# Patient Record
Sex: FEMALE | Race: ASIAN | NOT HISPANIC OR LATINO | Employment: UNEMPLOYED | URBAN - METROPOLITAN AREA
[De-identification: names, ages, dates, MRNs, and addresses within clinical notes are randomized per-mention and may not be internally consistent; named-entity substitution may affect disease eponyms.]

---

## 2020-02-26 ENCOUNTER — TELEPHONE (OUTPATIENT)
Dept: TRANSPLANT | Facility: CLINIC | Age: 55
End: 2020-02-26

## 2020-02-26 NOTE — TELEPHONE ENCOUNTER
----- Message from Jillian Delgado sent at 2/26/2020  4:09 PM CST -----  Have the patients demographic sheet which includes: patients name, mailing address, phone number and insurance information. We will check to see if patient medical records are in Care Everywhere:[facility]    By: Jillian Delgado   Post-Care Instructions: I reviewed with the patient in detail post-care instructions. Patient should keep wound covered and call the office should any redness, pain, swelling or worsening occur. Curette Text (Optional): After the contents were expressed a curette was used to partially remove the cyst wall. Include Sutures?: No Suture Text: The incision was partially closed with Method: 11 blade Lesion Type: Cyst Epidermal Closure: simple interrupted Epidermal Sutures: 4-0 Ethilon Anesthesia Volume In Cc: 1 Preparation Text: The area was prepped in the usual clean fashion. Consent was obtained and risks were reviewed including but not limited to delayed wound healing, infection, need for multiple I and D's, and pain. Detail Level: Detailed Wound Care: Petrolatum Size Of Lesion In Cm (Optional But May Be Required For Some Insurances): 0 Dressing: dry sterile dressing Render Postcare In Note?: Yes

## 2020-02-27 ENCOUNTER — TELEPHONE (OUTPATIENT)
Dept: TRANSPLANT | Facility: CLINIC | Age: 55
End: 2020-02-27

## 2020-02-27 NOTE — TELEPHONE ENCOUNTER
----- Message from Jillian Delgado sent at 2/27/2020  4:56 PM CST -----    Faxed POST TXP CARE AGREEMENT letter to:      Reuben Vee MD, Hepatologists  [address] [address]  P:[phone]  F: [phone]

## 2020-03-02 ENCOUNTER — TELEPHONE (OUTPATIENT)
Dept: TRANSPLANT | Facility: CLINIC | Age: 55
End: 2020-03-02

## 2020-03-02 NOTE — TELEPHONE ENCOUNTER
"----- Message from Jillian Delgado sent at 3/2/2020  7:38 AM CST -----    Pt " POST TXP CARE AGREEMENT LETTER " has been scanned into media.   "

## 2020-03-11 ENCOUNTER — TELEPHONE (OUTPATIENT)
Dept: TRANSPLANT | Facility: CLINIC | Age: 55
End: 2020-03-11

## 2020-03-12 NOTE — TELEPHONE ENCOUNTER
pts liver transplant coordinator informed of insurance issues.  Per the insurance pt will need a PCP referral from [facility]. Spoke with GUICHO at [facility] and pt  notified.

## 2020-03-12 NOTE — TELEPHONE ENCOUNTER
Referral received from Dr Vee    Patient with cryptogenic, NAFLD. MELD 29  ICD-10:  K74.60  Referred for liver transplant for  EVALUATION.    Referral completed and forwarded to [facility].          Insurance:   PRIMARY: ALLYSSA FLORES PHYSICIAN  ID:[id number]  GRP# [id number]  Contact #     SECONDARY: ALLYSSA LOZANO SELEC  ID:[id number]  Contact #

## 2020-03-20 ENCOUNTER — TELEPHONE (OUTPATIENT)
Dept: TRANSPLANT | Facility: CLINIC | Age: 55
End: 2020-03-20

## 2020-03-20 NOTE — TELEPHONE ENCOUNTER
At [facility]. Pt has HMO and has medical grp attached to the plan.  She question if auth was for inpatient or outpt. I informed her it was an outpt eval.

## 2020-03-20 NOTE — TELEPHONE ENCOUNTER
----- Message from Amirah Sorto LPN sent at 3/20/2020 11:51 AM CDT -----  Contact: [facility]// Halle Osei morning,   I think this is for you.     Wally       ----- Message -----  From: Yani ELLIS August  Sent: 3/20/2020  11:22 AM CDT  To: [facility] Liver [facility]x Clinical Support    Reason: Calling to speak with coordinator pertaining to coding on a order she received for mutual pt.    Communication: [phone]

## 2020-03-23 ENCOUNTER — TELEPHONE (OUTPATIENT)
Dept: TRANSPLANT | Facility: CLINIC | Age: 55
End: 2020-03-23

## 2020-03-23 NOTE — TELEPHONE ENCOUNTER
----- Message from Colleen Aranda sent at 3/23/2020 12:18 PM CDT -----  Pt is calling wanting to know about insurance clearance      Pt contact [phone]

## 2020-03-24 ENCOUNTER — DOCUMENTATION ONLY (OUTPATIENT)
Dept: TRANSPLANT | Facility: CLINIC | Age: 55
End: 2020-03-24

## 2020-03-24 NOTE — NURSING
Per   The pts medical grp denied the eval and it is now being sent to [employer]. financial to be notified.

## 2020-04-07 ENCOUNTER — DOCUMENTATION ONLY (OUTPATIENT)
Dept: TRANSPLANT | Facility: CLINIC | Age: 55
End: 2020-04-07

## 2020-04-09 ENCOUNTER — TELEPHONE (OUTPATIENT)
Dept: TRANSPLANT | Facility: CLINIC | Age: 55
End: 2020-04-09

## 2020-04-23 ENCOUNTER — TELEPHONE (OUTPATIENT)
Dept: TRANSPLANT | Facility: CLINIC | Age: 55
End: 2020-04-23

## 2020-04-23 NOTE — TELEPHONE ENCOUNTER
----- Message from Jillian Delgado sent at 4/23/2020 12:38 PM CDT -----  Contact: pt  Yonis    Returned call to pt  and he wanted to know about testing that the pt can have done locally. Told him that the nurse has note done a check list on the pt; I will send a message to the nurse and once she is done with the check list we will send the testing that can be done locally to the ref Md office for him to order.      ----- Message -----  From: Donnell Koo RN  Sent: 4/23/2020  12:13 PM CDT  To: [facility] Liver Referral Pool        ----- Message -----  From: Yani Terry  Sent: 4/23/2020   8:13 AM CDT  To: [facility] Pre-Liver Transplant Clinical    Reason: Calling to speak with coordinator pertaining to test for his wife    Communication: [phone]

## 2020-04-28 ENCOUNTER — TELEPHONE (OUTPATIENT)
Dept: TRANSPLANT | Facility: CLINIC | Age: 55
End: 2020-04-28

## 2020-04-28 NOTE — TELEPHONE ENCOUNTER
----- Message from Tay Kumar RN sent at 4/28/2020  3:40 PM CDT -----  Contact: pt ([place])      ----- Message -----  From: Holland Koo  Sent: 4/28/2020   3:28 PM CDT  To: [facility] Pre-Liver Transplant Clinical    Calling to speak with coordinator     Call back: [phone]

## 2020-04-28 NOTE — TELEPHONE ENCOUNTER
Called lvm re we are now scheduling more evaluations for pts flying in. I lvm I will work on this on Thursday.

## 2020-05-05 ENCOUNTER — TELEPHONE (OUTPATIENT)
Dept: TRANSPLANT | Facility: CLINIC | Age: 55
End: 2020-05-05

## 2020-05-11 ENCOUNTER — TELEPHONE (OUTPATIENT)
Dept: TRANSPLANT | Facility: CLINIC | Age: 55
End: 2020-05-11

## 2020-05-11 NOTE — TELEPHONE ENCOUNTER
----- Message from Jillian Delgado sent at 5/11/2020  2:26 PM CDT -----  Contact: Yonis Coy     Returned call to to pt  and he told me that the pt has been adimitted to [facility] for HE. Pt asked that I send a list of testing that the pt may have done locally to Dr. Reuben Vee office. Fax cover sheet with allowed outside testing to F: [phone]    ----- Message -----  From: Camille Crawford  Sent: 5/11/2020   2:03 PM CDT  To: [facility] Referral Pool    Calling to speak with coordinator       Call Back : [phone]

## 2020-05-15 ENCOUNTER — TELEPHONE (OUTPATIENT)
Dept: TRANSPLANT | Facility: CLINIC | Age: 55
End: 2020-05-15

## 2020-05-15 NOTE — TELEPHONE ENCOUNTER
Pt  called. Pt recently discharged due to encephalopathy.  He is wanting to come soon now.  Will ck testing for eval results.

## 2020-06-10 ENCOUNTER — TELEPHONE (OUTPATIENT)
Dept: TRANSPLANT | Facility: CLINIC | Age: 55
End: 2020-06-10

## 2020-06-10 NOTE — TELEPHONE ENCOUNTER
----- Message from Tay Kumar RN sent at 6/10/2020  2:47 PM CDT -----  Contact: Pt       ----- Message -----  From: Jose Meraz  Sent: 6/10/2020   2:41 PM CDT  To: [facility] Pre-Liver Transplant Clinical    Pt would like call back on info regarding upcoming trip for appointments       [phone])

## 2020-06-11 DIAGNOSIS — Z76.82 ORGAN TRANSPLANT CANDIDATE: Primary | ICD-10-CM

## 2020-06-15 ENCOUNTER — TELEPHONE (OUTPATIENT)
Dept: TRANSPLANT | Facility: CLINIC | Age: 55
End: 2020-06-15

## 2020-06-15 NOTE — TELEPHONE ENCOUNTER
----- Message from Jillian Delgado sent at 6/15/2020  1:58 PM CDT -----  Regarding: FW: pt ()    Returned call to pt  and he told me that the pt was in the hosp  for abd pain where they found cyst on her pancreas. She has been made inactive on the list in [place] and wanted to know if he need to cx her work up appts here. Told him that I will let the nurse know.  ----- Message -----  From: Holland Koo  Sent: 6/15/2020   1:39 PM CDT  To: [facility] Liver Referral Pool  Subject: pt ()                                     Pt  is calling to speak with Raisa     Call back: [phone]

## 2020-06-18 ENCOUNTER — TELEPHONE (OUTPATIENT)
Dept: HEPATOLOGY | Facility: CLINIC | Age: 55
End: 2020-06-18

## 2020-06-18 NOTE — TELEPHONE ENCOUNTER
I received a call from Dr. Reuben Vee from [facility].    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  Recently had abd pain and lipase in 5000s, no stones. She underwent EUS, some side branch IPMNs (cyst fluid- high CEA/lipase) seen, the largest is 1.2 cm.  There is no high risk features of pancreatic cyst or malignancy.

## 2020-06-24 ENCOUNTER — TELEPHONE (OUTPATIENT)
Dept: TRANSPLANT | Facility: CLINIC | Age: 55
End: 2020-06-24

## 2020-06-24 NOTE — TELEPHONE ENCOUNTER
----- Message from Tay Kumar RN sent at 6/24/2020  9:26 AM CDT -----  Contact: Yonis    ----- Message -----  From: Camille Crawford  Sent: 6/24/2020   9:17 AM CDT  To: [facility] Pre-Liver Transplant Clinical      Returning a call.            Call Back :[phone]

## 2020-06-24 NOTE — TELEPHONE ENCOUNTER
Spoke with the patient's caregiver about appointments for Liver Transplant Fast Pass Evaluation scheduled to start on 6/25/20 at 0730.   Patient appointments reviewed along with special instructions and locations.   Questions answered and concerns addressed at this time.

## 2020-06-25 ENCOUNTER — HOSPITAL ENCOUNTER (OUTPATIENT)
Facility: HOSPITAL | Age: 55
LOS: 1 days | Discharge: HOME | End: 2020-06-29
Attending: EMERGENCY MEDICINE | Admitting: HOSPITALIST
Payer: COMMERCIAL

## 2020-06-25 DIAGNOSIS — K75.81 NONALCOHOLIC STEATOHEPATITIS: ICD-10-CM

## 2020-06-25 DIAGNOSIS — K74.60 DECOMPENSATED HEPATIC CIRRHOSIS: ICD-10-CM

## 2020-06-25 DIAGNOSIS — R10.84 GENERALIZED ABDOMINAL PAIN: ICD-10-CM

## 2020-06-25 DIAGNOSIS — R18.8 OTHER ASCITES: ICD-10-CM

## 2020-06-25 DIAGNOSIS — Z87.19 HISTORY OF PANCREATITIS: ICD-10-CM

## 2020-06-25 DIAGNOSIS — F05 DELIRIUM DUE TO MEDICAL CONDITION WITH BEHAVIORAL DISTURBANCE: ICD-10-CM

## 2020-06-25 DIAGNOSIS — K76.82 HEPATIC ENCEPHALOPATHY: Primary | ICD-10-CM

## 2020-06-25 DIAGNOSIS — K74.60 LIVER CIRRHOSIS SECONDARY TO NASH: ICD-10-CM

## 2020-06-25 DIAGNOSIS — D69.6 THROMBOCYTOPENIA: ICD-10-CM

## 2020-06-25 DIAGNOSIS — D53.9 MACROCYTIC ANEMIA: ICD-10-CM

## 2020-06-25 DIAGNOSIS — Z76.82 ORGAN TRANSPLANT CANDIDATE: ICD-10-CM

## 2020-06-25 DIAGNOSIS — K75.81 LIVER CIRRHOSIS SECONDARY TO NASH: ICD-10-CM

## 2020-06-25 DIAGNOSIS — K72.90 DECOMPENSATED HEPATIC CIRRHOSIS: ICD-10-CM

## 2020-06-25 LAB
ALBUMIN SERPL BCP-MCNC: 2.7 G/DL (ref 3.5–5.2)
ALP SERPL-CCNC: 84 U/L (ref 55–135)
ALT SERPL W/O P-5'-P-CCNC: 19 U/L (ref 10–44)
AMMONIA PLAS-SCNC: 93 UMOL/L (ref 10–50)
ANION GAP SERPL CALC-SCNC: 7 MMOL/L (ref 8–16)
AST SERPL-CCNC: 35 U/L (ref 10–40)
BASOPHILS # BLD AUTO: 0.07 K/UL (ref 0–0.2)
BASOPHILS NFR BLD: 1 % (ref 0–1.9)
BILIRUB SERPL-MCNC: 6.3 MG/DL (ref 0.1–1)
BILIRUB UR QL STRIP: NEGATIVE
BUN SERPL-MCNC: 28 MG/DL (ref 6–20)
CALCIUM SERPL-MCNC: 9.9 MG/DL (ref 8.7–10.5)
CHLORIDE SERPL-SCNC: 105 MMOL/L (ref 95–110)
CLARITY UR REFRACT.AUTO: ABNORMAL
CO2 SERPL-SCNC: 22 MMOL/L (ref 23–29)
COLOR UR AUTO: ABNORMAL
CREAT SERPL-MCNC: 1.2 MG/DL (ref 0.5–1.4)
DIFFERENTIAL METHOD: ABNORMAL
EOSINOPHIL # BLD AUTO: 0.1 K/UL (ref 0–0.5)
EOSINOPHIL NFR BLD: 1.1 % (ref 0–8)
ERYTHROCYTE [DISTWIDTH] IN BLOOD BY AUTOMATED COUNT: 13.2 % (ref 11.5–14.5)
EST. GFR  (AFRICAN AMERICAN): 59.2 ML/MIN/1.73 M^2
EST. GFR  (NON AFRICAN AMERICAN): 51.4 ML/MIN/1.73 M^2
GLUCOSE SERPL-MCNC: 121 MG/DL (ref 70–110)
GLUCOSE UR QL STRIP: NEGATIVE
HCT VFR BLD AUTO: 26.9 % (ref 37–48.5)
HGB BLD-MCNC: 8.9 G/DL (ref 12–16)
HGB UR QL STRIP: NEGATIVE
IMM GRANULOCYTES # BLD AUTO: 0.04 K/UL (ref 0–0.04)
IMM GRANULOCYTES NFR BLD AUTO: 0.5 % (ref 0–0.5)
INR PPP: 1.3 (ref 0.8–1.2)
KETONES UR QL STRIP: NEGATIVE
LEUKOCYTE ESTERASE UR QL STRIP: NEGATIVE
LIPASE SERPL-CCNC: 218 U/L (ref 4–60)
LYMPHOCYTES # BLD AUTO: 1.4 K/UL (ref 1–4.8)
LYMPHOCYTES NFR BLD: 18.9 % (ref 18–48)
MAGNESIUM SERPL-MCNC: 2 MG/DL (ref 1.6–2.6)
MCH RBC QN AUTO: 35.6 PG (ref 27–31)
MCHC RBC AUTO-ENTMCNC: 33.1 G/DL (ref 32–36)
MCV RBC AUTO: 108 FL (ref 82–98)
MONOCYTES # BLD AUTO: 0.5 K/UL (ref 0.3–1)
MONOCYTES NFR BLD: 6.8 % (ref 4–15)
NEUTROPHILS # BLD AUTO: 5.3 K/UL (ref 1.8–7.7)
NEUTROPHILS NFR BLD: 71.7 % (ref 38–73)
NITRITE UR QL STRIP: NEGATIVE
NRBC BLD-RTO: 0 /100 WBC
PH UR STRIP: 7 [PH] (ref 5–8)
PHOSPHATE SERPL-MCNC: 3.5 MG/DL (ref 2.7–4.5)
PLATELET # BLD AUTO: 82 K/UL (ref 150–350)
PMV BLD AUTO: 10.6 FL (ref 9.2–12.9)
POTASSIUM SERPL-SCNC: 5 MMOL/L (ref 3.5–5.1)
PROT SERPL-MCNC: 6 G/DL (ref 6–8.4)
PROT UR QL STRIP: NEGATIVE
PROTHROMBIN TIME: 13.1 SEC (ref 9–12.5)
RBC # BLD AUTO: 2.5 M/UL (ref 4–5.4)
SARS-COV-2 RDRP RESP QL NAA+PROBE: NEGATIVE
SODIUM SERPL-SCNC: 134 MMOL/L (ref 136–145)
SP GR UR STRIP: 1.01 (ref 1–1.03)
URN SPEC COLLECT METH UR: ABNORMAL
WBC # BLD AUTO: 7.36 K/UL (ref 3.9–12.7)

## 2020-06-25 PROCEDURE — 96374 THER/PROPH/DIAG INJ IV PUSH: CPT | Mod: NTX

## 2020-06-25 PROCEDURE — 99222 PR INITIAL HOSPITAL CARE,LEVL II: ICD-10-PCS | Mod: NTX,,, | Performed by: HOSPITALIST

## 2020-06-25 PROCEDURE — 99223 1ST HOSP IP/OBS HIGH 75: CPT | Mod: NTX,,, | Performed by: INTERNAL MEDICINE

## 2020-06-25 PROCEDURE — 63600175 PHARM REV CODE 636 W HCPCS: Mod: NTX | Performed by: HOSPITALIST

## 2020-06-25 PROCEDURE — 99223 PR INITIAL HOSPITAL CARE,LEVL III: ICD-10-PCS | Mod: NTX,,, | Performed by: INTERNAL MEDICINE

## 2020-06-25 PROCEDURE — 99285 EMERGENCY DEPT VISIT HI MDM: CPT | Mod: NTX,,, | Performed by: PHYSICIAN ASSISTANT

## 2020-06-25 PROCEDURE — 83735 ASSAY OF MAGNESIUM: CPT | Mod: NTX

## 2020-06-25 PROCEDURE — 85610 PROTHROMBIN TIME: CPT | Mod: NTX

## 2020-06-25 PROCEDURE — 63600175 PHARM REV CODE 636 W HCPCS: Mod: NTX | Performed by: PHYSICIAN ASSISTANT

## 2020-06-25 PROCEDURE — 81003 URINALYSIS AUTO W/O SCOPE: CPT | Mod: NTX

## 2020-06-25 PROCEDURE — 25000003 PHARM REV CODE 250: Mod: NTX | Performed by: HOSPITALIST

## 2020-06-25 PROCEDURE — 83690 ASSAY OF LIPASE: CPT | Mod: NTX

## 2020-06-25 PROCEDURE — 20600001 HC STEP DOWN PRIVATE ROOM: Mod: NTX

## 2020-06-25 PROCEDURE — U0002 COVID-19 LAB TEST NON-CDC: HCPCS | Mod: NTX

## 2020-06-25 PROCEDURE — 84100 ASSAY OF PHOSPHORUS: CPT | Mod: NTX

## 2020-06-25 PROCEDURE — 80053 COMPREHEN METABOLIC PANEL: CPT | Mod: NTX

## 2020-06-25 PROCEDURE — 94761 N-INVAS EAR/PLS OXIMETRY MLT: CPT | Mod: NTX

## 2020-06-25 PROCEDURE — 99222 1ST HOSP IP/OBS MODERATE 55: CPT | Mod: NTX,,, | Performed by: HOSPITALIST

## 2020-06-25 PROCEDURE — 82140 ASSAY OF AMMONIA: CPT | Mod: NTX

## 2020-06-25 PROCEDURE — 99285 EMERGENCY DEPT VISIT HI MDM: CPT | Mod: 25,NTX

## 2020-06-25 PROCEDURE — 85025 COMPLETE CBC W/AUTO DIFF WBC: CPT | Mod: NTX

## 2020-06-25 PROCEDURE — 99285 PR EMERGENCY DEPT VISIT,LEVEL V: ICD-10-PCS | Mod: NTX,,, | Performed by: PHYSICIAN ASSISTANT

## 2020-06-25 RX ORDER — LACTULOSE 10 G/10G
20 SOLUTION ORAL 3 TIMES DAILY
Status: DISCONTINUED | OUTPATIENT
Start: 2020-06-25 | End: 2020-06-26

## 2020-06-25 RX ORDER — ACETAMINOPHEN 325 MG/1
325 TABLET ORAL EVERY 8 HOURS PRN
Status: DISCONTINUED | OUTPATIENT
Start: 2020-06-25 | End: 2020-06-29 | Stop reason: HOSPADM

## 2020-06-25 RX ORDER — ERGOCALCIFEROL 1.25 MG/1
50000 CAPSULE ORAL
COMMUNITY
End: 2020-10-07 | Stop reason: SDUPTHER

## 2020-06-25 RX ORDER — ZINC SULFATE 50(220)MG
220 CAPSULE ORAL
COMMUNITY
End: 2020-12-11

## 2020-06-25 RX ORDER — LIDOCAINE HYDROCHLORIDE 20 MG/ML
10 SOLUTION OROPHARYNGEAL ONCE
Status: COMPLETED | OUTPATIENT
Start: 2020-06-25 | End: 2020-06-25

## 2020-06-25 RX ORDER — LEVOTHYROXINE SODIUM 150 UG/1
150 TABLET ORAL
Status: DISCONTINUED | OUTPATIENT
Start: 2020-06-26 | End: 2020-06-29 | Stop reason: HOSPADM

## 2020-06-25 RX ORDER — FUROSEMIDE 40 MG/1
40 TABLET ORAL DAILY
Status: DISCONTINUED | OUTPATIENT
Start: 2020-06-25 | End: 2020-06-25

## 2020-06-25 RX ORDER — POLYETHYLENE GLYCOL 3350 17 G/17G
17 POWDER, FOR SOLUTION ORAL 2 TIMES DAILY PRN
Status: DISCONTINUED | OUTPATIENT
Start: 2020-06-25 | End: 2020-06-28

## 2020-06-25 RX ORDER — ONDANSETRON 2 MG/ML
4 INJECTION INTRAMUSCULAR; INTRAVENOUS EVERY 8 HOURS PRN
Status: DISCONTINUED | OUTPATIENT
Start: 2020-06-25 | End: 2020-06-29 | Stop reason: HOSPADM

## 2020-06-25 RX ORDER — SPIRONOLACTONE 50 MG/1
100 TABLET, FILM COATED ORAL DAILY
Status: DISCONTINUED | OUTPATIENT
Start: 2020-06-25 | End: 2020-06-29 | Stop reason: HOSPADM

## 2020-06-25 RX ORDER — IBUPROFEN 200 MG
24 TABLET ORAL
Status: DISCONTINUED | OUTPATIENT
Start: 2020-06-25 | End: 2020-06-29 | Stop reason: HOSPADM

## 2020-06-25 RX ORDER — NYSTATIN 100000 [USP'U]/G
POWDER TOPICAL 2 TIMES DAILY
COMMUNITY

## 2020-06-25 RX ORDER — CHOLECALCIFEROL (VITAMIN D3) 25 MCG
1000 TABLET ORAL DAILY
Status: ON HOLD | COMMUNITY
End: 2020-06-29 | Stop reason: HOSPADM

## 2020-06-25 RX ORDER — LACTULOSE 10 G/10G
SOLUTION ORAL
Status: ON HOLD | COMMUNITY
End: 2020-08-12 | Stop reason: HOSPADM

## 2020-06-25 RX ORDER — ACETAMINOPHEN, DIPHENHYDRAMINE HCL, PHENYLEPHRINE HCL 325; 25; 5 MG/1; MG/1; MG/1
10 TABLET ORAL NIGHTLY PRN
COMMUNITY

## 2020-06-25 RX ORDER — FUROSEMIDE 40 MG/1
60 TABLET ORAL DAILY
Status: ON HOLD | COMMUNITY
End: 2020-07-10 | Stop reason: SDUPTHER

## 2020-06-25 RX ORDER — ENOXAPARIN SODIUM 100 MG/ML
40 INJECTION SUBCUTANEOUS EVERY 24 HOURS
Status: DISCONTINUED | OUTPATIENT
Start: 2020-06-25 | End: 2020-06-29 | Stop reason: HOSPADM

## 2020-06-25 RX ORDER — ALUMINUM HYDROXIDE, MAGNESIUM HYDROXIDE, AND SIMETHICONE 2400; 240; 2400 MG/30ML; MG/30ML; MG/30ML
30 SUSPENSION ORAL ONCE
Status: COMPLETED | OUTPATIENT
Start: 2020-06-25 | End: 2020-06-25

## 2020-06-25 RX ORDER — MIDODRINE HYDROCHLORIDE 10 MG/1
10 TABLET ORAL 3 TIMES DAILY
COMMUNITY
End: 2020-09-14 | Stop reason: SDUPTHER

## 2020-06-25 RX ORDER — PANTOPRAZOLE SODIUM 40 MG/1
40 TABLET, DELAYED RELEASE ORAL
Status: DISCONTINUED | OUTPATIENT
Start: 2020-06-26 | End: 2020-06-29 | Stop reason: HOSPADM

## 2020-06-25 RX ORDER — ONDANSETRON 4 MG/1
4 TABLET, ORALLY DISINTEGRATING ORAL EVERY 8 HOURS PRN
Status: DISCONTINUED | OUTPATIENT
Start: 2020-06-25 | End: 2020-06-29 | Stop reason: HOSPADM

## 2020-06-25 RX ORDER — HALOPERIDOL 1 MG/1
1 TABLET ORAL 2 TIMES DAILY
Status: ON HOLD | COMMUNITY
End: 2020-06-29 | Stop reason: SDUPTHER

## 2020-06-25 RX ORDER — MORPHINE SULFATE 4 MG/ML
4 INJECTION, SOLUTION INTRAMUSCULAR; INTRAVENOUS
Status: COMPLETED | OUTPATIENT
Start: 2020-06-25 | End: 2020-06-25

## 2020-06-25 RX ORDER — LIDOCAINE 50 MG/G
1 PATCH TOPICAL
COMMUNITY

## 2020-06-25 RX ORDER — SODIUM CHLORIDE 0.9 % (FLUSH) 0.9 %
5 SYRINGE (ML) INJECTION
Status: DISCONTINUED | OUTPATIENT
Start: 2020-06-25 | End: 2020-06-29 | Stop reason: HOSPADM

## 2020-06-25 RX ORDER — LEVOTHYROXINE SODIUM 150 UG/1
150 TABLET ORAL
COMMUNITY
End: 2020-07-28 | Stop reason: SDUPTHER

## 2020-06-25 RX ORDER — ACETAMINOPHEN 500 MG
500 TABLET ORAL EVERY 6 HOURS PRN
Status: ON HOLD | COMMUNITY
End: 2020-06-29 | Stop reason: SDUPTHER

## 2020-06-25 RX ORDER — IBUPROFEN 200 MG
16 TABLET ORAL
Status: DISCONTINUED | OUTPATIENT
Start: 2020-06-25 | End: 2020-06-29 | Stop reason: HOSPADM

## 2020-06-25 RX ORDER — SPIRONOLACTONE 50 MG/1
100 TABLET, FILM COATED ORAL DAILY
Status: ON HOLD | COMMUNITY
End: 2020-08-12 | Stop reason: SDUPTHER

## 2020-06-25 RX ORDER — LACTULOSE 10 G/15ML
20 SOLUTION ORAL 3 TIMES DAILY
Status: DISCONTINUED | OUTPATIENT
Start: 2020-06-25 | End: 2020-06-25

## 2020-06-25 RX ORDER — PANTOPRAZOLE SODIUM 40 MG/1
40 TABLET, DELAYED RELEASE ORAL
COMMUNITY

## 2020-06-25 RX ORDER — GLUCAGON 1 MG
1 KIT INJECTION
Status: DISCONTINUED | OUTPATIENT
Start: 2020-06-25 | End: 2020-06-29 | Stop reason: HOSPADM

## 2020-06-25 RX ORDER — MIDODRINE HYDROCHLORIDE 5 MG/1
10 TABLET ORAL 3 TIMES DAILY
Status: DISCONTINUED | OUTPATIENT
Start: 2020-06-25 | End: 2020-06-29 | Stop reason: HOSPADM

## 2020-06-25 RX ORDER — SIMETHICONE 80 MG
80 TABLET,CHEWABLE ORAL EVERY 6 HOURS PRN
COMMUNITY

## 2020-06-25 RX ORDER — MIDODRINE HYDROCHLORIDE 5 MG/1
5 TABLET ORAL 3 TIMES DAILY
Status: DISCONTINUED | OUTPATIENT
Start: 2020-06-25 | End: 2020-06-25

## 2020-06-25 RX ADMIN — FUROSEMIDE 60 MG: 40 TABLET ORAL at 02:06

## 2020-06-25 RX ADMIN — RIFAXIMIN 550 MG: 550 TABLET ORAL at 09:06

## 2020-06-25 RX ADMIN — MIDODRINE HYDROCHLORIDE 10 MG: 5 TABLET ORAL at 02:06

## 2020-06-25 RX ADMIN — ALUMINUM HYDROXIDE, MAGNESIUM HYDROXIDE, AND DIMETHICONE 30 ML: 400; 400; 40 SUSPENSION ORAL at 03:06

## 2020-06-25 RX ADMIN — LACTULOSE 20 G: 10 SOLUTION ORAL at 03:06

## 2020-06-25 RX ADMIN — MORPHINE SULFATE 4 MG: 4 INJECTION INTRAVENOUS at 04:06

## 2020-06-25 RX ADMIN — RIFAXIMIN 550 MG: 550 TABLET ORAL at 02:06

## 2020-06-25 RX ADMIN — LIDOCAINE HYDROCHLORIDE 10 ML: 20 SOLUTION ORAL; TOPICAL at 03:06

## 2020-06-25 RX ADMIN — ENOXAPARIN SODIUM 40 MG: 100 INJECTION SUBCUTANEOUS at 05:06

## 2020-06-25 RX ADMIN — MIDODRINE HYDROCHLORIDE 5 MG: 5 TABLET ORAL at 08:06

## 2020-06-25 RX ADMIN — MIDODRINE HYDROCHLORIDE 10 MG: 5 TABLET ORAL at 09:06

## 2020-06-25 RX ADMIN — SPIRONOLACTONE 100 MG: 50 TABLET ORAL at 02:06

## 2020-06-25 RX ADMIN — LACTULOSE 20 G: 10 SOLUTION ORAL at 09:06

## 2020-06-25 NOTE — ED TRIAGE NOTES
Zeina Mahoney, a 54 y.o. female presents to the ED w/ complaint of abdominal pain    Triage note:  Chief Complaint   Patient presents with    Abdominal Pain     Pt states constant abdominal pain in upper middle abdomen starting at 1am this morning. Pt scheduled to have evaluation for liver tx tomorrow. Pt previous hospitalized with same complaint.     Review of patient's allergies indicates:   Allergen Reactions    Aspirin     Ciprofloxacin      No past medical history on file.       Adult Physical Assessment  LOC: Zeina Mahoney, 54 y.o. female verified via two identifiers.  The patient is awake, alert, oriented and speaking appropriately at this time.  APPEARANCE: Patient resting comfortably and appears to be in no acute distress at this time. Patient is clean and well groomed, patient's clothing is properly fastened.  SKIN:The skin is warm and dry, patient has normal skin turgor and moist mucus membranes, skin intact, no breakdown or brusing noted. Patient is jaundice  MUSCULOSKELETAL: Patient moving all extremities well, no obvious swelling or deformities noted.  RESPIRATORY: Airway is open and patent, respirations are spontaneous, patient has a normal effort and rate, no accessory muscle use noted.  CARDIAC: Patient has a normal rate and rhythm, no periphreal edema noted in any extremity, capillary refill < 3 seconds in all extremities  ABDOMEN: Soft and non tender to palpation, no abdominal distention noted. Bowel sounds present in all four quadrants.  NEUROLOGIC: Eyes open spontaneously, behavior appropriate to situation, follows commands, facial expression symmetrical, bilateral hand grasp equal and even, purposeful motor response noted, normal sensation in all extremities when touched with a finger.

## 2020-06-25 NOTE — HPI
Patient is a 54-year-old female with past medical history of CHAUDHARY/NEHAL cirrhosis complicated by encephalopathy, volume overload, sarcopenia, listed for transplant at [facility] in [place], gravies disease status post iodine radiation, hypercalcemia due to hyperparathyroidism, recent onset abdominal pain recurrent admissions due to acute pancreatitis of unclear etiology, who presented to the ED with abdominal pain.  Hepatology consulted for evaluation    History obtained from patient's report and chart review    Patient was listed at [facility] for liver transplant secondary to Chaudhary cirrhosis.  Most recently she was admitted with 2 episodes of epigastric abdominal pain and was found to have pancreatitis based on a lipase that was elevated. Abdominal imaging did not show signs of pancreatitis, although showed cystic lesions in the tail of the pancreas. She was inactive pending identification of pancreas lesions. She underwent EUS and showed multiple cystic lesions consistent with IPMN.  Neoplasia seems to be less likely.  Pain was thought inconsistent with pancreatitis however.  Patient was discharged with a lidocaine patch for possible musculoskeletal type pain.    Patient has significant decompensation due to cirrhosis, however her meld score is relatively low in the low 20s, and due to long wait in [facility], patient decided to get re-evaluated in [place] for liver transplant.    The patient was scheduled for a clinic appointment earlier today at 7:30 a.m., however she presented directly to the ED due to worsening abdominal pain similar to her prior episodes.  The patient points towards the epigastric area.  She denies nausea, vomiting, change in appetite.  She denies fevers or chills or change in bowel habits.  She denies melena or hematochezia.      Labs today are stable compared to her labs last done in [place].  Her lipase is 214 down trending from last reading.  An ultrasound of the abdomen shows signs of  cirrhosis with moderate volume ascites.

## 2020-06-25 NOTE — ASSESSMENT & PLAN NOTE
Patient is 54-year-old female with past medical history of CHAUDHARY cirrhosis complicated by hepatic encephalopathy and volume overload presented to the hospital for evaluation of the abdominal pain.    Unclear etiology of abdominal pain, this appears to have been extensively evaluated in [place] with no underlying etiology.  Lipase here argues against diagnosis of pancreatitis.  Recommend paracentesis to evaluate fluid for SBP.    Given meld score of 20 and relative stability of the patient's liver disease otherwise, will defer evaluation to outpatient setting.  Once pain is controlled, the patient can be discharged and transplant evaluation can be resumed outpatient.    -abdominal pain:  Unclear etiology, pain management, and paracentesis to rule out SBP  -history of pancreatic cysts:  Based on prior evaluation, patient had EUS with FNA suspicious IPMN.  Despite mildly elevated CEA, this is thought to be benign.  -decompensated cirrhosis secondary to CHAUDHARY (possible alcohol): obtain PETH.  Given meld score stability, will defer evaluation to outpatient.  Patient is [place] at [facility] [place].  -hepatic encephalopathy:  Continue lactulose, rifaximin, and zinc.  -volume overload:  Continue home diuretics; furosemide 60 mg daily, and spironolactone 100 mg daily.  -HCC and variceal screening up-to-date on Care everywhere.

## 2020-06-25 NOTE — PROGRESS NOTES
Transplant Recipient Adult Psychosocial Assessment - Inpatient Evaluation     Pt's , Yonis Coy, was present for the evaluation and assisted her with answering questions.     Zeina Mahoney  [address] [facility] [address] [place] [address]  Telephone Information:   Mobile [phone]   Home  [phone] (home)  Work  There is no work phone number on file.  E-mail  No e-mail address on record    Sex: female  YOB: 1965  Age: 54 y.o.    Encounter Date: 2020  [place] Citizen: yes  Primary Language: English   Needed: no    Emergency Contact:  Name: Yonis Mahoney   Relationship:   Address: [address], [place], [place] [address]  Phone Numbers:  [phone] (mobile)    Family/Social Support:   Number of dependents/: Pt has no dependents that she provides cares for. Pt has 2 outside dogs and 1 inside cat in the home. Pt's daughter will take care of the pets in the event that pt is relocated for transplant.   Marital history: Pt has been  to her  for 30 years and this is her only marriage.   Other family dynamics: Pt has 3 adult children; 2 sons - Yonis Mahoney  (29), Ramiro Mahoney (26), and 1 daughter - Riley Mahoney (21). Pt's parents are both . Pt has 2 older sisters that live near her and 2 older brothers that are both .     Household Composition:  Name: Zeina Mahoney   Age: 54  Relationship: patient  Does person drive? no     Name: Yonis Mahoney  Age: u/k  Relationship:   Does person drive? yes    Riley Mahoney (daughter) lives on the same property as the pt but in a different home.     Do you and your caregivers have access to reliable transportation? yes  PRIMARY CAREGIVER: Yonis Mahoney, pt's , will be the primary caregiver. Phone: [phone]     provided in-depth information to patient and caregiver regarding pre- and post-transplant caregiver role.   strongly encourages patient and caregiver to have  concrete plan regarding post-transplant care giving, including back-up caregiver(s) to ensure care giving needs are met as needed.    Patient and Caregiver states understanding all aspects of caregiver role/commitment and is able/willing/committed to being caregiver to the fullest extent necessary.    Patient and Caregiver verbalizes understanding of the education provided today and caregiver responsibilities.         remains available. Patient and Caregiver agree to contact  in a timely manner if concerns arise.      Able to take time off work without financial concerns: yes. Pt's  is retired.     Additional Significant Others who will Assist with Transplant:  Name: Ramiro Mahoney; Ph: [phone]  Age: 26  City: [facility]: [place]  Relationship: son  Does person drive? yes    Name: Riley Mahoney; ; [phone]  Age: 21  City: [facility]: [place]  Relationship: daughter  Does person drive? yes    Living Will: no  Healthcare Power of : no  Advance Directives on file: <<no information> per medical record.  Verbally reviewed LW/HCPA information.   provided patient with copy of LW/HCPA documents and provided education on completion of forms.    Living Donors: No. Education was given to patient.    Highest Education Level: High School (9-12) or GED  Reading Ability: 12th grade  Reports difficulty with: reading; pt wears readers  Learns Best By:  Pt reports that she learns best by seeing, hearing, and viewing demonstration.      Status: no  VA Benefits: no     Working for Income: No  If no, reason not working: Patient choice. Pt stopped working when her kids were born to raise her children and stayed home.   Patient is retired from working in retail. 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    Spouse/Significant Other Employment: Pt's  is retired, worked in [employer] in [place].     Disabled: no    Monthly Income:  skilled nursing: $9,000 - Pt's  's [facility].   Able to afford all costs now and if transplanted, including medications: yes  Patient and Caregiver verbalizes understanding of personal responsibilities related to transplant costs and the importance of having a financial plan to ensure that patients transplant costs are fully covered.      provided fundraising information/education.  Patient and Caregiver verbalizes understanding.   remains available.    Insurance:   Payor/Plan Subscr  Sex Relation Sub. Ins. ID Effective Group Num   1. BLUE CROSS BL* FLORIAN MEDINA 9/10/1964 Male  [id number]                                   [address] [address]     Primary Insurance (for UNOS reporting): Private Insurance  Secondary Insurance (for UNOS reporting): None  Patient and Caregiver verbalizes clear understanding that patient may experience difficulty obtaining and/or be denied insurance coverage post-surgery. This includes and is not limited to disability insurance, life insurance, health insurance, burial insurance, long term care insurance, and other insurances.    Patient and Caregiver also reports understanding that future health concerns related to or unrelated to transplantation may not be covered by patient's insurance.  Resources and information provided and reviewed.      Patient and Caregiver provides verbal permission to release any necessary information to outside resources for patient care and discharge planning.  Resources and information provided are reviewed.      Dialysis Adherence:  Patient and Caregiver reports no history of dialysis treatment.      Infusion Service: patient utilizing? no  Home Health: patient utilizing? yes - Pt and pt's  reports that they have utilized home health services for the past 4 months. Pt's  reports that someone comes weekly for blood draws and checking pt's vitals. Pt's  states that they plan on continuing these services. HH last visited the  patient on June 16th, before the pt and  left for [place] for transplant evaluation.   DME: yes - Pt's  reports that the pt utilizes several DME devices including a hospital bed, walker, wheelchair, bathroom pull-bars, and shower chair.   Pulmonary/Cardiac Rehab: no   ADLS:  Pt reports she needs constant assistance. Pt's  reports that there is always someone there to help the pt with anything she needs and requires assistance with most of her ADLS. Pt's  she has a history of falls, episodes of encephalopathy, and can not be left alone for these reasons.     Adherence:   Pt and pt's  reports no issues or concerns with adherence with medical appointments and taking prescribed medications.  Adherence education and counseling provided.     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  Alcohol: Pt and pt's  reports that the pt used to be a social drinker and would have a few glasses of wine on occasion. Pt reports that she has not had any ETOH use in over a year when she recieved her ESLD diagnosis and did not have any problems with quitting.   Illicit Drugs/Non-prescribed Medications: none, patient denies any use.    Patient and Caregiver states clear understanding of the potential impact of substance use as it relates to transplant candidacy and is aware of possible random substance screening.  Substance abstinence/cessation counseling, education and resources provided and reviewed.     Arrests/DWI/Treatment/Rehab: patient denies    Psychiatric History:    Mental  Health: Pt denies any MH diagnosis or any issues with depression or anxiety past/present.  Psychiatrist/Counselor: no  Medications:  no  Suicide/Homicide Issues: Pt denies any SI/HI (past/present).    Safety at home: Pt and pt's  reports not concerns safety in the home.     Knowledge: Patient and Caregiver states having clear understanding and realistic expectations regarding the potential risks and potential benefits of organ transplantation and organ donation, agrees to discuss with health care team members and support system members and to utilize available resources and express questions and/or concerns in order to further facilitate the pt informed decision-making.  Resources and information provided and reviewed.     Patient and Caregiver is aware of [facility]'s affiliation and/or partnership with agencies in home health care, LTAC, SNF, [facility] [place], and other hospitals and clinics.    Understanding: Patient and Caregiver reports having a clear understanding of the many lifetime commitments involved with being a transplant recipient, including costs, compliance, medications, lab work, procedures, appointments, concrete and financial planning, preparedness, timely and appropriate communication of concerns, abstinence (ETOH, tobacco, illicit non-prescribed drugs), adherence to all health care team recommendations, support system and caregiver involvement, appropriate and timely resource utilization and follow-through, mental health counseling as needed/recommended, and patient and caregiver responsibilities.  Social Service Handbook, resources and detailed educational information provided and reviewed.  Educational information provided.    Patient and Caregiver also reports current and expected compliance with health care regime and states having a clear understanding of the importance of compliance.      Patient and Caregiver reports a clear understanding that risks and benefits may be involved with organ  transplantation and with organ donation.      Patient and Caregiver also reports clear understanding that psychosocial risk factors may affect patient, and include but are not limited to feelings of depression, generalized anxiety, anxiety regarding dependence on others, post traumatic stress disorder, feelings of guilt and other emotional and/or mental concerns, and/or exacerbation of existing mental health concerns.  Detailed resources provided and discussed.     Patient and Caregiver agrees to access appropriate resources in a timely manner as needed and/or as recommended, and to communicate concerns appropriately.  Patient and Caregiver also reports a clear understanding of treatment options available.      reviewed education, provided additional information, and answered questions.    Feelings or Concerns: Pt reports that she is nervous and scared about the transplant process and nervous that she will not receive a transplant.     Coping: Identify Patient & Caregiver Strategies to Damon:   1. Currently & Pre-transplant - Pt marlene with surrounding herself with family and spending time with her  and children.    2. At the time of surgery - Pt reported that she will continue to cope by surrounding herself with family and spending time with her  and children.    3. During post-Transplant & Recovery Period - Pt reported that she will continue to cope by surrounding herself with family and spending time with her  and children.     Goals: Pt reports she looks forward to fishing and going to the beach after she is recovered from transplant.  Patient referred to Vocational Rehabilitation.    Interview Behavior: Patient presents as alert and oriented x 4, good eye contact, well groomed, recall good, concentration/judgement good and average intelligence. Pt was sitting up in the chair in her room on [facility]. Pt's  answered a majority of the questions from the assessment due to the pt  not feeling well as well as eating her lunch. Pt's  presents as alert and oriented x 4, good eye contact, and asking/answering questions appropriately.     Pt was present at the hospital to present to the transplant clinic for liver transplant evauation. From there the pt reported to the ED for abdominal pain and then was admitted into TSU. SW met with the pt in her room on TSU.     Transplant Social Work - Candidacy  Assessment/Plan:     Psychosocial Suitability: Patient presents as an acceptable candidate for liver transplant at this time. Based on psychosocial risk factors, patient presents as low risk. Pt's protective factors includes a supportive family and caregiver plan, and a lack of substance abuse and mental health concerns.     Recommendations/Additional Comments:  -fundraising  -local lodging     LUKE Alfonso    Supervised by Anthony Ortiz LMSW.

## 2020-06-25 NOTE — CONSULTS
[facility]  Hepatology  Consult Note    Patient Name: Zeina Mahoney  MRN: [id number]  Admission Date: 6/25/2020  Hospital Length of Stay: 0 days  Attending Provider: Elen Dumont MD   Primary Care Physician: Primary Doctor No  Principal Problem:Generalized abdominal pain    Inpatient consult to Hepatology  Consult performed by: Tasha Red MD  Consult ordered by: Elen Dumont MD        Subjective:     Transplant status: Pre-transplant    HPI:  Patient is a 54-year-old female with past medical history of GRAJEDA/NEHAL cirrhosis complicated by encephalopathy, volume overload, sarcopenia, listed for transplant at [facility] in [place], gravies disease status post iodine radiation, hypercalcemia due to hyperparathyroidism, recent onset abdominal pain recurrent admissions due to acute pancreatitis of unclear etiology, who presented to the ED with abdominal pain.  Hepatology consulted for evaluation    History obtained from patient's report and chart review    Patient was listed at [facility] for liver transplant secondary to Grajeda cirrhosis.  Most recently she was admitted with 2 episodes of epigastric abdominal pain and was found to have pancreatitis based on a lipase that was elevated. Abdominal imaging did not show signs of pancreatitis, although showed cystic lesions in the tail of the pancreas. She was inactive pending identification of pancreas lesions. She underwent EUS and showed multiple cystic lesions consistent with IPMN.  Neoplasia seems to be less likely.  Pain was thought inconsistent with pancreatitis however.  Patient was discharged with a lidocaine patch for possible musculoskeletal type pain.    Patient has significant decompensation due to cirrhosis, however her meld score is relatively low in the low 20s, and due to long wait in [facility], patient decided to get re-evaluated in [place] for liver transplant.    The patient was scheduled for a clinic appointment earlier today  at 7:30 a.m., however she presented directly to the ED due to worsening abdominal pain similar to her prior episodes.  The patient points towards the epigastric area.  She denies nausea, vomiting, change in appetite.  She denies fevers or chills or change in bowel habits.  She denies melena or hematochezia.      Labs today are stable compared to her labs last done in [place].  Her lipase is 214 down trending from last reading.  An ultrasound of the abdomen shows signs of cirrhosis with moderate volume ascites.    Review of Systems   Constitutional: Positive for fatigue. Negative for activity change, appetite change, chills and fever.   HENT: Negative for trouble swallowing.    Respiratory: Negative for cough, choking, chest tightness and shortness of breath.    Cardiovascular: Negative for chest pain and leg swelling.   Gastrointestinal: Positive for abdominal pain (epigastric). Negative for abdominal distention, anal bleeding, blood in stool, constipation, diarrhea, nausea and vomiting.   Genitourinary: Negative for difficulty urinating and dysuria.   Musculoskeletal: Negative for arthralgias and back pain.   Skin: Positive for color change. Negative for pallor.   Neurological: Positive for dizziness and weakness. Negative for headaches.   Psychiatric/Behavioral: Positive for confusion. Negative for agitation.       Past Medical History:   Diagnosis Date    Decompensated hepatic cirrhosis 6/25/2020       No past surgical history on file.    Family history of liver disease: No    Review of patient's allergies indicates:   Allergen Reactions    Aspirin Tinitus    Ciprofloxacin Rash       Tobacco Use    Smoking status: Not on file   Substance and Sexual Activity    Alcohol use: Not on file    Drug use: Not on file    Sexual activity: Not on file       Medications Prior to Admission   Medication Sig Dispense Refill Last Dose    acetaminophen (TYLENOL) 500 MG tablet Take 500 mg by mouth every 6 (six) hours as  needed for Pain.       ergocalciferol (VITAMIN D2) 50,000 unit Cap Take 50,000 Units by mouth every 7 days.   tuesday    furosemide (LASIX) 40 MG tablet Take 60 mg by mouth once daily.        haloperidoL (HALDOL) 1 MG tablet Take 1 mg by mouth 2 (two) times daily.       lactulose (CEPHULAC) 10 gram packet Take 1 and a half packets three times a day        levothyroxine (SYNTHROID) 150 MCG tablet Take 150 mcg by mouth before breakfast.       lidocaine (LIDODERM) 5 % Place 1 patch onto the skin every 24 hours. Remove & Discard patch within 12 hours or as directed by MD       melatonin 10 mg Tab Take 10 mg by mouth nightly as needed (sleep).       midodrine (PROAMATINE) 10 MG tablet Take 10 mg by mouth 3 (three) times daily.       nystatin (MYCOSTATIN) powder Apply topically 2 (two) times daily. Apply to affected area twice a day       pantoprazole (PROTONIX) 40 MG tablet Take 40 mg by mouth before breakfast.       rifAXIMin (XIFAXAN) 550 mg Tab Take 550 mg by mouth 2 (two) times daily.        simethicone (MYLICON) 80 MG chewable tablet Take 80 mg by mouth every 6 (six) hours as needed for Flatulence. Take 2 tablets by mouth every 6 hours as needed for gas       spironolactone (ALDACTONE) 50 MG tablet Take 100 mg by mouth once daily.        vitamin D (VITAMIN D3) 1000 units Tab Take 1,000 Units by mouth once daily.       zinc sulfate (ZINCATE) 220 (50) mg capsule Take 220 mg by mouth before breakfast.          Objective:     Vital Signs (Most Recent):  Temp: 97.2 °F (36.2 °C) (06/25/20 1222)  Pulse: 82 (06/25/20 1400)  Resp: 16 (06/25/20 1300)  BP: 102/62 (06/25/20 1300)  SpO2: 99 % (06/25/20 1400) Vital Signs (24h Range):  Temp:  [97.2 °F (36.2 °C)-98.5 °F (36.9 °C)] 97.2 °F (36.2 °C)  Pulse:  [67-91] 82  Resp:  [13-19] 16  SpO2:  [99 %-100 %] 99 %  BP: ()/(50-66) 102/62     Weight: 60.9 kg (134 lb 4.2 oz) (06/25/20 0640)  Body mass index is 23.05 kg/m².    Physical Exam  Constitutional:        General: She is not in acute distress.     Appearance: She is well-developed. She is ill-appearing.   HENT:      Head: Normocephalic.   Eyes:      General: Scleral icterus present.      Conjunctiva/sclera: Conjunctivae normal.   Neck:      Musculoskeletal: Normal range of motion and neck supple.   Cardiovascular:      Rate and Rhythm: Normal rate and regular rhythm.   Pulmonary:      Effort: Pulmonary effort is normal.      Breath sounds: Normal breath sounds.   Abdominal:      General: Bowel sounds are normal. There is no distension.      Palpations: Abdomen is soft. There is no mass.      Tenderness: There is abdominal tenderness (epigastric). There is no guarding or rebound.   Musculoskeletal: Normal range of motion.   Skin:     General: Skin is warm and dry.      Coloration: Skin is jaundiced.   Neurological:      Mental Status: She is alert and oriented to person, place, and time.      Comments: Positive asterixis         MELD-Na score: 20 at 6/25/2020  4:41 AM  MELD score: 18 at 6/25/2020  4:41 AM  Calculated from:  Serum Creatinine: 1.2 mg/dL at 6/25/2020  4:41 AM  Serum Sodium: 134 mmol/L at 6/25/2020  4:41 AM  Total Bilirubin: 6.3 mg/dL at 6/25/2020  4:41 AM  INR(ratio): 1.3 at 6/25/2020  4:41 AM  Age: 54 years 9 months    Significant Labs:  CBC:   Recent Labs   Lab 06/25/20  0441   WBC 7.36   RBC 2.50*   HGB 8.9*   HCT 26.9*   PLT 82*     BMP:   Recent Labs   Lab 06/25/20  0441   *   *   K 5.0      CO2 22*   BUN 28*   CREATININE 1.2   CALCIUM 9.9     CMP:   Recent Labs   Lab 06/25/20  0441   *   CALCIUM 9.9   ALBUMIN 2.7*   PROT 6.0   *   K 5.0   CO2 22*      BUN 28*   CREATININE 1.2   ALKPHOS 84   ALT 19   AST 35   BILITOT 6.3*     Coagulation:   Recent Labs   Lab 06/25/20  0441   INR 1.3*       Significant Imaging:  Labs: Reviewed  US: Reviewed  CT: Reviewed    Assessment/Plan:     Decompensated hepatic cirrhosis  Patient is 54-year-old female with past medical  history of CHAUDHARY cirrhosis complicated by hepatic encephalopathy and volume overload presented to the hospital for evaluation of the abdominal pain.    Unclear etiology of abdominal pain, this appears to have been extensively evaluated in [place] with no underlying etiology.  Lipase here argues against diagnosis of pancreatitis.  Recommend paracentesis to evaluate fluid for SBP.    Given meld score of 20 and relative stability of the patient's liver disease otherwise, will defer evaluation to outpatient setting.  Once pain is controlled, the patient can be discharged and transplant evaluation can be resumed outpatient.    -abdominal pain:  Unclear etiology, pain management, and paracentesis to rule out SBP  -history of pancreatic cysts:  Based on prior evaluation, patient had EUS with FNA suspicious IPMN.  Despite mildly elevated CEA, this is thought to be benign.  -decompensated cirrhosis secondary to CHAUDHARY (possible alcohol): obtain PETH.  Given meld score stability, will defer evaluation to outpatient.  Patient is listed at [facility] in [place].  -hepatic encephalopathy:  Continue lactulose, rifaximin, and zinc.  -volume overload:  Continue home diuretics; furosemide 60 mg daily, and spironolactone 100 mg daily.  -HCC and variceal screening up-to-date on Care everywhere.        Thank you for your consult. I will follow-up with patient. Please contact us if you have any additional questions.    Prosper Red MD  Hepatology  [facility]

## 2020-06-25 NOTE — ED PROVIDER NOTES
Encounter Date: 6/25/2020       History     Chief Complaint   Patient presents with    Abdominal Pain     Pt states constant abdominal pain in upper middle abdomen starting at 1am this morning. Pt scheduled to have evaluation for liver tx tomorrow. Pt previous hospitalized with same complaint.     54-year-old female to the ER for evaluation of epigastric abdominal pain.  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   She lives in [place] and is routinely followed out there.  She was listed for a liver transplant but due to low MELD she has decided come to [place] for 2nd opinion.     Most recently the patient has had multiple admissions for abdominal pain, encephalopathy, and ascites..  No recent notes of SBP from outside facility. MELD in mid 20s.  Recently had abd pain and lipase in 5000s, no stones. She underwent EUS, some side branch IPMNs (cyst fluid- high CEA/lipase) seen, the largest is 1.2 cm. There were no high risk features of pancreatic cyst or malignancy.    Patient has an appointment in the morning today June 25th to see hepatology at 7:30 a.m. but she came to the ER due to severe abdominal discomfort.  She endorses nausea and vomiting.  She denies any fevers or chills.  She denies any chest pain or shortness of breath. She is awake and alert with good mentation.         Review of patient's allergies indicates:   Allergen Reactions    Aspirin     Ciprofloxacin      No past medical history on file.  No past surgical history on file.  No family history on file.  Social History     Tobacco Use    Smoking status: Not on file   Substance Use Topics    Alcohol use: Not on file    Drug use: Not on file     Review of Systems   Constitutional: Negative for fever.   HENT: Negative for sore throat.    Respiratory: Negative for shortness of breath.    Cardiovascular: Negative for chest pain.   Gastrointestinal: Positive for abdominal pain, nausea and vomiting.   Genitourinary: Negative for  dysuria.   Musculoskeletal: Negative for back pain.   Skin: Negative for rash.   Neurological: Negative for weakness.   Hematological: Does not bruise/bleed easily.       Physical Exam     Initial Vitals [06/25/20 0409]   BP Pulse Resp Temp SpO2   (!) 92/50 91 18 98.4 °F (36.9 °C) 100 %      MAP       --         Physical Exam    Constitutional: Vital signs are normal. She appears well-developed and well-nourished. She is not diaphoretic. She appears distressed.   HENT:   Head: Normocephalic and atraumatic.   Right Ear: Hearing and external ear normal.   Left Ear: Hearing and external ear normal.   Eyes: Conjunctivae are normal. Scleral icterus is present.   Neck: Neck supple.   Cardiovascular: Normal rate and regular rhythm.   Pulmonary/Chest: Breath sounds normal.   Good air movement, non distressed   Abdominal: Soft. Normal appearance and bowel sounds are normal.   Soft, non tender   Musculoskeletal: Normal range of motion. No tenderness or edema.   Neurological: She is alert and oriented to person, place, and time.   Benign, normal mentation, non focal exam   Skin: Skin is warm and intact.   Jaundiced   Psychiatric: She has a normal mood and affect. Her speech is normal and behavior is normal. Cognition and memory are normal.         ED Course   Procedures  Labs Reviewed   CBC W/ AUTO DIFFERENTIAL   COMPREHENSIVE METABOLIC PANEL   LIPASE   URINALYSIS, REFLEX TO URINE CULTURE   MAGNESIUM   PHOSPHORUS   PROTIME-INR   AMMONIA          Imaging Results    None          Medical Decision Making:   History:   Old Medical Records: I decided to obtain old medical records.  Initial Assessment:   54-year-old female with cirrhosis  Differential Diagnosis:   Spontaneous bacterial peritonitis, cirrhosis, abdominal pain, pancreatitis, pancreatic cancer, decompensated cirrhosis  Clinical Tests:   Lab Tests: Ordered and Reviewed  Medical Tests: Ordered and Reviewed  ED Management:  Plan:  Routine labs and discussed with Hospital  Medicine.  I anticipate admission Hospital Medicine with hepatology consult.  The patient is scheduled to see hepatology for outpatient workup a pedro transplant this morning.  Based on the patient's initial assessment have low suspicion for SBP as she is afebrile and her abdomen is soft.  I suspect that her symptoms are likely secondary to decompensated cirrhosis. She is awake alert and oriented, I see no signs of encephalopathy.   Ammonia slightly elevated, patient not encephalopathic  Meld calculated 22 using old INR as that labs had not resulted yet  Case discussed with Hospital Medicine.  The patient will be admitted.  At this time I do not have any etiology for pain other than decompensated cirrhosis. No signs of infection on my assessment at this time.   Other:   I discussed test(s) with the performing physician.  I have discussed this case with another health care provider.                                 Clinical Impression:       ICD-10-CM ICD-9-CM   1. Decompensated hepatic cirrhosis  K72.90 572.8         Disposition:   Disposition: Admitted  Condition: Stable                        Emanuel Lr PA-C  06/25/20 0549

## 2020-06-25 NOTE — H&P
Inpatient Radiology Pre-procedure Note    History of Present Illness:  Zeina Mahoney is a 54 y.o. female with CHAUDHARY cirrhosis who presents for US-guided paracentesis.  Admission H&P reviewed.  Past Medical History:   Diagnosis Date    Decompensated hepatic cirrhosis 6/25/2020     No past surgical history on file.    Review of Systems:   As documented in primary team H&P    Home Meds:   Prior to Admission medications    Medication Sig Start Date End Date Taking? Authorizing Provider   acetaminophen (TYLENOL) 500 MG tablet Take 500 mg by mouth every 6 (six) hours as needed for Pain.   Yes Historical Provider, MD   ergocalciferol (VITAMIN D2) 50,000 unit Cap Take 50,000 Units by mouth every 7 days.   Yes Historical Provider, MD   furosemide (LASIX) 40 MG tablet Take 60 mg by mouth once daily.    Yes Historical Provider, MD   haloperidoL (HALDOL) 1 MG tablet Take 1 mg by mouth 2 (two) times daily.   Yes Historical Provider, MD   lactulose (CEPHULAC) 10 gram packet Take 1 and a half packets three times a day    Yes Historical Provider, MD   levothyroxine (SYNTHROID) 150 MCG tablet Take 150 mcg by mouth before breakfast.   Yes Historical Provider, MD   lidocaine (LIDODERM) 5 % Place 1 patch onto the skin every 24 hours. Remove & Discard patch within 12 hours or as directed by MD   Yes Historical Provider, MD   melatonin 10 mg Tab Take 10 mg by mouth nightly as needed (sleep).   Yes Historical Provider, MD   midodrine (PROAMATINE) 10 MG tablet Take 10 mg by mouth 3 (three) times daily.   Yes Historical Provider, MD   nystatin (MYCOSTATIN) powder Apply topically 2 (two) times daily. Apply to affected area twice a day   Yes Historical Provider, MD   pantoprazole (PROTONIX) 40 MG tablet Take 40 mg by mouth before breakfast.   Yes Historical Provider, MD   rifAXIMin (XIFAXAN) 550 mg Tab Take 550 mg by mouth 2 (two) times daily.    Yes Historical Provider, MD   simethicone (MYLICON) 80 MG chewable tablet Take 80 mg by mouth  every 6 (six) hours as needed for Flatulence. Take 2 tablets by mouth every 6 hours as needed for gas   Yes Historical Provider, MD   spironolactone (ALDACTONE) 50 MG tablet Take 100 mg by mouth once daily.    Yes Historical Provider, MD   vitamin D (VITAMIN D3) 1000 units Tab Take 1,000 Units by mouth once daily.   Yes Historical Provider, MD   zinc sulfate (ZINCATE) 220 (50) mg capsule Take 220 mg by mouth before breakfast.   Yes Historical Provider, MD     Scheduled Meds:    enoxaparin  40 mg Subcutaneous Q24H    furosemide  60 mg Oral Daily    GI cocktail (mylanta 30 mL, lidocaine 2 % viscous 10 mL, dicyclomine 10 mL) 50 mL   Oral Once    [START ON 6/26/2020] levothyroxine  150 mcg Oral Before breakfast    midodrine  10 mg Oral TID    [START ON 6/26/2020] pantoprazole  40 mg Oral Before breakfast    rifAXImin  550 mg Oral BID    spironolactone  100 mg Oral Daily     Continuous Infusions:   PRN Meds:acetaminophen, dextrose 50%, dextrose 50%, glucagon (human recombinant), glucose, glucose, melatonin, ondansetron, ondansetron, polyethylene glycol, sodium chloride 0.9%  Anticoagulants/Antiplatelets: no anticoagulation    Allergies:   Review of patient's allergies indicates:   Allergen Reactions    Aspirin Tinitus    Ciprofloxacin Rash     Sedation Hx: have not been any systemic reactions    Labs:  Recent Labs   Lab 06/25/20 0441   INR 1.3*       Recent Labs   Lab 06/25/20 0441   WBC 7.36   HGB 8.9*   HCT 26.9*   *   PLT 82*      Recent Labs   Lab 06/25/20  0441   *   *   K 5.0      CO2 22*   BUN 28*   CREATININE 1.2   CALCIUM 9.9   MG 2.0   ALT 19   AST 35   ALBUMIN 2.7*   BILITOT 6.3*     Vitals:  Temp: 97.2 °F (36.2 °C) (06/25/20 1222)  Pulse: 85 (06/25/20 1200)  Resp: 17 (06/25/20 1200)  BP: 101/66 (06/25/20 1200)  SpO2: 99 % (06/25/20 1000)     Physical Exam:  General: no acute distress  Mental Status: alert and oriented to person, place and time  HEENT: normocephalic,  atraumatic  Chest: unlabored breathing  Heart: regular heart rate  Abdomen: nondistended  Extremity: moves all extremities    Plan: Patient will undergo US-guided paracentesis.   Sedation Plan: local    Janee Haider, PGY-3

## 2020-06-25 NOTE — NURSING
Dr. DILAN Haider at bedside, insufficient fluid noted on ultrasound to safely procede with procedure, Dr. Haider discussed with pt, pt verbalized understanding, report called to bedside RN, transport request placed for return to room

## 2020-06-25 NOTE — PLAN OF CARE
Patient is pleasantly confused at times. Patient had a liver US today and was scheduled for a paracentesis but did not have enough fluid to remove. Patient started on her home lactulose this afternoon. Monitoring neuro checks every 4 hours. Patient c/o abdominal pain but is refusing the PRN tylenol. GI cocktail given per orders. Urine sample sent for testing. Reminded the patient and her  to call for assistance. Call light and personal items are within reach.

## 2020-06-25 NOTE — SUBJECTIVE & OBJECTIVE
Review of Systems   Constitutional: Positive for fatigue. Negative for activity change, appetite change, chills and fever.   HENT: Negative for trouble swallowing.    Respiratory: Negative for cough, choking, chest tightness and shortness of breath.    Cardiovascular: Negative for chest pain and leg swelling.   Gastrointestinal: Positive for abdominal pain (epigastric). Negative for abdominal distention, anal bleeding, blood in stool, constipation, diarrhea, nausea and vomiting.   Genitourinary: Negative for difficulty urinating and dysuria.   Musculoskeletal: Negative for arthralgias and back pain.   Skin: Positive for color change. Negative for pallor.   Neurological: Positive for dizziness and weakness. Negative for headaches.   Psychiatric/Behavioral: Positive for confusion. Negative for agitation.       Past Medical History:   Diagnosis Date    Decompensated hepatic cirrhosis 6/25/2020       No past surgical history on file.    Family history of liver disease: No    Review of patient's allergies indicates:   Allergen Reactions    Aspirin Tinitus    Ciprofloxacin Rash       Tobacco Use    Smoking status: Not on file   Substance and Sexual Activity    Alcohol use: Not on file    Drug use: Not on file    Sexual activity: Not on file       Medications Prior to Admission   Medication Sig Dispense Refill Last Dose    acetaminophen (TYLENOL) 500 MG tablet Take 500 mg by mouth every 6 (six) hours as needed for Pain.       ergocalciferol (VITAMIN D2) 50,000 unit Cap Take 50,000 Units by mouth every 7 days.   tuesday    furosemide (LASIX) 40 MG tablet Take 60 mg by mouth once daily.        haloperidoL (HALDOL) 1 MG tablet Take 1 mg by mouth 2 (two) times daily.       lactulose (CEPHULAC) 10 gram packet Take 1 and a half packets three times a day        levothyroxine (SYNTHROID) 150 MCG tablet Take 150 mcg by mouth before breakfast.       lidocaine (LIDODERM) 5 % Place 1 patch onto the skin every 24 hours.  Remove & Discard patch within 12 hours or as directed by MD       melatonin 10 mg Tab Take 10 mg by mouth nightly as needed (sleep).       midodrine (PROAMATINE) 10 MG tablet Take 10 mg by mouth 3 (three) times daily.       nystatin (MYCOSTATIN) powder Apply topically 2 (two) times daily. Apply to affected area twice a day       pantoprazole (PROTONIX) 40 MG tablet Take 40 mg by mouth before breakfast.       rifAXIMin (XIFAXAN) 550 mg Tab Take 550 mg by mouth 2 (two) times daily.        simethicone (MYLICON) 80 MG chewable tablet Take 80 mg by mouth every 6 (six) hours as needed for Flatulence. Take 2 tablets by mouth every 6 hours as needed for gas       spironolactone (ALDACTONE) 50 MG tablet Take 100 mg by mouth once daily.        vitamin D (VITAMIN D3) 1000 units Tab Take 1,000 Units by mouth once daily.       zinc sulfate (ZINCATE) 220 (50) mg capsule Take 220 mg by mouth before breakfast.          Objective:     Vital Signs (Most Recent):  Temp: 97.2 °F (36.2 °C) (06/25/20 1222)  Pulse: 82 (06/25/20 1400)  Resp: 16 (06/25/20 1300)  BP: 102/62 (06/25/20 1300)  SpO2: 99 % (06/25/20 1400) Vital Signs (24h Range):  Temp:  [97.2 °F (36.2 °C)-98.5 °F (36.9 °C)] 97.2 °F (36.2 °C)  Pulse:  [67-91] 82  Resp:  [13-19] 16  SpO2:  [99 %-100 %] 99 %  BP: ()/(50-66) 102/62     Weight: 60.9 kg (134 lb 4.2 oz) (06/25/20 0640)  Body mass index is 23.05 kg/m².    Physical Exam  Constitutional:       General: She is not in acute distress.     Appearance: She is well-developed. She is ill-appearing.   HENT:      Head: Normocephalic.   Eyes:      General: Scleral icterus present.      Conjunctiva/sclera: Conjunctivae normal.   Neck:      Musculoskeletal: Normal range of motion and neck supple.   Cardiovascular:      Rate and Rhythm: Normal rate and regular rhythm.   Pulmonary:      Effort: Pulmonary effort is normal.      Breath sounds: Normal breath sounds.   Abdominal:      General: Bowel sounds are normal. There  is no distension.      Palpations: Abdomen is soft. There is no mass.      Tenderness: There is abdominal tenderness (epigastric). There is no guarding or rebound.   Musculoskeletal: Normal range of motion.   Skin:     General: Skin is warm and dry.      Coloration: Skin is jaundiced.   Neurological:      Mental Status: She is alert and oriented to person, place, and time.      Comments: Positive asterixis         MELD-Na score: 20 at 6/25/2020  4:41 AM  MELD score: 18 at 6/25/2020  4:41 AM  Calculated from:  Serum Creatinine: 1.2 mg/dL at 6/25/2020  4:41 AM  Serum Sodium: 134 mmol/L at 6/25/2020  4:41 AM  Total Bilirubin: 6.3 mg/dL at 6/25/2020  4:41 AM  INR(ratio): 1.3 at 6/25/2020  4:41 AM  Age: 54 years 9 months    Significant Labs:  CBC:   Recent Labs   Lab 06/25/20  0441   WBC 7.36   RBC 2.50*   HGB 8.9*   HCT 26.9*   PLT 82*     BMP:   Recent Labs   Lab 06/25/20  0441   *   *   K 5.0      CO2 22*   BUN 28*   CREATININE 1.2   CALCIUM 9.9     CMP:   Recent Labs   Lab 06/25/20  0441   *   CALCIUM 9.9   ALBUMIN 2.7*   PROT 6.0   *   K 5.0   CO2 22*      BUN 28*   CREATININE 1.2   ALKPHOS 84   ALT 19   AST 35   BILITOT 6.3*     Coagulation:   Recent Labs   Lab 06/25/20  0441   INR 1.3*       Significant Imaging:  Labs: Reviewed  US: Reviewed  CT: Reviewed

## 2020-06-25 NOTE — PLAN OF CARE
SW attempted to complete Discharge Planning Assessment at bedside; however, patient was meeting with our physicians at the time. SW will attempt again at a later time.        06/25/20 1132   Discharge Assessment   Assessment Type Discharge Planning Assessment     Antoinette Simon LMSW   - Case Management

## 2020-06-25 NOTE — PROCEDURES
Radiology Post-Procedure Note    Pre Op Diagnosis: Ascites  Post Op Diagnosis: Same    Procedure: Paracentesis    Findings:   Limited ultrasound of all 4 abdominal quadrants was performed. There was no amenable fluid for drainage. No paracentesis was performed.       Janee Haider, PGY-3

## 2020-06-26 PROBLEM — R45.1 AGITATION: Status: ACTIVE | Noted: 2020-06-26

## 2020-06-26 PROBLEM — K76.82 HEPATIC ENCEPHALOPATHY: Status: RESOLVED | Noted: 2020-06-25 | Resolved: 2020-06-26

## 2020-06-26 LAB
ALBUMIN SERPL BCP-MCNC: 3.3 G/DL (ref 3.5–5.2)
ALP SERPL-CCNC: 98 U/L (ref 55–135)
ALT SERPL W/O P-5'-P-CCNC: 25 U/L (ref 10–44)
AMMONIA PLAS-SCNC: 73 UMOL/L (ref 10–50)
ANION GAP SERPL CALC-SCNC: 12 MMOL/L (ref 8–16)
AST SERPL-CCNC: 39 U/L (ref 10–40)
BASOPHILS # BLD AUTO: 0.05 K/UL (ref 0–0.2)
BASOPHILS NFR BLD: 1 % (ref 0–1.9)
BILIRUB SERPL-MCNC: 7.8 MG/DL (ref 0.1–1)
BUN SERPL-MCNC: 27 MG/DL (ref 6–20)
CALCIUM SERPL-MCNC: 10.8 MG/DL (ref 8.7–10.5)
CHLORIDE SERPL-SCNC: 103 MMOL/L (ref 95–110)
CO2 SERPL-SCNC: 20 MMOL/L (ref 23–29)
CREAT SERPL-MCNC: 1.2 MG/DL (ref 0.5–1.4)
DIFFERENTIAL METHOD: ABNORMAL
EOSINOPHIL # BLD AUTO: 0 K/UL (ref 0–0.5)
EOSINOPHIL NFR BLD: 0.2 % (ref 0–8)
ERYTHROCYTE [DISTWIDTH] IN BLOOD BY AUTOMATED COUNT: 13.2 % (ref 11.5–14.5)
EST. GFR  (AFRICAN AMERICAN): 59.2 ML/MIN/1.73 M^2
EST. GFR  (NON AFRICAN AMERICAN): 51.4 ML/MIN/1.73 M^2
GLUCOSE SERPL-MCNC: 114 MG/DL (ref 70–110)
HCT VFR BLD AUTO: 31.1 % (ref 37–48.5)
HGB BLD-MCNC: 9.6 G/DL (ref 12–16)
IMM GRANULOCYTES # BLD AUTO: 0.03 K/UL (ref 0–0.04)
IMM GRANULOCYTES NFR BLD AUTO: 0.6 % (ref 0–0.5)
INR PPP: 1.3 (ref 0.8–1.2)
LACTATE SERPL-SCNC: 1.8 MMOL/L (ref 0.5–2.2)
LYMPHOCYTES # BLD AUTO: 0.8 K/UL (ref 1–4.8)
LYMPHOCYTES NFR BLD: 14.7 % (ref 18–48)
MAGNESIUM SERPL-MCNC: 2.1 MG/DL (ref 1.6–2.6)
MCH RBC QN AUTO: 34.9 PG (ref 27–31)
MCHC RBC AUTO-ENTMCNC: 30.9 G/DL (ref 32–36)
MCV RBC AUTO: 113 FL (ref 82–98)
MONOCYTES # BLD AUTO: 0.4 K/UL (ref 0.3–1)
MONOCYTES NFR BLD: 6.9 % (ref 4–15)
NEUTROPHILS # BLD AUTO: 4 K/UL (ref 1.8–7.7)
NEUTROPHILS NFR BLD: 76.6 % (ref 38–73)
NRBC BLD-RTO: 0 /100 WBC
PHOSPHATE SERPL-MCNC: 3.3 MG/DL (ref 2.7–4.5)
PLATELET # BLD AUTO: 84 K/UL (ref 150–350)
PMV BLD AUTO: 10.7 FL (ref 9.2–12.9)
POTASSIUM SERPL-SCNC: 4.6 MMOL/L (ref 3.5–5.1)
PROT SERPL-MCNC: 7 G/DL (ref 6–8.4)
PROTHROMBIN TIME: 12.7 SEC (ref 9–12.5)
RBC # BLD AUTO: 2.75 M/UL (ref 4–5.4)
SODIUM SERPL-SCNC: 135 MMOL/L (ref 136–145)
WBC # BLD AUTO: 5.25 K/UL (ref 3.9–12.7)

## 2020-06-26 PROCEDURE — 82140 ASSAY OF AMMONIA: CPT | Mod: NTX

## 2020-06-26 PROCEDURE — 83735 ASSAY OF MAGNESIUM: CPT | Mod: NTX

## 2020-06-26 PROCEDURE — 85025 COMPLETE CBC W/AUTO DIFF WBC: CPT | Mod: NTX

## 2020-06-26 PROCEDURE — 87040 BLOOD CULTURE FOR BACTERIA: CPT | Mod: 59,NTX

## 2020-06-26 PROCEDURE — 96372 THER/PROPH/DIAG INJ SC/IM: CPT

## 2020-06-26 PROCEDURE — 83605 ASSAY OF LACTIC ACID: CPT | Mod: NTX

## 2020-06-26 PROCEDURE — 63600175 PHARM REV CODE 636 W HCPCS: Mod: NTX | Performed by: HOSPITALIST

## 2020-06-26 PROCEDURE — 99225 PR SUBSEQUENT OBSERVATION CARE,LEVEL II: ICD-10-PCS | Mod: NTX,,, | Performed by: HOSPITALIST

## 2020-06-26 PROCEDURE — 25000003 PHARM REV CODE 250: Mod: NTX | Performed by: HOSPITALIST

## 2020-06-26 PROCEDURE — 80321 ALCOHOLS BIOMARKERS 1OR 2: CPT | Mod: NTX

## 2020-06-26 PROCEDURE — 80053 COMPREHEN METABOLIC PANEL: CPT | Mod: NTX

## 2020-06-26 PROCEDURE — 36415 COLL VENOUS BLD VENIPUNCTURE: CPT | Mod: NTX

## 2020-06-26 PROCEDURE — 99225 PR SUBSEQUENT OBSERVATION CARE,LEVEL II: CPT | Mod: NTX,,, | Performed by: HOSPITALIST

## 2020-06-26 PROCEDURE — 84100 ASSAY OF PHOSPHORUS: CPT | Mod: NTX

## 2020-06-26 PROCEDURE — 85610 PROTHROMBIN TIME: CPT | Mod: NTX

## 2020-06-26 PROCEDURE — G0378 HOSPITAL OBSERVATION PER HR: HCPCS | Mod: NTX

## 2020-06-26 RX ORDER — AMOXICILLIN 250 MG
1 CAPSULE ORAL 2 TIMES DAILY
Status: DISCONTINUED | OUTPATIENT
Start: 2020-06-26 | End: 2020-06-28

## 2020-06-26 RX ORDER — LACTULOSE 10 G/10G
20 SOLUTION ORAL EVERY 6 HOURS
Status: DISCONTINUED | OUTPATIENT
Start: 2020-06-26 | End: 2020-06-29 | Stop reason: HOSPADM

## 2020-06-26 RX ORDER — HALOPERIDOL 5 MG/ML
1 INJECTION INTRAMUSCULAR EVERY 6 HOURS PRN
Status: DISCONTINUED | OUTPATIENT
Start: 2020-06-26 | End: 2020-06-27

## 2020-06-26 RX ORDER — HALOPERIDOL 1 MG/1
1 TABLET ORAL 2 TIMES DAILY
Status: DISCONTINUED | OUTPATIENT
Start: 2020-06-26 | End: 2020-06-27

## 2020-06-26 RX ORDER — ZINC SULFATE 50(220)MG
220 CAPSULE ORAL DAILY
Status: DISCONTINUED | OUTPATIENT
Start: 2020-06-26 | End: 2020-06-29 | Stop reason: HOSPADM

## 2020-06-26 RX ORDER — LACTULOSE 10 G/15ML
200 SOLUTION ORAL; RECTAL 3 TIMES DAILY
Status: DISCONTINUED | OUTPATIENT
Start: 2020-06-26 | End: 2020-06-26

## 2020-06-26 RX ADMIN — ENOXAPARIN SODIUM 40 MG: 100 INJECTION SUBCUTANEOUS at 04:06

## 2020-06-26 RX ADMIN — SENNOSIDES AND DOCUSATE SODIUM 1 TABLET: 8.6; 5 TABLET ORAL at 07:06

## 2020-06-26 RX ADMIN — HALOPERIDOL 1 MG: 1 TABLET ORAL at 07:06

## 2020-06-26 RX ADMIN — HALOPERIDOL LACTATE 1 MG: 5 INJECTION, SOLUTION INTRAMUSCULAR at 09:06

## 2020-06-26 RX ADMIN — LACTULOSE 20 G: 10 SOLUTION ORAL at 10:06

## 2020-06-26 RX ADMIN — LACTULOSE 20 G: 10 SOLUTION ORAL at 05:06

## 2020-06-26 RX ADMIN — LEVOTHYROXINE SODIUM 150 MCG: 150 TABLET ORAL at 05:06

## 2020-06-26 RX ADMIN — ZINC SULFATE 220 MG (50 MG) CAPSULE 220 MG: CAPSULE at 04:06

## 2020-06-26 RX ADMIN — RIFAXIMIN 550 MG: 550 TABLET ORAL at 07:06

## 2020-06-26 RX ADMIN — SENNOSIDES AND DOCUSATE SODIUM 1 TABLET: 8.6; 5 TABLET ORAL at 01:06

## 2020-06-26 RX ADMIN — MIDODRINE HYDROCHLORIDE 10 MG: 5 TABLET ORAL at 07:06

## 2020-06-26 RX ADMIN — RIFAXIMIN 550 MG: 550 TABLET ORAL at 01:06

## 2020-06-26 RX ADMIN — LACTULOSE 200 G: 10 SOLUTION ORAL at 09:06

## 2020-06-26 RX ADMIN — SPIRONOLACTONE 100 MG: 50 TABLET ORAL at 01:06

## 2020-06-26 RX ADMIN — LACTULOSE 20 G: 10 SOLUTION ORAL at 06:06

## 2020-06-26 RX ADMIN — PANTOPRAZOLE SODIUM 40 MG: 40 TABLET, DELAYED RELEASE ORAL at 05:06

## 2020-06-26 RX ADMIN — FUROSEMIDE 60 MG: 40 TABLET ORAL at 01:06

## 2020-06-26 RX ADMIN — MIDODRINE HYDROCHLORIDE 10 MG: 5 TABLET ORAL at 02:06

## 2020-06-26 NOTE — PLAN OF CARE
Pt. Disoriented x 3, oriented to self, and agitated at the beginning of the shift due to encephalopathy (ammonia 73, LFTs trending up)- Lactulose enema given x 1 and PRN haldol given and patient became AAO x 4  Neuro checks q4h completed  Blood cultures NGTD, Lactic acid 1.8--afebrile  Patient's spouse at the bedside- interacting with the patient and participating in care.  Wound care consulted due to L. Leg wound r/t fall at home-- cleansed with NS, Medi honey applied and island dressing in placed-- to be changed daily  Patient states she knows when she has to use the bathroom but has had multiple incontinent episodes-- Pur wick in place  Safety precautions maintained throughout the shift, call light within reach, and nonslip socks when out of bed.

## 2020-06-26 NOTE — PHARMACY MED REC
"Admission Medication Reconciliation - Pharmacy Consult Note    The home medication history was taken by Marine Reddy, pharmacy technician.  Based on information gathered and subsequent review by the clinical pharmacist, the items below may need attention.     You may go to "Admission" then "Reconcile Home Medications" tabs to review and/or act upon these items.     Potentially problematic discrepancies with current MAR  o Patient IS taking the following which was not ordered upon admit  o Ergocalciferol 50 000 Units Q Tuesday  o Zinc 220 mg QD   o Patient is taking a drug DIFFERENTLY than how ordered upon admit  o Haloperidol 1 mg BID     Please address this information as you see fit.  Feel free to contact us if you have any questions or require assistance.  Lani Hernandez  EXT [phone]     .    .            "

## 2020-06-26 NOTE — CONSULTS
Wound care consult received. Pt with past medical history of CHAUDHARY/NEHAL cirrhosis complicated by encephalopathy, volume overload, sarcopenia, listed for transplant [facility] [place], gravies disease status post iodine radiation, hypercalcemia due to hyperparathyroidism, recent onset abdominal pain recurrent admissions due to acute pancreatitis of unclear etiology, who presented to the ED with abdominal pain.   Consult states pt fell at home and dropped a plate which cut her leg and has been getting dressing changes by a home health nurse.  Upon assessment of lower left leg, noted healing open wound from trauma with mostly partial thickness tissue exposed and very small areas of full thickness. Healing edges with intact scar tissue. No redness or signs of infection. Media file not available at this time. Wound measures 4.5x1.3x0.1cm.  Recommend Medihoney to wound daily. Expect healing of wound with no complications. Notified nursing.  Wound care team to sign off.  Please reconsult for any further needs. [id number]

## 2020-06-26 NOTE — PROGRESS NOTES
Patient became more alert; patient is AAO x 3, disoriented to the year. Patient able to sit on the side of the bed. Patient was able to hold a conversation and answer questions for discharge planning with . Will continue to monitor

## 2020-06-26 NOTE — PLAN OF CARE
SW completed discharge planning assessment with patient and caregiver (spouse) at bedside.     Patient spouse reported he will provide transportation upon discharge.     Functional stats was independent prior and patient utilized rolling walker, wheelchair, and shower chair. Patient's spouse indicates the patient needs incontinence DME (depends). This request was shared with CM to order.     Patient lives in a 1 story home with 0 steps to enter with spouse and stepson. Patient is staying in an RV at [facility] on [address] while in [place] waiting on transplant evaluation. There are 4 steps to enter with a handle bar and patient's spouse and stepson provides assistance.     Patient and spouse inquired about information regarding completing Advance Directive. Patient's nurse present at the time of inquiry and stated she would follow-up to help get this completed.     Patient is current with [facility] in [place] for  and would like to resume services upon return to [place].     PCP: Dr. Merlin Charles    Pharmacy:   [facility]/pharmacy #[id number] - [place], [place] - [address]  [address]  [place] [address]  Phone: [phone] Fax: [phone]    Or most convenient [facility] location to the [facility] in [place]    Payor: Payor: BLUE CROSS BLUE SHIELD / Plan: BCBS ALL OUT OF STATE / Product Type: PPO /        06/26/20 1450   Discharge Assessment   Assessment Type Discharge Planning Assessment   Confirmed/corrected address and phone number on facesheet? Yes   Assessment information obtained from? Patient;Caregiver   Prior to hospitilization cognitive status: Alert/Oriented   Prior to hospitalization functional status: Assistive Equipment   Current cognitive status: Alert/Oriented   Current Functional Status: Assistive Equipment   Facility Arrived From: ED   Lives With child(alondra), adult;spouse   Able to Return to Prior Arrangements yes   Is patient able to care for self after discharge? Yes   Who are your caregiver(s)  and their phone number(s)? Yonis Mahoney (spouse) [phone]   Patient's perception of discharge disposition home or selfcare   Readmission Within the Last 30 Days no previous admission in last 30 days   Patient currently being followed by outpatient case management? No   Patient currently receives any other outside agency services? No   Equipment Currently Used at Home walker, rolling;wheelchair;shower chair   Do you have any problems affording any of your prescribed medications? No   Is the patient taking medications as prescribed? yes   Does the patient have transportation home? Yes   Transportation Anticipated family or friend will provide   Dialysis Name and Scheduled days N/A   Does the patient receive services at the [facility]? No   Discharge Plan A Home Health   Discharge Plan B Home   DME Needed Upon Discharge  other (see comments)  (Incontinence supplies (Depends))   Patient/Family in Agreement with Plan yes     Antoinette Simon LMSW   - Case Management

## 2020-06-26 NOTE — PROGRESS NOTES
Notified Dr. Dumont of patient now somnolent (likely from PRN Haldol) but unchanged from this morning assessment. Lactulose enema given and 2 BM's. VSS, afebrile. No c/o pain. Still not able to pass bedside swallow so no morning medications were given. No new orders at this time and provider to see patient and spouse at the bedside. Will continue to monitor to see how the patient does throughout the day.

## 2020-06-26 NOTE — PROGRESS NOTES
Notified Dr. Dumont of patient oriented only to self, agitated, dysarthric speech and unable to answer questions or . VSS. Patient is unable to follow commands so no morning medications taken. Orders to be placed. Will continue to monitor.

## 2020-06-27 PROBLEM — F05 DELIRIUM DUE TO MEDICAL CONDITION WITH BEHAVIORAL DISTURBANCE: Status: ACTIVE | Noted: 2020-06-26

## 2020-06-27 LAB
ALBUMIN SERPL BCP-MCNC: 2.8 G/DL (ref 3.5–5.2)
ALP SERPL-CCNC: 85 U/L (ref 55–135)
ALT SERPL W/O P-5'-P-CCNC: 21 U/L (ref 10–44)
ANION GAP SERPL CALC-SCNC: 8 MMOL/L (ref 8–16)
AST SERPL-CCNC: 33 U/L (ref 10–40)
BASOPHILS # BLD AUTO: 0.05 K/UL (ref 0–0.2)
BASOPHILS NFR BLD: 0.8 % (ref 0–1.9)
BILIRUB SERPL-MCNC: 7.2 MG/DL (ref 0.1–1)
BUN SERPL-MCNC: 28 MG/DL (ref 6–20)
CALCIUM SERPL-MCNC: 10.2 MG/DL (ref 8.7–10.5)
CHLORIDE SERPL-SCNC: 105 MMOL/L (ref 95–110)
CO2 SERPL-SCNC: 24 MMOL/L (ref 23–29)
CREAT SERPL-MCNC: 1.2 MG/DL (ref 0.5–1.4)
DIFFERENTIAL METHOD: ABNORMAL
EOSINOPHIL # BLD AUTO: 0 K/UL (ref 0–0.5)
EOSINOPHIL NFR BLD: 0.5 % (ref 0–8)
ERYTHROCYTE [DISTWIDTH] IN BLOOD BY AUTOMATED COUNT: 13.1 % (ref 11.5–14.5)
EST. GFR  (AFRICAN AMERICAN): 59.2 ML/MIN/1.73 M^2
EST. GFR  (NON AFRICAN AMERICAN): 51.4 ML/MIN/1.73 M^2
GLUCOSE SERPL-MCNC: 145 MG/DL (ref 70–110)
HCT VFR BLD AUTO: 25 % (ref 37–48.5)
HGB BLD-MCNC: 8 G/DL (ref 12–16)
IMM GRANULOCYTES # BLD AUTO: 0.02 K/UL (ref 0–0.04)
IMM GRANULOCYTES NFR BLD AUTO: 0.3 % (ref 0–0.5)
INR PPP: 1.4 (ref 0.8–1.2)
LYMPHOCYTES # BLD AUTO: 1.2 K/UL (ref 1–4.8)
LYMPHOCYTES NFR BLD: 19.6 % (ref 18–48)
MAGNESIUM SERPL-MCNC: 1.9 MG/DL (ref 1.6–2.6)
MCH RBC QN AUTO: 34.9 PG (ref 27–31)
MCHC RBC AUTO-ENTMCNC: 32 G/DL (ref 32–36)
MCV RBC AUTO: 109 FL (ref 82–98)
MONOCYTES # BLD AUTO: 0.5 K/UL (ref 0.3–1)
MONOCYTES NFR BLD: 8.1 % (ref 4–15)
NEUTROPHILS # BLD AUTO: 4.5 K/UL (ref 1.8–7.7)
NEUTROPHILS NFR BLD: 70.7 % (ref 38–73)
NRBC BLD-RTO: 0 /100 WBC
PHOSPHATE SERPL-MCNC: 3.5 MG/DL (ref 2.7–4.5)
PLATELET # BLD AUTO: 75 K/UL (ref 150–350)
PMV BLD AUTO: 10.5 FL (ref 9.2–12.9)
POTASSIUM SERPL-SCNC: 4.3 MMOL/L (ref 3.5–5.1)
PROT SERPL-MCNC: 6 G/DL (ref 6–8.4)
PROTHROMBIN TIME: 13.5 SEC (ref 9–12.5)
RBC # BLD AUTO: 2.29 M/UL (ref 4–5.4)
SODIUM SERPL-SCNC: 137 MMOL/L (ref 136–145)
WBC # BLD AUTO: 6.33 K/UL (ref 3.9–12.7)

## 2020-06-27 PROCEDURE — 36415 COLL VENOUS BLD VENIPUNCTURE: CPT | Mod: NTX

## 2020-06-27 PROCEDURE — 90792 PR PSYCHIATRIC DIAGNOSTIC EVALUATION W/MEDICAL SERVICES: ICD-10-PCS | Mod: NTX,,, | Performed by: PSYCHIATRY & NEUROLOGY

## 2020-06-27 PROCEDURE — 80053 COMPREHEN METABOLIC PANEL: CPT | Mod: NTX

## 2020-06-27 PROCEDURE — 84100 ASSAY OF PHOSPHORUS: CPT | Mod: NTX

## 2020-06-27 PROCEDURE — 96372 THER/PROPH/DIAG INJ SC/IM: CPT

## 2020-06-27 PROCEDURE — 90792 PSYCH DIAG EVAL W/MED SRVCS: CPT | Mod: NTX,,, | Performed by: PSYCHIATRY & NEUROLOGY

## 2020-06-27 PROCEDURE — 25000003 PHARM REV CODE 250: Mod: NTX | Performed by: HOSPITALIST

## 2020-06-27 PROCEDURE — G0378 HOSPITAL OBSERVATION PER HR: HCPCS | Mod: NTX

## 2020-06-27 PROCEDURE — 97530 THERAPEUTIC ACTIVITIES: CPT | Mod: NTX

## 2020-06-27 PROCEDURE — 97165 OT EVAL LOW COMPLEX 30 MIN: CPT | Mod: NTX

## 2020-06-27 PROCEDURE — 63600175 PHARM REV CODE 636 W HCPCS: Mod: NTX | Performed by: HOSPITALIST

## 2020-06-27 PROCEDURE — 99225 PR SUBSEQUENT OBSERVATION CARE,LEVEL II: ICD-10-PCS | Mod: NTX,,, | Performed by: HOSPITALIST

## 2020-06-27 PROCEDURE — 83735 ASSAY OF MAGNESIUM: CPT | Mod: NTX

## 2020-06-27 PROCEDURE — 85610 PROTHROMBIN TIME: CPT | Mod: NTX

## 2020-06-27 PROCEDURE — 99225 PR SUBSEQUENT OBSERVATION CARE,LEVEL II: CPT | Mod: NTX,,, | Performed by: HOSPITALIST

## 2020-06-27 PROCEDURE — 85025 COMPLETE CBC W/AUTO DIFF WBC: CPT | Mod: NTX

## 2020-06-27 RX ORDER — TALC
9 POWDER (GRAM) TOPICAL NIGHTLY
Status: DISCONTINUED | OUTPATIENT
Start: 2020-06-27 | End: 2020-06-29 | Stop reason: HOSPADM

## 2020-06-27 RX ORDER — LACTULOSE 10 G/15ML
200 SOLUTION ORAL; RECTAL ONCE
Status: COMPLETED | OUTPATIENT
Start: 2020-06-27 | End: 2020-06-27

## 2020-06-27 RX ORDER — HALOPERIDOL 1 MG/1
1 TABLET ORAL ONCE
Status: COMPLETED | OUTPATIENT
Start: 2020-06-27 | End: 2020-06-27

## 2020-06-27 RX ORDER — HALOPERIDOL 1 MG/1
1 TABLET ORAL EVERY 8 HOURS PRN
Status: DISCONTINUED | OUTPATIENT
Start: 2020-06-27 | End: 2020-06-29 | Stop reason: HOSPADM

## 2020-06-27 RX ORDER — HALOPERIDOL 1 MG/1
2 TABLET ORAL NIGHTLY
Status: DISCONTINUED | OUTPATIENT
Start: 2020-06-28 | End: 2020-06-29 | Stop reason: HOSPADM

## 2020-06-27 RX ADMIN — SENNOSIDES AND DOCUSATE SODIUM 1 TABLET: 8.6; 5 TABLET ORAL at 09:06

## 2020-06-27 RX ADMIN — FUROSEMIDE 60 MG: 40 TABLET ORAL at 09:06

## 2020-06-27 RX ADMIN — RIFAXIMIN 550 MG: 550 TABLET ORAL at 09:06

## 2020-06-27 RX ADMIN — HALOPERIDOL 1 MG: 1 TABLET ORAL at 09:06

## 2020-06-27 RX ADMIN — LACTULOSE 20 G: 10 SOLUTION ORAL at 06:06

## 2020-06-27 RX ADMIN — MIDODRINE HYDROCHLORIDE 10 MG: 5 TABLET ORAL at 03:06

## 2020-06-27 RX ADMIN — SPIRONOLACTONE 100 MG: 50 TABLET ORAL at 09:06

## 2020-06-27 RX ADMIN — Medication 9 MG: at 09:06

## 2020-06-27 RX ADMIN — ENOXAPARIN SODIUM 40 MG: 100 INJECTION SUBCUTANEOUS at 05:06

## 2020-06-27 RX ADMIN — MIDODRINE HYDROCHLORIDE 10 MG: 5 TABLET ORAL at 09:06

## 2020-06-27 RX ADMIN — LACTULOSE 20 G: 10 SOLUTION ORAL at 12:06

## 2020-06-27 RX ADMIN — LEVOTHYROXINE SODIUM 150 MCG: 150 TABLET ORAL at 05:06

## 2020-06-27 RX ADMIN — LACTULOSE 20 G: 10 SOLUTION ORAL at 11:06

## 2020-06-27 RX ADMIN — LACTULOSE 200 G: 10 SOLUTION ORAL at 06:06

## 2020-06-27 RX ADMIN — LACTULOSE 20 G: 10 SOLUTION ORAL at 05:06

## 2020-06-27 RX ADMIN — ZINC SULFATE 220 MG (50 MG) CAPSULE 220 MG: CAPSULE at 09:06

## 2020-06-27 RX ADMIN — PANTOPRAZOLE SODIUM 40 MG: 40 TABLET, DELAYED RELEASE ORAL at 05:06

## 2020-06-27 NOTE — SUBJECTIVE & OBJECTIVE
Patient History           Medical as of 6/27/2020     Past Medical History     Diagnosis Date Comments Source    Chronic Hepatic encephalopathy 6/25/2020 -- Provider    Decompensated hepatic cirrhosis 6/25/2020 -- Provider    History of pancreatitis 6/25/2020 -- Provider    Liver cirrhosis secondary to CHAUDHARY 6/25/2020 -- Provider    Macrocytic anemia 6/25/2020 -- Provider    Other ascites 6/25/2020 -- Provider    Thrombocytopenia 6/25/2020 -- Provider                  Surgical as of 6/27/2020    Past Surgical History: Patient provided no pertinent surgical history.           Family as of 6/27/2020    None           Tobacco Use as of 6/27/2020     Smoking Status Smoking Start Date Smoking Quit Date Packs/Day Years Used    Never Smoker -- -- -- --    Types Comments Smokeless Tobacco Status Smokeless Tobacco Quit Date Source    -- -- Never Used -- Provider            Alcohol Use as of 6/27/2020     Alcohol Use Drinks/Week Alcohol/Week Comments Source    --   -- -- Provider    Frequency Typical Drinks Binge Drinking        -- -- --              Drug Use as of 6/27/2020     Drug Use Types Frequency Comments Source    -- -- -- -- Provider            Sexual Activity as of 6/27/2020     Sexually Active Birth Control Partners Comments Source    -- -- -- -- Provider            Activities of Daily Living as of 6/27/2020    None           Social Documentation as of 6/27/2020    None           Occupational as of 6/27/2020    None           Socioeconomic as of 6/27/2020     Marital Status Spouse Name Number of Children Years Education Education Level Preferred Language Ethnicity Race Source    Unknown -- -- -- -- English Unknown Unknown --    Financial Resource Strain Food Insecurity: Worry Food Insecurity: Inability Transportation Needs: Medical Transportation Needs: Non-medical    -- -- -- -- --            Pertinent History     Question Response Comments    Lives with -- --    Place in Birth Order -- --    Lives in -- --     Number of Siblings -- --    Raised by -- --    Legal Involvement -- --    Childhood Trauma -- --    Criminal History of -- --    Financial Status -- --    Highest Level of Education -- --    Does patient have access to a firearm? -- --     Service -- --    Primary Leisure Activity -- --    Spirituality -- --        Past Medical History:   Diagnosis Date    Chronic Hepatic encephalopathy 6/25/2020    Decompensated hepatic cirrhosis 6/25/2020    History of pancreatitis 6/25/2020    Liver cirrhosis secondary to CHAUDHARY 6/25/2020    Macrocytic anemia 6/25/2020    Other ascites 6/25/2020    Thrombocytopenia 6/25/2020     History reviewed. No pertinent surgical history.  Family History     None        Tobacco Use    Smoking status: Never Smoker    Smokeless tobacco: Never Used   Substance and Sexual Activity    Alcohol use: Not on file    Drug use: Not on file    Sexual activity: Not on file     Review of patient's allergies indicates:   Allergen Reactions    Aspirin Tinitus    Ciprofloxacin Rash       No current facility-administered medications on file prior to encounter.      Current Outpatient Medications on File Prior to Encounter   Medication Sig    acetaminophen (TYLENOL) 500 MG tablet Take 500 mg by mouth every 6 (six) hours as needed for Pain.    ergocalciferol (VITAMIN D2) 50,000 unit Cap Take 50,000 Units by mouth every 7 days.    furosemide (LASIX) 40 MG tablet Take 60 mg by mouth once daily.     haloperidoL (HALDOL) 1 MG tablet Take 1 mg by mouth 2 (two) times daily.    lactulose (CEPHULAC) 10 gram packet Take 1 and a half packets three times a day     levothyroxine (SYNTHROID) 150 MCG tablet Take 150 mcg by mouth before breakfast.    lidocaine (LIDODERM) 5 % Place 1 patch onto the skin every 24 hours. Remove & Discard patch within 12 hours or as directed by MD    melatonin 10 mg Tab Take 10 mg by mouth nightly as needed (sleep).    midodrine (PROAMATINE) 10 MG tablet Take 10 mg by  "mouth 3 (three) times daily.    nystatin (MYCOSTATIN) powder Apply topically 2 (two) times daily. Apply to affected area twice a day    pantoprazole (PROTONIX) 40 MG tablet Take 40 mg by mouth before breakfast.    rifAXIMin (XIFAXAN) 550 mg Tab Take 550 mg by mouth 2 (two) times daily.     simethicone (MYLICON) 80 MG chewable tablet Take 80 mg by mouth every 6 (six) hours as needed for Flatulence. Take 2 tablets by mouth every 6 hours as needed for gas    spironolactone (ALDACTONE) 50 MG tablet Take 100 mg by mouth once daily.     vitamin D (VITAMIN D3) 1000 units Tab Take 1,000 Units by mouth once daily.    zinc sulfate (ZINCATE) 220 (50) mg capsule Take 220 mg by mouth before breakfast.     Psychotherapeutics (From admission, onward)    Start     Stop Route Frequency Ordered    06/26/20 2100  haloperidoL tablet 1 mg      -- Oral 2 times daily 06/26/20 1403    06/26/20 1024  haloperidol lactate injection 1 mg      -- IV Every 6 hours PRN 06/26/20 0924          Objective:     Vital Signs (Most Recent):  Temp: 98.2 °F (36.8 °C) (06/27/20 1210)  Pulse: 88 (06/27/20 0601)  Resp: 15 (06/27/20 0601)  BP: 103/65 (06/27/20 0601)  SpO2: 99 % (06/27/20 0601) Vital Signs (24h Range):  Temp:  [97.6 °F (36.4 °C)-98.6 °F (37 °C)] 98.2 °F (36.8 °C)  Pulse:  [83-98] 88  Resp:  [15-16] 15  SpO2:  [99 %] 99 %  BP: ()/(63-72) 103/65     Height: 5' 4" (162.6 cm)  Weight: 56.7 kg (125 lb)  Body mass index is 21.46 kg/m².      Intake/Output Summary (Last 24 hours) at 6/27/2020 1435  Last data filed at 6/27/2020 0601  Gross per 24 hour   Intake 1440 ml   Output --   Net 1440 ml       Physical Exam  Psychiatric:      Comments: Mental Status Exam:  Appearance: fair hygiene and grooming, casually dressed, drowsy, significant scleral icterus  Behavior/Cooperation: calm, cooperative, pleasant, engaged, occasionally somnolent  Language: fluent english  Speech: normal tone, normal rate, normal pitch, normal volume  Mood: " ""great"  Affect: blunted  Thought Process: linear, logical, goal-directed  Thought Content:  denies SI/HI/paranoia/delusions; no objective evidence of paranoia or delusions  Perception: denies AVH; no objective evidence of AVH   Orientation: x4  Memory: intact to conversation  Attention Span/Concentration: impaired d/t sedation  Fund of Knowledge: appropriate for education level  Insight: fair  Judgment: fair    CAM-ICU negative          Significant Labs: All pertinent labs within the past 24 hours have been reviewed.    Significant Imaging: I have reviewed all pertinent imaging results/findings within the past 24 hours.  "

## 2020-06-27 NOTE — PT/OT/SLP EVAL
"Occupational Therapy   Evaluation and Discharge Note    Name: Zeina Mahoney  MRN: [id number]  Admitting Diagnosis:  Hepatic encephalopathy      Recommendations:     Discharge Recommendations: home  Discharge Equipment Recommendations:  none  Barriers to discharge:  None    Assessment:     Zeina Mahoney is a 54 y.o. female with a medical diagnosis of Hepatic encephalopathy. At this time, patient is functioning at their prior level of function and does not require further acute OT services.     Plan:     During this hospitalization, patient does not require further acute OT services.  Please re-consult if situation changes.    · Plan of Care Reviewed with: patient    Subjective     Occupational Profile:  Living Environment: Pt lives with her  in a SSH with no steps and a walk-in shower with a chair.  Previous level of function: (I) with ADLs / Walks with a RW / Recent fall at home - "I just gave out".  Equipment Used at home:  wheelchair, shower chair, walker, rolling  Assistance upon Discharge:     Pain/Comfort:  · Pain Rating 1: 0/10    Patients cultural, spiritual, Sabianist conflicts given the current situation:      Objective:     Communicated with: rn prior to session.  Patient found up in chair with   upon OT entry to room.    General Precautions: Standard, fall     Occupational Performance:    Bed Mobility:    Independent    Functional Mobility/Transfers:  · Patient completed Sit <> Stand Transfer with supervision  with  no assistive device   · Functional Mobility: Walked ~ 100' with (S) - more stable with the RW. Observed walking within room w/o assistance.    Activities of Daily Living:  · Setup - (I)    Cognitive/Visual Perceptual:  Cognitive/Psychosocial Skills:     -       Oriented to: Person, Place, Time and Situation   -       Safety awareness/insight to disability: intact     Physical Exam:  BUE AROM/MMT: WNL    AMPAC 6 Click ADL:  AMPAC Total Score: 24    Treatment & Education:  UE " ROM/MMT  Bed mobility training / assessment  Functional mobility assessment  Sit/standing balance assessment  Educated on importance of sitting OOB in bedside chair to promote increased strength, endurance & breathing.  Discussed D/C of OT  Education:    Patient left up in chair with call button in reach    GOALS:   Multidisciplinary Problems     Occupational Therapy Goals     Not on file          Multidisciplinary Problems (Resolved)        Problem: Occupational Therapy Goal    Goal Priority Disciplines Outcome Interventions   Occupational Therapy Goal   (Resolved)     OT, PT/OT Met                    History:     Past Medical History:   Diagnosis Date    Chronic Hepatic encephalopathy 6/25/2020    Decompensated hepatic cirrhosis 6/25/2020    History of pancreatitis 6/25/2020    Liver cirrhosis secondary to CHAUDHARY 6/25/2020    Macrocytic anemia 6/25/2020    Other ascites 6/25/2020    Thrombocytopenia 6/25/2020       History reviewed. No pertinent surgical history.    Time Tracking:     OT Date of Treatment: 06/27/20  OT Start Time: 1132  OT Stop Time: 1149  OT Total Time (min): 17 min    Billable Minutes:Evaluation 7  Therapeutic Activity 10    FRANCISCO Poole  6/27/2020

## 2020-06-27 NOTE — PROGRESS NOTES
Progress Note  Hospital Medicine  [facility]       Patient Name: Zeina Mahoney  MRN:  [id number]  Hospital Medicine Team: [facility] L Elen Dumont MD  Date of Admission:  6/25/2020     Length of Stay:  LOS: 1 day   Expected Discharge Date: 6/29/2020  Principal Problem:  Hepatic encephalopathy     Subjective:     Interval History/Overnight Events:      Patient lethargic and encephalopathic this AM.  Was becoming agitated. Was not able to swallow meds safely.  Given lactulose enema and haldol.  Had a BM and became more awake.   Abdominal pain resolved.  Had good appetite.  HDS  VSS  Psychiatry consulted for antipsychotic management, since pt was placed on chronic BID PO haldol at OSH  acute on chronic HE on 6/26 with significant confusion which is  Improving with increased lactulose doses and lactulose enema . Was much more awake and closer to baseline this PM     enoxaparin  40 mg Subcutaneous Q24H    furosemide  60 mg Oral Daily    haloperidoL  1 mg Oral BID    lactulose  20 g Oral Q6H    levothyroxine  150 mcg Oral Before breakfast    midodrine  10 mg Oral TID    pantoprazole  40 mg Oral Before breakfast    rifAXImin  550 mg Oral BID    senna-docusate 8.6-50 mg  1 tablet Oral BID    spironolactone  100 mg Oral Daily    zinc sulfate  220 mg Oral Daily           acetaminophen, dextrose 50%, dextrose 50%, glucagon (human recombinant), glucose, glucose, haloperidol lactate, melatonin, ondansetron, ondansetron, polyethylene glycol, sodium chloride 0.9%    Review of Systems   Constitutional: Negative for chills, fatigue, fever.   HENT: Negative for sore throat, trouble swallowing.    Eyes: Negative for photophobia, visual disturbance.   Respiratory: Negative for cough, shortness of breath.    Cardiovascular: Negative for chest pain, palpitations, leg swelling.   Gastrointestinal: Negative for abdominal pain, constipation, diarrhea, nausea, vomiting.   Endocrine: Negative  for cold intolerance, heat intolerance.   Genitourinary: Negative for dysuria, frequency.   Musculoskeletal: Negative for arthralgias, myalgias.   Skin: Negative for rash, wound, erythema   Neurological: Negative for dizziness, syncope, weakness, light-headedness.   Psychiatric/Behavioral: Negative for confusion, hallucinations, anxiety  All other systems reviewed and are negative.    Objective:     Temp:  [97.6 °F (36.4 °C)-98.6 °F (37 °C)]   Pulse:  []   Resp:  [16-18]   BP: ()/(49-72)   SpO2:  [99 %-100 %]         Weight change: 1.732 kg (3 lb 13.1 oz)   Body mass index is 22.97 kg/m².       Physical Exam:  Constitutional: appears older than stated age, chronically ill looking,  non-distressed, not diaphoretic.   HENT: NC/AT, external ears normal, oropharynx clear, MMM w/o exudates.   Eyes: PERRL, EOMI, conjunctiva normal, no discharge b/l, +scleral icterus   Neck: normal ROM, supple  CV: RRR, no m/r/g, no carotid bruits, +2 peripheral pulses.  Pulmonary/Chest wall: Breathing comfortably w/o distress, CTAB, no w/r/r, no crackles.    GI: Soft, (+) BS, (+) BM   + mildly distended + mildly tender in epigastric region  Musculoskeletal: Normal ROM, +atrophy,  no pitting edema  Neurological: AAO x 3, no focal deficits   + asterixis   Skin: warm, dry   +pallor, jaundice, + spider angiomas,  Psych: flat affect , normal behavior, thought content and judgement    Labs:    Chemistries:   Recent Labs   Lab 06/25/20 0441 06/26/20 0621   * 135*   K 5.0 4.6    103   CO2 22* 20*   BUN 28* 27*   CREATININE 1.2 1.2   CALCIUM 9.9 10.8*   PROT 6.0 7.0   BILITOT 6.3* 7.8*   ALKPHOS 84 98   ALT 19 25   AST 35 39   MG 2.0 2.1   PHOS 3.5 3.3        WBC:   Recent Labs   Lab 06/25/20 0441 06/26/20  0621   WBC 7.36 5.25     Bands:     CBC/Anemia Labs: Coags:    Recent Labs   Lab 06/25/20  0441 06/26/20  0621   WBC 7.36 5.25   HGB 8.9* 9.6*   HCT 26.9* 31.1*   PLT 82* 84*   * 113*   RDW 13.2 13.2    Recent  Labs   Lab 06/25/20  0441 06/26/20  0621   INR 1.3* 1.3*          Assessment and Plan     Hospital Course:    Ms. Zeina Mahoney was admitted to Hospital Medicine for management of  Hepatic encephalopathy     Active Hospital Problems    Diagnosis  POA    *Acute Hepatic encephalopathy [K72.90]  Yes    Decompensated hepatic cirrhosis [K72.90]  Yes    Generalized abdominal pain [R10.84]  Yes    Macrocytic anemia [D53.9]  Yes    Liver cirrhosis secondary to CHAUDHARY [K75.81, K74.60]  Yes    Other ascites [R18.8]  Yes    Thrombocytopenia [D69.6]  Yes    History of pancreatitis [Z87.19]  Not Applicable      Resolved Hospital Problems   No resolved problems to display.           Generalized abdominal pain  History of pancreatitis  - unclear etiology at this time.  Labs and presentation not consistent with acute pancreatitis.  Ultrasound of the liver showed no PVT / thrombus and showed moderate amount of ascites. IR paracentesis attempted but insufficient ascites identified for safe paracentesis.  Concern for possible acid reflux as etiology.  No UTI based on UA results  - IR paracentesis attempted but insufficient ascites identified for safe paracentesis.   - will consider CT scan abdominal pain does not improve  - holding off antibiotics  - attempt to GI cocktail, monitor for improvement  - resolved on 6/26. Work up unrevealing   Overall, pain is chronic in nature and has been present for >2 mo     Decompensated hepatic cirrhosis  Liver cirrhosis secondary to CHAUDHARY  Other ascites  Chronic Hepatic encephalopathy  MELD-Na score: 20 at 6/26/2020  6:21 AM  MELD score: 19 at 6/26/2020  6:21 AM  Calculated from:  Serum Creatinine: 1.2 mg/dL at 6/26/2020  6:21 AM  Serum Sodium: 135 mmol/L at 6/26/2020  6:21 AM  Total Bilirubin: 7.8 mg/dL at 6/26/2020  6:21 AM  INR(ratio): 1.3 at 6/26/2020  6:21 AM  Age: 54 years 9 months     - listed for transplant at [facility] in [place]  - continue home diuretics as well as lactulose and  rifaximin.  No current concerns for acute hepatic encephalopathy or volume overload  - hepatology consulted.  Investigating abdominal pain as above.  - IR paracentesis attempted but insufficient ascites identified for safe paracentesis  - acute on chronic HE on 6/26 with significant confusion which is  Improving with increased lactulose doses and lactulose enema   -Psychiatry consulted for antipsychotic management, since pt was placed on chronic BID PO haldol at OSH     Macrocytic anemia  Thrombocytopenia  - stable on chronic due to liver disease  - monitor     Diet:    GI PPx:    DVT PPx:    Goals of Care:       High Risk Conditions:  abd pain     Disposition:    Admitted to hospital medicine     Discharge  in 1-2 days        Signing Physician:      Elen Dumont MD  Department of Hospital Medicine   [facility]  Pager [phone] [id number]

## 2020-06-27 NOTE — ASSESSMENT & PLAN NOTE
Impression  Delirium 2/2 hepatic encephalopathy    Recs  · Meds  · Haldol 2mg PO qHS  · Haldol 1mg PO q4hr for non-redirectable agitation  · Continue to treat hepatic encephalopathy with appropriate medical mgmt (I.e. lactulose)  · DELIRIUM BEHAVIOR MANAGEMENT  · PLEASE utilize CHEMICAL restraints with PRN meds first for agitation. Minimize use of PHYSICAL restraints OR have periods of being out of physical restraints if possible.  · Keep window shades open and room lit during day and room dim at night in order to promote normal sleep-wake cycles  · Encourage family at bedside. [place] patient often to situation, location, date.  · Continue to Limit or Discontinue use of Narcotics, Benzos and Anti-cholinergic medications as they may worsen delirium.  · Continue medical workup for causative etiology of Delirium.    Case discussed with staff psychiatrist, Dr. Chad Dewey. Psychiatry will continue to follow.

## 2020-06-27 NOTE — ASSESSMENT & PLAN NOTE
Impression  Delirium 2/2 hepatic encephalopathy    Recs  · Meds  · Haldol 2mg PO qHS  · Haldol 2mg PO q4hr for non-redirectable agitation  · Continue to treat hepatic encephalopathy with appropriate medical mgmt (I.e. lactulose)  · DELIRIUM BEHAVIOR MANAGEMENT  · PLEASE utilize CHEMICAL restraints with PRN meds first for agitation. Minimize use of PHYSICAL restraints OR have periods of being out of physical restraints if possible.  · Keep window shades open and room lit during day and room dim at night in order to promote normal sleep-wake cycles  · Encourage family at bedside. [place] patient often to situation, location, date.  · Continue to Limit or Discontinue use of Narcotics, Benzos and Anti-cholinergic medications as they may worsen delirium.  · Continue medical workup for causative etiology of Delirium.    Case discussed with staff psychiatrist, Dr. Chad Dewey. Psychiatry will continue to follow.

## 2020-06-27 NOTE — HPI
"Zeina Mahoney is a 54 y.o. female with a past psychiatric history of Delirium 2/2 hepatic encephalopathy and Mood d/o 2/2 liver disease who presented to [facility] due to Hepatic encephalopathy. Psychiatry was consulted for "antipsychotic adjustment/ management, pt placed on BID haldol for agitation associated with hepatic encephalopathy at OSH."    Per Primary Team:  Patient lethargic and encephalopathic this AM.  Was becoming agitated. Was not able to swallow meds safely.  Given lactulose enema and haldol.  Had a BM and became more awake.     Per Chart Review:  Pt was admitted to a hospital in early May with complications related to liver failure and had several bouts of agitation and irritability.  Psychiatry consult liaison team assessed patient and recommended Haldol 2.5 mg q.h.s. then 1 mg q.h.s. on discharge.  During a later encounter at the hospital, patient's dose of Haldol was increased to 1 mg b.i.d.    Per Psychiatry:  On interview, patient calm and cooperative, however very drowsy.  She is alert and oriented x4 and CAM-ICU negative, but at times confused.  Denies any current mood, psychotic, manic, PTSD, or other psychiatric symptoms at this time.  Also denies history of mental illness.  Agreeable to continuing to take Haldol.    Collateral:  Spoke with son outside of her room.  He says his mother has never been irritable or agitated outside of the setting of hepatic encephalopathy.  Well with the Haldol does help with those symptoms, he believes it is very sedating for her. Does believe the pros outweight the cons of the medication.    Medical Review of Systems:  Endorses jaundice, fatigue, mild confusion, and significant abdominal pain, ROS otherwise negative     Psychiatric Review of Systems-is patient experiencing or having changes in  sleep: no  appetite: no  weight: no  energy/anergy: yes  interest/pleasure/anhedonia: no  somatic symptoms: yes  libido: no  anxiety/panic: no  guilty/hopelessness: " no  concentration: yes  S.I.B.s/risky behavior: no  any drugs: no  alcohol: no     Allergies:  Aspirin and Ciprofloxacin    Past Medical/Surgical History:  Past Medical History:   Diagnosis Date    Chronic Hepatic encephalopathy 6/25/2020    Decompensated hepatic cirrhosis 6/25/2020    History of pancreatitis 6/25/2020    Liver cirrhosis secondary to CHAUDHARY 6/25/2020    Macrocytic anemia 6/25/2020    Other ascites 6/25/2020    Thrombocytopenia 6/25/2020     History reviewed. No pertinent surgical history.    Past Psychiatric History:  Previous Medication Trials: yes   Previous Psychiatric Hospitalizations: no   Previous Suicide Attempts: no   History of Violence: no  Outpatient Psychiatrist: no    Social History:  Marital Status:   Children: 3   Employment Status/Info: retired  Education: college graduate  Special Ed: no  Housing Status: lives with family in [place]  History of phys/sexual abuse: no  Access to gun: no    Substance Abuse History:  Recreational Drugs: denies  Use of Alcohol: denied  Rehab History: no   Tobacco Use: no    Legal History:  Past Charges/Incarcerations: no   Pending charges: no     Family Psychiatric History:   denies    Psychosocial Stressors: health  Functioning Relationships: good support system

## 2020-06-27 NOTE — CONSULTS
"[facility]  Psychiatry  Consult Note    Patient Name: Zeina Mahoney  MRN: [id number]   Code Status: Full Code  Admission Date: 6/25/2020  Hospital Length of Stay: 1 days  Attending Physician: Elen Dumont MD  Primary Care Provider: Primary Doctor No      Patient information was obtained from patient, relative(s), past medical records and ER records.   Inpatient consult to Psychiatry  Consult performed by: Reggie Agudelo MD  Consult ordered by: Elen Dumont MD        Subjective:     Principal Problem:Hepatic encephalopathy       HPI:   Zeina Mahoney is a 54 y.o. female with a past psychiatric history of Delirium 2/2 hepatic encephalopathy and Mood d/o 2/2 liver disease who presented to [facility] due to Hepatic encephalopathy. Psychiatry was consulted for "antipsychotic adjustment/ management, pt placed on BID haldol for agitation associated with hepatic encephalopathy at OSH."    Per Primary Team:  Patient lethargic and encephalopathic this AM.  Was becoming agitated. Was not able to swallow meds safely.  Given lactulose enema and haldol.  Had a BM and became more awake.     Per Chart Review:  Pt was admitted to a hospital in early May with complications related to liver failure and had several bouts of agitation and irritability.  Psychiatry consult liaison team assessed patient and recommended Haldol 2.5 mg q.h.s. then 1 mg q.h.s. on discharge.  During a later encounter at the hospital, patient's dose of Haldol was increased to 1 mg b.i.d.    Per Psychiatry:  On interview, patient calm and cooperative, however very drowsy.  She is alert and oriented x4 and CAM-ICU negative, but at times confused.  Denies any current mood, psychotic, manic, PTSD, or other psychiatric symptoms at this time.  Also denies history of mental illness.  Agreeable to continuing to take Haldol.    Collateral:  Spoke with son outside of her room.  He says his mother has never been irritable or agitated outside of the " setting of hepatic encephalopathy.  Well with the Haldol does help with those symptoms, he believes it is very sedating for her. Does believe the pros outweight the cons of the medication.    Medical Review of Systems:  Endorses jaundice, fatigue, mild confusion, and significant abdominal pain, ROS otherwise negative     Psychiatric Review of Systems-is patient experiencing or having changes in  sleep: no  appetite: no  weight: no  energy/anergy: yes  interest/pleasure/anhedonia: no  somatic symptoms: yes  libido: no  anxiety/panic: no  guilty/hopelessness: no  concentration: yes  S.I.B.s/risky behavior: no  any drugs: no  alcohol: no     Allergies:  Aspirin and Ciprofloxacin    Past Medical/Surgical History:  Past Medical History:   Diagnosis Date    Chronic Hepatic encephalopathy 6/25/2020    Decompensated hepatic cirrhosis 6/25/2020    History of pancreatitis 6/25/2020    Liver cirrhosis secondary to CHAUDHARY 6/25/2020    Macrocytic anemia 6/25/2020    Other ascites 6/25/2020    Thrombocytopenia 6/25/2020     History reviewed. No pertinent surgical history.    Past Psychiatric History:  Previous Medication Trials: yes   Previous Psychiatric Hospitalizations: no   Previous Suicide Attempts: no   History of Violence: no  Outpatient Psychiatrist: no    Social History:  Marital Status:   Children: 3   Employment Status/Info: retired  Education: college graduate  Special Ed: no  Housing Status: lives with family in [place]  History of phys/sexual abuse: no  Access to gun: no    Substance Abuse History:  Recreational Drugs: denies  Use of Alcohol: denied  Rehab History: no   Tobacco Use: no    Legal History:  Past Charges/Incarcerations: no   Pending charges: no     Family Psychiatric History:   denies    Psychosocial Stressors: health  Functioning Relationships: good support system      Hospital Course: No notes on file         Patient History           Medical as of 6/27/2020     Past Medical History      Diagnosis Date Comments Source    Chronic Hepatic encephalopathy 6/25/2020 -- Provider    Decompensated hepatic cirrhosis 6/25/2020 -- Provider    History of pancreatitis 6/25/2020 -- Provider    Liver cirrhosis secondary to CHAUDHARY 6/25/2020 -- Provider    Macrocytic anemia 6/25/2020 -- Provider    Other ascites 6/25/2020 -- Provider    Thrombocytopenia 6/25/2020 -- Provider                  Surgical as of 6/27/2020    Past Surgical History: Patient provided no pertinent surgical history.           Family as of 6/27/2020    None           Tobacco Use as of 6/27/2020     Smoking Status Smoking Start Date Smoking Quit Date Packs/Day Years Used    Never Smoker -- -- -- --    Types Comments Smokeless Tobacco Status Smokeless Tobacco Quit Date Source    -- -- Never Used -- Provider            Alcohol Use as of 6/27/2020     Alcohol Use Drinks/Week Alcohol/Week Comments Source    --   -- -- Provider    Frequency Typical Drinks Binge Drinking        -- -- --              Drug Use as of 6/27/2020     Drug Use Types Frequency Comments Source    -- -- -- -- Provider            Sexual Activity as of 6/27/2020     Sexually Active Birth Control Partners Comments Source    -- -- -- -- Provider            Activities of Daily Living as of 6/27/2020    None           Social Documentation as of 6/27/2020    None           Occupational as of 6/27/2020    None           Socioeconomic as of 6/27/2020     Marital Status Spouse Name Number of Children Years Education Education Level Preferred Language Ethnicity Race Source    Unknown -- -- -- -- English Unknown Unknown --    Financial Resource Strain Food Insecurity: Worry Food Insecurity: Inability Transportation Needs: Medical Transportation Needs: Non-medical    -- -- -- -- --            Pertinent History     Question Response Comments    Lives with -- --    Place in Birth Order -- --    Lives in -- --    Number of Siblings -- --    Raised by -- --    Legal Involvement -- --    Childhood  Trauma -- --    Criminal History of -- --    Financial Status -- --    Highest Level of Education -- --    Does patient have access to a firearm? -- --     Service -- --    Primary Leisure Activity -- --    Spirituality -- --        Past Medical History:   Diagnosis Date    Chronic Hepatic encephalopathy 6/25/2020    Decompensated hepatic cirrhosis 6/25/2020    History of pancreatitis 6/25/2020    Liver cirrhosis secondary to CHAUDHARY 6/25/2020    Macrocytic anemia 6/25/2020    Other ascites 6/25/2020    Thrombocytopenia 6/25/2020     History reviewed. No pertinent surgical history.  Family History     None        Tobacco Use    Smoking status: Never Smoker    Smokeless tobacco: Never Used   Substance and Sexual Activity    Alcohol use: Not on file    Drug use: Not on file    Sexual activity: Not on file     Review of patient's allergies indicates:   Allergen Reactions    Aspirin Tinitus    Ciprofloxacin Rash       No current facility-administered medications on file prior to encounter.      Current Outpatient Medications on File Prior to Encounter   Medication Sig    acetaminophen (TYLENOL) 500 MG tablet Take 500 mg by mouth every 6 (six) hours as needed for Pain.    ergocalciferol (VITAMIN D2) 50,000 unit Cap Take 50,000 Units by mouth every 7 days.    furosemide (LASIX) 40 MG tablet Take 60 mg by mouth once daily.     haloperidoL (HALDOL) 1 MG tablet Take 1 mg by mouth 2 (two) times daily.    lactulose (CEPHULAC) 10 gram packet Take 1 and a half packets three times a day     levothyroxine (SYNTHROID) 150 MCG tablet Take 150 mcg by mouth before breakfast.    lidocaine (LIDODERM) 5 % Place 1 patch onto the skin every 24 hours. Remove & Discard patch within 12 hours or as directed by MD    melatonin 10 mg Tab Take 10 mg by mouth nightly as needed (sleep).    midodrine (PROAMATINE) 10 MG tablet Take 10 mg by mouth 3 (three) times daily.    nystatin (MYCOSTATIN) powder Apply topically 2  "(two) times daily. Apply to affected area twice a day    pantoprazole (PROTONIX) 40 MG tablet Take 40 mg by mouth before breakfast.    rifAXIMin (XIFAXAN) 550 mg Tab Take 550 mg by mouth 2 (two) times daily.     simethicone (MYLICON) 80 MG chewable tablet Take 80 mg by mouth every 6 (six) hours as needed for Flatulence. Take 2 tablets by mouth every 6 hours as needed for gas    spironolactone (ALDACTONE) 50 MG tablet Take 100 mg by mouth once daily.     vitamin D (VITAMIN D3) 1000 units Tab Take 1,000 Units by mouth once daily.    zinc sulfate (ZINCATE) 220 (50) mg capsule Take 220 mg by mouth before breakfast.     Psychotherapeutics (From admission, onward)    Start     Stop Route Frequency Ordered    06/26/20 2100  haloperidoL tablet 1 mg      -- Oral 2 times daily 06/26/20 1403    06/26/20 1024  haloperidol lactate injection 1 mg      -- IV Every 6 hours PRN 06/26/20 0924          Objective:     Vital Signs (Most Recent):  Temp: 98.2 °F (36.8 °C) (06/27/20 1210)  Pulse: 88 (06/27/20 0601)  Resp: 15 (06/27/20 0601)  BP: 103/65 (06/27/20 0601)  SpO2: 99 % (06/27/20 0601) Vital Signs (24h Range):  Temp:  [97.6 °F (36.4 °C)-98.6 °F (37 °C)] 98.2 °F (36.8 °C)  Pulse:  [83-98] 88  Resp:  [15-16] 15  SpO2:  [99 %] 99 %  BP: ()/(63-72) 103/65     Height: 5' 4" (162.6 cm)  Weight: 56.7 kg (125 lb)  Body mass index is 21.46 kg/m².      Intake/Output Summary (Last 24 hours) at 6/27/2020 1435  Last data filed at 6/27/2020 0601  Gross per 24 hour   Intake 1440 ml   Output --   Net 1440 ml       Physical Exam  Psychiatric:      Comments: Mental Status Exam:  Appearance: fair hygiene and grooming, casually dressed, drowsy, significant scleral icterus  Behavior/Cooperation: calm, cooperative, pleasant, engaged, occasionally somnolent  Language: fluent english  Speech: normal tone, normal rate, normal pitch, normal volume  Mood: "great"  Affect: blunted  Thought Process: linear, logical, goal-directed  Thought " Content:  denies SI/HI/paranoia/delusions; no objective evidence of paranoia or delusions  Perception: denies AVH; no objective evidence of AVH   Orientation: x4  Memory: intact to conversation  Attention Span/Concentration: impaired d/t sedation  Fund of Knowledge: appropriate for education level  Insight: fair  Judgment: fair    CAM-ICU negative          Significant Labs: All pertinent labs within the past 24 hours have been reviewed.    Significant Imaging: I have reviewed all pertinent imaging results/findings within the past 24 hours.    Assessment/Plan:     Delirium due to medical condition with behavioral disturbance  Impression  Delirium 2/2 hepatic encephalopathy    Recs  · Meds  · Haldol 2mg PO qHS  · Haldol 1mg PO q4hr for non-redirectable agitation  · Continue to treat hepatic encephalopathy with appropriate medical mgmt (I.e. lactulose)  · DELIRIUM BEHAVIOR MANAGEMENT  · PLEASE utilize CHEMICAL restraints with PRN meds first for agitation. Minimize use of PHYSICAL restraints OR have periods of being out of physical restraints if possible.  · Keep window shades open and room lit during day and room dim at night in order to promote normal sleep-wake cycles  · Encourage family at bedside. [place] patient often to situation, location, date.  · Continue to Limit or Discontinue use of Narcotics, Benzos and Anti-cholinergic medications as they may worsen delirium.  · Continue medical workup for causative etiology of Delirium.    Case discussed with staff psychiatrist, Dr. Chad Dewey. Psychiatry will continue to follow.         Total Time:  60 minutes      Reggie Agudelo MD   Psychiatry  [facility]

## 2020-06-27 NOTE — PROGRESS NOTES
Patient oriented to person, place and time. Ambulating in room. Son at side. Receiving lactulose - pt's own crystalose.  Has a gigantic apetite- educated on need for low sodium diet.

## 2020-06-27 NOTE — PROGRESS NOTES
Progress Note  Hospital Medicine  [facility]       Patient Name: Zeina Mahoney  MRN:  [id number]  Hospital Medicine Team: [facility] HOSP MED L Elen Dumont MD  Date of Admission:  6/25/2020     Length of Stay:  LOS: 1 day   Expected Discharge Date: 6/29/2020  Principal Problem:  Hepatic encephalopathy     Subjective:     Interval History/Overnight Events:      Patient sleepy today but awakens easily. Son at bedside states that pt is still somewhat encephalopathic and not at baseline.  Increased frequency of lactulose and plan for lactulose enema in the PM if does not achieve 4 BMs. Likely needs > 4BMs per day to control HE     enoxaparin  40 mg Subcutaneous Q24H    furosemide  60 mg Oral Daily    haloperidoL  1 mg Oral Once    [START ON 6/28/2020] haloperidoL  2 mg Oral QHS    lactulose  200 g Rectal Once    lactulose  20 g Oral Q6H    levothyroxine  150 mcg Oral Before breakfast    melatonin  9 mg Oral Nightly    midodrine  10 mg Oral TID    pantoprazole  40 mg Oral Before breakfast    rifAXImin  550 mg Oral BID    senna-docusate 8.6-50 mg  1 tablet Oral BID    spironolactone  100 mg Oral Daily    zinc sulfate  220 mg Oral Daily           acetaminophen, dextrose 50%, dextrose 50%, glucagon (human recombinant), glucose, glucose, haloperidoL, ondansetron, ondansetron, polyethylene glycol, sodium chloride 0.9%    Review of Systems   Constitutional: Negative for chills, fatigue, fever.   HENT: Negative for sore throat, trouble swallowing.    Eyes: Negative for photophobia, visual disturbance.   Respiratory: Negative for cough, shortness of breath.    Cardiovascular: Negative for chest pain, palpitations, leg swelling.   Gastrointestinal: Negative for abdominal pain, constipation, diarrhea, nausea, vomiting.   Endocrine: Negative for cold intolerance, heat intolerance.   Genitourinary: Negative for dysuria, frequency.   Musculoskeletal: Negative for arthralgias, myalgias.    Skin: Negative for rash, wound, erythema   Neurological: Negative for dizziness, syncope, weakness, light-headedness.   Psychiatric/Behavioral: Negative for confusion, hallucinations, anxiety  All other systems reviewed and are negative.    Objective:     Temp:  [97.4 °F (36.3 °C)-98.6 °F (37 °C)]   Pulse:  []   Resp:  [15-21]   BP: ()/(63-71)   SpO2:  [99 %]         Weight change: -4 kg (-8 lb 13.1 oz)   Body mass index is 21.46 kg/m².       Physical Exam:  Constitutional: appears older than stated age, chronically ill looking,  non-distressed, not diaphoretic.   HENT: NC/AT, external ears normal, oropharynx clear, MMM w/o exudates.   Eyes: PERRL, EOMI, conjunctiva normal, no discharge b/l, +scleral icterus   Neck: normal ROM, supple  CV: RRR, no m/r/g, no carotid bruits, +2 peripheral pulses.  Pulmonary/Chest wall: Breathing comfortably w/o distress, CTAB, no w/r/r, no crackles.    GI: Soft, (+) BS, (+) BM   + mildly distended  non tender   Musculoskeletal: Normal ROM, +atrophy,  no pitting edema  Neurological: AAO x 3, no focal deficits   + asterixis   Skin: warm, dry   +pallor, jaundice, + spider angiomas,  Psych: flat affect , normal behavior, thought content and judgement    Labs:    Chemistries:   Recent Labs   Lab 06/25/20 0441 06/26/20 0621 06/27/20  0557   * 135* 137   K 5.0 4.6 4.3    103 105   CO2 22* 20* 24   BUN 28* 27* 28*   CREATININE 1.2 1.2 1.2   CALCIUM 9.9 10.8* 10.2   PROT 6.0 7.0 6.0   BILITOT 6.3* 7.8* 7.2*   ALKPHOS 84 98 85   ALT 19 25 21   AST 35 39 33   MG 2.0 2.1 1.9   PHOS 3.5 3.3 3.5        WBC:   Recent Labs   Lab 06/25/20 0441 06/26/20 0621 06/27/20  0557   WBC 7.36 5.25 6.33     Bands:     CBC/Anemia Labs: Coags:    Recent Labs   Lab 06/25/20  0441 06/26/20  0621 06/27/20  0557   WBC 7.36 5.25 6.33   HGB 8.9* 9.6* 8.0*   HCT 26.9* 31.1* 25.0*   PLT 82* 84* 75*   * 113* 109*   RDW 13.2 13.2 13.1    Recent Labs   Lab 06/25/20 0441 06/26/20 0621  06/27/20  0557   INR 1.3* 1.3* 1.4*          Assessment and Plan     Hospital Course:    Ms. Zeina Mahoney was admitted to Hospital Medicine for management of  Hepatic encephalopathy     Active Hospital Problems    Diagnosis  POA    *Acute Hepatic encephalopathy [K72.90]  Yes    Delirium due to medical condition with behavioral disturbance [F05]  Yes    Decompensated hepatic cirrhosis [K72.90]  Yes    Generalized abdominal pain [R10.84]  Yes    Macrocytic anemia [D53.9]  Yes    Liver cirrhosis secondary to CHAUDHARY [K75.81, K74.60]  Yes    Other ascites [R18.8]  Yes    Thrombocytopenia [D69.6]  Yes    History of pancreatitis [Z87.19]  Not Applicable      Resolved Hospital Problems   No resolved problems to display.           Generalized abdominal pain  History of pancreatitis  - unclear etiology at this time.  Labs and presentation not consistent with acute pancreatitis.  Ultrasound of the liver showed no PVT / thrombus and showed moderate amount of ascites. IR paracentesis attempted but insufficient ascites identified for safe paracentesis.  Concern for possible acid reflux as etiology.  No UTI based on UA results  - IR paracentesis attempted but insufficient ascites identified for safe paracentesis.   - will consider CT scan abdominal pain does not improve  - holding off antibiotics  - attempt to GI cocktail, monitor for improvement  - resolved on 6/26. Work up unrevealing   Overall, pain is chronic in nature and has been present for >2 mo     Decompensated hepatic cirrhosis  Liver cirrhosis secondary to CHAUDHARY  Other ascites  Chronic Hepatic encephalopathy  MELD-Na score: 19 at 6/27/2020  5:57 AM  MELD score: 19 at 6/27/2020  5:57 AM  Calculated from:  Serum Creatinine: 1.2 mg/dL at 6/27/2020  5:57 AM  Serum Sodium: 137 mmol/L at 6/27/2020  5:57 AM  Total Bilirubin: 7.2 mg/dL at 6/27/2020  5:57 AM  INR(ratio): 1.4 at 6/27/2020  5:57 AM  Age: 54 years 9 months     [place] [facility] [place]  -  continue home diuretics as well as lactulose and rifaximin.  No current concerns for acute hepatic encephalopathy or volume overload  - hepatology consulted.  Investigating abdominal pain as above.  - IR paracentesis attempted but insufficient ascites identified for safe paracentesis  - acute on chronic HE on 6/26 with significant confusion which is  Improving with increased lactulose doses and lactulose enema   -Psychiatry consulted for antipsychotic management, since pt was placed on chronic BID PO haldol at OSH- rec cont haldol 2mg QHS  - Increased frequency of lactulose and plan for lactulose enema in the PM if does not achieve 4 BMs. Likely needs > 4BMs per day to control HE     Macrocytic anemia  Thrombocytopenia  - stable on chronic due to liver disease  - monitor     Diet:    GI PPx:    DVT PPx:    Goals of Care:       High Risk Conditions:  abd pain     Disposition:       Discharge 6/28 - 6/29         Signing Physician:      Elen Dumont MD  Department of Hospital Medicine   [facility]  Pager [phone] [facility] [id number]

## 2020-06-28 LAB
ALBUMIN SERPL BCP-MCNC: 2.8 G/DL (ref 3.5–5.2)
ALP SERPL-CCNC: 85 U/L (ref 55–135)
ALT SERPL W/O P-5'-P-CCNC: 22 U/L (ref 10–44)
AMMONIA PLAS-SCNC: 87 UMOL/L (ref 10–50)
ANION GAP SERPL CALC-SCNC: 7 MMOL/L (ref 8–16)
AST SERPL-CCNC: 33 U/L (ref 10–40)
BASOPHILS # BLD AUTO: 0.05 K/UL (ref 0–0.2)
BASOPHILS NFR BLD: 0.8 % (ref 0–1.9)
BILIRUB SERPL-MCNC: 6.5 MG/DL (ref 0.1–1)
BUN SERPL-MCNC: 25 MG/DL (ref 6–20)
CALCIUM SERPL-MCNC: 10.1 MG/DL (ref 8.7–10.5)
CHLORIDE SERPL-SCNC: 101 MMOL/L (ref 95–110)
CO2 SERPL-SCNC: 25 MMOL/L (ref 23–29)
CREAT SERPL-MCNC: 1.2 MG/DL (ref 0.5–1.4)
DIFFERENTIAL METHOD: ABNORMAL
EOSINOPHIL # BLD AUTO: 0.1 K/UL (ref 0–0.5)
EOSINOPHIL NFR BLD: 0.8 % (ref 0–8)
ERYTHROCYTE [DISTWIDTH] IN BLOOD BY AUTOMATED COUNT: 12.9 % (ref 11.5–14.5)
EST. GFR  (AFRICAN AMERICAN): 59.2 ML/MIN/1.73 M^2
EST. GFR  (NON AFRICAN AMERICAN): 51.4 ML/MIN/1.73 M^2
GLUCOSE SERPL-MCNC: 156 MG/DL (ref 70–110)
HCT VFR BLD AUTO: 26.8 % (ref 37–48.5)
HGB BLD-MCNC: 8.6 G/DL (ref 12–16)
IMM GRANULOCYTES # BLD AUTO: 0.03 K/UL (ref 0–0.04)
IMM GRANULOCYTES NFR BLD AUTO: 0.5 % (ref 0–0.5)
LYMPHOCYTES # BLD AUTO: 1.6 K/UL (ref 1–4.8)
LYMPHOCYTES NFR BLD: 24 % (ref 18–48)
MCH RBC QN AUTO: 35.1 PG (ref 27–31)
MCHC RBC AUTO-ENTMCNC: 32.1 G/DL (ref 32–36)
MCV RBC AUTO: 109 FL (ref 82–98)
MONOCYTES # BLD AUTO: 0.5 K/UL (ref 0.3–1)
MONOCYTES NFR BLD: 7 % (ref 4–15)
NEUTROPHILS # BLD AUTO: 4.3 K/UL (ref 1.8–7.7)
NEUTROPHILS NFR BLD: 66.9 % (ref 38–73)
NRBC BLD-RTO: 0 /100 WBC
PLATELET # BLD AUTO: 77 K/UL (ref 150–350)
PMV BLD AUTO: 9.9 FL (ref 9.2–12.9)
POTASSIUM SERPL-SCNC: 4.5 MMOL/L (ref 3.5–5.1)
PROT SERPL-MCNC: 6.2 G/DL (ref 6–8.4)
RBC # BLD AUTO: 2.45 M/UL (ref 4–5.4)
SODIUM SERPL-SCNC: 133 MMOL/L (ref 136–145)
WBC # BLD AUTO: 6.45 K/UL (ref 3.9–12.7)

## 2020-06-28 PROCEDURE — 25000003 PHARM REV CODE 250: Mod: NTX | Performed by: HOSPITALIST

## 2020-06-28 PROCEDURE — 80053 COMPREHEN METABOLIC PANEL: CPT | Mod: NTX

## 2020-06-28 PROCEDURE — G0378 HOSPITAL OBSERVATION PER HR: HCPCS | Mod: NTX

## 2020-06-28 PROCEDURE — 36415 COLL VENOUS BLD VENIPUNCTURE: CPT | Mod: NTX

## 2020-06-28 PROCEDURE — 94761 N-INVAS EAR/PLS OXIMETRY MLT: CPT | Mod: NTX

## 2020-06-28 PROCEDURE — 85025 COMPLETE CBC W/AUTO DIFF WBC: CPT | Mod: NTX

## 2020-06-28 PROCEDURE — 97161 PT EVAL LOW COMPLEX 20 MIN: CPT | Mod: NTX

## 2020-06-28 PROCEDURE — 82140 ASSAY OF AMMONIA: CPT | Mod: NTX

## 2020-06-28 PROCEDURE — 99225 PR SUBSEQUENT OBSERVATION CARE,LEVEL II: CPT | Mod: NTX,,, | Performed by: HOSPITALIST

## 2020-06-28 PROCEDURE — 63600175 PHARM REV CODE 636 W HCPCS: Mod: NTX | Performed by: HOSPITALIST

## 2020-06-28 PROCEDURE — 97530 THERAPEUTIC ACTIVITIES: CPT | Mod: NTX

## 2020-06-28 PROCEDURE — 99225 PR SUBSEQUENT OBSERVATION CARE,LEVEL II: ICD-10-PCS | Mod: NTX,,, | Performed by: HOSPITALIST

## 2020-06-28 PROCEDURE — 96372 THER/PROPH/DIAG INJ SC/IM: CPT

## 2020-06-28 RX ORDER — LACTULOSE 10 G/15ML
45 SOLUTION ORAL 3 TIMES DAILY
Status: DISCONTINUED | OUTPATIENT
Start: 2020-06-28 | End: 2020-06-29 | Stop reason: HOSPADM

## 2020-06-28 RX ORDER — POLYETHYLENE GLYCOL 3350 17 G/17G
17 POWDER, FOR SOLUTION ORAL DAILY
Status: DISCONTINUED | OUTPATIENT
Start: 2020-06-28 | End: 2020-06-29 | Stop reason: HOSPADM

## 2020-06-28 RX ORDER — LACTULOSE 10 G/15ML
200 SOLUTION ORAL; RECTAL ONCE
Status: DISCONTINUED | OUTPATIENT
Start: 2020-06-28 | End: 2020-06-29 | Stop reason: HOSPADM

## 2020-06-28 RX ORDER — LACTULOSE 10 G/15ML
45 SOLUTION ORAL 3 TIMES DAILY
Status: DISCONTINUED | OUTPATIENT
Start: 2020-06-28 | End: 2020-06-28

## 2020-06-28 RX ADMIN — LACTULOSE 45 G: 10 SOLUTION ORAL at 01:06

## 2020-06-28 RX ADMIN — RIFAXIMIN 550 MG: 550 TABLET ORAL at 09:06

## 2020-06-28 RX ADMIN — MIDODRINE HYDROCHLORIDE 10 MG: 5 TABLET ORAL at 08:06

## 2020-06-28 RX ADMIN — MIDODRINE HYDROCHLORIDE 10 MG: 5 TABLET ORAL at 09:06

## 2020-06-28 RX ADMIN — LACTULOSE 45 G: 10 SOLUTION ORAL at 08:06

## 2020-06-28 RX ADMIN — FUROSEMIDE 60 MG: 40 TABLET ORAL at 09:06

## 2020-06-28 RX ADMIN — ENOXAPARIN SODIUM 40 MG: 100 INJECTION SUBCUTANEOUS at 05:06

## 2020-06-28 RX ADMIN — RIFAXIMIN 550 MG: 550 TABLET ORAL at 08:06

## 2020-06-28 RX ADMIN — PANTOPRAZOLE SODIUM 40 MG: 40 TABLET, DELAYED RELEASE ORAL at 05:06

## 2020-06-28 RX ADMIN — LACTULOSE 20 G: 10 SOLUTION ORAL at 06:06

## 2020-06-28 RX ADMIN — SPIRONOLACTONE 100 MG: 50 TABLET ORAL at 09:06

## 2020-06-28 RX ADMIN — HALOPERIDOL 2 MG: 1 TABLET ORAL at 08:06

## 2020-06-28 RX ADMIN — MIDODRINE HYDROCHLORIDE 10 MG: 5 TABLET ORAL at 03:06

## 2020-06-28 RX ADMIN — LACTULOSE 20 G: 10 SOLUTION ORAL at 11:06

## 2020-06-28 RX ADMIN — ZINC SULFATE 220 MG (50 MG) CAPSULE 220 MG: CAPSULE at 09:06

## 2020-06-28 RX ADMIN — LEVOTHYROXINE SODIUM 150 MCG: 150 TABLET ORAL at 05:06

## 2020-06-28 RX ADMIN — SENNOSIDES AND DOCUSATE SODIUM 1 TABLET: 8.6; 5 TABLET ORAL at 09:06

## 2020-06-28 RX ADMIN — Medication 9 MG: at 08:06

## 2020-06-28 RX ADMIN — LACTULOSE 20 G: 10 SOLUTION ORAL at 05:06

## 2020-06-28 NOTE — PLAN OF CARE
Problem: Physical Therapy Goal  Goal: Physical Therapy Goal  Outcome: Met   Eval and D/C from acute PT services    Eric Remy PT,DPT  6/28/2020

## 2020-06-28 NOTE — PROGRESS NOTES
Progress Note  Hospital Medicine  [facility]       Patient Name: Zeina Mahoney  MRN:  [id number]  Hospital Medicine Team: [facility] MED L Elen Dumont MD  Date of Admission:  6/25/2020     Length of Stay:  LOS: 1 day   Expected Discharge Date: 6/29/2020  Principal Problem:  Hepatic encephalopathy     Subjective:     Interval History/Overnight Events:      Mental status at baseline today.  However, has not had any BMs today.  Needed to get lactulose enemas. No asterixis. Bowel regimen escalated.      enoxaparin  40 mg Subcutaneous Q24H    furosemide  60 mg Oral Daily    haloperidoL  2 mg Oral QHS    lactulose  200 g Rectal Once    lactulose  45 g Oral TID    lactulose  20 g Oral Q6H    levothyroxine  150 mcg Oral Before breakfast    melatonin  9 mg Oral Nightly    midodrine  10 mg Oral TID    pantoprazole  40 mg Oral Before breakfast    rifAXImin  550 mg Oral BID    senna-docusate 8.6-50 mg  1 tablet Oral BID    spironolactone  100 mg Oral Daily    zinc sulfate  220 mg Oral Daily           acetaminophen, dextrose 50%, dextrose 50%, glucagon (human recombinant), glucose, glucose, haloperidoL, ondansetron, ondansetron, polyethylene glycol, sodium chloride 0.9%    Review of Systems   Constitutional: Negative for chills, fatigue, fever.   HENT: Negative for sore throat, trouble swallowing.    Eyes: Negative for photophobia, visual disturbance.   Respiratory: Negative for cough, shortness of breath.    Cardiovascular: Negative for chest pain, palpitations, leg swelling.   Gastrointestinal: Negative for abdominal pain, constipation, diarrhea, nausea, vomiting.   Endocrine: Negative for cold intolerance, heat intolerance.   Genitourinary: Negative for dysuria, frequency.   Musculoskeletal: Negative for arthralgias, myalgias.   Skin: Negative for rash, wound, erythema   Neurological: Negative for dizziness, syncope, weakness, light-headedness.   Psychiatric/Behavioral:  Negative for confusion, hallucinations, anxiety  All other systems reviewed and are negative.    Objective:     Temp:  [97.7 °F (36.5 °C)-98.6 °F (37 °C)]   Pulse:  [85-92]   Resp:  [15-23]   BP: ()/(59-70)   SpO2:  [98 %-99 %]         Weight change: 0.6 kg (1 lb 5.2 oz)   Body mass index is 21.68 kg/m².       Physical Exam:  Constitutional: appears older than stated age, chronically ill looking,  non-distressed, not diaphoretic.   HENT: NC/AT, external ears normal, oropharynx clear, MMM w/o exudates.   Eyes: PERRL, EOMI, conjunctiva normal, no discharge b/l, +scleral icterus   Neck: normal ROM, supple  CV: RRR, no m/r/g, no carotid bruits, +2 peripheral pulses.  Pulmonary/Chest wall: Breathing comfortably w/o distress, CTAB, no w/r/r, no crackles.    GI: Soft, (+) BS, (+) BM   + mildly distended  non tender   Musculoskeletal: Normal ROM, +atrophy,  no pitting edema  Neurological: AAO x4, no focal deficits   no asterixis   Skin: warm, dry   +pallor, jaundice, + spider angiomas,  Psych: flat affect , normal behavior, thought content and judgement    Labs:    Chemistries:   Recent Labs   Lab 06/25/20 0441 06/26/20 0621 06/27/20  0557 06/28/20  0636   * 135* 137 133*   K 5.0 4.6 4.3 4.5    103 105 101   CO2 22* 20* 24 25   BUN 28* 27* 28* 25*   CREATININE 1.2 1.2 1.2 1.2   CALCIUM 9.9 10.8* 10.2 10.1   PROT 6.0 7.0 6.0 6.2   BILITOT 6.3* 7.8* 7.2* 6.5*   ALKPHOS 84 98 85 85   ALT 19 25 21 22   AST 35 39 33 33   MG 2.0 2.1 1.9  --    PHOS 3.5 3.3 3.5  --         WBC:   Recent Labs   Lab 06/25/20 0441 06/26/20  0621 06/27/20  0557 06/28/20  0636   WBC 7.36 5.25 6.33 6.45     Bands:     CBC/Anemia Labs: Coags:    Recent Labs   Lab 06/26/20  0621 06/27/20  0557 06/28/20  0636   WBC 5.25 6.33 6.45   HGB 9.6* 8.0* 8.6*   HCT 31.1* 25.0* 26.8*   PLT 84* 75* 77*   * 109* 109*   RDW 13.2 13.1 12.9    Recent Labs   Lab 06/25/20  0441 06/26/20  0621 06/27/20  0557   INR 1.3* 1.3* 1.4*           Assessment and Plan     Hospital Course:    Ms. Zeina Mahoney was admitted to Hospital Medicine for management of  Hepatic encephalopathy     Active Hospital Problems    Diagnosis  POA    *Acute Hepatic encephalopathy [K72.90]  Yes    Delirium due to medical condition with behavioral disturbance [F05]  Yes    Decompensated hepatic cirrhosis [K72.90]  Yes    Generalized abdominal pain [R10.84]  Yes    Macrocytic anemia [D53.9]  Yes    Liver cirrhosis secondary to CHAUDHARY [K75.81, K74.60]  Yes    Other ascites [R18.8]  Yes    Thrombocytopenia [D69.6]  Yes    History of pancreatitis [Z87.19]  Not Applicable      Resolved Hospital Problems   No resolved problems to display.           Generalized abdominal pain  History of pancreatitis  - unclear etiology at this time.  Labs and presentation not consistent with acute pancreatitis.  Ultrasound of the liver showed no PVT / thrombus and showed moderate amount of ascites. IR paracentesis attempted but insufficient ascites identified for safe paracentesis.  Concern for possible acid reflux as etiology.  No UTI based on UA results  - IR paracentesis attempted but insufficient ascites identified for safe paracentesis.   - will consider CT scan abdominal pain does not improve  - holding off antibiotics  - attempt to GI cocktail, monitor for improvement  - resolved on 6/26. Work up unrevealing   Overall, pain is chronic in nature and has been present for >2 mo  - RESOLVED     Decompensated hepatic cirrhosis  Liver cirrhosis secondary to CHAUDHARY  Other ascites  Chronic Hepatic encephalopathy  MELD-Na score: 22 at 6/28/2020  6:36 AM  MELD score: 19 at 6/28/2020  6:36 AM  Calculated from:  Serum Creatinine: 1.2 mg/dL at 6/28/2020  6:36 AM  Serum Sodium: 133 mmol/L at 6/28/2020  6:36 AM  Total Bilirubin: 6.5 mg/dL at 6/28/2020  6:36 AM  INR(ratio): 1.4 at 6/27/2020  5:57 AM  Age: 54 years 9 months     - [place] [facility] [place]  - continue home diuretics as  well as lactulose and rifaximin.  No current concerns for acute hepatic encephalopathy or volume overload  - hepatology consulted.  Investigating abdominal pain as above.  - IR paracentesis attempted but insufficient ascites identified for safe paracentesis  - acute on chronic HE on 6/26 with significant confusion which is  Improving with increased lactulose doses and lactulose enema   -Psychiatry consulted for antipsychotic management, since pt was placed on chronic BID PO haldol at OSH- rec cont haldol 2mg QHS  - patient takes crystalose at home, as pt did not like lactulose taster in the past and does better with powder. Plan to use crystalose and lactulose intermittently,since not having BMs with crystalose alone.  Senna-doc/ Miralax  also ordered.  Likely needs > 4BMs per day to control HE     Macrocytic anemia  Thrombocytopenia  - stable on chronic due to liver disease  - monitor     Diet:    GI PPx:    DVT PPx:    Goals of Care:       High Risk Conditions:  abd pain     Disposition:       Discharge 6/28 - 6/29         Signing Physician:      Elen Dumont MD  Department of Hospital Medicine   [facility]  Pager [phone] [facility] [id number]

## 2020-06-28 NOTE — PT/OT/SLP EVAL
"Physical Therapy Evaluation and Discharge Note    Patient Name:  Zeina Mahoney   MRN:  [id number]    Recommendations:     Discharge Recommendations:  home   Discharge Equipment Recommendations: none   Barriers to discharge: None    Assessment:     Zeina Mahoney is a 54 y.o. female admitted with a medical diagnosis of Hepatic encephalopathy. .  At this time, patient is functioning at their prior level of function and does not require further acute PT services.     Recent Surgery: * No surgery found *      Plan:     During this hospitalization, patient does not require further acute PT services.  Please re-consult if situation changes.      Subjective     Chief Complaint: fatigue  Patient/Family Comments/goals: "I've been getting around ok."  Pain/Comfort:  · Pain Rating 1: 0/10    Patients cultural, spiritual, Bahai conflicts given the current situation: no    Living Environment:  Pt lives c  in [facility] c [facility]STE.  Pt from [place] and currently living in [facility] c 4STE [place].  Reports 1x fall secondary to legs giving out  Prior to admission, patients level of function was independent c ADLs and using RW for mobility.  Equipment used at home: walker, rolling, wheelchair, shower chair.  DME owned (not currently used): none.  Upon discharge, patient will have assistance from family.    Objective:     Communicated with RN prior to session.  Patient found up in chair with telemetry, peripheral IV upon PT entry to room.    General Precautions: Standard, fall   Orthopedic Precautions:N/A   Braces: N/A     Exams:  · Cognitive Exam:  Patient is oriented to Person, Place, Time and Situation  · RLE ROM: WFL  · RLE Strength: WFL  · LLE ROM: WFL  · LLE Strength: WFL    Functional Mobility:  · Transfers:     · Sit to Stand:  supervision with no AD  · Gait: 50ft c no AD; 100ft c RW S  · Demo decreased gait speed, narrow POONAM, mild unsteadiness c improvement using RW  · Balance: standing (S)    AM-PAC 6 CLICK MOBILITY  Total " Score:20       Therapeutic Activities and Exercises:  Pt educated on: PT role/POC; safety c mobility; benefits of OOB activities; performing therex; d/c recs - v/u  -sit<>Stand from bedside chair 6x  -therex (LAQ, hip flex, AP)  -RW ordered    AM-PAC 6 CLICK MOBILITY  Total Score:20     Patient left up in chair with all lines intact, call button in reach, RN notified and family present.    GOALS:   Multidisciplinary Problems     Physical Therapy Goals     Not on file          Multidisciplinary Problems (Resolved)        Problem: Physical Therapy Goal    Goal Priority Disciplines Outcome Goal Variances Interventions   Physical Therapy Goal   (Resolved)     PT, PT/OT Met                     History:     Past Medical History:   Diagnosis Date    Chronic Hepatic encephalopathy 6/25/2020    Decompensated hepatic cirrhosis 6/25/2020    History of pancreatitis 6/25/2020    Liver cirrhosis secondary to CHAUDHARY 6/25/2020    Macrocytic anemia 6/25/2020    Other ascites 6/25/2020    Thrombocytopenia 6/25/2020       History reviewed. No pertinent surgical history.    Time Tracking:     PT Received On: 06/28/20  PT Start Time: 0954     PT Stop Time: 1012  PT Total Time (min): 18 min     Billable Minutes: Evaluation 10 min and Therapeutic Activity 8 min      Eric Remy, PT  06/28/2020

## 2020-06-28 NOTE — PROGRESS NOTES
Dresing chnaged to left shin wound ( from previous fall at home)    Cleansed with normal saline, applied medihoney and covered with mepore dressing.

## 2020-06-28 NOTE — CARE UPDATE
Rapid Response Nurse Chart Check     Chart check completed, abnormal VS noted. Please call [phone] for further concerns or assistance.

## 2020-06-29 ENCOUNTER — TELEPHONE (OUTPATIENT)
Dept: TRANSPLANT | Facility: CLINIC | Age: 55
End: 2020-06-29

## 2020-06-29 VITALS
DIASTOLIC BLOOD PRESSURE: 77 MMHG | BODY MASS INDEX: 21.64 KG/M2 | WEIGHT: 126.75 LBS | HEART RATE: 104 BPM | SYSTOLIC BLOOD PRESSURE: 127 MMHG | RESPIRATION RATE: 16 BRPM | OXYGEN SATURATION: 99 % | TEMPERATURE: 98 F | HEIGHT: 64 IN

## 2020-06-29 LAB
ALBUMIN SERPL BCP-MCNC: 2.9 G/DL (ref 3.5–5.2)
ALP SERPL-CCNC: 87 U/L (ref 55–135)
ALT SERPL W/O P-5'-P-CCNC: 21 U/L (ref 10–44)
AMMONIA PLAS-SCNC: 51 UMOL/L (ref 10–50)
ANION GAP SERPL CALC-SCNC: 8 MMOL/L (ref 8–16)
AST SERPL-CCNC: 32 U/L (ref 10–40)
BILIRUB SERPL-MCNC: 6.9 MG/DL (ref 0.1–1)
BUN SERPL-MCNC: 23 MG/DL (ref 6–20)
CALCIUM SERPL-MCNC: 10.6 MG/DL (ref 8.7–10.5)
CHLORIDE SERPL-SCNC: 99 MMOL/L (ref 95–110)
CO2 SERPL-SCNC: 25 MMOL/L (ref 23–29)
CREAT SERPL-MCNC: 1.4 MG/DL (ref 0.5–1.4)
EST. GFR  (AFRICAN AMERICAN): 49.1 ML/MIN/1.73 M^2
EST. GFR  (NON AFRICAN AMERICAN): 42.6 ML/MIN/1.73 M^2
GLUCOSE SERPL-MCNC: 139 MG/DL (ref 70–110)
POTASSIUM SERPL-SCNC: 4.4 MMOL/L (ref 3.5–5.1)
PROT SERPL-MCNC: 6.4 G/DL (ref 6–8.4)
SODIUM SERPL-SCNC: 132 MMOL/L (ref 136–145)

## 2020-06-29 PROCEDURE — 80053 COMPREHEN METABOLIC PANEL: CPT | Mod: NTX

## 2020-06-29 PROCEDURE — 82140 ASSAY OF AMMONIA: CPT | Mod: NTX

## 2020-06-29 PROCEDURE — 36415 COLL VENOUS BLD VENIPUNCTURE: CPT | Mod: NTX

## 2020-06-29 PROCEDURE — 99217 PR OBSERVATION CARE DISCHARGE: CPT | Mod: NTX,,, | Performed by: HOSPITALIST

## 2020-06-29 PROCEDURE — 25000003 PHARM REV CODE 250: Mod: NTX | Performed by: HOSPITALIST

## 2020-06-29 PROCEDURE — 94761 N-INVAS EAR/PLS OXIMETRY MLT: CPT | Mod: NTX

## 2020-06-29 PROCEDURE — G0378 HOSPITAL OBSERVATION PER HR: HCPCS | Mod: NTX

## 2020-06-29 PROCEDURE — 99217 PR OBSERVATION CARE DISCHARGE: ICD-10-PCS | Mod: NTX,,, | Performed by: HOSPITALIST

## 2020-06-29 RX ORDER — HALOPERIDOL 1 MG/1
2 TABLET ORAL NIGHTLY
Status: ON HOLD
Start: 2020-06-29 | End: 2020-07-10 | Stop reason: SDUPTHER

## 2020-06-29 RX ORDER — ACETAMINOPHEN 500 MG
500 TABLET ORAL EVERY 6 HOURS PRN
Refills: 0
Start: 2020-06-29

## 2020-06-29 RX ORDER — POLYETHYLENE GLYCOL 3350 17 G/17G
17 POWDER, FOR SOLUTION ORAL DAILY
Refills: 0 | Status: ON HOLD
Start: 2020-06-30 | End: 2020-07-10 | Stop reason: SDUPTHER

## 2020-06-29 RX ORDER — LACTULOSE 10 G/15ML
20 SOLUTION ORAL; RECTAL 3 TIMES DAILY PRN
Qty: 2700 ML | Refills: 2 | Status: SHIPPED | OUTPATIENT
Start: 2020-06-29 | End: 2020-07-15 | Stop reason: SDUPTHER

## 2020-06-29 RX ADMIN — RIFAXIMIN 550 MG: 550 TABLET ORAL at 08:06

## 2020-06-29 RX ADMIN — SPIRONOLACTONE 100 MG: 50 TABLET ORAL at 08:06

## 2020-06-29 RX ADMIN — MIDODRINE HYDROCHLORIDE 10 MG: 5 TABLET ORAL at 08:06

## 2020-06-29 RX ADMIN — PANTOPRAZOLE SODIUM 40 MG: 40 TABLET, DELAYED RELEASE ORAL at 05:06

## 2020-06-29 RX ADMIN — LACTULOSE 45 G: 10 SOLUTION ORAL at 08:06

## 2020-06-29 RX ADMIN — LEVOTHYROXINE SODIUM 150 MCG: 150 TABLET ORAL at 05:06

## 2020-06-29 RX ADMIN — ZINC SULFATE 220 MG (50 MG) CAPSULE 220 MG: CAPSULE at 08:06

## 2020-06-29 RX ADMIN — FUROSEMIDE 60 MG: 40 TABLET ORAL at 08:06

## 2020-06-29 RX ADMIN — LACTULOSE 20 G: 10 SOLUTION ORAL at 05:06

## 2020-06-29 NOTE — PLAN OF CARE
No SW needs identified at this time.     Spouse will provide transportation.        06/29/20 1042   Post-Acute Status   Post-Acute Authorization Other   Other Status No Post-Acute Service Needs   Discharge Plan   Discharge Plan A Home with family     Antoinette Simon LMSW   - Case Management

## 2020-06-29 NOTE — TELEPHONE ENCOUNTER
Returned call.  asking re rescheduling appts. Informed that after discharge the report will go to coordinator who will reschedule pt appts.  This week is booked. He questioned if we would consider they are from out of town. Informed that we will reschedule appts after hospital testing reviewed. informed this week may not be available.

## 2020-06-29 NOTE — TELEPHONE ENCOUNTER
----- Message from Holland Koo sent at 6/29/2020  9:32 AM CDT -----  Contact: pt ()  Calling to speak with Raisa Nuñez in regards to his wife    Call back: [phone]

## 2020-06-29 NOTE — DISCHARGE SUMMARY
DISCHARGE SUMMARY  Hospital Medicine    Team: [facility] MED L    Patient Name: Zeina Mahoney  YOB: 1965    Admit Date: 6/25/2020    Discharge Date: 06/29/2020    Discharge Attending Physician: CHARISSA Dumont MD    Chief Complaint: Generalized abdominal pain     Princilpal Diagnoses:  Active Hospital Problems    Diagnosis  POA    *Acute Hepatic encephalopathy [K72.90]  Yes    Delirium due to medical condition with behavioral disturbance [F05]  Yes    Decompensated hepatic cirrhosis [K72.90]  Yes    Generalized abdominal pain [R10.84]  Yes    Macrocytic anemia [D53.9]  Yes    Liver cirrhosis secondary to CHAUDHARY [K75.81, K74.60]  Yes    Other ascites [R18.8]  Yes    Thrombocytopenia [D69.6]  Yes    History of pancreatitis [Z87.19]  Not Applicable      Resolved Hospital Problems   No resolved problems to display.       Discharged Condition: Admit problems have  resolved       HOSPITAL COURSE:      Initial Presentation:     54 y.o. female with CHAUDHARY/NEHAL cirrhosis complicated by encephalopathy, volume overload, sarcopenia, [place] [facility] [place], hx of pancreatitis 2/2 unknown cause (status post extensive workup), presented to the ED on 06/25/2020 with acute onset of epigastric abdominal pain.  Patient describes pain as constant, dull in nature,  Nonradiating; pain started several hours before presentation to the ED.  No associated fevers chills, abdominal distension, nausea vomiting, decreased appetite with this pain.  Patient has not been constipating, and has been taking her lactulose appropriately.  No recent mental status changes.  Denies any dysuria or fevers.  No prior history of SBP.  Patient has not need recurrent frequent paracentesis.  Patient does have history of acid reflux and takes PPI at home.      On evaluation on presentation, patient felt that her abdominal pain has alleviated with IV morphine given in the ED.  Lipase 218, has history of pancreatitis.  Meld score 20 on  admission    Course of Principle Problem for Admission:    Generalized abdominal pain  History of pancreatitis  - unclear etiology at this time.  Labs and presentation not consistent with acute pancreatitis.  Ultrasound of the liver showed no PVT / thrombus and showed moderate amount of ascites. IR paracentesis attempted but insufficient ascites identified for safe paracentesis.  Concern for possible acid reflux as etiology.  No UTI based on UA results  - IR paracentesis attempted but insufficient ascites identified for safe paracentesis.   - holding off antibiotics  - attempt to GI cocktail, monitor for improvement  - resolved on 6/26. Work up unrevealing   Overall, pain is chronic in nature and has been present for >2 mo  - RESOLVED    On the day of d/c, patient was doing well with no acute issues. Mental status at patient's baseline , having adequate BMs with lactulose and crystalose    Physical Exam:  Constitutional: appears older than stated age, chronically ill looking,  non-distressed, not diaphoretic.   HENT: NC/AT, external ears normal, oropharynx clear, MMM w/o exudates.   Eyes: PERRL, EOMI, conjunctiva normal, no discharge b/l, +scleral icterus   Neck: normal ROM, supple  CV: RRR, no m/r/g, no carotid bruits, +2 peripheral pulses.  Pulmonary/Chest wall: Breathing comfortably w/o distress, CTAB, no w/r/r, no crackles.    GI: Soft, (+) BS, (+) BM   + mildly distended  non tender   Musculoskeletal: Normal ROM, +atrophy,  no pitting edema  Neurological: AAO x4, no focal deficits   no asterixis   Skin: warm, dry   +pallor, jaundice, + spider angiomas,  Psych: blunt affect , normal behavior, thought content and judgement    Other Medical Problems Addressed in the Hospital:    Decompensated hepatic cirrhosis  Liver cirrhosis secondary to CHAUDHARY with ascites   Acute on Chronic Hepatic encephalopathy  MELD-Na score: 24 at 6/29/2020  6:17 AM  MELD score: 21 at 6/29/2020  6:17 AM  Calculated from:  Serum Creatinine:  1.4 mg/dL at 6/29/2020  6:17 AM  Serum Sodium: 132 mmol/L at 6/29/2020  6:17 AM  Total Bilirubin: 6.9 mg/dL at 6/29/2020  6:17 AM  INR(ratio): 1.4 at 6/27/2020  5:57 AM  Age: 54 years 9 months    - listed for transplant at [facility] [place]  - continue home diuretics as well as lactulose and rifaximin.  No current concerns for acute hepatic encephalopathy or volume overload  - hepatology consulted.  Investigating abdominal pain as above.  - IR paracentesis attempted but insufficient ascites identified for safe paracentesis  - acute on chronic HE on 6/26 with significant confusion which is  Improving with increased lactulose doses and lactulose enema   - patient takes crystalose at home, as pt did not like lactulose taster in the past and does better with powder. Plan to use crystalose and lactulose intermittently,since not having BMs with crystalose alone.  Senna-doc/ Miralax  also ordered.  Likely needs > 4BMs per day to control HE    Macrocytic anemia  Thrombocytopenia  - stable on chronic due to liver disease  - monitor    Delirium due to medical condition with behavioral disturbance  -Psychiatry consulted for antipsychotic management, since pt was placed on chronic BID PO haldol at OSH- rec cont haldol 2mg QHS    CONSULTS: hepatology, psychiatry     PROCEDURES: none    Labs:    Chemistries:   Recent Labs   Lab 06/25/20  0441 06/26/20  0621 06/27/20  0557 06/28/20  0636 06/29/20  0617   * 135* 137 133* 132*   K 5.0 4.6 4.3 4.5 4.4    103 105 101 99   CO2 22* 20* 24 25 25   BUN 28* 27* 28* 25* 23*   CREATININE 1.2 1.2 1.2 1.2 1.4   CALCIUM 9.9 10.8* 10.2 10.1 10.6*   PROT 6.0 7.0 6.0 6.2 6.4   BILITOT 6.3* 7.8* 7.2* 6.5* 6.9*   ALKPHOS 84 98 85 85 87   ALT 19 25 21 22 21   AST 35 39 33 33 32   MG 2.0 2.1 1.9  --   --    PHOS 3.5 3.3 3.5  --   --         WBC:   Recent Labs   Lab 06/25/20  0441 06/26/20  0621 06/27/20  0557 06/28/20  0636   WBC 7.36 5.25 6.33 6.45     Bands:     CBC/Anemia Labs: Coags:     Recent Labs   Lab 06/26/20  0621 06/27/20  0557 06/28/20  0636   WBC 5.25 6.33 6.45   HGB 9.6* 8.0* 8.6*   HCT 31.1* 25.0* 26.8*   PLT 84* 75* 77*   * 109* 109*   RDW 13.2 13.1 12.9    Recent Labs   Lab 06/25/20  0441 06/26/20  0621 06/27/20  0557   INR 1.3* 1.3* 1.4*            Disposition:  Home       Follow-up Plans from This Hospitalization:  hepatology     Discharge Medication List:       Zeina Mahoney   Home Medication Instructions LOERTTA:[id number]    Printed on:06/29/20 1308   Medication Information                      acetaminophen (TYLENOL) 500 MG tablet  Take 1 tablet (500 mg total) by mouth every 6 (six) hours as needed for Pain. Max 2000mg daily.             diaper,brief,adult,disposable Misc  5 mg/kg/day by Misc.(Non-Drug; Combo Route) route 5 (five) times daily. Patient with incontinence. Please supply enough depends for 5xs/day             ergocalciferol (VITAMIN D2) 50,000 unit Cap  Take 50,000 Units by mouth every 7 days.             furosemide (LASIX) 40 MG tablet  Take 60 mg by mouth once daily.              haloperidoL (HALDOL) 1 MG tablet  Take 2 tablets (2 mg total) by mouth every evening.             lactulose (CEPHULAC) 10 gram packet  Take 1 and a half packets three times a day              lactulose (CHRONULAC) 10 gram/15 mL solution  Take 30 mLs (20 g total) by mouth 3 (three) times daily as needed. Alternate with kristalose if not having enough BMs.             levothyroxine (SYNTHROID) 150 MCG tablet  Take 150 mcg by mouth before breakfast.             lidocaine (LIDODERM) 5 %  Place 1 patch onto the skin every 24 hours. Remove & Discard patch within 12 hours or as directed by MD             melatonin 10 mg Tab  Take 10 mg by mouth nightly as needed (sleep).             midodrine (PROAMATINE) 10 MG tablet  Take 10 mg by mouth 3 (three) times daily.             nystatin (MYCOSTATIN) powder  Apply topically 2 (two) times daily. Apply to affected area twice a day              pantoprazole (PROTONIX) 40 MG tablet  Take 40 mg by mouth before breakfast.             polyethylene glycol (GLYCOLAX) 17 gram PwPk  Take 17 g by mouth once daily. Take if no BM with lactulose             rifAXIMin (XIFAXAN) 550 mg Tab  Take 550 mg by mouth 2 (two) times daily.              simethicone (MYLICON) 80 MG chewable tablet  Take 80 mg by mouth every 6 (six) hours as needed for Flatulence. Take 2 tablets by mouth every 6 hours as needed for gas             spironolactone (ALDACTONE) 50 MG tablet  Take 100 mg by mouth once daily.              zinc sulfate (ZINCATE) 220 (50) mg capsule  Take 220 mg by mouth before breakfast.                   At the time of discharge patient was told to take all medications as prescribed, to keep all followup appointments, and to call their primary care physician or return to the emergency room if they have any worsening or concerning symptoms.    Time spent on the discharge of the patient including review of hospital course with the patient. reviewing discharge medications and arranging follow-up care 45 minutes.  Patient was seen and examined on the date of discharge and determined to be suitable for discharge.        Signing Physician:  Elen Dumont MD

## 2020-06-29 NOTE — NURSING
Reviewed discharged instruction with patient's  who verbalized understanding. [facility] Patient left via wheelchair with  and all belongings.

## 2020-07-01 LAB
BACTERIA BLD CULT: NORMAL
BACTERIA BLD CULT: NORMAL

## 2020-07-02 LAB — PHOSPHATIDYLETHANOL (PETH): NEGATIVE NG/ML

## 2020-07-04 ENCOUNTER — NURSE TRIAGE (OUTPATIENT)
Dept: ADMINISTRATIVE | Facility: CLINIC | Age: 55
End: 2020-07-04

## 2020-07-05 NOTE — TELEPHONE ENCOUNTER
Reason for Disposition   [1] Difficult to awaken or acting confused (e.g., disoriented, slurred speech) AND [2] present now AND [3] new onset    Additional Information   Negative: [1] Difficult to awaken or acting confused (e.g., disoriented, slurred speech) AND [2] present now AND [3] has diabetes (diabetes mellitus)    Protocols used: CONFUSION - DELIRIUM-A-AH  Admit 6/25  Liver referral 2/28/20  BPA n/a  CC: family calling with concerns of severe constipation. Had small BM 30 mins ago after an enema. Had 60 ml lactulose prior to enema with no effect. on lactulose/cystalose x 3 today today. still disoriented. Confusion worse today. rec EMS. Caller states he will take pt to ED. Call back with questions.

## 2020-07-06 ENCOUNTER — TELEPHONE (OUTPATIENT)
Dept: TRANSPLANT | Facility: CLINIC | Age: 55
End: 2020-07-06

## 2020-07-06 ENCOUNTER — HOSPITAL ENCOUNTER (OUTPATIENT)
Dept: RADIOLOGY | Facility: CLINIC | Age: 55
Discharge: HOME | DRG: 442 | End: 2020-07-06
Attending: INTERNAL MEDICINE
Payer: COMMERCIAL

## 2020-07-06 ENCOUNTER — HOSPITAL ENCOUNTER (OUTPATIENT)
Dept: RADIOLOGY | Facility: HOSPITAL | Age: 55
Discharge: HOME | DRG: 442 | End: 2020-07-06
Attending: INTERNAL MEDICINE
Payer: COMMERCIAL

## 2020-07-06 DIAGNOSIS — K75.81 NONALCOHOLIC STEATOHEPATITIS: ICD-10-CM

## 2020-07-06 DIAGNOSIS — Z76.82 ORGAN TRANSPLANT CANDIDATE: ICD-10-CM

## 2020-07-06 DIAGNOSIS — Z76.82 ORGAN TRANSPLANT CANDIDATE: Primary | ICD-10-CM

## 2020-07-06 PROCEDURE — 77080 DXA BONE DENSITY AXIAL: CPT | Mod: TC,TXP

## 2020-07-06 PROCEDURE — 71046 XR CHEST PA AND LATERAL: ICD-10-PCS | Mod: 26,TXP,, | Performed by: RADIOLOGY

## 2020-07-06 PROCEDURE — 71046 X-RAY EXAM CHEST 2 VIEWS: CPT | Mod: TC,FY,TXP

## 2020-07-06 PROCEDURE — 71046 X-RAY EXAM CHEST 2 VIEWS: CPT | Mod: 26,TXP,, | Performed by: RADIOLOGY

## 2020-07-06 PROCEDURE — 77080 DEXA BONE DENSITY SPINE HIP: ICD-10-PCS | Mod: 26,TXP,, | Performed by: INTERNAL MEDICINE

## 2020-07-06 PROCEDURE — 77080 DXA BONE DENSITY AXIAL: CPT | Mod: 26,TXP,, | Performed by: INTERNAL MEDICINE

## 2020-07-06 NOTE — TELEPHONE ENCOUNTER
----- Message from Jillian Delgado sent at 7/6/2020 10:00 AM CDT -----  Contact: Yonis Pastrana    Returned call to pt  to re/maame work up appts that had to be cx'ed because of her admit. Pt  said that they can start anytime. Maame'ed pt some appts for today and will call him back when i have finished with her other appts.   ----- Message -----  From: Camille Crawford  Sent: 7/6/2020   9:04 AM CDT  To: [facility] Liver Referral Pool    Patient calling to  appt         Call Back : [phone]

## 2020-07-06 NOTE — TELEPHONE ENCOUNTER
----- Message from Jillian Delgado sent at 7/6/2020  5:06 PM CDT -----    Called pt  x2, but the call could not be completed. Called pt and told her that I kelton'ed the rest of her appt for 7/8 and 7/9. Emailed a copy to her .

## 2020-07-07 ENCOUNTER — HOSPITAL ENCOUNTER (INPATIENT)
Facility: HOSPITAL | Age: 55
LOS: 2 days | Discharge: HOME | DRG: 442 | End: 2020-07-10
Attending: EMERGENCY MEDICINE | Admitting: HOSPITALIST
Payer: COMMERCIAL

## 2020-07-07 DIAGNOSIS — K76.82 HEPATIC ENCEPHALOPATHY: ICD-10-CM

## 2020-07-07 DIAGNOSIS — I27.20 PULMONARY HYPERTENSION: ICD-10-CM

## 2020-07-07 DIAGNOSIS — F05 DELIRIUM DUE TO MEDICAL CONDITION WITHOUT BEHAVIORAL DISTURBANCE: ICD-10-CM

## 2020-07-07 DIAGNOSIS — F43.22 ADJUSTMENT DISORDER WITH ANXIOUS MOOD: ICD-10-CM

## 2020-07-07 LAB
ALBUMIN SERPL BCP-MCNC: 3.1 G/DL (ref 3.5–5.2)
ALP SERPL-CCNC: 94 U/L (ref 55–135)
ALT SERPL W/O P-5'-P-CCNC: 34 U/L (ref 10–44)
AMMONIA PLAS-SCNC: 94 UMOL/L (ref 10–50)
ANION GAP SERPL CALC-SCNC: 8 MMOL/L (ref 8–16)
AST SERPL-CCNC: 60 U/L (ref 10–40)
BASOPHILS # BLD AUTO: 0.04 K/UL (ref 0–0.2)
BASOPHILS NFR BLD: 0.6 % (ref 0–1.9)
BILIRUB SERPL-MCNC: 7.4 MG/DL (ref 0.1–1)
BUN SERPL-MCNC: 34 MG/DL (ref 6–20)
CALCIUM SERPL-MCNC: 10.5 MG/DL (ref 8.7–10.5)
CHLORIDE SERPL-SCNC: 103 MMOL/L (ref 95–110)
CO2 SERPL-SCNC: 22 MMOL/L (ref 23–29)
CREAT SERPL-MCNC: 1.7 MG/DL (ref 0.5–1.4)
DIFFERENTIAL METHOD: ABNORMAL
EOSINOPHIL # BLD AUTO: 0 K/UL (ref 0–0.5)
EOSINOPHIL NFR BLD: 0.6 % (ref 0–8)
ERYTHROCYTE [DISTWIDTH] IN BLOOD BY AUTOMATED COUNT: 13.8 % (ref 11.5–14.5)
EST. GFR  (AFRICAN AMERICAN): 38.9 ML/MIN/1.73 M^2
EST. GFR  (NON AFRICAN AMERICAN): 33.7 ML/MIN/1.73 M^2
GLUCOSE SERPL-MCNC: 175 MG/DL (ref 70–110)
HCT VFR BLD AUTO: 25.8 % (ref 37–48.5)
HGB BLD-MCNC: 8.8 G/DL (ref 12–16)
IMM GRANULOCYTES # BLD AUTO: 0.04 K/UL (ref 0–0.04)
IMM GRANULOCYTES NFR BLD AUTO: 0.6 % (ref 0–0.5)
INR PPP: 1.2 (ref 0.8–1.2)
LIPASE SERPL-CCNC: 170 U/L (ref 4–60)
LYMPHOCYTES # BLD AUTO: 1.2 K/UL (ref 1–4.8)
LYMPHOCYTES NFR BLD: 17.2 % (ref 18–48)
MCH RBC QN AUTO: 36.1 PG (ref 27–31)
MCHC RBC AUTO-ENTMCNC: 34.1 G/DL (ref 32–36)
MCV RBC AUTO: 106 FL (ref 82–98)
MONOCYTES # BLD AUTO: 0.5 K/UL (ref 0.3–1)
MONOCYTES NFR BLD: 7 % (ref 4–15)
NEUTROPHILS # BLD AUTO: 5.3 K/UL (ref 1.8–7.7)
NEUTROPHILS NFR BLD: 74 % (ref 38–73)
NRBC BLD-RTO: 0 /100 WBC
PLATELET # BLD AUTO: 85 K/UL (ref 150–350)
PMV BLD AUTO: 10.6 FL (ref 9.2–12.9)
POTASSIUM SERPL-SCNC: 5 MMOL/L (ref 3.5–5.1)
PROT SERPL-MCNC: 7.3 G/DL (ref 6–8.4)
PROTHROMBIN TIME: 12.4 SEC (ref 9–12.5)
RBC # BLD AUTO: 2.44 M/UL (ref 4–5.4)
SODIUM SERPL-SCNC: 133 MMOL/L (ref 136–145)
WBC # BLD AUTO: 7.17 K/UL (ref 3.9–12.7)

## 2020-07-07 PROCEDURE — 80053 COMPREHEN METABOLIC PANEL: CPT | Mod: NTX

## 2020-07-07 PROCEDURE — 99285 EMERGENCY DEPT VISIT HI MDM: CPT | Mod: 25,NTX

## 2020-07-07 PROCEDURE — 25000003 PHARM REV CODE 250: Mod: NTX | Performed by: STUDENT IN AN ORGANIZED HEALTH CARE EDUCATION/TRAINING PROGRAM

## 2020-07-07 PROCEDURE — 51798 US URINE CAPACITY MEASURE: CPT | Mod: NTX

## 2020-07-07 PROCEDURE — 99285 PR EMERGENCY DEPT VISIT,LEVEL V: ICD-10-PCS | Mod: NTX,,, | Performed by: EMERGENCY MEDICINE

## 2020-07-07 PROCEDURE — 82140 ASSAY OF AMMONIA: CPT | Mod: NTX

## 2020-07-07 PROCEDURE — 99285 EMERGENCY DEPT VISIT HI MDM: CPT | Mod: NTX,,, | Performed by: EMERGENCY MEDICINE

## 2020-07-07 PROCEDURE — 85610 PROTHROMBIN TIME: CPT | Mod: NTX

## 2020-07-07 PROCEDURE — 83690 ASSAY OF LIPASE: CPT | Mod: NTX

## 2020-07-07 PROCEDURE — 85025 COMPLETE CBC W/AUTO DIFF WBC: CPT | Mod: NTX

## 2020-07-07 PROCEDURE — U0002 COVID-19 LAB TEST NON-CDC: HCPCS | Mod: NTX

## 2020-07-07 RX ORDER — LACTULOSE 10 G/15ML
30 SOLUTION ORAL
Status: COMPLETED | OUTPATIENT
Start: 2020-07-07 | End: 2020-07-07

## 2020-07-07 RX ADMIN — LACTULOSE 30 G: 20 SOLUTION ORAL at 09:07

## 2020-07-08 ENCOUNTER — DOCUMENTATION ONLY (OUTPATIENT)
Dept: TRANSPLANT | Facility: CLINIC | Age: 55
End: 2020-07-08

## 2020-07-08 PROBLEM — E89.0 POSTABLATIVE HYPOTHYROIDISM: Status: ACTIVE | Noted: 2020-07-08

## 2020-07-08 PROBLEM — E83.52 HYPERCALCEMIA: Status: ACTIVE | Noted: 2020-07-08

## 2020-07-08 LAB
ALBUMIN SERPL BCP-MCNC: 2.8 G/DL (ref 3.5–5.2)
ALP SERPL-CCNC: 93 U/L (ref 55–135)
ALT SERPL W/O P-5'-P-CCNC: 29 U/L (ref 10–44)
ANION GAP SERPL CALC-SCNC: 7 MMOL/L (ref 8–16)
ASCENDING AORTA: 3.44 CM
AST SERPL-CCNC: 42 U/L (ref 10–40)
AV INDEX (PROSTH): 0.7
AV MEAN GRADIENT: 8 MMHG
AV PEAK GRADIENT: 18 MMHG
AV VALVE AREA: 2.55 CM2
AV VELOCITY RATIO: 0.6
BACTERIA #/AREA URNS AUTO: ABNORMAL /HPF
BASOPHILS # BLD AUTO: 0.05 K/UL (ref 0–0.2)
BASOPHILS NFR BLD: 0.8 % (ref 0–1.9)
BILIRUB SERPL-MCNC: 7.7 MG/DL (ref 0.1–1)
BILIRUB UR QL STRIP: NEGATIVE
BILIRUB UR QL STRIP: NEGATIVE
BSA FOR ECHO PROCEDURE: 1.57 M2
BUN SERPL-MCNC: 32 MG/DL (ref 6–20)
CALCIUM SERPL-MCNC: 10.7 MG/DL (ref 8.7–10.5)
CHLORIDE SERPL-SCNC: 101 MMOL/L (ref 95–110)
CLARITY UR REFRACT.AUTO: ABNORMAL
CLARITY UR REFRACT.AUTO: ABNORMAL
CO2 SERPL-SCNC: 22 MMOL/L (ref 23–29)
COLOR UR AUTO: ABNORMAL
COLOR UR AUTO: ABNORMAL
CREAT SERPL-MCNC: 1.6 MG/DL (ref 0.5–1.4)
CREAT UR-MCNC: 49 MG/DL (ref 15–325)
CREAT UR-MCNC: 49 MG/DL (ref 15–325)
CV ECHO LV RWT: 0.33 CM
DIFFERENTIAL METHOD: ABNORMAL
DOP CALC AO PEAK VEL: 2.11 M/S
DOP CALC AO VTI: 36.06 CM
DOP CALC LVOT AREA: 3.6 CM2
DOP CALC LVOT DIAMETER: 2.15 CM
DOP CALC LVOT PEAK VEL: 1.27 M/S
DOP CALC LVOT STROKE VOLUME: 91.95 CM3
DOP CALCLVOT PEAK VEL VTI: 25.34 CM
E WAVE DECELERATION TIME: 242.91 MSEC
E/A RATIO: 0.79
E/E' RATIO: 11.07 M/S
ECHO LV POSTERIOR WALL: 0.8 CM (ref 0.6–1.1)
EOSINOPHIL # BLD AUTO: 0 K/UL (ref 0–0.5)
EOSINOPHIL NFR BLD: 0.6 % (ref 0–8)
ERYTHROCYTE [DISTWIDTH] IN BLOOD BY AUTOMATED COUNT: 13.5 % (ref 11.5–14.5)
EST. GFR  (AFRICAN AMERICAN): 41.8 ML/MIN/1.73 M^2
EST. GFR  (NON AFRICAN AMERICAN): 36.3 ML/MIN/1.73 M^2
FRACTIONAL SHORTENING: 35 % (ref 28–44)
GLUCOSE SERPL-MCNC: 168 MG/DL (ref 70–110)
GLUCOSE UR QL STRIP: NEGATIVE
GLUCOSE UR QL STRIP: NEGATIVE
HCT VFR BLD AUTO: 24.6 % (ref 37–48.5)
HGB BLD-MCNC: 8.2 G/DL (ref 12–16)
HGB UR QL STRIP: ABNORMAL
HGB UR QL STRIP: ABNORMAL
IMM GRANULOCYTES # BLD AUTO: 0.04 K/UL (ref 0–0.04)
IMM GRANULOCYTES NFR BLD AUTO: 0.6 % (ref 0–0.5)
INR PPP: 1.2 (ref 0.8–1.2)
INTERVENTRICULAR SEPTUM: 0.9 CM (ref 0.6–1.1)
KETONES UR QL STRIP: NEGATIVE
KETONES UR QL STRIP: NEGATIVE
LA MAJOR: 4.84 CM
LA MINOR: 5.02 CM
LA WIDTH: 3.99 CM
LEFT ATRIUM SIZE: 3.6 CM
LEFT ATRIUM VOLUME INDEX: 38.1 ML/M2
LEFT ATRIUM VOLUME: 60.17 CM3
LEFT INTERNAL DIMENSION IN SYSTOLE: 3.18 CM (ref 2.1–4)
LEFT VENTRICLE DIASTOLIC VOLUME INDEX: 69.97 ML/M2
LEFT VENTRICLE DIASTOLIC VOLUME: 110.55 ML
LEFT VENTRICLE MASS INDEX: 89 G/M2
LEFT VENTRICLE SYSTOLIC VOLUME INDEX: 25.5 ML/M2
LEFT VENTRICLE SYSTOLIC VOLUME: 40.36 ML
LEFT VENTRICULAR INTERNAL DIMENSION IN DIASTOLE: 4.86 CM (ref 3.5–6)
LEFT VENTRICULAR MASS: 139.97 G
LEUKOCYTE ESTERASE UR QL STRIP: NEGATIVE
LEUKOCYTE ESTERASE UR QL STRIP: NEGATIVE
LV LATERAL E/E' RATIO: 10.38 M/S
LV SEPTAL E/E' RATIO: 11.86 M/S
LYMPHOCYTES # BLD AUTO: 1.1 K/UL (ref 1–4.8)
LYMPHOCYTES NFR BLD: 17 % (ref 18–48)
MCH RBC QN AUTO: 35.7 PG (ref 27–31)
MCHC RBC AUTO-ENTMCNC: 33.3 G/DL (ref 32–36)
MCV RBC AUTO: 107 FL (ref 82–98)
MICROSCOPIC COMMENT: ABNORMAL
MONOCYTES # BLD AUTO: 0.5 K/UL (ref 0.3–1)
MONOCYTES NFR BLD: 6.8 % (ref 4–15)
MV PEAK A VEL: 1.05 M/S
MV PEAK E VEL: 0.83 M/S
MV STENOSIS PRESSURE HALF TIME: 70.44 MS
MV VALVE AREA P 1/2 METHOD: 3.12 CM2
NEUTROPHILS # BLD AUTO: 4.9 K/UL (ref 1.8–7.7)
NEUTROPHILS NFR BLD: 74.2 % (ref 38–73)
NITRITE UR QL STRIP: NEGATIVE
NITRITE UR QL STRIP: NEGATIVE
NRBC BLD-RTO: 0 /100 WBC
PH UR STRIP: 6 [PH] (ref 5–8)
PH UR STRIP: 6 [PH] (ref 5–8)
PISA TR MAX VEL: 2.25 M/S
PLATELET # BLD AUTO: 61 K/UL (ref 150–350)
PMV BLD AUTO: 10.1 FL (ref 9.2–12.9)
POTASSIUM SERPL-SCNC: 3.7 MMOL/L (ref 3.5–5.1)
PROT SERPL-MCNC: 6.5 G/DL (ref 6–8.4)
PROT UR QL STRIP: NEGATIVE
PROT UR QL STRIP: NEGATIVE
PROT UR-MCNC: 16 MG/DL (ref 0–15)
PROT/CREAT UR: 0.33 MG/G{CREAT} (ref 0–0.2)
PROTHROMBIN TIME: 12.4 SEC (ref 9–12.5)
RA MAJOR: 4.95 CM
RA PRESSURE: 3 MMHG
RA WIDTH: 3.64 CM
RBC # BLD AUTO: 2.3 M/UL (ref 4–5.4)
RBC #/AREA URNS AUTO: 2 /HPF (ref 0–4)
RIGHT VENTRICULAR END-DIASTOLIC DIMENSION: 3.3 CM
SARS-COV-2 RDRP RESP QL NAA+PROBE: NEGATIVE
SINUS: 3.14 CM
SODIUM SERPL-SCNC: 130 MMOL/L (ref 136–145)
SODIUM UR-SCNC: 70 MMOL/L (ref 20–250)
SP GR UR STRIP: 1.01 (ref 1–1.03)
SP GR UR STRIP: 1.01 (ref 1–1.03)
SQUAMOUS #/AREA URNS AUTO: 1 /HPF
STJ: 2.8 CM
TDI LATERAL: 0.08 M/S
TDI SEPTAL: 0.07 M/S
TDI: 0.08 M/S
TR MAX PG: 20 MMHG
TRICUSPID ANNULAR PLANE SYSTOLIC EXCURSION: 1.65 CM
TV REST PULMONARY ARTERY PRESSURE: 23 MMHG
URN SPEC COLLECT METH UR: ABNORMAL
URN SPEC COLLECT METH UR: ABNORMAL
UUN UR-MCNC: 514 MG/DL (ref 140–1050)
WBC # BLD AUTO: 6.58 K/UL (ref 3.9–12.7)
WBC #/AREA URNS AUTO: 1 /HPF (ref 0–5)

## 2020-07-08 PROCEDURE — 99223 PR INITIAL HOSPITAL CARE,LEVL III: ICD-10-PCS | Mod: NTX,,, | Performed by: HOSPITALIST

## 2020-07-08 PROCEDURE — 81001 URINALYSIS AUTO W/SCOPE: CPT | Mod: NTX

## 2020-07-08 PROCEDURE — 25000003 PHARM REV CODE 250: Mod: NTX | Performed by: HOSPITALIST

## 2020-07-08 PROCEDURE — 20600001 HC STEP DOWN PRIVATE ROOM: Mod: NTX

## 2020-07-08 PROCEDURE — 86480 TB TEST CELL IMMUN MEASURE: CPT | Mod: NTX

## 2020-07-08 PROCEDURE — 99222 1ST HOSP IP/OBS MODERATE 55: CPT | Mod: NTX,,, | Performed by: INTERNAL MEDICINE

## 2020-07-08 PROCEDURE — 99222 PR INITIAL HOSPITAL CARE,LEVL II: ICD-10-PCS | Mod: NTX,,, | Performed by: INTERNAL MEDICINE

## 2020-07-08 PROCEDURE — 84300 ASSAY OF URINE SODIUM: CPT | Mod: NTX

## 2020-07-08 PROCEDURE — 84540 ASSAY OF URINE/UREA-N: CPT | Mod: NTX

## 2020-07-08 PROCEDURE — 80053 COMPREHEN METABOLIC PANEL: CPT | Mod: NTX

## 2020-07-08 PROCEDURE — 99223 1ST HOSP IP/OBS HIGH 75: CPT | Mod: NTX,,, | Performed by: HOSPITALIST

## 2020-07-08 PROCEDURE — 86682 HELMINTH ANTIBODY: CPT | Mod: NTX

## 2020-07-08 PROCEDURE — 63600175 PHARM REV CODE 636 W HCPCS: Mod: NTX | Performed by: HOSPITALIST

## 2020-07-08 PROCEDURE — 84156 ASSAY OF PROTEIN URINE: CPT | Mod: NTX

## 2020-07-08 PROCEDURE — 85610 PROTHROMBIN TIME: CPT | Mod: NTX

## 2020-07-08 PROCEDURE — 36415 COLL VENOUS BLD VENIPUNCTURE: CPT | Mod: NTX

## 2020-07-08 PROCEDURE — 85025 COMPLETE CBC W/AUTO DIFF WBC: CPT | Mod: NTX

## 2020-07-08 RX ORDER — IPRATROPIUM BROMIDE AND ALBUTEROL SULFATE 2.5; .5 MG/3ML; MG/3ML
3 SOLUTION RESPIRATORY (INHALATION) EVERY 6 HOURS PRN
Status: DISCONTINUED | OUTPATIENT
Start: 2020-07-08 | End: 2020-07-10 | Stop reason: HOSPADM

## 2020-07-08 RX ORDER — QUETIAPINE FUMARATE 25 MG/1
25 TABLET, FILM COATED ORAL NIGHTLY
Status: DISCONTINUED | OUTPATIENT
Start: 2020-07-08 | End: 2020-07-09

## 2020-07-08 RX ORDER — SPIRONOLACTONE 25 MG/1
100 TABLET ORAL DAILY
Status: DISCONTINUED | OUTPATIENT
Start: 2020-07-08 | End: 2020-07-08

## 2020-07-08 RX ORDER — MIDODRINE HYDROCHLORIDE 5 MG/1
10 TABLET ORAL 3 TIMES DAILY
Status: DISCONTINUED | OUTPATIENT
Start: 2020-07-08 | End: 2020-07-10 | Stop reason: HOSPADM

## 2020-07-08 RX ORDER — LEVOTHYROXINE SODIUM 112 UG/1
112 TABLET ORAL
Status: DISCONTINUED | OUTPATIENT
Start: 2020-07-09 | End: 2020-07-10 | Stop reason: HOSPADM

## 2020-07-08 RX ORDER — IBUPROFEN 200 MG
24 TABLET ORAL
Status: DISCONTINUED | OUTPATIENT
Start: 2020-07-08 | End: 2020-07-10 | Stop reason: HOSPADM

## 2020-07-08 RX ORDER — LACTULOSE 10 G/15ML
45 SOLUTION ORAL EVERY 6 HOURS
Status: DISCONTINUED | OUTPATIENT
Start: 2020-07-08 | End: 2020-07-10 | Stop reason: HOSPADM

## 2020-07-08 RX ORDER — POLYETHYLENE GLYCOL 3350 17 G/17G
17 POWDER, FOR SOLUTION ORAL DAILY
Status: DISCONTINUED | OUTPATIENT
Start: 2020-07-08 | End: 2020-07-08

## 2020-07-08 RX ORDER — OLANZAPINE 10 MG/2ML
5 INJECTION, POWDER, FOR SOLUTION INTRAMUSCULAR EVERY 8 HOURS PRN
Status: DISCONTINUED | OUTPATIENT
Start: 2020-07-08 | End: 2020-07-10

## 2020-07-08 RX ORDER — ZINC SULFATE 50(220)MG
220 CAPSULE ORAL
Status: DISCONTINUED | OUTPATIENT
Start: 2020-07-08 | End: 2020-07-10 | Stop reason: HOSPADM

## 2020-07-08 RX ORDER — POLYETHYLENE GLYCOL 3350 17 G/17G
17 POWDER, FOR SOLUTION ORAL 3 TIMES DAILY
Status: DISCONTINUED | OUTPATIENT
Start: 2020-07-08 | End: 2020-07-10 | Stop reason: HOSPADM

## 2020-07-08 RX ORDER — ENOXAPARIN SODIUM 100 MG/ML
40 INJECTION SUBCUTANEOUS EVERY 24 HOURS
Status: DISCONTINUED | OUTPATIENT
Start: 2020-07-08 | End: 2020-07-10 | Stop reason: HOSPADM

## 2020-07-08 RX ORDER — SODIUM CHLORIDE 0.9 % (FLUSH) 0.9 %
10 SYRINGE (ML) INJECTION
Status: DISCONTINUED | OUTPATIENT
Start: 2020-07-08 | End: 2020-07-10 | Stop reason: HOSPADM

## 2020-07-08 RX ORDER — GADOBUTROL 604.72 MG/ML
10 INJECTION INTRAVENOUS
Status: COMPLETED | OUTPATIENT
Start: 2020-07-09 | End: 2020-07-08

## 2020-07-08 RX ORDER — ACETAMINOPHEN 325 MG/1
325 TABLET ORAL EVERY 4 HOURS PRN
Status: DISCONTINUED | OUTPATIENT
Start: 2020-07-08 | End: 2020-07-10 | Stop reason: HOSPADM

## 2020-07-08 RX ORDER — PROCHLORPERAZINE EDISYLATE 5 MG/ML
5 INJECTION INTRAMUSCULAR; INTRAVENOUS EVERY 6 HOURS PRN
Status: DISCONTINUED | OUTPATIENT
Start: 2020-07-08 | End: 2020-07-10 | Stop reason: HOSPADM

## 2020-07-08 RX ORDER — IBUPROFEN 200 MG
16 TABLET ORAL
Status: DISCONTINUED | OUTPATIENT
Start: 2020-07-08 | End: 2020-07-10 | Stop reason: HOSPADM

## 2020-07-08 RX ORDER — PANTOPRAZOLE SODIUM 40 MG/1
40 TABLET, DELAYED RELEASE ORAL
Status: DISCONTINUED | OUTPATIENT
Start: 2020-07-08 | End: 2020-07-10 | Stop reason: HOSPADM

## 2020-07-08 RX ORDER — LACTULOSE 10 G/15ML
20 SOLUTION ORAL EVERY 6 HOURS
Status: DISCONTINUED | OUTPATIENT
Start: 2020-07-08 | End: 2020-07-08

## 2020-07-08 RX ORDER — SYRING-NEEDL,DISP,INSUL,0.3 ML 29 G X1/2"
296 SYRINGE, EMPTY DISPOSABLE MISCELLANEOUS ONCE
Status: COMPLETED | OUTPATIENT
Start: 2020-07-08 | End: 2020-07-08

## 2020-07-08 RX ORDER — TALC
6 POWDER (GRAM) TOPICAL NIGHTLY PRN
Status: DISCONTINUED | OUTPATIENT
Start: 2020-07-08 | End: 2020-07-10 | Stop reason: HOSPADM

## 2020-07-08 RX ORDER — ONDANSETRON 2 MG/ML
4 INJECTION INTRAMUSCULAR; INTRAVENOUS EVERY 8 HOURS PRN
Status: DISCONTINUED | OUTPATIENT
Start: 2020-07-08 | End: 2020-07-10 | Stop reason: HOSPADM

## 2020-07-08 RX ORDER — ERGOCALCIFEROL 1.25 MG/1
50000 CAPSULE ORAL
Status: DISCONTINUED | OUTPATIENT
Start: 2020-07-08 | End: 2020-07-10 | Stop reason: HOSPADM

## 2020-07-08 RX ADMIN — POLYETHYLENE GLYCOL 3350 17 G: 17 POWDER, FOR SOLUTION ORAL at 03:07

## 2020-07-08 RX ADMIN — MAGNESIUM CITRATE 296 ML: 1.75 LIQUID ORAL at 03:07

## 2020-07-08 RX ADMIN — MIDODRINE HYDROCHLORIDE 10 MG: 5 TABLET ORAL at 09:07

## 2020-07-08 RX ADMIN — MIDODRINE HYDROCHLORIDE 10 MG: 5 TABLET ORAL at 03:07

## 2020-07-08 RX ADMIN — QUETIAPINE FUMARATE 25 MG: 25 TABLET ORAL at 09:07

## 2020-07-08 RX ADMIN — SODIUM CHLORIDE 1000 ML: 0.9 INJECTION, SOLUTION INTRAVENOUS at 05:07

## 2020-07-08 RX ADMIN — RIFAXIMIN 550 MG: 550 TABLET ORAL at 09:07

## 2020-07-08 RX ADMIN — PANTOPRAZOLE SODIUM 40 MG: 40 TABLET, DELAYED RELEASE ORAL at 05:07

## 2020-07-08 RX ADMIN — LACTULOSE 45 G: 20 SOLUTION ORAL at 05:07

## 2020-07-08 RX ADMIN — ZINC SULFATE 220 MG (50 MG) CAPSULE 220 MG: CAPSULE at 05:07

## 2020-07-08 RX ADMIN — ERGOCALCIFEROL 50000 UNITS: 1.25 CAPSULE ORAL at 09:07

## 2020-07-08 RX ADMIN — LACTULOSE 20 G: 20 SOLUTION ORAL at 05:07

## 2020-07-08 RX ADMIN — POLYETHYLENE GLYCOL 3350 17 G: 17 POWDER, FOR SOLUTION ORAL at 12:07

## 2020-07-08 RX ADMIN — LEVOTHYROXINE SODIUM 137 MCG: 25 TABLET ORAL at 05:07

## 2020-07-08 RX ADMIN — GADOBUTROL 10 ML: 604.72 INJECTION INTRAVENOUS at 11:07

## 2020-07-08 RX ADMIN — ENOXAPARIN SODIUM 40 MG: 100 INJECTION SUBCUTANEOUS at 05:07

## 2020-07-08 RX ADMIN — LACTULOSE 45 G: 20 SOLUTION ORAL at 12:07

## 2020-07-08 RX ADMIN — POLYETHYLENE GLYCOL 3350 17 G: 17 POWDER, FOR SOLUTION ORAL at 09:07

## 2020-07-08 NOTE — CONSULTS
[facility]  Hepatology  Consult Note    Patient Name: Zeina Mahoney  MRN: [id number]  Admission Date: 7/7/2020  Hospital Length of Stay: 0 days  Attending Provider: Jorge Watson MD   Primary Care Physician: Primary Doctor No  Principal Problem:Hepatic encephalopathy    Inpatient consult to Hepatology  Consult performed by: Mario Randall MD  Consult ordered by: Preet Koo MD        Subjective:     Transplant status: No    HPI:  Ms. Coy is a 53yo F w/PMH CHAUDHARY/NEHAL cirrhosis with hepatic encephalopathy, volume overload, previously listed for transplant at [facility] [place] ([place]), Grave's (s/p iodine radiation), hypercalcemia due to hyperparathyroidism who presents with altered mental status.    Patient recently admitted 6/25-6/29 with pancreatitis and has had prior extensive workup for this including EUS 6/11/20 with pancreatic cysts. Brought in by  for increased confusion. She has been on lactulose which she states she is taking with 3-4 BMs daily. Denies missed doses. She does still note epigastric pain but unrelated to food and able to eat breakfast today. She was previously listed for transplant in [place] but moved here.    Review of Systems   Constitutional: Negative for chills, fatigue and fever.   HENT: Negative for trouble swallowing.    Respiratory: Negative for cough and shortness of breath.    Cardiovascular: Negative for chest pain and leg swelling.   Gastrointestinal: Negative for abdominal pain, blood in stool, constipation, diarrhea, nausea and vomiting.   Genitourinary: Negative for dysuria.   Musculoskeletal: Negative for arthralgias.   Skin: Negative for color change and pallor.   Neurological: Negative for light-headedness and headaches.   Psychiatric/Behavioral: Positive for confusion. Negative for behavioral problems.       Past Medical History:   Diagnosis Date    Chronic Hepatic encephalopathy 6/25/2020    Decompensated hepatic cirrhosis 6/25/2020    History  of pancreatitis 6/25/2020    Liver cirrhosis secondary to CHAUDHARY 6/25/2020    Macrocytic anemia 6/25/2020    Other ascites 6/25/2020    Thrombocytopenia 6/25/2020       History reviewed. No pertinent surgical history.    Family history of liver disease: No    Review of patient's allergies indicates:   Allergen Reactions    Aspirin Tinitus    Ciprofloxacin Rash       Tobacco Use    Smoking status: Never Smoker    Smokeless tobacco: Never Used   Substance and Sexual Activity    Alcohol use: Not on file    Drug use: Not on file    Sexual activity: Not on file       Medications Prior to Admission   Medication Sig Dispense Refill Last Dose    acetaminophen (TYLENOL) 500 MG tablet Take 1 tablet (500 mg total) by mouth every 6 (six) hours as needed for Pain. Max 2000mg daily.  0     diaper,brief,adult,disposable Misc 5 mg/kg/day by Misc.(Non-Drug; Combo Route) route 5 (five) times daily. Patient with incontinence. Please supply enough depends for 5xs/day       ergocalciferol (VITAMIN D2) 50,000 unit Cap Take 50,000 Units by mouth every 7 days.       furosemide (LASIX) 40 MG tablet Take 60 mg by mouth once daily.        haloperidoL (HALDOL) 1 MG tablet Take 2 tablets (2 mg total) by mouth every evening.       lactulose (CEPHULAC) 10 gram packet Take 1 and a half packets three times a day        lactulose (CHRONULAC) 10 gram/15 mL solution Take 30 mLs (20 g total) by mouth 3 (three) times daily as needed. Alternate with kristalose if not having enough BMs. 2700 mL 2     levothyroxine (SYNTHROID) 150 MCG tablet Take 150 mcg by mouth before breakfast.       lidocaine (LIDODERM) 5 % Place 1 patch onto the skin every 24 hours. Remove & Discard patch within 12 hours or as directed by MD       melatonin 10 mg Tab Take 10 mg by mouth nightly as needed (sleep).       midodrine (PROAMATINE) 10 MG tablet Take 10 mg by mouth 3 (three) times daily.       nystatin (MYCOSTATIN) powder Apply topically 2 (two) times  daily. Apply to affected area twice a day       pantoprazole (PROTONIX) 40 MG tablet Take 40 mg by mouth before breakfast.       polyethylene glycol (GLYCOLAX) 17 gram PwPk Take 17 g by mouth once daily. Take if no BM with lactulose  0     rifAXIMin (XIFAXAN) 550 mg Tab Take 550 mg by mouth 2 (two) times daily.        simethicone (MYLICON) 80 MG chewable tablet Take 80 mg by mouth every 6 (six) hours as needed for Flatulence. Take 2 tablets by mouth every 6 hours as needed for gas       spironolactone (ALDACTONE) 50 MG tablet Take 100 mg by mouth once daily.        zinc sulfate (ZINCATE) 220 (50) mg capsule Take 220 mg by mouth before breakfast.          Objective:     Vital Signs (Most Recent):  Temp: 97.6 °F (36.4 °C) (07/08/20 1157)  Pulse: 82 (07/08/20 1157)  Resp: 18 (07/08/20 1157)  BP: (!) 112/56 (07/08/20 1157)  SpO2: 100 % (07/08/20 1157) Vital Signs (24h Range):  Temp:  [97.3 °F (36.3 °C)-97.8 °F (36.6 °C)] 97.6 °F (36.4 °C)  Pulse:  [79-91] 82  Resp:  [15-18] 18  SpO2:  [97 %-100 %] 100 %  BP: (108-143)/(53-99) 112/56     Weight: 55 kg (121 lb 4.1 oz) (07/08/20 0300)  Body mass index is 20.81 kg/m².    Physical Exam  Vitals signs and nursing note reviewed.   Constitutional:       General: She is not in acute distress.  Eyes:      General: Scleral icterus present.   Neck:      Musculoskeletal: Neck supple.   Cardiovascular:      Rate and Rhythm: Normal rate and regular rhythm.      Heart sounds: No murmur.   Pulmonary:      Effort: Pulmonary effort is normal. No respiratory distress.      Breath sounds: Normal breath sounds.   Abdominal:      General: Bowel sounds are normal. There is no distension.      Palpations: Abdomen is soft.      Tenderness: There is no abdominal tenderness.   Skin:     General: Skin is warm.   Neurological:      Mental Status: She is alert.   Psychiatric:         Behavior: Behavior normal.         MELD-Na score: 25 at 7/8/2020 11:11 AM  MELD score: 21 at 7/8/2020 11:11  AM  Calculated from:  Serum Creatinine: 1.6 mg/dL at 7/8/2020 11:11 AM  Serum Sodium: 130 mmol/L at 7/8/2020 11:11 AM  Total Bilirubin: 7.7 mg/dL at 7/8/2020 11:11 AM  INR(ratio): 1.2 at 7/8/2020 11:11 AM  Age: 54 years 9 months    Significant Labs:  Labs within the past month have been reviewed.    Significant Imaging:  Imaging reviewed    Assessment/Plan:     Decompensated hepatic cirrhosis  Ms. Coy is a 53yo F w/PMH CHAUDHARY/NEHAL cirrhosis with hepatic encephalopathy, volume overload, previously listed for transplant at [facility] ([place]), Grave's (s/p iodine radiation), hypercalcemia due to hyperparathyroidism who presents with altered mental status.  Mental status improved, but still confused per . Has not been able to complete transplant eval due to recurrent admissions. MELD 25.    - Ascites: 2 gram Na restrict; diuretics on hold with hyponatremia / KG  - Esophageal varices: last EGD 11/2019 with no varices  - Hepatic encephalopathy: lactulose titrated to 3-4 BMs daily + rifaximin bid + zinc  - HCC screening: AFP 16 7/6/20, U/S 6/25/20 with no masses  - Transplant: approved for inpatient evaluation, will start as inpatient given recurrent admissions          Thank you for your consult. I will follow-up with patient. Please contact us if you have any additional questions.    Mario Randall MD  Hepatology  [facility]

## 2020-07-08 NOTE — PLAN OF CARE
Problem: Wound  Goal: Optimal Wound Healing  7/8/2020 0521 by Mil Ferrer RN  Outcome: Ongoing, Progressing  7/8/2020 0410 by Mil Ferrer RN  Outcome: Ongoing, Progressing     Problem: Fall Injury Risk  Goal: Absence of Fall and Fall-Related Injury  7/8/2020 0521 by Mil Ferrer RN  Outcome: Ongoing, Progressing  7/8/2020 0410 by Mil Ferrer RN  Outcome: Ongoing, Progressing     Problem: Adult Inpatient Plan of Care  Goal: Plan of Care Review  7/8/2020 0521 by Mil Ferrer RN  Outcome: Ongoing, Progressing  7/8/2020 0410 by Mil Ferrer RN  Outcome: Ongoing, Progressing  Goal: Patient-Specific Goal (Individualization)  7/8/2020 0521 by Mil Ferrer RN  Outcome: Ongoing, Progressing  7/8/2020 0410 by Mil Ferrer RN  Outcome: Ongoing, Progressing  Goal: Absence of Hospital-Acquired Illness or Injury  7/8/2020 0521 by Mil Ferrer RN  Outcome: Ongoing, Progressing  7/8/2020 0410 by Mil Ferrer RN  Outcome: Ongoing, Progressing  Goal: Optimal Comfort and Wellbeing  7/8/2020 0521 by Mil Ferrer RN  Outcome: Ongoing, Progressing  7/8/2020 0410 by Mil Ferrer RN  Outcome: Ongoing, Progressing  Goal: Readiness for Transition of Care  7/8/2020 0521 by Mil Ferrer RN  Outcome: Ongoing, Progressing  7/8/2020 0410 by Mil Ferrer RN  Outcome: Ongoing, Progressing  Goal: Rounds/Family Conference  7/8/2020 0521 by Mil Ferrer RN  Outcome: Ongoing, Progressing  7/8/2020 0410 by Mil Ferrer RN  Outcome: Ongoing, Progressing     Problem: Infection  Goal: Infection Symptom Resolution  7/8/2020 0521 by Mil Ferrer RN  Outcome: Ongoing, Progressing  7/8/2020 0410 by Mil Ferrer RN  Outcome: Ongoing, Progressing     Problem: Skin Injury Risk Increased  Goal: Skin Health and Integrity  7/8/2020 0521 by Mil Ferrer RN  Outcome: Ongoing, Progressing  7/8/2020 0410 by Mil Ferrer RN  Outcome: Ongoing, Progressing

## 2020-07-08 NOTE — SUBJECTIVE & OBJECTIVE
Review of Systems   Constitutional: Negative for chills, fatigue and fever.   HENT: Negative for trouble swallowing.    Respiratory: Negative for cough and shortness of breath.    Cardiovascular: Negative for chest pain and leg swelling.   Gastrointestinal: Negative for abdominal pain, blood in stool, constipation, diarrhea, nausea and vomiting.   Genitourinary: Negative for dysuria.   Musculoskeletal: Negative for arthralgias.   Skin: Negative for color change and pallor.   Neurological: Negative for light-headedness and headaches.   Psychiatric/Behavioral: Positive for confusion. Negative for behavioral problems.       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 No pertinent surgical history.    Family history of liver disease: No    Review of patient's allergies indicates:   Allergen Reactions    Aspirin Tinitus    Ciprofloxacin Rash       Tobacco Use    Smoking status: Never Smoker    Smokeless tobacco: Never Used   Substance and Sexual Activity    Alcohol use: Not on file    Drug use: Not on file    Sexual activity: Not on file       Medications Prior to Admission   Medication Sig Dispense Refill Last Dose    acetaminophen (TYLENOL) 500 MG tablet Take 1 tablet (500 mg total) by mouth every 6 (six) hours as needed for Pain. Max 2000mg daily.  0     diaper,brief,adult,disposable Misc 5 mg/kg/day by Misc.(Non-Drug; Combo Route) route 5 (five) times daily. Patient with incontinence. Please supply enough depends for 5xs/day       ergocalciferol (VITAMIN D2) 50,000 unit Cap Take 50,000 Units by mouth every 7 days.       furosemide (LASIX) 40 MG tablet Take 60 mg by mouth once daily.        haloperidoL (HALDOL) 1 MG tablet  Take 2 tablets (2 mg total) by mouth every evening.       lactulose (CEPHULAC) 10 gram packet Take 1 and a half packets three times a day        lactulose (CHRONULAC) 10 gram/15 mL solution Take 30 mLs (20 g total) by mouth 3 (three) times daily as needed. Alternate with kristalose if not having enough BMs. 2700 mL 2     levothyroxine (SYNTHROID) 150 MCG tablet Take 150 mcg by mouth before breakfast.       lidocaine (LIDODERM) 5 % Place 1 patch onto the skin every 24 hours. Remove & Discard patch within 12 hours or as directed by MD       melatonin 10 mg Tab Take 10 mg by mouth nightly as needed (sleep).       midodrine (PROAMATINE) 10 MG tablet Take 10 mg by mouth 3 (three) times daily.       nystatin (MYCOSTATIN) powder Apply topically 2 (two) times daily. Apply to affected area twice a day       pantoprazole (PROTONIX) 40 MG tablet Take 40 mg by mouth before breakfast.       polyethylene glycol (GLYCOLAX) 17 gram PwPk Take 17 g by mouth once daily. Take if no BM with lactulose  0     rifAXIMin (XIFAXAN) 550 mg Tab Take 550 mg by mouth 2 (two) times daily.        simethicone (MYLICON) 80 MG chewable tablet Take 80 mg by mouth every 6 (six) hours as needed for Flatulence. Take 2 tablets by mouth every 6 hours as needed for gas       spironolactone (ALDACTONE) 50 MG tablet Take 100 mg by mouth once daily.        zinc sulfate (ZINCATE) 220 (50) mg capsule Take 220 mg by mouth before breakfast.          Objective:     Vital Signs (Most Recent):  Temp: 97.6 °F (36.4 °C) (07/08/20 1157)  Pulse: 82 (07/08/20 1157)  Resp: 18 (07/08/20 1157)  BP: (!) 112/56 (07/08/20 1157)  SpO2: 100 % (07/08/20 1157) Vital Signs (24h Range):  Temp:  [97.3 °F (36.3 °C)-97.8 °F (36.6 °C)] 97.6 °F (36.4 °C)  Pulse:  [79-91] 82  Resp:  [15-18] 18  SpO2:  [97 %-100 %] 100 %  BP: (108-143)/(53-99) 112/56     Weight: 55 kg (121 lb 4.1 oz) (07/08/20 0300)  Body mass index is 20.81 kg/m².    Physical Exam  Vitals signs and nursing  note reviewed.   Constitutional:       General: She is not in acute distress.  Eyes:      General: Scleral icterus present.   Neck:      Musculoskeletal: Neck supple.   Cardiovascular:      Rate and Rhythm: Normal rate and regular rhythm.      Heart sounds: No murmur.   Pulmonary:      Effort: Pulmonary effort is normal. No respiratory distress.      Breath sounds: Normal breath sounds.   Abdominal:      General: Bowel sounds are normal. There is no distension.      Palpations: Abdomen is soft.      Tenderness: There is no abdominal tenderness.   Skin:     General: Skin is warm.   Neurological:      Mental Status: She is alert.   Psychiatric:         Behavior: Behavior normal.         MELD-Na score: 25 at 7/8/2020 11:11 AM  MELD score: 21 at 7/8/2020 11:11 AM  Calculated from:  Serum Creatinine: 1.6 mg/dL at 7/8/2020 11:11 AM  Serum Sodium: 130 mmol/L at 7/8/2020 11:11 AM  Total Bilirubin: 7.7 mg/dL at 7/8/2020 11:11 AM  INR(ratio): 1.2 at 7/8/2020 11:11 AM  Age: 54 years 9 months    Significant Labs:  Labs within the past month have been reviewed.    Significant Imaging:  Imaging reviewed

## 2020-07-08 NOTE — H&P
Hospital Medicine  History and Physical Exam    Team: [facility] HOSP MED L Preet Koo MD  Admit Date: 7/7/2020  Principal Problem:  Hepatic encephalopathy   Patient information was obtained from patient, past medical records and ER records.   Primary care Physician: Primary Doctor No  Code status: Full Code    HPI: 53 yo F with PMHx of CHAUDHARY/NEHAL cirrhosis complicated by encephalopathy, volume overload, sarcopenia, listed for [place] [facility] [place] [place], hx of pancreatitis 2/2 unknown cause (status post extensive workup) presents to the ED with  with decreased alertness. Pt. Discharged from the hospital 9 days ago after a 5 day admission for generalized abdominal pain. During that admission, the patient reportedly developed acute on chronic HE on 6/26 with significant confusion which improved with increased lactulose doses and lactulose enema. Pt.  not available at time of interview. Per ED, the patient's  reports that the patient has been progressively  less acitve and sleepythe patient's discharge 6/29l. He reports that the pt. Has been taking lactulose as prescribed, but the patient has not been having more than 1-2 BM's a day. Pt. Presented to the ED with  7/5 with similar complaints, but she was discharged home as her ammonia and other labs were at baseline and she was sleepy but noted to be AAOx3.    Today on my interview, the patient is alert and oriented x3. When asked why she is here, she states that she was driven to the hospital by her  in their RV because of her liver and chronic abdominal pain. She reports that she has been feeling ok and having an appropriate amount of BM's. She denies any fevers or chills and reports that she has been 4 BM's a day.    Hemoglobin A1C   Date Value Ref Range Status   07/06/2020 <4.0 (A) 4.0 - 5.6 % Final     Comment:     ADA Screening Guidelines:  5.7-6.4%  Consistent with prediabetes  >or=6.5%  Consistent with diabetes  High levels of  fetal hemoglobin interfere with the HbA1C  assay. Heterozygous hemoglobin variants (HbS, HgC, etc)do  not significantly interfere with this assay.   However, presence of multiple variants may affect accuracy.  Falsely low HA1c results may be observed in patients   with clinical conditions that shorten erythrocyte   life span or decrease mean erythrocyte age.  HA1c   may not accurately reflect glycemic control when   clinical conditions that affect erythrocyte survival   are present. Fructosamine may be used as an alternate  measurement of glycemic control.         Past Medical History: Patient has a past medical history of Chronic Hepatic encephalopathy (6/25/2020), Decompensated hepatic cirrhosis (6/25/2020), History of pancreatitis (6/25/2020), Liver cirrhosis secondary to CHAUDHARY (6/25/2020), Macrocytic anemia (6/25/2020), Other ascites (6/25/2020), and Thrombocytopenia (6/25/2020).    Past Surgical History: Patient has no past surgical history on file.    Social History: Patient reports that she has never smoked. She has never used smokeless tobacco.    Family History: family history is not on file.    Medications: reviewed     Allergies: Patient is allergic to aspirin and ciprofloxacin.    ROS  Pain Scale:4 /10   Constitutional: no fever or chills  Respiratory: no cough or shortness of breath  Cardiovascular: no chest pain or palpitations  Gastrointestinal: no nausea or vomiting, Positive for abdominal pain epigastric or change in bowel habits  Genitourinary: no hematuria or dysuria  Integument/Breast: no rash or pruritis  Hematologic/Lymphatic: no easy bruising or lymphadenopathy  Musculoskeletal: no arthralgias or myalgias  Neurological: no seizures or tremors  Behavioral/Psych: no depression or anxiety    PEx  Temp:  [97.3 °F (36.3 °C)]   Pulse:  [82-91]   Resp:  [16-18]   BP: (112-120)/(56-59)   SpO2:  [97 %-100 %]   Body mass index is 21.76 kg/m².   No intake or output data in the 24 hours ending 07/08/20  0003    General appearance: chronically ill appearing and Jaundiced  Mental status: Alert and oriented x 3  HEENT:  conjunctivae/corneas clear, PERRL  Neck: supple, thyroid not enlarged  Pulm:   normal respiratory effort, CTA B, no c/w/r  Card: RRR, S1, S2 normal, no murmur, click, rub or gallop  Abd: soft, NT, Mildly distended, ND, BS present; no masses, no organomegaly  Ext: no c/c/e  Pulses: 2+, symmetric  Skin: color, texture, turgor normal. No rashes or lesions  Neuro: CN II-XII grossly intact, no focal numbness or weakness, normal strength and tone     Recent Results (from the past 24 hour(s))   CBC W/ AUTO DIFFERENTIAL    Collection Time: 07/07/20  9:32 PM   Result Value Ref Range    WBC 7.17 3.90 - 12.70 K/uL    RBC 2.44 (L) 4.00 - 5.40 M/uL    Hemoglobin 8.8 (L) 12.0 - 16.0 g/dL    Hematocrit 25.8 (L) 37.0 - 48.5 %    Mean Corpuscular Volume 106 (H) 82 - 98 fL    Mean Corpuscular Hemoglobin 36.1 (H) 27.0 - 31.0 pg    Mean Corpuscular Hemoglobin Conc 34.1 32.0 - 36.0 g/dL    RDW 13.8 11.5 - 14.5 %    Platelets 85 (L) 150 - 350 K/uL    MPV 10.6 9.2 - 12.9 fL    Immature Granulocytes 0.6 (H) 0.0 - 0.5 %    Gran # (ANC) 5.3 1.8 - 7.7 K/uL    Immature Grans (Abs) 0.04 0.00 - 0.04 K/uL    Lymph # 1.2 1.0 - 4.8 K/uL    Mono # 0.5 0.3 - 1.0 K/uL    Eos # 0.0 0.0 - 0.5 K/uL    Baso # 0.04 0.00 - 0.20 K/uL    nRBC 0 0 /100 WBC    Gran% 74.0 (H) 38.0 - 73.0 %    Lymph% 17.2 (L) 18.0 - 48.0 %    Mono% 7.0 4.0 - 15.0 %    Eosinophil% 0.6 0.0 - 8.0 %    Basophil% 0.6 0.0 - 1.9 %    Differential Method Automated    Comp. Metabolic Panel    Collection Time: 07/07/20  9:32 PM   Result Value Ref Range    Sodium 133 (L) 136 - 145 mmol/L    Potassium 5.0 3.5 - 5.1 mmol/L    Chloride 103 95 - 110 mmol/L    CO2 22 (L) 23 - 29 mmol/L    Glucose 175 (H) 70 - 110 mg/dL    BUN, Bld 34 (H) 6 - 20 mg/dL    Creatinine 1.7 (H) 0.5 - 1.4 mg/dL    Calcium 10.5 8.7 - 10.5 mg/dL    Total Protein 7.3 6.0 - 8.4 g/dL    Albumin 3.1 (L) 3.5 -  5.2 g/dL    Total Bilirubin 7.4 (H) 0.1 - 1.0 mg/dL    Alkaline Phosphatase 94 55 - 135 U/L    AST 60 (H) 10 - 40 U/L    ALT 34 10 - 44 U/L    Anion Gap 8 8 - 16 mmol/L    eGFR if African American 38.9 (A) >60 mL/min/1.73 m^2    eGFR if non  33.7 (A) >60 mL/min/1.73 m^2   Lipase    Collection Time: 07/07/20  9:32 PM   Result Value Ref Range    Lipase 170 (H) 4 - 60 U/L   Protime-INR    Collection Time: 07/07/20  9:32 PM   Result Value Ref Range    Prothrombin Time 12.4 9.0 - 12.5 sec    INR 1.2 0.8 - 1.2   Ammonia    Collection Time: 07/07/20  9:32 PM   Result Value Ref Range    Ammonia 94 (H) 10 - 50 umol/L       No results for input(s): POCTGLUCOSE in the last 168 hours.    Active Hospital Problems    Diagnosis  POA    Acute Hepatic encephalopathy [K72.90]  Yes      Resolved Hospital Problems   No resolved problems to display.       Assessment and Plan:  Hepatic Encephalopathy  -Pt. With decreased alertness, ammonia elevated to 94 (previously 51 at discharge last hospitalization)  -Continue plan from last hospitalization, lactulose Q6hrs, rifaximin, senna. Can use lactulose enemas as needed if PO lactulose not working     Decompensated hepatic cirrhosis  Liver cirrhosis secondary to CHAUDHARY  MELD-Na score: 24 at 7/7/2020  9:32 PM  MELD score: 21 at 7/7/2020  9:32 PM  Calculated from:  Serum Creatinine: 1.7 mg/dL at 7/7/2020  9:32 PM  Serum Sodium: 133 mmol/L at 7/7/2020  9:32 PM  Total Bilirubin: 7.4 mg/dL at 7/7/2020  9:32 PM  INR(ratio): 1.2 at 7/7/2020  9:32 PM  Age: 54 years 9 months  - [place] [facility] [place]  -Hold home diuretics, suspect pt. Over diuresed and not having as much PO intake  -Continue midodrine  - hepatology consulted    CKD  -Baseline Cr 1.2, Cr 1.7 on presentation  -Hold diuretics and continue to monitor  -Avoid nephrotoxic medications     Macrocytic anemia  Thrombocytopenia  - stable on chronic due to liver disease  - monitor    Hypothyroidism  -TSH 0.050  with Free T4 elevated to 1.8  -Will decrease synthroid to 137 mcg from 150, follow up with PCP/endocrine as outpatient    DVT PPx: SCDs    Preet Koo MD  Hospital Medicine Staff  [phone] pager

## 2020-07-08 NOTE — HPI
Ms. Coy is a 55yo F w/PMH CHAUDHARY/NEHAL cirrhosis with hepatic encephalopathy, volume overload, previously listed for transplant at [facility] [place] ([place]), Grave's (s/p iodine radiation), hypercalcemia due to hyperparathyroidism who presents with altered mental status.    Patient recently admitted 6/25-6/29 with pancreatitis and has had prior extensive workup for this including EUS 6/11/20 with pancreatic cysts. Brought in by  for increased confusion. She has been on lactulose which she states she is taking with 3-4 BMs daily. Denies missed doses. She does still note epigastric pain but unrelated to food and able to eat breakfast today. She was previously listed for transplant in [place] but moved here.

## 2020-07-08 NOTE — PLAN OF CARE
CM met with patient and  in room 8025 to complete discharge planning assessment. Admitted for - CHAUDHARY/NEHAL cirrhosis with hepatic encephalopathy, volume overload and liver transplant. Not medically stable for discharge. Will continue to follow    Mihaela Haro RN   Case Management  Ext  [phone]       07/08/20 [id number]   Post-Acute Status   Post-Acute Authorization Placement   Post-Acute Placement Status Awaiting Internal Medical Clearance   Discharge Delays None known at this time   Discharge Plan   Discharge Plan A Rehab   Discharge Plan B Home;Home Health

## 2020-07-08 NOTE — PLAN OF CARE
Problem: Adult Inpatient Plan of Care  Goal: Plan of Care Review  Outcome: Ongoing, Progressing  Plan of care was reviewed with the pt. AAOx3. Vitals were stable. Echo and MRI was done. Picc team was able to come put in a new IV due to poor venous access. Pt was able to ambulate to the bathroom with stand by assist with . No major changes through out the day. Safety precautions were in placed.

## 2020-07-08 NOTE — NURSING TRANSFER
Nursing Transfer Note      7/8/2020     Transfer From: ED    Transfer via stretcher    Transfer with      Transported by: transport     Medicines sent: None     Chart send with patient: Yes    Notified: spouse    Patient reassessed at: 06/08/2020, 0300    Upon arrival to floor: patient oriented to room, call bell in reach and bed in lowest position

## 2020-07-08 NOTE — ASSESSMENT & PLAN NOTE
Ms. Coy is a 55yo F w/PMH CHAUDHARY/NEHAL cirrhosis with hepatic encephalopathy, volume overload, previously listed for transplant at [facility] ([place]), Grave's (s/p iodine radiation), hypercalcemia due to hyperparathyroidism who presents with altered mental status.  Mental status improved, but still confused per . Has not been able to complete transplant eval due to recurrent admissions. MELD 25.    - Ascites: 2 gram Na restrict; diuretics on hold with hyponatremia / KG  - Esophageal varices: last EGD 11/2019 with no varices  - Hepatic encephalopathy: lactulose titrated to 3-4 BMs daily + rifaximin bid + zinc  - HCC screening: AFP 16 7/6/20, U/S 6/25/20 with no masses  - Transplant: approved for inpatient evaluation, will start as inpatient given recurrent admissions

## 2020-07-08 NOTE — ED TRIAGE NOTES
Patient comes into ER with complaints of altered mental status since about midnight last night. Patient's  states that she has been in an our of the [facility] recently for this same thing. The patient is on the transplant list for a liver.

## 2020-07-08 NOTE — HPI
This is a 54 y.o. female. with a past medical history of CHAUDHARY/NEHAL cirrhosis with hepatic encephalopathy, volume overload, [place] [facility] [place] Graves disease s/p iodine radiation with postablative hypothyroidism, hypercalcemia with PTH 72 but vitamin D deficiency and CKD who was admitted for confusion.    Pt. Discharged from the hospital 6/29 after a 5 day admission for generalized abdominal pain, acute on chronic HE on 6/26 with significant confusion which improved with increased lactulose doses and lactulose enema.     Patient's  reports, that he does not know much about her thyroid medications but he has been giving her the synthroid for the past year around 9am with all the other medications together. Patient has lost close to 30 pounds since May 2020, but most likely due to the uptitration of her lasix to 60mg PO daily. She gets some trembling per  (likely asterixis), which happens every time she gets altered.      Today on my interview patient is sleepy, does not engage much in conversation and was not able to obtain much information. All the information obtained from the .  stated he can get all the info about prior synthroid doses and if it has been up titrated recently by her PCP later tonight if needed.     Graves disease s/p iodine radiation with postablative hypothyroidism  -Presents with possible iatrogenic hyperthyroidism labs likely due to high doses of synthroid  -Patient was taking 150mcg PO synthroid daily, was supposed to be decreased to 137mcg PO daily but  stated he didn't decreased it. Her weight based synthroid calculatedat 88mcg PO daily  -Last TSH 0.05   -T4 1.8  -Pt with Hx of volume overload, which improved since May per  at bedside, likely allowing for better absorption of synthroid  -Currently not complaining of any heat intolerance, increase in sweating, anxiety, does have fatigue and weakness but likely due to  her liver    Hypercalcemia likely PTH mediated and worsened due to hyperthyroidism, CKD and andimmobilization  -10.7, corrected for albumin 11.66  -Vitamin D level 12  -PTH 73 on May 2020  -No Hx of kidneys stones

## 2020-07-08 NOTE — ASSESSMENT & PLAN NOTE
Hypercalcemia likely PTH mediated and worsened due to hyperthyroidism, cirrhosis, CKD and and immobilization  -10.7, corrected for albumin 11.66, unlikely mental status due to hypercalcemia  -PTH 73 on May 2020  -In care everywhere noted to have a 24 hour urine calcium which was < 100 mg/24h but can be low because of vitamin D deficiency  -Current Vitamin D level 12  -No Hx of kidneys stones    Plan  - Continue ergocalciferol 50,000 IU every 7 days   - Recheck vitamin D in 8 weeks outpatient with a goal of >20 as patient is cirrhotic and will be hard to fully correct her vitamin D levels  - At that time recheck PTH and if elevated recommend to check 24hr urine Calcium  - Ensure adequate hydration, can use Lasix for volume overload as dictated by Hepatology

## 2020-07-08 NOTE — SUBJECTIVE & OBJECTIVE
Interval HPI:  See above  Overnight events: None  Eatin%  Nausea: No  Hypoglycemia and intervention: No  Fever: No  TPN and/or TF: No      PMH, PSH, FH, SH updated and reviewed     ROS: Information obtained from  as patient sleepy and nort engaging in interview    Review of Systems   Constitutional: Positive for activity change, appetite change and fatigue. Negative for unexpected weight change.   HENT: Negative for congestion and trouble swallowing.    Eyes: Negative for visual disturbance.   Respiratory: Negative for shortness of breath.    Cardiovascular: Negative for chest pain.   Gastrointestinal: Negative for abdominal pain.   Endocrine: Negative for cold intolerance, heat intolerance, polydipsia, polyphagia and polyuria.   Genitourinary: Negative for difficulty urinating.   Musculoskeletal: Negative for myalgias.   Skin: Negative for color change.   Neurological: Positive for weakness.   Hematological: Negative for adenopathy.   Psychiatric/Behavioral: Positive for confusion. Negative for agitation.       Labs Reviewed and Include:    Recent Labs   Lab 20  1111   *   CALCIUM 10.7*   ALBUMIN 2.8*   PROT 6.5   *   K 3.7   CO2 22*      BUN 32*   CREATININE 1.6*   ALKPHOS 93   ALT 29   AST 42*   BILITOT 7.7*     Lab Results   Component Value Date    HGBA1C <4.0 (A) 2020       Nutritional status:   Body mass index is 20.77 kg/m².  Lab Results   Component Value Date    ALBUMIN 2.8 (L) 2020    ALBUMIN 3.1 (L) 2020    ALBUMIN 3.2 (L) 2020     No results found for: PREALBUMIN    Estimated Creatinine Clearance: 34.7 mL/min (A) (based on SCr of 1.6 mg/dL (H)).    POCT Glucose results:    Current Insulin Regimen:         PHYSICAL EXAMINATION:  Vitals:    20 1157   BP: (!) 112/56   Pulse: 82   Resp: 18   Temp: 97.6 °F (36.4 °C)     Body mass index is 20.81 kg/m².    Physical Exam  Vitals signs and nursing note reviewed.   Constitutional:       General:  She is not in acute distress.     Appearance: She is well-developed. She is ill-appearing.   HENT:      Head: Normocephalic and atraumatic.      Right Ear: External ear normal.      Left Ear: External ear normal.      Nose: Nose normal.   Eyes:      General: Scleral icterus present.   Neck:      Musculoskeletal: No muscular tenderness.      Thyroid: No thyromegaly.      Vascular: No carotid bruit.      Trachea: No tracheal deviation.   Cardiovascular:      Rate and Rhythm: Normal rate and regular rhythm.      Heart sounds: Normal heart sounds. No murmur.   Pulmonary:      Effort: Pulmonary effort is normal.      Breath sounds: Normal breath sounds.   Abdominal:      Palpations: Abdomen is soft. There is no mass.      Tenderness: There is no abdominal tenderness.   Musculoskeletal: Normal range of motion.   Skin:     General: Skin is warm.      Findings: No rash.   Neurological:      Motor: Weakness present.   Psychiatric:      Comments: Patient drowsy during interview

## 2020-07-08 NOTE — ASSESSMENT & PLAN NOTE
Graves disease s/p iodine radiation with postablative hypothyroidism  -Presents with possible iatrogenic hyperthyroidism   -Likely due to high doses of synthroid  -Non tender thyroid  -Taking 150mcg PO synthroid daily  -Her weight based synthroid calculatedat 88mcg PO daily  -Last TSH 0.05   -T4 1.8  -Hx of volume overload, which improved since May per  at bedside (lost about 30 pounds), likely allowing for better absorption of synthroid    Plan  Recommend to lowe synthroid to 112mcg PO daily  Educated  to not give medication with any other medications and to give it on an empty stomach with water only and to wait 30 min before giving her anything else  Recommend to repeat TSH and T4 levels in 4 to 6 weeks and adjust accordingly

## 2020-07-08 NOTE — PROVIDER PROGRESS NOTES - EMERGENCY DEPT.
Encounter Date: 7/7/2020    ED Physician Progress Notes         EKG - STEMI Decision  Initial Reading: No STEMI present.  Response: No Action Needed.

## 2020-07-08 NOTE — ED PROVIDER NOTES
Encounter Date: 7/7/2020       History     Chief Complaint   Patient presents with    Altered Mental Status     Pt's  states pt is in liver failure and has been nonresponsive and confused since last night around 2am. Pt recently admitted to the hospital for encephalopathy.     54 y.o. female with CHAUDHARY cirrhosis who presents with altered mental status. Patient was recently discharged 6/29 after admission for hepatic encephalopathy.  states she had not returned to baseline upon discharge and continued to worsen over the week. He states that she has had reduced bowel movements (1-2 daily) since discharge while appropriately taking lactulose. He says she has been less active and oriented today. Patient denies any pain today. No associated fevers chills, abdominal distension, nausea vomiting.  No recent mental status changes. Denies any dysuria or fevers. No history of SBP. No recent paracentesis. Patient is currently being worked up for liver transplant, has not yet been listed.        Review of patient's allergies indicates:   Allergen Reactions    Aspirin Tinitus    Ciprofloxacin Rash     Past Medical History:   Diagnosis Date    Chronic Hepatic encephalopathy 6/25/2020    Decompensated hepatic cirrhosis 6/25/2020    History of pancreatitis 6/25/2020    Liver cirrhosis secondary to CHAUDHARY 6/25/2020    Macrocytic anemia 6/25/2020    Other ascites 6/25/2020    Thrombocytopenia 6/25/2020     No past surgical history on file.  No family history on file.  Social History     Tobacco Use    Smoking status: Never Smoker    Smokeless tobacco: Never Used   Substance Use Topics    Alcohol use: Not on file    Drug use: Not on file     Review of Systems   Constitutional: Positive for activity change, appetite change and fatigue. Negative for chills, diaphoresis and fever.   HENT: Negative for congestion, rhinorrhea, sinus pressure, sinus pain, sneezing and sore throat.    Respiratory: Negative for cough,  choking, chest tightness, shortness of breath, wheezing and stridor.    Cardiovascular: Negative for chest pain and leg swelling.   Gastrointestinal: Negative for abdominal distention, abdominal pain, anal bleeding, blood in stool, constipation, diarrhea, nausea, rectal pain and vomiting.   Genitourinary: Negative for dysuria, frequency, hematuria and urgency.   Musculoskeletal: Negative for back pain and myalgias.   Skin: Negative for rash.   Allergic/Immunologic: Negative for environmental allergies, food allergies and immunocompromised state.   Neurological: Negative for dizziness and headaches.   Hematological: Does not bruise/bleed easily.       Physical Exam     Initial Vitals   BP Pulse Resp Temp SpO2   07/07/20 2017 07/07/20 2017 07/07/20 2020 07/07/20 2020 07/07/20 2017   (!) 116/58 91 18 97.3 °F (36.3 °C) 97 %      MAP       --                Physical Exam  Gen: Alert and oriented to person but not place or time  Eye: EOMI, no scleral icterus, no periorbital edema or ecchymosis  Head: NCAT, no lesions  ENT: neck supple, no stridor, no masses  CVS: RRR, no m/r/g, distal pulses intact/symmetric  Pulm: CTAB, no wheezes, rales or rhonchi, no increased work of breathing  Abd: soft, nontender, nondistended, no organomegaly, no CVAT  Ext: no edema, lesions, rashes, or deformity  Neuro: GCS15, moving all extremities, gait intact  Skin: Bronzed skin  Psych: Cooperative, require regular reorienting during exam  ED Course   Procedures  Labs Reviewed   CBC W/ AUTO DIFFERENTIAL   COMPREHENSIVE METABOLIC PANEL   LIPASE   URINALYSIS, REFLEX TO URINE CULTURE   PROTIME-INR   AMMONIA     EKG Readings: (Independently Interpreted)   Initial Reading: No STEMI. Rhythm: Sinus Tachycardia.       Imaging Results    None          Medical Decision Making:   Initial Assessment:   54 y.o. female  with h/o CHAUDHARY cirrhosis who presents with altered mental staus. This patient's differential diagnosis includes, but is not limited to:  metabolic causes (i.e., hyper/hyponatremia, hyper/hypoglycemia, hypercalcemia, hyper/hypothyroidism, hypoxia/hypercapnea, hepatic encephalopathy, uremic encephalopathy, drug intoxication/withdrawal, alcohol intoxication or withdrawal, Wernicke encephalopathy), spontaneous bacterial peritonitis, cirrhosis, abdominal pain, pancreatitis, pancreatic cancer, decompensated cirrhosis.    Clinical presentation consistent with hepatic encephalopathy, less concern for SBP although abdomen is soft, nontender, and not distended.       Independently Interpreted Test(s):   I have ordered and independently interpreted EKG Reading(s) - see prior notes  Clinical Tests:   Lab Tests: Ordered  Radiological Study: Ordered  Medical Tests: Ordered  ED Management:  11:30 PM Worsening ammonia 94 from 57 on discharge from last week. MELD score is 24 today. Patient being admitted to -L.               Attending Attestation:   Physician Attestation Statement for Resident:  As the supervising MD . -: Emergent evaluation 54-year-old female history of CHAUDHARY presenting with family for acute confusion and altered mental status.  Patient has been compliant with medication,  has been giving multiple enemas and doubling doses of her meds without good bowel movements.  -: General:  Chronically ill-appearing, no acute distress  Eyes:  Scleral icterus  Abdomen:  Soft, nontender, no distension, no fluid wave  Neuro:  Awake, answering questions, no tremor  -: Patient treated with lactulose, exam consistent with hepatic encephalopathy.  Labs support diagnosis.  Will admit to Hospital Medicine for further treatment and evaluation                                   Clinical Impression:       ICD-10-CM ICD-9-CM   1. Hepatic encephalopathy  K72.90 572.2                                Isa Matamoros MD  07/08/20 0158       Stephen Ford MD  Resident  07/08/20 0159

## 2020-07-08 NOTE — PLAN OF CARE
CM met with patient and spouse (Yonis) in room 8089 for Discharge Planning Assessment.  Per spouse ,  patient lives with him in a single family home in [place] currently here with an RV and spouse is staying at a BNB..   Per Yonis  patient was intermittently independent requiring modest assistance with ADLS and uses cane/walker for ambulation.  Per Yonis, the patient will have assistance from himself and adult children upon discharge.  Discharge Planning Booklet given to patient/family and discussed.  All questions addressed.        PCP:  Primary Doctor No    Pharmacy:  [id number] - [place], [place] - [address]  [address]  [place] [address]  Phone: [phone] Fax: [phone]    Emergency Contacts:  Extended Emergency Contact Information  Primary Emergency Contact: Yonis Pastrana  Mobile Phone: [phone]  Relation: Spouse   needed? No    Insurance:  Payor: [employer] / Plan: [employer] ALL OUT OF STATE / Product Type: PPO /       Mihaela Haro RN  Case Management  Ext: [phone]  07/08/2020  3:19 PM         07/08/20 1501   Discharge Assessment   Assessment Type Discharge Planning Assessment   Confirmed/corrected address and phone number on facesheet? Yes   Assessment information obtained from? Caregiver  (spouse - Yonis Pastrana)   Expected Length of Stay (days) 8   Communicated expected length of stay with patient/caregiver yes   Prior to hospitilization cognitive status: Unable to Assess  (patient disoriented at time of visit)   Prior to hospitalization functional status: Needs Assistance;Assistive Equipment;Partially Dependent;Independent   Current cognitive status: Unable to Assess   Current Functional Status: Independent;Assistive Equipment;Needs Assistance;Partially Dependent   Facility Arrived From: from [facility] Liver transplant and evaluation   Lives With spouse;child(alondra), adult   Is patient able to care for self after discharge? Yes   Who are your caregiver(s) and their phone number(s)?  Yonis Pastrana Spouse     [phone]  or Oskar Pastrana - son - [phone]   Patient's perception of discharge disposition home health  (agreeable to IRF/SNF if recommended)   Readmission Within the Last 30 Days no previous admission in last 30 days   Patient currently being followed by outpatient case management? No   Patient currently receives any other outside agency services? No   Equipment Currently Used at Home cane, straight;walker, rolling;wheelchair;bedside commode;shower chair;hospital bed;grab bar;wound care supplies   Do you have any problems affording any of your prescribed medications? No   Is the patient taking medications as prescribed? yes   Does the patient have transportation home? Yes   Transportation Anticipated family or friend will provide   Does the patient receive services at the [facility]? No   Discharge Plan A Rehab   Discharge Plan B Home;Home with family   DME Needed Upon Discharge  other (see comments)  (TBD)   Patient/Family in Agreement with Plan yes

## 2020-07-08 NOTE — CONSULTS
[facility]  Endocrinology  Consult Note    Consult Requested by: Jorge Watson MD   Reason for admit: Hepatic encephalopathy    HISTORY OF PRESENT ILLNESS:  This is a 54 y.o. female. with a past medical history of CHAUDHARY/NEHAL cirrhosis with hepatic encephalopathy, volume overload, previously listed for transplant at [facility] ([place]), Graves disease s/p iodine radiation with postablative hypothyroidism, hypercalcemia with PTH 72 but vitamin D deficiency and CKD who was admitted for confusion.    Pt. Discharged from the hospital 6/29 after a 5 day admission for generalized abdominal pain, acute on chronic HE on 6/26 with significant confusion which improved with increased lactulose doses and lactulose enema.     Patient's  reports, that he does not know much about her thyroid medications but he has been giving her the synthroid for the past year around 9am with all the other medications together. Patient has lost close to 30 pounds since May 2020, but most likely due to the uptitration of her lasix to 60mg PO daily. She gets some trembling per  (likely asterixis), which happens every time she gets altered.      Today on my interview patient is sleepy, does not engage much in conversation and was not able to obtain much information. All the information obtained from the .  stated he can get all the info about prior synthroid doses and if it has been up titrated recently by her PCP later tonight if needed.     Graves disease s/p iodine radiation with postablative hypothyroidism  -Presents with possible iatrogenic hyperthyroidism labs likely due to high doses of synthroid  -Patient was taking 150mcg PO synthroid daily, was supposed to be decreased to 137mcg PO daily but  stated he didn't decreased it. Her weight based synthroid calculatedat 88mcg PO daily  -Last TSH 0.05   -T4 1.8  -Pt with Hx of volume overload, which improved since May per  at bedside, likely  allowing for better absorption of synthroid  -Currently not complaining of any heat intolerance, increase in sweating, anxiety, does have fatigue and weakness but likely due to her liver    Hypercalcemia likely PTH mediated and worsened due to hyperthyroidism, CKD and andimmobilization  -10.7, corrected for albumin 11.66  -Vitamin D level 12  -PTH 73 on May 2020  -No Hx of kidneys stones      Medications and/or Treatments Impacting Glycemic Control:  Immunotherapy:    Immunosuppressants     None        Steroids:   Hormones (From admission, onward)    Start     Stop Route Frequency Ordered    07/08/20 0128  melatonin tablet 6 mg      -- Oral Nightly PRN 07/08/20 0028        Pressors:    Autonomic Drugs (From admission, onward)    Start     Stop Route Frequency Ordered    07/08/20 0900  midodrine tablet 10 mg      -- Oral 3 times daily 07/08/20 0028          Medications Prior to Admission   Medication Sig Dispense Refill Last Dose    acetaminophen (TYLENOL) 500 MG tablet Take 1 tablet (500 mg total) by mouth every 6 (six) hours as needed for Pain. Max 2000mg daily.  0     diaper,brief,adult,disposable Misc 5 mg/kg/day by Misc.(Non-Drug; Combo Route) route 5 (five) times daily. Patient with incontinence. Please supply enough depends for 5xs/day       ergocalciferol (VITAMIN D2) 50,000 unit Cap Take 50,000 Units by mouth every 7 days.       furosemide (LASIX) 40 MG tablet Take 60 mg by mouth once daily.        haloperidoL (HALDOL) 1 MG tablet Take 2 tablets (2 mg total) by mouth every evening.       lactulose (CEPHULAC) 10 gram packet Take 1 and a half packets three times a day        lactulose (CHRONULAC) 10 gram/15 mL solution Take 30 mLs (20 g total) by mouth 3 (three) times daily as needed. Alternate with kristalose if not having enough BMs. 2700 mL 2     levothyroxine (SYNTHROID) 150 MCG tablet Take 150 mcg by mouth before breakfast.       lidocaine (LIDODERM) 5 % Place 1 patch onto the skin every 24  hours. Remove & Discard patch within 12 hours or as directed by MD       melatonin 10 mg Tab Take 10 mg by mouth nightly as needed (sleep).       midodrine (PROAMATINE) 10 MG tablet Take 10 mg by mouth 3 (three) times daily.       nystatin (MYCOSTATIN) powder Apply topically 2 (two) times daily. Apply to affected area twice a day       pantoprazole (PROTONIX) 40 MG tablet Take 40 mg by mouth before breakfast.       polyethylene glycol (GLYCOLAX) 17 gram PwPk Take 17 g by mouth once daily. Take if no BM with lactulose  0     rifAXIMin (XIFAXAN) 550 mg Tab Take 550 mg by mouth 2 (two) times daily.        simethicone (MYLICON) 80 MG chewable tablet Take 80 mg by mouth every 6 (six) hours as needed for Flatulence. Take 2 tablets by mouth every 6 hours as needed for gas       spironolactone (ALDACTONE) 50 MG tablet Take 100 mg by mouth once daily.        zinc sulfate (ZINCATE) 220 (50) mg capsule Take 220 mg by mouth before breakfast.          Current Facility-Administered Medications   Medication Dose Route Frequency Provider Last Rate Last Dose    acetaminophen tablet 325 mg  325 mg Oral Q4H PRN Preet Koo MD        albuterol-ipratropium 2.5 mg-0.5 mg/3 mL nebulizer solution 3 mL  3 mL Nebulization Q6H PRN Preet Koo MD        enoxaparin injection 40 mg  40 mg Subcutaneous Q24H Preet Koo MD        ergocalciferol capsule 50,000 Units  50,000 Units Oral Q7 Days Jorge Watson MD   50,000 Units at 07/08/20 0953    glucose chewable tablet 16 g  16 g Oral PRN Preet Koo MD        glucose chewable tablet 24 g  24 g Oral PRN Preet Koo MD        lactulose 20 gram/30 mL solution Soln 45 g  45 g Oral Q6H Jorge Watson MD   45 g at 07/08/20 1240    melatonin tablet 6 mg  6 mg Oral Nightly PRN Preet Koo MD        midodrine tablet 10 mg  10 mg Oral TID Preet Koo MD   10 mg at 07/08/20 1515    OLANZapine injection 5 mg  5 mg Intramuscular Q8H PRN Jorge Watson MD         ondansetron injection 4 mg  4 mg Intravenous Q8H PRN Preet Koo MD        pantoprazole EC tablet 40 mg  40 mg Oral Before breakfast Preet Koo MD   40 mg at 20 0520    polyethylene glycol packet 17 g  17 g Oral TID Jorge Watson MD   17 g at 20 1515    prochlorperazine injection Soln 5 mg  5 mg Intravenous Q6H PRN Jorge Watson MD        QUEtiapine tablet 25 mg  25 mg Oral QHS Jorge Watson MD        rifAXIMin tablet 550 mg  550 mg Oral BID Preet Koo MD   550 mg at 20 0953    sodium chloride 0.9% bolus 1,000 mL  1,000 mL Intravenous Once Jorge Watson MD        sodium chloride 0.9% flush 10 mL  10 mL Intravenous PRN Preet Koo MD        zinc sulfate capsule 220 mg  220 mg Oral Before breakfast Preet Koo MD   220 mg at 20 0520       Interval HPI:  See above  Overnight events: None  Eatin%  Nausea: No  Hypoglycemia and intervention: No  Fever: No  TPN and/or TF: No      PMH, PSH, FH, SH updated and reviewed     ROS: Information obtained from  as patient sleepy and nort engaging in interview    Review of Systems   Constitutional: Positive for activity change, appetite change and fatigue. Negative for unexpected weight change.   HENT: Negative for congestion and trouble swallowing.    Eyes: Negative for visual disturbance.   Respiratory: Negative for shortness of breath.    Cardiovascular: Negative for chest pain.   Gastrointestinal: Negative for abdominal pain.   Endocrine: Negative for cold intolerance, heat intolerance, polydipsia, polyphagia and polyuria.   Genitourinary: Negative for difficulty urinating.   Musculoskeletal: Negative for myalgias.   Skin: Negative for color change.   Neurological: Positive for weakness.   Hematological: Negative for adenopathy.   Psychiatric/Behavioral: Positive for confusion. Negative for agitation.       Labs Reviewed and Include:    Recent Labs   Lab 20  1111   *   CALCIUM 10.7*   ALBUMIN  2.8*   PROT 6.5   *   K 3.7   CO2 22*      BUN 32*   CREATININE 1.6*   ALKPHOS 93   ALT 29   AST 42*   BILITOT 7.7*     Lab Results   Component Value Date    HGBA1C <4.0 (A) 07/06/2020       Nutritional status:   Body mass index is 20.77 kg/m².  Lab Results   Component Value Date    ALBUMIN 2.8 (L) 07/08/2020    ALBUMIN 3.1 (L) 07/07/2020    ALBUMIN 3.2 (L) 07/06/2020     No results found for: PREALBUMIN    Estimated Creatinine Clearance: 34.7 mL/min (A) (based on SCr of 1.6 mg/dL (H)).    POCT Glucose results:    Current Insulin Regimen:         PHYSICAL EXAMINATION:  Vitals:    07/08/20 1157   BP: (!) 112/56   Pulse: 82   Resp: 18   Temp: 97.6 °F (36.4 °C)     Body mass index is 20.81 kg/m².    Physical Exam  Vitals signs and nursing note reviewed.   Constitutional:       General: She is not in acute distress.     Appearance: She is well-developed. She is ill-appearing.   HENT:      Head: Normocephalic and atraumatic.      Right Ear: External ear normal.      Left Ear: External ear normal.      Nose: Nose normal.   Eyes:      General: Scleral icterus present.   Neck:      Musculoskeletal: No muscular tenderness.      Thyroid: No thyromegaly.      Vascular: No carotid bruit.      Trachea: No tracheal deviation.   Cardiovascular:      Rate and Rhythm: Normal rate and regular rhythm.      Heart sounds: Normal heart sounds. No murmur.   Pulmonary:      Effort: Pulmonary effort is normal.      Breath sounds: Normal breath sounds.   Abdominal:      Palpations: Abdomen is soft. There is no mass.      Tenderness: There is no abdominal tenderness.   Musculoskeletal: Normal range of motion.   Skin:     General: Skin is warm.      Findings: No rash.   Neurological:      Motor: Weakness present.   Psychiatric:      Comments: Patient drowsy during interview       .     ASSESSMENT and PLAN:    Hypercalcemia  Hypercalcemia likely PTH mediated and worsened due to hyperthyroidism, cirrhosis, CKD and and  immobilization  -10.7, corrected for albumin 11.66, unlikely mental status due to hypercalcemia  -PTH 73 on May 2020  -In care everywhere noted to have a 24 hour urine calcium which was < 100 mg/24h but can be low because of vitamin D deficiency  -Current Vitamin D level 12  -No Hx of kidneys stones    Plan  - Continue ergocalciferol 50,000 IU every 7 days   - Recheck vitamin D in 8 weeks outpatient with a goal of >20 as patient is cirrhotic and will be hard to fully correct her vitamin D levels  - At that time recheck PTH and if elevated recommend to check 24hr urine Calcium  - Ensure adequate hydration, can use Lasix for volume overload as dictated by Hepatology    Postablative hypothyroidism  Graves disease s/p iodine radiation with postablative hypothyroidism  -Presents with possible iatrogenic hyperthyroidism   -Likely due to high doses of synthroid  -Non tender thyroid  -Taking 150mcg PO synthroid daily  -Her weight based synthroid calculatedat 88mcg PO daily  -Last TSH 0.05   -T4 1.8  -Hx of volume overload, which improved since May per  at bedside (lost about 30 pounds), likely allowing for better absorption of synthroid    Plan  Recommend to lowe synthroid to 112mcg PO daily  Educated  to not give medication with any other medications and to give it on an empty stomach with water only and to wait 30 min before giving her anything else  Recommend to repeat TSH and T4 levels in 4 to 6 weeks and adjust accordingly        Decompensated hepatic cirrhosis  Per primary team          DISCHARGE NEEDS: will assess daily    Sunil Younger MD  Endocrinology  [facility]

## 2020-07-09 PROBLEM — F43.22 ADJUSTMENT DISORDER WITH ANXIOUS MOOD: Status: ACTIVE | Noted: 2020-07-09

## 2020-07-09 LAB
ABO + RH BLD: NORMAL
ALBUMIN SERPL BCP-MCNC: 2.7 G/DL (ref 3.5–5.2)
ALP SERPL-CCNC: 85 U/L (ref 55–135)
ALT SERPL W/O P-5'-P-CCNC: 32 U/L (ref 10–44)
ANION GAP SERPL CALC-SCNC: 6 MMOL/L (ref 8–16)
AST SERPL-CCNC: 43 U/L (ref 10–40)
BASOPHILS # BLD AUTO: 0.04 K/UL (ref 0–0.2)
BASOPHILS NFR BLD: 0.7 % (ref 0–1.9)
BILIRUB SERPL-MCNC: 8.1 MG/DL (ref 0.1–1)
BLD GP AB SCN CELLS X3 SERPL QL: NORMAL
BUN SERPL-MCNC: 25 MG/DL (ref 6–20)
CALCIUM SERPL-MCNC: 10.3 MG/DL (ref 8.7–10.5)
CHLORIDE SERPL-SCNC: 106 MMOL/L (ref 95–110)
CO2 SERPL-SCNC: 23 MMOL/L (ref 23–29)
CREAT SERPL-MCNC: 1.4 MG/DL (ref 0.5–1.4)
DIFFERENTIAL METHOD: ABNORMAL
EOSINOPHIL # BLD AUTO: 0.1 K/UL (ref 0–0.5)
EOSINOPHIL NFR BLD: 1.4 % (ref 0–8)
ERYTHROCYTE [DISTWIDTH] IN BLOOD BY AUTOMATED COUNT: 13.5 % (ref 11.5–14.5)
EST. GFR  (AFRICAN AMERICAN): 49.1 ML/MIN/1.73 M^2
EST. GFR  (NON AFRICAN AMERICAN): 42.6 ML/MIN/1.73 M^2
GAMMA INTERFERON BACKGROUND BLD IA-ACNC: 0.02 IU/ML
GLUCOSE SERPL-MCNC: 124 MG/DL (ref 70–110)
HCT VFR BLD AUTO: 23.7 % (ref 37–48.5)
HGB BLD-MCNC: 7.7 G/DL (ref 12–16)
IMM GRANULOCYTES # BLD AUTO: 0.03 K/UL (ref 0–0.04)
IMM GRANULOCYTES NFR BLD AUTO: 0.5 % (ref 0–0.5)
INR PPP: 1.2 (ref 0.8–1.2)
LYMPHOCYTES # BLD AUTO: 1.4 K/UL (ref 1–4.8)
LYMPHOCYTES NFR BLD: 23.4 % (ref 18–48)
M TB IFN-G CD4+ BCKGRND COR BLD-ACNC: 0 IU/ML
MAGNESIUM SERPL-MCNC: 2.4 MG/DL (ref 1.6–2.6)
MCH RBC QN AUTO: 35.2 PG (ref 27–31)
MCHC RBC AUTO-ENTMCNC: 32.5 G/DL (ref 32–36)
MCV RBC AUTO: 108 FL (ref 82–98)
MITOGEN IGNF BCKGRD COR BLD-ACNC: 5.89 IU/ML
MONOCYTES # BLD AUTO: 0.4 K/UL (ref 0.3–1)
MONOCYTES NFR BLD: 7.1 % (ref 4–15)
NEUTROPHILS # BLD AUTO: 3.9 K/UL (ref 1.8–7.7)
NEUTROPHILS NFR BLD: 66.9 % (ref 38–73)
NRBC BLD-RTO: 0 /100 WBC
PLATELET # BLD AUTO: 51 K/UL (ref 150–350)
PMV BLD AUTO: 8.9 FL (ref 9.2–12.9)
POTASSIUM SERPL-SCNC: 4.2 MMOL/L (ref 3.5–5.1)
PREALB SERPL-MCNC: 5 MG/DL (ref 20–43)
PROT SERPL-MCNC: 6.3 G/DL (ref 6–8.4)
PROTHROMBIN TIME: 12.3 SEC (ref 9–12.5)
RBC # BLD AUTO: 2.19 M/UL (ref 4–5.4)
SODIUM SERPL-SCNC: 135 MMOL/L (ref 136–145)
STRONGYLOIDES ANTIBODY IGG: NEGATIVE
TB GOLD PLUS: NEGATIVE
TB2 - NIL: 0 IU/ML
WBC # BLD AUTO: 5.78 K/UL (ref 3.9–12.7)

## 2020-07-09 PROCEDURE — 94761 N-INVAS EAR/PLS OXIMETRY MLT: CPT | Mod: NTX

## 2020-07-09 PROCEDURE — 97161 PT EVAL LOW COMPLEX 20 MIN: CPT | Mod: NTX

## 2020-07-09 PROCEDURE — 36415 COLL VENOUS BLD VENIPUNCTURE: CPT | Mod: NTX

## 2020-07-09 PROCEDURE — 84134 ASSAY OF PREALBUMIN: CPT | Mod: NTX

## 2020-07-09 PROCEDURE — 63600175 PHARM REV CODE 636 W HCPCS: Mod: NTX | Performed by: HOSPITALIST

## 2020-07-09 PROCEDURE — 20600001 HC STEP DOWN PRIVATE ROOM: Mod: NTX

## 2020-07-09 PROCEDURE — 99232 SBSQ HOSP IP/OBS MODERATE 35: CPT | Mod: NTX,,, | Performed by: NURSE PRACTITIONER

## 2020-07-09 PROCEDURE — 25500020 PHARM REV CODE 255: Mod: NTX | Performed by: HOSPITALIST

## 2020-07-09 PROCEDURE — 97116 GAIT TRAINING THERAPY: CPT | Mod: NTX

## 2020-07-09 PROCEDURE — 97802 MEDICAL NUTRITION INDIV IN: CPT | Mod: NTX

## 2020-07-09 PROCEDURE — 97535 SELF CARE MNGMENT TRAINING: CPT | Mod: NTX

## 2020-07-09 PROCEDURE — 99232 PR SUBSEQUENT HOSPITAL CARE,LEVL II: ICD-10-PCS | Mod: NTX,,, | Performed by: HOSPITALIST

## 2020-07-09 PROCEDURE — 25000003 PHARM REV CODE 250: Mod: NTX | Performed by: HOSPITALIST

## 2020-07-09 PROCEDURE — 99232 SBSQ HOSP IP/OBS MODERATE 35: CPT | Mod: NTX,,, | Performed by: HOSPITALIST

## 2020-07-09 PROCEDURE — 85025 COMPLETE CBC W/AUTO DIFF WBC: CPT | Mod: NTX

## 2020-07-09 PROCEDURE — 97165 OT EVAL LOW COMPLEX 30 MIN: CPT | Mod: NTX

## 2020-07-09 PROCEDURE — 85610 PROTHROMBIN TIME: CPT | Mod: NTX

## 2020-07-09 PROCEDURE — 80053 COMPREHEN METABOLIC PANEL: CPT | Mod: NTX

## 2020-07-09 PROCEDURE — 99900035 HC TECH TIME PER 15 MIN (STAT): Mod: NTX

## 2020-07-09 PROCEDURE — 83735 ASSAY OF MAGNESIUM: CPT | Mod: NTX

## 2020-07-09 PROCEDURE — 99232 PR SUBSEQUENT HOSPITAL CARE,LEVL II: ICD-10-PCS | Mod: NTX,,, | Performed by: NURSE PRACTITIONER

## 2020-07-09 PROCEDURE — A9585 GADOBUTROL INJECTION: HCPCS | Mod: NTX | Performed by: HOSPITALIST

## 2020-07-09 PROCEDURE — 86850 RBC ANTIBODY SCREEN: CPT | Mod: NTX

## 2020-07-09 PROCEDURE — 25000003 PHARM REV CODE 250: Mod: NTX | Performed by: GENERAL ACUTE CARE HOSPITAL

## 2020-07-09 RX ADMIN — MIDODRINE HYDROCHLORIDE 10 MG: 5 TABLET ORAL at 08:07

## 2020-07-09 RX ADMIN — RIFAXIMIN 550 MG: 550 TABLET ORAL at 08:07

## 2020-07-09 RX ADMIN — ZINC SULFATE 220 MG (50 MG) CAPSULE 220 MG: CAPSULE at 05:07

## 2020-07-09 RX ADMIN — LACTULOSE 45 G: 20 SOLUTION ORAL at 12:07

## 2020-07-09 RX ADMIN — ENOXAPARIN SODIUM 40 MG: 100 INJECTION SUBCUTANEOUS at 05:07

## 2020-07-09 RX ADMIN — IOHEXOL 100 ML: 350 INJECTION, SOLUTION INTRAVENOUS at 07:07

## 2020-07-09 RX ADMIN — RIFAXIMIN 550 MG: 550 TABLET ORAL at 09:07

## 2020-07-09 RX ADMIN — POLYETHYLENE GLYCOL 3350 17 G: 17 POWDER, FOR SOLUTION ORAL at 08:07

## 2020-07-09 RX ADMIN — PANTOPRAZOLE SODIUM 40 MG: 40 TABLET, DELAYED RELEASE ORAL at 05:07

## 2020-07-09 RX ADMIN — LACTULOSE 45 G: 20 SOLUTION ORAL at 05:07

## 2020-07-09 RX ADMIN — MIDODRINE HYDROCHLORIDE 10 MG: 5 TABLET ORAL at 03:07

## 2020-07-09 RX ADMIN — LEVOTHYROXINE SODIUM 112 MCG: 112 TABLET ORAL at 05:07

## 2020-07-09 RX ADMIN — POLYETHYLENE GLYCOL 3350 17 G: 17 POWDER, FOR SOLUTION ORAL at 03:07

## 2020-07-09 RX ADMIN — POLYETHYLENE GLYCOL 3350 17 G: 17 POWDER, FOR SOLUTION ORAL at 09:07

## 2020-07-09 RX ADMIN — MIDODRINE HYDROCHLORIDE 10 MG: 5 TABLET ORAL at 09:07

## 2020-07-09 NOTE — HOSPITAL COURSE
"7/10/2020  Pt sleeping in no distress upon entering room.  Pt awakens easily.  Pt provides consent for  (at bedside) to remain in room during interview.  Pt reports doing well yesterday/overnight, denies acute events.  Reports sleeping well overnight.  Denies problems with appetite.  Mood "good."  Denies SE from current med regimen.  Attends well to interview.  Multiple errors on SAVEAHAART.  No errors on disorganized thinking questions.  Disoriented to day of week, day of month.  Oriented to month, year, location and situation.  Pt's  confirmed history prior to presentation as documented in HPI above.   reports that pt's current PRNs work well for sleep and acute agitation.  Pt's  does not feel that adjustments to pt's psychotropic regimen are needed at this time.   "

## 2020-07-09 NOTE — PLAN OF CARE
Problem: Occupational Therapy Goal  Goal: Occupational Therapy Goal  Description: Goals to be met by: 7/16/20    Patient will increase functional independence with ADLs by performing:    UE Dressing with Supervision.  LE Dressing with Stand-by Assistance.  Grooming while standing at sink with Supervision.  Toileting from toilet with Supervision for hygiene and clothing management.   Supine to sit with [place].  Toilet transfer to toilet with Supervision.    Outcome: Ongoing, Progressing   Evaluation completed. Initiate POC.   Rebecca Renner OT  7/9/2020

## 2020-07-09 NOTE — PLAN OF CARE
Problem: Physical Therapy Goal  Goal: Physical Therapy Goal  Description: Goals to be met by: 2020    Patient will increase functional independence with mobility by performin. Supine to sit with Modified [place]  2. Sit to supine with Modified [place]  3. Sit to stand transfer with Supervision  4. Gait  x 150 feet with Supervision with or without using least restrictive AD.   5. Ascend/descend 5 stairs with right Handrails Supervision to improve functional BLE strength   6. Lower extremity exercise program x20 reps per handout, with independence    Outcome: Ongoing, Progressing     PT evaluation completed- see note for details. Goals established and POC initiated.     Torri Haider, PT, DPT   2020  Pager: [phone]

## 2020-07-09 NOTE — PROGRESS NOTES
MELD-Na score: 25 at 7/8/2020 11:11 AM  MELD score: 21 at 7/8/2020 11:11 AM  Calculated from:  Serum Creatinine: 1.6 mg/dL at 7/8/2020 11:11 AM  Serum Sodium: 130 mmol/L at 7/8/2020 11:11 AM  Total Bilirubin: 7.7 mg/dL at 7/8/2020 11:11 AM  INR(ratio): 1.2 at 7/8/2020 11:11 AM  Age: 54 years 9 months    - patient here from [place] for liver transplant eval, already approved and listed there with low meld however;  at bedside; has not had a BM today and here for HE, increased lactulose, added miralax and mag citrate  - patient has been approved to complete eval inpatient, all orders and consults placed;

## 2020-07-09 NOTE — CONSULTS
[facility]  Psychiatry  Consult Note    Patient Name: Zeina Mahoney  MRN: [id number]   Code Status: Full Code  Admission Date: 7/7/2020  Hospital Length of Stay: 1 days  Attending Physician: Elen Dumont MD  Primary Care Provider: Primary Doctor No    Current Legal Status: N/A    Patient information was obtained from patient, spouse/SO, past medical records and ER records.   Inpatient consult to Psychiatry  Consult performed by: Polly Pacheco DNP  Consult ordered by: Elen Dumont MD  Reason for consult: ongoing delirium and agitation in setting of liver failure and episodes of acutes on chronic hep encephalopathy, further reassessment for delirium control - would like to avoid scheduled anti psychotics        Subjective:     Principal Problem:Hepatic encephalopathy    Chief Complaint:  Delirium      HPI:   Per Primary Team:  HPI: 55 yo F with PMHx of CHAUDHARY/NEHAL cirrhosis complicated by encephalopathy, volume overload, sarcopenia, listed for transplant at [facility] in [place], hx of pancreatitis 2/2 unknown cause (status post extensive workup) presents to the ED with  with decreased alertness. Pt. Discharged from the hospital 9 days ago after a 5 day admission for generalized abdominal pain. During that admission, the patient reportedly developed acute on chronic HE on 6/26 with significant confusion which improved with increased lactulose doses and lactulose enema. Pt.  not available at time of interview. Per ED, the patient's  reports that the patient has been progressively  less acitve and sleepythe patient's discharge 6/29l. He reports that the pt. Has been taking lactulose as prescribed, but the patient has not been having more than 1-2 BM's a day. Pt. Presented to the ED with  7/5 with similar complaints, but she was discharged home as her ammonia and other labs were at baseline and she was sleepy but noted to be AAOx3.     Today on my interview, the patient  "is alert and oriented x3. When asked why she is here, she states that she was driven to the hospital by her  in their RV because of her liver and chronic abdominal pain. She reports that she has been feeling ok and having an appropriate amount of BM's. She denies any fevers or chills and reports that she has been 4 BM's a day.    Per Chart Review:  Pt was admitted to a hospital in early May with complications related to liver failure and had several bouts of agitation and irritability.  Psychiatry consult liaison team assessed patient and recommended Haldol 2.5 mg q.h.s. then 1 mg q.h.s. on discharge.  During a later encounter at the hospital, patient's dose of Haldol was increased to 1 mg b.i.d. She was admitted again around June 25th where she was seen by Psych CL and haldol continued  - 2mg QHS.     Per Psychiatry CL:  Zeina Mahoney is a 55 y/o female with no previous psychiatric history. She is lethargic at times but Ox3. She reports feeling very tired and trying to find out information about being listed (transplant). Admits to anxiety and poor sleep when asked directly. Per  she has been having trouble sleeping for past couple months. Later he reports she slept better on haldol. He initially stated he didn't notice a difference with haldol but upon later reflection he noted improvement in agitation and irritability.    Patient  Yonis ([phone]) is at bedside and gave a brief history of events. Jul 2019 she was diagnosed with liver disease. From Nov -Dec she spent a month in the hospital "was very taxing on her, pulled 25 teeth out at one time."  At one point she wanted to leave, had some outbursts which he feels were related to both HE as well as anxiety. At the beginning of May she was hospitalized to get a transplant but the surgeon decided the liver was not "good enough." Per  at the end of that brief hospitalization she was becoming delirious so they discharged her home " feeling she would do better at home. She had a fall the next day and was re hospitalized. She subsequently spent all of May in and out of the hospital with delirium. Reports HE episode last about 5-7 days but once her ammonia level is improved she clears. He reports she decompensates very quickly, and its becoming more severe and prolonged. He denies any previous psychiatric illness.       Past Medical/Surgical History  Past Medical History:   Diagnosis Date    Chronic Hepatic encephalopathy 6/25/2020    Decompensated hepatic cirrhosis 6/25/2020    History of pancreatitis 6/25/2020    Liver cirrhosis secondary to CHAUDHARY 6/25/2020    Macrocytic anemia 6/25/2020    Other ascites 6/25/2020    Thrombocytopenia 6/25/2020     History reviewed. No pertinent surgical history.    Past Psychiatric History:  Previous Medication Trials: yes - xanax, haldol,   Previous Psychiatric Hospitalizations: no   Previous Suicide Attempts: no   History of Violence: no  Outpatient Psychiatrist: no     Social History:  Marital Status:   Children: 3   Employment Status/Info: retired  Education: college graduate  Special Ed: no  Housing Status: lives with family in [place]  History of phys/sexual abuse: no  Access to gun: no     Substance Abuse History:  Recreational Drugs: denies  Use of Alcohol: denied  Rehab History: no   Tobacco Use: no     Legal History:  Past Charges/Incarcerations: no   Pending charges: no      Family Psychiatric History:   denies     Psychosocial Stressors: health  Functioning Relationships: good support system      Hospital Course: No notes on file         Patient History           Medical as of 7/9/2020     Past Medical History     Diagnosis Date Comments Source    Chronic Hepatic encephalopathy 6/25/2020 -- Provider    Decompensated hepatic cirrhosis 6/25/2020 -- Provider    History of pancreatitis 6/25/2020 -- Provider    Liver cirrhosis secondary to CHAUDHARY 6/25/2020 -- Provider    Macrocytic anemia  6/25/2020 -- Provider    Other ascites 6/25/2020 -- Provider    Thrombocytopenia 6/25/2020 -- Provider                  Surgical as of 7/9/2020    Past Surgical History: Patient provided no pertinent surgical history.           Family as of 7/9/2020    None           Tobacco Use as of 7/9/2020     Smoking Status Smoking Start Date Smoking Quit Date Packs/Day Years Used    Never Smoker -- -- -- --    Types Comments Smokeless Tobacco Status Smokeless Tobacco Quit Date Source    -- -- Never Used -- Provider            Alcohol Use as of 7/9/2020     Alcohol Use Drinks/Week Alcohol/Week Comments Source    --   -- -- Provider    Frequency Typical Drinks Binge Drinking        -- -- --              Drug Use as of 7/9/2020     Drug Use Types Frequency Comments Source    -- -- -- -- Provider            Sexual Activity as of 7/9/2020     Sexually Active Birth Control Partners Comments Source    -- -- -- -- Provider            Activities of Daily Living as of 7/9/2020    None           Social Documentation as of 7/9/2020    None           Occupational as of 7/9/2020    None           Socioeconomic as of 7/9/2020     Marital Status Spouse Name Number of Children Years Education Education Level Preferred Language Ethnicity Race Source     -- -- -- -- English Belgian  --    Financial Resource Strain Food Insecurity: Worry Food Insecurity: Inability Transportation Needs: Medical Transportation Needs: Non-medical    -- -- -- -- --            Pertinent History     Question Response Comments    Lives with -- --    Place in Birth Order -- --    Lives in -- --    Number of Siblings -- --    Raised by -- --    Legal Involvement -- --    Childhood Trauma -- --    Criminal History of -- --    Financial Status -- --    Highest Level of Education -- --    Does patient have access to a firearm? -- --     Service -- --    Primary Leisure Activity -- --    Spirituality -- --        Past Medical History:   Diagnosis Date     Chronic Hepatic encephalopathy 6/25/2020    Decompensated hepatic cirrhosis 6/25/2020    History of pancreatitis 6/25/2020    Liver cirrhosis secondary to CHAUDHARY 6/25/2020    Macrocytic anemia 6/25/2020    Other ascites 6/25/2020    Thrombocytopenia 6/25/2020     History reviewed. No pertinent surgical history.  Family History     None        Tobacco Use    Smoking status: Never Smoker    Smokeless tobacco: Never Used   Substance and Sexual Activity    Alcohol use: Not on file    Drug use: Not on file    Sexual activity: Not on file     Review of patient's allergies indicates:   Allergen Reactions    Aspirin Tinitus    Ciprofloxacin Rash       No current facility-administered medications on file prior to encounter.      Current Outpatient Medications on File Prior to Encounter   Medication Sig    acetaminophen (TYLENOL) 500 MG tablet Take 1 tablet (500 mg total) by mouth every 6 (six) hours as needed for Pain. Max 2000mg daily.    diaper,brief,adult,disposable Misc 5 mg/kg/day by Misc.(Non-Drug; Combo Route) route 5 (five) times daily. Patient with incontinence. Please supply enough depends for 5xs/day    ergocalciferol (VITAMIN D2) 50,000 unit Cap Take 50,000 Units by mouth every 7 days.    furosemide (LASIX) 40 MG tablet Take 60 mg by mouth once daily.     haloperidoL (HALDOL) 1 MG tablet Take 2 tablets (2 mg total) by mouth every evening.    lactulose (CEPHULAC) 10 gram packet Take 1 and a half packets three times a day     lactulose (CHRONULAC) 10 gram/15 mL solution Take 30 mLs (20 g total) by mouth 3 (three) times daily as needed. Alternate with kristalose if not having enough BMs.    levothyroxine (SYNTHROID) 150 MCG tablet Take 150 mcg by mouth before breakfast.    lidocaine (LIDODERM) 5 % Place 1 patch onto the skin every 24 hours. Remove & Discard patch within 12 hours or as directed by MD    melatonin 10 mg Tab Take 10 mg by mouth nightly as needed (sleep).    midodrine (PROAMATINE)  "10 MG tablet Take 10 mg by mouth 3 (three) times daily.    nystatin (MYCOSTATIN) powder Apply topically 2 (two) times daily. Apply to affected area twice a day    pantoprazole (PROTONIX) 40 MG tablet Take 40 mg by mouth before breakfast.    polyethylene glycol (GLYCOLAX) 17 gram PwPk Take 17 g by mouth once daily. Take if no BM with lactulose    rifAXIMin (XIFAXAN) 550 mg Tab Take 550 mg by mouth 2 (two) times daily.     simethicone (MYLICON) 80 MG chewable tablet Take 80 mg by mouth every 6 (six) hours as needed for Flatulence. Take 2 tablets by mouth every 6 hours as needed for gas    spironolactone (ALDACTONE) 50 MG tablet Take 100 mg by mouth once daily.     zinc sulfate (ZINCATE) 220 (50) mg capsule Take 220 mg by mouth before breakfast.     Psychotherapeutics (From admission, onward)    Start     Stop Route Frequency Ordered    07/08/20 1436  OLANZapine injection 5 mg      -- IM Every 8 hours PRN 07/08/20 1336        Review of Systems   Gastrointestinal: Positive for abdominal pain.   Psychiatric/Behavioral: Positive for confusion and sleep disturbance. The patient is nervous/anxious.      Strengths and Liabilities: Strength: Patient is intelligent., Strength: Patient has positive support network., Liability: Patient has poor health., Liability: Patient has possible cognitive impairment.    Objective:     Vital Signs (Most Recent):  Temp: 97.8 °F (36.6 °C) (07/09/20 0801)  Pulse: 105 (07/09/20 0801)  Resp: 16 (07/09/20 0801)  BP: 128/64 (07/09/20 0801)  SpO2: 100 % (07/09/20 0801) Vital Signs (24h Range):  Temp:  [97.8 °F (36.6 °C)-98.6 °F (37 °C)] 97.8 °F (36.6 °C)  Pulse:  [] 105  Resp:  [16-18] 16  SpO2:  [95 %-100 %] 100 %  BP: (108-128)/(54-64) 128/64     Height: 5' 4" (162.6 cm)  Weight: 54.9 kg (121 lb 0.5 oz)  Body mass index is 20.78 kg/m².    No intake or output data in the 24 hours ending 07/09/20 1702    Physical Exam  NEUROLOGICAL EXAMINATION:     MENTAL STATUS        Mental Status " "Exam  Appearance: disheveled, lying in bed, jaundice  Behavior/Copperation: cooperative, psychomotor retardation  Speech: slowed, soft, non-spontaneous  Mood:  anxious  Affect: mood-incongruent  Thought Process: poverty of thought  Thought Content: normal, no suicidality, no homicidality, delusions, or paranoia  Orientation:  person, place, situation, month of year, year  Memory: Intact  Attention/Concentration:  Impaired but improving  Cognition: grossly intact  Insight: good  Judgement: fair       Significant Labs: All pertinent labs within the past 24 hours have been reviewed.    Significant Imaging: I have reviewed all pertinent imaging results/findings within the past 24 hours.    Assessment/Plan:     Adjustment disorder with anxious mood  - expressed concerns for anxiety related to adjusting to limitations of this illness - not able to drive, leave the house, do many things she previously could, also guilt about taking someone's liver.   -patient describes her mood as "fine" but reports anxiety when asked directly if she is feeling anxious     Delirium due to medical condition without behavioral disturbance  Assessment: Zeina Mahoney is a 55 y/o female with no previous psychiatric history who has been hospitalized multiple times since May with complications related to liver failure and had several bouts of agitation and irritability. During these hospitalizations she was placed on scheduled haldol at various frequencies, most recently Haldol 1 mg BID. Presentation this hospitalization is absent of irritability or agitation thus far. Per 's observation she only becomes agitated during hospitalizations, not when she is in her home environment.     Recommendations:  -Haldol 1 mg IV every 6 hours as needed for severe non-redirectable agitation if needed to help the pt more effectively interact with environment.  -ECG daily  - CL will continue to follow  - Case Discussed with: Dr. Mendiola           Total " Time:  60 minutes      Polly Pacheco, RAUDEL   Psychiatry  [facility]

## 2020-07-09 NOTE — PLAN OF CARE
NOT MEDICALLY STABLE FOR DISCHARGE:      Per Dr Watson:  MELD-Na score: 25 at 7/8/2020 11:11 AM  MELD score: 21 at 7/8/2020 11:11 AM  Calculated from:  Serum Creatinine: 1.6 mg/dL at 7/8/2020 11:11 AM  Serum Sodium: 130 mmol/L at 7/8/2020 11:11 AM  Total Bilirubin: 7.7 mg/dL at 7/8/2020 11:11 AM  INR(ratio): 1.2 at 7/8/2020 11:11 AM  Age: 54 years 9 months     - patient here from [place] for liver transplant eval, already approved and listed there with low meld however;  at bedside; has not had a BM today and here for HE, increased lactulose, added miralax and mag citrate  - patient has been approved to complete eval inpatient, all orders and consults placed;        07/09/20 1058   Discharge Reassessment   Assessment Type Discharge Planning Reassessment   Provided patient/caregiver education on the expected discharge date and the discharge plan Yes   Do you have any problems affording any of your prescribed medications? No   Discharge Plan A Rehab   Discharge Plan B Home;Home Health   DME Needed Upon Discharge  other (see comments)  (TBD)   Patient choice form signed by patient/caregiver N/A   Anticipated Discharge Disposition   (TBD)   Can the patient/caregiver answer the patient profile reliably? No, cognitively impaired       Mihaela Haro RN  Case Management  Ext [phone]

## 2020-07-09 NOTE — PT/OT/SLP EVAL
Occupational Therapy   Evaluation    Name: Zeina Mahoney  MRN: [id number]  Admitting Diagnosis:  Hepatic encephalopathy      Recommendations:     Discharge Recommendations: home health OT  Discharge Equipment Recommendations:  none  Barriers to discharge:  None    Assessment:     Zeina Mahoney is a 54 y.o. female with a medical diagnosis of Hepatic encephalopathy.  She presents alert and willing to participate in therapy session. Upon arrival, pt found supine and oriented x4. Pt displays delayed processing and poor safety awareness. Performance deficits affecting function: weakness, impaired endurance, gait instability, impaired balance, impaired self care skills, impaired functional mobilty, impaired cognition.  She has good support from spouse and would benefit from HHOT following d/c to continue to progress towards goals and improve quality of life.     Rehab Prognosis: Good; patient would benefit from acute skilled OT services to address these deficits and reach maximum level of function.       Plan:     Patient to be seen 3 x/week to address the above listed problems via self-care/home management, therapeutic activities, therapeutic exercises, neuromuscular re-education  · Plan of Care Expires: 08/07/20  · Plan of Care Reviewed with: patient    Subjective     Chief Complaint: Nausea during end of session  Patient/Family Comments/goals: return to PLOF    Occupational Profile:  Living Environment: Pt lives with spouse in [place] however is currently in [place] in a 1st floor condo with no KSENIA; bathroom contains tub-shower combo   Previous level of function: PTA, pt reports being independent with ADL and functional mobility   Roles and Routines: Home/community dweller, drives  Equipment Used at Home:  cane, straight, walker, rolling, wheelchair, bedside commode, shower chair, grab bar  Assistance upon Discharge: Pt will have assistance from spouse     Pain/Comfort:  · Pain Rating 1: 0/10  · Pain Rating  Post-Intervention 1: 0/10    Patients cultural, spiritual, Gnosticism conflicts given the current situation: no    Objective:     Communicated with: RN prior to session.  Patient found supine with telemetry, peripheral IV, pulse ox (continuous) upon OT entry to room.    General Precautions: Standard, fall   Orthopedic Precautions:N/A   Braces: N/A     Occupational Performance:    Bed Mobility:    · Patient completed Rolling/Turning to Left with  stand by assistance  · Patient completed Supine to Sit with stand by assistance    Functional Mobility/Transfers:  · Patient completed Sit <> Stand Transfer with contact guard assistance  with  rolling walker   · Patient completed Bed <> Chair Transfer using Stand Pivot technique with contact guard assistance with rolling walker  · Functional Mobility: Pt completed functional mobility from EOB < bathroom < bedside chair with CGA and RW for balance and safety.     Activities of Daily Living:  · Grooming: stand by assistance to wash hands while standing at sink ( required minimal cues for sequencing)   · Upper Body Dressing: minimum assistance to tammie gown like jacket while seated EOB  · Lower Body Dressing: stand by assistance to tammie B socks while seated EOB    Cognitive/Visual Perceptual:  Cognitive/Psychosocial Skills:     -       Oriented to: Person, Place, Time and Situation   -       Follows Commands/attention:Follows multistep  commands  -       Communication: clear/fluent  -       Safety awareness/insight to disability: Impaired  -       Mood/Affect/Coping skills/emotional control: Appropriate to situation  Visual/Perceptual:      -Intact     Physical Exam:  Postural examination/scapula alignment:    -       Rounded shoulders  Skin integrity: Visible skin intact  Edema:  None noted  Dominant hand:    -       RUE  Upper Extremity Range of Motion:     -       Right Upper Extremity: WFL  -       Left Upper Extremity: WFL  Upper Extremity Strength:    -       Right Upper  Extremity: WFL  -       Left Upper Extremity: WFL   Strength:    -       Right Upper Extremity: WFL  -       Left Upper Extremity: WFL    AMPAC 6 Click ADL:  AMPAC Total Score: 19    Treatment & Education:  -Pt edu on OT role/POC  -Importance of OOB activity with staff assistance  -Safety during functional t/f and mobility  -White board updated  - Multiple self care tasks completed--as noted above  - All questions/concerns answered within OT scope of practice  Education:    Patient left up in chair with all lines intact, call button in reach and chair alarm on    GOALS:   Multidisciplinary Problems     Occupational Therapy Goals        Problem: Occupational Therapy Goal    Goal Priority Disciplines Outcome Interventions   Occupational Therapy Goal     OT, PT/OT Ongoing, Progressing    Description: Goals to be met by: 7/16/20    Patient will increase functional independence with ADLs by performing:    UE Dressing with Supervision.  LE Dressing with Stand-by Assistance.  Grooming while standing at sink with Supervision.  Toileting from toilet with Supervision for hygiene and clothing management.   Supine to sit with [place].  Toilet transfer to toilet with Supervision.                     History:     Past Medical History:   Diagnosis Date    Chronic Hepatic encephalopathy 6/25/2020    Decompensated hepatic cirrhosis 6/25/2020    History of pancreatitis 6/25/2020    Liver cirrhosis secondary to CHAUDHARY 6/25/2020    Macrocytic anemia 6/25/2020    Other ascites 6/25/2020    Thrombocytopenia 6/25/2020       History reviewed. No pertinent surgical history.    Time Tracking:     OT Date of Treatment: 07/09/20  OT Start Time: 0848  OT Stop Time: 0907  OT Total Time (min): 19 min    Billable Minutes:Evaluation 8  Self Care/Home Management 11    Rebecca Renner, RICHELLE  7/9/2020

## 2020-07-09 NOTE — HPI
Per Primary Team:  HPI: 53 yo F with PMHx of CHAUDHARY/NEHAL cirrhosis complicated by encephalopathy, volume overload, sarcopenia, listed for transplant at [facility] in [place], hx of pancreatitis 2/2 unknown cause (status post extensive workup) presents to the ED with  with decreased alertness. Pt. Discharged from the hospital 9 days ago after a 5 day admission for generalized abdominal pain. During that admission, the patient reportedly developed acute on chronic HE on 6/26 with significant confusion which improved with increased lactulose doses and lactulose enema. Pt.  not available at time of interview. Per ED, the patient's  reports that the patient has been progressively  less acitve and sleepythe patient's discharge 6/29l. He reports that the pt. Has been taking lactulose as prescribed, but the patient has not been having more than 1-2 BM's a day. Pt. Presented to the ED with  7/5 with similar complaints, but she was discharged home as her ammonia and other labs were at baseline and she was sleepy but noted to be AAOx3.     Today on my interview, the patient is alert and oriented x3. When asked why she is here, she states that she was driven to the hospital by her  in their RV because of her liver and chronic abdominal pain. She reports that she has been feeling ok and having an appropriate amount of BM's. She denies any fevers or chills and reports that she has been 4 BM's a day.    Per Chart Review:  Pt was admitted to a hospital in early May with complications related to liver failure and had several bouts of agitation and irritability.  Psychiatry consult liaison team assessed patient and recommended Haldol 2.5 mg q.h.s. then 1 mg q.h.s. on discharge.  During a later encounter at the hospital, patient's dose of Haldol was increased to 1 mg b.i.d. She was admitted again around June 25th where she was seen by Psych CL and haldol continued  - 2mg QHS.     Per Psychiatry CL:  Zeina  "Maru is a 55 y/o female with no previous psychiatric history. She is lethargic at times but Ox3. She reports feeling very tired and trying to find out information about being listed (transplant). Admits to anxiety and poor sleep. Per  she has been having trouble sleeping for past couple months. Later he reports she slept better on haldol. He initially stated he didn't notice a difference with haldol but upon later reflection he noted improvement in agitation and irritability.    Patient  Yonis ([phone]) is at bedside and gave a brief history of events. Jul 2019 she was diagnosed with liver disease. From Nov -Dec she spent a month in the hospital "was very taxing on her, pulled 25 teeth out at one time."  At one point she wanted to leave, had some out светлана which he feels  was related to both HE as well as anxiety. At the beginning of May she was hospitalized to get a transplant but the surgeon decided the liver was not "good enough." Per  at the end of that brief hospitalization she was becoming delirious so they discharged her home feeling she would do better at home. She had a fall the next day and was re hospitalized. She subsequently spent all of May in and out of the hospital with delirium. Reports "HE episode" last about 5-7 days but once her ammonia level is improved she clears. He reports she decompensates very quickly, and its becoming more severe and prolonged. He denies any previous psychiatric illness.       Past Medical/Surgical History  Past Medical History:   Diagnosis Date    Chronic Hepatic encephalopathy 6/25/2020    Decompensated hepatic cirrhosis 6/25/2020    History of pancreatitis 6/25/2020    Liver cirrhosis secondary to CHAUDHARY 6/25/2020    Macrocytic anemia 6/25/2020    Other ascites 6/25/2020    Thrombocytopenia 6/25/2020     History reviewed. No pertinent surgical history.    Past Psychiatric History:  Previous Medication Trials: yes - xanax, haldol, "   Previous Psychiatric Hospitalizations: no   Previous Suicide Attempts: no   History of Violence: no  Outpatient Psychiatrist: no     Social History:  Marital Status:   Children: 3   Employment Status/Info: retired  Education: college graduate  Special Ed: no  Housing Status: lives with family in [place]  History of phys/sexual abuse: no  Access to gun: no     Substance Abuse History:  Recreational Drugs: denies  Use of Alcohol: denied  Rehab History: no   Tobacco Use: no     Legal History:  Past Charges/Incarcerations: no   Pending charges: no      Family Psychiatric History:   denies     Psychosocial Stressors: health  Functioning Relationships: good support system

## 2020-07-09 NOTE — ASSESSMENT & PLAN NOTE
"- expressed concerns for anxiety related to adjusting to limitations of this illness - not able to drive, leave the house, do many things she previously could, also guilt about taking someone's liver.   -patient describes her mood as "fine" but reports anxiety when asked directly if she is feeling anxious   "

## 2020-07-09 NOTE — ASSESSMENT & PLAN NOTE
Assessment: Zeina Mahoney is a 55 y/o female with no previous psychiatric history who has been hospitalized multiple times since May with complications related to liver failure and had several bouts of agitation and irritability. During these hospitalizations she was placed on scheduled haldol at various frequencies, most recently Haldol 1 mg BID. Presentation this hospitalization is absent of irritability or agitation thus far. Per 's observation she only becomes agitated during hospitalizations, not when she is in her home environment.     Recommendations:  -Haldol 1 mg IV every 6 hours as needed for severe non-redirectable agitation if needed to help the pt more effectively interact with environment.  -ECG daily  - CL will continue to follow  - Case Discussed with: Dr. Mendiola

## 2020-07-09 NOTE — TREATMENT PLAN
Hepatology Treatment Plan    Zeina Mahoney is a 54 y.o. female admitted to hospital 7/7/2020 (Hospital Day: 3) due to Hepatic encephalopathy.     Interval History  LFTs stable. Cr improving, 1.4. Vitals stable.  No BM yesterday.    Objective  Temp:  [97.8 °F (36.6 °C)-98.6 °F (37 °C)] 97.8 °F (36.6 °C) (07/09 0801)  Pulse:  [] 105 (07/09 0801)  BP: (108-128)/(54-64) 128/64 (07/09 0801)  Resp:  [16-18] 16 (07/09 0801)  SpO2:  [95 %-100 %] 100 % (07/09 0801)      Laboratory    MELD-Na score: 21 at 7/9/2020  5:39 AM  MELD score: 20 at 7/9/2020  5:39 AM  Calculated from:  Serum Creatinine: 1.4 mg/dL at 7/9/2020  5:39 AM  Serum Sodium: 135 mmol/L at 7/9/2020  5:39 AM  Total Bilirubin: 8.1 mg/dL at 7/9/2020  5:39 AM  INR(ratio): 1.2 at 7/9/2020  5:39 AM  Age: 54 years 9 months    Recent Labs   Lab 07/07/20  2132 07/08/20  1111 07/09/20  0539   HGB 8.8* 8.2* 7.7*       Lab Results   Component Value Date    WBC 5.78 07/09/2020    HGB 7.7 (L) 07/09/2020    HCT 23.7 (L) 07/09/2020     (H) 07/09/2020    PLT 51 (L) 07/09/2020       Lab Results   Component Value Date     (L) 07/09/2020    K 4.2 07/09/2020     07/09/2020    CO2 23 07/09/2020    BUN 25 (H) 07/09/2020    CREATININE 1.4 07/09/2020    CALCIUM 10.3 07/09/2020    ANIONGAP 6 (L) 07/09/2020    ESTGFRAFRICA 49.1 (A) 07/09/2020    EGFRNONAA 42.6 (A) 07/09/2020       Lab Results   Component Value Date    ALT 32 07/09/2020    AST 43 (H) 07/09/2020    GGT 32 07/06/2020    GGT 32 07/06/2020    ALKPHOS 85 07/09/2020    BILITOT 8.1 (H) 07/09/2020       Lab Results   Component Value Date    INR 1.2 07/09/2020    INR 1.2 07/08/2020    INR 1.2 07/07/2020       Plan  Ms. Coy is a 55yo F w/PMH CHAUDHARY/NEHAL cirrhosis with hepatic encephalopathy, volume overload, previously listed for transplant at [facility] ([place]), Grave's (s/p iodine radiation), hypercalcemia due to hyperparathyroidism who presents with altered mental status.  Undergoing inpatient liver  transplant evaluation.    - Ascites: 2 gram Na restrict; diuretics on hold with hyponatremia / KG  - Esophageal varices: last EGD 11/2019 with no varices  - Hepatic encephalopathy: MRI brain with changes of HE. Lactulose titrated to 3-4 BMs daily + rifaximin bid + zinc  - HCC screening: AFP 16 7/6/20, U/S 6/25/20 with no masses, MRI abdomen 7/8 with 1.3cm R hepatic mass -> will get CT abdomen triple phase to better evaluate  - Transplant: began inpatient evaluation      Thank you for involving us in the care of Zeina Mahoney. Please call with any additional questions, concerns or changes in the patient's clinical status.    Mario Randall MD  Gastroenterology Fellow, PGY V

## 2020-07-09 NOTE — PLAN OF CARE
Recommendations     1. Continue current Low sodium diet, add Boost Plus ONS if PO intake declines.   2. RD to monitor & follow-up.     Goals: Meet % EEN, EPN by RD f/u date  Nutrition Goal Status: new  Communication of RD Recs: reviewed with RN

## 2020-07-09 NOTE — SUBJECTIVE & OBJECTIVE
Patient History           Medical as of 7/9/2020     Past Medical History     Diagnosis Date Comments Source    Chronic Hepatic encephalopathy 6/25/2020 -- Provider    Decompensated hepatic cirrhosis 6/25/2020 -- Provider    History of pancreatitis 6/25/2020 -- Provider    Liver cirrhosis secondary to CHAUDHARY 6/25/2020 -- Provider    Macrocytic anemia 6/25/2020 -- Provider    Other ascites 6/25/2020 -- Provider    Thrombocytopenia 6/25/2020 -- Provider                  Surgical as of 7/9/2020    Past Surgical History: Patient provided no pertinent surgical history.           Family as of 7/9/2020    None           Tobacco Use as of 7/9/2020     Smoking Status Smoking Start Date Smoking Quit Date Packs/Day Years Used    Never Smoker -- -- -- --    Types Comments Smokeless Tobacco Status Smokeless Tobacco Quit Date Source    -- -- Never Used -- Provider            Alcohol Use as of 7/9/2020     Alcohol Use Drinks/Week Alcohol/Week Comments Source    --   -- -- Provider    Frequency Typical Drinks Binge Drinking        -- -- --              Drug Use as of 7/9/2020     Drug Use Types Frequency Comments Source    -- -- -- -- Provider            Sexual Activity as of 7/9/2020     Sexually Active Birth Control Partners Comments Source    -- -- -- -- Provider            Activities of Daily Living as of 7/9/2020    None           Social Documentation as of 7/9/2020    None           Occupational as of 7/9/2020    None           Socioeconomic as of 7/9/2020     Marital Status Spouse Name Number of Children Years Education Education Level Preferred Language Ethnicity Race Source     -- -- -- -- English Belarusian  --    Financial Resource Strain Food Insecurity: Worry Food Insecurity: Inability Transportation Needs: Medical Transportation Needs: Non-medical    -- -- -- -- --            Pertinent History     Question Response Comments    Lives with -- --    Place in Birth Order -- --    Lives in -- --    Number of  Siblings -- --    Raised by -- --    Legal Involvement -- --    Childhood Trauma -- --    Criminal History of -- --    Financial Status -- --    Highest Level of Education -- --    Does patient have access to a firearm? -- --     Service -- --    Primary Leisure Activity -- --    Spirituality -- --        Past Medical History:   Diagnosis Date    Chronic Hepatic encephalopathy 6/25/2020    Decompensated hepatic cirrhosis 6/25/2020    History of pancreatitis 6/25/2020    Liver cirrhosis secondary to CHAUDHARY 6/25/2020    Macrocytic anemia 6/25/2020    Other ascites 6/25/2020    Thrombocytopenia 6/25/2020     History reviewed. No pertinent surgical history.  Family History     None        Tobacco Use    Smoking status: Never Smoker    Smokeless tobacco: Never Used   Substance and Sexual Activity    Alcohol use: Not on file    Drug use: Not on file    Sexual activity: Not on file     Review of patient's allergies indicates:   Allergen Reactions    Aspirin Tinitus    Ciprofloxacin Rash       No current facility-administered medications on file prior to encounter.      Current Outpatient Medications on File Prior to Encounter   Medication Sig    acetaminophen (TYLENOL) 500 MG tablet Take 1 tablet (500 mg total) by mouth every 6 (six) hours as needed for Pain. Max 2000mg daily.    diaper,brief,adult,disposable Misc 5 mg/kg/day by Misc.(Non-Drug; Combo Route) route 5 (five) times daily. Patient with incontinence. Please supply enough depends for 5xs/day    ergocalciferol (VITAMIN D2) 50,000 unit Cap Take 50,000 Units by mouth every 7 days.    furosemide (LASIX) 40 MG tablet Take 60 mg by mouth once daily.     haloperidoL (HALDOL) 1 MG tablet Take 2 tablets (2 mg total) by mouth every evening.    lactulose (CEPHULAC) 10 gram packet Take 1 and a half packets three times a day     lactulose (CHRONULAC) 10 gram/15 mL solution Take 30 mLs (20 g total) by mouth 3 (three) times daily as needed. Alternate  with kristalose if not having enough BMs.    levothyroxine (SYNTHROID) 150 MCG tablet Take 150 mcg by mouth before breakfast.    lidocaine (LIDODERM) 5 % Place 1 patch onto the skin every 24 hours. Remove & Discard patch within 12 hours or as directed by MD    melatonin 10 mg Tab Take 10 mg by mouth nightly as needed (sleep).    midodrine (PROAMATINE) 10 MG tablet Take 10 mg by mouth 3 (three) times daily.    nystatin (MYCOSTATIN) powder Apply topically 2 (two) times daily. Apply to affected area twice a day    pantoprazole (PROTONIX) 40 MG tablet Take 40 mg by mouth before breakfast.    polyethylene glycol (GLYCOLAX) 17 gram PwPk Take 17 g by mouth once daily. Take if no BM with lactulose    rifAXIMin (XIFAXAN) 550 mg Tab Take 550 mg by mouth 2 (two) times daily.     simethicone (MYLICON) 80 MG chewable tablet Take 80 mg by mouth every 6 (six) hours as needed for Flatulence. Take 2 tablets by mouth every 6 hours as needed for gas    spironolactone (ALDACTONE) 50 MG tablet Take 100 mg by mouth once daily.     zinc sulfate (ZINCATE) 220 (50) mg capsule Take 220 mg by mouth before breakfast.     Psychotherapeutics (From admission, onward)    Start     Stop Route Frequency Ordered    07/08/20 1436  OLANZapine injection 5 mg      -- IM Every 8 hours PRN 07/08/20 1336        Review of Systems   Gastrointestinal: Positive for abdominal pain.   Psychiatric/Behavioral: Positive for confusion and sleep disturbance. The patient is nervous/anxious.      Strengths and Liabilities: Strength: Patient is intelligent., Strength: Patient has positive support network., Liability: Patient has poor health., Liability: Patient has possible cognitive impairment.    Objective:     Vital Signs (Most Recent):  Temp: 97.8 °F (36.6 °C) (07/09/20 0801)  Pulse: 105 (07/09/20 0801)  Resp: 16 (07/09/20 0801)  BP: 128/64 (07/09/20 0801)  SpO2: 100 % (07/09/20 0801) Vital Signs (24h Range):  Temp:  [97.8 °F (36.6 °C)-98.6 °F (37 °C)]  "97.8 °F (36.6 °C)  Pulse:  [] 105  Resp:  [16-18] 16  SpO2:  [95 %-100 %] 100 %  BP: (108-128)/(54-64) 128/64     Height: 5' 4" (162.6 cm)  Weight: 54.9 kg (121 lb 0.5 oz)  Body mass index is 20.78 kg/m².    No intake or output data in the 24 hours ending 07/09/20 1603    Physical Exam  NEUROLOGICAL EXAMINATION:     MENTAL STATUS        Mental Status Exam  Appearance: disheveled, lying in bed, jaundice  Behavior/Copperation: cooperative, psychomotor retardation  Speech: slowed, soft, non-spontaneous  Mood:  anxious  Affect: mood-incongruent  Thought Process: poverty of thought  Thought Content: normal, no suicidality, no homicidality, delusions, or paranoia  Orientation:  person, place, situation, month of year, year  Memory: Intact  Attention/Concentration:  Impaired but improving  Cognition: grossly intact  Insight: good  Judgement: fair       Significant Labs: All pertinent labs within the past 24 hours have been reviewed.    Significant Imaging: I have reviewed all pertinent imaging results/findings within the past 24 hours.  "

## 2020-07-09 NOTE — PLAN OF CARE
The patient is lying in bed, soundly asleep at this time. Pt is easily awakened. Patient is able to make needs known. ADL's done totally per staff. The patient has a Telesitter monitoring her. No attempts to get out of bed during the night. Pt to be transported to MRI at 0200. Bed locked , call light within reach.

## 2020-07-09 NOTE — PT/OT/SLP EVAL
Physical Therapy Evaluation  & Treatment     Patient Name:  Zeina Mahoney  MRN: [id number]    Recommendations:     Discharge Recommendations: Home Health PT (pending medical course- pt undergoing work-up for liver transplant evaluation)   Equipment recommendations: TBD  Barriers to discharge: Fall risk     Assessment:     Zeina Mahoney is a 54 y.o. female admitted to [facility] on 2020 with medical diagnosis of Hepatic encephalopathy. Pt cooperative and agreeable to participate in therapy. Pt presents with weakness, impaired balance, decreased endurance, decreased safety awareness, impaired functional mobility , edema and gait instability. Zeina Mahoney would benefit from acute PT intervention to address listed functional deficits, provide patient/caregiver education, reduce fall risk, and maximize (I) and safety with functional mobility.    Rehab Prognosis: Good; patient would benefit from acute skilled PT services to address these deficits and reach maximum level of function.      Plan:     During this hospitalization, patient to be seen 3 x/week to address the identified rehab impairments via gait training, therapeutic activities, therapeutic exercises, neuromuscular re-education and progress towards stated goals.     Plan of Care Expires:  20  Plan of Care reviewed with: patient    This plan of care has been discussed with the patient/caregiver, who was included in its development and is in agreement with the identified goals and treatment plan.     Subjective     Communicated with RN prior to session.  Patient agreeable to participate.     Patient comments/goals: no therapy goals stated     Pain/Comfort:  · Location: no pain   · Pain Ratin/10   · Pain Rating Post-Intervention: 0/10     Patients cultural, spiritual, Congregation conflicts given the current situation: No cultural, spiritual, or educational needs identified.    Patient History: information obtained from pt      Living Environment: Pt lives with  spouse in [place], [facility] with no KSENIA; while in [place] for medical care, pt and spouse have been staying at bed & breakfast.   Prior Level of Function: Pt stated that she was ambulatory with use of rolling walker as needed (not all of the time). Her  assisted with ADLs as needed (typically dressing).   DME owned: rolling walker, single point cane, bedside commode, shower chair and wheelchair  Caregiver Assistance: (A) from spouse as needed    Objective:     Patient found HOB elevated with: telemetry, peripheral IV, pulse ox (continuous)    Recent Surgery: * No surgery found *    General Precautions: Standard, fall   Orthopedic Precautions:N/A   Braces: N/A   Body mass index is 20.78 kg/m².  Oxygen Device: room air      Exams:     Cognition:  o Pt is alert and oriented x 4  o Pt's ability to follow single step commands: intact    o Impaired safety awareness; delayed response time      Sensation:   o Light touch sensation: Intact BLEs     Edema: mild BLEs      Postural examination/scapula alignment: Rounded shoulder and Head forward     Lower Extremity Range of Motion:  o Right Lower Extremity: WFL  o Left Lower Extremity: WFL     Lower Extremity Strength:  o Right Lower Extremity: 4/5  o Left Lower Extremity: 4/5    Functional Mobility:    Bed Mobility:  · Supine > Sit: Stand-by Assistance  · Sit > Supine: N/T     Transfers:   · Sit <> Stand Transfer: Contact Guard Assistance from EOB with RW x 1 trial, from bedside chair x 1 trial   · Pt required cueing for hand placement and RW management               Gait:  · Distance: ~20 ft.   · Assistance level: Contact Guard Assistance  · Assistive Device: rolling walker  · Gait Assessment: decreased step length , decreased gait speed and unsteady gait ; no overt LOB     Balance:  · Static Sit: Supervision at EOB   · Static Stand: Stand-By Assist with Rolling walker    Outcome Measure: AM-PAC 6 CLICK MOBILITY  Total Score:18     Patient/Caregiver Education:      Therapist educated pt/caregiver regarding:    PT POC and goals for therapy    Safety with mobility and fall risk    Seated therex and activity recommendations    Instruction on use of call button and importance of calling nursing staff for assistance with mobility     Patient/caregiver able to verbalize understanding; will follow-up with pt/caregiver during current admit for additional questions/concerns within scope of practice.     White board updated.     Patient left up in chair with all lines intact, call button in reach, chair alarm on and RN present.    GOALS:   Multidisciplinary Problems     Physical Therapy Goals        Problem: Physical Therapy Goal    Goal Priority Disciplines Outcome Goal Variances Interventions   Physical Therapy Goal     PT, PT/OT Ongoing, Progressing     Description: Goals to be met by: 2020    Patient will increase functional independence with mobility by performin. Supine to sit with Modified [place]  2. Sit to supine with Modified [place]  3. Sit to stand transfer with Supervision  4. Gait  x 150 feet with Supervision with or without using least restrictive AD.   5. Ascend/descend 5 stairs with right Handrails Supervision to improve functional BLE strength   6. Lower extremity exercise program x20 reps per handout, with independence                       History:     Past Medical History:   Diagnosis Date    Chronic Hepatic encephalopathy 2020    Decompensated hepatic cirrhosis 2020    History of pancreatitis 2020    Liver cirrhosis secondary to CHAUDHARY 2020    Macrocytic anemia 2020    Other ascites 2020    Thrombocytopenia 2020       History reviewed. No pertinent surgical history.    Time Tracking:     PT Received On: 20  PT Start Time: 843     PT Stop Time: 906  PT Total Time (min): 23 min     Billable Minutes: Evaluation 13 min and Gait Training 10 min    Torri Haider PT  2020

## 2020-07-09 NOTE — CONSULTS
"  [facility]  Adult Nutrition  Consult Note    SUMMARY     Recommendations    1. Continue current Low sodium diet, add Boost Plus ONS if PO intake declines.   2. RD to monitor & follow-up.    Goals: Meet % EEN, EPN by RD f/u date  Nutrition Goal Status: new  Communication of RD Recs: reviewed with RN    Reason for Assessment    Reason For Assessment: consult  Diagnosis: other (see comments)(Encephalopathy, Liver tx work-up)  Relevant Medical History: Cirrhosis  Interdisciplinary Rounds: did not attend    General Information Comments: Pt reports good appetite, consuming 75% of meals. Reports good appetite & stable wt PTA, however per H & P - pt w/ 30# wt loss PTA. No weight history in chart. Pt appears frail, however unable to complete NFPE 2/2 working w/ PT. RD unable to fully assess for malnutrition - will continue to monitor energy intake & weight changes. Low sodium diet education complete, handouts provided & pt verbalized understanding.   Nutrition Discharge Planning: Adequate PO intake    Nutrition/Diet History    Patient Reported Diet/Restrictions/Preferences: low salt  Factors Affecting Nutritional Intake: None identified at this time    Anthropometrics    Temp: 97.8 °F (36.6 °C)  Height Method: Stated  Height: 5' 4" (162.6 cm)  Height (inches): 64 in  Weight Method: Bed Scale  Weight: 54.9 kg (121 lb 0.5 oz)  Weight (lb): 121.03 lb  Ideal Body Weight (IBW), Female: 120 lb  % Ideal Body Weight, Female (lb): 100.86 %  BMI (Calculated): 20.8  BMI Grade: 18.5-24.9 - normal    Lab/Procedures/Meds    Pertinent Labs Reviewed: reviewed  Pertinent Labs Comments: Na 130, BUN 32, Creat 1.6, GFR 36.3, Bili 7.7  Pertinent Medications Reviewed: reviewed  Pertinent Medications Comments: Lactulose    Estimated/Assessed Needs    Weight Used For Calorie Calculations: 55 kg (121 lb 4.1 oz)     Energy Calorie Requirements (kcal): 1476 kcal/d  Energy Need Method: [place](1.3 PAL)     Protein " Requirements: 66 g/d (1.2 g/kg)  Weight Used For Protein Calculations: 55 kg (121 lb 4.1 oz)     Estimated Fluid Requirement Method: other (see comments)(Per MD or 1 mL/kcal)    Nutrition Prescription Ordered    Current Diet Order: Low sodium     Evaluation of Received Nutrient/Fluid Intake    Comments: LBM: 7/8    Tolerance: tolerating    Nutrition Risk    Level of Risk/Frequency of Follow-up: (1x/week)     Assessment and Plan    Nutrition Problem  Increased nutrient needs     Related to (etiology):   Physiological causes    Signs and Symptoms (as evidenced by):   Liver tx work-up    Interventions(treatment strategy):  Collaboration of nutrition care w/ other providers     Nutrition Diagnosis Status:   New     Monitor and Evaluation    Food and Nutrient Intake: energy intake, food and beverage intake  Food and Nutrient Adminstration: diet order  Physical Activity and Function: nutrition-related ADLs and IADLs  Anthropometric Measurements: weight, weight change  Biochemical Data, Medical Tests and Procedures: lipid profile, inflammatory profile, glucose/endocrine profile, gastrointestinal profile, electrolyte and renal panel  Nutrition-Focused Physical Findings: overall appearance     Nutrition Follow-Up    RD Follow-up?: Yes

## 2020-07-10 ENCOUNTER — DOCUMENTATION ONLY (OUTPATIENT)
Dept: PHARMACY | Facility: HOSPITAL | Age: 55
End: 2020-07-10

## 2020-07-10 VITALS
HEART RATE: 96 BPM | WEIGHT: 121.06 LBS | OXYGEN SATURATION: 100 % | HEIGHT: 64 IN | DIASTOLIC BLOOD PRESSURE: 56 MMHG | TEMPERATURE: 97 F | BODY MASS INDEX: 20.67 KG/M2 | RESPIRATION RATE: 16 BRPM | SYSTOLIC BLOOD PRESSURE: 111 MMHG

## 2020-07-10 LAB
ALBUMIN SERPL BCP-MCNC: 2.5 G/DL (ref 3.5–5.2)
ALP SERPL-CCNC: 84 U/L (ref 55–135)
ALT SERPL W/O P-5'-P-CCNC: 30 U/L (ref 10–44)
AMMONIA PLAS-SCNC: 49 UMOL/L (ref 10–50)
ANION GAP SERPL CALC-SCNC: 5 MMOL/L (ref 8–16)
AST SERPL-CCNC: 46 U/L (ref 10–40)
BASOPHILS # BLD AUTO: 0.03 K/UL (ref 0–0.2)
BASOPHILS NFR BLD: 0.5 % (ref 0–1.9)
BILIRUB SERPL-MCNC: 8.4 MG/DL (ref 0.1–1)
BUN SERPL-MCNC: 21 MG/DL (ref 6–20)
CALCIUM SERPL-MCNC: 10.3 MG/DL (ref 8.7–10.5)
CHLORIDE SERPL-SCNC: 103 MMOL/L (ref 95–110)
CO2 SERPL-SCNC: 24 MMOL/L (ref 23–29)
CREAT SERPL-MCNC: 1.2 MG/DL (ref 0.5–1.4)
DIFFERENTIAL METHOD: ABNORMAL
EOSINOPHIL # BLD AUTO: 0.1 K/UL (ref 0–0.5)
EOSINOPHIL NFR BLD: 1.4 % (ref 0–8)
ERYTHROCYTE [DISTWIDTH] IN BLOOD BY AUTOMATED COUNT: 13.3 % (ref 11.5–14.5)
EST. GFR  (AFRICAN AMERICAN): 59.2 ML/MIN/1.73 M^2
EST. GFR  (NON AFRICAN AMERICAN): 51.4 ML/MIN/1.73 M^2
GLUCOSE SERPL-MCNC: 184 MG/DL (ref 70–110)
HCT VFR BLD AUTO: 22.9 % (ref 37–48.5)
HGB BLD-MCNC: 7.8 G/DL (ref 12–16)
IMM GRANULOCYTES # BLD AUTO: 0.04 K/UL (ref 0–0.04)
IMM GRANULOCYTES NFR BLD AUTO: 0.7 % (ref 0–0.5)
INR PPP: 1.3 (ref 0.8–1.2)
LYMPHOCYTES # BLD AUTO: 1 K/UL (ref 1–4.8)
LYMPHOCYTES NFR BLD: 18.1 % (ref 18–48)
MAGNESIUM SERPL-MCNC: 2.5 MG/DL (ref 1.6–2.6)
MCH RBC QN AUTO: 35.5 PG (ref 27–31)
MCHC RBC AUTO-ENTMCNC: 34.1 G/DL (ref 32–36)
MCV RBC AUTO: 104 FL (ref 82–98)
MONOCYTES # BLD AUTO: 0.5 K/UL (ref 0.3–1)
MONOCYTES NFR BLD: 8.5 % (ref 4–15)
NEUTROPHILS # BLD AUTO: 4 K/UL (ref 1.8–7.7)
NEUTROPHILS NFR BLD: 70.8 % (ref 38–73)
NRBC BLD-RTO: 0 /100 WBC
PLATELET # BLD AUTO: 51 K/UL (ref 150–350)
PMV BLD AUTO: 10.3 FL (ref 9.2–12.9)
POTASSIUM SERPL-SCNC: 4.3 MMOL/L (ref 3.5–5.1)
PROT SERPL-MCNC: 6.1 G/DL (ref 6–8.4)
PROTHROMBIN TIME: 13.1 SEC (ref 9–12.5)
RBC # BLD AUTO: 2.2 M/UL (ref 4–5.4)
SODIUM SERPL-SCNC: 132 MMOL/L (ref 136–145)
WBC # BLD AUTO: 5.63 K/UL (ref 3.9–12.7)

## 2020-07-10 PROCEDURE — 99232 SBSQ HOSP IP/OBS MODERATE 35: CPT | Mod: NTX,,, | Performed by: INTERNAL MEDICINE

## 2020-07-10 PROCEDURE — 83735 ASSAY OF MAGNESIUM: CPT | Mod: NTX

## 2020-07-10 PROCEDURE — 85610 PROTHROMBIN TIME: CPT | Mod: NTX

## 2020-07-10 PROCEDURE — 25000003 PHARM REV CODE 250: Mod: NTX | Performed by: HOSPITALIST

## 2020-07-10 PROCEDURE — 99232 PR SUBSEQUENT HOSPITAL CARE,LEVL II: ICD-10-PCS | Mod: NTX,,, | Performed by: INTERNAL MEDICINE

## 2020-07-10 PROCEDURE — 82140 ASSAY OF AMMONIA: CPT | Mod: NTX

## 2020-07-10 PROCEDURE — 85025 COMPLETE CBC W/AUTO DIFF WBC: CPT | Mod: NTX

## 2020-07-10 PROCEDURE — 36415 COLL VENOUS BLD VENIPUNCTURE: CPT | Mod: NTX

## 2020-07-10 PROCEDURE — 80307 DRUG TEST PRSMV CHEM ANLYZR: CPT | Mod: NTX

## 2020-07-10 PROCEDURE — 99239 PR HOSPITAL DISCHARGE DAY,>30 MIN: ICD-10-PCS | Mod: NTX,,, | Performed by: HOSPITALIST

## 2020-07-10 PROCEDURE — 80053 COMPREHEN METABOLIC PANEL: CPT | Mod: NTX

## 2020-07-10 PROCEDURE — 25000003 PHARM REV CODE 250: Mod: NTX | Performed by: GENERAL ACUTE CARE HOSPITAL

## 2020-07-10 PROCEDURE — 99239 HOSP IP/OBS DSCHRG MGMT >30: CPT | Mod: NTX,,, | Performed by: HOSPITALIST

## 2020-07-10 PROCEDURE — 63600175 PHARM REV CODE 636 W HCPCS: Mod: NTX | Performed by: HOSPITALIST

## 2020-07-10 RX ORDER — POLYETHYLENE GLYCOL 3350 17 G/17G
17 POWDER, FOR SOLUTION ORAL DAILY
Refills: 0
Start: 2020-07-10

## 2020-07-10 RX ORDER — AMOXICILLIN 250 MG
1 CAPSULE ORAL 2 TIMES DAILY
Status: DISCONTINUED | OUTPATIENT
Start: 2020-07-10 | End: 2020-07-10 | Stop reason: HOSPADM

## 2020-07-10 RX ORDER — SPIRONOLACTONE 100 MG/1
100 TABLET, FILM COATED ORAL DAILY
Status: DISCONTINUED | OUTPATIENT
Start: 2020-07-10 | End: 2020-07-10 | Stop reason: HOSPADM

## 2020-07-10 RX ORDER — ADHESIVE BANDAGE
30 BANDAGE TOPICAL DAILY PRN
Start: 2020-07-10

## 2020-07-10 RX ORDER — FUROSEMIDE 40 MG/1
40 TABLET ORAL DAILY
Start: 2020-07-10 | End: 2020-07-28

## 2020-07-10 RX ORDER — HALOPERIDOL 1 MG/1
1 TABLET ORAL DAILY PRN
Status: ON HOLD
Start: 2020-07-10 | End: 2020-09-23 | Stop reason: HOSPADM

## 2020-07-10 RX ORDER — FUROSEMIDE 40 MG/1
40 TABLET ORAL DAILY
Status: DISCONTINUED | OUTPATIENT
Start: 2020-07-10 | End: 2020-07-10 | Stop reason: HOSPADM

## 2020-07-10 RX ORDER — OLANZAPINE 10 MG/2ML
2.5 INJECTION, POWDER, FOR SOLUTION INTRAMUSCULAR EVERY 8 HOURS PRN
Status: DISCONTINUED | OUTPATIENT
Start: 2020-07-10 | End: 2020-07-10 | Stop reason: HOSPADM

## 2020-07-10 RX ADMIN — MIDODRINE HYDROCHLORIDE 10 MG: 5 TABLET ORAL at 01:07

## 2020-07-10 RX ADMIN — DOCUSATE SODIUM - SENNOSIDES 1 TABLET: 50; 8.6 TABLET, FILM COATED ORAL at 11:07

## 2020-07-10 RX ADMIN — LACTULOSE 45 G: 20 SOLUTION ORAL at 12:07

## 2020-07-10 RX ADMIN — ZINC SULFATE 220 MG (50 MG) CAPSULE 220 MG: CAPSULE at 06:07

## 2020-07-10 RX ADMIN — LACTULOSE 45 G: 20 SOLUTION ORAL at 06:07

## 2020-07-10 RX ADMIN — ONDANSETRON 4 MG: 2 INJECTION, SOLUTION INTRAMUSCULAR; INTRAVENOUS at 08:07

## 2020-07-10 RX ADMIN — SPIRONOLACTONE 100 MG: 100 TABLET ORAL at 01:07

## 2020-07-10 RX ADMIN — POLYETHYLENE GLYCOL 3350 17 G: 17 POWDER, FOR SOLUTION ORAL at 08:07

## 2020-07-10 RX ADMIN — LEVOTHYROXINE SODIUM 112 MCG: 112 TABLET ORAL at 06:07

## 2020-07-10 RX ADMIN — FUROSEMIDE 40 MG: 40 TABLET ORAL at 01:07

## 2020-07-10 RX ADMIN — POLYETHYLENE GLYCOL 3350 17 G: 17 POWDER, FOR SOLUTION ORAL at 01:07

## 2020-07-10 RX ADMIN — PANTOPRAZOLE SODIUM 40 MG: 40 TABLET, DELAYED RELEASE ORAL at 06:07

## 2020-07-10 RX ADMIN — MIDODRINE HYDROCHLORIDE 10 MG: 5 TABLET ORAL at 08:07

## 2020-07-10 RX ADMIN — LACTULOSE 45 G: 20 SOLUTION ORAL at 11:07

## 2020-07-10 RX ADMIN — RIFAXIMIN 550 MG: 550 TABLET ORAL at 08:07

## 2020-07-10 NOTE — PLAN OF CARE
Patient aao, vss, family at bedside , Poc explained patient tolerated well, transferred to CT for the ABD, will monitor.

## 2020-07-10 NOTE — PROGRESS NOTES
[facility]  Hepatology  Progress Note    Patient Name: Zeina Mahoney  MRN: [id number]  Admission Date: 7/7/2020  Hospital Length of Stay: 2 days  Attending Provider: Elen Dumont MD   Primary Care Physician: Primary Doctor No  Principal Problem:Hepatic encephalopathy    Subjective:     Transplant status: No    HPI: Ms. Coy is a 53yo F w/PMH CHAUDHARY/NEHAL cirrhosis with hepatic encephalopathy, volume overload, previously listed for transplant at [facility] ([place]), Grave's (s/p iodine radiation), hypercalcemia due to hyperparathyroidism who presents with altered mental status.    Patient recently admitted 6/25-6/29 with pancreatitis and has had prior extensive workup for this including EUS 6/11/20 with pancreatic cysts. Brought in by  for increased confusion. She has been on lactulose which she states she is taking with 3-4 BMs daily. Denies missed doses. She does still note epigastric pain but unrelated to food and able to eat breakfast today. She was previously listed for transplant in [place] but moved here.    Interval History:   Patient has no complaints. Undergoing liver transplant evaluation.    Current Facility-Administered Medications   Medication    acetaminophen tablet 325 mg    albuterol-ipratropium 2.5 mg-0.5 mg/3 mL nebulizer solution 3 mL    enoxaparin injection 40 mg    ergocalciferol capsule 50,000 Units    glucose chewable tablet 16 g    glucose chewable tablet 24 g    lactulose 20 gram/30 mL solution Soln 45 g    levothyroxine tablet 112 mcg    melatonin tablet 6 mg    midodrine tablet 10 mg    OLANZapine injection 5 mg    ondansetron injection 4 mg    pantoprazole EC tablet 40 mg    polyethylene glycol packet 17 g    prochlorperazine injection Soln 5 mg    rifAXIMin tablet 550 mg    senna-docusate 8.6-50 mg per tablet 1 tablet    sodium chloride 0.9% flush 10 mL    zinc sulfate capsule 220 mg       Objective:     Vital Signs (Most Recent):  Temp:  96.6 °F (35.9 °C) (07/10/20 0703)  Pulse: 93 (07/10/20 0703)  Resp: 16 (07/10/20 0703)  BP: (!) 105/54 (07/10/20 0703)  SpO2: 100 % (07/10/20 0703) Vital Signs (24h Range):  Temp:  [96.6 °F (35.9 °C)-98.9 °F (37.2 °C)] 96.6 °F (35.9 °C)  Pulse:  [] 93  Resp:  [16-18] 16  SpO2:  [98 %-100 %] 100 %  BP: (105-125)/(51-65) 105/54     Weight: 54.9 kg (121 lb 0.5 oz) (07/09/20 0900)  Body mass index is 20.78 kg/m².    Physical Exam  Vitals signs and nursing note reviewed.   Eyes:      General: Scleral icterus present.   Cardiovascular:      Rate and Rhythm: Normal rate.      Heart sounds: Normal heart sounds.   Pulmonary:      Effort: Pulmonary effort is normal.   Abdominal:      General: There is no distension.      Tenderness: There is no abdominal tenderness.   Skin:     Coloration: Skin is jaundiced.   Neurological:      Mental Status: She is alert.         MELD-Na score: 22 at 7/10/2020  5:44 AM  MELD score: 19 at 7/10/2020  5:44 AM  Calculated from:  Serum Creatinine: 1.2 mg/dL at 7/10/2020  5:44 AM  Serum Sodium: 132 mmol/L at 7/10/2020  5:44 AM  Total Bilirubin: 8.4 mg/dL at 7/10/2020  5:44 AM  INR(ratio): 1.3 at 7/10/2020  5:44 AM  Age: 54 years 10 months    Significant Labs:  Labs within the past month have been reviewed.    Significant Imaging:  Reviewed    Assessment/Plan:     Decompensated hepatic cirrhosis  Ms. Coy is a 55yo F w/PMH CHAUDHARY/NEHAL cirrhosis with hepatic encephalopathy, volume overload, previously listed for transplant at [facility] ([place]), Grave's (s/p iodine radiation), hypercalcemia due to hyperparathyroidism who presents with altered mental status.  Mental status improved, but still confused per . Has not been able to complete transplant eval due to recurrent admissions.    - Ascites: none  - Esophageal varices: last EGD 11/2019 with no varices  - Hepatic encephalopathy: lactulose titrated to 3-4 BMs daily + rifaximin bid + zinc. Pending Psychiatry re-evaluation for prior to  admission haldol / agitation.  - HCC screening: AFP 16 7/6/20, U/S 6/25/20 with no masses  - Transplant: completed inpatient evaluation, ordered Utox given hx of +methadone        Thank you for your consult. I will follow-up with patient. Please contact us if you have any additional questions.    Mario Randall MD  Hepatology  [facility]

## 2020-07-10 NOTE — SUBJECTIVE & OBJECTIVE
"Interval History: see hospital course.     Family History     None        Tobacco Use    Smoking status: Never Smoker    Smokeless tobacco: Never Used   Substance and Sexual Activity    Alcohol use: Not on file    Drug use: Not on file    Sexual activity: Not on file     Psychotherapeutics (From admission, onward)    Start     Stop Route Frequency Ordered    07/08/20 1436  OLANZapine injection 5 mg      -- IM Every 8 hours PRN 07/08/20 1336         Review of Systems   History obtained from the patient   General : NO chills or fever.     Eyes: NO  visual changes   ENT: NO hearing change, nasal discharge or sore throat   Endocrine: NO weight changes or polydipsia/polyuria   Dermatological: NO rashes   Respiratory: NO cough, shortness of breath   Cardiovascular: NO chest pain, palpitations or racing heart   Gastrointestinal: NO constipation or diarrhea.  +nausea, +vomiting.   Musculoskeletal: NO muscle pain or stiffness   Neurological: NO confusion, dizziness, headaches or tremors   Psychiatric: please see HPI    Objective:     Vital Signs (Most Recent):  Temp: 96.6 °F (35.9 °C) (07/10/20 0703)  Pulse: 93 (07/10/20 0703)  Resp: 16 (07/10/20 0703)  BP: (!) 105/54 (07/10/20 0703)  SpO2: 100 % (07/10/20 0703) Vital Signs (24h Range):  Temp:  [96.6 °F (35.9 °C)-98.9 °F (37.2 °C)] 96.6 °F (35.9 °C)  Pulse:  [] 93  Resp:  [16-18] 16  SpO2:  [98 %-100 %] 100 %  BP: (105-125)/(51-65) 105/54     Height: 5' 4" (162.6 cm)  Weight: 54.9 kg (121 lb 0.5 oz)  Body mass index is 20.78 kg/m².      Intake/Output Summary (Last 24 hours) at 7/10/2020 1104  Last data filed at 7/10/2020 0600  Gross per 24 hour   Intake 240 ml   Output 600 ml   Net -360 ml     Physical Exam      Appearance: disheveled, lying in bed, jaundice  Behavior/Copperation: cooperative, psychomotor retardation, somnolent, fell asleep repeatedly during interview  Speech: slowed, soft, non-spontaneous  Mood:  "alright"  Affect: " "mood-incongruent  Thought Process: linear and goal directed  Thought Content: normal, no suicidality, no homicidality, delusions, or paranoia  Orientation:  person, place, situation, month of year, year  Memory: Intact  Attention/Concentration: Attended to conversation but failed SAVEAHAART  Cognition: grossly intact  Insight: good  Judgement: fair    CAM-ICU:  1. Acute change and/or fluctuating course of mental status: Yes  2. Inattention (SAVEAHAART): Yes  · "Squeeze my hand, only when you hear, the letter 'A'."  3. Altered Level of Consciousness: Yes  4. Disorganized Thinking (Errors >1/6): No  · "Will a stone float on water?"  · "Are there fish in the sea?"  · "Does one pound weigh more than two?"  · "Can you use a hammer to pound a nail?"  · Command(s):  · "Hold up 2 fingers."  · "Now do the same thing with the other hand."    Score: 1+2 AND, either 3 or 4 present = Positive CAM-ICU      Significant Labs: All pertinent labs within the past 24 hours have been reviewed.    Significant Imaging: I have reviewed all pertinent imaging results/findings within the past 24 hours.  "

## 2020-07-10 NOTE — PROGRESS NOTES
Progress Note  Hospital Medicine  [facility]       Patient Name: Zeina Mahoney  MRN:  [id number]  Hospital Medicine Team: [facility] Elen Dumont MD  Date of Admission:  7/7/2020     Length of Stay:  LOS: 1 day   Expected Discharge Date: 7/15/2020  Principal Problem:  Hepatic encephalopathy     Subjective:     Interval History/Overnight Events:      Mental status slowly improving, still has mild asterixis.  Having bowel movements .psychiatry agree consulted today again.  Seroquel stopped   Patient's MRI of the brain and abdomen reviewed.  Hepatology recommended obtaining CT triple phase of the liver.     enoxaparin  40 mg Subcutaneous Q24H    ergocalciferol  50,000 Units Oral Q7 Days    lactulose  45 g Oral Q6H    levothyroxine  112 mcg Oral Before breakfast    midodrine  10 mg Oral TID    pantoprazole  40 mg Oral Before breakfast    polyethylene glycol  17 g Oral TID    rifAXIMin  550 mg Oral BID    zinc sulfate  220 mg Oral Before breakfast           acetaminophen, albuterol-ipratropium, glucose, glucose, melatonin, OLANZapine, ondansetron, prochlorperazine, sodium chloride 0.9%    Review of Systems   Constitutional: Negative for chills, fatigue, fever.   HENT: Negative for sore throat, trouble swallowing.    Eyes: Negative for photophobia, visual disturbance.   Respiratory: Negative for cough, shortness of breath.    Cardiovascular: Negative for chest pain, palpitations, leg swelling.   Gastrointestinal: Negative for abdominal pain, constipation, diarrhea, nausea, vomiting.   Endocrine: Negative for cold intolerance, heat intolerance.   Genitourinary: Negative for dysuria, frequency.   Musculoskeletal: Negative for arthralgias, myalgias.   Skin: Negative for rash, wound, erythema   Neurological: Negative for dizziness, syncope, weakness, light-headedness.   Psychiatric/Behavioral: Negative for confusion, hallucinations, anxiety  All other systems reviewed and are  negative.    Objective:     Temp:  [97.3 °F (36.3 °C)-98.3 °F (36.8 °C)]   Pulse:  []   Resp:  [16-18]   BP: (108-128)/(54-65)   SpO2:  [95 %-100 %]         Weight change: -2.615 kg (-5 lb 12.2 oz)   Body mass index is 20.78 kg/m².       Physical Exam:  Constitutional: appears older than stated age, chronically ill looking,  non-distressed, not diaphoretic.   HENT: NC/AT, external ears normal, oropharynx clear, MMM w/o exudates.   Eyes: PERRL, EOMI, conjunctiva normal, no discharge b/l, +scleral icterus   Neck: normal ROM, supple  CV: RRR, no m/r/g, no carotid bruits, +2 peripheral pulses.  Pulmonary/Chest wall: Breathing comfortably w/o distress, CTAB, no w/r/r, no crackles.    GI: Soft, (+) BS, (+) BM   not distended  non tender   Musculoskeletal: Normal ROM, +atrophy,  no pitting edema  Neurological: AAO x4, no focal deficits   +asterixis   Skin: warm, dry   +pallor, jaundice, + spider angiomas,  Psych: flat affect , normal behavior, thought content and judgement    Labs:    Chemistries:   Recent Labs   Lab 07/07/20 2132 07/08/20  1111 07/09/20  0539   * 130* 135*   K 5.0 3.7 4.2    101 106   CO2 22* 22* 23   BUN 34* 32* 25*   CREATININE 1.7* 1.6* 1.4   CALCIUM 10.5 10.7* 10.3   PROT 7.3 6.5 6.3   BILITOT 7.4* 7.7* 8.1*   ALKPHOS 94 93 85   ALT 34 29 32   AST 60* 42* 43*   MG  --   --  2.4        WBC:   Recent Labs   Lab 07/05/20  0119 07/06/20  1403 07/07/20 2132 07/08/20  1111 07/09/20  0539   WBC 5.90 7.60  7.60 7.17 6.58 5.78     Bands:     CBC/Anemia Labs: Coags:    Recent Labs   Lab 07/06/20  1403 07/07/20 2132 07/08/20  1111 07/09/20  0539   WBC 7.60  7.60 7.17 6.58 5.78   HGB 9.3*  9.3* 8.8* 8.2* 7.7*   HCT 28.8*  28.8* 25.8* 24.6* 23.7*   PLT 78*  78* 85* 61* 51*   *  110* 106* 107* 108*   RDW 13.9  13.9 13.8 13.5 13.5   IRON 185*  185*  --   --   --    FERRITIN 547*  547*  --   --   --     Recent Labs   Lab 07/07/20 2132 07/08/20  1111 07/09/20  0539   INR 1.2 1.2  1.2          Assessment and Plan     Hospital Course:    Ms. Zeina Mahoney was admitted to Hospital Medicine for management of  Hepatic encephalopathy     Active Hospital Problems    Diagnosis  POA    *Acute Hepatic encephalopathy [K72.90]  Yes    Adjustment disorder with anxious mood [F43.22]  Yes    Postablative hypothyroidism [E89.0]  Yes    Hypercalcemia [E83.52]  Yes    Delirium due to medical condition without behavioral disturbance [F05]  Yes    Decompensated hepatic cirrhosis [K72.90]  Yes    Liver cirrhosis secondary to CHAUDHARY [K75.81, K74.60]  Yes    Thrombocytopenia [D69.6]  Yes      Resolved Hospital Problems   No resolved problems to display.      Acute on Chronic Hepatic encephalopathy  Delirium due to medical condition without behavioral disturbance  -recurrent hospitalizations with acute on chronic HE.  Did not have adequate number of bowel movements prior to this admission, despite increased lactulose doses and lactulose enemas at home.  - low suspicion for infection  - continue aggressive p.o. lactulose as well as a rifaximin and zinc and stool softeners.  Will include p.r.n. lactulose enemas the patient not having adequate number of bowel movements  - MRI of the brain was obtained.  Showed Symmetrical T1 hyperintensity bilateral basal ganglia most compatible with sequela of chronic hepatic encephalopathy in light of history  - psychiatry evaluating the patient.  And will stop scheduled Haldol and only use Haldol for non redirectable agitation      Decompensated hepatic cirrhosis  Liver cirrhosis secondary to CHAUDHARY  Other ascites  MELD-Na score: 21 at 7/9/2020  5:39 AM  MELD score: 20 at 7/9/2020  5:39 AM  Calculated from:  Serum Creatinine: 1.4 mg/dL at 7/9/2020  5:39 AM  Serum Sodium: 135 mmol/L at 7/9/2020  5:39 AM  Total Bilirubin: 8.1 mg/dL at 7/9/2020  5:39 AM  INR(ratio): 1.2 at 7/9/2020  5:39 AM  Age: 54 years 9 months     - listed for transplant at [facility] in [place]  - PETH negative    - hepatology consulted.    - IR paracentesis attempted but insufficient ascites identified for safe paracentesis  -diuretics held at this time due to slightly increased creatinine early in the hospitalization.  - MRI abdomen pelvis obtained. Showed  Morphologic changes of cirrhosis and MR findings suggesting parenchymal iron overload.  1.3 cm focal area of signal abnormality within the right hepatic dome, possibly loculated fluid or a complex cyst but incompletely evaluated secondary to location and patient motion artifact.  Consider further characterization with triple phase abdominal CT.  - AFP 16  - triple phase abdominal CT pending   - will plan to complete liver transplant evaluation while inpatient, given the patient has recurrent hospitalization and has been unable to complete liver transplant evaluation as outpatient     Macrocytic anemia  Thrombocytopenia  - stable on chronic due to liver disease  - monitor    Hypercalcemia  - Multifactorial: immobilization, cirrhosis, thyrotoxicosis, hyperparathyroidism in the setting of very low vitamin D levels   Mild osteopenia, no nephrocalcinosis or kidney stones on recent US.   -10.7, corrected for albumin 11.66, unlikely mental status due to hypercalcemia. PTH 73 on May 2020  - Continue ergocalciferol 50,000 IU every 7 days   - Recheck vitamin D in 8 weeks outpatient with a goal of >20 as patient is cirrhotic and will be hard to fully correct her vitamin D levels  - At that time recheck PTH and if elevated recommend to check 24hr urine Calcium  - adequate hydration, can use Lasix for volume overload as dictated by Hepatology     Diet:   low-sodium  GI PPx:    DVT PPx:   Lovenox  Goals of Care:  full     High Risk Conditions:  abd pain     Disposition:       TBD        Signing Physician:      Elen Dumont MD  Department of Hospital Medicine   [facility]  Pager [phone] [place] [id number]

## 2020-07-10 NOTE — PLAN OF CARE
CM submitted HH referral to [facility] - Patient is from [place] on the Liver Transplant List and currently staying indefinitely at a local ir BNB at [address] [place] [address] - Contact number for spouse Yonis is [phone] (cell)    Mihaela Haro RN  Case Management  Ext [phone]       07/10/20 1310   Post-Acute Status   Post-Acute Authorization Home Health   Home Health Status Referrals Sent   Discharge Plan   Discharge Plan A Home;Home Health   Discharge Plan B Home;Home with family

## 2020-07-10 NOTE — PROGRESS NOTES
[facility]  Psychiatry  Progress Note    Patient Name: Zeina Mahoney  MRN: [id number]   Code Status: Full Code  Admission Date: 7/7/2020  Hospital Length of Stay: 2 days  Expected Discharge Date: 7/10/2020  Attending Physician: Elen Dumont MD  Primary Care Provider: Primary Doctor No    Current Legal Status: N/A    Patient information was obtained from patient, spouse/SO, past medical records and ER records.     Subjective:     Principal Problem:Hepatic encephalopathy    Chief Complaint: confusion    Per Primary Team:  HPI: 55 yo F with PMHx of CHAUDHARY/NEHAL cirrhosis complicated by encephalopathy, volume overload, sarcopenia, [place] [facility] [place], hx of pancreatitis 2/2 unknown cause (status post extensive workup) presents to the ED with  with decreased alertness. Pt. Discharged from the hospital 9 days ago after a 5 day admission for generalized abdominal pain. During that admission, the patient reportedly developed acute on chronic HE on 6/26 with significant confusion which improved with increased lactulose doses and lactulose enema. Pt.  not available at time of interview. Per ED, the patient's  reports that the patient has been progressively  less acitve and sleepythe patient's discharge 6/29l. He reports that the pt. Has been taking lactulose as prescribed, but the patient has not been having more than 1-2 BM's a day. Pt. Presented to the ED with  7/5 with similar complaints, but she was discharged home as her ammonia and other labs were at baseline and she was sleepy but noted to be AAOx3.     Today on my interview, the patient is alert and oriented x3. When asked why she is here, she states that she was driven to the hospital by her  in their RV because of her liver and chronic abdominal pain. She reports that she has been feeling ok and having an appropriate amount of BM's. She denies any fevers or chills and reports that she has  "been 4 BM's a day.    Per Chart Review:  Pt was admitted to a hospital in early May with complications related to liver failure and had several bouts of agitation and irritability.  Psychiatry consult liaison team assessed patient and recommended Haldol 2.5 mg q.h.s. then 1 mg q.h.s. on discharge.  During a later encounter at the hospital, patient's dose of Haldol was increased to 1 mg b.i.d. She was admitted again around June 25th where she was seen by Psych CL and haldol continued  - 2mg QHS.     Per Psychiatry CL:  Zeina Mahoney is a 53 y/o female with no previous psychiatric history. She is lethargic at times but Ox3. She reports feeling very tired and trying to find out information about being listed (transplant). Admits to anxiety and poor sleep. Per  she has been having trouble sleeping for past couple months. Later he reports she slept better on haldol. He initially stated he didn't notice a difference with haldol but upon later reflection he noted improvement in agitation and irritability.    Patient  Yonis ([phone]) is at bedside and gave a brief history of events. Jul 2019 she was diagnosed with liver disease. From Nov -Dec she spent a month in the hospital "was very taxing on her, pulled 25 teeth out at one time."  At one point she wanted to leave, had some out светлана which he feels  was related to both HE as well as anxiety. At the beginning of May she was hospitalized to get a transplant but the surgeon decided the liver was not "good enough." Per  at the end of that brief hospitalization she was becoming delirious so they discharged her home feeling she would do better at home. She had a fall the next day and was re hospitalized. She subsequently spent all of May in and out of the hospital with delirium. Reports "HE episode" last about 5-7 days but once her ammonia level is improved she clears. He reports she decompensates very quickly, and its becoming more severe and " "prolonged. He denies any previous psychiatric illness.       Past Medical/Surgical History  Past Medical History:   Diagnosis Date    Chronic Hepatic encephalopathy 6/25/2020    Decompensated hepatic cirrhosis 6/25/2020    History of pancreatitis 6/25/2020    Liver cirrhosis secondary to CHAUDHARY 6/25/2020    Macrocytic anemia 6/25/2020    Other ascites 6/25/2020    Thrombocytopenia 6/25/2020     History reviewed. No pertinent surgical history.    Past Psychiatric History:  Previous Medication Trials: yes - xanax, haldol,   Previous Psychiatric Hospitalizations: no   Previous Suicide Attempts: no   History of Violence: no  Outpatient Psychiatrist: no     Social History:  Marital Status:   Children: 3   Employment Status/Info: retired  Education: college graduate  Special Ed: no  Housing Status: lives with family in [place]  History of phys/sexual abuse: no  Access to gun: no     Substance Abuse History:  Recreational Drugs: denies  Use of Alcohol: denied  Rehab History: no   Tobacco Use: no     Legal History:  Past Charges/Incarcerations: no   Pending charges: no      Family Psychiatric History:   denies     Psychosocial Stressors: health  Functioning Relationships: good support system      Hospital Course: 7/10/2020  Pt sleeping in no distress upon entering room.  Pt awakens easily.  Pt provides consent for  (at bedside) to remain in room during interview.  Pt reports doing well yesterday/overnight, denies acute events.  Reports sleeping well overnight.  Denies problems with appetite.  Mood "good."  Denies SE from current med regimen.  Attends well to interview.  Multiple errors on SAVEAHAART.  No errors on disorganized thinking questions.  Disoriented to day of week, day of month.  Oriented to month, year, location and situation.  Pt's  confirmed history prior to presentation as documented in HPI above.   reports that pt's current PRNs work well for sleep and acute agitation.  " "Pt's  does not feel that adjustments to pt's psychotropic regimen are needed at this time.     Interval History: see hospital course.     Family History     None        Tobacco Use    Smoking status: Never Smoker    Smokeless tobacco: Never Used   Substance and Sexual Activity    Alcohol use: Not on file    Drug use: Not on file    Sexual activity: Not on file     Psychotherapeutics (From admission, onward)    Start     Stop Route Frequency Ordered    07/08/20 1436  OLANZapine injection 5 mg      -- IM Every 8 hours PRN 07/08/20 1336         Review of Systems   History obtained from the patient   General : NO chills or fever.     Eyes: NO  visual changes   ENT: NO hearing change, nasal discharge or sore throat   Endocrine: NO weight changes or polydipsia/polyuria   Dermatological: NO rashes   Respiratory: NO cough, shortness of breath   Cardiovascular: NO chest pain, palpitations or racing heart   Gastrointestinal: NO constipation or diarrhea.  +nausea, +vomiting.   Musculoskeletal: NO muscle pain or stiffness   Neurological: NO confusion, dizziness, headaches or tremors   Psychiatric: please see HPI    Objective:     Vital Signs (Most Recent):  Temp: 96.6 °F (35.9 °C) (07/10/20 0703)  Pulse: 93 (07/10/20 0703)  Resp: 16 (07/10/20 0703)  BP: (!) 105/54 (07/10/20 0703)  SpO2: 100 % (07/10/20 0703) Vital Signs (24h Range):  Temp:  [96.6 °F (35.9 °C)-98.9 °F (37.2 °C)] 96.6 °F (35.9 °C)  Pulse:  [] 93  Resp:  [16-18] 16  SpO2:  [98 %-100 %] 100 %  BP: (105-125)/(51-65) 105/54     Height: 5' 4" (162.6 cm)  Weight: 54.9 kg (121 lb 0.5 oz)  Body mass index is 20.78 kg/m².      Intake/Output Summary (Last 24 hours) at 7/10/2020 1104  Last data filed at 7/10/2020 0600  Gross per 24 hour   Intake 240 ml   Output 600 ml   Net -360 ml     Physical Exam      Appearance: disheveled, lying in bed, jaundice  Behavior/Copperation: cooperative, psychomotor retardation, somnolent, fell asleep repeatedly " "during interview  Speech: slowed, soft, non-spontaneous  Mood:  "alright"  Affect: mood-incongruent  Thought Process: linear and goal directed  Thought Content: normal, no suicidality, no homicidality, delusions, or paranoia  Orientation:  person, place, situation, month of year, year  Memory: Intact  Attention/Concentration: Attended to conversation but failed SAVEAHAART  Cognition: grossly intact  Insight: good  Judgement: fair    CAM-ICU:  1. Acute change and/or fluctuating course of mental status: Yes  2. Inattention (SAVEAHAART): Yes  · "Squeeze my hand, only when you hear, the letter 'A'."  3. Altered Level of Consciousness: Yes  4. Disorganized Thinking (Errors >1/6): No  · "Will a stone float on water?"  · "Are there fish in the sea?"  · "Does one pound weigh more than two?"  · "Can you use a hammer to pound a nail?"  · Command(s):  · "Hold up 2 fingers."  · "Now do the same thing with the other hand."    Score: 1+2 AND, either 3 or 4 present = Positive CAM-ICU      Significant Labs: All pertinent labs within the past 24 hours have been reviewed.    Significant Imaging: I have reviewed all pertinent imaging results/findings within the past 24 hours.    Assessment/Plan:     Adjustment disorder with anxious mood  - expressed concerns for anxiety related to adjusting to limitations of this illness - not able to drive, leave the house, do many things she previously could, also guilt about taking someone's liver.   -patient describes her mood as "fine" but reports anxiety when asked directly if she is feeling anxious     Delirium due to medical condition without behavioral disturbance  Assessment: Zeina Mahoney is a 53 y/o female with no previous psychiatric history who has been hospitalized multiple times since May with complications related to liver failure and had several bouts of agitation and irritability. During these hospitalizations she was placed on scheduled haldol at various frequencies, most " recently Haldol 1 mg BID. Presentation this hospitalization is absent of irritability or agitation thus far. Per 's observation she only becomes agitated during hospitalizations, not when she is in her home environment.     Recommendations:    1. Scheduled meds:   - Start seroquel 25mg nightly for encephalopathy and insomnia, this medication can be discontinued at discharge    2. PRN meds:   - Discontinue Haldol 2/2 elevated qtc  - Recommend zyprexa 2.5mg PO or IM q8 hrs prn for nonredirectable agitation associated with encephalopathy  - Recommend to continue melatonin vs ramelteon prn insomnia    3. Legal:   -No need for PEC as pt is not an imminent danger to self or others or gravely disabled due to an acute psychiatric condition, no need to seek inpatient psychiatric hospitalization    4. Additional workup/other:   - daily EKG to monitor qtc     Psychiatry will sign off at this time.  Thank you for the consult.  If any additional assistance is needed in the future or if questions arise regarding the above recommendations, please call or re-consult.  If emergencies arise, please contact on-call resident, SW or NP.     In cases of emergency, daily coverage provided by Acute/ED Psych MD, NP, or SW, with associated contact numbers listed in the [facility] On Call Schedule.    Case discussed with consult liaison psychiatry staff: Radha Mendiola MD     Need for Continued Hospitalization:   No need for inpatient psychiatric hospitalization. Continue medical care as per the primary team.    Anticipated Disposition: Home or Self Care     Total time:  25 with greater than 50% of this time spent in counseling and/or coordination of care.     Issac Suarez MD   Psychiatry  [facility]

## 2020-07-10 NOTE — PLAN OF CARE
The patient is lying in bed soundly asleep at this time. No s/s of acute distress. Patient is easily awakened. Able to make needs known. Telesitter maintained. Patient is a ble to stand with 2 person assist. Purewick external catheter in place. Bed locked and in low position. Call light within reach.

## 2020-07-10 NOTE — SUBJECTIVE & OBJECTIVE
Interval History:   Patient has no complaints. Undergoing liver transplant evaluation.    Current Facility-Administered Medications   Medication    acetaminophen tablet 325 mg    albuterol-ipratropium 2.5 mg-0.5 mg/3 mL nebulizer solution 3 mL    enoxaparin injection 40 mg    ergocalciferol capsule 50,000 Units    glucose chewable tablet 16 g    glucose chewable tablet 24 g    lactulose 20 gram/30 mL solution Soln 45 g    levothyroxine tablet 112 mcg    melatonin tablet 6 mg    midodrine tablet 10 mg    OLANZapine injection 5 mg    ondansetron injection 4 mg    pantoprazole EC tablet 40 mg    polyethylene glycol packet 17 g    prochlorperazine injection Soln 5 mg    rifAXIMin tablet 550 mg    senna-docusate 8.6-50 mg per tablet 1 tablet    sodium chloride 0.9% flush 10 mL    zinc sulfate capsule 220 mg       Objective:     Vital Signs (Most Recent):  Temp: 96.6 °F (35.9 °C) (07/10/20 0703)  Pulse: 93 (07/10/20 0703)  Resp: 16 (07/10/20 0703)  BP: (!) 105/54 (07/10/20 0703)  SpO2: 100 % (07/10/20 0703) Vital Signs (24h Range):  Temp:  [96.6 °F (35.9 °C)-98.9 °F (37.2 °C)] 96.6 °F (35.9 °C)  Pulse:  [] 93  Resp:  [16-18] 16  SpO2:  [98 %-100 %] 100 %  BP: (105-125)/(51-65) 105/54     Weight: 54.9 kg (121 lb 0.5 oz) (07/09/20 0900)  Body mass index is 20.78 kg/m².    Physical Exam  Vitals signs and nursing note reviewed.   Eyes:      General: Scleral icterus present.   Cardiovascular:      Rate and Rhythm: Normal rate.      Heart sounds: Normal heart sounds.   Pulmonary:      Effort: Pulmonary effort is normal.   Abdominal:      General: There is no distension.      Tenderness: There is no abdominal tenderness.   Skin:     Coloration: Skin is jaundiced.   Neurological:      Mental Status: She is alert.         MELD-Na score: 22 at 7/10/2020  5:44 AM  MELD score: 19 at 7/10/2020  5:44 AM  Calculated from:  Serum Creatinine: 1.2 mg/dL at 7/10/2020  5:44 AM  Serum Sodium: 132 mmol/L at 7/10/2020   5:44 AM  Total Bilirubin: 8.4 mg/dL at 7/10/2020  5:44 AM  INR(ratio): 1.3 at 7/10/2020  5:44 AM  Age: 54 years 10 months    Significant Labs:  Labs within the past month have been reviewed.    Significant Imaging:  Reviewed

## 2020-07-10 NOTE — PLAN OF CARE
Patient medically ready for discharge to [facility] with spouse / [facility] accepted will see patient Wednesday morning to draw labs per Dr Dumont orders. Patient being presented Wednesday (07/15/2020) to Transplant Committee - follow up appointments to be scheduled by tranplant coordinator at that time as patient is from [place].   is at bedside and will assit patient home. This CM scheduled or requested necessary follow-up appointments. Family/patient aware of discharge.    No future appointments.    Mihaela Haro RN  Case Management  Ext: [phone]  07/10/2020  2:37 PM        07/10/20 1435   Final Note   Assessment Type Final Discharge Note   Anticipated Discharge Disposition Home-Health   What phone number can be called within the next 1-3 days to see how you are doing after discharge? [phone]   Hospital Follow Up  Appt(s) scheduled? No  (will be scheduled following Transplnt commitee meeting on Wednesday - they will call patient)   Discharge plans and expectations educations in teach back method with documentation complete? Yes  (yes per bedside nurse)   Right Care Referral Info   Post Acute Recommendation Home-care   Referral Type Home health -   Facility Name [facility]

## 2020-07-10 NOTE — PLAN OF CARE
[facility]    HOME HEALTH ORDERS  FACE TO FACE ENCOUNTER    Patient Name: Zeina Mahoney  YOB: 1965    PCP: Primary Doctor No   PCP Address: None  PCP Phone Number: None  PCP Fax: None    Encounter Date: 07/10/2020    Admit to Home Health    Diagnoses:  Active Hospital Problems    Diagnosis  POA    *Acute Hepatic encephalopathy [K72.90]  Yes    Adjustment disorder with anxious mood [F43.22]  Yes    Postablative hypothyroidism [E89.0]  Yes    Hypercalcemia [E83.52]  Yes    Delirium due to medical condition without behavioral disturbance [F05]  Yes    Decompensated hepatic cirrhosis [K72.90]  Yes    Liver cirrhosis secondary to CHAUDHARY [K75.81, K74.60]  Yes    Thrombocytopenia [D69.6]  Yes      Resolved Hospital Problems   No resolved problems to display.       No future appointments.  Follow-up Information     [facility].    Specialty: Hepatology  Why: as scheduled   Contact information:  [address]  [facility] [id number] [phone]                   I have seen and examined this patient face to face today. My clinical findings that support the need for the home health skilled services and home bound status are the following:  Weakness/numbness causing balance and gait disturbance due to Liver Disease and Weakness/Debility making it taxing to leave home.  Requiring assistive device to leave home due to unsteady gait caused by  Liver Disease and Weakness/Debility.  Patient with medication mismanagement issues requiring home bound status as evidenced by  Poor understanding of medication regimen/dosage and Poor adherence to medication regimen/dosage.  Medical restrictions requiring assistance of another human to leave home due to  Unstable ambulation and hepatic encephalopathy .  Mental confusion making it unsafe for patient to leave home alone due to  hepatic encephalopathy .    Allergies:  Review of patient's allergies indicates:   Allergen Reactions     Aspirin Tinitus    Ciprofloxacin Rash       Diet: fluid restriction: 1500 cc and 2 gram sodium diet    Activities: activity as tolerated    Nursing:   SN to complete comprehensive assessment including routine vital signs. Instruct on disease process and s/s of complications to report to MD. Review/verify medication list sent home with the patient at time of discharge  and instruct patient/caregiver as needed. Frequency may be adjusted depending on start of care date.    Notify MD if SBP > 160 or < 90; DBP > 90 or < 50; HR > 120 or < 50; Temp > 101; Other:   Worsening confusion despite lactulose doses      CONSULTS:    Physical Therapy to evaluate and treat. Evaluate for home safety and equipment needs; Establish/upgrade home exercise program. Perform / instruct on therapeutic exercises, gait training, transfer training, and Range of Motion.  Occupational Therapy to evaluate and treat. Evaluate home environment for safety and equipment needs. Perform/Instruct on transfers, ADL training, ROM, and therapeutic exercises.    MISCELLANEOUS CARE:  lactulose enemas as needed 3 times per week, if patient does not achieve desired # of bowel movements     Please draw weekly CBC , INR and CMP, starting on 7/15/20 and fax results to [phone] [facility]       WOUND CARE ORDERS  n/a      Medications: Review discharge medications with patient and family and provide education.      Current Discharge Medication List      START taking these medications    Details   magnesium hydroxide 400 mg/5 ml (MILK OF MAGNESIA) 400 mg/5 mL Susp Take 30 mLs (2,400 mg total) by mouth daily as needed (Take if constipated despite lactulose).  Qty:           CONTINUE these medications which have CHANGED    Details   furosemide (LASIX) 40 MG tablet Take 1 tablet (40 mg total) by mouth once daily.      haloperidoL (HALDOL) 1 MG tablet Take 1 tablet (1 mg total) by mouth daily as needed (non redirectable agitation).      polyethylene glycol  (GLYCOLAX) 17 gram PwPk Take 17 g by mouth once daily. Or miralax OTC. Take if constipated despite lactulose  Refills: 0         CONTINUE these medications which have NOT CHANGED    Details   acetaminophen (TYLENOL) 500 MG tablet Take 1 tablet (500 mg total) by mouth every 6 (six) hours as needed for Pain. Max 2000mg daily.  Refills: 0      diaper,brief,adult,disposable Misc 5 mg/kg/day by Misc.(Non-Drug; Combo Route) route 5 (five) times daily. Patient with incontinence. Please supply enough depends for 5xs/day      ergocalciferol (VITAMIN D2) 50,000 unit Cap Take 50,000 Units by mouth every 7 days.      lactulose (CEPHULAC) 10 gram packet Take 1 and a half packets three times a day       lactulose (CHRONULAC) 10 gram/15 mL solution Take 30 mLs (20 g total) by mouth 3 (three) times daily as needed. Alternate with kristalose if not having enough BMs.  Qty: 2700 mL, Refills: 2      levothyroxine (SYNTHROID) 150 MCG tablet Take 150 mcg by mouth before breakfast.      lidocaine (LIDODERM) 5 % Place 1 patch onto the skin every 24 hours. Remove & Discard patch within 12 hours or as directed by MD      melatonin 10 mg Tab Take 10 mg by mouth nightly as needed (sleep).      midodrine (PROAMATINE) 10 MG tablet Take 10 mg by mouth 3 (three) times daily.      nystatin (MYCOSTATIN) powder Apply topically 2 (two) times daily. Apply to affected area twice a day      pantoprazole (PROTONIX) 40 MG tablet Take 40 mg by mouth before breakfast.      rifAXIMin (XIFAXAN) 550 mg Tab Take 550 mg by mouth 2 (two) times daily.       simethicone (MYLICON) 80 MG chewable tablet Take 80 mg by mouth every 6 (six) hours as needed for Flatulence. Take 2 tablets by mouth every 6 hours as needed for gas      spironolactone (ALDACTONE) 50 MG tablet Take 100 mg by mouth once daily.     Comments: .      zinc sulfate (ZINCATE) 220 (50) mg capsule Take 220 mg by mouth before breakfast.             I certify that this patient is confined to her home and  needs intermittent skilled nursing care, physical therapy and occupational therapy.      Signing Physician:     Elen Dumont MD  Department of [facility] Medicine   [facility]  Pager [phone] [place] [phone]  7/10/2020

## 2020-07-10 NOTE — ASSESSMENT & PLAN NOTE
Ms. Coy is a 53yo F w/PMH CHAUDHARY/NEHAL cirrhosis with hepatic encephalopathy, volume overload, previously listed for transplant at [facility] [place] ([place]), Grave's (s/p iodine radiation), hypercalcemia due to hyperparathyroidism who presents with altered mental status.  Mental status improved, but still confused per . Has not been able to complete transplant eval due to recurrent admissions.    - Ascites: none  - Esophageal varices: last EGD 11/2019 with no varices  - Hepatic encephalopathy: lactulose titrated to 3-4 BMs daily + rifaximin bid + zinc. Pending Psychiatry re-evaluation for prior to admission haldol / agitation.  - HCC screening: AFP 16 7/6/20, U/S 6/25/20 with no masses  - Transplant: completed inpatient evaluation, ordered Utox given hx of +methadone

## 2020-07-10 NOTE — ASSESSMENT & PLAN NOTE
Assessment: Zeina Mahoney is a 53 y/o female with no previous psychiatric history who has been hospitalized multiple times since May with complications related to liver failure and had several bouts of agitation and irritability. During these hospitalizations she was placed on scheduled haldol at various frequencies, most recently Haldol 1 mg BID. Presentation this hospitalization is absent of irritability or agitation thus far. Per 's observation she only becomes agitated during hospitalizations, not when she is in her home environment.     Recommendations:    1. Scheduled meds:   - Start seroquel 25mg nightly for encephalopathy and insomnia, this medication can be discontinued at discharge    2. PRN meds:   - Discontinue Haldol 2/2 elevated qtc  - Recommend zyprexa 2.5mg PO or IM q8 hrs prn for nonredirectable agitation associated with encephalopathy  - Recommend to continue melatonin vs ramelteon prn insomnia    3. Legal:   -No need for PEC as pt is not an imminent danger to self or others or gravely disabled due to an acute psychiatric condition, no need to seek inpatient psychiatric hospitalization    4. Additional workup/other:   - daily EKG to monitor qtc     Psychiatry will sign off at this time.  Thank you for the consult.  If any additional assistance is needed in the future or if questions arise regarding the above recommendations, please call or re-consult.  If emergencies arise, please contact on-call resident, SW or NP.     In cases of emergency, daily coverage provided by Acute/ED Psych MD, NP, or SW, with associated contact numbers listed in the [facility] On Call Schedule.    Case discussed with consult liaison psychiatry staff: Radha Mendiola MD

## 2020-07-10 NOTE — NURSING
PHARM.D. PRE-TRANSPLANT NOTE:    This patient's medication therapy was evaluated as part of her pre-transplant evaluation.      The following general pharmacologic concerns were noted: Currently hospitalized; patient was on haldol at home and should have synthroid restarted to prevent withdrawal post transplant    The following concerns for post-operative pain management were noted: n/a    The following pharmacologic concerns related to HCV therapy were noted: n/a      This patient's medication profile was reviewed for considerations for DAA Hepatitis C therapy:    [x]  No current inducers of CYP 3A4 or PGP  [x]  No amiodarone on this patient's EMR profile in the last 24 months  [x]  No past or current atrial fibrillation on this patient's EMR profile       Current Outpatient Medications   Medication Sig Dispense Refill    acetaminophen (TYLENOL) 500 MG tablet Take 1 tablet (500 mg total) by mouth every 6 (six) hours as needed for Pain. Max 2000mg daily.  0    diaper,brief,adult,disposable Misc 5 mg/kg/day by Misc.(Non-Drug; Combo Route) route 5 (five) times daily. Patient with incontinence. Please supply enough depends for 5xs/day      ergocalciferol (VITAMIN D2) 50,000 unit Cap Take 50,000 Units by mouth every 7 days.      furosemide (LASIX) 40 MG tablet Take 1 tablet (40 mg total) by mouth once daily.      haloperidoL (HALDOL) 1 MG tablet Take 1 tablet (1 mg total) by mouth daily as needed (non redirectable agitation).      lactulose (CEPHULAC) 10 gram packet Take 1 and a half packets three times a day       lactulose (CHRONULAC) 10 gram/15 mL solution Take 30 mLs (20 g total) by mouth 3 (three) times daily as needed. Alternate with kristalose if not having enough BMs. 2700 mL 2    levothyroxine (SYNTHROID) 150 MCG tablet Take 150 mcg by mouth before breakfast.      lidocaine (LIDODERM) 5 % Place 1 patch onto the skin every 24 hours. Remove & Discard patch within 12 hours or as directed by MD       magnesium hydroxide 400 mg/5 ml (MILK OF MAGNESIA) 400 mg/5 mL Susp Take 30 mLs (2,400 mg total) by mouth daily as needed (Take if constipated despite lactulose).      melatonin 10 mg Tab Take 10 mg by mouth nightly as needed (sleep).      midodrine (PROAMATINE) 10 MG tablet Take 10 mg by mouth 3 (three) times daily.      nystatin (MYCOSTATIN) powder Apply topically 2 (two) times daily. Apply to affected area twice a day      pantoprazole (PROTONIX) 40 MG tablet Take 40 mg by mouth before breakfast.      polyethylene glycol (GLYCOLAX) 17 gram PwPk Take 17 g by mouth once daily. Or miralax OTC. Take if constipated despite lactulose  0    rifAXIMin (XIFAXAN) 550 mg Tab Take 550 mg by mouth 2 (two) times daily.       simethicone (MYLICON) 80 MG chewable tablet Take 80 mg by mouth every 6 (six) hours as needed for Flatulence. Take 2 tablets by mouth every 6 hours as needed for gas      spironolactone (ALDACTONE) 50 MG tablet Take 100 mg by mouth once daily.       zinc sulfate (ZINCATE) 220 (50) mg capsule Take 220 mg by mouth before breakfast.       No current facility-administered medications for this visit.      Facility-Administered Medications Ordered in Other Visits   Medication Dose Route Frequency Provider Last Rate Last Dose    acetaminophen tablet 325 mg  325 mg Oral Q4H PRN Preet Koo MD        albuterol-ipratropium 2.5 mg-0.5 mg/3 mL nebulizer solution 3 mL  3 mL Nebulization Q6H PRN Preet Koo MD        enoxaparin injection 40 mg  40 mg Subcutaneous Q24H Preet Koo MD   40 mg at 07/09/20 1717    ergocalciferol capsule 50,000 Units  50,000 Units Oral Q7 Days Jorge Watson MD   50,000 Units at 07/08/20 0953    furosemide tablet 40 mg  40 mg Oral Daily Elen Dumont MD   40 mg at 07/10/20 1337    glucose chewable tablet 16 g  16 g Oral PRN Preet Koo MD        glucose chewable tablet 24 g  24 g Oral PRN Preet Koo MD        lactulose 20 gram/30 mL solution Soln 45  g  45 g Oral Q6H Jorge Watson MD   45 g at 07/10/20 1122    levothyroxine tablet 112 mcg  112 mcg Oral Before breakfast Sunli Younger MD   112 mcg at 07/10/20 0648    melatonin tablet 6 mg  6 mg Oral Nightly PRN Preet Koo MD        midodrine tablet 10 mg  10 mg Oral TID Preet Koo MD   10 mg at 07/10/20 1337    OLANZapine injection 2.5 mg  2.5 mg Intramuscular Q8H PRN Radha Mendiola MD        ondansetron injection 4 mg  4 mg Intravenous Q8H PRN Preet Koo MD   4 mg at 07/10/20 0821    pantoprazole EC tablet 40 mg  40 mg Oral Before breakfast Preet Koo MD   40 mg at 07/10/20 0648    polyethylene glycol packet 17 g  17 g Oral TID Jorge Watson MD   17 g at 07/10/20 1338    prochlorperazine injection Soln 5 mg  5 mg Intravenous Q6H PRN Jorge Watson MD        rifAXIMin tablet 550 mg  550 mg Oral BID Preet Koo MD   550 mg at 07/10/20 0818    senna-docusate 8.6-50 mg per tablet 1 tablet  1 tablet Oral BID Elen Dumont MD   1 tablet at 07/10/20 1100    sodium chloride 0.9% flush 10 mL  10 mL Intravenous PRN Preet Koo MD        spironolactone tablet 100 mg  100 mg Oral Daily Elen Dumont MD   100 mg at 07/10/20 1338    zinc sulfate capsule 220 mg  220 mg Oral Before breakfast Preet Koo MD   220 mg at 07/10/20 0648         Currently Ms. Mahoney is responsible for preparing / administering this patient's medications on a daily basis.  I am available for consultation and can be contacted, as needed by the other members of the Liver Transplant team.

## 2020-07-10 NOTE — DISCHARGE SUMMARY
DISCHARGE SUMMARY  Hospital Medicine    [facility]: [facility]    Patient Name: Zeina Mahoney  YOB: 1965    Admit Date: 7/7/2020    Discharge Date: 07/10/2020    Discharge Attending Physician: Elen Dumont MD     Chief Complaint: Acute Hepatic encephalopathy    Princilpal Diagnoses:  Active Hospital Problems    Diagnosis  POA    *Acute Hepatic encephalopathy [K72.90]  Yes    Adjustment disorder with anxious mood [F43.22]  Yes    Postablative hypothyroidism [E89.0]  Yes    Hypercalcemia [E83.52]  Yes    Delirium due to medical condition without behavioral disturbance [F05]  Yes    Decompensated hepatic cirrhosis [K72.90]  Yes    Liver cirrhosis secondary to CHAUDHARY [K75.81, K74.60]  Yes    Thrombocytopenia [D69.6]  Yes      Resolved Hospital Problems   No resolved problems to display.       Discharged Condition: Admit problems have resolved     HOSPITAL COURSE:      Initial Presentation:    As per admitting provider,     53 yo F with PMHx of CHAUDHARY/NEHAL cirrhosis complicated by encephalopathy, volume overload, sarcopenia, [place] [facility] [place], hx of pancreatitis 2/2 unknown cause (status post extensive workup) presents to the ED with  with decreased alertness. Pt. Discharged from the hospital 9 days ago after a 5 day admission for generalized abdominal pain. During that admission, the patient reportedly developed acute on chronic HE on 6/26 with significant confusion which improved with increased lactulose doses and lactulose enema. Pt.  not available at time of interview. Per ED, the patient's  reports that the patient has been progressively  less acitve and sleepythe patient's discharge 6/29l. He reports that the pt. Has been taking lactulose as prescribed, but the patient has not been having more than 1-2 BM's a day. Pt. Presented to the ED with  7/5 with similar complaints, but she was discharged home as her ammonia and other labs were at  baseline and she was sleepy but noted to be AAOx3.     Today on my interview, the patient is alert and oriented x3. When asked why she is here, she states that she was driven to the hospital by her  in their RV because of her liver and chronic abdominal pain. She reports that she has been feeling ok and having an appropriate amount of BM's. She denies any fevers or chills and reports that she has been 4 BM's a day.    Course of Principle Problem for Admission:    Acute on Chronic Hepatic encephalopathy  Delirium due to medical condition without behavioral disturbance  -recurrent hospitalizations with acute on chronic HE.  Did not have adequate number of bowel movements prior to this admission, despite increased lactulose doses and lactulose enemas at home.  - low suspicion for infection  - continue aggressive p.o. lactulose as well as a rifaximin and zinc and stool softeners.  Will include p.r.n. lactulose enemas the patient not having adequate number of bowel movements  - MRI of the brain was obtained.  Showed Symmetrical T1 hyperintensity bilateral basal ganglia most compatible with sequela of chronic hepatic encephalopathy in light of history  - psychiatry evaluating the patient.  And will stop scheduled Haldol and only use Haldol for non redirectable agitation      On the day of d/c, patient was doing well with no acute issues.   Mental status at baseline   Discharged with the medication regimen below . Plan to present pt's case to txp committee on 7/15    Physical Exam:  Constitutional: appears older than stated age, chronically ill looking,  non-distressed, not diaphoretic.   HENT: NC/AT, external ears normal, oropharynx clear, MMM w/o exudates.   Eyes: PERRL, EOMI, conjunctiva normal, no discharge b/l, +scleral icterus   Neck: normal ROM, supple  CV: RRR, no m/r/g, no carotid bruits, +2 peripheral pulses.  Pulmonary/Chest wall: Breathing comfortably w/o distress, CTAB, no w/r/r, no crackles.    GI:  Soft, (+) BS, (+) BM   not distended  non tender   Musculoskeletal: Normal ROM, +atrophy,  no pitting edema  Neurological: AAO x4, no focal deficits   no asterixis   Skin: warm, dry   +pallor, jaundice  Psych: flat affect , normal behavior, thought content and judgement    Other Medical Problems Addressed in the Hospital:    Decompensated hepatic cirrhosis  Liver cirrhosis secondary to CHAUDHARY  Other ascites  MELD-Na score: 21 at 7/9/2020  5:39 AM  MELD score: 20 at 7/9/2020  5:39 AM  Calculated from:  Serum Creatinine: 1.4 mg/dL at 7/9/2020  5:39 AM  Serum Sodium: 135 mmol/L at 7/9/2020  5:39 AM  Total Bilirubin: 8.1 mg/dL at 7/9/2020  5:39 AM  INR(ratio): 1.2 at 7/9/2020  5:39 AM  Age: 54 years 9 months     - [place] [facility] [place]  - PETH negative   - hepatology consulted.    - IR paracentesis attempted but insufficient ascites identified for safe paracentesis  -diuretics held at this time due to slightly increased creatinine early in the hospitalization.  - MRI abdomen pelvis obtained. Showed  Morphologic changes of cirrhosis and MR findings suggesting parenchymal iron overload.  1.3 cm focal area of signal abnormality within the right hepatic dome, possibly loculated fluid or a complex cyst but incompletely evaluated secondary to location and patient motion artifact.  Consider further characterization with triple phase abdominal CT.  - AFP 16  - triple phase abdominal CT obtained and reviewed   -completedd liver transplant evaluation while inpatient,  - Plan to present pt's case to txp committee on 7/15     Macrocytic anemia  Thrombocytopenia  - stable on chronic due to liver disease  - monitor     Hypercalcemia  - Multifactorial: immobilization, cirrhosis, thyrotoxicosis, hyperparathyroidism in the setting of very low vitamin D levels   Mild osteopenia, no nephrocalcinosis or kidney stones on recent US.   -10.7, corrected for albumin 11.66, unlikely mental status due to hypercalcemia. PTH 73  on May 2020  - Continue ergocalciferol 50,000 IU every 7 days   - Recheck vitamin D in 8 weeks outpatient with a goal of >20 as patient is cirrhotic and will be hard to fully correct her vitamin D levels  - At that time recheck PTH and if elevated recommend to check 24hr urine Calcium  - adequate hydration, can use Lasix for volume overload as dictated by Hepatology      CONSULTS: hepatology, psychiatry     Labs:    Chemistries:   Recent Labs   Lab 07/08/20 1111 07/09/20  0539 07/10/20  0544   * 135* 132*   K 3.7 4.2 4.3    106 103   CO2 22* 23 24   BUN 32* 25* 21*   CREATININE 1.6* 1.4 1.2   CALCIUM 10.7* 10.3 10.3   PROT 6.5 6.3 6.1   BILITOT 7.7* 8.1* 8.4*   ALKPHOS 93 85 84   ALT 29 32 30   AST 42* 43* 46*   MG  --  2.4 2.5        WBC:   Recent Labs   Lab 07/06/20  1403 07/07/20  2132 07/08/20 1111 07/09/20  0539 07/10/20  0544   WBC 7.60  7.60 7.17 6.58 5.78 5.63     Bands:     CBC/Anemia Labs: Coags:    Recent Labs   Lab 07/06/20  1403  07/08/20  1111 07/09/20  0539 07/10/20  0544   WBC 7.60  7.60   < > 6.58 5.78 5.63   HGB 9.3*  9.3*   < > 8.2* 7.7* 7.8*   HCT 28.8*  28.8*   < > 24.6* 23.7* 22.9*   PLT 78*  78*   < > 61* 51* 51*   *  110*   < > 107* 108* 104*   RDW 13.9  13.9   < > 13.5 13.5 13.3   IRON 185*  185*  --   --   --   --    FERRITIN 547*  547*  --   --   --   --     < > = values in this interval not displayed.    Recent Labs   Lab 07/08/20 1111 07/09/20  0539 07/10/20  0544   INR 1.2 1.2 1.3*          Disposition:  Home with Home Health       Follow-up Plans from This Hospitalization:  Hepatology     Discharge Medication List:       Zeina Mahoney   Home Medication Instructions LORETTA:[id number]    Printed on:07/10/20 1239   Medication Information                      acetaminophen (TYLENOL) 500 MG tablet  Take 1 tablet (500 mg total) by mouth every 6 (six) hours as needed for Pain. Max 2000mg daily.             diaper,brief,adult,disposable Misc  5 mg/kg/day by  Misc.(Non-Drug; Combo Route) route 5 (five) times daily. Patient with incontinence. Please supply enough depends for 5xs/day             ergocalciferol (VITAMIN D2) 50,000 unit Cap  Take 50,000 Units by mouth every 7 days.             furosemide (LASIX) 40 MG tablet  Take 1 tablet (40 mg total) by mouth once daily.             haloperidoL (HALDOL) 1 MG tablet  Take 1 tablet (1 mg total) by mouth daily as needed (non redirectable agitation).             lactulose (CEPHULAC) 10 gram packet  Take 1 and a half packets three times a day              lactulose (CHRONULAC) 10 gram/15 mL solution  Take 30 mLs (20 g total) by mouth 3 (three) times daily as needed. Alternate with kristalose if not having enough BMs.             levothyroxine (SYNTHROID) 150 MCG tablet  Take 150 mcg by mouth before breakfast.             lidocaine (LIDODERM) 5 %  Place 1 patch onto the skin every 24 hours. Remove & Discard patch within 12 hours or as directed by MD             magnesium hydroxide 400 mg/5 ml (MILK OF MAGNESIA) 400 mg/5 mL Susp  Take 30 mLs (2,400 mg total) by mouth daily as needed (Take if constipated despite lactulose).             melatonin 10 mg Tab  Take 10 mg by mouth nightly as needed (sleep).             midodrine (PROAMATINE) 10 MG tablet  Take 10 mg by mouth 3 (three) times daily.             nystatin (MYCOSTATIN) powder  Apply topically 2 (two) times daily. Apply to affected area twice a day             pantoprazole (PROTONIX) 40 MG tablet  Take 40 mg by mouth before breakfast.             polyethylene glycol (GLYCOLAX) 17 gram PwPk  Take 17 g by mouth once daily. Or miralax OTC. Take if constipated despite lactulose             rifAXIMin (XIFAXAN) 550 mg Tab  Take 550 mg by mouth 2 (two) times daily.              simethicone (MYLICON) 80 MG chewable tablet  Take 80 mg by mouth every 6 (six) hours as needed for Flatulence. Take 2 tablets by mouth every 6 hours as needed for gas             spironolactone (ALDACTONE)  50 MG tablet  Take 100 mg by mouth once daily.              zinc sulfate (ZINCATE) 220 (50) mg capsule  Take 220 mg by mouth before breakfast.                   At the time of discharge patient was told to take all medications as prescribed, to keep all followup appointments, and to call their primary care physician or return to the emergency room if they have any worsening or concerning symptoms.    Time spent on the discharge of the patient including review of hospital course with the patient. reviewing discharge medications and arranging follow-up care 45 minutes.  Patient was seen and examined on the date of discharge and determined to be suitable for discharge.        Signing Physician:  Elen Dumont MD

## 2020-07-11 LAB
AMPHET+METHAMPHET UR QL: NEGATIVE
BARBITURATES UR QL SCN>200 NG/ML: NEGATIVE
BENZODIAZ UR QL SCN>200 NG/ML: NEGATIVE
BZE UR QL SCN: NEGATIVE
CANNABINOIDS UR QL SCN: NEGATIVE
CREAT UR-MCNC: 61 MG/DL (ref 15–325)
ETHANOL UR-MCNC: 20 MG/DL
METHADONE UR QL SCN>300 NG/ML: NEGATIVE
OPIATES UR QL SCN: NEGATIVE
PCP UR QL SCN>25 NG/ML: NEGATIVE
TOXICOLOGY INFORMATION: ABNORMAL

## 2020-07-11 NOTE — HPI
Ms. Mahoney is a 53 yo female who presents with ESLD 2/2 CHAUDHARY. Per family, she was listed for transplant in [place], but recently relocated to [facility]. She denies abdominal surgeries. U/S 6/25 demonstrates splenomegaly, +ascites, and a patent portal vein. MRI 7/8 demonstrates a splenorenal shunt and possible lesion in the dome of the liver. CT is pending to further characterize the area of concern. Patient is drowsy but arousable and answers questions appropriately with minimal prompting.

## 2020-07-11 NOTE — CONSULTS
[facility]  Liver Transplant  Consult Note    Patient Name: Zeina Mahoney  MRN: [id number]  Admission Date: 7/7/2020  Hospital Length of Stay: 2 days  Code Status: Prior  Attending Provider: No att. providers found  Primary Care Provider: Primary Doctor No    Consults  Subjective:     History of Present Illness:  Ms. Mahoney is a 53 yo female who presents with ESLD 2/2 CHAUDHARY. Per family, she was listed for transplant in [place], but recently relocated to [facility]. She denies abdominal surgeries. U/S 6/25 demonstrates splenomegaly, +ascites, and a patent portal vein. MRI 7/8 demonstrates a splenorenal shunt and possible lesion in the dome of the liver. CT is pending to further characterize the area of concern. Patient is drowsy but arousable and answers questions appropriately with minimal prompting.     Review of Systems   Constitutional: Positive for activity change, appetite change and fatigue. Negative for unexpected weight change.   HENT: Negative for congestion and trouble swallowing.    Eyes: Negative for visual disturbance.   Respiratory: Negative for shortness of breath.    Cardiovascular: Negative for chest pain.   Gastrointestinal: Negative for abdominal pain.   Endocrine: Negative for cold intolerance, heat intolerance, polydipsia, polyphagia and polyuria.   Genitourinary: Negative for difficulty urinating.   Musculoskeletal: Negative for myalgias.   Skin: Negative for color change.   Neurological: Positive for weakness.   Hematological: Negative for adenopathy.   Psychiatric/Behavioral: Positive for confusion. Negative for agitation.       Labs Reviewed and Include:    Recent Labs   Lab 07/10/20  0544   *   CALCIUM 10.3   ALBUMIN 2.5*   PROT 6.1   *   K 4.3   CO2 24      BUN 21*   CREATININE 1.2   ALKPHOS 84   ALT 30   AST 46*   BILITOT 8.4*     Lab Results   Component Value Date    HGBA1C <4.0 (A) 07/06/2020       Nutritional status:   Body mass index is 20.78 kg/m².  Lab  Results   Component Value Date    ALBUMIN 2.5 (L) 07/10/2020    ALBUMIN 2.7 (L) 07/09/2020    ALBUMIN 2.8 (L) 07/08/2020     Lab Results   Component Value Date    PREALBUMIN 5 (L) 07/09/2020       Estimated Creatinine Clearance: 46.3 mL/min (based on SCr of 1.2 mg/dL).    POCT Glucose results:    Current Insulin Regimen:         PHYSICAL EXAMINATION:  Vitals:    07/10/20 1301   BP: (!) 111/56   Pulse: 96   Resp: 16   Temp: 96.8 °F (36 °C)     Body mass index is 20.78 kg/m².    Physical Exam  Vitals signs and nursing note reviewed.   Constitutional:       General: She is not in acute distress.     Appearance: She is well-developed. She is ill-appearing.   HENT:      Head: Normocephalic and atraumatic.      Right Ear: External ear normal.      Left Ear: External ear normal.      Nose: Nose normal.   Eyes:      General: Scleral icterus present.   Neck:      Musculoskeletal: No muscular tenderness.      Thyroid: No thyromegaly.      Vascular: No carotid bruit.      Trachea: No tracheal deviation.   Cardiovascular:      Rate and Rhythm: Normal rate and regular rhythm.      Heart sounds: Normal heart sounds. No murmur.   Pulmonary:      Effort: Pulmonary effort is normal.      Breath sounds: Normal breath sounds.   Abdominal:      Palpations: Abdomen is soft. There is no mass.      Tenderness: There is no abdominal tenderness.   Musculoskeletal: Normal range of motion.   Skin:     General: Skin is warm.      Findings: No rash.   Neurological:      Motor: No focal deficits.   Psychiatric:      Comments: Patient drowsy during interview         Assessment/Plan:     No new Assessment & Plan notes have been filed under this hospital service since the last note was generated.  Service: Transplant Liver      Transplant Candidacy: Patient is a 54 y.o. year old female with CHAUDHARY (non-alcoholic steatohepatitis) being evaluated for possible OLT.  In my opinion, she is a suitable liver transplant candidate.  She will be presented to  selection committee after all tests and evaluations are complete.        UNOS Patient Status  Functional Status: 50% - Requires considerable assistance and frequent medical care  Physical Capacity: No Limitations    Counseling: I discussed with the patient the benefits of liver transplantation.  We discussed the evaluation and listing procedures.  We discussed the MELD system and the associated waiting times.  We discussed national and center specific survival results.  We discussed the option of being multiply listed in different OPOs.   We discussed the risks, benefits and potential complications related to surgery including the risks related to anesthesia, bleeding, infection, primary non-function of the allograft, the risk of reoperation as well as the risk of death.  We discussed the typical post-operative course, length of hospitalization, the long-term use of immunosuppressive therapy as well as the need for long-term routine follow-up.    PHS: I discussed the use of organs from donors with PHS increased-risk behavior, including the testing protocols utilized, as well as data from the literature regarding the likelihood of transmission of hepatitis or HIV.  The patient is willing to consider such grafts.  DCD: I discussed the use of organs recovered by donation after cardiac death (DCD), including slightly decreased graft survival and greater risk of arterial and biliary complications. The potential advantage to the recipient is possibly receiving a transplant sooner by accepting such an organ. The patient is willing to consider such grafts.  HBcAb: I discussed the use of organs from donors with HBcAb in conjunction with long term use of HBV antiviral drugs, such as lamivudine. The small but measurable risk of hepatitis B seroconversion was discussed as well as the potentially life long need to continue antiviral drugs. The patient is willing to consider such grafts.  HCV Non-viremic recipient: I discussed  the use of HCV-positive organs in naive recipients, including the risk of viral transmission to the patients or others, potential insurance barriers for antiviral medication coverage, risk for fibrosing cholestatic hepatitis, death or graft loss. The potential advantage to the recipient is the possibility of receiving a transplant sooner with decreased mortality risk by accepting such an organ. The patient is willing to consider such grafts.  LDLT: I discussed the nature of living donor liver transplant, including donor risks and more frequent recipient complications. The patient is willing to consider such grafts.    Annette Verdugo MD  Liver Transplant  [facility]

## 2020-07-11 NOTE — SUBJECTIVE & OBJECTIVE
Review of Systems   Constitutional: Positive for activity change, appetite change and fatigue. Negative for unexpected weight change.   HENT: Negative for congestion and trouble swallowing.    Eyes: Negative for visual disturbance.   Respiratory: Negative for shortness of breath.    Cardiovascular: Negative for chest pain.   Gastrointestinal: Negative for abdominal pain.   Endocrine: Negative for cold intolerance, heat intolerance, polydipsia, polyphagia and polyuria.   Genitourinary: Negative for difficulty urinating.   Musculoskeletal: Negative for myalgias.   Skin: Negative for color change.   Neurological: Positive for weakness.   Hematological: Negative for adenopathy.   Psychiatric/Behavioral: Positive for confusion. Negative for agitation.       Labs Reviewed and Include:    Recent Labs   Lab 07/10/20  0544   *   CALCIUM 10.3   ALBUMIN 2.5*   PROT 6.1   *   K 4.3   CO2 24      BUN 21*   CREATININE 1.2   ALKPHOS 84   ALT 30   AST 46*   BILITOT 8.4*     Lab Results   Component Value Date    HGBA1C <4.0 (A) 07/06/2020       Nutritional status:   Body mass index is 20.78 kg/m².  Lab Results   Component Value Date    ALBUMIN 2.5 (L) 07/10/2020    ALBUMIN 2.7 (L) 07/09/2020    ALBUMIN 2.8 (L) 07/08/2020     Lab Results   Component Value Date    PREALBUMIN 5 (L) 07/09/2020       Estimated Creatinine Clearance: 46.3 mL/min (based on SCr of 1.2 mg/dL).    POCT Glucose results:    Current Insulin Regimen:         PHYSICAL EXAMINATION:  Vitals:    07/10/20 1301   BP: (!) 111/56   Pulse: 96   Resp: 16   Temp: 96.8 °F (36 °C)     Body mass index is 20.78 kg/m².    Physical Exam  Vitals signs and nursing note reviewed.   Constitutional:       General: She is not in acute distress.     Appearance: She is well-developed. She is ill-appearing.   HENT:      Head: Normocephalic and atraumatic.      Right Ear: External ear normal.      Left Ear: External ear normal.      Nose: Nose normal.   Eyes:       General: Scleral icterus present.   Neck:      Musculoskeletal: No muscular tenderness.      Thyroid: No thyromegaly.      Vascular: No carotid bruit.      Trachea: No tracheal deviation.   Cardiovascular:      Rate and Rhythm: Normal rate and regular rhythm.      Heart sounds: Normal heart sounds. No murmur.   Pulmonary:      Effort: Pulmonary effort is normal.      Breath sounds: Normal breath sounds.   Abdominal:      Palpations: Abdomen is soft. There is no mass.      Tenderness: There is no abdominal tenderness.   Musculoskeletal: Normal range of motion.   Skin:     General: Skin is warm.      Findings: No rash.   Neurological:      Motor: Weakness present.   Psychiatric:      Comments: Patient drowsy during interview

## 2020-07-12 ENCOUNTER — OFFICE VISIT (OUTPATIENT)
Dept: URGENT CARE | Facility: CLINIC | Age: 55
End: 2020-07-12
Payer: COMMERCIAL

## 2020-07-12 VITALS
RESPIRATION RATE: 20 BRPM | OXYGEN SATURATION: 98 % | HEIGHT: 64 IN | HEART RATE: 89 BPM | BODY MASS INDEX: 20.67 KG/M2 | SYSTOLIC BLOOD PRESSURE: 131 MMHG | WEIGHT: 121.06 LBS | DIASTOLIC BLOOD PRESSURE: 52 MMHG | TEMPERATURE: 97 F

## 2020-07-12 DIAGNOSIS — L03.116 CELLULITIS OF LEFT LEG WITHOUT FOOT: Primary | ICD-10-CM

## 2020-07-12 PROCEDURE — 90715 TDAP VACCINE GREATER THAN OR EQUAL TO 7YO IM: ICD-10-PCS | Mod: S$GLB,TXP,, | Performed by: INTERNAL MEDICINE

## 2020-07-12 PROCEDURE — 90715 TDAP VACCINE 7 YRS/> IM: CPT | Mod: S$GLB,TXP,, | Performed by: INTERNAL MEDICINE

## 2020-07-12 PROCEDURE — 90471 IMMUNIZATION ADMIN: CPT | Mod: S$GLB,TXP,, | Performed by: INTERNAL MEDICINE

## 2020-07-12 PROCEDURE — 99213 PR OFFICE/OUTPT VISIT, EST, LEVL III, 20-29 MIN: ICD-10-PCS | Mod: 25,S$GLB,TXP, | Performed by: INTERNAL MEDICINE

## 2020-07-12 PROCEDURE — 90471 TDAP VACCINE GREATER THAN OR EQUAL TO 7YO IM: ICD-10-PCS | Mod: S$GLB,TXP,, | Performed by: INTERNAL MEDICINE

## 2020-07-12 PROCEDURE — 99213 OFFICE O/P EST LOW 20 MIN: CPT | Mod: 25,S$GLB,TXP, | Performed by: INTERNAL MEDICINE

## 2020-07-12 RX ORDER — LEVOFLOXACIN 500 MG/1
500 TABLET, FILM COATED ORAL DAILY
Qty: 7 TABLET | Refills: 0 | Status: ON HOLD | OUTPATIENT
Start: 2020-07-12 | End: 2020-07-18

## 2020-07-12 NOTE — PROGRESS NOTES
Subjective:       Patient ID: Zeina Mahoney is a 54 y.o. female.    Vitals:  vitals were not taken for this visit.     Chief Complaint: Laceration (Right calf )    1 week ago Right calf wound    Laceration   The incident occurred 5 to 7 days ago. The laceration is located on the right leg. The laceration mechanism was a dirty knife. The pain is at a severity of 2/10. The pain is mild. The pain has been constant since onset. She reports no foreign bodies present. Her tetanus status is unknown.       Constitution: Negative for chills, fatigue and fever.   HENT: Negative for congestion and sore throat.    Neck: Negative for painful lymph nodes.   Cardiovascular: Negative for chest pain and leg swelling.   Eyes: Negative for double vision and blurred vision.   Respiratory: Negative for cough and shortness of breath.    Gastrointestinal: Negative for nausea, vomiting and diarrhea.   Genitourinary: Negative for dysuria, frequency, urgency and history of kidney stones.   Musculoskeletal: Negative for joint pain, joint swelling, muscle cramps and muscle ache.   Skin: Positive for laceration and erythema. Negative for color change, pale, rash and bruising.   Allergic/Immunologic: Negative for seasonal allergies.   Neurological: Negative for dizziness, history of vertigo, light-headedness, passing out and headaches.   Hematologic/Lymphatic: Negative for swollen lymph nodes.   Psychiatric/Behavioral: Negative for nervous/anxious, sleep disturbance and depression. The patient is not nervous/anxious.        Objective:      Physical Exam   Constitutional: She is oriented to person, place, and time.  Non-toxic appearance.   HENT:   Head: Normocephalic and atraumatic.   Eyes: Scleral icterus is present.   Cardiovascular: Normal rate, regular rhythm and normal pulses.   Pulmonary/Chest: Effort normal and breath sounds normal. No respiratory distress.   Abdominal: Normal appearance.   Neurological: She is alert and oriented to person,  place, and time.   Skin: Lesions:  erythema and lesion           there is a healing puncture wound on the right anterior shin near the ankle with surrounding erythema. No purulence was expressed. There is no calf tenderness. She exhibits lower extremity edema bilaterally equal in character.  Assessment:       1. Cellulitis of left leg without foot        Plan:         Cellulitis of left leg without foot  -     levoFLOXacin (LEVAQUIN) 500 MG tablet; Take 1 tablet (500 mg total) by mouth once daily. for 7 days  Dispense: 7 tablet; Refill: 0  -     (In Office Administered) Tdap Vaccine    No purulence of fluctuance of the wound. I&D not indicated. No sepsis physiology. Patient was alert and oriented x4. Will try outpatient therapy with Levofloxacin, but instructed patient and care giver to immediately go to the ER if she becomes confused or develops fever or chills. Patient and caregiver agreed and were given handwritten instructions in addition to verbal instructions.

## 2020-07-12 NOTE — PATIENT INSTRUCTIONS
Take all antibiotics as prescribed. If you develop fever or chills, immediately proceed to the ER. If your leg develops drainage, proceed to the ER. Call your Liver Transplant team on Monday and let them know you were seen by Urgent Care and found to have a superficial cellulitis. You were started on antibiotics and given a tetanus shot.

## 2020-07-13 ENCOUNTER — HOSPITAL ENCOUNTER (EMERGENCY)
Facility: HOSPITAL | Age: 55
Discharge: HOME | End: 2020-07-13
Attending: EMERGENCY MEDICINE
Payer: COMMERCIAL

## 2020-07-13 VITALS
WEIGHT: 125 LBS | HEART RATE: 86 BPM | TEMPERATURE: 98 F | OXYGEN SATURATION: 100 % | SYSTOLIC BLOOD PRESSURE: 102 MMHG | RESPIRATION RATE: 18 BRPM | BODY MASS INDEX: 21.34 KG/M2 | DIASTOLIC BLOOD PRESSURE: 49 MMHG | HEIGHT: 64 IN

## 2020-07-13 DIAGNOSIS — L03.90 CELLULITIS: ICD-10-CM

## 2020-07-13 LAB
ALBUMIN SERPL BCP-MCNC: 2.6 G/DL (ref 3.5–5.2)
ALP SERPL-CCNC: 109 U/L (ref 55–135)
ALT SERPL W/O P-5'-P-CCNC: 36 U/L (ref 10–44)
ANION GAP SERPL CALC-SCNC: 6 MMOL/L (ref 8–16)
AST SERPL-CCNC: 47 U/L (ref 10–40)
BASOPHILS # BLD AUTO: 0.03 K/UL (ref 0–0.2)
BASOPHILS NFR BLD: 0.5 % (ref 0–1.9)
BILIRUB SERPL-MCNC: 5.3 MG/DL (ref 0.1–1)
BUN SERPL-MCNC: 29 MG/DL (ref 6–20)
CALCIUM SERPL-MCNC: 10.2 MG/DL (ref 8.7–10.5)
CHLORIDE SERPL-SCNC: 101 MMOL/L (ref 95–110)
CO2 SERPL-SCNC: 22 MMOL/L (ref 23–29)
CREAT SERPL-MCNC: 1.5 MG/DL (ref 0.5–1.4)
CRP SERPL-MCNC: 3 MG/L (ref 0–8.2)
DIFFERENTIAL METHOD: ABNORMAL
EOSINOPHIL # BLD AUTO: 0.1 K/UL (ref 0–0.5)
EOSINOPHIL NFR BLD: 1 % (ref 0–8)
ERYTHROCYTE [DISTWIDTH] IN BLOOD BY AUTOMATED COUNT: 14.5 % (ref 11.5–14.5)
ERYTHROCYTE [SEDIMENTATION RATE] IN BLOOD BY WESTERGREN METHOD: <2 MM/HR (ref 0–36)
EST. GFR  (AFRICAN AMERICAN): 45.2 ML/MIN/1.73 M^2
EST. GFR  (NON AFRICAN AMERICAN): 39.2 ML/MIN/1.73 M^2
GLUCOSE SERPL-MCNC: 169 MG/DL (ref 70–110)
HCT VFR BLD AUTO: 22 % (ref 37–48.5)
HGB BLD-MCNC: 7.4 G/DL (ref 12–16)
IMM GRANULOCYTES # BLD AUTO: 0.04 K/UL (ref 0–0.04)
IMM GRANULOCYTES NFR BLD AUTO: 0.6 % (ref 0–0.5)
LYMPHOCYTES # BLD AUTO: 1.2 K/UL (ref 1–4.8)
LYMPHOCYTES NFR BLD: 19.3 % (ref 18–48)
MCH RBC QN AUTO: 36.1 PG (ref 27–31)
MCHC RBC AUTO-ENTMCNC: 33.6 G/DL (ref 32–36)
MCV RBC AUTO: 107 FL (ref 82–98)
MONOCYTES # BLD AUTO: 0.4 K/UL (ref 0.3–1)
MONOCYTES NFR BLD: 7 % (ref 4–15)
NEUTROPHILS # BLD AUTO: 4.5 K/UL (ref 1.8–7.7)
NEUTROPHILS NFR BLD: 71.6 % (ref 38–73)
NRBC BLD-RTO: 0 /100 WBC
PLATELET # BLD AUTO: 66 K/UL (ref 150–350)
PMV BLD AUTO: 10 FL (ref 9.2–12.9)
POTASSIUM SERPL-SCNC: 5 MMOL/L (ref 3.5–5.1)
PROT SERPL-MCNC: 5.9 G/DL (ref 6–8.4)
RBC # BLD AUTO: 2.05 M/UL (ref 4–5.4)
SODIUM SERPL-SCNC: 129 MMOL/L (ref 136–145)
WBC # BLD AUTO: 6.26 K/UL (ref 3.9–12.7)

## 2020-07-13 PROCEDURE — 85652 RBC SED RATE AUTOMATED: CPT | Mod: NTX

## 2020-07-13 PROCEDURE — 99284 EMERGENCY DEPT VISIT MOD MDM: CPT | Mod: 25,NTX

## 2020-07-13 PROCEDURE — 99285 EMERGENCY DEPT VISIT HI MDM: CPT | Mod: NTX,,, | Performed by: EMERGENCY MEDICINE

## 2020-07-13 PROCEDURE — 36000 PLACE NEEDLE IN VEIN: CPT | Mod: NTX

## 2020-07-13 PROCEDURE — 85025 COMPLETE CBC W/AUTO DIFF WBC: CPT | Mod: NTX

## 2020-07-13 PROCEDURE — 99285 PR EMERGENCY DEPT VISIT,LEVEL V: ICD-10-PCS | Mod: NTX,,, | Performed by: EMERGENCY MEDICINE

## 2020-07-13 PROCEDURE — 80053 COMPREHEN METABOLIC PANEL: CPT | Mod: NTX

## 2020-07-13 PROCEDURE — 86140 C-REACTIVE PROTEIN: CPT | Mod: NTX

## 2020-07-13 PROCEDURE — 87040 BLOOD CULTURE FOR BACTERIA: CPT | Mod: 59,NTX

## 2020-07-13 RX ORDER — CLINDAMYCIN HYDROCHLORIDE 150 MG/1
450 CAPSULE ORAL 3 TIMES DAILY
Qty: 90 CAPSULE | Refills: 0 | Status: ON HOLD | OUTPATIENT
Start: 2020-07-13 | End: 2020-07-18

## 2020-07-13 NOTE — ED NOTES
"Patient identifiers for Zeina Mahoney 54 y.o. female checked and correct.  Chief Complaint   Patient presents with    Multiple Complaints     uc told me to come get iv antibiotics for sore on my r lower lerg with swelling, liver failure     Past Medical History:   Diagnosis Date    Chronic Hepatic encephalopathy 6/25/2020    Decompensated hepatic cirrhosis 6/25/2020    History of pancreatitis 6/25/2020    Liver cirrhosis secondary to CHAUDHARY 6/25/2020    Macrocytic anemia 6/25/2020    Other ascites 6/25/2020    Thrombocytopenia 6/25/2020     Allergies reported:   Review of patient's allergies indicates:   Allergen Reactions    Aspirin Tinitus    Ciprofloxacin Rash         LOC: Patient is awake, alert, and aware of environment with an appropriate affect. Patient is oriented x 4 and speaking appropriately.  APPEARANCE: Patient resting comfortably and in no acute distress. Patient is clean and well groomed, patient's clothing is properly fastened.  HEENT: Wearing mask. Yellow sclera noted.   SKIN: The skin is warm and dry. Patient has normal skin turgor and moist mucus membranes. Wound to left calf. Appears clean and healing well. Wound to right ankle is swollen with bruising noted. Appears red and inflammed. Pain noted; "quite sore". Instructed to come to ED for antibiotic therapy. Bruising to right wrist and right leg. Skin is jaundiced.  MUSKULOSKELETAL: Patient is moving all extremities well, no obvious deformities noted. Pulses intact. Patient gets around with wheelchair. Brought wheelchair with them. Reports having generalized weakness. Sees physical therapy.  RESPIRATORY: Airway is open and patent. Respirations are spontaneous and non-labored with normal effort and rate. Denies SOB. 100% oxygen saturation on room air.  CARDIAC: Patient has a normal rate and rhythm. 92 on cardiac monitor. Bilateral lower extremity and foot peripheral edema noted. Denies chest pain.   ABDOMEN: Distention noted. Soft and " tender upon palpation. Denies nausea and vomiting. Liver transplant potential; find out Wednesday.  NEUROLOGICAL: pupils 3 mm, PERRL. Facial expression is symmetrical. Hand grasps are equal bilaterally. Normal sensation in all extremities when touched with finger.

## 2020-07-13 NOTE — ED PROVIDER NOTES
Encounter Date: 7/13/2020       History     Chief Complaint   Patient presents with    Multiple Complaints     uc told me to come get iv antibiotics for sore on my r lower lerg with swelling, liver failure     Patient is a 54 F with liver failure 2/2 CHAUDHARY and thyroid disease who presents with a 2 day history of R ankle pain and erythema around a previous skin wound.  The patient and her  are visiting from [place] for a liver transplant evaluation and have had 2 recent hospital admission for hepatic encephalopathy, most recently being discharged after a 3 day stay on Friday 7/10.  Patient returns today for worsening R ankle pain and increased erythema and swelling around her ankle wound.   Wound on R ankle present for 2 weeks.       Denies Fever, chills, N/V, AMS, chest pain, SOB.    Endorses abdominal pain, decreased BMs. Patient also notes wound to L shin present for 2 months.         Review of patient's allergies indicates:   Allergen Reactions    Aspirin Tinitus    Ciprofloxacin Rash     Past Medical History:   Diagnosis Date    Chronic Hepatic encephalopathy 6/25/2020    Decompensated hepatic cirrhosis 6/25/2020    History of pancreatitis 6/25/2020    Liver cirrhosis secondary to CHAUDHARY 6/25/2020    Macrocytic anemia 6/25/2020    Other ascites 6/25/2020    Thrombocytopenia 6/25/2020     No past surgical history on file.  No family history on file.  Social History     Tobacco Use    Smoking status: Never Smoker    Smokeless tobacco: Never Used   Substance Use Topics    Alcohol use: Not on file    Drug use: Not on file     Review of Systems   Constitutional: Negative for activity change, appetite change and fever.   HENT: Negative for congestion and dental problem.    Eyes: Negative for discharge and itching.   Respiratory: Negative for apnea, chest tightness and shortness of breath.    Cardiovascular: Positive for leg swelling. Negative for chest pain.   Gastrointestinal: Positive for  abdominal pain. Negative for anal bleeding, blood in stool and constipation.   Endocrine: Negative for cold intolerance and heat intolerance.   Genitourinary: Negative for difficulty urinating and dysuria.   Musculoskeletal: Negative for arthralgias and back pain.   Skin: Positive for color change and wound.   Neurological: Negative for dizziness, facial asymmetry and headaches.   Psychiatric/Behavioral: Negative for agitation, behavioral problems and confusion.       Physical Exam     Initial Vitals [07/13/20 1157]   BP Pulse Resp Temp SpO2   (!) 101/49 92 18 97.9 °F (36.6 °C) 100 %      MAP       --         Physical Exam    Constitutional: She appears well-developed. No distress.   Patient appears slightly jaundiced.  States that she is at baseline.   HENT:   Head: Normocephalic and atraumatic.   Nose: Nose normal.   Eyes: EOM are normal. Pupils are equal, round, and reactive to light. Scleral icterus is present.   Neck: Normal range of motion. Neck supple.   Cardiovascular: Normal rate, regular rhythm and normal heart sounds.   Pulmonary/Chest: Breath sounds normal. No respiratory distress.   Abdominal: Soft. Bowel sounds are normal. There is abdominal tenderness.   Musculoskeletal: Normal range of motion. Tenderness and edema present.   Neurological: She is alert and oriented to person, place, and time.   Skin: Skin is warm and dry. Capillary refill takes less than 2 seconds.   1x2 cm wound to anterior right ankle.  Erythema, bruising and swelling around wound.  No Drainage.  TTP around wound.     3x4 cm wound on left proximal shin just distal to level of tibial tuberosity.  No drainage or swelling, sight erythema.     Psychiatric: She has a normal mood and affect. Thought content normal.                 ED Course   Procedures  Labs Reviewed   CULTURE, BLOOD   CULTURE, BLOOD   CBC W/ AUTO DIFFERENTIAL   COMPREHENSIVE METABOLIC PANEL   SEDIMENTATION RATE   C-REACTIVE PROTEIN          Imaging Results    None           Medical Decision Making:   History:   I obtained history from: someone other than patient.       <> Summary of History:  at bedside and helps recount Liver history.    Old Medical Records: I decided to obtain old medical records.  Initial Assessment:   Patient is a 54 F with liver failure 2/2 CHAUDHARY and thyroid disease who presents with a 2 day history of R ankle pain and erythema around a previous skin wound.  Wound to R ankle has been present for 2 weeks, wound to L shin present for 2 months.  Patient diagnosed with cellulitis and started on Abx 2 days ago.    Differential Diagnosis:   Ddx includes but not limited to cellulitis, osteomylitis, septic arthritis.  Prior dx of cellulitis with only 2 doses of abx in cirrotic patient with impaired wound healing, favor cellulitis.  Ordering x-ray to rule out osteomylitis.   Clinical Tests:   Lab Tests: Ordered and Reviewed  Radiological Study: Ordered and Reviewed  ED Management:  X-rays negative for osteomyelitis.  Given patient only had 2 days of abx in setting of liver insufficiency, patient counceled that likely will need more days of treatment before symptoms improve.  Dispo with instructions to return if the infection worsens or spreads.  Plan for to change Rx to Clinda for MRSA coverage in at risk patient.                Attending Attestation:   Physician Attestation Statement for Resident:  As the supervising MD   Physician Attestation Statement: I have personally seen and examined this patient.   I agree with the above history. -:   As the supervising MD I agree with the above PE.    As the supervising MD I agree with the above treatment, course, plan, and disposition.                                  Clinical Impression:   Final diagnoses:  [L03.90] Cellulitis        Disposition:   Disposition: Discharged  Condition: Stable                        Taj Aggarwal MD  Resident  07/13/20 2040       Sabi Hall MD  07/14/20 1140

## 2020-07-13 NOTE — DISCHARGE INSTRUCTIONS
Fill new Rx and take antibiotics as directed.  Return should the patient before febrile or if infections appears to worsen, with widened area of redness or increased drainage of pus.

## 2020-07-13 NOTE — PROVIDER PROGRESS NOTES - EMERGENCY DEPT.
y Emergency Department TeleTRIAGE Encounter Note      CHIEF COMPLAINT    Chief Complaint   Patient presents with    Multiple Complaints     uc told me to come get iv antibiotics for sore on my r lower lerg with swelling, liver failure       VITAL SIGNS   Initial Vitals [07/13/20 1157]   BP Pulse Resp Temp SpO2   (!) 101/49 92 18 97.9 °F (36.6 °C) 100 %      MAP       --            ALLERGIES    Review of patient's allergies indicates:   Allergen Reactions    Aspirin Tinitus    Ciprofloxacin Rash       PROVIDER TRIAGE NOTE  This is a teletriage evaluation of a 54 y.o. female presenting to the ED with c/o right lower leg pain and swelling.  Nursing staff reports no significant erythema on the leg, there is some increased warmth.  Small area with pus draining.  Seen at urgent care yesterday, started on Levaquin.  Appears to be worsening per the family member.  Patient is currently being evaluated for liver transplant.  Initial orders will be placed and care will be transferred to an alternate provider when patient is roomed for a full evaluation. Any additional orders and the final disposition will be determined by that provider.         ORDERS  Labs Reviewed   CBC W/ AUTO DIFFERENTIAL   COMPREHENSIVE METABOLIC PANEL       ED Orders (720h ago, onward)    Start Ordered     Status Ordering Provider    07/13/20 1203 07/13/20 1202  CBC auto differential  STAT  Collect    Ordered JOSE BELLA    07/13/20 1203 07/13/20 1202  Comprehensive metabolic panel  STAT  Collect    Ordered JOSE BELLA    07/13/20 1203 07/13/20 1202  Nursing communication  Once     Comments: Please have the patient change into a gown for examination. Thank you.    Ordered JOSE BELLA            Virtual Visit Note: The provider triage portion of this emergency department evaluation and documentation was performed via [employer], a HIPAA-compliant telemedicine application, in concert with a tele-presenter in the room. A face to face  patient evaluation with one of my colleagues will occur once the patient is placed in an emergency department room.      DISCLAIMER: This note was prepared with [employer] voice recognition transcription software. Garbled syntax, mangled pronouns, and other bizarre constructions may be attributed to that software system.

## 2020-07-15 ENCOUNTER — HOSPITAL ENCOUNTER (EMERGENCY)
Facility: HOSPITAL | Age: 55
Discharge: HOME | End: 2020-07-16
Attending: EMERGENCY MEDICINE
Payer: COMMERCIAL

## 2020-07-15 ENCOUNTER — TELEPHONE (OUTPATIENT)
Dept: TRANSPLANT | Facility: CLINIC | Age: 55
End: 2020-07-15

## 2020-07-15 ENCOUNTER — COMMITTEE REVIEW (OUTPATIENT)
Dept: TRANSPLANT | Facility: CLINIC | Age: 55
End: 2020-07-15

## 2020-07-15 VITALS
DIASTOLIC BLOOD PRESSURE: 53 MMHG | TEMPERATURE: 98 F | OXYGEN SATURATION: 98 % | HEART RATE: 95 BPM | WEIGHT: 120 LBS | SYSTOLIC BLOOD PRESSURE: 108 MMHG | RESPIRATION RATE: 18 BRPM | HEIGHT: 64 IN | BODY MASS INDEX: 20.49 KG/M2

## 2020-07-15 DIAGNOSIS — E87.1 HYPONATREMIA: ICD-10-CM

## 2020-07-15 DIAGNOSIS — L29.9 ITCHING: ICD-10-CM

## 2020-07-15 DIAGNOSIS — E87.5 HYPERKALEMIA: Primary | ICD-10-CM

## 2020-07-15 LAB
ALBUMIN SERPL BCP-MCNC: 2.7 G/DL (ref 3.5–5.2)
ALP SERPL-CCNC: 106 U/L (ref 55–135)
ALT SERPL W/O P-5'-P-CCNC: 36 U/L (ref 10–44)
ANION GAP SERPL CALC-SCNC: 5 MMOL/L (ref 8–16)
AST SERPL-CCNC: 65 U/L (ref 10–40)
BASOPHILS # BLD AUTO: 0.03 K/UL (ref 0–0.2)
BASOPHILS NFR BLD: 0.5 % (ref 0–1.9)
BILIRUB SERPL-MCNC: 5.1 MG/DL (ref 0.1–1)
BUN SERPL-MCNC: 35 MG/DL (ref 6–20)
CALCIUM SERPL-MCNC: 10.3 MG/DL (ref 8.7–10.5)
CHLORIDE SERPL-SCNC: 96 MMOL/L (ref 95–110)
CO2 SERPL-SCNC: 25 MMOL/L (ref 23–29)
CREAT SERPL-MCNC: 1.7 MG/DL (ref 0.5–1.4)
DIFFERENTIAL METHOD: ABNORMAL
EOSINOPHIL # BLD AUTO: 0.1 K/UL (ref 0–0.5)
EOSINOPHIL NFR BLD: 1.1 % (ref 0–8)
ERYTHROCYTE [DISTWIDTH] IN BLOOD BY AUTOMATED COUNT: 15 % (ref 11.5–14.5)
EST. GFR  (AFRICAN AMERICAN): 38.9 ML/MIN/1.73 M^2
EST. GFR  (NON AFRICAN AMERICAN): 33.7 ML/MIN/1.73 M^2
GLUCOSE SERPL-MCNC: 98 MG/DL (ref 70–110)
HCT VFR BLD AUTO: 21.6 % (ref 37–48.5)
HGB BLD-MCNC: 7.3 G/DL (ref 12–16)
IMM GRANULOCYTES # BLD AUTO: 0.05 K/UL (ref 0–0.04)
IMM GRANULOCYTES NFR BLD AUTO: 0.8 % (ref 0–0.5)
LYMPHOCYTES # BLD AUTO: 1.2 K/UL (ref 1–4.8)
LYMPHOCYTES NFR BLD: 17.9 % (ref 18–48)
MCH RBC QN AUTO: 36.1 PG (ref 27–31)
MCHC RBC AUTO-ENTMCNC: 33.8 G/DL (ref 32–36)
MCV RBC AUTO: 107 FL (ref 82–98)
MONOCYTES # BLD AUTO: 0.5 K/UL (ref 0.3–1)
MONOCYTES NFR BLD: 7.1 % (ref 4–15)
NEUTROPHILS # BLD AUTO: 4.8 K/UL (ref 1.8–7.7)
NEUTROPHILS NFR BLD: 72.6 % (ref 38–73)
NRBC BLD-RTO: 0 /100 WBC
PLATELET # BLD AUTO: 89 K/UL (ref 150–350)
PMV BLD AUTO: 11.1 FL (ref 9.2–12.9)
POTASSIUM SERPL-SCNC: 6.4 MMOL/L (ref 3.5–5.1)
PROT SERPL-MCNC: 6.6 G/DL (ref 6–8.4)
RBC # BLD AUTO: 2.02 M/UL (ref 4–5.4)
SODIUM SERPL-SCNC: 126 MMOL/L (ref 136–145)
WBC # BLD AUTO: 6.64 K/UL (ref 3.9–12.7)

## 2020-07-15 PROCEDURE — 96375 TX/PRO/DX INJ NEW DRUG ADDON: CPT | Mod: NTX

## 2020-07-15 PROCEDURE — 85025 COMPLETE CBC W/AUTO DIFF WBC: CPT | Mod: 91,NTX

## 2020-07-15 PROCEDURE — G0180 PR HOME HEALTH MD CERTIFICATION: ICD-10-PCS | Mod: NTX,,, | Performed by: HOSPITALIST

## 2020-07-15 PROCEDURE — 96374 THER/PROPH/DIAG INJ IV PUSH: CPT | Mod: NTX

## 2020-07-15 PROCEDURE — 99284 EMERGENCY DEPT VISIT MOD MDM: CPT | Mod: 25,NTX

## 2020-07-15 PROCEDURE — 99285 PR EMERGENCY DEPT VISIT,LEVEL V: ICD-10-PCS | Mod: NTX,,, | Performed by: EMERGENCY MEDICINE

## 2020-07-15 PROCEDURE — 96361 HYDRATE IV INFUSION ADD-ON: CPT | Mod: NTX

## 2020-07-15 PROCEDURE — G0180 MD CERTIFICATION HHA PATIENT: HCPCS | Mod: NTX,,, | Performed by: HOSPITALIST

## 2020-07-15 PROCEDURE — S0028 INJECTION, FAMOTIDINE, 20 MG: HCPCS | Mod: NTX | Performed by: STUDENT IN AN ORGANIZED HEALTH CARE EDUCATION/TRAINING PROGRAM

## 2020-07-15 PROCEDURE — 25000242 PHARM REV CODE 250 ALT 637 W/ HCPCS: Mod: NTX | Performed by: STUDENT IN AN ORGANIZED HEALTH CARE EDUCATION/TRAINING PROGRAM

## 2020-07-15 PROCEDURE — 99285 EMERGENCY DEPT VISIT HI MDM: CPT | Mod: NTX,,, | Performed by: EMERGENCY MEDICINE

## 2020-07-15 PROCEDURE — 25000003 PHARM REV CODE 250: Mod: NTX | Performed by: STUDENT IN AN ORGANIZED HEALTH CARE EDUCATION/TRAINING PROGRAM

## 2020-07-15 PROCEDURE — 80053 COMPREHEN METABOLIC PANEL: CPT | Mod: 91,NTX

## 2020-07-15 PROCEDURE — 94761 N-INVAS EAR/PLS OXIMETRY MLT: CPT | Mod: NTX

## 2020-07-15 PROCEDURE — 94640 AIRWAY INHALATION TREATMENT: CPT | Mod: NTX

## 2020-07-15 PROCEDURE — 63600175 PHARM REV CODE 636 W HCPCS: Mod: NTX | Performed by: STUDENT IN AN ORGANIZED HEALTH CARE EDUCATION/TRAINING PROGRAM

## 2020-07-15 RX ORDER — LACTULOSE 10 G/15ML
20 SOLUTION ORAL; RECTAL 3 TIMES DAILY PRN
Qty: 2700 ML | Refills: 2 | Status: SHIPPED | OUTPATIENT
Start: 2020-07-15 | End: 2020-08-14

## 2020-07-15 RX ORDER — DEXAMETHASONE SODIUM PHOSPHATE 4 MG/ML
10 INJECTION, SOLUTION INTRA-ARTICULAR; INTRALESIONAL; INTRAMUSCULAR; INTRAVENOUS; SOFT TISSUE
Status: COMPLETED | OUTPATIENT
Start: 2020-07-15 | End: 2020-07-15

## 2020-07-15 RX ORDER — FAMOTIDINE 10 MG/ML
20 INJECTION INTRAVENOUS
Status: COMPLETED | OUTPATIENT
Start: 2020-07-15 | End: 2020-07-15

## 2020-07-15 RX ORDER — ALBUTEROL SULFATE 2.5 MG/.5ML
15 SOLUTION RESPIRATORY (INHALATION)
Status: COMPLETED | OUTPATIENT
Start: 2020-07-15 | End: 2020-07-15

## 2020-07-15 RX ORDER — IBUPROFEN 200 MG
30 TABLET ORAL
Status: DISCONTINUED | OUTPATIENT
Start: 2020-07-15 | End: 2020-07-16

## 2020-07-15 RX ORDER — SULFAMETHOXAZOLE AND TRIMETHOPRIM 800; 160 MG/1; MG/1
1 TABLET ORAL 2 TIMES DAILY
Status: CANCELLED | OUTPATIENT
Start: 2020-07-15 | End: 2020-07-25

## 2020-07-15 RX ADMIN — SODIUM CHLORIDE 1000 ML: 0.9 INJECTION, SOLUTION INTRAVENOUS at 11:07

## 2020-07-15 RX ADMIN — FAMOTIDINE 20 MG: 10 INJECTION INTRAVENOUS at 11:07

## 2020-07-15 RX ADMIN — ALBUTEROL SULFATE 15 MG: 2.5 SOLUTION RESPIRATORY (INHALATION) at 11:07

## 2020-07-15 RX ADMIN — DEXAMETHASONE SODIUM PHOSPHATE 10 MG: 4 INJECTION, SOLUTION INTRA-ARTICULAR; INTRALESIONAL; INTRAMUSCULAR; INTRAVENOUS; SOFT TISSUE at 11:07

## 2020-07-15 NOTE — TELEPHONE ENCOUNTER
Returned call to  nurse. No answer. Left VM requesting a call back. Will route refill for lactulose.

## 2020-07-15 NOTE — TELEPHONE ENCOUNTER
Returned call to pt. Per her , Yonis, pt has full body itching since starting her clindamycin. She has tried topical anti-itch creams & benadrykl to no avail. Called to ask for an alternative antibiotic. Per Dr. Reyes, Bactrim DS 1 tab twice per day for 10 days. Prescription routed.

## 2020-07-15 NOTE — TELEPHONE ENCOUNTER
Called to update pt & family on pt's case. Pt has been approved for listing for a liver transplant. Will request listing clearance from pt's insurance company & follow up with pt once received. Understanding expressed. No other questions at this time.

## 2020-07-15 NOTE — COMMITTEE REVIEW
Zeina Mahoney's case presented to selection committee.  Patient has been accepted for liver transplant due to Cirrhosis: Fatty Liver ([place]) and complications of end stage liver disease including hyperbilirubinemia, hypoalbuminemia, coagulopathy, ascites, severe malnutrition, encephalopathy, jaundice, pancytopenia, SBP, portal hypertension, splenomegaly and thrombocytopenia with a MELD score of 22.  Patient has no absolute contraindications for liver transplant.  Patient will be listed pending financial approval.    Patient will accept HBcAb positive livers.  Patient will accept HCVAB positive livers.  Patient will accept DCD livers.  Patient will accept HCV HERACLIO positive livers  Patient will accept HBV HERACLIO positive livers  I was present at the committee meeting and attest to the decision of the committee.    Joaquin Malhotra  07/16/2020  I was present at the committee meeting and attest to the decision of the committee.    Joaquin Malhotra  07/16/2020  I was present at the committee meeting and attest to the decision of the committee.    Joaquin Malhotra  02/03/2021

## 2020-07-15 NOTE — TELEPHONE ENCOUNTER
----- Message from Tay Kumar RN sent at 7/15/2020  4:51 PM CDT -----  Regarding: FW: Yonis Mahoney:     ----- Message -----  From: Jose Meraz  Sent: 7/15/2020   4:05 PM CDT  To: [facility] Pre-Liver Transplant Clinical  Subject: Yonis Mahoney:                               Pt calling for change in antibiotics for pt. Seems to be having an allergic reaction     [phone]

## 2020-07-15 NOTE — TELEPHONE ENCOUNTER
----- Message from Colleen Aranda sent at 7/15/2020 11:13 AM CDT -----  Contact: [facility] Nurse  Yonis is calling to speak to coordinator regarding pt pt has an injury on her leg pt may be having a reaction to the medication given and she needs a refill rx for her lactulose    Yonis contact [phone]

## 2020-07-16 PROCEDURE — 93010 ELECTROCARDIOGRAM REPORT: CPT | Mod: NTX,,, | Performed by: INTERNAL MEDICINE

## 2020-07-16 PROCEDURE — 63600175 PHARM REV CODE 636 W HCPCS: Mod: NTX | Performed by: STUDENT IN AN ORGANIZED HEALTH CARE EDUCATION/TRAINING PROGRAM

## 2020-07-16 PROCEDURE — 93010 EKG 12-LEAD: ICD-10-PCS | Mod: NTX,,, | Performed by: INTERNAL MEDICINE

## 2020-07-16 PROCEDURE — 25000003 PHARM REV CODE 250: Mod: NTX | Performed by: STUDENT IN AN ORGANIZED HEALTH CARE EDUCATION/TRAINING PROGRAM

## 2020-07-16 PROCEDURE — 93005 ELECTROCARDIOGRAM TRACING: CPT | Mod: NTX

## 2020-07-16 RX ORDER — FUROSEMIDE 10 MG/ML
40 INJECTION INTRAMUSCULAR; INTRAVENOUS
Status: COMPLETED | OUTPATIENT
Start: 2020-07-16 | End: 2020-07-16

## 2020-07-16 RX ADMIN — SODIUM POLYSTYRENE SULFONATE 30 G: 15 SUSPENSION ORAL; RECTAL at 01:07

## 2020-07-16 RX ADMIN — FUROSEMIDE 40 MG: 10 INJECTION, SOLUTION INTRAMUSCULAR; INTRAVENOUS at 01:07

## 2020-07-16 NOTE — ED TRIAGE NOTES
Zeina Mahoney, a 54 y.o. female presents to the ED w/ complaint of generalized itching after taking clindamycin. No rash noted at this time    Triage note:  Chief Complaint   Patient presents with    Itching     pt reports generalized itching x 2 days. Denies SOB, throat swelling, chest tightness. Unsure what caused the itching.     Review of patient's allergies indicates:   Allergen Reactions    Aspirin Tinitus    Ciprofloxacin Rash     Past Medical History:   Diagnosis Date    Chronic Hepatic encephalopathy 6/25/2020    Decompensated hepatic cirrhosis 6/25/2020    History of pancreatitis 6/25/2020    Liver cirrhosis secondary to CHAUDHARY 6/25/2020    Macrocytic anemia 6/25/2020    Other ascites 6/25/2020    Thrombocytopenia 6/25/2020     LOC: Patient name and date of birth verified. The patient is awake, alert and aware of environment with an appropriate affect, the patient is oriented x 3 and speaking appropriately.   APPEARANCE: Patient resting comfortably, patient is clean and well groomed, patient's clothing is properly fastened.  SKIN: The skin is warm and dry, color consistent with ethnicity, patient has normal skin turgor and moist mucus membranes, skin intact, no breakdown or bruising noted.  MUSCULOSKELETAL: Patient moving all extremities well, no obvious swelling or deformities noted.   RESPIRATORY: Respirations are spontaneous, patient has a normal effort and rate, no accessory muscle use noted.  CARDIAC: Patient has a normal rate and rhythm, no periphreal edema noted, capillary refill < 3 seconds.  ABDOMEN: Soft and non tender to palpation, no distention noted. Bowel sounds present in all four quadrants.  NEUROLOGIC: Eyes open spontaneously, behavior appropriate to situation, follows commands, facial expression symmetrical, bilateral hand grasp equal and even, purposeful motor response noted, normal sensation in all extremities when touched with a finger.

## 2020-07-16 NOTE — ED PROVIDER NOTES
"Encounter Date: 7/15/2020     History     Chief Complaint   Patient presents with    Itching     pt reports generalized itching x 2 days. Denies SOB, throat swelling, chest tightness. Unsure what caused the itching.     HPI     Ms. Zeina Mahoney is a 55 y/o F with a PMH of liver cirrhosis secondary to CHAUDHARY, hepatic encephalopathy, pancreatitis who has presented for itching. Patient reports that she was recently started on an antibiotic for "her liver" (per chart review it appears she was started on levofloxacin for cellulitis on 7/12, then switched to clindamycin on 7/13 for MRSA coverage). She has been itching severely since. She says she itches "all over." Patient has history of allergic reaction to keflex in the past. Conversation in "[place]" from today appears that Dr. Reyes of liver transplant sent patient a prescription for Bactrim DS to [facility] pharmacy. Unclear if patient has picked up new prescription, likely not. Patient reports taking benadryl with no relief. She took 2 pills of benadryl prior to arrival, with no relief of itching. Patient denies any respiratory distress; denies any facial swelling, shortness of breath, wheezing, chest pain, change in voice, excessive secretions, abdominal pain, nausea, vomiting, etc. All other review of systems negative. Of note, patient has been approved for listing for a liver transplant.     Review of patient's allergies indicates:   Allergen Reactions    Aspirin Tinitus    Ciprofloxacin Rash     Past Medical History:   Diagnosis Date    Chronic Hepatic encephalopathy 6/25/2020    Decompensated hepatic cirrhosis 6/25/2020    History of pancreatitis 6/25/2020    Liver cirrhosis secondary to CHAUDHARY 6/25/2020    Macrocytic anemia 6/25/2020    Other ascites 6/25/2020    Thrombocytopenia 6/25/2020     History reviewed. No pertinent surgical history.  History reviewed. No pertinent family history.  Social History     Tobacco Use    Smoking status: Never Smoker    " Smokeless tobacco: Never Used   Substance Use Topics    Alcohol use: Never     Frequency: Never    Drug use: Never     Review of Systems   Constitutional: Negative for activity change, appetite change, chills, fatigue and fever.   HENT: Negative for congestion, rhinorrhea, sneezing and sore throat.    Eyes: Negative for photophobia, redness and visual disturbance.   Respiratory: Negative for cough, shortness of breath and wheezing.    Cardiovascular: Negative for chest pain and leg swelling.   Gastrointestinal: Negative for abdominal pain, diarrhea, nausea and vomiting.   Genitourinary: Negative for frequency, hematuria, urgency and vaginal bleeding.   Musculoskeletal: Negative for arthralgias, back pain and myalgias.   Skin: Negative for color change, pallor and wound.        Itching   Neurological: Negative for dizziness, weakness, numbness and headaches.   Hematological: Does not bruise/bleed easily.   Psychiatric/Behavioral: Negative for agitation, behavioral problems and confusion.   All other systems reviewed and are negative.      Physical Exam     Initial Vitals [07/15/20 2047]   BP Pulse Resp Temp SpO2   (!) 108/53 91 17 98.1 °F (36.7 °C) 100 %      MAP       --         Physical Exam    Nursing note and vitals reviewed.  Constitutional: She appears well-developed and well-nourished. She is not diaphoretic. No distress.   54 year old female, jaundiced, appears uncomfortable. Alert and oriented.   HENT:   Head: Normocephalic and atraumatic.   Right Ear: External ear normal.   Left Ear: External ear normal.   Nose: Nose normal.   Mouth/Throat: Oropharynx is clear and moist.   Eyes: Conjunctivae and EOM are normal. Pupils are equal, round, and reactive to light. Scleral icterus is present.   Neck: Normal range of motion. Neck supple. No tracheal deviation present.   Cardiovascular: Normal rate, regular rhythm, normal heart sounds and intact distal pulses.   No murmur heard.  Pulmonary/Chest: Breath sounds  normal. No stridor. No respiratory distress. She has no wheezes. She exhibits no tenderness.   Abdominal: Soft. Bowel sounds are normal. She exhibits no distension. There is no abdominal tenderness. There is no guarding.   Musculoskeletal: Normal range of motion. Edema present. No tenderness.      Comments: Bilateral lower extremity edema  Small scab/wound on left ankle   Neurological: She is alert and oriented to person, place, and time. She has normal strength. No cranial nerve deficit or sensory deficit. GCS score is 15. GCS eye subscore is 4. GCS verbal subscore is 5. GCS motor subscore is 6.   Skin: Skin is warm and dry. Capillary refill takes less than 2 seconds. No erythema. No pallor.   Jaundiced   Psychiatric: She has a normal mood and affect. Her behavior is normal. Thought content normal.       ED Course   Procedures  Labs Reviewed   CBC W/ AUTO DIFFERENTIAL - Abnormal; Notable for the following components:       Result Value    RBC 2.02 (*)     Hemoglobin 7.3 (*)     Hematocrit 21.6 (*)     Mean Corpuscular Volume 107 (*)     Mean Corpuscular Hemoglobin 36.1 (*)     RDW 15.0 (*)     Platelets 89 (*)     Immature Granulocytes 0.8 (*)     Immature Grans (Abs) 0.05 (*)     Lymph% 17.9 (*)     All other components within normal limits   COMPREHENSIVE METABOLIC PANEL - Abnormal; Notable for the following components:    Sodium 126 (*)     Potassium 6.4 (*)     BUN, Bld 35 (*)     Creatinine 1.7 (*)     Albumin 2.7 (*)     Total Bilirubin 5.1 (*)     AST 65 (*)     Anion Gap 5 (*)     eGFR if  38.9 (*)     eGFR if non  33.7 (*)     All other components within normal limits   BASIC METABOLIC PANEL   SARS-COV-2 RNA AMPLIFICATION, QUAL   POCT GLUCOSE MONITORING CONTINUOUS     EKG Readings: (Independently Interpreted)   Initial Reading: No STEMI. Rhythm: Sinus Tachycardia. Heart Rate: 111. Ectopy: No Ectopy. Conduction: Normal. Clinical Impression: Sinus Tachycardia   Sinus  "tachycardia rate of 111       Imaging Results    None          Medical Decision Making:   Initial Assessment:   55 y/o F with a PMH of liver cirrhosis 2/2 CHAUDHARY, hepatic encephalopathy, pancreatitis who has presented for itching. Patient reports 2 days of non-stop itching not relieved by benadryl. Patient recently started on abx for cellulitis (initially levofloxacin, switched to clindamycin, now with script for bactrim but has not started bactrim). Patient has history of allergic reactions to abx (keflex). Patient appears jaundiced, with scleral icterus. Bilateral lower extremity edema with wound on left lower leg. Vitals stable.   Differential Diagnosis:   Allergic reaction, elevated bilirubin  Clinical Tests:   Lab Tests: Ordered  ED Management:  Work-up concerning for the following: Potassium 6.4 (elevated from 5.8 earlier today), sodium 126 (decreased from 129 earlier today), creatinine 1.7, bilirubin 5.1 (down from 6 earlier today).   Have ordered EKG and initiated hyperkalemia shift with albuterol nebs and insulin.   Patient to receive famotidine and dexamethasone in addition for itching (patient already had benadryl prior to arrival).   Will repeat BMP after shift.   Anticipate admission for abnormal lab-work.   Will continue to reassess and update.    Update:  Patient decided she wanted to leave AMA. Cited reason as she wants to sleep in her own bed and "not be in the hospital anymore." Discussed serious risks of hyperkalemia and hyponatremia with patient. Patient understood and was able to repeat back to me on multiple occasions that she has "high potassium and low sodium" and she could "die" if she goes home. "My heart could stop." Discussed risks such as seizure, coma, arrhythmia, missed medical diagnosis, decompensation, etc.  Patient able to state full name, date, and location. Oriented x3. Gave patient dose of lasix (40mg) in addition to Kayexalate before discharge. Discontinued insulin and glucose. " Patient signed out AMA at this time.                   ED Course as of Jul 16 0142   Wed Jul 15, 2020   2351 Potassium(!!): 6.4 [HM]   2351 Sodium(!): 126 [HM]   2351 Creatinine(!): 1.7 [HM]   2351 BILIRUBIN TOTAL(!): 5.1 [HM]      ED Course User Index  [HM] Vandana Lopez MD                Clinical Impression:       ICD-10-CM ICD-9-CM   1. Hyperkalemia  E87.5 276.7   2. Itching  L29.9 698.9   3. Hyponatremia  E87.1 276.1             ED Disposition Condition    AMA                           Vandana Lopez MD  Resident  07/16/20 0142

## 2020-07-16 NOTE — ED NOTES
"Pt reports "I just want to go home, I dont feel good and I refuse to stay and lay in this bed". This RN attempted to re-direct pt and educate the pt on the need for staying. Pt remains adamant on leaving. MD Nasra notified.   "

## 2020-07-16 NOTE — ED NOTES
At 0110- 20 G IV removed to R wrist. Catheter intact. AMA forms signed by Resident Vandana Lopez . Risks explained to pt by Resident. Pt verbalized understanding. This RN as witness. Pt wheeled out of ED by this RN with mask in place.

## 2020-07-16 NOTE — DISCHARGE INSTRUCTIONS
Recent Results (from the past 6 hour(s))   CBC auto differential    Collection Time: 07/15/20 10:08 PM   Result Value Ref Range    WBC 6.64 3.90 - 12.70 K/uL    RBC 2.02 (L) 4.00 - 5.40 M/uL    Hemoglobin 7.3 (L) 12.0 - 16.0 g/dL    Hematocrit 21.6 (L) 37.0 - 48.5 %    Mean Corpuscular Volume 107 (H) 82 - 98 fL    Mean Corpuscular Hemoglobin 36.1 (H) 27.0 - 31.0 pg    Mean Corpuscular Hemoglobin Conc 33.8 32.0 - 36.0 g/dL    RDW 15.0 (H) 11.5 - 14.5 %    Platelets 89 (L) 150 - 350 K/uL    MPV 11.1 9.2 - 12.9 fL    Immature Granulocytes 0.8 (H) 0.0 - 0.5 %    Gran # (ANC) 4.8 1.8 - 7.7 K/uL    Immature Grans (Abs) 0.05 (H) 0.00 - 0.04 K/uL    Lymph # 1.2 1.0 - 4.8 K/uL    Mono # 0.5 0.3 - 1.0 K/uL    Eos # 0.1 0.0 - 0.5 K/uL    Baso # 0.03 0.00 - 0.20 K/uL    nRBC 0 0 /100 WBC    Gran% 72.6 38.0 - 73.0 %    Lymph% 17.9 (L) 18.0 - 48.0 %    Mono% 7.1 4.0 - 15.0 %    Eosinophil% 1.1 0.0 - 8.0 %    Basophil% 0.5 0.0 - 1.9 %    Differential Method Automated    Comprehensive metabolic panel    Collection Time: 07/15/20 10:08 PM   Result Value Ref Range    Sodium 126 (L) 136 - 145 mmol/L    Potassium 6.4 (HH) 3.5 - 5.1 mmol/L    Chloride 96 95 - 110 mmol/L    CO2 25 23 - 29 mmol/L    Glucose 98 70 - 110 mg/dL    BUN, Bld 35 (H) 6 - 20 mg/dL    Creatinine 1.7 (H) 0.5 - 1.4 mg/dL    Calcium 10.3 8.7 - 10.5 mg/dL    Total Protein 6.6 6.0 - 8.4 g/dL    Albumin 2.7 (L) 3.5 - 5.2 g/dL    Total Bilirubin 5.1 (H) 0.1 - 1.0 mg/dL    Alkaline Phosphatase 106 55 - 135 U/L    AST 65 (H) 10 - 40 U/L    ALT 36 10 - 44 U/L    Anion Gap 5 (L) 8 - 16 mmol/L    eGFR if African American 38.9 (A) >60 mL/min/1.73 m^2    eGFR if non  33.7 (A) >60 mL/min/1.73 m^2

## 2020-07-17 ENCOUNTER — TELEPHONE (OUTPATIENT)
Dept: TRANSPLANT | Facility: CLINIC | Age: 55
End: 2020-07-17

## 2020-07-17 RX ORDER — SULFAMETHOXAZOLE AND TRIMETHOPRIM 400; 80 MG/1; MG/1
1 TABLET ORAL 2 TIMES DAILY
Qty: 20 TABLET | Refills: 0 | Status: SHIPPED | OUTPATIENT
Start: 2020-07-17 | End: 2020-07-28

## 2020-07-17 NOTE — TELEPHONE ENCOUNTER
Returned call to pt's , Yonis. He was inquiring re: pt;s transplant status. Explained that we are awaiting financial clearance from her insurance co & will be following up with pt for a lab appt as soon as clearance is obtained. Requested that he or patient call us when health issues arise so that we may triage pt's needs instead of her always having to go to the ED. Yonis stated that pt is getting anxious about being away from home. Assured him that pt is safe here & in good care with the team. Yonis agreed that she has had great care at [facility] & is very happy with the team. He thinks pt's is just feeling antsy waiting here instead of waiting in [place]. Offered understanding & support. Reviewed pt's case with Yonis & explained that her MELD has been high lately and there is potential for her to get offers sooner rather than later depending on what the list looks like once she is listed. Understanding expressed. No other questions at this time.

## 2020-07-17 NOTE — TELEPHONE ENCOUNTER
----- Message from Colleen Aranda sent at 7/17/2020  9:41 AM CDT -----  Contact: Yonis   Mr Yonis is calling to speak to coordinator, he stated his wife is having anxiety about being in the city and also wants to know about her antibiotics          Pt contact [phone]

## 2020-07-18 ENCOUNTER — HOSPITAL ENCOUNTER (INPATIENT)
Facility: HOSPITAL | Age: 55
LOS: 3 days | Discharge: HOME | DRG: 439 | End: 2020-07-21
Attending: EMERGENCY MEDICINE | Admitting: SURGERY
Payer: COMMERCIAL

## 2020-07-18 DIAGNOSIS — R10.84 GENERALIZED ABDOMINAL PAIN: ICD-10-CM

## 2020-07-18 DIAGNOSIS — K75.81 LIVER CIRRHOSIS SECONDARY TO NASH: ICD-10-CM

## 2020-07-18 DIAGNOSIS — D68.4 ACQUIRED COAGULATION FACTOR DEFICIENCY: ICD-10-CM

## 2020-07-18 DIAGNOSIS — K85.90 ACUTE PANCREATITIS, UNSPECIFIED COMPLICATION STATUS, UNSPECIFIED PANCREATITIS TYPE: ICD-10-CM

## 2020-07-18 DIAGNOSIS — E87.5 HYPERKALEMIA: ICD-10-CM

## 2020-07-18 DIAGNOSIS — R74.8 ABNORMAL SERUM LIPASE LEVEL: ICD-10-CM

## 2020-07-18 DIAGNOSIS — K74.60 LIVER CIRRHOSIS SECONDARY TO NASH: ICD-10-CM

## 2020-07-18 DIAGNOSIS — R10.9 ABDOMINAL PAIN, UNSPECIFIED ABDOMINAL LOCATION: ICD-10-CM

## 2020-07-18 DIAGNOSIS — K74.60 DECOMPENSATED HEPATIC CIRRHOSIS: ICD-10-CM

## 2020-07-18 DIAGNOSIS — R10.9 ABDOMINAL PAIN: Primary | ICD-10-CM

## 2020-07-18 DIAGNOSIS — E87.1 HYPONATREMIA: ICD-10-CM

## 2020-07-18 DIAGNOSIS — K72.90 DECOMPENSATED HEPATIC CIRRHOSIS: ICD-10-CM

## 2020-07-18 LAB
ABO + RH BLD: NORMAL
ALBUMIN SERPL BCP-MCNC: 2.9 G/DL (ref 3.5–5.2)
ALP SERPL-CCNC: 107 U/L (ref 55–135)
ALT SERPL W/O P-5'-P-CCNC: 35 U/L (ref 10–44)
AMMONIA PLAS-SCNC: 31 UMOL/L (ref 10–50)
AMPHET+METHAMPHET UR QL: NEGATIVE
ANION GAP SERPL CALC-SCNC: 4 MMOL/L (ref 8–16)
AST SERPL-CCNC: 42 U/L (ref 10–40)
BACTERIA BLD CULT: NORMAL
BACTERIA BLD CULT: NORMAL
BARBITURATES UR QL SCN>200 NG/ML: NEGATIVE
BASOPHILS # BLD AUTO: 0.01 K/UL (ref 0–0.2)
BASOPHILS NFR BLD: 0.1 % (ref 0–1.9)
BENZODIAZ UR QL SCN>200 NG/ML: NEGATIVE
BILIRUB SERPL-MCNC: 5.7 MG/DL (ref 0.1–1)
BILIRUB UR QL STRIP: NEGATIVE
BLD GP AB SCN CELLS X3 SERPL QL: NORMAL
BUN SERPL-MCNC: 45 MG/DL (ref 6–20)
BZE UR QL SCN: NEGATIVE
CALCIUM SERPL-MCNC: 9.9 MG/DL (ref 8.7–10.5)
CANNABINOIDS UR QL SCN: NEGATIVE
CHLORIDE SERPL-SCNC: 99 MMOL/L (ref 95–110)
CHOLEST SERPL-MCNC: 109 MG/DL (ref 120–199)
CHOLEST/HDLC SERPL: 12.1 {RATIO} (ref 2–5)
CLARITY UR REFRACT.AUTO: CLEAR
CO2 SERPL-SCNC: 24 MMOL/L (ref 23–29)
COLOR UR AUTO: YELLOW
CREAT SERPL-MCNC: 1.3 MG/DL (ref 0.5–1.4)
CREAT UR-MCNC: 22 MG/DL (ref 15–325)
CREAT UR-MCNC: 23 MG/DL (ref 15–325)
DIFFERENTIAL METHOD: ABNORMAL
EOSINOPHIL # BLD AUTO: 0 K/UL (ref 0–0.5)
EOSINOPHIL NFR BLD: 0.1 % (ref 0–8)
ERYTHROCYTE [DISTWIDTH] IN BLOOD BY AUTOMATED COUNT: 14.9 % (ref 11.5–14.5)
EST. GFR  (AFRICAN AMERICAN): 53.7 ML/MIN/1.73 M^2
EST. GFR  (NON AFRICAN AMERICAN): 46.6 ML/MIN/1.73 M^2
ETHANOL SERPL-MCNC: <10 MG/DL
GLUCOSE SERPL-MCNC: 154 MG/DL (ref 70–110)
GLUCOSE UR QL STRIP: NEGATIVE
HCT VFR BLD AUTO: 23.8 % (ref 37–48.5)
HDLC SERPL-MCNC: 9 MG/DL (ref 40–75)
HDLC SERPL: 8.3 % (ref 20–50)
HGB BLD-MCNC: 8 G/DL (ref 12–16)
HGB UR QL STRIP: NEGATIVE
IMM GRANULOCYTES # BLD AUTO: 0.15 K/UL (ref 0–0.04)
IMM GRANULOCYTES NFR BLD AUTO: 1.3 % (ref 0–0.5)
INR PPP: 1.3 (ref 0.8–1.2)
KETONES UR QL STRIP: NEGATIVE
LDLC SERPL CALC-MCNC: 87.4 MG/DL (ref 63–159)
LEUKOCYTE ESTERASE UR QL STRIP: NEGATIVE
LIPASE SERPL-CCNC: >1000 U/L (ref 4–60)
LYMPHOCYTES # BLD AUTO: 1.1 K/UL (ref 1–4.8)
LYMPHOCYTES NFR BLD: 9.5 % (ref 18–48)
MAGNESIUM SERPL-MCNC: 1.7 MG/DL (ref 1.6–2.6)
MCH RBC QN AUTO: 36.2 PG (ref 27–31)
MCHC RBC AUTO-ENTMCNC: 33.6 G/DL (ref 32–36)
MCV RBC AUTO: 108 FL (ref 82–98)
METHADONE UR QL SCN>300 NG/ML: NEGATIVE
MONOCYTES # BLD AUTO: 1.1 K/UL (ref 0.3–1)
MONOCYTES NFR BLD: 8.9 % (ref 4–15)
NEUTROPHILS # BLD AUTO: 9.6 K/UL (ref 1.8–7.7)
NEUTROPHILS NFR BLD: 80.1 % (ref 38–73)
NITRITE UR QL STRIP: NEGATIVE
NONHDLC SERPL-MCNC: 100 MG/DL
NRBC BLD-RTO: 0 /100 WBC
OPIATES UR QL SCN: NORMAL
OSMOLALITY SERPL: 291 MOSM/KG (ref 275–295)
OSMOLALITY UR: 418 MOSM/KG (ref 50–1200)
PCP UR QL SCN>25 NG/ML: NEGATIVE
PH UR STRIP: 7 [PH] (ref 5–8)
PLATELET # BLD AUTO: 82 K/UL (ref 150–350)
PMV BLD AUTO: 9.9 FL (ref 9.2–12.9)
POTASSIUM SERPL-SCNC: 5.2 MMOL/L (ref 3.5–5.1)
PROT SERPL-MCNC: 6.4 G/DL (ref 6–8.4)
PROT UR QL STRIP: NEGATIVE
PROTHROMBIN TIME: 13 SEC (ref 9–12.5)
RBC # BLD AUTO: 2.21 M/UL (ref 4–5.4)
SARS-COV-2 RDRP RESP QL NAA+PROBE: NEGATIVE
SODIUM SERPL-SCNC: 127 MMOL/L (ref 136–145)
SODIUM UR-SCNC: 105 MMOL/L (ref 20–250)
SP GR UR STRIP: 1.01 (ref 1–1.03)
TOXICOLOGY INFORMATION: NORMAL
TRIGL SERPL-MCNC: 63 MG/DL (ref 30–150)
TROPONIN I SERPL DL<=0.01 NG/ML-MCNC: 0.02 NG/ML (ref 0–0.03)
URN SPEC COLLECT METH UR: NORMAL
WBC # BLD AUTO: 11.94 K/UL (ref 3.9–12.7)

## 2020-07-18 PROCEDURE — 25500020 PHARM REV CODE 255: Mod: NTX | Performed by: EMERGENCY MEDICINE

## 2020-07-18 PROCEDURE — 82140 ASSAY OF AMMONIA: CPT | Mod: NTX

## 2020-07-18 PROCEDURE — 80053 COMPREHEN METABOLIC PANEL: CPT | Mod: NTX

## 2020-07-18 PROCEDURE — 80320 DRUG SCREEN QUANTALCOHOLS: CPT | Mod: NTX

## 2020-07-18 PROCEDURE — 36415 COLL VENOUS BLD VENIPUNCTURE: CPT | Mod: NTX

## 2020-07-18 PROCEDURE — 84300 ASSAY OF URINE SODIUM: CPT | Mod: NTX

## 2020-07-18 PROCEDURE — U0002 COVID-19 LAB TEST NON-CDC: HCPCS | Mod: NTX

## 2020-07-18 PROCEDURE — 99285 PR EMERGENCY DEPT VISIT,LEVEL V: ICD-10-PCS | Mod: NTX,,, | Performed by: PHYSICIAN ASSISTANT

## 2020-07-18 PROCEDURE — 81003 URINALYSIS AUTO W/O SCOPE: CPT | Mod: NTX,59

## 2020-07-18 PROCEDURE — 25000003 PHARM REV CODE 250: Mod: NTX | Performed by: NURSE PRACTITIONER

## 2020-07-18 PROCEDURE — 99223 1ST HOSP IP/OBS HIGH 75: CPT | Mod: NTX,,, | Performed by: NURSE PRACTITIONER

## 2020-07-18 PROCEDURE — 20600001 HC STEP DOWN PRIVATE ROOM: Mod: NTX

## 2020-07-18 PROCEDURE — 96375 TX/PRO/DX INJ NEW DRUG ADDON: CPT | Mod: NTX

## 2020-07-18 PROCEDURE — 63600175 PHARM REV CODE 636 W HCPCS: Mod: NTX | Performed by: PHYSICIAN ASSISTANT

## 2020-07-18 PROCEDURE — 87502 INFLUENZA DNA AMP PROBE: CPT | Mod: NTX

## 2020-07-18 PROCEDURE — 93010 EKG 12-LEAD: ICD-10-PCS | Mod: NTX,,, | Performed by: INTERNAL MEDICINE

## 2020-07-18 PROCEDURE — 83690 ASSAY OF LIPASE: CPT | Mod: NTX

## 2020-07-18 PROCEDURE — 94761 N-INVAS EAR/PLS OXIMETRY MLT: CPT | Mod: NTX

## 2020-07-18 PROCEDURE — 85610 PROTHROMBIN TIME: CPT | Mod: NTX

## 2020-07-18 PROCEDURE — 99285 EMERGENCY DEPT VISIT HI MDM: CPT | Mod: 25,NTX

## 2020-07-18 PROCEDURE — 96374 THER/PROPH/DIAG INJ IV PUSH: CPT | Mod: NTX

## 2020-07-18 PROCEDURE — 84484 ASSAY OF TROPONIN QUANT: CPT | Mod: NTX

## 2020-07-18 PROCEDURE — 25000003 PHARM REV CODE 250: Mod: NTX | Performed by: EMERGENCY MEDICINE

## 2020-07-18 PROCEDURE — 82570 ASSAY OF URINE CREATININE: CPT | Mod: NTX

## 2020-07-18 PROCEDURE — 99223 PR INITIAL HOSPITAL CARE,LEVL III: ICD-10-PCS | Mod: NTX,,, | Performed by: NURSE PRACTITIONER

## 2020-07-18 PROCEDURE — 93005 ELECTROCARDIOGRAM TRACING: CPT | Mod: NTX

## 2020-07-18 PROCEDURE — 25000003 PHARM REV CODE 250: Mod: NTX | Performed by: PHYSICIAN ASSISTANT

## 2020-07-18 PROCEDURE — 85025 COMPLETE CBC W/AUTO DIFF WBC: CPT | Mod: NTX

## 2020-07-18 PROCEDURE — 99285 EMERGENCY DEPT VISIT HI MDM: CPT | Mod: NTX,,, | Performed by: PHYSICIAN ASSISTANT

## 2020-07-18 PROCEDURE — 80061 LIPID PANEL: CPT | Mod: NTX

## 2020-07-18 PROCEDURE — 83735 ASSAY OF MAGNESIUM: CPT | Mod: NTX

## 2020-07-18 PROCEDURE — 94640 AIRWAY INHALATION TREATMENT: CPT | Mod: NTX

## 2020-07-18 PROCEDURE — 93010 ELECTROCARDIOGRAM REPORT: CPT | Mod: NTX,,, | Performed by: INTERNAL MEDICINE

## 2020-07-18 PROCEDURE — 80307 DRUG TEST PRSMV CHEM ANLYZR: CPT | Mod: NTX

## 2020-07-18 PROCEDURE — 83935 ASSAY OF URINE OSMOLALITY: CPT | Mod: NTX

## 2020-07-18 PROCEDURE — 83930 ASSAY OF BLOOD OSMOLALITY: CPT | Mod: NTX

## 2020-07-18 PROCEDURE — 25000003 PHARM REV CODE 250: Mod: NTX | Performed by: STUDENT IN AN ORGANIZED HEALTH CARE EDUCATION/TRAINING PROGRAM

## 2020-07-18 PROCEDURE — 86850 RBC ANTIBODY SCREEN: CPT | Mod: NTX

## 2020-07-18 RX ORDER — PANTOPRAZOLE SODIUM 40 MG/1
40 TABLET, DELAYED RELEASE ORAL DAILY
Status: DISCONTINUED | OUTPATIENT
Start: 2020-07-19 | End: 2020-07-21 | Stop reason: HOSPADM

## 2020-07-18 RX ORDER — ALBUTEROL SULFATE 2.5 MG/.5ML
15 SOLUTION RESPIRATORY (INHALATION)
Status: DISCONTINUED | OUTPATIENT
Start: 2020-07-18 | End: 2020-07-18

## 2020-07-18 RX ORDER — LACTULOSE 10 G/15ML
200 SOLUTION ORAL; RECTAL
Status: DISCONTINUED | OUTPATIENT
Start: 2020-07-18 | End: 2020-07-18

## 2020-07-18 RX ORDER — SODIUM CHLORIDE 9 MG/ML
INJECTION, SOLUTION INTRAVENOUS CONTINUOUS
Status: DISCONTINUED | OUTPATIENT
Start: 2020-07-18 | End: 2020-07-21

## 2020-07-18 RX ORDER — MICONAZOLE NITRATE 2 %
POWDER (GRAM) TOPICAL 2 TIMES DAILY
Status: DISCONTINUED | OUTPATIENT
Start: 2020-07-18 | End: 2020-07-21 | Stop reason: HOSPADM

## 2020-07-18 RX ORDER — FUROSEMIDE 10 MG/ML
40 INJECTION INTRAMUSCULAR; INTRAVENOUS
Status: DISCONTINUED | OUTPATIENT
Start: 2020-07-18 | End: 2020-07-18

## 2020-07-18 RX ORDER — LEVOTHYROXINE SODIUM 150 UG/1
150 TABLET ORAL
Status: DISCONTINUED | OUTPATIENT
Start: 2020-07-18 | End: 2020-07-21 | Stop reason: HOSPADM

## 2020-07-18 RX ORDER — ONDANSETRON 2 MG/ML
4 INJECTION INTRAMUSCULAR; INTRAVENOUS EVERY 8 HOURS PRN
Status: DISCONTINUED | OUTPATIENT
Start: 2020-07-18 | End: 2020-07-21 | Stop reason: HOSPADM

## 2020-07-18 RX ORDER — SODIUM CHLORIDE 0.9 % (FLUSH) 0.9 %
10 SYRINGE (ML) INJECTION
Status: DISCONTINUED | OUTPATIENT
Start: 2020-07-18 | End: 2020-07-21 | Stop reason: HOSPADM

## 2020-07-18 RX ORDER — MIDODRINE HYDROCHLORIDE 5 MG/1
10 TABLET ORAL EVERY 8 HOURS
Status: DISCONTINUED | OUTPATIENT
Start: 2020-07-18 | End: 2020-07-21 | Stop reason: HOSPADM

## 2020-07-18 RX ORDER — PANTOPRAZOLE SODIUM 40 MG/1
40 TABLET, DELAYED RELEASE ORAL
Status: DISCONTINUED | OUTPATIENT
Start: 2020-07-18 | End: 2020-07-18

## 2020-07-18 RX ORDER — MIDODRINE HYDROCHLORIDE 5 MG/1
10 TABLET ORAL 3 TIMES DAILY
Status: DISCONTINUED | OUTPATIENT
Start: 2020-07-18 | End: 2020-07-18

## 2020-07-18 RX ORDER — ZINC SULFATE 50(220)MG
220 CAPSULE ORAL
Status: DISCONTINUED | OUTPATIENT
Start: 2020-07-18 | End: 2020-07-21 | Stop reason: HOSPADM

## 2020-07-18 RX ORDER — INDOMETHACIN 25 MG/1
50 CAPSULE ORAL
Status: DISCONTINUED | OUTPATIENT
Start: 2020-07-18 | End: 2020-07-18

## 2020-07-18 RX ORDER — LACTULOSE 10 G/15ML
15 SOLUTION ORAL 3 TIMES DAILY
Status: DISCONTINUED | OUTPATIENT
Start: 2020-07-18 | End: 2020-07-18

## 2020-07-18 RX ORDER — MORPHINE SULFATE 4 MG/ML
4 INJECTION, SOLUTION INTRAMUSCULAR; INTRAVENOUS
Status: COMPLETED | OUTPATIENT
Start: 2020-07-18 | End: 2020-07-18

## 2020-07-18 RX ORDER — ONDANSETRON 2 MG/ML
4 INJECTION INTRAMUSCULAR; INTRAVENOUS EVERY 12 HOURS PRN
Status: DISCONTINUED | OUTPATIENT
Start: 2020-07-18 | End: 2020-07-18 | Stop reason: SDUPTHER

## 2020-07-18 RX ORDER — ONDANSETRON 2 MG/ML
4 INJECTION INTRAMUSCULAR; INTRAVENOUS
Status: COMPLETED | OUTPATIENT
Start: 2020-07-18 | End: 2020-07-18

## 2020-07-18 RX ORDER — HEPARIN SODIUM 5000 [USP'U]/ML
5000 INJECTION, SOLUTION INTRAVENOUS; SUBCUTANEOUS EVERY 8 HOURS
Status: DISCONTINUED | OUTPATIENT
Start: 2020-07-18 | End: 2020-07-21 | Stop reason: HOSPADM

## 2020-07-18 RX ORDER — ACETAMINOPHEN 325 MG/1
650 TABLET ORAL EVERY 8 HOURS PRN
Status: DISCONTINUED | OUTPATIENT
Start: 2020-07-18 | End: 2020-07-21 | Stop reason: HOSPADM

## 2020-07-18 RX ADMIN — LACTULOSE 200 G: 10 SOLUTION ORAL at 09:07

## 2020-07-18 RX ADMIN — SODIUM CHLORIDE: 0.9 INJECTION, SOLUTION INTRAVENOUS at 08:07

## 2020-07-18 RX ADMIN — ONDANSETRON 4 MG: 2 INJECTION INTRAMUSCULAR; INTRAVENOUS at 11:07

## 2020-07-18 RX ADMIN — PANTOPRAZOLE SODIUM 40 MG: 40 TABLET, DELAYED RELEASE ORAL at 08:07

## 2020-07-18 RX ADMIN — MIDODRINE HYDROCHLORIDE 10 MG: 5 TABLET ORAL at 08:07

## 2020-07-18 RX ADMIN — RIFAXIMIN 550 MG: 550 TABLET ORAL at 09:07

## 2020-07-18 RX ADMIN — HEPARIN SODIUM 5000 UNITS: 5000 INJECTION INTRAVENOUS; SUBCUTANEOUS at 10:07

## 2020-07-18 RX ADMIN — MORPHINE SULFATE 4 MG: 4 INJECTION INTRAVENOUS at 03:07

## 2020-07-18 RX ADMIN — LACTULOSE 15 G: 20 SOLUTION ORAL at 02:07

## 2020-07-18 RX ADMIN — IOHEXOL 75 ML: 350 INJECTION, SOLUTION INTRAVENOUS at 04:07

## 2020-07-18 RX ADMIN — HEPARIN SODIUM 5000 UNITS: 5000 INJECTION INTRAVENOUS; SUBCUTANEOUS at 02:07

## 2020-07-18 RX ADMIN — ZINC SULFATE 220 MG (50 MG) CAPSULE 220 MG: CAPSULE at 08:07

## 2020-07-18 RX ADMIN — ONDANSETRON 4 MG: 2 INJECTION INTRAMUSCULAR; INTRAVENOUS at 03:07

## 2020-07-18 RX ADMIN — MIDODRINE HYDROCHLORIDE 10 MG: 5 TABLET ORAL at 10:07

## 2020-07-18 RX ADMIN — LEVOTHYROXINE SODIUM 150 MCG: 150 TABLET ORAL at 08:07

## 2020-07-18 RX ADMIN — MIDODRINE HYDROCHLORIDE 10 MG: 5 TABLET ORAL at 02:07

## 2020-07-18 RX ADMIN — ACETAMINOPHEN 650 MG: 325 TABLET ORAL at 09:07

## 2020-07-18 NOTE — ED NOTES
Night shift gave report. Was told pt treatment team needed to be notified of heart rate in the 120's before coming to the floor. Night nurse sent a message to PA.

## 2020-07-18 NOTE — ED PROVIDER NOTES
Encounter Date: 7/18/2020       History     Chief Complaint   Patient presents with    Abdominal Pain     Pt reports generalized abd pain x 1 day. pt denies emesis and diarrhea. Pt on Liver tx list.     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 She presents to the ED for abdominal pain. Patient reports having generalized abdominal pain for one day. Describes pain as constant and aching. Rates pain 10/10. Reports associated nausea. Reports last BM today. + flatus. Reports compliance with lactulose. She denies fever,chills, nausea, vomiting, sob, chest pain, dysuria, diarrhea, or constipation. She is a non smoker and denies alcohol use.    The history is provided by the patient and medical records. No  was used.     Review of patient's allergies indicates:   Allergen Reactions    Aspirin Tinitus    Ciprofloxacin Rash     Past Medical History:   Diagnosis Date    Chronic Hepatic encephalopathy 6/25/2020    Decompensated hepatic cirrhosis 6/25/2020    History of pancreatitis 6/25/2020    Liver cirrhosis secondary to CHAUDHARY 6/25/2020    Macrocytic anemia 6/25/2020    Other ascites 6/25/2020    Thrombocytopenia 6/25/2020     No past surgical history on file.  No family history on file.  Social History     Tobacco Use    Smoking status: Never Smoker    Smokeless tobacco: Never Used   Substance Use Topics    Alcohol use: Never     Frequency: Never    Drug use: Never     Review of Systems   Constitutional: Negative for fever.   HENT: Negative for sore throat.    Respiratory: Negative for shortness of breath.    Cardiovascular: Negative for chest pain.   Gastrointestinal: Positive for abdominal pain and nausea. Negative for vomiting.   Genitourinary: Negative for dysuria.   Musculoskeletal: Negative for back pain.   Skin: Negative for rash.   Neurological: Negative for weakness.    Hematological: Does not bruise/bleed easily.       Physical Exam     Initial Vitals [07/18/20 0301]   BP Pulse Resp Temp SpO2   (!) 116/58 109 18 98.1 °F (36.7 °C) 98 %      MAP       --         Physical Exam    Vitals reviewed.  Constitutional: She appears well-developed and well-nourished. No distress.   HENT:   Head: Normocephalic.   Eyes: Conjunctivae are normal. Scleral icterus is present.   Neck: Normal range of motion.   Cardiovascular: Normal rate and regular rhythm.   No murmur heard.  Pulmonary/Chest: Breath sounds normal. No respiratory distress. She has no wheezes. She has no rales.   Abdominal: Soft. Bowel sounds are normal. She exhibits no distension. There is abdominal tenderness in the right upper quadrant and epigastric area.   Musculoskeletal: Normal range of motion. Edema (2+ pitting edema of b/l LE) present.   Neurological: She is alert and oriented to person, place, and time.   Skin: Skin is warm and dry. No erythema.   jaundice         ED Course   Procedures  Labs Reviewed   CBC W/ AUTO DIFFERENTIAL - Abnormal; Notable for the following components:       Result Value    RBC 2.21 (*)     Hemoglobin 8.0 (*)     Hematocrit 23.8 (*)     Mean Corpuscular Volume 108 (*)     Mean Corpuscular Hemoglobin 36.2 (*)     RDW 14.9 (*)     Platelets 82 (*)     Immature Granulocytes 1.3 (*)     Gran # (ANC) 9.6 (*)     Immature Grans (Abs) 0.15 (*)     Mono # 1.1 (*)     Gran% 80.1 (*)     Lymph% 9.5 (*)     All other components within normal limits   COMPREHENSIVE METABOLIC PANEL - Abnormal; Notable for the following components:    Sodium 127 (*)     Potassium 5.2 (*)     Glucose 154 (*)     BUN, Bld 45 (*)     Albumin 2.9 (*)     Total Bilirubin 5.7 (*)     AST 42 (*)     Anion Gap 4 (*)     eGFR if  53.7 (*)     eGFR if non  46.6 (*)     All other components within normal limits   LIPASE - Abnormal; Notable for the following components:    Lipase >1000 (*)     All other  components within normal limits    Narrative:     ADD ON TROPONIN I [id number] PER AMAYA LINARES PA-C 05:40    07/18/2020    PROTIME-INR - Abnormal; Notable for the following components:    Prothrombin Time 13.0 (*)     INR 1.3 (*)     All other components within normal limits   URINALYSIS, REFLEX TO URINE CULTURE    Narrative:     Specimen Source->Urine   MAGNESIUM   AMMONIA   ALCOHOL,MEDICAL (ETHANOL)   DRUG SCREEN PANEL, URINE EMERGENCY    Narrative:     Specimen Source->Urine   SARS-COV-2 RNA AMPLIFICATION, QUAL   TROPONIN I   TROPONIN I    Narrative:     ADD ON TROPONIN I [id number] PER AMAYA LINARES PA-C 05:40    07/18/2020    TYPE & SCREEN   ISTAT CHEM8          Imaging Results          X-Ray Abdomen AP 1 View (KUB) (Final result)  Result time 07/18/20 06:46:00    Final result by Reuben Rojas MD (07/18/20 06:46:00)                 Impression:      Enteric tube is noted, the distal tip is subdiaphragmatic overlying the left upper quadrant of the abdomen.      Electronically signed by: Reuben Rojas  Date:    07/18/2020  Time:    06:46             Narrative:    EXAMINATION:  XR ABDOMEN AP 1 VIEW    CLINICAL HISTORY:  Encounter for fitting and adjustment of other gastrointestinal appliance and device    TECHNIQUE:  AP View(s) of the abdomen was performed.    COMPARISON:  None    FINDINGS:  Single abdominal radiograph is submitted centered about the upper abdomen and lower chest to evaluate enteric tube placement.  Enteric tube is noted, the distal tip is subdiaphragmatic overlying the left upper quadrant of the abdomen.  Evaluation of the abdomen is otherwise limited.                                CT Abdomen Pelvis With Contrast (Final result)  Result time 07/18/20 05:12:16    Final result by Reuben Rojas MD (07/18/20 05:12:16)                 Impression:      Variant bowel anatomy is noted as discussed above including appearance thought to represent a mobile cecum without evidence  for colonic inflammatory change or volvulus, there are small bowel loops that extend lateral to the right colon, some of the small bowel loops demonstrate mild dilated appearance with suggestion of mild wall and fold thickening and interloop edema, where as the remainder of the small bowel appears predominantly decompressed, this may relate to a localized ileus/enteritis however the possibility of a developing closed loop obstruction is to be considered, close clinical and historical correlation is needed if there is persistent or worsening symptomatology additional follow-up is recommended.    Findings consistent with chronic hepatic disease and portal venous hypertension again noted.    Mild central branching air density associated with the liver does not have the typical configuration of portal venous air, and there is no air seen within the portal vein, or mesenteric vein, and there is no evidence for pneumatosis this is thought likely mild pneumobilia.    Cholelithiasis, there is no evidence for pericholecystic inflammatory change.    Hypodense pancreatic lesions are again noted appearing stable.    This report was flagged in Epic as abnormal.      Electronically signed by: Reuben Rojas  Date:    07/18/2020  Time:    05:12             Narrative:    EXAMINATION:  CT ABDOMEN PELVIS WITH CONTRAST    CLINICAL HISTORY:  Abdominal pain, acute, nonlocalized;    TECHNIQUE:  Low dose axial images, sagittal and coronal reformations were obtained from the lung bases to the pubic symphysis following the IV administration of 75 mL of Omnipaque 350 .  Oral contrast was not given.    COMPARISON:  July 9, 2020    FINDINGS:  Single-phase CT examination of the abdomen and pelvis was performed.  Intravenous contrast was utilized, oral contrast was not utilized, axial imaging, sagittal and coronal reconstruction imaging is submitted.    The lung bases demonstrate mild motion artifact, and chronic and atelectatic change.  The  stomach demonstrates nonspecific appearance of mild distention with ingested material and air.  The gallbladder is partially contracted, cholelithiasis is noted, there is no evidence for pericholecystic inflammatory change.  There is no evidence for peripancreatic inflammatory change, hypodense pancreatic lesions are again noted as previously discussed and appears stable.  There is no evidence for abnormal pancreatic or biliary ductal dilatation, there is however mild pneumobilia noted.    There is mild irregular contour of the liver consistent with chronic hepatic disease, the spleen appears enlarged, there is mild free fluid of the abdomen and pelvis and varices are noted, findings consistent with chronic hepatic disease, and portal venous hypertension as previously noted.  Small hypodensity at the dome of the liver appears stable.    The adrenal glands appear unremarkable.  There is no evidence for hydronephrosis or obstructive uropathy or perinephric inflammatory change bilaterally.  The abdominal aorta demonstrates appropriate opacification, atherosclerotic changes noted, there is no aneurysmal dilatation.  The urinary bladder appears unremarkable for degree of distention.  The uterus and adnexa appear unremarkable.    There is variant anatomic configuration of the bowel, as there are several small bowel loops that extend to the right of, lateral of the ascending colon.  In addition it appears that there is a mildly prominent air and fluid-filled structure in the central abdomen as seen on axial image 38 that is thought likely representative of the cecum, this is seen at the level of the mid abdomen, it appears that cecum courses inferior medially towards the left, then crosses the midline as seen on axial images 50-58 to the right lower quadrant, and communicates with the ascending colon.  This is appears to be a change in configuration of the right colon and therefore this may relate to a mobile cecum, there  is however no evidence for rotation to suggest cecal volvulus, there is no evidence for colonic wall thickening or inflammatory change, mild stool seen throughout the course of the colon and the colon appears somewhat redundant and tortuous.  The appendix is not identified.  The majority of the small bowel appears of small caliber, however there are a few small bowel loops of the right abdomen as seen on axial images 33 through 52 that demonstrate mild fluid-filled distention with mild wall and fold thickening, this could relate to a focal area of enteritis, and ileus however there is suggestion of some interloop haziness that may relate to edema, and given the localized appearance the possibility of developing closed loop obstruction is to be considered.  Close clinical and historical correlation is needed if there is persistent or worsening symptomatology additional follow-up is recommended.  There is no evidence for free intraperitoneal air, there is no evidence for pneumatosis, the aforementioned branching air within the central liver is thought likely pneumobilia and does not demonstrate a pattern typical for portal venous air.    The visualized osseous structures demonstrate chronic change.                                 Medical Decision Making:   History:   Old Medical Records: I decided to obtain old medical records.  Clinical Tests:   Lab Tests: Ordered and Reviewed  Radiological Study: Ordered and Reviewed  Medical Tests: Ordered and Reviewed  Other:   I have discussed this case with another health care provider.       <> Summary of the Discussion: Case discussed with liver transplant surgery for admission       APC / Resident Notes:   Patient is a 54 year old female presents to the ED for emergent evaluation of generalized abdominal pain.     Will order labs and imaging. Will order pain medication and antiemetic for symptomatic relief. Will continue to monitor.     Differential diagnoses include, but are  not limited to: hepatic encephalopathy, hepatic congestion, pancreatitis, cardiac arrhythmia, or electrolyte imbalance.     COVID negative. No leukocytosis. Hemodynamically stable. Thrombocytopenia. Hyponatremia 127. Elevated tbili 5.7. MELD score of 25. Patient has been accepted for liver transplant due to Cirrhosis since 07/15/2020 pending financial approval. UA unremarkable for infectious process. UDS presumptive positive for opiates. CT abdomen pelvis with contrast found to have mobile cecum without evidence for colonic inflammatory change or volvulus, there are small bowel loops that extend lateral to the right colon, some of the small bowel loops demonstrate mild dilated appearance with suggestion of mild wall and fold thickening and interloop edema, where as the remainder of the small bowel appears predominantly decompressed, this may relate to a localized ileus/enteritis however the possibility of a developing closed loop obstruction is to be considered. Liver transplant surgery consulted.     6:03 AM  CT findings discussed with liver transplant CORINNE whom has discussed and reviewed with attending Dr. Foster. They believe there is no indication for emergent general surgery consultation. They will admit patient to their service for conservative management at this time. They will consult hepatology for evaluation this morning.     Patient admitted to liver transplant surgery service for further management.     I have discussed and reviewed with my supervising physician.         Attending Attestation:     Physician Attestation Statement for NP/PA:   I discussed this assessment and plan of this patient with the NP/PA, but I did not personally examine the patient. The face to face encounter was performed by the NP/PA.    Other NP/PA Attestation Additions:      Medical Decision Making: Admitted to liver transplant service.           Clinical Impression:       ICD-10-CM ICD-9-CM   1. Abdominal pain  R10.9 789.00    2. Liver cirrhosis secondary to CHAUDHARY  K75.81 571.8    K74.60 571.5   3. Decompensated hepatic cirrhosis  K72.90 572.8   4. Hyponatremia  E87.1 276.1         Disposition:   Disposition: Admitted  Condition: Fair     ED Disposition Condition    Admit                           Agata Alexander PA-C  07/18/20 [id number]       Roxana Carlton MD  07/18/20 5743

## 2020-07-18 NOTE — ED NOTES
Patient was put on the bed pan and alert and able to give us a urine sample. Sheets were changed and the patient is clean, dry and comfortable.

## 2020-07-18 NOTE — HPI
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 She presented to the ED for abdominal pain. Patient reports having generalized abdominal pain for one day. Describes pain as constant and aching. Rates pain 10/10. Reports associated nausea. Reports last BM today. + flatus. Reports compliance with lactulose. She denies fever, chills, nausea, vomiting, sob, chest pain, dysuria, diarrhea, or constipation. Labs reviewed (close to baseline). CT A/P with concern for possible ileus vs. closed loop SBO. D/w transplant hepatologist, Dr. Cobb. Plan for NG tube placement, IVF, and close monitoring. Lipase also noted to be greater than 1000. Will cont with plan for bowel rest at this time.

## 2020-07-18 NOTE — ASSESSMENT & PLAN NOTE
- Presented to the ED 7/18 for 10/10 abdominal pain x 1 day  - Reports associated nausea, but no vomiting.  - Reports last BM today. + flatus. Reports compliance with lactulose.   - Labs reviewed (close to baseline).  - CT A/P with concern for possible ileus vs. closed loop SBO.   - D/w transplant hepatologist, Dr. Cobb. Plan for NG tube placement, IVF, and close monitoring.  - Lipase also greater than 1000 on lab work.   - plan to cont with bowel rest at this time.   - consider lactulose enemas for HE control if pt noted with confusion.

## 2020-07-18 NOTE — PROGRESS NOTES
Pt arrived to room, unsure of where the NG tube was marked and placed. Notified NP Jerry Perdue who will order xray to verify placement. IVF infusing.

## 2020-07-18 NOTE — ED NOTES
Spoke to pt  on phone.  updated on POC and informed that all tests have not yet resulted. Pt  instructed to call back for any other questions

## 2020-07-18 NOTE — SUBJECTIVE & OBJECTIVE
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 No pertinent surgical history.    Review of patient's allergies indicates:   Allergen Reactions    Aspirin Tinitus    Ciprofloxacin Rash    Clindamycin Itching       Family History     None        Tobacco Use    Smoking status: Never Smoker    Smokeless tobacco: Never Used   Substance and Sexual Activity    Alcohol use: Never     Frequency: Never    Drug use: Never    Sexual activity: Not on file       PTA Medications   Medication Sig    acetaminophen (TYLENOL) 500 MG tablet Take 1 tablet (500 mg total) by mouth every 6 (six) hours as needed for Pain. Max 2000mg daily.    clindamycin (CLEOCIN) 150 MG capsule Take 3 capsules (450 mg total) by mouth 3 (three) times daily. for 10 days    diaper,brief,adult,disposable Misc 5 mg/kg/day by Misc.(Non-Drug; Combo Route) route 5 (five) times daily. Patient with incontinence. Please supply enough depends for 5xs/day    ergocalciferol (VITAMIN D2) 50,000 unit Cap Take 50,000 Units by mouth every 7 days.    furosemide (LASIX) 40 MG tablet Take 1 tablet (40 mg total) by mouth once daily.    haloperidoL (HALDOL) 1 MG tablet Take 1 tablet (1 mg total) by mouth daily as needed (non redirectable agitation).    lactulose (CEPHULAC) 10 gram packet Take 1 and a half packets three times a day     lactulose (CHRONULAC) 10 gram/15 mL solution Take 30 mLs (20 g total) by mouth 3 (three) times daily as needed. Alternate with kristalose if not having enough BMs.    levoFLOXacin (LEVAQUIN) 500 MG tablet Take 1 tablet (500 mg total) by mouth once daily. for 7 days    levothyroxine (SYNTHROID) 150 MCG tablet Take 150 mcg by mouth before breakfast.     lidocaine (LIDODERM) 5 % Place 1 patch onto the skin every 24 hours. Remove & Discard patch within 12 hours or as directed by MD    magnesium hydroxide 400 mg/5 ml (MILK OF MAGNESIA) 400 mg/5 mL Susp Take 30 mLs (2,400 mg total) by mouth daily as needed (Take if constipated despite lactulose).    melatonin 10 mg Tab Take 10 mg by mouth nightly as needed (sleep).    midodrine (PROAMATINE) 10 MG tablet Take 10 mg by mouth 3 (three) times daily.    nystatin (MYCOSTATIN) powder Apply topically 2 (two) times daily. Apply to affected area twice a day    pantoprazole (PROTONIX) 40 MG tablet Take 40 mg by mouth before breakfast.    polyethylene glycol (GLYCOLAX) 17 gram PwPk Take 17 g by mouth once daily. Or miralax OTC. Take if constipated despite lactulose    rifAXIMin (XIFAXAN) 550 mg Tab Take 550 mg by mouth 2 (two) times daily.     simethicone (MYLICON) 80 MG chewable tablet Take 80 mg by mouth every 6 (six) hours as needed for Flatulence. Take 2 tablets by mouth every 6 hours as needed for gas    spironolactone (ALDACTONE) 50 MG tablet Take 100 mg by mouth once daily.     sulfamethoxazole-trimethoprim 400-80mg (BACTRIM) 400-80 mg per tablet Take 1 tablet by mouth 2 (two) times daily.    zinc sulfate (ZINCATE) 220 (50) mg capsule Take 220 mg by mouth before breakfast.       Review of Systems   Constitutional: Positive for activity change, appetite change and fatigue. Negative for fever.   HENT: Negative.  Negative for congestion and facial swelling.    Eyes: Negative.    Respiratory: Negative.  Negative for chest tightness, shortness of breath and wheezing.    Cardiovascular: Negative.  Negative for chest pain, palpitations and leg swelling.   Gastrointestinal: Positive for abdominal distention and abdominal pain. Negative for constipation, diarrhea, nausea and vomiting.   Genitourinary: Negative.  Negative for decreased urine volume, difficulty urinating, dysuria, hematuria and urgency.   Musculoskeletal:  Negative.  Negative for back pain, neck pain and neck stiffness.   Skin: Positive for color change. Negative for pallor and rash.   Allergic/Immunologic: Positive for immunocompromised state.   Neurological: Positive for tremors and weakness. Negative for dizziness, seizures, speech difficulty, light-headedness and headaches.   Psychiatric/Behavioral: Positive for decreased concentration. Negative for behavioral problems, hallucinations and suicidal ideas. The patient is nervous/anxious.      Objective:     Vital Signs (Most Recent):  Temp: 97.8 °F (36.6 °C) (07/18/20 1130)  Pulse: 91 (07/18/20 1130)  Resp: 17 (07/18/20 1130)  BP: 109/67 (07/18/20 1130)  SpO2: 99 % (07/18/20 1130) Vital Signs (24h Range):  Temp:  [97.8 °F (36.6 °C)-98.1 °F (36.7 °C)] 97.8 °F (36.6 °C)  Pulse:  [] 91  Resp:  [17-24] 17  SpO2:  [98 %-100 %] 99 %  BP: (109-116)/(56-68) 109/67     Weight: 63.1 kg (139 lb 1.8 oz)  Body mass index is 23.88 kg/m².    No intake or output data in the 24 hours ending 07/18/20 1215    Physical Exam  Vitals signs and nursing note reviewed.   Constitutional:       General: She is not in acute distress.     Appearance: She is well-developed. She is not diaphoretic.   HENT:      Head: Normocephalic and atraumatic.   Neck:      Musculoskeletal: Normal range of motion and neck supple.   Cardiovascular:      Rate and Rhythm: Normal rate and regular rhythm.      Heart sounds: Normal heart sounds. No murmur.   Pulmonary:      Effort: Pulmonary effort is normal. No respiratory distress.      Breath sounds: Examination of the right-lower field reveals decreased breath sounds. Examination of the left-lower field reveals decreased breath sounds. Decreased breath sounds present. No wheezing.   Chest:      Chest wall: No tenderness.   Abdominal:      General: Bowel sounds are normal. There is distension.      Palpations: Abdomen is soft.      Tenderness: There is abdominal tenderness.   Musculoskeletal: Normal range of  motion.         General: Swelling (BLE) present. No tenderness.   Skin:     General: Skin is warm and dry.   Neurological:      Mental Status: She is alert and oriented to person, place, and time.   Psychiatric:         Behavior: Behavior normal.         Thought Content: Thought content normal.         Judgment: Judgment normal.         Laboratory:  CBC:   Recent Labs   Lab 07/18/20 0323   WBC 11.94   RBC 2.21*   HGB 8.0*   HCT 23.8*   PLT 82*   *   MCH 36.2*   MCHC 33.6     CMP:   Recent Labs   Lab 07/18/20 0323   *   CALCIUM 9.9   ALBUMIN 2.9*   PROT 6.4   *   K 5.2*   CO2 24   CL 99   BUN 45*   CREATININE 1.3   ALKPHOS 107   ALT 35   AST 42*   BILITOT 5.7*     Coagulation:   Recent Labs   Lab 07/18/20 0323   INR 1.3*     Labs within the past 24 hours have been reviewed.    Diagnostic Results:  None

## 2020-07-18 NOTE — ED NOTES
EKG shows acute MI/STEMI. Bianka SCHUMACHER called to bedside. Repeat EKG to be taken with portable EKG machine due to lead2 having some artifact

## 2020-07-18 NOTE — PLAN OF CARE
Pt is AAOX3 but disorientated to time. Liver txp eval is complete, waiting on final financial clearance, MELD 25. Pt went to ED for abdominal pain. In ED an NGT was placed to right nare for possible ileus vs closed loop SBO showing on CT scan. Placement was verified by Xray when pt got up to room [address].     -abdomen is soft and not distended  -NG tube was hooked up to LIWS and very little clear yellow output with a light pink tinge was noted in canister  -pt pulled out NGT and NP Jerry Perdue was notified. NP stated to leave it out for now, but if nausea came back, it has to be placed again  -NPO  -lipase at 1000, IVF at 75ml/hr  -up with SBA, very weak and shaky  -purewick placed due to weakness  -pt stated she had 4 bms on 7/17 which was later confirmed by  who stated he inspects each and every bm and on 7/17 she had very long tubular soft formed bms  -pt reported pain at constant stabbing that started in the middle of the night, after pt was admitted to room and positioned in bed with BLE elevated, pt denied pain at each time nurse rounded on pt  -pt was informed of elevated lipase and the plan for bowel rest at this time. Pt repeated asks over and over for something to eat although she verbalizes that she knows she cant have any food.  - is at bedside who is a good historian and supportive with care   -bed alarm was turned on due to pts hx of encephalopathy  -fall precautions in place, call bell in reach

## 2020-07-18 NOTE — ASSESSMENT & PLAN NOTE
"- 54 year old female with PMHx of d-CHF with 65% EF, cirrhosis due to CHAUDHARY (liver txp eval complete, financial approval pending MELD 25)  - See "abdominal pain."  - Consider lactulose enemas for HE control if pt noted with confusion.   "

## 2020-07-18 NOTE — H&P
[facility]  Liver Transplant  History & Physical    Patient Name: Zeina Mahoney  MRN: [id number]  Admission Date: 7/18/2020  Code Status: Full Code  Primary Care Provider: Primary Doctor No  Post-Operative Day:      ORGAN:     Disease Etiology: Cirrhosis: Fatty Liver (Chaudhary)  Donor Type:     CDC High Risk:     Donor CMV Status:   Donor CMV Status:   Donor HBcAB:     Donor HCV Status:     Donor HBV HERACLIO: Organ record is missing.  Donor HCV HERACLIO: Organ record is missing.  Whole or Partial:   Biliary Anastomosis:   Arterial Anatomy:     Subjective:     History of Present Illness:  54 year old female with PMHx of d-CHF with 65% EF, cirrhosis due to CHAUDHARY (liver txp eval complete, financial approval pending MELD 25), portal hypertension, hypothyroidism, and adjustment disorder. She presented to the ED for abdominal pain. Patient reports having generalized abdominal pain for one day. Describes pain as constant and aching. Rates pain 10/10. Reports associated nausea. Reports last BM today. + flatus. Reports compliance with lactulose. She denies fever, chills, nausea, vomiting, sob, chest pain, dysuria, diarrhea, or constipation. Labs reviewed (close to baseline). CT A/P with concern for possible ileus vs. closed loop SBO. D/w transplant hepatologist, Dr. Cobb. Plan for NG tube placement, IVF, and close monitoring. Lipase also noted to be greater than 1000. Will cont with plan for bowel rest at this time.     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 No pertinent surgical history.    Review of patient's allergies indicates:   Allergen Reactions    Aspirin Tinitus    Ciprofloxacin Rash    Clindamycin Itching       Family  History     None        Tobacco Use    Smoking status: Never Smoker    Smokeless tobacco: Never Used   Substance and Sexual Activity    Alcohol use: Never     Frequency: Never    Drug use: Never    Sexual activity: Not on file       PTA Medications   Medication Sig    acetaminophen (TYLENOL) 500 MG tablet Take 1 tablet (500 mg total) by mouth every 6 (six) hours as needed for Pain. Max 2000mg daily.    clindamycin (CLEOCIN) 150 MG capsule Take 3 capsules (450 mg total) by mouth 3 (three) times daily. for 10 days    diaper,brief,adult,disposable Misc 5 mg/kg/day by Misc.(Non-Drug; Combo Route) route 5 (five) times daily. Patient with incontinence. Please supply enough depends for 5xs/day    ergocalciferol (VITAMIN D2) 50,000 unit Cap Take 50,000 Units by mouth every 7 days.    furosemide (LASIX) 40 MG tablet Take 1 tablet (40 mg total) by mouth once daily.    haloperidoL (HALDOL) 1 MG tablet Take 1 tablet (1 mg total) by mouth daily as needed (non redirectable agitation).    lactulose (CEPHULAC) 10 gram packet Take 1 and a half packets three times a day     lactulose (CHRONULAC) 10 gram/15 mL solution Take 30 mLs (20 g total) by mouth 3 (three) times daily as needed. Alternate with kristalose if not having enough BMs.    levoFLOXacin (LEVAQUIN) 500 MG tablet Take 1 tablet (500 mg total) by mouth once daily. for 7 days    levothyroxine (SYNTHROID) 150 MCG tablet Take 150 mcg by mouth before breakfast.    lidocaine (LIDODERM) 5 % Place 1 patch onto the skin every 24 hours. Remove & Discard patch within 12 hours or as directed by MD    magnesium hydroxide 400 mg/5 ml (MILK OF MAGNESIA) 400 mg/5 mL Susp Take 30 mLs (2,400 mg total) by mouth daily as needed (Take if constipated despite lactulose).    melatonin 10 mg Tab Take 10 mg by mouth nightly as needed (sleep).    midodrine (PROAMATINE) 10 MG tablet Take 10 mg by mouth 3 (three) times daily.    nystatin (MYCOSTATIN) powder Apply topically 2 (two)  times daily. Apply to affected area twice a day    pantoprazole (PROTONIX) 40 MG tablet Take 40 mg by mouth before breakfast.    polyethylene glycol (GLYCOLAX) 17 gram PwPk Take 17 g by mouth once daily. Or miralax OTC. Take if constipated despite lactulose    rifAXIMin (XIFAXAN) 550 mg Tab Take 550 mg by mouth 2 (two) times daily.     simethicone (MYLICON) 80 MG chewable tablet Take 80 mg by mouth every 6 (six) hours as needed for Flatulence. Take 2 tablets by mouth every 6 hours as needed for gas    spironolactone (ALDACTONE) 50 MG tablet Take 100 mg by mouth once daily.     sulfamethoxazole-trimethoprim 400-80mg (BACTRIM) 400-80 mg per tablet Take 1 tablet by mouth 2 (two) times daily.    zinc sulfate (ZINCATE) 220 (50) mg capsule Take 220 mg by mouth before breakfast.       Review of Systems   Constitutional: Positive for activity change, appetite change and fatigue. Negative for fever.   HENT: Negative.  Negative for congestion and facial swelling.    Eyes: Negative.    Respiratory: Negative.  Negative for chest tightness, shortness of breath and wheezing.    Cardiovascular: Negative.  Negative for chest pain, palpitations and leg swelling.   Gastrointestinal: Positive for abdominal distention and abdominal pain. Negative for constipation, diarrhea, nausea and vomiting.   Genitourinary: Negative.  Negative for decreased urine volume, difficulty urinating, dysuria, hematuria and urgency.   Musculoskeletal: Negative.  Negative for back pain, neck pain and neck stiffness.   Skin: Positive for color change. Negative for pallor and rash.   Allergic/Immunologic: Positive for immunocompromised state.   Neurological: Positive for tremors and weakness. Negative for dizziness, seizures, speech difficulty, light-headedness and headaches.   Psychiatric/Behavioral: Positive for decreased concentration. Negative for behavioral problems, hallucinations and suicidal ideas. The patient is nervous/anxious.      Objective:      Vital Signs (Most Recent):  Temp: 97.8 °F (36.6 °C) (07/18/20 1130)  Pulse: 91 (07/18/20 1130)  Resp: 17 (07/18/20 1130)  BP: 109/67 (07/18/20 1130)  SpO2: 99 % (07/18/20 1130) Vital Signs (24h Range):  Temp:  [97.8 °F (36.6 °C)-98.1 °F (36.7 °C)] 97.8 °F (36.6 °C)  Pulse:  [] 91  Resp:  [17-24] 17  SpO2:  [98 %-100 %] 99 %  BP: (109-116)/(56-68) 109/67     Weight: 63.1 kg (139 lb 1.8 oz)  Body mass index is 23.88 kg/m².    No intake or output data in the 24 hours ending 07/18/20 1215    Physical Exam  Vitals signs and nursing note reviewed.   Constitutional:       General: She is not in acute distress.     Appearance: She is well-developed. She is not diaphoretic.   HENT:      Head: Normocephalic and atraumatic.   Neck:      Musculoskeletal: Normal range of motion and neck supple.   Cardiovascular:      Rate and Rhythm: Normal rate and regular rhythm.      Heart sounds: Normal heart sounds. No murmur.   Pulmonary:      Effort: Pulmonary effort is normal. No respiratory distress.      Breath sounds: Examination of the right-lower field reveals decreased breath sounds. Examination of the left-lower field reveals decreased breath sounds. Decreased breath sounds present. No wheezing.   Chest:      Chest wall: No tenderness.   Abdominal:      General: Bowel sounds are normal. There is distension.      Palpations: Abdomen is soft.      Tenderness: There is abdominal tenderness.   Musculoskeletal: Normal range of motion.         General: Swelling (BLE) present. No tenderness.   Skin:     General: Skin is warm and dry.   Neurological:      Mental Status: She is alert and oriented to person, place, and time.   Psychiatric:         Behavior: Behavior normal.         Thought Content: Thought content normal.         Judgment: Judgment normal.         Laboratory:  CBC:   Recent Labs   Lab 07/18/20  0323   WBC 11.94   RBC 2.21*   HGB 8.0*   HCT 23.8*   PLT 82*   *   MCH 36.2*   MCHC 33.6     CMP:   Recent Labs  "  Lab 07/18/20  0323   *   CALCIUM 9.9   ALBUMIN 2.9*   PROT 6.4   *   K 5.2*   CO2 24   CL 99   BUN 45*   CREATININE 1.3   ALKPHOS 107   ALT 35   AST 42*   BILITOT 5.7*     Coagulation:   Recent Labs   Lab 07/18/20  0323   INR 1.3*     Labs within the past 24 hours have been reviewed.    Diagnostic Results:  None    Assessment/Plan:     Hyperkalemia  - Continue tele.  - Low K diet when resumed  - Monitor      Acquired coagulation factor deficiency  - 2/2 ESLD  - Monitor      Abdominal pain  - Presented to the ED 7/18 for 10/10 abdominal pain x 1 day  - Reports associated nausea, but no vomiting.  - Reports last BM today. + flatus. Reports compliance with lactulose.   - Labs reviewed (close to baseline).  - CT A/P with concern for possible ileus vs. closed loop SBO.   - D/w transplant hepatologist, Dr. Cobb. Plan for NG tube placement, IVF, and close monitoring.  - Lipase also greater than 1000 on lab work.   - plan to cont with bowel rest at this time.   - consider lactulose enemas for HE control if pt noted with confusion.       Liver cirrhosis secondary to CHAUDHARY  - 54 year old female with PMHx of d-CHF with 65% EF, cirrhosis due to CHAUDHARY (liver txp eval complete, financial approval pending MELD 25)  - See "abdominal pain."  - Consider lactulose enemas for HE control if pt noted with confusion.     Decompensated hepatic cirrhosis  - See "liver cirrhosis."            Jerry Perdue NP  Liver Transplant  [facility]      "

## 2020-07-18 NOTE — PROGRESS NOTES
VISI placed on pt, VSS. Xray at bedside doing abd xray to verify placement. Hat placed in toilet to collect for urine testing. Pt instructed to call for assistance to ambulate to BR and verbalized understanding.

## 2020-07-19 PROBLEM — K85.90 ACUTE PANCREATITIS: Status: ACTIVE | Noted: 2020-07-18

## 2020-07-19 LAB
ALBUMIN SERPL BCP-MCNC: 2.3 G/DL (ref 3.5–5.2)
ALP SERPL-CCNC: 84 U/L (ref 55–135)
ALT SERPL W/O P-5'-P-CCNC: 29 U/L (ref 10–44)
AMYLASE SERPL-CCNC: 1376 U/L (ref 20–110)
ANION GAP SERPL CALC-SCNC: 2 MMOL/L (ref 8–16)
AST SERPL-CCNC: 37 U/L (ref 10–40)
BASOPHILS # BLD AUTO: 0.01 K/UL (ref 0–0.2)
BASOPHILS NFR BLD: 0.1 % (ref 0–1.9)
BILIRUB SERPL-MCNC: 7.2 MG/DL (ref 0.1–1)
BUN SERPL-MCNC: 23 MG/DL (ref 6–20)
CALCIUM SERPL-MCNC: 9.7 MG/DL (ref 8.7–10.5)
CHLORIDE SERPL-SCNC: 104 MMOL/L (ref 95–110)
CO2 SERPL-SCNC: 23 MMOL/L (ref 23–29)
CREAT SERPL-MCNC: 1 MG/DL (ref 0.5–1.4)
DIFFERENTIAL METHOD: ABNORMAL
EOSINOPHIL # BLD AUTO: 0 K/UL (ref 0–0.5)
EOSINOPHIL NFR BLD: 0.5 % (ref 0–8)
ERYTHROCYTE [DISTWIDTH] IN BLOOD BY AUTOMATED COUNT: 14.3 % (ref 11.5–14.5)
EST. GFR  (AFRICAN AMERICAN): >60 ML/MIN/1.73 M^2
EST. GFR  (NON AFRICAN AMERICAN): >60 ML/MIN/1.73 M^2
GLUCOSE SERPL-MCNC: 150 MG/DL (ref 70–110)
HCT VFR BLD AUTO: 22.1 % (ref 37–48.5)
HGB BLD-MCNC: 7.2 G/DL (ref 12–16)
IMM GRANULOCYTES # BLD AUTO: 0.07 K/UL (ref 0–0.04)
IMM GRANULOCYTES NFR BLD AUTO: 0.9 % (ref 0–0.5)
INR PPP: 1.3 (ref 0.8–1.2)
LIPASE SERPL-CCNC: >1000 U/L (ref 4–60)
LYMPHOCYTES # BLD AUTO: 0.6 K/UL (ref 1–4.8)
LYMPHOCYTES NFR BLD: 7.7 % (ref 18–48)
MAGNESIUM SERPL-MCNC: 1.6 MG/DL (ref 1.6–2.6)
MCH RBC QN AUTO: 35.8 PG (ref 27–31)
MCHC RBC AUTO-ENTMCNC: 32.6 G/DL (ref 32–36)
MCV RBC AUTO: 110 FL (ref 82–98)
MONOCYTES # BLD AUTO: 0.7 K/UL (ref 0.3–1)
MONOCYTES NFR BLD: 9.1 % (ref 4–15)
NEUTROPHILS # BLD AUTO: 6.6 K/UL (ref 1.8–7.7)
NEUTROPHILS NFR BLD: 81.7 % (ref 38–73)
NRBC BLD-RTO: 0 /100 WBC
PHOSPHATE SERPL-MCNC: 3.1 MG/DL (ref 2.7–4.5)
PLATELET # BLD AUTO: 56 K/UL (ref 150–350)
PMV BLD AUTO: 9.6 FL (ref 9.2–12.9)
POTASSIUM SERPL-SCNC: 4.8 MMOL/L (ref 3.5–5.1)
PROT SERPL-MCNC: 5.2 G/DL (ref 6–8.4)
PROTHROMBIN TIME: 13.4 SEC (ref 9–12.5)
RBC # BLD AUTO: 2.01 M/UL (ref 4–5.4)
SODIUM SERPL-SCNC: 129 MMOL/L (ref 136–145)
WBC # BLD AUTO: 8.1 K/UL (ref 3.9–12.7)

## 2020-07-19 PROCEDURE — 83690 ASSAY OF LIPASE: CPT | Mod: NTX

## 2020-07-19 PROCEDURE — 99233 PR SUBSEQUENT HOSPITAL CARE,LEVL III: ICD-10-PCS | Mod: NTX,,, | Performed by: INTERNAL MEDICINE

## 2020-07-19 PROCEDURE — 25000003 PHARM REV CODE 250: Mod: NTX | Performed by: PHYSICIAN ASSISTANT

## 2020-07-19 PROCEDURE — 85610 PROTHROMBIN TIME: CPT | Mod: NTX

## 2020-07-19 PROCEDURE — 80053 COMPREHEN METABOLIC PANEL: CPT | Mod: NTX

## 2020-07-19 PROCEDURE — 20600001 HC STEP DOWN PRIVATE ROOM: Mod: NTX

## 2020-07-19 PROCEDURE — 94761 N-INVAS EAR/PLS OXIMETRY MLT: CPT | Mod: NTX

## 2020-07-19 PROCEDURE — 84100 ASSAY OF PHOSPHORUS: CPT | Mod: NTX

## 2020-07-19 PROCEDURE — 85025 COMPLETE CBC W/AUTO DIFF WBC: CPT | Mod: NTX

## 2020-07-19 PROCEDURE — 82150 ASSAY OF AMYLASE: CPT | Mod: NTX

## 2020-07-19 PROCEDURE — 25000003 PHARM REV CODE 250: Mod: NTX | Performed by: EMERGENCY MEDICINE

## 2020-07-19 PROCEDURE — 25000003 PHARM REV CODE 250: Mod: NTX | Performed by: NURSE PRACTITIONER

## 2020-07-19 PROCEDURE — 83735 ASSAY OF MAGNESIUM: CPT | Mod: NTX

## 2020-07-19 PROCEDURE — 63600175 PHARM REV CODE 636 W HCPCS: Mod: NTX | Performed by: PHYSICIAN ASSISTANT

## 2020-07-19 PROCEDURE — 99233 SBSQ HOSP IP/OBS HIGH 50: CPT | Mod: NTX,,, | Performed by: INTERNAL MEDICINE

## 2020-07-19 PROCEDURE — 36415 COLL VENOUS BLD VENIPUNCTURE: CPT | Mod: NTX

## 2020-07-19 RX ORDER — LACTULOSE 10 G/15ML
200 SOLUTION ORAL; RECTAL 2 TIMES DAILY
Status: DISCONTINUED | OUTPATIENT
Start: 2020-07-19 | End: 2020-07-20

## 2020-07-19 RX ORDER — LACTULOSE 10 G/15ML
200 SOLUTION ORAL; RECTAL 2 TIMES DAILY
Status: DISCONTINUED | OUTPATIENT
Start: 2020-07-19 | End: 2020-07-19

## 2020-07-19 RX ORDER — LACTULOSE 10 G/15ML
30 SOLUTION ORAL 3 TIMES DAILY
Status: DISCONTINUED | OUTPATIENT
Start: 2020-07-19 | End: 2020-07-21

## 2020-07-19 RX ORDER — HALOPERIDOL 1 MG/1
1 TABLET ORAL DAILY PRN
Status: DISCONTINUED | OUTPATIENT
Start: 2020-07-19 | End: 2020-07-19

## 2020-07-19 RX ORDER — HALOPERIDOL 5 MG/ML
1 INJECTION INTRAMUSCULAR EVERY 6 HOURS PRN
Status: DISCONTINUED | OUTPATIENT
Start: 2020-07-19 | End: 2020-07-21 | Stop reason: HOSPADM

## 2020-07-19 RX ADMIN — RIFAXIMIN 550 MG: 550 TABLET ORAL at 09:07

## 2020-07-19 RX ADMIN — PANTOPRAZOLE SODIUM 40 MG: 40 TABLET, DELAYED RELEASE ORAL at 09:07

## 2020-07-19 RX ADMIN — SODIUM CHLORIDE: 0.9 INJECTION, SOLUTION INTRAVENOUS at 09:07

## 2020-07-19 RX ADMIN — MIDODRINE HYDROCHLORIDE 10 MG: 5 TABLET ORAL at 09:07

## 2020-07-19 RX ADMIN — MICONAZOLE NITRATE: 20 POWDER TOPICAL at 09:07

## 2020-07-19 RX ADMIN — HEPARIN SODIUM 5000 UNITS: 5000 INJECTION INTRAVENOUS; SUBCUTANEOUS at 02:07

## 2020-07-19 RX ADMIN — MIDODRINE HYDROCHLORIDE 10 MG: 5 TABLET ORAL at 02:07

## 2020-07-19 RX ADMIN — LACTULOSE 30 G: 20 SOLUTION ORAL at 09:07

## 2020-07-19 RX ADMIN — ZINC SULFATE 220 MG (50 MG) CAPSULE 220 MG: CAPSULE at 05:07

## 2020-07-19 RX ADMIN — LACTULOSE 200 G: 10 SOLUTION ORAL at 08:07

## 2020-07-19 RX ADMIN — SODIUM CHLORIDE: 0.9 INJECTION, SOLUTION INTRAVENOUS at 10:07

## 2020-07-19 RX ADMIN — LACTULOSE 30 G: 20 SOLUTION ORAL at 02:07

## 2020-07-19 RX ADMIN — HALOPERIDOL LACTATE 1 MG: 5 INJECTION, SOLUTION INTRAMUSCULAR at 07:07

## 2020-07-19 RX ADMIN — HEPARIN SODIUM 5000 UNITS: 5000 INJECTION INTRAVENOUS; SUBCUTANEOUS at 05:07

## 2020-07-19 RX ADMIN — LEVOTHYROXINE SODIUM 150 MCG: 150 TABLET ORAL at 05:07

## 2020-07-19 RX ADMIN — MIDODRINE HYDROCHLORIDE 10 MG: 5 TABLET ORAL at 05:07

## 2020-07-19 RX ADMIN — HEPARIN SODIUM 5000 UNITS: 5000 INJECTION INTRAVENOUS; SUBCUTANEOUS at 09:07

## 2020-07-19 NOTE — SUBJECTIVE & OBJECTIVE
Interval History:   Patient has no complaints. Undergoing liver transplant evaluation.    Current Facility-Administered Medications   Medication    0.9%  NaCl infusion    acetaminophen tablet 650 mg    haloperidoL tablet 1 mg    heparin (porcine) injection 5,000 Units    lactulose 10 gram/15 mL solution (enema) 200 g    lactulose 20 gram/30 mL solution Soln 30 g    levothyroxine tablet 150 mcg    miconazole NITRATE 2 % top powder    midodrine tablet 10 mg    ondansetron injection 4 mg    pantoprazole EC tablet 40 mg    rifAXIMin tablet 550 mg    sodium chloride 0.9% flush 10 mL    zinc sulfate capsule 220 mg     Review of Systems   Constitutional: Positive for activity change and appetite change.   HENT: Negative for congestion and trouble swallowing.    Eyes: Negative for visual disturbance.   Respiratory: Negative for cough and shortness of breath.    Cardiovascular: Positive for leg swelling. Negative for chest pain.   Gastrointestinal: Positive for abdominal distention and diarrhea. Negative for abdominal pain, constipation, nausea and vomiting.   Genitourinary: Negative for dysuria and flank pain.   Musculoskeletal: Negative for back pain and gait problem.   Skin: Positive for color change.   Neurological: Negative for light-headedness and headaches.   Psychiatric/Behavioral: Negative for agitation and behavioral problems.       Objective:     Vital Signs (Most Recent):  Temp: 97.6 °F (36.4 °C) (07/19/20 1121)  Pulse: 98 (07/19/20 1121)  Resp: 14 (07/19/20 1121)  BP: (!) 114/56 (07/19/20 1121)  SpO2: 99 % (07/19/20 1121) Vital Signs (24h Range):  Temp:  [97.4 °F (36.3 °C)-98.3 °F (36.8 °C)] 97.6 °F (36.4 °C)  Pulse:  [85-98] 98  Resp:  [14-20] 14  SpO2:  [97 %-99 %] 99 %  BP: (100-114)/(49-69) 114/56     Weight: 67.8 kg (149 lb 7.6 oz) (07/19/20 0500)  Body mass index is 25.66 kg/m².    Physical Exam  Vitals signs and nursing note reviewed.   Constitutional:       Appearance: Normal appearance.    HENT:      Head: Normocephalic and atraumatic.      Right Ear: Tympanic membrane normal.   Eyes:      General: Scleral icterus present.      Extraocular Movements: Extraocular movements intact.      Pupils: Pupils are equal, round, and reactive to light.   Cardiovascular:      Rate and Rhythm: Normal rate.      Heart sounds: Normal heart sounds.   Pulmonary:      Effort: Pulmonary effort is normal.   Abdominal:      General: There is no distension.      Tenderness: There is no abdominal tenderness.   Skin:     Coloration: Skin is jaundiced.   Neurological:      Mental Status: She is alert and oriented to person, place, and time.   Psychiatric:         Mood and Affect: Mood normal.         Behavior: Behavior normal.         MELD-Na score: 25 at 7/18/2020  3:23 AM  MELD score: 18 at 7/18/2020  3:23 AM  Calculated from:  Serum Creatinine: 1.3 mg/dL at 7/18/2020  3:23 AM  Serum Sodium: 127 mmol/L at 7/18/2020  3:23 AM  Total Bilirubin: 5.7 mg/dL at 7/18/2020  3:23 AM  INR(ratio): 1.3 at 7/18/2020  3:23 AM  Age: 54 years 10 months    Significant Labs:  Labs within the past month have been reviewed.    Significant Imaging:  Reviewed

## 2020-07-19 NOTE — PROGRESS NOTES
[facility]  Liver Transplant  Progress Note    Patient Name: Zeina Mahoney  MRN: [id number]  Admission Date: 7/18/2020  Hospital Length of Stay: 1 days  Code Status: Full Code  Primary Care Provider: Primary Doctor No  Post-Operative Day:     ORGAN:     Disease Etiology: Cirrhosis: Fatty Liver (Chaudhary)  Donor Type:     CDC High Risk:     Donor CMV Status:   Donor CMV Status:   Donor HBcAB:     Donor HCV Status:     Donor HBV HERACLIO: Organ record is missing.  Donor HCV HERACLIO: Organ record is missing.  Whole or Partial:   Biliary Anastomosis:   Arterial Anatomy:   Subjective:     History of Present Illness:  54 year old female with PMHx of d-CHF with 65% EF, cirrhosis due to CHAUDHARY (liver txp eval complete, financial approval pending MELD 25), portal hypertension, hypothyroidism, and adjustment disorder. She presented to the ED for abdominal pain. Patient reports having generalized abdominal pain for one day. Describes pain as constant and aching. Rates pain 10/10. Reports associated nausea. Reports last BM today. + flatus. Reports compliance with lactulose. She denies fever, chills, nausea, vomiting, sob, chest pain, dysuria, diarrhea, or constipation. Labs reviewed (close to baseline). CT A/P with concern for possible ileus vs. closed loop SBO. D/w transplant hepatologist, Dr. Cobb. Plan for NG tube placement, IVF, and close monitoring. Lipase also noted to be greater than 1000. Will cont with plan for bowel rest at this time.     Hospital Course:  Patient presented with sever abdominal pain, she he has managed for acute pancreatitis, which clinically improved. Suspected SBO on CT abdomen, bowel rest was planned and NGT placed, patient demand removing NGT and advancing diet as she clinically feely better. She is undergoing eval for liver transplant as well. Continue monitor lytes closely.     Interval History:   Patient has no complaints. Undergoing liver transplant evaluation.    Current  Facility-Administered Medications   Medication    0.9%  NaCl infusion    acetaminophen tablet 650 mg    haloperidoL tablet 1 mg    heparin (porcine) injection 5,000 Units    lactulose 10 gram/15 mL solution (enema) 200 g    lactulose 20 gram/30 mL solution Soln 30 g    levothyroxine tablet 150 mcg    miconazole NITRATE 2 % top powder    midodrine tablet 10 mg    ondansetron injection 4 mg    pantoprazole EC tablet 40 mg    rifAXIMin tablet 550 mg    sodium chloride 0.9% flush 10 mL    zinc sulfate capsule 220 mg     Review of Systems   Constitutional: Positive for activity change and appetite change.   HENT: Negative for congestion and trouble swallowing.    Eyes: Negative for visual disturbance.   Respiratory: Negative for cough and shortness of breath.    Cardiovascular: Positive for leg swelling. Negative for chest pain.   Gastrointestinal: Positive for abdominal distention and diarrhea. Negative for abdominal pain, constipation, nausea and vomiting.   Genitourinary: Negative for dysuria and flank pain.   Musculoskeletal: Negative for back pain and gait problem.   Skin: Positive for color change.   Neurological: Negative for light-headedness and headaches.   Psychiatric/Behavioral: Negative for agitation and behavioral problems.       Objective:     Vital Signs (Most Recent):  Temp: 97.6 °F (36.4 °C) (07/19/20 1121)  Pulse: 98 (07/19/20 1121)  Resp: 14 (07/19/20 1121)  BP: (!) 114/56 (07/19/20 1121)  SpO2: 99 % (07/19/20 1121) Vital Signs (24h Range):  Temp:  [97.4 °F (36.3 °C)-98.3 °F (36.8 °C)] 97.6 °F (36.4 °C)  Pulse:  [85-98] 98  Resp:  [14-20] 14  SpO2:  [97 %-99 %] 99 %  BP: (100-114)/(49-69) 114/56     Weight: 67.8 kg (149 lb 7.6 oz) (07/19/20 0500)  Body mass index is 25.66 kg/m².    Physical Exam  Vitals signs and nursing note reviewed.   Constitutional:       Appearance: Normal appearance.   HENT:      Head: Normocephalic and atraumatic.      Right Ear: Tympanic membrane normal.   Eyes:       General: Scleral icterus present.      Extraocular Movements: Extraocular movements intact.      Pupils: Pupils are equal, round, and reactive to light.   Cardiovascular:      Rate and Rhythm: Normal rate.      Heart sounds: Normal heart sounds.   Pulmonary:      Effort: Pulmonary effort is normal.   Abdominal:      General: There is no distension.      Tenderness: There is no abdominal tenderness.   Skin:     Coloration: Skin is jaundiced.   Neurological:      Mental Status: She is alert and oriented to person, place, and time.   Psychiatric:         Mood and Affect: Mood normal.         Behavior: Behavior normal.         MELD-Na score: 25 at 7/18/2020  3:23 AM  MELD score: 18 at 7/18/2020  3:23 AM  Calculated from:  Serum Creatinine: 1.3 mg/dL at 7/18/2020  3:23 AM  Serum Sodium: 127 mmol/L at 7/18/2020  3:23 AM  Total Bilirubin: 5.7 mg/dL at 7/18/2020  3:23 AM  INR(ratio): 1.3 at 7/18/2020  3:23 AM  Age: 54 years 10 months    Significant Labs:  Labs within the past month have been reviewed.    Significant Imaging:  Reviewed          Assessment/Plan:     * Acute pancreatitis  - Patient with hx of multiple acute pancreatitis unknown cause. She underwent extensive workup in [place]. Presented with sever abdominal pain as well as N/V.    While in ED: CT A/P with concern for possible ileus vs. closed loop SBO and Lipase at presentation >1000   - Initially plan for bowel rest and placed NGT>> overnight patient pulled out the NGT and request advancing the diet as she feels her symptoms improving   - Patient looks clinically improving this morning>> no abdominal pain, N or V   - Advancing diet as tolerated   - continue monitor closely           Decompensated hepatic cirrhosis  -54 year old female with PMHx of d-CHF with 65% EF, cirrhosis due to CHAUDHARY (liver txp eval complete, financial approval pending MELD 25)      MELD-Na score: 25 at 7/18/2020  3:23 AM  MELD score: 18 at 7/18/2020  3:23 AM  Calculated  "from:  Serum Creatinine: 1.3 mg/dL at 7/18/2020  3:23 AM  Serum Sodium: 127 mmol/L at 7/18/2020  3:23 AM  Total Bilirubin: 5.7 mg/dL at 7/18/2020  3:23 AM  INR(ratio): 1.3 at 7/18/2020  3:23 AM  Age: 54 years 10 months      -history of pancreatic cysts:  Based on prior evaluation, patient had EUS with FNA suspicious IPMN.  Despite mildly elevated CEA, this is thought to be benign.  -decompensated cirrhosis secondary to CHAUDHARY (possible alcohol)  -hepatic encephalopathy:  Continue lactulose oral and Enema, rifaximin, and zinc.  -HCC and variceal screening up-to-date on Care everywhere.  - Transplant: Patient undergoing liver transplant Eval       Hyponatremia  Last      Plan:   - Daily BMP   - Continue to monitor closely       Hyperkalemia  - Continue tele.  - Low K diet when resumed    Plan:   - Daily BMP    - Shift as needed       Acquired coagulation factor deficiency  - 2/2 ESLD  - Monitor      Liver cirrhosis secondary to CHAUDHARY  - 54 year old female with PMHx of d-CHF with 65% EF, cirrhosis due to CHAUDHARY (liver txp eval complete, financial approval pending MELD 25)  - See "abdominal pain."  - Consider lactulose enemas for HE control if pt noted with confusion.       VTE Risk Mitigation (From admission, onward)         Ordered     heparin (porcine) injection 5,000 Units  Every 8 hours      07/18/20 0605     IP VTE HIGH RISK PATIENT  Once      07/18/20 0605                The patients clinical status was discussed at multidisplinary rounds, involving transplant surgery, transplant medicine, pharmacy, nursing, nutrition, and social work    Noreen Salinas MD  Internal Medicine Department, PGY-II  [facility]  [phone]    "

## 2020-07-19 NOTE — PLAN OF CARE
Pt AAOx3 intermittently confused, VSS, afebrile. Pt is moderatly encephalopathic and becomes agitated when told she is NPO. Polly PA made exception and allowed one time broth and cranberry juice to calm pt down, pt tolerated well. Pt has pure wick in place hooked up to wall suction. Bed alarm on. Lipase @ 1000. IVF @ 75. Pt had single lactulose enema at 2200, BM passed 30m post admin. Plan for bowel rest. Fall risk precautions maintained and reviewed with pt. Plan of care reviewed with pt. Pt verbalized understanding. Look at flowsheet for assessment findings. Will continue to monitor.

## 2020-07-19 NOTE — PLAN OF CARE
Pt is AAOX3 but disorientated to time. Has remained calm and cooperative on this shift at this time. Pt refused labs this am. Called and spoke to phlebotomist who will be in to collect the missed labs.     -abdomen is soft and not distended, has had two bms that have been charted in the flowsheets. bms are soft and mod-large.  -diet changed to CLD and pt had some milk that was gave to her. Tolerated it all without difficulty. No Nausea or pain  -lipase of 1000 as of 7/18, IVF at 75ml/hr  -up with SBA, very weak and shaky,  assists pt to bed  -purewick placed due to weakness  - is at bedside who is a good historian and supportive with care   -bed alarm was turned on due to pts hx of encephalopathy  -fall precautions in place, call bell in reach

## 2020-07-19 NOTE — ASSESSMENT & PLAN NOTE
- Patient with hx of multiple acute pancreatitis unknown cause. She underwent extensive workup in [place]. Presented with sever abdominal pain as well as N/V.    While in ED: CT A/P with concern for possible ileus vs. closed loop SBO and Lipase at presentation >1000   - Initially plan for bowel rest and placed NGT>> overnight patient pulled out the NGT and request advancing the diet as she feels her symptoms improving   - Patient looks clinically improving this morning>> no abdominal pain, N or V   - Advancing diet as tolerated   - continue monitor closely

## 2020-07-19 NOTE — ASSESSMENT & PLAN NOTE
-54 year old female with PMHx of d-CHF with 65% EF, cirrhosis due to CHAUDHARY (liver txp eval complete, financial approval pending MELD 25)      MELD-Na score: 25 at 7/18/2020  3:23 AM  MELD score: 18 at 7/18/2020  3:23 AM  Calculated from:  Serum Creatinine: 1.3 mg/dL at 7/18/2020  3:23 AM  Serum Sodium: 127 mmol/L at 7/18/2020  3:23 AM  Total Bilirubin: 5.7 mg/dL at 7/18/2020  3:23 AM  INR(ratio): 1.3 at 7/18/2020  3:23 AM  Age: 54 years 10 months      -history of pancreatic cysts:  Based on prior evaluation, patient had EUS with FNA suspicious IPMN.  Despite mildly elevated CEA, this is thought to be benign.  -decompensated cirrhosis secondary to CHAUDHARY (possible alcohol)  -hepatic encephalopathy:  Continue lactulose oral and Enema, rifaximin, and zinc.  -HCC and variceal screening up-to-date on Care everywhere.  - Transplant: Patient undergoing liver transplant Eval

## 2020-07-19 NOTE — HOSPITAL COURSE
Interval History: patient with significant agitation overnight due to her wanting to eat, patient placed in restraints and given haldol IV. She was previously NPO on IVF for acute pancreatitis. Lipase remains > 1000. Patient reports abd pain and N/V resolved. CT A/P 7/18 with possible localized ilus vs SBO. Patient approved for transplant awaiting financial approval to list. MELD 22 today. Diet advanced to low fat, monitor closely for abd pain and N/V. AAOx3, unsure of date/time, (-) asterixis, continue home lactulose/rifaximin.

## 2020-07-20 ENCOUNTER — TELEPHONE (OUTPATIENT)
Dept: TRANSPLANT | Facility: CLINIC | Age: 55
End: 2020-07-20

## 2020-07-20 LAB
ALBUMIN SERPL BCP-MCNC: 2.1 G/DL (ref 3.5–5.2)
ALP SERPL-CCNC: 75 U/L (ref 55–135)
ALT SERPL W/O P-5'-P-CCNC: 25 U/L (ref 10–44)
ANION GAP SERPL CALC-SCNC: 3 MMOL/L (ref 8–16)
AST SERPL-CCNC: 33 U/L (ref 10–40)
BASOPHILS # BLD AUTO: 0.01 K/UL (ref 0–0.2)
BASOPHILS NFR BLD: 0.2 % (ref 0–1.9)
BILIRUB SERPL-MCNC: 6.9 MG/DL (ref 0.1–1)
BUN SERPL-MCNC: 19 MG/DL (ref 6–20)
BUN SERPL-MCNC: 40 MG/DL (ref 6–30)
CALCIUM SERPL-MCNC: 9.3 MG/DL (ref 8.7–10.5)
CHLORIDE SERPL-SCNC: 109 MMOL/L (ref 95–110)
CHLORIDE SERPL-SCNC: 97 MMOL/L (ref 95–110)
CO2 SERPL-SCNC: 18 MMOL/L (ref 23–29)
CREAT SERPL-MCNC: 0.8 MG/DL (ref 0.5–1.4)
CREAT SERPL-MCNC: 1.4 MG/DL (ref 0.5–1.4)
DIFFERENTIAL METHOD: ABNORMAL
EOSINOPHIL # BLD AUTO: 0.1 K/UL (ref 0–0.5)
EOSINOPHIL NFR BLD: 1.2 % (ref 0–8)
ERYTHROCYTE [DISTWIDTH] IN BLOOD BY AUTOMATED COUNT: 14.3 % (ref 11.5–14.5)
EST. GFR  (AFRICAN AMERICAN): >60 ML/MIN/1.73 M^2
EST. GFR  (NON AFRICAN AMERICAN): >60 ML/MIN/1.73 M^2
GLUCOSE SERPL-MCNC: 145 MG/DL (ref 70–110)
GLUCOSE SERPL-MCNC: 152 MG/DL (ref 70–110)
HCT VFR BLD AUTO: 21.8 % (ref 37–48.5)
HCT VFR BLD CALC: 24 %PCV (ref 36–54)
HGB BLD-MCNC: 7 G/DL (ref 12–16)
IMM GRANULOCYTES # BLD AUTO: 0.07 K/UL (ref 0–0.04)
IMM GRANULOCYTES NFR BLD AUTO: 1.1 % (ref 0–0.5)
INR PPP: 1.3 (ref 0.8–1.2)
LIPASE SERPL-CCNC: >1000 U/L (ref 4–60)
LYMPHOCYTES # BLD AUTO: 0.9 K/UL (ref 1–4.8)
LYMPHOCYTES NFR BLD: 13.9 % (ref 18–48)
MAGNESIUM SERPL-MCNC: 1.5 MG/DL (ref 1.6–2.6)
MCH RBC QN AUTO: 36.1 PG (ref 27–31)
MCHC RBC AUTO-ENTMCNC: 32.1 G/DL (ref 32–36)
MCV RBC AUTO: 112 FL (ref 82–98)
MONOCYTES # BLD AUTO: 0.7 K/UL (ref 0.3–1)
MONOCYTES NFR BLD: 11 % (ref 4–15)
NEUTROPHILS # BLD AUTO: 4.8 K/UL (ref 1.8–7.7)
NEUTROPHILS NFR BLD: 72.6 % (ref 38–73)
NRBC BLD-RTO: 0 /100 WBC
PHOSPHATE SERPL-MCNC: 2.7 MG/DL (ref 2.7–4.5)
PLATELET # BLD AUTO: 52 K/UL (ref 150–350)
PMV BLD AUTO: 9.3 FL (ref 9.2–12.9)
POC IONIZED CALCIUM: 1.37 MMOL/L (ref 1.06–1.42)
POC TCO2 (MEASURED): 23 MMOL/L (ref 23–29)
POTASSIUM BLD-SCNC: 5.2 MMOL/L (ref 3.5–5.1)
POTASSIUM SERPL-SCNC: 5 MMOL/L (ref 3.5–5.1)
PROT SERPL-MCNC: 4.9 G/DL (ref 6–8.4)
PROTHROMBIN TIME: 12.8 SEC (ref 9–12.5)
RBC # BLD AUTO: 1.94 M/UL (ref 4–5.4)
SAMPLE: ABNORMAL
SODIUM BLD-SCNC: 130 MMOL/L (ref 136–145)
SODIUM SERPL-SCNC: 130 MMOL/L (ref 136–145)
WBC # BLD AUTO: 6.55 K/UL (ref 3.9–12.7)

## 2020-07-20 PROCEDURE — 84100 ASSAY OF PHOSPHORUS: CPT | Mod: NTX

## 2020-07-20 PROCEDURE — 99233 SBSQ HOSP IP/OBS HIGH 50: CPT | Mod: NTX,,, | Performed by: NURSE PRACTITIONER

## 2020-07-20 PROCEDURE — 25000003 PHARM REV CODE 250: Mod: NTX | Performed by: PHYSICIAN ASSISTANT

## 2020-07-20 PROCEDURE — 63600175 PHARM REV CODE 636 W HCPCS: Mod: NTX | Performed by: PHYSICIAN ASSISTANT

## 2020-07-20 PROCEDURE — 83735 ASSAY OF MAGNESIUM: CPT | Mod: NTX

## 2020-07-20 PROCEDURE — 25000003 PHARM REV CODE 250: Mod: NTX | Performed by: EMERGENCY MEDICINE

## 2020-07-20 PROCEDURE — 36415 COLL VENOUS BLD VENIPUNCTURE: CPT | Mod: NTX

## 2020-07-20 PROCEDURE — 20600001 HC STEP DOWN PRIVATE ROOM: Mod: NTX

## 2020-07-20 PROCEDURE — 99233 PR SUBSEQUENT HOSPITAL CARE,LEVL III: ICD-10-PCS | Mod: NTX,,, | Performed by: NURSE PRACTITIONER

## 2020-07-20 PROCEDURE — 83690 ASSAY OF LIPASE: CPT | Mod: NTX

## 2020-07-20 PROCEDURE — 25000003 PHARM REV CODE 250: Mod: NTX | Performed by: NURSE PRACTITIONER

## 2020-07-20 PROCEDURE — 85025 COMPLETE CBC W/AUTO DIFF WBC: CPT | Mod: NTX

## 2020-07-20 PROCEDURE — 80053 COMPREHEN METABOLIC PANEL: CPT | Mod: NTX

## 2020-07-20 PROCEDURE — 85610 PROTHROMBIN TIME: CPT | Mod: NTX

## 2020-07-20 RX ORDER — LACTULOSE 10 G/15ML
200 SOLUTION ORAL; RECTAL EVERY 6 HOURS PRN
Status: DISCONTINUED | OUTPATIENT
Start: 2020-07-20 | End: 2020-07-21 | Stop reason: HOSPADM

## 2020-07-20 RX ORDER — SODIUM BICARBONATE 650 MG/1
1300 TABLET ORAL 2 TIMES DAILY
Status: COMPLETED | OUTPATIENT
Start: 2020-07-20 | End: 2020-07-20

## 2020-07-20 RX ORDER — LANOLIN ALCOHOL/MO/W.PET/CERES
400 CREAM (GRAM) TOPICAL DAILY
Status: DISCONTINUED | OUTPATIENT
Start: 2020-07-20 | End: 2020-07-21 | Stop reason: HOSPADM

## 2020-07-20 RX ORDER — LACTULOSE 10 G/15ML
30 SOLUTION ORAL EVERY 4 HOURS PRN
Status: DISCONTINUED | OUTPATIENT
Start: 2020-07-20 | End: 2020-07-21

## 2020-07-20 RX ADMIN — MIDODRINE HYDROCHLORIDE 10 MG: 5 TABLET ORAL at 05:07

## 2020-07-20 RX ADMIN — SODIUM CHLORIDE: 0.9 INJECTION, SOLUTION INTRAVENOUS at 10:07

## 2020-07-20 RX ADMIN — HEPARIN SODIUM 5000 UNITS: 5000 INJECTION INTRAVENOUS; SUBCUTANEOUS at 02:07

## 2020-07-20 RX ADMIN — MIDODRINE HYDROCHLORIDE 10 MG: 5 TABLET ORAL at 02:07

## 2020-07-20 RX ADMIN — SODIUM BICARBONATE 650 MG TABLET 1300 MG: at 10:07

## 2020-07-20 RX ADMIN — RIFAXIMIN 550 MG: 550 TABLET ORAL at 08:07

## 2020-07-20 RX ADMIN — RIFAXIMIN 550 MG: 550 TABLET ORAL at 09:07

## 2020-07-20 RX ADMIN — ACETAMINOPHEN 650 MG: 325 TABLET ORAL at 07:07

## 2020-07-20 RX ADMIN — LEVOTHYROXINE SODIUM 150 MCG: 150 TABLET ORAL at 05:07

## 2020-07-20 RX ADMIN — PANTOPRAZOLE SODIUM 40 MG: 40 TABLET, DELAYED RELEASE ORAL at 08:07

## 2020-07-20 RX ADMIN — MIDODRINE HYDROCHLORIDE 10 MG: 5 TABLET ORAL at 09:07

## 2020-07-20 RX ADMIN — ZINC SULFATE 220 MG (50 MG) CAPSULE 220 MG: CAPSULE at 05:07

## 2020-07-20 RX ADMIN — HALOPERIDOL LACTATE 1 MG: 5 INJECTION, SOLUTION INTRAMUSCULAR at 08:07

## 2020-07-20 RX ADMIN — HEPARIN SODIUM 5000 UNITS: 5000 INJECTION INTRAVENOUS; SUBCUTANEOUS at 05:07

## 2020-07-20 RX ADMIN — MAGNESIUM OXIDE 400 MG (241.3 MG MAGNESIUM) TABLET 400 MG: TABLET at 10:07

## 2020-07-20 RX ADMIN — LACTULOSE 30 G: 10 SOLUTION ORAL at 05:07

## 2020-07-20 RX ADMIN — SODIUM BICARBONATE 650 MG TABLET 1300 MG: at 09:07

## 2020-07-20 RX ADMIN — MICONAZOLE NITRATE: 20 POWDER TOPICAL at 08:07

## 2020-07-20 NOTE — PLAN OF CARE
Pt AAOx3 intermittently confused, VSS, afebrile. Pt is moderatly encephalopathic and upon entering her room at beginning of shift, pt became agitated and verbally abusive, threatening staff. Pt threatened to remove IV and leave hospital. Gerardo BOOGIE notified and entered nonviolent restraints, only wrist restraints were applied. PO haldol offered pt refused, so IV haldol was administered. Avasys in room. Bed alarm on. Lactulose enema given, BM passed 30m post admin. Plan for bowel rest, pt was advanced to clear liquid diet, tolerating well. Restraints were removed @ 0200 and pt has been compliant. IVF @ 75. Fall risk precautions maintained and reviewed with pt. Plan of care reviewed with pt. Pt verbalized understanding. Look at flowsheet for assessment findings. Will continue to monitor.

## 2020-07-20 NOTE — ASSESSMENT & PLAN NOTE
- Patient with hx of multiple acute pancreatitis unknown cause. She underwent extensive workup in [place]. Presented with severe abdominal pain as well as N/V.    While in ED: CT A/P with concern for possible ileus vs. closed loop SBO and Lipase at presentation >1000   - Initially planedn for bowel rest and placed NGT>> overnight patient pulled out the NGT and request advancing the diet as she feels her symptoms improving   - Patient looks clinically improving this morning>> no abdominal pain, N or V   - Diet advanced to low fat 7/20  - Monitor for abd pain and/or N/V  - Lipase remains > 1000, continue to trend amylase/lipase

## 2020-07-20 NOTE — PROGRESS NOTES
Admit Note     Met with patient and  to assess needs. Patient is a 54 y.o.  female, admitted for Hyponatremia., Liver cirrhosis secondary to CHAUDHARY, Abdominal pain, and Decompensated hepatic cirrhosis.         Patient admitted from ED on 7/18/2020 .  At this time, patient presents as alert and oriented x 4, pleasant, good eye contact, well groomed, recall good, concentration/judgement good, average intelligence, calm, communicative, cooperative and asking and answering questions appropriately.  At this time, patients caregiver presents as alert and oriented x 4, pleasant, good eye contact, well groomed, recall good, concentration/judgement good, average intelligence, calm, communicative, cooperative and asking and answering questions appropriately.    Household/Family Systems     Patient resides with patient's , at [address] [facility] [address].  Support system includes .  Patient does not have dependents that are need of being cared for.     Patients primary caregiver is Yonis Mahoney , patients , phone number [phone].  Confirmed patients contact information is [phone] (home);   Telephone Information:   Mobile [phone]   .    During admission, patient's caregiver plans to stay at [facility] located on [address] [place] [address] . Confirmed patient and patients caregivers do have access to reliable transportation.    Cognitive Status/Learning     Patient reports reading ability as 12th grade and states patient does have difficulty with seeing. Pt wears readers.  Patient reports patient learns best by seeing, hearing, and visual.   Needed: No.   Highest education level: High School (9-12) or GED    Vocation/Disability   .  Working for Income: No  If no, reason not working: Patient Choice - Homemaker. Pt stopped working when kids were born to raise her children and stayed at home.   Patient is retired from working in retail. 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      Adherence     Patient reports a high level of adherence to patients health care regimen.  Adherence counseling and education provided. Patient verbalizes understanding.    Substance Use    Patient reports the following substance usage.    Tobacco: none, patient denies any use.  Alcohol:  Pt and pt's  reports that the pt used to be a social drinker and would have a few glasses of wine on occasion. Pt reports that she has not had any ETOH use in over a year when she recieved her ESLD diagnosis and did not have any problems with quitting.   Illicit Drugs/Non-prescribed Medications: none, patient denies any use.  Patient states clear understanding of the potential impact of substance use.  Substance abstinence/cessation counseling, education and resources provided and reviewed.     Services Utilizing/ADLS    Infusion Service: Prior to admission, patient utilizing? no  Home Health: Prior to admission, patient utilizing? yes Pt and pt's  reports that they have utilized home health services for the past 4 months. Pt's  reports that someone comes weekly for blood draws and checking pt's vitals. Pt's  states that they plan on continuing these services.  last visited the patient on June 16th, before the pt and  left for [place] for transplant evaluation.   DME: Prior to admission, yes Pt's  reports that the pt utilizes several DME devices including a hospital bed, walker, wheelchair, bathroom pull-bars, and shower chair.  Pulmonary/Cardiac Rehab: Prior to admission, no  Dialysis:  Prior to admission, no  Transplant Specialty Pharmacy:  Prior to admission, no.    Prior to admission, patient reports patient was not independent with ADLS and was not driving.  Patient reports patient is not able to care for self at this time due to compromised medical condition (as documented in medical record) and physical weakness.. Pt reported needing 's assistance.  Patient indicates  a willingness to care for self once medically cleared to do so.    Insurance/Medications    Insured by   Payor/Plan Subscr  Sex Relation Sub. Ins. ID Effective Group Num   1. BLUE CROSS BL* FLORIAN MEDINA 9/10/1964 Male  [id number]                                   [address] [address]      Primary Insurance (for UNOS reporting): Private Insurance  Secondary Insurance (for UNOS reporting): None    Patient reports patient is able to obtain and afford medications at this time and at time of discharge.    Living Will/Healthcare Power of     Patient states patient does not have a LW and/or HCPA.   provided education regarding LW and HCPA and the completion of forms.     Coping/Mental Health    Patient is coping well with the aid of  family members.  Patient denies mental health difficulties.     Discharge Planning    At time of discharge, patient plans to return to [address] [place] [address]  under the care of pt's  Florian.  Patients  will transport patient.  Per rounds today, expected discharge date has not been medically determined at this time. Patient and patients caregiver  verbalize understanding and are involved in treatment planning and discharge process.    Additional Concerns    Patient's caretaker denies additional needs and/or concerns at this time. Patient is being followed for needs, education, resources, information, emotional support, supportive counseling, and for supportive and skilled discharge plan of care.  providing ongoing psychosocial support, education, resources and d/c planning as needed.  SW remains available.  provided resource list, patient choice, psychosocial and supportive counseling, resources, education, assistance and discharge planning with patient and caregiver involvement, ongoing SW availability and services as appropriate.  remains available. Patient's caregiver verbalizes understanding and  agreement with information reviewed,  availability and how to access available resources as needed. Patient denies additional needs and/or concerns at this time. Patient verbalizes understanding and agreement with information reviewed, social work availability, and how to access available resources as needed.

## 2020-07-20 NOTE — PROGRESS NOTES
[facility]  Liver Transplant  Progress Note    Patient Name: Zeina Mahoney  MRN: [id number]  Admission Date: 7/18/2020  Hospital Length of Stay: 2 days  Code Status: Full Code  Primary Care Provider: Primary Doctor No  Post-Operative Day:     ORGAN:     Disease Etiology: Cirrhosis: Fatty Liver (Chaudhary)  Donor Type:     CDC High Risk:     Donor CMV Status:   Donor CMV Status:   Donor HBcAB:     Donor HCV Status:     Donor HBV HERACLIO: Organ record is missing.  Donor HCV HERACLIO: Organ record is missing.  Whole or Partial:   Biliary Anastomosis:   Arterial Anatomy:   Subjective:     History of Present Illness:  54 year old female with PMHx of d-CHF with 65% EF, cirrhosis due to CHAUDHARY (liver txp eval complete, financial approval pending MELD 25), portal hypertension, hypothyroidism, and adjustment disorder. She presented to the ED for abdominal pain. Patient reports having generalized abdominal pain for one day. Describes pain as constant and aching. Rates pain 10/10. Reports associated nausea. Reports last BM today. + flatus. Reports compliance with lactulose. She denies fever, chills, nausea, vomiting, sob, chest pain, dysuria, diarrhea, or constipation. Labs reviewed (close to baseline). CT A/P with concern for possible ileus vs. closed loop SBO. D/w transplant hepatologist, Dr. Cobb. Plan for NG tube placement, IVF, and close monitoring. Lipase also noted to be greater than 1000. Will cont with plan for bowel rest at this time.     Interval History: patient with significant agitation overnight due to her wanting to eat, patient placed in restraints and given haldol IV. She was previously NPO on IVF for acute pancreatitis. Lipase remains > 1000. Patient reports abd pain and N/V resolved. CT A/P 7/18 with possible localized ilus vs SBO. Patient approved for transplant awaiting financial approval to list. MELD 22 today. Diet advanced to low fat, monitor closely for abd pain and N/V. AAOx3, unsure of date/time,  (-) asterixis, continue home lactulose/rifaximin.     Scheduled Meds:   heparin (porcine)  5,000 Units Subcutaneous Q8H    lactulose  30 g Oral TID    levothyroxine  150 mcg Oral Before breakfast    magnesium oxide  400 mg Oral Daily    miconazole NITRATE 2 %   Topical (Top) BID    midodrine  10 mg Oral Q8H    pantoprazole  40 mg Oral Daily    rifAXIMin  550 mg Oral BID    sodium bicarbonate  1,300 mg Oral BID    zinc sulfate  220 mg Oral Before breakfast     Continuous Infusions:   sodium chloride 0.9% 75 mL/hr at 07/20/20 1036     PRN Meds:acetaminophen, haloperidol lactate, ondansetron, sodium chloride 0.9%    Review of Systems   Constitutional: Positive for activity change, appetite change and fatigue. Negative for fever.   HENT: Negative.  Negative for congestion and facial swelling.    Eyes: Negative.    Respiratory: Negative.  Negative for chest tightness, shortness of breath and wheezing.    Cardiovascular: Negative.  Negative for chest pain, palpitations and leg swelling.   Gastrointestinal: Positive for abdominal distention. Negative for abdominal pain, constipation, diarrhea, nausea and vomiting.   Genitourinary: Negative.  Negative for decreased urine volume, difficulty urinating, dysuria, hematuria and urgency.   Musculoskeletal: Negative.  Negative for back pain, neck pain and neck stiffness.   Skin: Positive for color change. Negative for pallor and rash.   Allergic/Immunologic: Positive for immunocompromised state.   Neurological: Positive for weakness. Negative for dizziness, tremors, seizures, speech difficulty, light-headedness and headaches.   Psychiatric/Behavioral: Positive for decreased concentration. Negative for behavioral problems, hallucinations and suicidal ideas. The patient is nervous/anxious.      Objective:     Vital Signs (Most Recent):  Temp: 98.3 °F (36.8 °C) (07/20/20 0748)  Pulse: 93 (07/20/20 0748)  Resp: 18 (07/20/20 0748)  BP: (!) 102/51 (07/20/20 0748)  SpO2: 99 %  (07/20/20 0748) Vital Signs (24h Range):  Temp:  [97.6 °F (36.4 °C)-98.5 °F (36.9 °C)] 98.3 °F (36.8 °C)  Pulse:  [90-99] 93  Resp:  [14-18] 18  SpO2:  [95 %-100 %] 99 %  BP: (102-115)/(51-56) 102/51     Weight: 65.5 kg (144 lb 6.4 oz)  Body mass index is 24.79 kg/m².    Intake/Output - Last 3 Shifts       07/18 0700 - 07/19 0659 07/19 0700 - 07/20 0659 07/20 0700 - 07/21 0659    P.O. 430 1755 240    I.V. (mL/kg)  2918.8 (44.6)     Total Intake(mL/kg) 430 (6.3) 4673.8 (71.4) 240 (3.7)    Urine (mL/kg/hr) 900 (0.6) 300 (0.2) 300 (1.1)    Emesis/NG output   0    Other   0    Stool 0 0 0    Blood   0    Total Output 900 300 300    Net -470 +4373.8 -60           Urine Occurrence 1 x  0 x    Stool Occurrence 1 x 5 x 0 x    Emesis Occurrence   0 x          Physical Exam  Vitals signs and nursing note reviewed.   Constitutional:       General: She is not in acute distress.     Appearance: She is well-developed. She is not diaphoretic.   HENT:      Head: Normocephalic and atraumatic.   Neck:      Musculoskeletal: Normal range of motion and neck supple.   Cardiovascular:      Rate and Rhythm: Normal rate and regular rhythm.      Heart sounds: Normal heart sounds. No murmur.   Pulmonary:      Effort: Pulmonary effort is normal. No respiratory distress.      Breath sounds: Examination of the right-lower field reveals decreased breath sounds. Examination of the left-lower field reveals decreased breath sounds. Decreased breath sounds present. No wheezing.   Chest:      Chest wall: No tenderness.   Abdominal:      General: Bowel sounds are normal. There is distension.      Palpations: Abdomen is soft.      Tenderness: There is abdominal tenderness.   Musculoskeletal: Normal range of motion.         General: Swelling (BLE) present. No tenderness.   Skin:     General: Skin is warm and dry.   Neurological:      Mental Status: She is alert and oriented to person, place, and time.   Psychiatric:         Behavior: Behavior normal.          Thought Content: Thought content normal.         Judgment: Judgment normal.         Laboratory:  Immunosuppressants     None        CBC:   Recent Labs   Lab 07/20/20  0616   WBC 6.55   RBC 1.94*   HGB 7.0*   HCT 21.8*   PLT 52*   *   MCH 36.1*   MCHC 32.1     CMP:   Recent Labs   Lab 07/20/20  0616   *   CALCIUM 9.3   ALBUMIN 2.1*   PROT 4.9*   *   K 5.0   CO2 18*      BUN 19   CREATININE 0.8   ALKPHOS 75   ALT 25   AST 33   BILITOT 6.9*     Coagulation:   Recent Labs   Lab 07/20/20  0616   INR 1.3*     Labs within the past 24 hours have been reviewed.    Diagnostic Results:  CT Abd/Pelvis:  FINDINGS:  Single-phase CT examination of the abdomen and pelvis was performed.  Intravenous contrast was utilized, oral contrast was not utilized, axial imaging, sagittal and coronal reconstruction imaging is submitted.     The lung bases demonstrate mild motion artifact, and chronic and atelectatic change.  The stomach demonstrates nonspecific appearance of mild distention with ingested material and air.  The gallbladder is partially contracted, cholelithiasis is noted, there is no evidence for pericholecystic inflammatory change.  There is no evidence for peripancreatic inflammatory change, hypodense pancreatic lesions are again noted as previously discussed and appears stable.  There is no evidence for abnormal pancreatic or biliary ductal dilatation, there is however mild pneumobilia noted.     There is mild irregular contour of the liver consistent with chronic hepatic disease, the spleen appears enlarged, there is mild free fluid of the abdomen and pelvis and varices are noted, findings consistent with chronic hepatic disease, and portal venous hypertension as previously noted.  Small hypodensity at the dome of the liver appears stable.     The adrenal glands appear unremarkable.  There is no evidence for hydronephrosis or obstructive uropathy or perinephric inflammatory change bilaterally.   The abdominal aorta demonstrates appropriate opacification, atherosclerotic changes noted, there is no aneurysmal dilatation.  The urinary bladder appears unremarkable for degree of distention.  The uterus and adnexa appear unremarkable.     There is variant anatomic configuration of the bowel, as there are several small bowel loops that extend to the right of, lateral of the ascending colon.  In addition it appears that there is a mildly prominent air and fluid-filled structure in the central abdomen as seen on axial image 38 that is thought likely representative of the cecum, this is seen at the level of the mid abdomen, it appears that cecum courses inferior medially towards the left, then crosses the midline as seen on axial images 50-58 to the right lower quadrant, and communicates with the ascending colon.  This is appears to be a change in configuration of the right colon and therefore this may relate to a mobile cecum, there is however no evidence for rotation to suggest cecal volvulus, there is no evidence for colonic wall thickening or inflammatory change, mild stool seen throughout the course of the colon and the colon appears somewhat redundant and tortuous.  The appendix is not identified.  The majority of the small bowel appears of small caliber, however there are a few small bowel loops of the right abdomen as seen on axial images 33 through 52 that demonstrate mild fluid-filled distention with mild wall and fold thickening, this could relate to a focal area of enteritis, and ileus however there is suggestion of some interloop haziness that may relate to edema, and given the localized appearance the possibility of developing closed loop obstruction is to be considered.  Close clinical and historical correlation is needed if there is persistent or worsening symptomatology additional follow-up is recommended.  There is no evidence for free intraperitoneal air, there is no evidence for pneumatosis, the  aforementioned branching air within the central liver is thought likely pneumobilia and does not demonstrate a pattern typical for portal venous air.     The visualized osseous structures demonstrate chronic change.     Impression:     Variant bowel anatomy is noted as discussed above including appearance thought to represent a mobile cecum without evidence for colonic inflammatory change or volvulus, there are small bowel loops that extend lateral to the right colon, some of the small bowel loops demonstrate mild dilated appearance with suggestion of mild wall and fold thickening and interloop edema, where as the remainder of the small bowel appears predominantly decompressed, this may relate to a localized ileus/enteritis however the possibility of a developing closed loop obstruction is to be considered, close clinical and historical correlation is needed if there is persistent or worsening symptomatology additional follow-up is recommended.     Findings consistent with chronic hepatic disease and portal venous hypertension again noted.     Mild central branching air density associated with the liver does not have the typical configuration of portal venous air, and there is no air seen within the portal vein, or mesenteric vein, and there is no evidence for pneumatosis this is thought likely mild pneumobilia.     Cholelithiasis, there is no evidence for pericholecystic inflammatory change.     Hypodense pancreatic lesions are again noted appearing stable.       Debility/Functional status: Patient debilitated by evidence of Weakness, Chronic fatigue, unspecified, Other malaise, Age-related physical debility, Limitation of activities due to disability and Other reduced mobility. Physical and occupational therapy ordered daily to evaluate and treat. Debility was: present on admission.    Assessment/Plan:     * Acute pancreatitis  - Patient with hx of multiple acute pancreatitis unknown cause. She underwent extensive  workup in [place]. Presented with severe abdominal pain as well as N/V.    While in ED: CT A/P with concern for possible ileus vs. closed loop SBO and Lipase at presentation >1000   - Initially planedn for bowel rest and placed NGT>> overnight patient pulled out the NGT and request advancing the diet as she feels her symptoms improving   - Patient looks clinically improving this morning>> no abdominal pain, N or V   - Diet advanced to low fat 7/20  - Monitor for abd pain and/or N/V  - Lipase remains > 1000, continue to trend amylase/lipase    Hyponatremia  - Na stable. Continue to monitor.     Hyperkalemia  - Continue tele.  - monitor    Acquired coagulation factor deficiency  - 2/2 ESLD  - Monitor    Liver cirrhosis secondary to CHAUDHARY  - 54 year old female with PMHx of d-CHF with 65% EF, cirrhosis due to CHAUDHARY (liver txp eval complete, financial approval pending MELD 25)  - See decompensated hepatic cirrhosis    Decompensated hepatic cirrhosis  -54 year old female with PMHx of d-CHF with 65% EF, cirrhosis due to CHAUDHARY (liver txp eval complete, financial approval pending MELD 25)  -history of pancreatic cysts:  Based on prior evaluation, patient had EUS with FNA suspicious IPMN.  Despite mildly elevated CEA, this is thought to be benign.  -decompensated cirrhosis secondary to CHAUDHARY (possible alcohol)  - [facility] 7/6 negative    MELD-Na score: 22 at 7/20/2020  6:16 AM  MELD score: 17 at 7/20/2020  6:16 AM  Calculated from:  Serum Creatinine: 0.8 mg/dL (Rounded to 1 mg/dL) at 7/20/2020  6:16 AM  Serum Sodium: 130 mmol/L at 7/20/2020  6:16 AM  Total Bilirubin: 6.9 mg/dL at 7/20/2020  6:16 AM  INR(ratio): 1.3 at 7/20/2020  6:16 AM  Age: 54 years 10 months        VTE Risk Mitigation (From admission, onward)         Ordered     heparin (porcine) injection 5,000 Units  Every 8 hours      07/18/20 0605     IP VTE HIGH RISK PATIENT  Once      07/18/20 0605                The patients clinical status was discussed at  multidisplinary rounds, involving transplant surgery, transplant medicine, pharmacy, nursing, nutrition, and social work    Discharge Planning: not stable for dc at this time.     Latrice Sorto DNP  Liver Transplant  [facility]

## 2020-07-20 NOTE — NURSING
"Offered patient PO Lactulose patient refusing stating "hell no not taking that" informed patient she will get a lactulose enema. Patient still yelling respirations even and unlabored no complaints of pain that she will report. No injury to the bilateral upper extremities.   "

## 2020-07-20 NOTE — PLAN OF CARE
AAOx4. VSS. Afebrile.  Denies any pain or discomfort.  PIV, CDI. Tele monitoring NSR.  Pt out of restraints and being compliant/not combative.  Liver eval completed and awaiting financial clearance. MELD 25.  CT A/P w possible ileus vs SBO. Now on reg diet w no issues.  Lipase remains > 1000. NS infusing @ 75 ml/hr.  Scheduled rifaximin and kristalose continued. No BM yet today.  Po mag replacement given. Mag 1.5.  +1 to bathroom/ purewick per request.   Bed alarm set + AVASYS.  @ bedside.  Bed low and locked, side rails x3. Call bell in reach. WCTM.

## 2020-07-20 NOTE — ASSESSMENT & PLAN NOTE
-54 year old female with PMHx of d-CHF with 65% EF, cirrhosis due to CHAUDHARY (liver txp eval complete, financial approval pending MELD 25)  -history of pancreatic cysts:  Based on prior evaluation, patient had EUS with FNA suspicious IPMN.  Despite mildly elevated CEA, this is thought to be benign.  -decompensated cirrhosis secondary to CHAUDHARY (possible alcohol)  - PETH 7/6 negative    MELD-Na score: 22 at 7/20/2020  6:16 AM  MELD score: 17 at 7/20/2020  6:16 AM  Calculated from:  Serum Creatinine: 0.8 mg/dL (Rounded to 1 mg/dL) at 7/20/2020  6:16 AM  Serum Sodium: 130 mmol/L at 7/20/2020  6:16 AM  Total Bilirubin: 6.9 mg/dL at 7/20/2020  6:16 AM  INR(ratio): 1.3 at 7/20/2020  6:16 AM  Age: 54 years 10 months

## 2020-07-20 NOTE — SUBJECTIVE & OBJECTIVE
Scheduled Meds:   heparin (porcine)  5,000 Units Subcutaneous Q8H    lactulose  30 g Oral TID    levothyroxine  150 mcg Oral Before breakfast    magnesium oxide  400 mg Oral Daily    miconazole NITRATE 2 %   Topical (Top) BID    midodrine  10 mg Oral Q8H    pantoprazole  40 mg Oral Daily    rifAXIMin  550 mg Oral BID    sodium bicarbonate  1,300 mg Oral BID    zinc sulfate  220 mg Oral Before breakfast     Continuous Infusions:   sodium chloride 0.9% 75 mL/hr at 07/20/20 1036     PRN Meds:acetaminophen, haloperidol lactate, ondansetron, sodium chloride 0.9%    Review of Systems   Constitutional: Positive for activity change, appetite change and fatigue. Negative for fever.   HENT: Negative.  Negative for congestion and facial swelling.    Eyes: Negative.    Respiratory: Negative.  Negative for chest tightness, shortness of breath and wheezing.    Cardiovascular: Negative.  Negative for chest pain, palpitations and leg swelling.   Gastrointestinal: Positive for abdominal distention. Negative for abdominal pain, constipation, diarrhea, nausea and vomiting.   Genitourinary: Negative.  Negative for decreased urine volume, difficulty urinating, dysuria, hematuria and urgency.   Musculoskeletal: Negative.  Negative for back pain, neck pain and neck stiffness.   Skin: Positive for color change. Negative for pallor and rash.   Allergic/Immunologic: Positive for immunocompromised state.   Neurological: Positive for weakness. Negative for dizziness, tremors, seizures, speech difficulty, light-headedness and headaches.   Psychiatric/Behavioral: Positive for decreased concentration. Negative for behavioral problems, hallucinations and suicidal ideas. The patient is nervous/anxious.      Objective:     Vital Signs (Most Recent):  Temp: 98.3 °F (36.8 °C) (07/20/20 0748)  Pulse: 93 (07/20/20 0748)  Resp: 18 (07/20/20 0748)  BP: (!) 102/51 (07/20/20 0748)  SpO2: 99 % (07/20/20 0748) Vital Signs (24h Range):  Temp:  [97.6  °F (36.4 °C)-98.5 °F (36.9 °C)] 98.3 °F (36.8 °C)  Pulse:  [90-99] 93  Resp:  [14-18] 18  SpO2:  [95 %-100 %] 99 %  BP: (102-115)/(51-56) 102/51     Weight: 65.5 kg (144 lb 6.4 oz)  Body mass index is 24.79 kg/m².    Intake/Output - Last 3 Shifts       07/18 0700 - 07/19 0659 07/19 0700 - 07/20 0659 07/20 0700 - 07/21 0659    P.O. 430 1755 240    I.V. (mL/kg)  2918.8 (44.6)     Total Intake(mL/kg) 430 (6.3) 4673.8 (71.4) 240 (3.7)    Urine (mL/kg/hr) 900 (0.6) 300 (0.2) 300 (1.1)    Emesis/NG output   0    Other   0    Stool 0 0 0    Blood   0    Total Output 900 300 300    Net -470 +4373.8 -60           Urine Occurrence 1 x  0 x    Stool Occurrence 1 x 5 x 0 x    Emesis Occurrence   0 x          Physical Exam  Vitals signs and nursing note reviewed.   Constitutional:       General: She is not in acute distress.     Appearance: She is well-developed. She is not diaphoretic.   HENT:      Head: Normocephalic and atraumatic.   Neck:      Musculoskeletal: Normal range of motion and neck supple.   Cardiovascular:      Rate and Rhythm: Normal rate and regular rhythm.      Heart sounds: Normal heart sounds. No murmur.   Pulmonary:      Effort: Pulmonary effort is normal. No respiratory distress.      Breath sounds: Examination of the right-lower field reveals decreased breath sounds. Examination of the left-lower field reveals decreased breath sounds. Decreased breath sounds present. No wheezing.   Chest:      Chest wall: No tenderness.   Abdominal:      General: Bowel sounds are normal. There is distension.      Palpations: Abdomen is soft.      Tenderness: There is abdominal tenderness.   Musculoskeletal: Normal range of motion.         General: Swelling (BLE) present. No tenderness.   Skin:     General: Skin is warm and dry.   Neurological:      Mental Status: She is alert and oriented to person, place, and time.   Psychiatric:         Behavior: Behavior normal.         Thought Content: Thought content normal.          Judgment: Judgment normal.         Laboratory:  Immunosuppressants     None        CBC:   Recent Labs   Lab 07/20/20  0616   WBC 6.55   RBC 1.94*   HGB 7.0*   HCT 21.8*   PLT 52*   *   MCH 36.1*   MCHC 32.1     CMP:   Recent Labs   Lab 07/20/20  0616   *   CALCIUM 9.3   ALBUMIN 2.1*   PROT 4.9*   *   K 5.0   CO2 18*      BUN 19   CREATININE 0.8   ALKPHOS 75   ALT 25   AST 33   BILITOT 6.9*     Coagulation:   Recent Labs   Lab 07/20/20  0616   INR 1.3*     Labs within the past 24 hours have been reviewed.    Diagnostic Results:  CT Abd/Pelvis:  FINDINGS:  Single-phase CT examination of the abdomen and pelvis was performed.  Intravenous contrast was utilized, oral contrast was not utilized, axial imaging, sagittal and coronal reconstruction imaging is submitted.     The lung bases demonstrate mild motion artifact, and chronic and atelectatic change.  The stomach demonstrates nonspecific appearance of mild distention with ingested material and air.  The gallbladder is partially contracted, cholelithiasis is noted, there is no evidence for pericholecystic inflammatory change.  There is no evidence for peripancreatic inflammatory change, hypodense pancreatic lesions are again noted as previously discussed and appears stable.  There is no evidence for abnormal pancreatic or biliary ductal dilatation, there is however mild pneumobilia noted.     There is mild irregular contour of the liver consistent with chronic hepatic disease, the spleen appears enlarged, there is mild free fluid of the abdomen and pelvis and varices are noted, findings consistent with chronic hepatic disease, and portal venous hypertension as previously noted.  Small hypodensity at the dome of the liver appears stable.     The adrenal glands appear unremarkable.  There is no evidence for hydronephrosis or obstructive uropathy or perinephric inflammatory change bilaterally.  The abdominal aorta demonstrates appropriate  opacification, atherosclerotic changes noted, there is no aneurysmal dilatation.  The urinary bladder appears unremarkable for degree of distention.  The uterus and adnexa appear unremarkable.     There is variant anatomic configuration of the bowel, as there are several small bowel loops that extend to the right of, lateral of the ascending colon.  In addition it appears that there is a mildly prominent air and fluid-filled structure in the central abdomen as seen on axial image 38 that is thought likely representative of the cecum, this is seen at the level of the mid abdomen, it appears that cecum courses inferior medially towards the left, then crosses the midline as seen on axial images 50-58 to the right lower quadrant, and communicates with the ascending colon.  This is appears to be a change in configuration of the right colon and therefore this may relate to a mobile cecum, there is however no evidence for rotation to suggest cecal volvulus, there is no evidence for colonic wall thickening or inflammatory change, mild stool seen throughout the course of the colon and the colon appears somewhat redundant and tortuous.  The appendix is not identified.  The majority of the small bowel appears of small caliber, however there are a few small bowel loops of the right abdomen as seen on axial images 33 through 52 that demonstrate mild fluid-filled distention with mild wall and fold thickening, this could relate to a focal area of enteritis, and ileus however there is suggestion of some interloop haziness that may relate to edema, and given the localized appearance the possibility of developing closed loop obstruction is to be considered.  Close clinical and historical correlation is needed if there is persistent or worsening symptomatology additional follow-up is recommended.  There is no evidence for free intraperitoneal air, there is no evidence for pneumatosis, the aforementioned branching air within the  central liver is thought likely pneumobilia and does not demonstrate a pattern typical for portal venous air.     The visualized osseous structures demonstrate chronic change.     Impression:     Variant bowel anatomy is noted as discussed above including appearance thought to represent a mobile cecum without evidence for colonic inflammatory change or volvulus, there are small bowel loops that extend lateral to the right colon, some of the small bowel loops demonstrate mild dilated appearance with suggestion of mild wall and fold thickening and interloop edema, where as the remainder of the small bowel appears predominantly decompressed, this may relate to a localized ileus/enteritis however the possibility of a developing closed loop obstruction is to be considered, close clinical and historical correlation is needed if there is persistent or worsening symptomatology additional follow-up is recommended.     Findings consistent with chronic hepatic disease and portal venous hypertension again noted.     Mild central branching air density associated with the liver does not have the typical configuration of portal venous air, and there is no air seen within the portal vein, or mesenteric vein, and there is no evidence for pneumatosis this is thought likely mild pneumobilia.     Cholelithiasis, there is no evidence for pericholecystic inflammatory change.     Hypodense pancreatic lesions are again noted appearing stable.       Debility/Functional status: Patient debilitated by evidence of Weakness, Chronic fatigue, unspecified, Other malaise, Age-related physical debility, Limitation of activities due to disability and Other reduced mobility. Physical and occupational therapy ordered daily to evaluate and treat. Debility was: present on admission.

## 2020-07-20 NOTE — NURSING
"Patient attempting to pull out PIV stating she was going to leave, that she had to leave because she is hungry. Patient threating to kick and hit all staff in room including HERNANDEZ Deluna. Per MD bilateral upper restraints applied no injuries noted. Orders received for IV haldol PRN Q 6 patient offered PO option first to which she responded "hell no i'm not taking that." IV haldol administered per MAR, patient tolerated well. patient states she will keep yelling till she gets a sandwich. Yelling noted. Orders placed by this RN for [employer] camera at the bedside. This RN will keep visual contact until camera received. MADAN Barron RN getting camera at this time.  "

## 2020-07-20 NOTE — TELEPHONE ENCOUNTER
Returned call to Yonis per portal message request. He said there was a misunderstanding regarding pt's NPO status & she became upset with one of the nurses on the unit. At this time, he thinks she was just having a rough night with some hepatic encephalopathy and was frustrated with her bedside RN, but stated that he didn't think it necessary to get upset over the situation. No other questions at this time.

## 2020-07-20 NOTE — ASSESSMENT & PLAN NOTE
- 54 year old female with PMHx of d-CHF with 65% EF, cirrhosis due to CHAUDHARY (liver txp eval complete, financial approval pending MELD 25)  - See decompensated hepatic cirrhosis

## 2020-07-21 VITALS
RESPIRATION RATE: 20 BRPM | TEMPERATURE: 98 F | BODY MASS INDEX: 24.65 KG/M2 | SYSTOLIC BLOOD PRESSURE: 114 MMHG | WEIGHT: 144.38 LBS | DIASTOLIC BLOOD PRESSURE: 57 MMHG | OXYGEN SATURATION: 100 % | HEIGHT: 64 IN | HEART RATE: 85 BPM

## 2020-07-21 DIAGNOSIS — K74.60 LIVER CIRRHOSIS SECONDARY TO NASH: ICD-10-CM

## 2020-07-21 DIAGNOSIS — K75.81 LIVER CIRRHOSIS SECONDARY TO NASH: ICD-10-CM

## 2020-07-21 DIAGNOSIS — Z76.82 AWAITING ORGAN TRANSPLANT STATUS: Primary | ICD-10-CM

## 2020-07-21 PROBLEM — R74.8 ABNORMAL SERUM LIPASE LEVEL: Status: ACTIVE | Noted: 2020-07-18

## 2020-07-21 LAB
ABO + RH BLD: NORMAL
ALBUMIN SERPL BCP-MCNC: 2.1 G/DL (ref 3.5–5.2)
ALP SERPL-CCNC: 89 U/L (ref 55–135)
ALT SERPL W/O P-5'-P-CCNC: 22 U/L (ref 10–44)
AMYLASE SERPL-CCNC: 307 U/L (ref 20–110)
ANION GAP SERPL CALC-SCNC: 2 MMOL/L (ref 8–16)
AST SERPL-CCNC: 29 U/L (ref 10–40)
BASOPHILS # BLD AUTO: 0.02 K/UL (ref 0–0.2)
BASOPHILS NFR BLD: 0.4 % (ref 0–1.9)
BILIRUB SERPL-MCNC: 5.8 MG/DL (ref 0.1–1)
BLD GP AB SCN CELLS X3 SERPL QL: NORMAL
BLD PROD TYP BPU: NORMAL
BLOOD UNIT EXPIRATION DATE: NORMAL
BLOOD UNIT TYPE CODE: 5100
BLOOD UNIT TYPE: NORMAL
BUN SERPL-MCNC: 20 MG/DL (ref 6–20)
CALCIUM SERPL-MCNC: 9.2 MG/DL (ref 8.7–10.5)
CHLORIDE SERPL-SCNC: 107 MMOL/L (ref 95–110)
CO2 SERPL-SCNC: 20 MMOL/L (ref 23–29)
CODING SYSTEM: NORMAL
CREAT SERPL-MCNC: 1 MG/DL (ref 0.5–1.4)
DIFFERENTIAL METHOD: ABNORMAL
DISPENSE STATUS: NORMAL
EOSINOPHIL # BLD AUTO: 0.1 K/UL (ref 0–0.5)
EOSINOPHIL NFR BLD: 1.8 % (ref 0–8)
ERYTHROCYTE [DISTWIDTH] IN BLOOD BY AUTOMATED COUNT: 14.5 % (ref 11.5–14.5)
EST. GFR  (AFRICAN AMERICAN): >60 ML/MIN/1.73 M^2
EST. GFR  (NON AFRICAN AMERICAN): >60 ML/MIN/1.73 M^2
GLUCOSE SERPL-MCNC: 126 MG/DL (ref 70–110)
HCT VFR BLD AUTO: 19.6 % (ref 37–48.5)
HGB BLD-MCNC: 6.5 G/DL (ref 12–16)
IMM GRANULOCYTES # BLD AUTO: 0.07 K/UL (ref 0–0.04)
IMM GRANULOCYTES NFR BLD AUTO: 1.2 % (ref 0–0.5)
INR PPP: 1.2 (ref 0.8–1.2)
LIPASE SERPL-CCNC: 477 U/L (ref 4–60)
LYMPHOCYTES # BLD AUTO: 1.1 K/UL (ref 1–4.8)
LYMPHOCYTES NFR BLD: 19.6 % (ref 18–48)
MAGNESIUM SERPL-MCNC: 1.7 MG/DL (ref 1.6–2.6)
MCH RBC QN AUTO: 36.7 PG (ref 27–31)
MCHC RBC AUTO-ENTMCNC: 33.2 G/DL (ref 32–36)
MCV RBC AUTO: 111 FL (ref 82–98)
MONOCYTES # BLD AUTO: 0.6 K/UL (ref 0.3–1)
MONOCYTES NFR BLD: 10.1 % (ref 4–15)
NEUTROPHILS # BLD AUTO: 3.8 K/UL (ref 1.8–7.7)
NEUTROPHILS NFR BLD: 66.9 % (ref 38–73)
NRBC BLD-RTO: 0 /100 WBC
PLATELET # BLD AUTO: 46 K/UL (ref 150–350)
PMV BLD AUTO: 9.6 FL (ref 9.2–12.9)
POTASSIUM SERPL-SCNC: 5.3 MMOL/L (ref 3.5–5.1)
PROT SERPL-MCNC: 4.9 G/DL (ref 6–8.4)
PROTHROMBIN TIME: 13.3 SEC (ref 9–12.5)
RBC # BLD AUTO: 1.77 M/UL (ref 4–5.4)
SODIUM SERPL-SCNC: 129 MMOL/L (ref 136–145)
TRANS ERYTHROCYTES VOL PATIENT: NORMAL ML
WBC # BLD AUTO: 5.66 K/UL (ref 3.9–12.7)

## 2020-07-21 PROCEDURE — 82150 ASSAY OF AMYLASE: CPT | Mod: NTX

## 2020-07-21 PROCEDURE — 25000003 PHARM REV CODE 250: Mod: NTX | Performed by: NURSE PRACTITIONER

## 2020-07-21 PROCEDURE — 86920 COMPATIBILITY TEST SPIN: CPT | Mod: NTX

## 2020-07-21 PROCEDURE — 83690 ASSAY OF LIPASE: CPT | Mod: NTX

## 2020-07-21 PROCEDURE — P9021 RED BLOOD CELLS UNIT: HCPCS | Mod: NTX

## 2020-07-21 PROCEDURE — 36430 TRANSFUSION BLD/BLD COMPNT: CPT | Mod: NTX

## 2020-07-21 PROCEDURE — 99233 PR SUBSEQUENT HOSPITAL CARE,LEVL III: ICD-10-PCS | Mod: NTX,,, | Performed by: NURSE PRACTITIONER

## 2020-07-21 PROCEDURE — 83735 ASSAY OF MAGNESIUM: CPT | Mod: NTX

## 2020-07-21 PROCEDURE — 25000003 PHARM REV CODE 250: Mod: NTX | Performed by: PHYSICIAN ASSISTANT

## 2020-07-21 PROCEDURE — 25000003 PHARM REV CODE 250: Mod: NTX | Performed by: EMERGENCY MEDICINE

## 2020-07-21 PROCEDURE — 86644 CMV ANTIBODY: CPT | Mod: NTX

## 2020-07-21 PROCEDURE — 86901 BLOOD TYPING SEROLOGIC RH(D): CPT | Mod: NTX

## 2020-07-21 PROCEDURE — 85610 PROTHROMBIN TIME: CPT | Mod: NTX

## 2020-07-21 PROCEDURE — 63600175 PHARM REV CODE 636 W HCPCS: Mod: NTX | Performed by: PHYSICIAN ASSISTANT

## 2020-07-21 PROCEDURE — 99233 SBSQ HOSP IP/OBS HIGH 50: CPT | Mod: NTX,,, | Performed by: NURSE PRACTITIONER

## 2020-07-21 PROCEDURE — 85025 COMPLETE CBC W/AUTO DIFF WBC: CPT | Mod: NTX

## 2020-07-21 PROCEDURE — 36415 COLL VENOUS BLD VENIPUNCTURE: CPT | Mod: NTX

## 2020-07-21 PROCEDURE — 80053 COMPREHEN METABOLIC PANEL: CPT | Mod: NTX

## 2020-07-21 RX ORDER — LACTULOSE 10 G/10G
30 SOLUTION ORAL EVERY 4 HOURS PRN
Status: DISCONTINUED | OUTPATIENT
Start: 2020-07-21 | End: 2020-07-21 | Stop reason: HOSPADM

## 2020-07-21 RX ORDER — HYDROCODONE BITARTRATE AND ACETAMINOPHEN 500; 5 MG/1; MG/1
TABLET ORAL
Status: DISCONTINUED | OUTPATIENT
Start: 2020-07-21 | End: 2020-07-21 | Stop reason: HOSPADM

## 2020-07-21 RX ORDER — LACTULOSE 10 G/10G
30 SOLUTION ORAL 3 TIMES DAILY
Status: DISCONTINUED | OUTPATIENT
Start: 2020-07-21 | End: 2020-07-21 | Stop reason: HOSPADM

## 2020-07-21 RX ORDER — SODIUM BICARBONATE 650 MG/1
1300 TABLET ORAL 2 TIMES DAILY
Status: DISCONTINUED | OUTPATIENT
Start: 2020-07-21 | End: 2020-07-21 | Stop reason: HOSPADM

## 2020-07-21 RX ORDER — LACTULOSE 10 G/10G
30 SOLUTION ORAL 3 TIMES DAILY PRN
Qty: 90 PACKET | Refills: 11 | Status: SHIPPED | OUTPATIENT
Start: 2020-07-21 | End: 2021-04-06 | Stop reason: SDUPTHER

## 2020-07-21 RX ADMIN — HALOPERIDOL LACTATE 1 MG: 5 INJECTION, SOLUTION INTRAMUSCULAR at 01:07

## 2020-07-21 RX ADMIN — MIDODRINE HYDROCHLORIDE 10 MG: 5 TABLET ORAL at 06:07

## 2020-07-21 RX ADMIN — PANTOPRAZOLE SODIUM 40 MG: 40 TABLET, DELAYED RELEASE ORAL at 10:07

## 2020-07-21 RX ADMIN — MIDODRINE HYDROCHLORIDE 10 MG: 5 TABLET ORAL at 02:07

## 2020-07-21 RX ADMIN — RIFAXIMIN 550 MG: 550 TABLET ORAL at 10:07

## 2020-07-21 RX ADMIN — ZINC SULFATE 220 MG (50 MG) CAPSULE 220 MG: CAPSULE at 06:07

## 2020-07-21 RX ADMIN — MAGNESIUM OXIDE 400 MG (241.3 MG MAGNESIUM) TABLET 400 MG: TABLET at 10:07

## 2020-07-21 RX ADMIN — HEPARIN SODIUM 5000 UNITS: 5000 INJECTION INTRAVENOUS; SUBCUTANEOUS at 06:07

## 2020-07-21 RX ADMIN — HEPARIN SODIUM 5000 UNITS: 5000 INJECTION INTRAVENOUS; SUBCUTANEOUS at 02:07

## 2020-07-21 RX ADMIN — LEVOTHYROXINE SODIUM 150 MCG: 150 TABLET ORAL at 06:07

## 2020-07-21 RX ADMIN — SODIUM BICARBONATE 650 MG TABLET 1300 MG: at 10:07

## 2020-07-21 RX ADMIN — LACTULOSE 30 G: 10 SOLUTION ORAL at 10:07

## 2020-07-21 NOTE — NURSING
Discharge instructions given and reviewed with pt and . Wound care instructions hand written to AVS per wound care RN orders. Gauze, foam dressings, Medi-honey, Betadine swabs and a bottle of NS provided for home wound care. HH to follow-up as outpt. Verbalized understanding.

## 2020-07-21 NOTE — PLAN OF CARE
Pt sat in chair this am. Pt was NPO this am for possible EUS today. EUS not scheduled. Diet resumed. Pt back in bed.  at BS assisting pt. Pt ambulated to BR wearing non-skid socks with  and had BM and voided. H&H decreased=6.5 & 19.6. Transfused PRBC's X1 unit. Tolerated well. No reaction noted. Remained oriented today. More cooperative when  was at BS. Plan to discharge today with .

## 2020-07-21 NOTE — SUBJECTIVE & OBJECTIVE
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 No pertinent surgical history.    Review of patient's allergies indicates:   Allergen Reactions    Aspirin Tinitus    Ciprofloxacin Rash    Clindamycin Itching     Family History     None        Tobacco Use    Smoking status: Never Smoker    Smokeless tobacco: Never Used   Substance and Sexual Activity    Alcohol use: Never     Frequency: Never    Drug use: Never    Sexual activity: Not on file     Review of Systems   Constitutional: Negative for activity change, appetite change, chills, diaphoresis, fatigue, fever and unexpected weight change.   HENT: Negative for sore throat and trouble swallowing.    Eyes: Negative for visual disturbance.   Respiratory: Negative for chest tightness and shortness of breath.    Cardiovascular: Negative for chest pain and leg swelling.   Gastrointestinal: Negative for abdominal distention, abdominal pain, anal bleeding, blood in stool, constipation, diarrhea, nausea and vomiting.   Genitourinary: Negative for dysuria and hematuria.   Musculoskeletal: Negative for arthralgias and myalgias.   Skin: Negative for rash.   Neurological: Negative for dizziness, weakness, light-headedness and headaches.   Psychiatric/Behavioral: Negative for agitation and confusion.     Objective:     Vital Signs (Most Recent):  Temp: 98.3 °F (36.8 °C) (07/21/20 0835)  Pulse: 92 (07/21/20 0835)  Resp: 16 (07/21/20 0835)  BP: (!) 106/54 (07/21/20 0835)  SpO2: 100 % (07/21/20 0835) Vital Signs (24h Range):  Temp:  [98.1 °F (36.7 °C)-98.4 °F (36.9 °C)] 98.3 °F (36.8 °C)  Pulse:  [] 92  Resp:  [16-18] 16  SpO2:  [97 %-100 %] 100 %  BP: ()/(50-76) 106/54     Weight:  65.5 kg (144 lb 6.4 oz) (07/20/20 0500)  Body mass index is 24.79 kg/m².      Intake/Output Summary (Last 24 hours) at 7/21/2020 0948  Last data filed at 7/21/2020 0854  Gross per 24 hour   Intake 3442.5 ml   Output 200 ml   Net 3242.5 ml       Lines/Drains/Airways     Drain            Female External Urinary Catheter 07/18/20 1411 2 days          Peripheral Intravenous Line                 Peripheral IV - Single Lumen 07/21/20 0711 22 G Anterior;Distal;Right Forearm less than 1 day                Physical Exam  Vitals signs and nursing note reviewed.   Constitutional:       General: She is not in acute distress.     Appearance: She is well-developed. She is not diaphoretic.      Comments: Sitting in bedside chair, no distress. No overt confusion, answering questions appropriately, no asterixis.    HENT:      Head: Normocephalic and atraumatic.      Mouth/Throat:      Pharynx: No oropharyngeal exudate.   Eyes:      General: Scleral icterus present.      Conjunctiva/sclera: Conjunctivae normal.   Cardiovascular:      Rate and Rhythm: Normal rate and regular rhythm.      Heart sounds: Normal heart sounds.   Pulmonary:      Effort: Pulmonary effort is normal. No respiratory distress.      Breath sounds: Normal breath sounds.   Abdominal:      General: Bowel sounds are normal. There is no distension.      Palpations: Abdomen is soft. There is no mass.      Tenderness: There is no abdominal tenderness. There is no guarding or rebound.      Comments: Soft, non-tender, mild distension +ve ascites.   Musculoskeletal:         General: No tenderness.   Lymphadenopathy:      Cervical: No cervical adenopathy.   Skin:     General: Skin is warm.      Capillary Refill: Capillary refill takes less than 2 seconds.      Findings: No rash.   Neurological:      Mental Status: She is alert and oriented to person, place, and time.   Psychiatric:         Behavior: Behavior normal.         Thought Content: Thought content normal.          Judgment: Judgment normal.         Significant Labs:  All pertinent lab results from the last 24 hours have been reviewed.    Significant Imaging:  Imaging results within the past 24 hours have been reviewed.

## 2020-07-21 NOTE — NURSING
Pt disoriented to date and time. Agitated mood currently, previously resting and calm during the day, yelling out in pain, rates abdominal pain 10/10, threatening to rip out IV's. PRN tylenol and halodol administered. KEAGAN Irvin notified of new change. NST on tele-'s. Avasys at bedside, bed alarm on, call bell within reach.

## 2020-07-21 NOTE — PROGRESS NOTES
Met with patient and  prior to discharge . Gave them [facility] booklet.  Informed patient that  will be calling them with appointments for labs and clinic next week.  Allowed time for questions and answers.

## 2020-07-21 NOTE — ASSESSMENT & PLAN NOTE
Ms Mahoney is a 54 year old female with history of HFpEF, CHAUDHARY ESLD (pending listing), grave's disease, who is presenting to the hospital due abdominal pain, AES consulted due to concern for abnormal lipase.    We do not believe the patient's current episode of abdominal pain is due to acute pancreatitis.  Recent imaging studies are without pancreatic changes c/w pancreatitis and are concerning for SBO. Abdominal pain is not classic for acute pancreatitis. Though lipase is elevated this may be due to alternative process such as ?SBO. Prior workup was concerning for side-branch IPMN (no evidence of main duct IPMN) which would be unlikely to cause acute pancreatitis. If patient were to have acute pancreatitis, most likely etiology would be biliary. She has prior history of sphincterotomy (4/2020) and would have CCY with OLT.     Plan  - recommend conservative management, abdominal pain has resolved and patient is tolerating PO  - do not recommend further endoscopic evaluation at this time

## 2020-07-21 NOTE — CONSULTS
[facility]  Gastroenterology  Consult Note    Patient Name: Zeina Mahoney  MRN: [id number]  Admission Date: 7/18/2020  Hospital Length of Stay: 3 days  Code Status: Full Code   Attending Provider: Hammad Foster MD   Consulting Provider: Eric Matos MD  Primary Care Physician: Primary Doctor No  Principal Problem:Abnormal serum lipase level    Inpatient consult to [facility] ([facility])  Consult performed by: Eric Matos MD  Consult ordered by: Latrice Sorto DNP        Subjective:     HPI:  Ms Mahoney is a 54 year old female with history of HFpEF, CHAUDHARY ESLD (pending listing), grave's disease, who is presenting to the hospital due to concern for elevated lipase.    She was previously listed for OLT at [facility] [place] and has been evaluated and pending listing at [facility] [place] due to wait list and low MELD. She was recently admitted 6/25/20 due to abdominal pain, at which time labs notable for lipase 218. RUQ notable for cholelithiasis and Gb thickening. She presented to the hospital on 7/18 due to concern for worsening abdominal pain, associated with nausea. Labs with lipase >1k. CT with concern for bowel obstruction (some of the small bowel loops demonstrate mild dilated appearance with suggestion of mild wall and fold thickening and interloop edema, where as the remainder of the small bowel appears predominantly decompressed). AES consulted due to concern for evaluation.    She reports she was feeling well prior to this without any complaints and was watching tv when she had acute onset of abdominal pain mid-epigastric, non-radiating. Since admission pain has resolved and is feeling at baseline. Denies any preceding symptoms of biliary colic. New medication changes or new medications.  Serial PETH negative (6/26, 7/6) and denies any ETOH history. Denies any family history of pancreatitis or pancreatic problems.    She denies history of pancreatitis, though on chart review she had a history of  pancreatitis felt to be gallstone in nature. She underwent ERCP (4/2020, 5/2020) for biliary obstruction s/p sphincterotomy. Reportedly underwent EUS at [facility] [place] notable for SB-IPMN (report not available)    Past Medical History:   Diagnosis Date    Chronic Hepatic encephalopathy 6/25/2020    Decompensated hepatic cirrhosis 6/25/2020    History of pancreatitis 6/25/2020    Liver cirrhosis secondary to CHAUDHARY 6/25/2020    Macrocytic anemia 6/25/2020    Other ascites 6/25/2020    Thrombocytopenia 6/25/2020       History reviewed. No pertinent surgical history.    Review of patient's allergies indicates:   Allergen Reactions    Aspirin Tinitus    Ciprofloxacin Rash    Clindamycin Itching     Family History     None        Tobacco Use    Smoking status: Never Smoker    Smokeless tobacco: Never Used   Substance and Sexual Activity    Alcohol use: Never     Frequency: Never    Drug use: Never    Sexual activity: Not on file     Review of Systems   Constitutional: Negative for activity change, appetite change, chills, diaphoresis, fatigue, fever and unexpected weight change.   HENT: Negative for sore throat and trouble swallowing.    Eyes: Negative for visual disturbance.   Respiratory: Negative for chest tightness and shortness of breath.    Cardiovascular: Negative for chest pain and leg swelling.   Gastrointestinal: Negative for abdominal distention, abdominal pain, anal bleeding, blood in stool, constipation, diarrhea, nausea and vomiting.   Genitourinary: Negative for dysuria and hematuria.   Musculoskeletal: Negative for arthralgias and myalgias.   Skin: Negative for rash.   Neurological: Negative for dizziness, weakness, light-headedness and headaches.   Psychiatric/Behavioral: Negative for agitation and confusion.     Objective:     Vital Signs (Most Recent):  Temp: 98.3 °F (36.8 °C) (07/21/20 0835)  Pulse: 92 (07/21/20 0835)  Resp: 16 (07/21/20 0835)  BP: (!) 106/54 (07/21/20 0835)  SpO2: 100 % (07/21/20  0835) Vital Signs (24h Range):  Temp:  [98.1 °F (36.7 °C)-98.4 °F (36.9 °C)] 98.3 °F (36.8 °C)  Pulse:  [] 92  Resp:  [16-18] 16  SpO2:  [97 %-100 %] 100 %  BP: ()/(50-76) 106/54     Weight: 65.5 kg (144 lb 6.4 oz) (07/20/20 0500)  Body mass index is 24.79 kg/m².      Intake/Output Summary (Last 24 hours) at 7/21/2020 0948  Last data filed at 7/21/2020 0854  Gross per 24 hour   Intake 3442.5 ml   Output 200 ml   Net 3242.5 ml       Lines/Drains/Airways     Drain            Female External Urinary Catheter 07/18/20 1411 2 days          Peripheral Intravenous Line                 Peripheral IV - Single Lumen 07/21/20 0711 22 G Anterior;Distal;Right Forearm less than 1 day                Physical Exam  Vitals signs and nursing note reviewed.   Constitutional:       General: She is not in acute distress.     Appearance: She is well-developed. She is not diaphoretic.      Comments: Sitting in bedside chair, no distress. No overt confusion, answering questions appropriately, no asterixis.    HENT:      Head: Normocephalic and atraumatic.      Mouth/Throat:      Pharynx: No oropharyngeal exudate.   Eyes:      General: Scleral icterus present.      Conjunctiva/sclera: Conjunctivae normal.   Cardiovascular:      Rate and Rhythm: Normal rate and regular rhythm.      Heart sounds: Normal heart sounds.   Pulmonary:      Effort: Pulmonary effort is normal. No respiratory distress.      Breath sounds: Normal breath sounds.   Abdominal:      General: Bowel sounds are normal. There is no distension.      Palpations: Abdomen is soft. There is no mass.      Tenderness: There is no abdominal tenderness. There is no guarding or rebound.      Comments: Soft, non-tender, mild distension +ve ascites.   Musculoskeletal:         General: No tenderness.   Lymphadenopathy:      Cervical: No cervical adenopathy.   Skin:     General: Skin is warm.      Capillary Refill: Capillary refill takes less than 2 seconds.      Findings: No  rash.   Neurological:      Mental Status: She is alert and oriented to person, place, and time.   Psychiatric:         Behavior: Behavior normal.         Thought Content: Thought content normal.         Judgment: Judgment normal.         Significant Labs:  All pertinent lab results from the last 24 hours have been reviewed.    Significant Imaging:  Imaging results within the past 24 hours have been reviewed.    Assessment/Plan:     * Abnormal serum lipase level  Ms Mahoney is a 54 year old female with history of HFpEF, CHAUDHARY ESLD (pending listing), grave's disease, who is presenting to the hospital due abdominal pain, AES consulted due to concern for abnormal lipase.    We do not believe the patient's current episode of abdominal pain is due to acute pancreatitis.  Recent imaging studies are without pancreatic changes c/w pancreatitis and are concerning for SBO. Abdominal pain is not classic for acute pancreatitis. Though lipase is elevated this may be due to alternative process such as ?SBO. Prior workup was concerning for side-branch IPMN (no evidence of main duct IPMN) which would be unlikely to cause acute pancreatitis. If patient were to have acute pancreatitis, most likely etiology would be biliary. She has prior history of sphincterotomy (4/2020) and would have CCY with OLT.     Plan  - recommend conservative management, abdominal pain has resolved and patient is tolerating PO  - do not recommend further endoscopic evaluation at this time          Thank you for your consult. I will sign off. Please contact us if you have any additional questions.    Eric Matos MD  Gastroenterology  [facility]

## 2020-07-21 NOTE — DISCHARGE SUMMARY
[facility]  Liver Transplant  Discharge Summary      Patient Name: Zeina Mahoney  MRN: [id number]  Admission Date: 7/18/2020  Hospital Length of Stay: 3 days  Discharge Date and Time:  07/21/2020 1:05 PM  Attending Physician: Hammad Foster MD   Discharging Provider: Latrice Sorto DNP  Primary Care Provider: Primary Doctor No  HPI:   54 year old female with PMHx of d-CHF with 65% EF, cirrhosis due to CHAUDHARY (liver txp eval complete, financial approval pending MELD 25), portal hypertension, hypothyroidism, and adjustment disorder. She presented to the ED for abdominal pain. Patient reports having generalized abdominal pain for one day. Describes pain as constant and aching. Rates pain 10/10. Reports associated nausea. Reports last BM today. + flatus. Reports compliance with lactulose. She denies fever, chills, nausea, vomiting, sob, chest pain, dysuria, diarrhea, or constipation. Labs reviewed (close to baseline). CT A/P with concern for possible ileus vs. closed loop SBO. D/w transplant hepatologist, Dr. Cobb. Plan for NG tube placement, IVF, and close monitoring. Lipase also noted to be greater than 1000. Will cont with plan for bowel rest at this time.     Hospital Course:    Patient admitted with abd pain and N/V. Lipase > 1000. CT abd/pelvis with possible ileus vs SBO. CT reviewed with surgeon. NGT was placed then removed by patient. 1 day after admission, patient's symptoms resolved. She has since tolerated a regular diet without abd pain or N/V. She is having BM's with lactulose. On day of discharge, her H/H was low, likely related to hemodilation from IVF. She was transfused with 1 unit PRBC. While inpatient, case was discussed with AES who felt symptoms and lipase not attributable to pancreatitis as CT did not show pancreatic inflammation. Per AES, they believe some small bowel inflammation likely caused transient pancreatitis and symptoms. Diuretics were restarted on discharge. Lipase now  trending down. She is stable and ready for discharge. She was approved for liver transplant and is awaiting financial clearance to be formally listed. She will have follow up labs and clinic appt per PDOL protocol.     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] 07/18/2020 Yes    Hyperkalemia [E87.5] 07/18/2020 Yes    Hyponatremia [E87.1]  Yes    Liver cirrhosis secondary to CHAUDHARY [K75.81, K74.60] 06/25/2020 Yes    Decompensated hepatic cirrhosis [K72.90] 06/25/2020 Yes      Problems Resolved During this Admission:       Consults (From admission, onward)        Status Ordering Provider     Inpatient consult to [facility] ([facility])  Once     Provider:  (Not yet assigned)    KIMMIE Hutton          Pending Diagnostic Studies:     None        Significant Diagnostic Studies: Labs:   CMP   Recent Labs   Lab 07/19/20  1547 07/20/20  0616 07/21/20  0610   * 130* 129*   K 4.8 5.0 5.3*    109 107   CO2 23 18* 20*   * 145* 126*   BUN 23* 19 20   CREATININE 1.0 0.8 1.0   CALCIUM 9.7 9.3 9.2   PROT 5.2* 4.9* 4.9*   ALBUMIN 2.3* 2.1* 2.1*   BILITOT 7.2* 6.9* 5.8*   ALKPHOS 84 75 89   AST 37 33 29   ALT 29 25 22   ANIONGAP 2* 3* 2*   ESTGFRAFRICA >60.0 >60.0 >60.0   EGFRNONAA >60.0 >60.0 >60.0   , CBC   Recent Labs   Lab 07/19/20  1547 07/20/20  0616 07/21/20  0610   WBC 8.10 6.55 5.66   HGB 7.2* 7.0* 6.5*   HCT 22.1* 21.8* 19.6*   PLT 56* 52* 46*   , INR   Lab Results   Component Value Date    INR 1.2 07/21/2020    INR 1.3 (H) 07/20/2020    INR 1.3 (H) 07/19/2020    and All labs within the past 24 hours have been reviewed    The patients clinical status was discussed at multidisplinary rounds, involving transplant surgery, transplant medicine, pharmacy, nursing, nutrition, and social work    Discharged Condition: good    Disposition: Home or Self  Care    Follow Up: see hospital course    Patient Instructions:      Diet Adult Regular     Order Specific Question Answer Comments   Na restriction, if any: 2gNa      Notify your health care provider if you experience any of the following:  temperature >100.4     Notify your health care provider if you experience any of the following:  persistent nausea and vomiting or diarrhea     Notify your health care provider if you experience any of the following:  severe uncontrolled pain     Notify your health care provider if you experience any of the following:  redness, tenderness, or signs of infection (pain, swelling, redness, odor or green/yellow discharge around incision site)     Notify your health care provider if you experience any of the following:  difficulty breathing or increased cough     Notify your health care provider if you experience any of the following:  severe persistent headache     Notify your health care provider if you experience any of the following:  worsening rash     Notify your health care provider if you experience any of the following:  persistent dizziness, light-headedness, or visual disturbances     Notify your health care provider if you experience any of the following:  increased confusion or weakness     No dressing needed     Activity as tolerated     Medications:  Reconciled Home Medications:      Medication List      CHANGE how you take these medications    * lactulose 10 gram packet  Commonly known as: CEPHULAC  Take 1 and a half packets three times a day  What changed: Another medication with the same name was added. Make sure you understand how and when to take each.     * lactulose 10 gram/15 mL solution  Commonly known as: CHRONULAC  Take 30 mLs (20 g total) by mouth 3 (three) times daily as needed. Alternate with kristalose if not having enough BMs.  What changed: Another medication with the same name was added. Make sure you understand how and when to take each.     *  lactulose 10 gram packet  Commonly known as: CEPHULAC  Take 3 packets (30 g total) by mouth 3 (three) times daily as needed.  What changed: You were already taking a medication with the same name, and this prescription was added. Make sure you understand how and when to take each.         * This list has 3 medication(s) that are the same as other medications prescribed for you. Read the directions carefully, and ask your doctor or other care provider to review them with you.            CONTINUE taking these medications    acetaminophen 500 MG tablet  Commonly known as: TYLENOL  Take 1 tablet (500 mg total) by mouth every 6 (six) hours as needed for Pain. Max 2000mg daily.     diaper,brief,adult,disposable Misc  5 mg/kg/day by Misc.(Non-Drug; Combo Route) route 5 (five) times daily. Patient with incontinence. Please supply enough depends for 5xs/day     furosemide 40 MG tablet  Commonly known as: LASIX  Take 1 tablet (40 mg total) by mouth once daily.     haloperidoL 1 MG tablet  Commonly known as: HALDOL  Take 1 tablet (1 mg total) by mouth daily as needed (non redirectable agitation).     levothyroxine 150 MCG tablet  Commonly known as: SYNTHROID  Take 150 mcg by mouth before breakfast.     lidocaine 5 %  Commonly known as: LIDODERM  Place 1 patch onto the skin every 24 hours. Remove & Discard patch within 12 hours or as directed by MD     magnesium hydroxide 400 mg/5 ml 400 mg/5 mL Susp  Commonly known as: MILK OF MAGNESIA  Take 30 mLs (2,400 mg total) by mouth daily as needed (Take if constipated despite lactulose).     melatonin 10 mg Tab  Take 10 mg by mouth nightly as needed (sleep).     midodrine 10 MG tablet  Commonly known as: PROAMATINE  Take 10 mg by mouth 3 (three) times daily.     nystatin powder  Commonly known as: MYCOSTATIN  Apply topically 2 (two) times daily. Apply to affected area twice a day     pantoprazole 40 MG tablet  Commonly known as: PROTONIX  Take 40 mg by mouth before breakfast.      polyethylene glycol 17 gram Pwpk  Commonly known as: GLYCOLAX  Take 17 g by mouth once daily. Or miralax OTC. Take if constipated despite lactulose     rifAXIMin 550 mg Tab  Commonly known as: XIFAXAN  Take 550 mg by mouth 2 (two) times daily.     simethicone 80 MG chewable tablet  Commonly known as: MYLICON  Take 80 mg by mouth every 6 (six) hours as needed for Flatulence. Take 2 tablets by mouth every 6 hours as needed for gas     spironolactone 50 MG tablet  Commonly known as: ALDACTONE  Take 100 mg by mouth once daily.     VITAMIN D2 50,000 unit Cap  Generic drug: ergocalciferol  Take 50,000 Units by mouth every 7 days.     zinc sulfate 220 (50) mg capsule  Commonly known as: ZINCATE  Take 220 mg by mouth before breakfast.        STOP taking these medications    clindamycin 150 MG capsule  Commonly known as: CLEOCIN     levoFLOXacin 500 MG tablet  Commonly known as: LEVAQUIN        ASK your doctor about these medications    sulfamethoxazole-trimethoprim 400-80mg 400-80 mg per tablet  Commonly known as: BACTRIM  Take 1 tablet by mouth 2 (two) times daily.          Time spent caring for patient (Greater than 1/2 spent in direct face-to-face contact): > 30 minutes    Latrice Sorto DNP  Liver Transplant  [facility]

## 2020-07-21 NOTE — PROGRESS NOTES
Discharge:     GERHARD spoke with Latrice Sorto DNP and inquired pt's potential discharge due to recent information regarding pt discharging during discussion meeting. SW was informed pt will be discharging today. SW visited pt at bedside and inquired if pt was receiving HH from [facility] prior to hospitalization. Pt and caregiver both confirmed pt was receiving HH from [facility] for SN (lab draws) and PT. CORINNE Latrice Sorto DNP put in resumption orders for HH SN and PT. GERHARD spoke with Blanca ([id number]) with [facility] and informed of pt discharging today and of HH referral. [facility] will be contacting pt tor further schedule a HH appt. SW informed pt and caregiver of HH resumption. Pt and caregiver verbalized understanding.     Pt and caregiver reported coping adequately well. Pt and caregiver reported looking forward to discharging home, as expressing hospitalizations can be overwhelming. SW provided active listening and emotional support. Caregiver(pt's ) will be transporting pt home. Pt and caregiver both reported sleeping, eating well, and practicing self care. Caregiver and pt denied having further questions or concerns. SW providing ongoing psychosocial and counseling support, education, resources, assistance and discharge planning as indicated. Following and available.

## 2020-07-21 NOTE — PLAN OF CARE
Pt alert, disoriented to time, VSS, afebrile.  Reports abdominal pain completely resolved. Continuing regular diet. Lipase reamins >1000. NS@75cc/hr.   Telemonitor NST  1 BM overnight, cont. Kristalose.  Bed alarm on, avasys monitoring system at bedside.   Pt in lowest settings, call light w/in reach, nonskid socks when ambulating, remains free from falls. NAD. WCTM.
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
## 2020-07-21 NOTE — HPI
Ms Mahoney is a 54 year old female with history of HFpEF, CHAUDHARY ESLD (pending listing), grave's disease, who is presenting to the hospital due to concern for elevated lipase.    She was previously listed for OLT at [facility] [place] and has been evaluated and pending listing at [facility] [place] due to wait list and low MELD. She was recently admitted 6/25/20 due to abdominal pain, at which time labs notable for lipase 218. RUQ notable for cholelithiasis and Gb thickening. She presented to the hospital on 7/18 due to concern for worsening abdominal pain, associated with nausea. Labs with lipase >1k. CT with concern for bowel obstruction (some of the small bowel loops demonstrate mild dilated appearance with suggestion of mild wall and fold thickening and interloop edema, where as the remainder of the small bowel appears predominantly decompressed). AES consulted due to concern for evaluation.    She reports she was feeling well prior to this without any complaints and was watching tv when she had acute onset of abdominal pain mid-epigastric, non-radiating. Since admission pain has resolved and is feeling at baseline. Denies any preceding symptoms of biliary colic. New medication changes or new medications.  Serial PETH negative (6/26, 7/6) and denies any ETOH history. Denies any family history of pancreatitis or pancreatic problems.    She denies history of pancreatitis, though on chart review she had a history of pancreatitis felt to be gallstone in nature. She underwent ERCP (4/2020, 5/2020) for biliary obstruction s/p sphincterotomy. Reportedly underwent EUS at [facility] [place] notable for SB-IPMN (report not available)

## 2020-07-22 ENCOUNTER — PATIENT OUTREACH (OUTPATIENT)
Dept: ADMINISTRATIVE | Facility: CLINIC | Age: 55
End: 2020-07-22

## 2020-07-22 NOTE — PROGRESS NOTES
C3 nurse attempted to contact patient. No answer. The following message was left for the patient to return the call:  Good Morning I am a nurse calling on behalf of [facility] from the [facility]ination [facility].  This is a Transitional Care Call for Zeina Mahoney . When you have a moment please contact us at [phone] or [phone] Monday through Friday, between the hours of 8 am to 4 pm. We look forward to speaking with you. On behalf of [facility] have a nice day.    The patient does not have a scheduled HOSFU appointment within 7-14 days post hospital discharge date 07/21/2020. Non [facility] PCP.

## 2020-07-23 ENCOUNTER — EXTERNAL HOME HEALTH (OUTPATIENT)
Dept: HOME HEALTH SERVICES | Facility: HOSPITAL | Age: 55
End: 2020-07-23
Payer: COMMERCIAL

## 2020-07-23 NOTE — PATIENT INSTRUCTIONS
Lipase  Does this test have other names?  Serum lipase  What is this test?  This test measures the amount of lipase in your blood. Lipase is an enzyme that is made by your pancreas. It helps your body digest fats.  Higher levels of lipase may mean you have a problem with your pancreas. Most often this means acute pancreatitis, or sudden inflammation of the pancreas.  Why do I need this test?  You may need this test if your healthcare provider suspects that you have a pancreatic disorder. Signs and symptoms of acute pancreatitis include:  · Nausea  · Vomiting'  · Rapid pulse  · Fever  · Abdominal (belly) or back pain  You also may also have this test if you already have pancreatitis and are being treated. Your provider can use this test to see how well your treatment is working.  What other tests might I have along with this test?  Your healthcare provider may also order other blood tests. These include one to check your levels of amylase, another digestive enzyme that rises if you have pancreatitis.  You may also have an ultrasound, CT scan, MRI, a special MRI called a magnetic resonance cholangiopancreatography of your pancreas. These scans look for gallstones or other abnormalities that sometimes occur with acute pancreatitis.  What do my test results mean?  Many things may affect your lab test results. These include the method each lab uses to do the test. Even if your test results are different from the normal value, you may not have a problem. To learn what the results mean for you, talk with your healthcare provider.  Results are given in units per liter (U/L). The normal range for adults younger than 60 is 0 to 160 U/L.   Higher than normal levels of lipase mean that you have a problem with your pancreas. If your blood has 3 to 10 times the normal level of lipase, then it's likely that you have acute pancreatitis.  High lipase levels also mean you may have kidney failure, cirrhosis, or a bowel problem.  How  is this test done?  The test requires a blood sample, which is drawn through a needle from a vein in your arm.  Does this test pose any risks?  Taking a blood sample with a needle carries risks that include bleeding, infection, bruising, or feeling dizzy. When the needle pricks your arm, you may feel a slight stinging sensation or pain. Afterward, the site may be slightly sore.  What might affect my test results?  Dialysis and a number of medications can affect your test results.  How do I get ready for this test?  You may need to stop eating or drinking anything except water for 8 to 12 hours before this test. Be sure your healthcare provider knows about all medicines, herbs, vitamins, and supplements you are taking. This includes medicines that don't need a prescription and any illicit drugs you may use.   © 2738-9618 [employer] [address] [place] [address], [place] [address]. All rights reserved. This information is not intended as a substitute for professional medical care. Always follow your healthcare professional's instructions.

## 2020-07-23 NOTE — PROGRESS NOTES
Please forward this important TCC information to your provider in order to maximize the post discharge care delivery of this patient.    C3 nurse spoke with Zeina Mahoney  for a TCC post hospital discharge follow up call. The patient does not have a scheduled HOSFU appointment with Primary Doctor No  within 7-14 days post hospital discharge date 07/21/2020. C3 nurse was unable to schedule HOSFU appointment in [facility].  Please contact pcp and schedule follow up appointment using HOSFU visit type on or before 08/05/2020    Respectfully,  Heather Bowers LPN    [facility]    [email or web address]       Please do not reply to this message, as this inbox is not routinely monitored.

## 2020-07-24 ENCOUNTER — TELEPHONE (OUTPATIENT)
Dept: TRANSPLANT | Facility: CLINIC | Age: 55
End: 2020-07-24

## 2020-07-24 DIAGNOSIS — K75.81 LIVER CIRRHOSIS SECONDARY TO NASH: Primary | ICD-10-CM

## 2020-07-24 DIAGNOSIS — Z76.82 ORGAN TRANSPLANT CANDIDATE: ICD-10-CM

## 2020-07-24 DIAGNOSIS — K74.60 LIVER CIRRHOSIS SECONDARY TO NASH: Primary | ICD-10-CM

## 2020-07-24 NOTE — TELEPHONE ENCOUNTER
Call returned. Yonis says Zeina is retaining fluid in her thighs, has increasing abdominal distention, and seems more tired. Says thighs appear to have fluid pockets, posteriorly, and are hard to touch.    Says Zeina is compliant with her medications; taking lasix 40 mg/d, spironolactone 100 mg/d; follows a low sodium diet; and elevates lower extremities when possible. Denies fever; sob; warmth, pain,  erythema, weeping of lower extremities.     Says patient's taking kristalose and xifaxan as prescribed and has 3-4 BMs daily. Denies confusion or mental status changes.     Labs on 7/21/20 show Na 129, K+ 5.3, crt 1.0.     Reviewed with Dr. Reyes. Plan: increase lasix to 40 mg/bid, continue spironolactone 100 mg/d, and repeat labs Monday. Patient should report to the ED for worsening of symptoms or acute changes/distress over the weekend.      Yonis notified. Understanding expressed.     ----- Message from Tameka Burger sent at 7/24/2020  4:05 PM CDT -----  Regarding: Speak with office  Contact: Yonis Somers is calling to speak with nurse about his wife having swelling in thighs and more tired.    [phone]

## 2020-07-27 ENCOUNTER — LAB VISIT (OUTPATIENT)
Dept: LAB | Facility: HOSPITAL | Age: 55
End: 2020-07-27
Attending: INTERNAL MEDICINE
Payer: COMMERCIAL

## 2020-07-27 ENCOUNTER — TELEPHONE (OUTPATIENT)
Dept: TRANSPLANT | Facility: CLINIC | Age: 55
End: 2020-07-27

## 2020-07-27 DIAGNOSIS — Z76.82 ORGAN TRANSPLANT CANDIDATE: ICD-10-CM

## 2020-07-27 DIAGNOSIS — K75.81 LIVER CIRRHOSIS SECONDARY TO NASH: ICD-10-CM

## 2020-07-27 DIAGNOSIS — K74.60 LIVER CIRRHOSIS SECONDARY TO NASH: ICD-10-CM

## 2020-07-27 LAB
ALBUMIN SERPL BCP-MCNC: 2.4 G/DL (ref 3.5–5.2)
ALP SERPL-CCNC: 113 U/L (ref 55–135)
ALT SERPL W/O P-5'-P-CCNC: 30 U/L (ref 10–44)
ANION GAP SERPL CALC-SCNC: 5 MMOL/L (ref 8–16)
AST SERPL-CCNC: 37 U/L (ref 10–40)
BASOPHILS # BLD AUTO: 0.04 K/UL (ref 0–0.2)
BASOPHILS NFR BLD: 0.5 % (ref 0–1.9)
BILIRUB SERPL-MCNC: 6.1 MG/DL (ref 0.1–1)
BUN SERPL-MCNC: 34 MG/DL (ref 6–20)
CALCIUM SERPL-MCNC: 9.8 MG/DL (ref 8.7–10.5)
CHLORIDE SERPL-SCNC: 100 MMOL/L (ref 95–110)
CO2 SERPL-SCNC: 25 MMOL/L (ref 23–29)
CREAT SERPL-MCNC: 1.1 MG/DL (ref 0.5–1.4)
DIFFERENTIAL METHOD: ABNORMAL
EOSINOPHIL # BLD AUTO: 0.1 K/UL (ref 0–0.5)
EOSINOPHIL NFR BLD: 1.1 % (ref 0–8)
ERYTHROCYTE [DISTWIDTH] IN BLOOD BY AUTOMATED COUNT: 16.8 % (ref 11.5–14.5)
EST. GFR  (AFRICAN AMERICAN): >60 ML/MIN/1.73 M^2
EST. GFR  (NON AFRICAN AMERICAN): 57.1 ML/MIN/1.73 M^2
GLUCOSE SERPL-MCNC: 170 MG/DL (ref 70–110)
HCT VFR BLD AUTO: 27.9 % (ref 37–48.5)
HGB BLD-MCNC: 8.8 G/DL (ref 12–16)
IMM GRANULOCYTES # BLD AUTO: 0.09 K/UL (ref 0–0.04)
IMM GRANULOCYTES NFR BLD AUTO: 1.2 % (ref 0–0.5)
INR PPP: 1.1 (ref 0.8–1.2)
LYMPHOCYTES # BLD AUTO: 1 K/UL (ref 1–4.8)
LYMPHOCYTES NFR BLD: 14.1 % (ref 18–48)
MCH RBC QN AUTO: 34.6 PG (ref 27–31)
MCHC RBC AUTO-ENTMCNC: 31.5 G/DL (ref 32–36)
MCV RBC AUTO: 110 FL (ref 82–98)
MONOCYTES # BLD AUTO: 0.6 K/UL (ref 0.3–1)
MONOCYTES NFR BLD: 7.5 % (ref 4–15)
NEUTROPHILS # BLD AUTO: 5.5 K/UL (ref 1.8–7.7)
NEUTROPHILS NFR BLD: 75.6 % (ref 38–73)
NRBC BLD-RTO: 0 /100 WBC
PLATELET # BLD AUTO: 69 K/UL (ref 150–350)
PMV BLD AUTO: 9.6 FL (ref 9.2–12.9)
POTASSIUM SERPL-SCNC: 5.1 MMOL/L (ref 3.5–5.1)
PROT SERPL-MCNC: 5.8 G/DL (ref 6–8.4)
PROTHROMBIN TIME: 12.2 SEC (ref 9–12.5)
RBC # BLD AUTO: 2.54 M/UL (ref 4–5.4)
SODIUM SERPL-SCNC: 130 MMOL/L (ref 136–145)
WBC # BLD AUTO: 7.32 K/UL (ref 3.9–12.7)

## 2020-07-27 PROCEDURE — 85610 PROTHROMBIN TIME: CPT | Mod: NTX

## 2020-07-27 PROCEDURE — 80053 COMPREHEN METABOLIC PANEL: CPT | Mod: NTX

## 2020-07-27 PROCEDURE — 85025 COMPLETE CBC W/AUTO DIFF WBC: CPT | Mod: NTX

## 2020-07-27 PROCEDURE — 36415 COLL VENOUS BLD VENIPUNCTURE: CPT | Mod: TXP

## 2020-07-27 NOTE — TELEPHONE ENCOUNTER
----- Message from Jillian Delgado sent at 7/27/2020  4:09 PM CDT -----    Called and sp to pt  about appt changes; appts on 7/30 cx'ed. Pt will see Dr. Reyes on 7/28.

## 2020-07-28 ENCOUNTER — DOCUMENT SCAN (OUTPATIENT)
Dept: HOME HEALTH SERVICES | Facility: HOSPITAL | Age: 55
End: 2020-07-28
Payer: COMMERCIAL

## 2020-07-28 ENCOUNTER — TELEPHONE (OUTPATIENT)
Dept: TRANSPLANT | Facility: CLINIC | Age: 55
End: 2020-07-28

## 2020-07-28 ENCOUNTER — OFFICE VISIT (OUTPATIENT)
Dept: TRANSPLANT | Facility: CLINIC | Age: 55
End: 2020-07-28
Payer: COMMERCIAL

## 2020-07-28 VITALS
OXYGEN SATURATION: 100 % | WEIGHT: 148.56 LBS | TEMPERATURE: 98 F | DIASTOLIC BLOOD PRESSURE: 45 MMHG | SYSTOLIC BLOOD PRESSURE: 100 MMHG | BODY MASS INDEX: 25.51 KG/M2 | HEART RATE: 79 BPM | RESPIRATION RATE: 16 BRPM

## 2020-07-28 DIAGNOSIS — K74.60 LIVER CIRRHOSIS SECONDARY TO NASH: Primary | ICD-10-CM

## 2020-07-28 DIAGNOSIS — K74.60 DECOMPENSATED HEPATIC CIRRHOSIS: Primary | ICD-10-CM

## 2020-07-28 DIAGNOSIS — K75.81 LIVER CIRRHOSIS SECONDARY TO NASH: Primary | ICD-10-CM

## 2020-07-28 DIAGNOSIS — Z76.82 ORGAN TRANSPLANT CANDIDATE: ICD-10-CM

## 2020-07-28 DIAGNOSIS — E44.0 PROTEIN-CALORIE MALNUTRITION, MODERATE: ICD-10-CM

## 2020-07-28 DIAGNOSIS — K72.90 DECOMPENSATED HEPATIC CIRRHOSIS: Primary | ICD-10-CM

## 2020-07-28 DIAGNOSIS — R18.8 OTHER ASCITES: ICD-10-CM

## 2020-07-28 DIAGNOSIS — K75.81 LIVER CIRRHOSIS SECONDARY TO NASH: ICD-10-CM

## 2020-07-28 DIAGNOSIS — E03.9 HYPOTHYROIDISM, UNSPECIFIED TYPE: ICD-10-CM

## 2020-07-28 DIAGNOSIS — D69.59 THROMBOCYTOPENIA DUE TO HYPERSPLENISM: ICD-10-CM

## 2020-07-28 DIAGNOSIS — K76.82 HEPATIC ENCEPHALOPATHY: ICD-10-CM

## 2020-07-28 DIAGNOSIS — D73.1 THROMBOCYTOPENIA DUE TO HYPERSPLENISM: ICD-10-CM

## 2020-07-28 DIAGNOSIS — E87.1 HYPONATREMIA WITH DECREASED SERUM OSMOLALITY: ICD-10-CM

## 2020-07-28 DIAGNOSIS — K74.60 LIVER CIRRHOSIS SECONDARY TO NASH: ICD-10-CM

## 2020-07-28 DIAGNOSIS — K76.6 PORTAL HYPERTENSION: ICD-10-CM

## 2020-07-28 PROBLEM — F05 DELIRIUM DUE TO MEDICAL CONDITION WITHOUT BEHAVIORAL DISTURBANCE: Status: RESOLVED | Noted: 2020-06-26 | Resolved: 2020-07-28

## 2020-07-28 PROBLEM — R10.84 GENERALIZED ABDOMINAL PAIN: Status: RESOLVED | Noted: 2020-06-25 | Resolved: 2020-07-28

## 2020-07-28 PROCEDURE — 3008F BODY MASS INDEX DOCD: CPT | Mod: CPTII,S$GLB,TXP, | Performed by: INTERNAL MEDICINE

## 2020-07-28 PROCEDURE — 99215 PR OFFICE/OUTPT VISIT, EST, LEVL V, 40-54 MIN: ICD-10-PCS | Mod: S$GLB,TXP,, | Performed by: INTERNAL MEDICINE

## 2020-07-28 PROCEDURE — 99999 PR PBB SHADOW E&M-EST. PATIENT-LVL V: ICD-10-PCS | Mod: PBBFAC,TXP,, | Performed by: INTERNAL MEDICINE

## 2020-07-28 PROCEDURE — 99999 PR PBB SHADOW E&M-EST. PATIENT-LVL V: CPT | Mod: PBBFAC,TXP,, | Performed by: INTERNAL MEDICINE

## 2020-07-28 PROCEDURE — 99215 OFFICE O/P EST HI 40 MIN: CPT | Mod: S$GLB,TXP,, | Performed by: INTERNAL MEDICINE

## 2020-07-28 PROCEDURE — 3008F PR BODY MASS INDEX (BMI) DOCUMENTED: ICD-10-PCS | Mod: CPTII,S$GLB,TXP, | Performed by: INTERNAL MEDICINE

## 2020-07-28 RX ORDER — FUROSEMIDE 40 MG/1
40 TABLET ORAL 2 TIMES DAILY
Status: ON HOLD
Start: 2020-07-28 | End: 2020-08-12 | Stop reason: SDUPTHER

## 2020-07-28 RX ORDER — LEVOTHYROXINE SODIUM 112 UG/1
112 TABLET ORAL
Qty: 30 TABLET | Refills: 6 | Status: ON HOLD | OUTPATIENT
Start: 2020-07-28 | End: 2020-08-12 | Stop reason: HOSPADM

## 2020-07-28 NOTE — LETTER
July 28, 2020        Reuben Vee  [address]  [facility] [address]  Phone: [phone]  Fax: [phone]             [facility]  [address]  [facility] [address]  Phone: [phone]   Patient: Zeina Mahoney   MR Number: [id number]   YOB: 1965   Date of Visit: 7/28/2020       Dear Dr. Reuben Vee    Thank you for referring Zeina Mahoney to me for evaluation. Attached you will find relevant portions of my assessment and plan of care.    If you have questions, please do not hesitate to call me. I look forward to following Zeina Mahoney along with you.    Sincerely,    Jairo Reyes MD    Enclosure    If you would like to receive this communication electronically, please contact [email or web address] or [phone] to request [employer] Link access.    [employer] Link is a tool which provides read-only access to select patient information with whom you have a relationship. Its easy to use and provides real time access to review your patients record including encounter summaries, notes, results, and demographic information.    If you feel you have received this communication in error or would no longer like to receive these types of communications, please e-mail [email or web address]

## 2020-07-28 NOTE — PROGRESS NOTES
Transplant Hepatology (Transplant Evaluation) Consult Note    Referring provider: Reuben Vee MD    Chief complaint:   Chief Complaint   Patient presents with    Cirrhosis       HPI:  Zeina Mahoney is a 54 y.o. female that presents to Transplant Hepatology Clinic for consultation of had concerns including Cirrhosis..  She  is accompanied by her .    7/28/20: feels ok, no acute complaints. Denies confusion. Working with home PT, able to ambulate with assistance. Denies GI bleed, SOB, Fever. Still with 1-2+ LL edema. /45 but did not get to take her midodrine in am. Abd is soft. Await insurance approval to get on UNOS list. Labs weekly, RTC 1 month.    Background  CHAUDHARY cirrhosis with decompensations. She had episodes of abdominal pain and elevated lipase, however not consistent with true acute pancreatitis as CT findings of the pancreas were normal. Thought to be GI process.     MELD-Na score: 21 at 7/27/2020 10:55 AM  MELD score: 15 at 7/27/2020 10:55 AM  Calculated from:  Serum Creatinine: 1.1 mg/dL at 7/27/2020 10:55 AM  Serum Sodium: 130 mmol/L at 7/27/2020 10:55 AM  Total Bilirubin: 6.1 mg/dL at 7/27/2020 10:55 AM  INR(ratio): 1.1 at 7/27/2020 10:55 AM  Age: 54 years 10 months    Liver disease:                   CHAUDHARY    MELD-Na:                         21  Child-Myers Class:             C    Blood type:                        O    Transplant status:             approved    Cirrhosis is decompensated with:    Ascites:                                                       yes large  Spontaneous bacterial peritonitis:              no   Hepatic Encephalopathy:                           Yes, [place] grade 2  Portal bleeding:                                          no  Hepatocellular carcinoma:                          no    Hepatorenal syndrome:                              no  Hyponatremia:                                            yes  Sarcopenia:                                                 yes   Portal vein thrombosis:                               no      Screening:  Last EGD:  Last imaging study:       Patient Active Problem List   Diagnosis    Decompensated hepatic cirrhosis    Generalized abdominal pain    Macrocytic anemia    Acute Hepatic encephalopathy    Liver cirrhosis secondary to CHAUDHARY    Other ascites    Thrombocytopenia    History of pancreatitis    Delirium due to medical condition without behavioral disturbance    Postablative hypothyroidism    Hypercalcemia    Adjustment disorder with anxious mood    Abnormal serum lipase level    Acquired coagulation factor deficiency    Hyperkalemia    Hyponatremia       Past Medical History:   Diagnosis Date    Chronic Hepatic encephalopathy 6/25/2020    Decompensated hepatic cirrhosis 6/25/2020    History of pancreatitis 6/25/2020    Liver cirrhosis secondary to CHAUDHARY 6/25/2020    Macrocytic anemia 6/25/2020    Other ascites 6/25/2020    Thrombocytopenia 6/25/2020       No past surgical history on file.    No family history on file.    Social History     Socioeconomic History    Marital status:      Spouse name: Not on file    Number of children: Not on file    Years of education: Not on file    Highest education level: Not on file   Occupational History    Not on file   Social Needs    Financial resource strain: Not on file    Food insecurity     Worry: Not on file     Inability: Not on file    Transportation needs     Medical: Not on file     Non-medical: Not on file   Tobacco Use    Smoking status: Never Smoker    Smokeless tobacco: Never Used   Substance and Sexual Activity    Alcohol use: Never     Frequency: Never    Drug use: Never    Sexual activity: Not on file   Lifestyle    Physical activity     Days per week: Not on file     Minutes per session: Not on file    Stress: Not on file   Relationships    Social connections     Talks on phone: Not on file     Gets together: Not on file     Attends  Quaker service: Not on file     Active member of club or organization: Not on file     Attends meetings of clubs or organizations: Not on file     Relationship status: Not on file   Other Topics Concern    Not on file   Social History Narrative    Not on file       Current Outpatient Medications   Medication Sig Dispense Refill    acetaminophen (TYLENOL) 500 MG tablet Take 1 tablet (500 mg total) by mouth every 6 (six) hours as needed for Pain. Max 2000mg daily.  0    ergocalciferol (VITAMIN D2) 50,000 unit Cap Take 50,000 Units by mouth every 7 days.      furosemide (LASIX) 40 MG tablet Take 1 tablet (40 mg total) by mouth once daily. (Patient taking differently: Take 80 mg by mouth once daily. )      haloperidoL (HALDOL) 1 MG tablet Take 1 tablet (1 mg total) by mouth daily as needed (non redirectable agitation).      lactulose (CEPHULAC) 10 gram packet Take 1 and a half packets three times a day       lactulose (CEPHULAC) 10 gram packet Mix 3 packets (30 g total) with liquid and take by mouth 3 (three) times daily as needed. 90 packet 11    lactulose (CHRONULAC) 10 gram/15 mL solution Take 30 mLs (20 g total) by mouth 3 (three) times daily as needed. Alternate with kristalose if not having enough BMs. 2700 mL 2    levothyroxine (SYNTHROID) 150 MCG tablet Take 150 mcg by mouth before breakfast.      diaper,brief,adult,disposable Misc 5 mg/kg/day by Misc.(Non-Drug; Combo Route) route 5 (five) times daily. Patient with incontinence. Please supply enough depends for 5xs/day      lidocaine (LIDODERM) 5 % Place 1 patch onto the skin every 24 hours. Remove & Discard patch within 12 hours or as directed by MD      magnesium hydroxide 400 mg/5 ml (MILK OF MAGNESIA) 400 mg/5 mL Susp Take 30 mLs (2,400 mg total) by mouth daily as needed (Take if constipated despite lactulose).      melatonin 10 mg Tab Take 10 mg by mouth nightly as needed (sleep).      midodrine (PROAMATINE) 10 MG tablet Take 10 mg by  mouth 3 (three) times daily.      nystatin (MYCOSTATIN) powder Apply topically 2 (two) times daily. Apply to affected area twice a day      pantoprazole (PROTONIX) 40 MG tablet Take 40 mg by mouth before breakfast.      polyethylene glycol (GLYCOLAX) 17 gram PwPk Take 17 g by mouth once daily. Or miralax OTC. Take if constipated despite lactulose  0    rifAXIMin (XIFAXAN) 550 mg Tab Take 550 mg by mouth 2 (two) times daily.       simethicone (MYLICON) 80 MG chewable tablet Take 80 mg by mouth every 6 (six) hours as needed for Flatulence. Take 2 tablets by mouth every 6 hours as needed for gas      spironolactone (ALDACTONE) 50 MG tablet Take 100 mg by mouth once daily.       sulfamethoxazole-trimethoprim 400-80mg (BACTRIM) 400-80 mg per tablet Take 1 tablet by mouth 2 (two) times daily. (Patient not taking: Reported on 7/23/2020) 20 tablet 0    zinc sulfate (ZINCATE) 220 (50) mg capsule Take 220 mg by mouth before breakfast.       No current facility-administered medications for this visit.        Review of patient's allergies indicates:   Allergen Reactions    Aspirin Tinitus    Ciprofloxacin Rash    Clindamycin Itching       Review of Systems   Constitutional: Positive for malaise/fatigue. Negative for chills, fever and weight loss.   HENT: Negative for sore throat.    Respiratory: Negative for cough, shortness of breath and wheezing.    Cardiovascular: Positive for leg swelling. Negative for chest pain.   Gastrointestinal: Negative for abdominal pain, blood in stool, constipation, diarrhea, melena, nausea and vomiting.   Musculoskeletal: Positive for back pain.   Neurological: Positive for dizziness and weakness. Negative for tremors, focal weakness, seizures and headaches.   Endo/Heme/Allergies: Bruises/bleeds easily.       Vitals:    07/28/20 0916   BP: (!) 100/45   Pulse: 79   Resp: 16   Temp: 98 °F (36.7 °C)   TempSrc: Oral   SpO2: 100%   Weight: 67.4 kg (148 lb 9.4 oz)       Physical Exam  Vitals  signs and nursing note reviewed.   Constitutional:       Appearance: She is well-developed.      Comments: Chronically ill-appearing. Malnourished. Temporal wasting.     HENT:      Head: Normocephalic and atraumatic.   Eyes:      General: Scleral icterus present.   Cardiovascular:      Rate and Rhythm: Normal rate and regular rhythm.      Heart sounds: Normal heart sounds.   Pulmonary:      Effort: Pulmonary effort is normal. No respiratory distress.      Breath sounds: Normal breath sounds.   Abdominal:      General: Bowel sounds are normal. There is distension.      Palpations: Abdomen is soft.      Tenderness: There is no abdominal tenderness.      Comments: Soft, small ascites     Musculoskeletal:         General: Swelling present.   Skin:     General: Skin is dry.      Coloration: Skin is jaundiced.      Findings: No rash.   Neurological:      Mental Status: She is alert and oriented to person, place, and time.   Psychiatric:         Mood and Affect: Mood is not anxious.         Behavior: Behavior normal.         LABS: I personally reviewed pertinent laboratory findings.    Lab Results   Component Value Date    ALT 30 07/27/2020    AST 37 07/27/2020    GGT 32 07/06/2020    GGT 32 07/06/2020    ALKPHOS 113 07/27/2020    BILITOT 6.1 (H) 07/27/2020       Lab Results   Component Value Date    WBC 7.32 07/27/2020    HGB 8.8 (L) 07/27/2020    HCT 27.9 (L) 07/27/2020     (H) 07/27/2020    PLT 69 (L) 07/27/2020       Lab Results   Component Value Date     (L) 07/27/2020    K 5.1 07/27/2020     07/27/2020    CO2 25 07/27/2020    BUN 34 (H) 07/27/2020    CREATININE 1.1 07/27/2020    CALCIUM 9.8 07/27/2020    ANIONGAP 5 (L) 07/27/2020    ESTGFRAFRICA >60.0 07/27/2020    EGFRNONAA 57.1 (A) 07/27/2020       Lab Results   Component Value Date    INR 1.1 07/27/2020    INR 1.2 07/21/2020    INR 1.3 (H) 07/20/2020       No results found for: SMOOTHMUSCAB, MITOAB  Lab Results   Component Value Date    IRON 185  (H) 07/06/2020    IRON 185 (H) 07/06/2020    TIBC 231 (L) 07/06/2020    TIBC 231 (L) 07/06/2020    FERRITIN 547 (H) 07/06/2020    FERRITIN 547 (H) 07/06/2020     Lab Results   Component Value Date    HEPBCAB Negative 07/06/2020    HEPBCAB Negative 07/06/2020    HEPCAB Negative 07/06/2020    HEPCAB Negative 07/06/2020     Lab Results   Component Value Date    TSH 0.050 (L) 07/06/2020    TSH 0.050 (L) 07/06/2020     No results found for: MELIDA    No results found for: ABORH    Lab Results   Component Value Date    HGBA1C <4.0 (A) 07/06/2020     Lab Results   Component Value Date    CHOL 109 (L) 07/18/2020     Lab Results   Component Value Date    HDL 9 (L) 07/18/2020     Lab Results   Component Value Date    LDLCALC 87.4 07/18/2020     Lab Results   Component Value Date    TRIG 63 07/18/2020     Lab Results   Component Value Date    CHOLHDL 8.3 (L) 07/18/2020         Imaging:   No results found for this or any previous visit.  I personally reviewed recent cardiology studies available on the chart and from outside medical records.  I personally reviewed recent imaging studies available on the chart and from outside medical records.      Assessment:  54 y.o. female presenting with     1. Decompensated hepatic cirrhosis    2. Other ascites    3. Liver cirrhosis secondary to CHAUDHARY    4. Hepatic encephalopathy    5. Portal hypertension    6. Protein-calorie malnutrition, moderate    7. Thrombocytopenia due to hypersplenism    8. Hyponatremia with decreased serum osmolality    9. Hypothyroidism, unspecified type        MELD-Na score: 21 at 7/27/2020 10:55 AM  MELD score: 15 at 7/27/2020 10:55 AM  Calculated from:  Serum Creatinine: 1.1 mg/dL at 7/27/2020 10:55 AM  Serum Sodium: 130 mmol/L at 7/27/2020 10:55 AM  Total Bilirubin: 6.1 mg/dL at 7/27/2020 10:55 AM  INR(ratio): 1.1 at 7/27/2020 10:55 AM  Age: 54 years 10 months    Functional Status: 40% - Disabled: requires special care and assistance  Physical Capacity: No  Limitations    Transplant Candidacy: approved for OLT,  Pending insurance approval to get UNOS listed.    7/28/20: feels ok, no acute complaints. Denies confusion. Working with home PT, able to ambulate with assistance. Denies GI bleed, SOB, Fever. Still with 1-2+ LL edema. /45 but did not get to take her midodrine in am. Abd is soft. Await insurance approval to get on UNOS list. Labs weekly, RTC 1 month.      Recommendation(s):  - await UNOS listing  - RTC in 1 month    Cirrhosis Counseling  - strict abstinence of alcohol use  - compliance with lactulose and rifaximin if you have developed hepatic encephalopathy (confusion due to high ammonia level)  - avoid non-steroidal anti-inflammatory drugs (NSAIDs) such as ibuprofen, Motrin, naprosyn, Alleve due to the risk of kidney damage  - can take acetaminophen (Tylenol), no more than 2000 mg per day  - low sodium (salt) 2 gram per day diet  - nutrition: 25-30kcal (calorie per body body weight in kilogram) per day  - no need to restrict protein in diet  - high protein diet: 1.2-1.5 gram/kg (protein per body weight in kilogram) per day to prevent muscle mass loss  - avoid fasting  - Late night snack with 50 gram of complex carbohydrates and protein  - resistance exercises for muscle strength  - avoid raw seafoods due to the risk of fatal Vibrio vulnificus infection  - ultrasound or MRI of the liver every 6 months for liver cancer screening (you are at risk of developing liver cancer due to scar tissue in the liver)  - Upper endoscopy every 1-2 years to screen for varices in the stomach and foodpipe which can burst and cause fatal bleeding      A total of 40 minutes were spent face-to-face with the patient during this encounter and over half of that time was spent on counseling and coordination of care.  We discussed in depth the nature of the patient's liver disease, the management plan and liver transplant evaluation in details. I also educated the patient about  lifestyle modifications which may improve hepatic steatosis, overweight/obesity, insulin resistance and high blood pressure issues. I have provided the patient with an opportunity to ask questions and have all questions answered to his satisfaction.     I have sent communication to the referring physician and/or primary care provider.    Jairo Reyes MD  Staff Physician  Hepatology and Liver Transplant  [facility]  [facility]

## 2020-07-28 NOTE — TELEPHONE ENCOUNTER
Called pt &  to inform them pt is now listed for a liver transplant with a MELD of 21. Transplant episode updated.

## 2020-07-28 NOTE — PATIENT INSTRUCTIONS
Your MELD score today is 21. We are still waiting for insurance approval to place on UNOS list.    Cirrhosis Counseling  - strict abstinence of alcohol use  - compliance with lactulose and rifaximin if you have developed hepatic encephalopathy (confusion due to high ammonia level)  - avoid non-steroidal anti-inflammatory drugs (NSAIDs) such as ibuprofen, Motrin, naprosyn, Alleve due to the risk of kidney damage  - can take acetaminophen (Tylenol), no more than 2000 mg per day  - low sodium (salt) 2 gram per day diet  - nutrition: 25-30kcal (calorie per body body weight in kilogram) per day - 7035-6190 calories  - no need to restrict protein in diet  - high protein diet: 1.2-1.5 gram/kg (protein per body weight in kilogram) per day to prevent muscle mass loss: 75 g protein  - avoid fasting  - Late night snack with 50 gram of complex carbohydrates and protein  - resistance exercises for muscle strength  - avoid raw seafoods due to the risk of fatal Vibrio vulnificus infection  - ultrasound or MRI of the liver every 6 months for liver cancer screening (you are at risk of developing liver cancer due to scar tissue in the liver)  - Upper endoscopy every 1-2 years to screen for varices in the stomach and foodpipe which can burst and cause fatal bleeding

## 2020-07-28 NOTE — TELEPHONE ENCOUNTER
"  LIVER WAIT LISTING NOTE    **NOTE:   IF ANY EXTERNAL LABS ARE USED FOR LISTING THE VALUES AND DATES MUST BE ENTERED IN EPIC TO GENERATE THIS NOTE**    Date of Financial clearance to list: 2020    [facility]:        Organ: Liver  Name:       Zeina Mahoney    : 1965          Gender:     female      MRN#: [id number]                                 State of Permanent Residence:  [address] [place] [id number]  Ethnicity: Sen[place]alese   Race:          CLINICAL INFORMATION       ABO  ABO Blood Group:   O POS     ABO Confirmation: (THESE DATES MUST BE PRIOR TO THE LIST DATE AND SUPPORTED BY SEPARATE LAB REPORTS)    Internal Results    Lab Results   Component Value Date    GROUPTRH O POS 2020    GROUPTRH O POS 2020     No results found for: ABO    External Results    ABO Date 1:  ABO Result 1:  ABO Date 2:  ABO Result 2:     Are either of these ABO results based on External Labs? no  (If Yes, STOP and go to source document in Media Tab for verification).    VITALS  Height:    Ht Readings from Last 1 Encounters:   20 5' 4" (1.626 m)     Weight:    Wt Readings from Last 1 Encounters:   20 67.4 kg (148 lb 9.4 oz)       LIVER ORGAN INFORMATION  Candidate Medical Urgency Status: MELD/PELD    Number of Previous Transplants: 0    MELD/PELD Data Collection:  Had dialysis twice, or 24 hours of CVVHD, within 1 week prior to the serum creatinine test: No  Encephalopathy: 1 - 2 Date: 2020  Ascites: moderate Date: 2020          MELD Score:  MELD-Na score: 21 at 2020 10:55 AM  MELD score: 15 at 2020 10:55 AM  Calculated from:  Serum Creatinine: 1.1 mg/dL at 2020 10:55 AM  Serum Sodium: 130 mmol/L at 2020 10:55 AM  Total Bilirubin: 6.1 mg/dL at 2020 10:55 AM  INR(ratio): 1.1 at 2020 10:55 AM  Age: 54 years 10 months  Lab Results   Component Value Date    ALBUMIN 2.4 (L) 2020     No results found for: EXTINR, EXTCREATININ, EXTSODIUM, " "EXTBILITOTAL, EXTALBUMIN    Additional Organs: none  Kidney: No    If Kidney is "Yes" above, check Diagnosis and enter the medical eligibility below for a simultaneous liver/kidney:    Diagnosis: Chronic kidney disease (CKD) with measured or calculated GFR less than or equal to 60 ml/min for greater than 90 consecutive days. At least one of the following must qualify for CKD:  Date Begun Dialysis CrCl (ml/min)  Must be < or = 30 eGFR (ml/min) Must be < or = 30    Yes/no:                    Diagnosis: Sustained acute kidney injury (must be confirmed at least once every 7 days). Please select at least one of the following criteria:  Date of test or treatment Dialysis received CrCl (ml/min) Must be < or = 30 eGFR (ml/min) Must be < or = 25 Number of days since previous test or treatment (must be less than or equal to 7 days)    Yes/No:                  Diagnosis: Metabolic disease, Check all diagnosis that apply:     Hyperoxaluria    Atypical hemolytic uremic syndrome (HUS) from mutations in factor H or factor I    Familial non-neuropathic systemic amyloidosis    Methylmalonic aciduria     Transplant nephrologist confirming candidate's most recent diagnosis for SLK: N/A               ## Please submit a separate Kidney Listing note for combined Liver/Kidney patients. ##    Will Recipient Accept?   Accept HBcAB Positive Organ:  yes  Accept HBV HERACLIO Positive Organ:  yes  Accept HCV Antibody Positive Organ: yes   Accept HCV HERACLIO Positive Organ:  yes                        Local: No                           Import: No  Accept DCD Organ:    yes  Minimum acceptable donor age:  5 years  Maximum acceptable donor age:  99 years  Minimum acceptable donor weight:  40 lbs    Maximum acceptable donor weight:  440 lb  Maximum miles the organ or  Recovery team will travel:   5000 miles    TCR Information    Citizenship: [place] Citizen   Country of permanent residence:   Year of entry to the [place]:   Highest education level: High School (9-12) " or GED    Patient on Life Support: No  Functional Status: 40% - disabled: requires special care and assistance   Working for income: No  If no, reason not working: Patient Choice - Other  Previous Pancreas [facility] - No  Source of payment: Private Insurance  Diabetes: No  Any previous malignancy: No  Neoadjuvant Therapy: No  Has candidate ever had a diagnosis of HCC: No    Liver Medical Factors  Previous abdominal surgery: No  Spontaneous Bacterial Peritonitis: No  History of Portal Vein Thrombosis: No  Transjugular Intrahepatic Portosystemic Shunt: No

## 2020-07-30 ENCOUNTER — DOCUMENT SCAN (OUTPATIENT)
Dept: HOME HEALTH SERVICES | Facility: HOSPITAL | Age: 55
End: 2020-07-30
Payer: COMMERCIAL

## 2020-07-31 ENCOUNTER — TELEPHONE (OUTPATIENT)
Dept: TRANSPLANT | Facility: CLINIC | Age: 55
End: 2020-07-31

## 2020-07-31 NOTE — TELEPHONE ENCOUNTER
Pt currently backup to another center for liver transplant.  Covid testing will be scheduled when times are established.

## 2020-08-01 ENCOUNTER — TELEPHONE (OUTPATIENT)
Dept: TRANSPLANT | Facility: CLINIC | Age: 55
End: 2020-08-01

## 2020-08-02 NOTE — TELEPHONE ENCOUNTER
ON CALL NOTE    8/1/2020    Called patient to release her as back-up for liver transplant .  Spoke w/ her .  Informed him that patient is no longer back-up/ standby, primary center accepted offer, and ok to resume usual activities.  Discussed transplant offers and process for primary as well as back-up offers.  All questions answered and concerns addressed.

## 2020-08-06 ENCOUNTER — HOSPITAL ENCOUNTER (INPATIENT)
Facility: HOSPITAL | Age: 55
LOS: 6 days | Discharge: HOME | DRG: 442 | End: 2020-08-12
Attending: EMERGENCY MEDICINE | Admitting: INTERNAL MEDICINE
Payer: COMMERCIAL

## 2020-08-06 ENCOUNTER — TELEPHONE (OUTPATIENT)
Dept: TRANSPLANT | Facility: CLINIC | Age: 55
End: 2020-08-06

## 2020-08-06 DIAGNOSIS — K72.90 DECOMPENSATED HEPATIC CIRRHOSIS: ICD-10-CM

## 2020-08-06 DIAGNOSIS — D68.4 ACQUIRED COAGULATION FACTOR DEFICIENCY: ICD-10-CM

## 2020-08-06 DIAGNOSIS — K76.82 HEPATIC ENCEPHALOPATHY: Primary | ICD-10-CM

## 2020-08-06 DIAGNOSIS — K74.60 LIVER CIRRHOSIS SECONDARY TO NASH: ICD-10-CM

## 2020-08-06 DIAGNOSIS — R78.81 BACTEREMIA: ICD-10-CM

## 2020-08-06 DIAGNOSIS — N17.9 AKI (ACUTE KIDNEY INJURY): ICD-10-CM

## 2020-08-06 DIAGNOSIS — K75.81 LIVER CIRRHOSIS SECONDARY TO NASH: ICD-10-CM

## 2020-08-06 DIAGNOSIS — D69.6 THROMBOCYTOPENIA: ICD-10-CM

## 2020-08-06 DIAGNOSIS — D63.8 ANEMIA OF CHRONIC DISEASE: ICD-10-CM

## 2020-08-06 DIAGNOSIS — K74.60 DECOMPENSATED HEPATIC CIRRHOSIS: ICD-10-CM

## 2020-08-06 DIAGNOSIS — E89.0 POSTABLATIVE HYPOTHYROIDISM: ICD-10-CM

## 2020-08-06 DIAGNOSIS — E87.1 HYPONATREMIA: ICD-10-CM

## 2020-08-06 DIAGNOSIS — R18.8 OTHER ASCITES: ICD-10-CM

## 2020-08-06 DIAGNOSIS — E72.20 HYPERAMMONEMIA: ICD-10-CM

## 2020-08-06 DIAGNOSIS — R11.0 NAUSEA: ICD-10-CM

## 2020-08-06 LAB
ABO + RH BLD: NORMAL
ALBUMIN SERPL BCP-MCNC: 3 G/DL (ref 3.5–5.2)
ALP SERPL-CCNC: 94 U/L (ref 55–135)
ALT SERPL W/O P-5'-P-CCNC: 32 U/L (ref 10–44)
AMMONIA PLAS-SCNC: 81 UMOL/L (ref 10–50)
ANION GAP SERPL CALC-SCNC: 8 MMOL/L (ref 8–16)
AST SERPL-CCNC: 40 U/L (ref 10–40)
BASOPHILS # BLD AUTO: 0.04 K/UL (ref 0–0.2)
BASOPHILS NFR BLD: 0.7 % (ref 0–1.9)
BILIRUB SERPL-MCNC: 6.8 MG/DL (ref 0.1–1)
BLD GP AB SCN CELLS X3 SERPL QL: NORMAL
BUN SERPL-MCNC: 39 MG/DL (ref 6–20)
CALCIUM SERPL-MCNC: 10.8 MG/DL (ref 8.7–10.5)
CHLORIDE SERPL-SCNC: 103 MMOL/L (ref 95–110)
CO2 SERPL-SCNC: 23 MMOL/L (ref 23–29)
CREAT SERPL-MCNC: 1.6 MG/DL (ref 0.5–1.4)
DIFFERENTIAL METHOD: ABNORMAL
EOSINOPHIL # BLD AUTO: 0.1 K/UL (ref 0–0.5)
EOSINOPHIL NFR BLD: 0.8 % (ref 0–8)
ERYTHROCYTE [DISTWIDTH] IN BLOOD BY AUTOMATED COUNT: 15 % (ref 11.5–14.5)
EST. GFR  (AFRICAN AMERICAN): 41.8 ML/MIN/1.73 M^2
EST. GFR  (NON AFRICAN AMERICAN): 36.3 ML/MIN/1.73 M^2
GLUCOSE SERPL-MCNC: 173 MG/DL (ref 70–110)
HCT VFR BLD AUTO: 29.3 % (ref 37–48.5)
HGB BLD-MCNC: 9.5 G/DL (ref 12–16)
IMM GRANULOCYTES # BLD AUTO: 0.04 K/UL (ref 0–0.04)
IMM GRANULOCYTES NFR BLD AUTO: 0.7 % (ref 0–0.5)
INR PPP: 1.1 (ref 0.8–1.2)
LIPASE SERPL-CCNC: 312 U/L (ref 4–60)
LYMPHOCYTES # BLD AUTO: 1.1 K/UL (ref 1–4.8)
LYMPHOCYTES NFR BLD: 19 % (ref 18–48)
MCH RBC QN AUTO: 35.1 PG (ref 27–31)
MCHC RBC AUTO-ENTMCNC: 32.4 G/DL (ref 32–36)
MCV RBC AUTO: 108 FL (ref 82–98)
MONOCYTES # BLD AUTO: 0.4 K/UL (ref 0.3–1)
MONOCYTES NFR BLD: 6.1 % (ref 4–15)
NEUTROPHILS # BLD AUTO: 4.3 K/UL (ref 1.8–7.7)
NEUTROPHILS NFR BLD: 72.7 % (ref 38–73)
NRBC BLD-RTO: 0 /100 WBC
PLATELET # BLD AUTO: 87 K/UL (ref 150–350)
PMV BLD AUTO: 9.8 FL (ref 9.2–12.9)
POTASSIUM SERPL-SCNC: 4.3 MMOL/L (ref 3.5–5.1)
PROT SERPL-MCNC: 6.8 G/DL (ref 6–8.4)
PROTHROMBIN TIME: 12 SEC (ref 9–12.5)
RBC # BLD AUTO: 2.71 M/UL (ref 4–5.4)
SARS-COV-2 RDRP RESP QL NAA+PROBE: NEGATIVE
SODIUM SERPL-SCNC: 134 MMOL/L (ref 136–145)
WBC # BLD AUTO: 5.91 K/UL (ref 3.9–12.7)

## 2020-08-06 PROCEDURE — 25000003 PHARM REV CODE 250: Mod: NTX | Performed by: EMERGENCY MEDICINE

## 2020-08-06 PROCEDURE — G0378 HOSPITAL OBSERVATION PER HR: HCPCS | Mod: NTX

## 2020-08-06 PROCEDURE — 99291 PR CRITICAL CARE, E/M 30-74 MINUTES: ICD-10-PCS | Mod: NTX,,, | Performed by: EMERGENCY MEDICINE

## 2020-08-06 PROCEDURE — 96361 HYDRATE IV INFUSION ADD-ON: CPT | Performed by: EMERGENCY MEDICINE

## 2020-08-06 PROCEDURE — 87040 BLOOD CULTURE FOR BACTERIA: CPT | Mod: 59,NTX

## 2020-08-06 PROCEDURE — 96374 THER/PROPH/DIAG INJ IV PUSH: CPT | Mod: NTX

## 2020-08-06 PROCEDURE — 82140 ASSAY OF AMMONIA: CPT | Mod: NTX

## 2020-08-06 PROCEDURE — 25000003 PHARM REV CODE 250: Mod: NTX | Performed by: PHYSICIAN ASSISTANT

## 2020-08-06 PROCEDURE — 87186 SC STD MICRODIL/AGAR DIL: CPT | Mod: 59,NTX

## 2020-08-06 PROCEDURE — 86850 RBC ANTIBODY SCREEN: CPT | Mod: NTX

## 2020-08-06 PROCEDURE — 80053 COMPREHEN METABOLIC PANEL: CPT | Mod: NTX

## 2020-08-06 PROCEDURE — 99291 CRITICAL CARE FIRST HOUR: CPT | Mod: NTX

## 2020-08-06 PROCEDURE — 85610 PROTHROMBIN TIME: CPT | Mod: NTX

## 2020-08-06 PROCEDURE — U0002 COVID-19 LAB TEST NON-CDC: HCPCS | Mod: NTX

## 2020-08-06 PROCEDURE — 85025 COMPLETE CBC W/AUTO DIFF WBC: CPT | Mod: NTX

## 2020-08-06 PROCEDURE — 99291 CRITICAL CARE FIRST HOUR: CPT | Mod: NTX,,, | Performed by: EMERGENCY MEDICINE

## 2020-08-06 PROCEDURE — 83690 ASSAY OF LIPASE: CPT | Mod: NTX

## 2020-08-06 PROCEDURE — 63600175 PHARM REV CODE 636 W HCPCS: Mod: NTX | Performed by: STUDENT IN AN ORGANIZED HEALTH CARE EDUCATION/TRAINING PROGRAM

## 2020-08-06 PROCEDURE — 87077 CULTURE AEROBIC IDENTIFY: CPT | Mod: NTX

## 2020-08-06 RX ORDER — LACTULOSE 10 G/15ML
45 SOLUTION ORAL 3 TIMES DAILY
Status: DISCONTINUED | OUTPATIENT
Start: 2020-08-06 | End: 2020-08-12 | Stop reason: HOSPADM

## 2020-08-06 RX ORDER — TALC
6 POWDER (GRAM) TOPICAL NIGHTLY PRN
Status: DISCONTINUED | OUTPATIENT
Start: 2020-08-06 | End: 2020-08-12 | Stop reason: HOSPADM

## 2020-08-06 RX ORDER — ACETAMINOPHEN 325 MG/1
650 TABLET ORAL EVERY 8 HOURS PRN
Status: DISCONTINUED | OUTPATIENT
Start: 2020-08-06 | End: 2020-08-12 | Stop reason: HOSPADM

## 2020-08-06 RX ORDER — PANTOPRAZOLE SODIUM 40 MG/1
40 TABLET, DELAYED RELEASE ORAL
Status: DISCONTINUED | OUTPATIENT
Start: 2020-08-07 | End: 2020-08-12 | Stop reason: HOSPADM

## 2020-08-06 RX ORDER — SODIUM CHLORIDE 0.9 % (FLUSH) 0.9 %
10 SYRINGE (ML) INJECTION
Status: DISCONTINUED | OUTPATIENT
Start: 2020-08-06 | End: 2020-08-12 | Stop reason: HOSPADM

## 2020-08-06 RX ORDER — LEVOTHYROXINE SODIUM 112 UG/1
112 TABLET ORAL
Status: DISCONTINUED | OUTPATIENT
Start: 2020-08-07 | End: 2020-08-11

## 2020-08-06 RX ORDER — MIDODRINE HYDROCHLORIDE 5 MG/1
10 TABLET ORAL 3 TIMES DAILY
Status: DISCONTINUED | OUTPATIENT
Start: 2020-08-06 | End: 2020-08-12 | Stop reason: HOSPADM

## 2020-08-06 RX ORDER — SODIUM CHLORIDE 9 MG/ML
INJECTION, SOLUTION INTRAVENOUS CONTINUOUS
Status: DISCONTINUED | OUTPATIENT
Start: 2020-08-06 | End: 2020-08-07

## 2020-08-06 RX ORDER — LACTULOSE 10 G/15ML
200 SOLUTION ORAL; RECTAL ONCE
Status: COMPLETED | OUTPATIENT
Start: 2020-08-06 | End: 2020-08-06

## 2020-08-06 RX ORDER — ONDANSETRON 2 MG/ML
4 INJECTION INTRAMUSCULAR; INTRAVENOUS
Status: COMPLETED | OUTPATIENT
Start: 2020-08-06 | End: 2020-08-06

## 2020-08-06 RX ORDER — LACTULOSE 10 G/15ML
60 SOLUTION ORAL
Status: COMPLETED | OUTPATIENT
Start: 2020-08-06 | End: 2020-08-06

## 2020-08-06 RX ORDER — POLYETHYLENE GLYCOL 3350 17 G/17G
17 POWDER, FOR SOLUTION ORAL DAILY
Status: DISCONTINUED | OUTPATIENT
Start: 2020-08-07 | End: 2020-08-12 | Stop reason: HOSPADM

## 2020-08-06 RX ORDER — ONDANSETRON 2 MG/ML
4 INJECTION INTRAMUSCULAR; INTRAVENOUS EVERY 12 HOURS PRN
Status: DISCONTINUED | OUTPATIENT
Start: 2020-08-06 | End: 2020-08-12 | Stop reason: HOSPADM

## 2020-08-06 RX ORDER — LACTULOSE 10 G/15ML
200 SOLUTION ORAL; RECTAL
Status: DISCONTINUED | OUTPATIENT
Start: 2020-08-06 | End: 2020-08-10

## 2020-08-06 RX ADMIN — SODIUM CHLORIDE: 0.9 INJECTION, SOLUTION INTRAVENOUS at 09:08

## 2020-08-06 RX ADMIN — ONDANSETRON 4 MG: 2 INJECTION INTRAMUSCULAR; INTRAVENOUS at 01:08

## 2020-08-06 RX ADMIN — MIDODRINE HYDROCHLORIDE 10 MG: 5 TABLET ORAL at 09:08

## 2020-08-06 RX ADMIN — LACTULOSE 60 G: 20 SOLUTION ORAL at 02:08

## 2020-08-06 RX ADMIN — RIFAXIMIN 550 MG: 550 TABLET ORAL at 09:08

## 2020-08-06 RX ADMIN — LACTULOSE 200 G: 10 SOLUTION ORAL at 09:08

## 2020-08-06 NOTE — ED NOTES
"Patient identifiers for Zeina Mahoney 54 y.o. female checked and correct.  Chief Complaint   Patient presents with    Altered Mental Status     Pt's  reports + increased confusion, Reports administering Rectal Lactulose this AM x 4 days. Denies fever or chills. on Liver Tx list     Past Medical History:   Diagnosis Date    Chronic Hepatic encephalopathy 6/25/2020    Decompensated hepatic cirrhosis 6/25/2020    History of pancreatitis 6/25/2020    Liver cirrhosis secondary to CHAUDHARY 6/25/2020    Macrocytic anemia 6/25/2020    Other ascites 6/25/2020    Thrombocytopenia 6/25/2020     Allergies reported:   Review of patient's allergies indicates:   Allergen Reactions    Aspirin Tinitus    Ciprofloxacin Rash    Clindamycin Itching         LOC: Patient is awake, alert, and aware of environment with an appropriate affect. Patient is oriented x 3 (not to situation) and speaking appropriately.  reports patient is more confused at home. Refusing to take some medications, forgetting her limits like wanting to call a uber and go to the store when she knows she can't. Patient follows commands.  APPEARANCE: Patient resting comfortably and in no acute distress. Patient is clean and well groomed, patient's clothing is properly fastened. Patient appears lethargic and appears a little "dazed".   HEENT: Sclera are jaundiced. Mask in place.   SKIN: The skin is warm and dry. Patient has normal skin turgor and moist mucus membranes. Skin is jaundiced. Scratch to front of right calf and small, round to ankle. Dressings applied to each.  reports patient will scratch at them when restless.   MUSKULOSKELETAL: Patient is moving all extremities well, no obvious deformities noted. Pulses intact. Patient unable to stand by self and requires assistance.  RESPIRATORY: Airway is open and patent. Respirations are spontaneous and non-labored with normal effort and rate. Denies SOB. 100% oxygen saturation on room " air.  CARDIAC: Patient has a normal rate and rhythm. 78 on cardiac monitor. Bilateral lower extremity, non-pitting peripheral edema noted. Denies chest pain.   ABDOMEN: Distention noted. Soft and non-tender upon palpation. Patient denies n/v/d.  administers lactulose at home, but states he doesn't think it gets the ammonia off like the enemas at home do. Patient on Liver transplant list.  NEUROLOGICAL: PERRL. Facial expression is symmetrical. Hand grasps are equal bilaterally. Normal sensation in all extremities when touched with finger.             Ana Castano RN  08/06/20 [id number]

## 2020-08-06 NOTE — ED PROVIDER NOTES
Encounter Date: 8/6/2020       History     Chief Complaint   Patient presents with    Altered Mental Status     Pt's  reports + increased confusion, Reports administering Rectal Lactulose this AM x 4 days. Denies fever or chills. on Liver Tx list     54 year old female with PMHx of d-CHF with 65% EF, cirrhosis due to CHAUDHARY (liver txp eval complete, financial approval pending MELD 25), portal hypertension, hypothyroidism, and adjustment disorder.  She presents with her  due to increased confusion and AMS.  states patient has been taking less and less of her latulose at home and has subsequently been more confused, asking for Uber rides and wanting to leave. The confusion is persistent, progressive, and without aggravating factors; recently there are no relieving factors, as the  has given lactulose enemas each of the past 4 days with an additional 105mg this morning. Pt's only physical complaint is nausea. 3 admissions in past 6 weeks for similar complaints.         Review of patient's allergies indicates:   Allergen Reactions    Aspirin Tinitus    Ciprofloxacin Rash    Clindamycin Itching     Past Medical History:   Diagnosis Date    Chronic Hepatic encephalopathy 6/25/2020    Decompensated hepatic cirrhosis 6/25/2020    History of pancreatitis 6/25/2020    Liver cirrhosis secondary to CHAUDHARY 6/25/2020    Macrocytic anemia 6/25/2020    Other ascites 6/25/2020    Thrombocytopenia 6/25/2020     History reviewed. No pertinent surgical history.  History reviewed. No pertinent family history.  Social History     Tobacco Use    Smoking status: Never Smoker    Smokeless tobacco: Never Used   Substance Use Topics    Alcohol use: Never     Frequency: Never    Drug use: Never     Review of Systems   Constitutional: Positive for appetite change. Negative for chills and fever.   HENT: Negative for congestion and sore throat.    Eyes: Negative for pain and visual disturbance.    Respiratory: Negative for cough and shortness of breath.    Cardiovascular: Negative for chest pain and leg swelling.   Gastrointestinal: Positive for nausea. Negative for abdominal pain, constipation, diarrhea and vomiting.   Endocrine: Negative for polydipsia and polyuria.   Genitourinary: Negative for dysuria and hematuria.   Musculoskeletal: Negative for arthralgias and back pain.   Skin: Negative for color change and rash.   Neurological: Negative for dizziness, light-headedness and headaches.       Physical Exam     Initial Vitals [08/06/20 1130]   BP Pulse Resp Temp SpO2   (!) 110/56 83 20 98 °F (36.7 °C) 100 %      MAP       --         Physical Exam    Vitals reviewed.  Constitutional: She appears well-developed and well-nourished. She is not diaphoretic.   HENT:   Head: Normocephalic and atraumatic.   Eyes: EOM are normal. Scleral icterus is present.   Neck: Normal range of motion. Neck supple.   Cardiovascular: Normal rate. Exam reveals gallop.    No murmur heard.  Pulmonary/Chest: Breath sounds normal. No respiratory distress.   Abdominal: Soft. She exhibits no distension. There is no abdominal tenderness. There is no rebound.   Musculoskeletal: Normal range of motion. No tenderness or edema.   Neurological: She is alert and oriented to person, place, and time.   Alerted to location, year, and president   Skin: Skin is warm and dry.   Jaundiced         ED Course   Procedures  Labs Reviewed   CBC W/ AUTO DIFFERENTIAL   COMPREHENSIVE METABOLIC PANEL   LIPASE   PROTIME-INR   URINALYSIS, REFLEX TO URINE CULTURE   AMMONIA   SARS-COV-2 RNA AMPLIFICATION, QUAL     EKG Readings: (Independently Interpreted)   Initial Reading: No STEMI. Previous EKG: Compared with most recent EKG Rhythm: Normal Sinus Rhythm. ST Segments: Normal ST Segments. T Waves: Normal. Axis: Normal.       Imaging Results    None          Medical Decision Making:   History:   I obtained history from: someone other than patient.  Old Medical  Records: I decided to obtain old medical records.  Old Records Summarized: records from previous admission(s).       <> Summary of Records: Patient admitted with abd pain and N/V. Lipase > 1000. CT abd/pelvis with possible ileus vs SBO. CT reviewed with surgeon. NGT was placed then removed by patient. 1 day after admission, patient's symptoms resolved. She has since tolerated a regular diet without abd pain or N/V. She is having BM's with lactulose. On day of discharge, her H/H was low, likely related to hemodilation from IVF. She was transfused with 1 unit PRBC. While inpatient, case was discussed with [facility] who felt symptoms and lipase not attributable to pancreatitis as CT did not show pancreatic inflammation. Per [facility], they believe some small bowel inflammation likely caused transient pancreatitis and symptoms. Diuretics were restarted on discharge. Lipase now trending down. She is stable and ready for discharge. She was approved for liver transplant and is awaiting financial clearance to be formally listed. She will have follow up labs and clinic appt per PDOL protocol.   Initial Assessment:   54 year old female with PMHx of d-CHF with 65% EF, cirrhosis due to CHAUDHARY (liver txp eval complete, MELD 21), portal hypertension, and hypothyroidism who presents with AMS following decreased lactulose intake concerning for another episode of decompensated cirrhosis.   VSS WNL  PE: scleral icterus, jaundiced, s3 gallop, otherwise benign  Differential Diagnosis:   Hepatic encephalopathy  Decompensated cirrhosis  SBP  Pneumonia  UTI  Hypoglycemia  Ingestion  Clinical Tests:   Lab Tests: Ordered and Reviewed  The following lab test(s) were unremarkable: CBC, CMP, Ammonia, Lipase and PT       <> Summary of Lab: Ammonia elevated to 81, remaining labs near patient's baselines  Radiological Study: Ordered and Reviewed  Medical Tests: Ordered and Reviewed  ED Management:  CBC, CMP, lipase, ammonia, PT/INR, EKG, and CXR ordered.  Ammonia elevated to 81 in the setting of AMS and cirrhosis leads us to a diagnosis of hepatic encephalopathy. We gave 60g lactulose and 4mg zofran in ED. Patient will be admitted to transplant team, since she is on the transplant list.     Additional MDM:     BABATUNDE Score:   Age over 65:                                    ___   > or = to 3 CAD risk factors:           ___  Established CAD:                            ___  > or = to 2 anginal events in the past 24 hours: ___  Use of ASA in past 7 days:              ___  Elevated Enzymes:                         ___  ST Depression > or = to 0.05 mV:  ___                              Clinical Impression:       ICD-10-CM ICD-9-CM   1. Hepatic encephalopathy  K72.90 572.2   2. Nausea  R11.0 787.02   3. Hyperammonemia  E72.20 270.6                                Edwardo Davis MD  Resident  08/06/20 1734       Edwardo Davis MD  Resident  08/06/20 1744

## 2020-08-06 NOTE — SUBJECTIVE & OBJECTIVE
Subjective:     Chief Complaint/Reason for Admission: acute on HE    History of Present Illness:  54 year old female with PMHx of d-CHF with 65% EF, cirrhosis due to CHAUDHARY (listed for OLTx), portal hypertension, hypothyroidism, and adjustment disorder. Recently admitted 7/18-7/21 with abdominal pain/ SBO vs. pancreatitis. While inpatient, case was discussed with [facility] who felt symptoms and lipase not attributable to pancreatitis as CT did not show pancreatic inflammation. Per [facility], they believe some small bowel inflammation likely caused transient pancreatitis and symptoms. She now presents to the ED 8/6 with AMS. Patient's  states patient has been taking less and less of her latulose at home and has subsequently been more confused, asking for Uber rides and wanting to leave. The confusion is persistent, progressive, and without aggravating factors; recently there are no relieving factors, as the  has given lactulose enemas each of the past 4 days with an additional 105 mg this morning. Pt's only physical complaint at this time is intermittent nausea. She denies fever, chills, CP, SOB, constipation, and diarrhea. Oriented to person, place, and month but not exact date. Minimal asterixis. Labs and CXR reviewed. Plan to admit to observation for management of acute on chronic HE.       Review of patient's allergies indicates:   Allergen Reactions    Aspirin Tinitus    Ciprofloxacin Rash    Clindamycin Itching       Past Medical History:   Diagnosis Date    Chronic Hepatic encephalopathy 6/25/2020    Decompensated hepatic cirrhosis 6/25/2020    History of pancreatitis 6/25/2020    Liver cirrhosis secondary to CHAUDHARY 6/25/2020    Macrocytic anemia 6/25/2020    Other ascites 6/25/2020    Thrombocytopenia 6/25/2020     History reviewed. No pertinent surgical history.  Family History     None        Tobacco Use    Smoking status: Never Smoker    Smokeless tobacco: Never Used   Substance and Sexual  "Activity    Alcohol use: Never     Frequency: Never    Drug use: Never    Sexual activity: Not on file        Review of Systems   Constitutional: Negative for appetite change, chills and fever.   HENT: Negative for facial swelling and trouble swallowing.    Eyes: Negative for photophobia and redness.   Respiratory: Negative for cough, shortness of breath, wheezing and stridor.    Cardiovascular: Positive for leg swelling. Negative for chest pain and palpitations.   Gastrointestinal: Positive for abdominal distention and nausea. Negative for constipation, diarrhea and vomiting.   Genitourinary: Negative for decreased urine volume, difficulty urinating and dysuria.   Neurological: Positive for weakness. Negative for dizziness and light-headedness.   Hematological: Bruises/bleeds easily.   Psychiatric/Behavioral: Positive for confusion. Negative for agitation and behavioral problems.     Objective:     Vital Signs (Most Recent):  Temp: 98 °F (36.7 °C) (08/06/20 1130)  Pulse: 78 (08/06/20 1431)  Resp: 19 (08/06/20 1431)  BP: (!) 107/59 (08/06/20 1431)  SpO2: 100 % (08/06/20 1431)  Height: 5' 4" (162.6 cm)  Weight: 59.9 kg (132 lb)  Body mass index is 22.66 kg/m².     Physical Exam  Vitals signs and nursing note reviewed.   Constitutional:       General: She is not in acute distress.  HENT:      Head: Normocephalic and atraumatic.      Mouth/Throat:      Mouth: Mucous membranes are dry.   Eyes:      General: No scleral icterus.        Right eye: No discharge.         Left eye: No discharge.   Neck:      Musculoskeletal: Normal range of motion.   Cardiovascular:      Rate and Rhythm: Normal rate and regular rhythm.      Heart sounds: No murmur.   Pulmonary:      Effort: Pulmonary effort is normal. No respiratory distress.      Breath sounds: No wheezing or rales.   Abdominal:      General: There is distension.      Tenderness: There is abdominal tenderness (mild diffuse). There is no guarding.   Musculoskeletal:      " Right lower leg: Edema present.      Left lower leg: Edema present.   Skin:     General: Skin is warm and dry.      Capillary Refill: Capillary refill takes 2 to 3 seconds.   Neurological:      General: No focal deficit present.      Mental Status: She is alert.      Comments: Oriented to person, place, and month but not exact date  Minimal asterixis   Psychiatric:         Mood and Affect: Mood normal.         Thought Content: Thought content normal.         Laboratory  Labs within the past 24 hours have been reviewed.    Diagnostic Results:  Reviewed

## 2020-08-06 NOTE — ASSESSMENT & PLAN NOTE
- 2/2 CHAUDHARY with ascites, HE, portal HTN  - currently listed for transplant with MELD of 21    MELD-Na score: 21 at 8/6/2020 12:03 PM  MELD score: 19 at 8/6/2020 12:03 PM  Calculated from:  Serum Creatinine: 1.6 mg/dL at 8/6/2020 12:03 PM  Serum Sodium: 134 mmol/L at 8/6/2020 12:03 PM  Total Bilirubin: 6.8 mg/dL at 8/6/2020 12:03 PM  INR(ratio): 1.1 at 8/6/2020 12:03 PM  Age: 54 years 10 months

## 2020-08-06 NOTE — H&P
[facility]  Kidney Transplant  H&P      Subjective:     Chief Complaint/Reason for Admission: acute on HE    History of Present Illness:  54 year old female with PMHx of d-CHF with 65% EF, cirrhosis due to CHAUDHARY (listed for OLTx), portal hypertension, hypothyroidism, and adjustment disorder. Recently admitted 7/18-7/21 with abdominal pain/ SBO vs. pancreatitis. While inpatient, case was discussed with AES who felt symptoms and lipase not attributable to pancreatitis as CT did not show pancreatic inflammation. Per AES, they believe some small bowel inflammation likely caused transient pancreatitis and symptoms. She now presents to the ED 8/6 with AMS. Patient's  states patient has been taking less and less of her latulose at home and has subsequently been more confused, asking for Uber rides and wanting to leave. The confusion is persistent, progressive, and without aggravating factors; recently there are no relieving factors, as the  has given lactulose enemas each of the past 4 days with an additional 105 mg this morning. Pt's only physical complaint at this time is intermittent nausea. She denies fever, chills, CP, SOB, constipation, and diarrhea. Oriented to person, place, and month but not exact date. Minimal asterixis. Labs and CXR reviewed. Plan to admit to observation for management of acute on chronic HE.       Review of patient's allergies indicates:   Allergen Reactions    Aspirin Tinitus    Ciprofloxacin Rash    Clindamycin Itching       Past Medical History:   Diagnosis Date    Chronic Hepatic encephalopathy 6/25/2020    Decompensated hepatic cirrhosis 6/25/2020    History of pancreatitis 6/25/2020    Liver cirrhosis secondary to CHAUDHARY 6/25/2020    Macrocytic anemia 6/25/2020    Other ascites 6/25/2020    Thrombocytopenia 6/25/2020     History reviewed. No pertinent surgical history.  Family History     None        Tobacco Use    Smoking status: Never Smoker     "Smokeless tobacco: Never Used   Substance and Sexual Activity    Alcohol use: Never     Frequency: Never    Drug use: Never    Sexual activity: Not on file        Review of Systems   Constitutional: Negative for appetite change, chills and fever.   HENT: Negative for facial swelling and trouble swallowing.    Eyes: Negative for photophobia and redness.   Respiratory: Negative for cough, shortness of breath, wheezing and stridor.    Cardiovascular: Positive for leg swelling. Negative for chest pain and palpitations.   Gastrointestinal: Positive for abdominal distention and nausea. Negative for constipation, diarrhea and vomiting.   Genitourinary: Negative for decreased urine volume, difficulty urinating and dysuria.   Neurological: Positive for weakness. Negative for dizziness and light-headedness.   Hematological: Bruises/bleeds easily.   Psychiatric/Behavioral: Positive for confusion. Negative for agitation and behavioral problems.     Objective:     Vital Signs (Most Recent):  Temp: 98 °F (36.7 °C) (08/06/20 1130)  Pulse: 78 (08/06/20 1431)  Resp: 19 (08/06/20 1431)  BP: (!) 107/59 (08/06/20 1431)  SpO2: 100 % (08/06/20 1431)  Height: 5' 4" (162.6 cm)  Weight: 59.9 kg (132 lb)  Body mass index is 22.66 kg/m².     Physical Exam  Vitals signs and nursing note reviewed.   Constitutional:       General: She is not in acute distress.  HENT:      Head: Normocephalic and atraumatic.      Mouth/Throat:      Mouth: Mucous membranes are dry.   Eyes:      General: No scleral icterus.        Right eye: No discharge.         Left eye: No discharge.   Neck:      Musculoskeletal: Normal range of motion.   Cardiovascular:      Rate and Rhythm: Normal rate and regular rhythm.      Heart sounds: No murmur.   Pulmonary:      Effort: Pulmonary effort is normal. No respiratory distress.      Breath sounds: No wheezing or rales.   Abdominal:      General: There is distension.      Tenderness: There is abdominal tenderness (mild " "diffuse). There is no guarding.   Musculoskeletal:      Right lower leg: Edema present.      Left lower leg: Edema present.   Skin:     General: Skin is warm and dry.      Capillary Refill: Capillary refill takes 2 to 3 seconds.   Neurological:      General: No focal deficit present.      Mental Status: She is alert.      Comments: Oriented to person, place, and month but not exact date  Minimal asterixis   Psychiatric:         Mood and Affect: Mood normal.         Thought Content: Thought content normal.         Laboratory  Labs within the past 24 hours have been reviewed.    Diagnostic Results:  Reviewed    Assessment/Plan:     Acute Hepatic encephalopathy  - presented to ED with AMS 8/6  - ammonia elevated at 81  - plan for lactulose enemas/PO lactulose until mental status improves  - infectious workup: covid negative, blood cx and UA pending  - labs & physical assessment look dry: NS @ 75 ml/hr x 10 hours  - monitor    KG (acute kidney injury)  - IVF overnight  - Monitor      Nausea  - Continue PRN anti-emetics  - Monitor      Hyponatremia  - Continue anti-emetics  - Monitor      Acquired coagulation factor deficiency  - 2/2 ESLD.   - Monitor.      Adjustment disorder with anxious mood  - Continue home meds.      Postablative hypothyroidism  - Continue levothyroxine.      Thrombocytopenia  - 2/2 ELSD/hypersplenism  - Monitor      Other ascites  - Diuretics held as labs appear dry  - Monitor      Liver cirrhosis secondary to CHAUDHARY  - See "decompensated hepatic cirrhosis."      Decompensated hepatic cirrhosis  - 2/2 CHAUDHARY with ascites, HE, portal HTN  - currently listed for transplant with MELD of 21    MELD-Na score: 21 at 8/6/2020 12:03 PM  MELD score: 19 at 8/6/2020 12:03 PM  Calculated from:  Serum Creatinine: 1.6 mg/dL at 8/6/2020 12:03 PM  Serum Sodium: 134 mmol/L at 8/6/2020 12:03 PM  Total Bilirubin: 6.8 mg/dL at 8/6/2020 12:03 PM  INR(ratio): 1.1 at 8/6/2020 12:03 PM  Age: 54 years 10 months        Polly WATSON" YAMILETH Irene  Kidney Transplant  [facility]

## 2020-08-06 NOTE — ASSESSMENT & PLAN NOTE
- presented to ED with AMS 8/6  - ammonia elevated at 81  - plan for lactulose enemas/PO lactulose until mental status improves  - infectious workup: covid negative, blood cx and UA pending  - labs & physical assessment look dry: NS @ 75 ml/hr x 10 hours  - monitor

## 2020-08-06 NOTE — TELEPHONE ENCOUNTER
Received call from pt's . Pt's been more confused lately & has refused some lactulose doses. Pt's  has been encouraging her meds, and when she won't take them, he gives her the lactulose enema. Pt had a bowel movement this morning, although not a substantial one & is taking a nap now.  to monitor her through the day to see if she becomes less confused, but advised him to bring her to the ED for evaluation if she becomes more confused/combative. Understanding expressed.  asked if he should give her haldol, but advised that it might mask her ammonia-driven confusion & make it appear that she's better when she may not be.  agreed & will hold off on the haldol unless she becomes severely combative again. No other questions at this time.

## 2020-08-06 NOTE — ED TRIAGE NOTES
Altered Mental Status (Pt's  reports + increased confusion, Reports administering Rectal Lactulose this AM x 4 days. Denies fever or chills. on Liver Tx list)

## 2020-08-06 NOTE — FIRST PROVIDER EVALUATION
" Emergency Department TeleTRIAGE Encounter Note      CHIEF COMPLAINT    Chief Complaint   Patient presents with    Altered Mental Status     Pt's  reports + increased confusion, Reports administering Rectal Lactulose this AM x 4 days. Denies fever or chills. on Liver Tx list       VITAL SIGNS   Initial Vitals [08/06/20 1130]   BP Pulse Resp Temp SpO2   (!) 110/56 83 20 98 °F (36.7 °C) 100 %      MAP       --            ALLERGIES    Review of patient's allergies indicates:   Allergen Reactions    Aspirin Tinitus    Ciprofloxacin Rash    Clindamycin Itching       PROVIDER TRIAGE NOTE  Patient is has history of LIver cirrhosis, pre-liver transplant - recent history of encephalopathy.   reports that she has "deteriorated over the last 4 days in regards to her alertness.  She is confused, consistent with previous episodes of encephalopathy  - He has given enema and 60 ml of lactulose this morning.  Then additional 45 ml of lactulose 45 minutes ago , but she is not improving.  He denies fall or head injury recently.  No fever.  Will order labs pending ED provider evaluation.       ORDERS  Labs Reviewed - No data to display    ED Orders (720h ago, onward)    None            Virtual Visit Note: The provider triage portion of this emergency department evaluation and documentation was performed via [employer], a HIPAA-compliant telemedicine application, in concert with a tele-presenter in the room. A face to face patient evaluation with one of my colleagues will occur once the patient is placed in an emergency department room.      DISCLAIMER: This note was prepared with [employer] - A [employer] Its Kind [employer]op[employer]Braingaze voice recognition transcription software. Garbled syntax, mangled pronouns, and other bizarre constructions may be attributed to that software system.  "

## 2020-08-06 NOTE — HPI
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 Recently admitted 7/18-7/21 with abdominal pain/ SBO vs. pancreatitis. While inpatient, case was discussed with [facility] who felt symptoms and lipase not attributable to pancreatitis as CT did not show pancreatic inflammation. Per [facility], they believe some small bowel inflammation likely caused transient pancreatitis and symptoms. She now presents to the ED 8/6 with AMS. Patient's  states patient has been taking less and less of her latulose at home and has subsequently been more confused, asking for Uber rides and wanting to leave. The confusion is persistent, progressive, and without aggravating factors; recently there are no relieving factors, as the  has given lactulose enemas each of the past 4 days with an additional 105 mg this morning. Pt's only physical complaint at this time is intermittent nausea. She denies fever, chills, CP, SOB, constipation, and diarrhea. Oriented to person, place, and month but not exact date. Minimal asterixis. Labs and CXR reviewed. Plan to admit to observation for management of acute on chronic HE. Listed with MELD 21.

## 2020-08-07 ENCOUNTER — TELEPHONE (OUTPATIENT)
Dept: TRANSPLANT | Facility: CLINIC | Age: 55
End: 2020-08-07

## 2020-08-07 LAB
ALBUMIN SERPL BCP-MCNC: 2.6 G/DL (ref 3.5–5.2)
ALP SERPL-CCNC: 92 U/L (ref 55–135)
ALT SERPL W/O P-5'-P-CCNC: 28 U/L (ref 10–44)
ANION GAP SERPL CALC-SCNC: 6 MMOL/L (ref 8–16)
AST SERPL-CCNC: 38 U/L (ref 10–40)
BASOPHILS # BLD AUTO: 0.05 K/UL (ref 0–0.2)
BASOPHILS NFR BLD: 1 % (ref 0–1.9)
BILIRUB SERPL-MCNC: 6.2 MG/DL (ref 0.1–1)
BUN SERPL-MCNC: 30 MG/DL (ref 6–20)
CALCIUM SERPL-MCNC: 10.2 MG/DL (ref 8.7–10.5)
CHLORIDE SERPL-SCNC: 109 MMOL/L (ref 95–110)
CO2 SERPL-SCNC: 19 MMOL/L (ref 23–29)
CREAT SERPL-MCNC: 1.4 MG/DL (ref 0.5–1.4)
DIFFERENTIAL METHOD: ABNORMAL
EOSINOPHIL # BLD AUTO: 0.1 K/UL (ref 0–0.5)
EOSINOPHIL NFR BLD: 1.8 % (ref 0–8)
ERYTHROCYTE [DISTWIDTH] IN BLOOD BY AUTOMATED COUNT: 15.6 % (ref 11.5–14.5)
EST. GFR  (AFRICAN AMERICAN): 49.1 ML/MIN/1.73 M^2
EST. GFR  (NON AFRICAN AMERICAN): 42.6 ML/MIN/1.73 M^2
GLUCOSE SERPL-MCNC: 139 MG/DL (ref 70–110)
HCT VFR BLD AUTO: 28.7 % (ref 37–48.5)
HGB BLD-MCNC: 8.9 G/DL (ref 12–16)
IMM GRANULOCYTES # BLD AUTO: 0.04 K/UL (ref 0–0.04)
IMM GRANULOCYTES NFR BLD AUTO: 0.8 % (ref 0–0.5)
INR PPP: 1.2 (ref 0.8–1.2)
LYMPHOCYTES # BLD AUTO: 1 K/UL (ref 1–4.8)
LYMPHOCYTES NFR BLD: 20 % (ref 18–48)
MAGNESIUM SERPL-MCNC: 1.7 MG/DL (ref 1.6–2.6)
MCH RBC QN AUTO: 35.6 PG (ref 27–31)
MCHC RBC AUTO-ENTMCNC: 31 G/DL (ref 32–36)
MCV RBC AUTO: 115 FL (ref 82–98)
MONOCYTES # BLD AUTO: 0.4 K/UL (ref 0.3–1)
MONOCYTES NFR BLD: 6.9 % (ref 4–15)
NEUTROPHILS # BLD AUTO: 3.5 K/UL (ref 1.8–7.7)
NEUTROPHILS NFR BLD: 69.5 % (ref 38–73)
NRBC BLD-RTO: 0 /100 WBC
PHOSPHATE SERPL-MCNC: 3.6 MG/DL (ref 2.7–4.5)
PLATELET # BLD AUTO: 108 K/UL (ref 150–350)
PMV BLD AUTO: 9.6 FL (ref 9.2–12.9)
POTASSIUM SERPL-SCNC: 4.2 MMOL/L (ref 3.5–5.1)
PROT SERPL-MCNC: 6 G/DL (ref 6–8.4)
PROTHROMBIN TIME: 13.6 SEC (ref 9–12.5)
RBC # BLD AUTO: 2.5 M/UL (ref 4–5.4)
SODIUM SERPL-SCNC: 134 MMOL/L (ref 136–145)
WBC # BLD AUTO: 5.05 K/UL (ref 3.9–12.7)

## 2020-08-07 PROCEDURE — 83735 ASSAY OF MAGNESIUM: CPT | Mod: NTX

## 2020-08-07 PROCEDURE — 84100 ASSAY OF PHOSPHORUS: CPT | Mod: NTX

## 2020-08-07 PROCEDURE — 96375 TX/PRO/DX INJ NEW DRUG ADDON: CPT | Performed by: EMERGENCY MEDICINE

## 2020-08-07 PROCEDURE — 99233 SBSQ HOSP IP/OBS HIGH 50: CPT | Mod: NTX,,, | Performed by: PHYSICIAN ASSISTANT

## 2020-08-07 PROCEDURE — 94761 N-INVAS EAR/PLS OXIMETRY MLT: CPT | Mod: NTX

## 2020-08-07 PROCEDURE — 99233 PR SUBSEQUENT HOSPITAL CARE,LEVL III: ICD-10-PCS | Mod: NTX,,, | Performed by: PHYSICIAN ASSISTANT

## 2020-08-07 PROCEDURE — 63600175 PHARM REV CODE 636 W HCPCS: Mod: NTX | Performed by: PHYSICIAN ASSISTANT

## 2020-08-07 PROCEDURE — 97165 OT EVAL LOW COMPLEX 30 MIN: CPT | Mod: NTX

## 2020-08-07 PROCEDURE — 97530 THERAPEUTIC ACTIVITIES: CPT | Mod: NTX

## 2020-08-07 PROCEDURE — 36415 COLL VENOUS BLD VENIPUNCTURE: CPT | Mod: NTX

## 2020-08-07 PROCEDURE — 85610 PROTHROMBIN TIME: CPT | Mod: NTX

## 2020-08-07 PROCEDURE — 63600175 PHARM REV CODE 636 W HCPCS: Mod: NTX | Performed by: SURGERY

## 2020-08-07 PROCEDURE — 97161 PT EVAL LOW COMPLEX 20 MIN: CPT | Mod: NTX

## 2020-08-07 PROCEDURE — 25000003 PHARM REV CODE 250: Mod: NTX | Performed by: SURGERY

## 2020-08-07 PROCEDURE — 85025 COMPLETE CBC W/AUTO DIFF WBC: CPT | Mod: NTX

## 2020-08-07 PROCEDURE — 96361 HYDRATE IV INFUSION ADD-ON: CPT | Performed by: EMERGENCY MEDICINE

## 2020-08-07 PROCEDURE — 25000003 PHARM REV CODE 250: Mod: NTX | Performed by: PHYSICIAN ASSISTANT

## 2020-08-07 PROCEDURE — 20600001 HC STEP DOWN PRIVATE ROOM: Mod: NTX

## 2020-08-07 PROCEDURE — 80053 COMPREHEN METABOLIC PANEL: CPT | Mod: NTX

## 2020-08-07 PROCEDURE — 87040 BLOOD CULTURE FOR BACTERIA: CPT | Mod: NTX

## 2020-08-07 RX ORDER — HALOPERIDOL 5 MG/ML
INJECTION INTRAMUSCULAR
Status: DISPENSED
Start: 2020-08-07 | End: 2020-08-07

## 2020-08-07 RX ORDER — HALOPERIDOL 5 MG/ML
5 INJECTION INTRAMUSCULAR EVERY 6 HOURS PRN
Status: DISCONTINUED | OUTPATIENT
Start: 2020-08-07 | End: 2020-08-12 | Stop reason: HOSPADM

## 2020-08-07 RX ORDER — HALOPERIDOL 5 MG/ML
5 INJECTION INTRAMUSCULAR EVERY 6 HOURS PRN
Status: DISCONTINUED | OUTPATIENT
Start: 2020-08-07 | End: 2020-08-07

## 2020-08-07 RX ORDER — PROPOFOL 10 MG/ML
INJECTION, EMULSION INTRAVENOUS
Status: DISPENSED
Start: 2020-08-07 | End: 2020-08-07

## 2020-08-07 RX ADMIN — PANTOPRAZOLE SODIUM 40 MG: 40 TABLET, DELAYED RELEASE ORAL at 08:08

## 2020-08-07 RX ADMIN — LACTULOSE 45 G: 20 SOLUTION ORAL at 09:08

## 2020-08-07 RX ADMIN — MIDODRINE HYDROCHLORIDE 10 MG: 5 TABLET ORAL at 02:08

## 2020-08-07 RX ADMIN — HALOPERIDOL LACTATE 5 MG: 5 INJECTION, SOLUTION INTRAMUSCULAR at 04:08

## 2020-08-07 RX ADMIN — MIDODRINE HYDROCHLORIDE 10 MG: 5 TABLET ORAL at 08:08

## 2020-08-07 RX ADMIN — LACTULOSE 45 G: 20 SOLUTION ORAL at 08:08

## 2020-08-07 RX ADMIN — RIFAXIMIN 550 MG: 550 TABLET ORAL at 09:08

## 2020-08-07 RX ADMIN — LACTULOSE 45 G: 20 SOLUTION ORAL at 02:08

## 2020-08-07 RX ADMIN — DEXTROSE MONOHYDRATE 1250 MG: 50 INJECTION, SOLUTION INTRAVENOUS at 10:08

## 2020-08-07 RX ADMIN — RIFAXIMIN 550 MG: 550 TABLET ORAL at 08:08

## 2020-08-07 RX ADMIN — LEVOTHYROXINE SODIUM 112 MCG: 112 TABLET ORAL at 08:08

## 2020-08-07 RX ADMIN — POLYETHYLENE GLYCOL 3350 17 G: 17 POWDER, FOR SOLUTION ORAL at 08:08

## 2020-08-07 RX ADMIN — LACTULOSE 45 G: 20 SOLUTION ORAL at 12:08

## 2020-08-07 RX ADMIN — MIDODRINE HYDROCHLORIDE 10 MG: 5 TABLET ORAL at 09:08

## 2020-08-07 NOTE — PLAN OF CARE
Problem: Physical Therapy Goal  Goal: Physical Therapy Goal  Description: Goals to be met by: 2020     Patient will increase functional independence with mobility by performin. Supine to sit with Set-up [place]  2. Sit to supine with Set-up [place]  3. Sit to stand transfer with Supervision  4. Bed to chair transfer with Supervision using LRAD  5. Gait  x 200 feet with Supervision using LRAD.   6. Lower extremity exercise program x15 reps per handout, with supervision    Outcome: Ongoing, Progressing

## 2020-08-07 NOTE — NURSING
Pt arrived to unit via stretcher. Pt AOx4 upon assessment. [facility]andering halls - requesting food.     Informed KEAGAN Hoffmann of patients arrival - lactulose enema and NS infusion not started in ED scheduled for 1930. Pt also had oral lactulose ordered. KEAGAN Hoffmann instructed RN to give pt oral lactulose instead of enema.     Pt requested enema - but currently having bowel movement. Will administer at later time. Spoke w/ pt's  and informed of transition of care to floor. Pt's  w/ understanding.     Diet transitioned from NPO to regular diet per KEAGAN Hoffmann since she passed her bedside swallow.

## 2020-08-07 NOTE — DISCHARGE SUMMARY
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 Recently admitted 7/18-7/21 with abdominal pain/ SBO vs. pancreatitis. While inpatient, case was discussed with AES who felt symptoms and lipase not attributable to pancreatitis as CT did not show pancreatic inflammation. Per AES, they believe some small bowel inflammation likely caused transient pancreatitis and symptoms. She now presents to the ED 8/6 with AMS. Patient's  states patient has been taking less and less of her latulose at home and has subsequently been more confused, asking for Uber rides and wanting to leave. The confusion is persistent, progressive, and without aggravating factors; recently there are no relieving factors, as the  has given lactulose enemas each of the past 4 days with an additional 105 mg this morning. Pt's only physical complaint at this time is intermittent nausea. She denies fever, chills, CP, SOB, constipation, and diarrhea. Oriented to person, place, and month but not exact date. Minimal asterixis. Labs and CXR reviewed. Admitted for management of acute on chronic HE.    Upon admit, patient extremely agitated, requiring Haldol and soft restraints to prohibit pulling at lines. Patient's mental status improved with lactulose enemas/PO. Blood cultures from 8/6 positive for staphylococcus epidermidis. Started on IV Vanc, will continue to complete 2 week course (EOT: 8/20/20). Plan for vanc trough on 8/13 to adjust dose accordingly. Midline placed 8/11. Restarted  home diuretics of lasix and spironolactone (dose decreased to 50mg given K on higher side) at discharge. H/H slowly trended down, received 1u pRBC 8/10. No overt signs of bleeding. H/H stable. Patient's mental statis at baseline. She is stable and ready for discharge.     * No surgery found *       Final Active Diagnoses:    Diagnosis Date Noted POA    PRINCIPAL PROBLEM:  Acute Hepatic encephalopathy [K72.90] 06/25/2020 Yes    Hyperammonemia [E72.20]  Yes    Nausea [R11.0] 08/06/2020 Yes    KG (acute kidney injury) [N17.9] 08/06/2020 Yes    Acquired coagulation factor deficiency [D68.4] 07/18/2020 Yes    Hyponatremia [E87.1]  Yes    Postablative hypothyroidism [E89.0] 07/08/2020 Yes    Decompensated hepatic cirrhosis [K72.90] 06/25/2020 Yes    Liver cirrhosis secondary to CHAUDHARY [K75.81, K74.60] 06/25/2020 Yes    Other ascites [R18.8] 06/25/2020 Yes    Thrombocytopenia [D69.6] 06/25/2020 Yes      Problems Resolved During this Admission:           Pending Diagnostic Studies:     None        Significant Diagnostic Studies: Labs:   CMP   Recent Labs   Lab 08/11/20  0558 08/12/20  0607   * 133*   K 4.4 4.0    113*   CO2 15* 16*   * 158*   BUN 23* 20   CREATININE 1.1 1.1   CALCIUM 8.7 9.4   PROT 4.7* 4.9*   ALBUMIN 1.9* 2.0*   BILITOT 4.2* 4.9*   ALKPHOS 96 85   AST 34 34   ALT 26 27   ANIONGAP 6* 4*   ESTGFRAFRICA >60.0 >60.0   EGFRNONAA 57.1* 57.1*   , CBC   Recent Labs   Lab 08/11/20  0558 08/12/20  0607   WBC 5.65 5.14   HGB 8.2* 8.9*   HCT 24.9* 26.7*   PLT 58* 57*    and All labs within the past 24 hours have been reviewed    The patients clinical status was discussed at multidisplinary rounds, involving transplant surgery, transplant medicine, pharmacy, nursing, nutrition, and social work    Discharged Condition: good    Disposition:     Follow Up:    Patient Instructions:   No discharge procedures on file.  Medications:  Reconciled Home Medications:      Medication List      START  taking these medications    sodium bicarbonate 650 MG tablet  Take 2 tablets (1,300 mg total) by mouth 2 (two) times daily.     vancomycin in dextrose 5 % 1 gram/250 mL Soln  Inject 250 mLs (1,000 mg total) into the vein every 12 (twelve) hours. for 8 days        CHANGE how you take these medications    furosemide 40 MG tablet  Commonly known as: LASIX  Take 1 tablet (40 mg total) by mouth once daily.  What changed: when to take this     * lactulose 10 gram/15 mL solution  Commonly known as: CHRONULAC  Take 30 mLs (20 g total) by mouth 3 (three) times daily as needed. Alternate with kristalose if not having enough BMs.  What changed: Another medication with the same name was removed. Continue taking this medication, and follow the directions you see here.     * KRISTALOSE 10 gram packet  Generic drug: lactulose  Mix 3 packets (30 g total) with liquid and take by mouth 3 (three) times daily as needed.  What changed: Another medication with the same name was removed. Continue taking this medication, and follow the directions you see here.     levothyroxine 88 MCG tablet  Commonly known as: SYNTHROID  Take 1 tablet (88 mcg total) by mouth before breakfast.  Start taking on: August 13, 2020  What changed:   · medication strength  · how much to take     spironolactone 50 MG tablet  Commonly known as: ALDACTONE  Take 1 tablet (50 mg total) by mouth once daily.  What changed: how much to take         * This list has 2 medication(s) that are the same as other medications prescribed for you. Read the directions carefully, and ask your doctor or other care provider to review them with you.            CONTINUE taking these medications    acetaminophen 500 MG tablet  Commonly known as: TYLENOL  Take 1 tablet (500 mg total) by mouth every 6 (six) hours as needed for Pain. Max 2000mg daily.     diaper,brief,adult,disposable Misc  5 mg/kg/day by Misc.(Non-Drug; Combo Route) route 5 (five) times daily. Patient with incontinence.  Please supply enough depends for 5xs/day     haloperidoL 1 MG tablet  Commonly known as: HALDOL  Take 1 tablet (1 mg total) by mouth daily as needed (non redirectable agitation).     lidocaine 5 %  Commonly known as: LIDODERM  Place 1 patch onto the skin every 24 hours. Remove & Discard patch within 12 hours or as directed by MD     magnesium hydroxide 400 mg/5 ml 400 mg/5 mL Susp  Commonly known as: MILK OF MAGNESIA  Take 30 mLs (2,400 mg total) by mouth daily as needed (Take if constipated despite lactulose).     melatonin 10 mg Tab  Take 10 mg by mouth nightly as needed (sleep).     midodrine 10 MG tablet  Commonly known as: PROAMATINE  Take 10 mg by mouth 3 (three) times daily.     nystatin powder  Commonly known as: MYCOSTATIN  Apply topically 2 (two) times daily. Apply to affected area twice a day     pantoprazole 40 MG tablet  Commonly known as: PROTONIX  Take 40 mg by mouth before breakfast.     polyethylene glycol 17 gram Pwpk  Commonly known as: GLYCOLAX  Take 17 g by mouth once daily. Or miralax OTC. Take if constipated despite lactulose     rifAXIMin 550 mg Tab  Commonly known as: XIFAXAN  Take 550 mg by mouth 2 (two) times daily.     simethicone 80 MG chewable tablet  Commonly known as: MYLICON  Take 80 mg by mouth every 6 (six) hours as needed for Flatulence. Take 2 tablets by mouth every 6 hours as needed for gas     VITAMIN D2 50,000 unit Cap  Generic drug: ergocalciferol  Take 50,000 Units by mouth every 7 days.     zinc sulfate 220 (50) mg capsule  Commonly known as: ZINCATE  Take 220 mg by mouth before breakfast.          Time spent caring for patient (Greater than 1/2 spent in direct face-to-face contact): > 30 minutes    Brandee Dominguez PA-C  Liver Transplant  [facility]

## 2020-08-07 NOTE — PLAN OF CARE
Evaluation completed.  OT plan of care developed and reviewed with patient.     D/C recommendations: home with home health  DME needs: none     Problem: Occupational Therapy Goal  Goal: Occupational Therapy Goal  Description: Goals to be met by: 8/17/2020    Patient will increase functional independence with ADLs by performing:    UE Dressing with [place].  LE Dressing with [place].  Grooming while standing at sink with SBA..  Toileting from toilet with [place] for hygiene and clothing management.   Bathing from  standing at sink with Supervision.    Outcome: Ongoing, Progressing     FRANCISCO England  8/7/2020  Rehab Services

## 2020-08-07 NOTE — ED NOTES
Pt awake, alert, VS stable at transport. Pt with phone in pocket when transported.     Negro Nayak RN  08/06/20 2012

## 2020-08-07 NOTE — PT/OT/SLP EVAL
Physical Therapy Evaluation    Patient Name:  Zeina Mahoney   MRN:  [id number]    Recommendations:     Discharge Recommendations:  home health PT   Discharge Equipment Recommendations: none   Barriers to discharge: None    Assessment:     Zeina Mahoney is a 54 y.o. female admitted with a medical diagnosis of Hepatic encephalopathy.  She presents with the following impairments/functional limitations:  weakness, impaired endurance, impaired self care skills, impaired functional mobilty, gait instability, impaired balance Pt. cooperative and tolerated treatment well.    Rehab Prognosis: Good; patient would benefit from acute skilled PT services to address these deficits and reach maximum level of function.    Recent Surgery: * No surgery found *      Plan:     During this hospitalization, patient to be seen 3 x/week to address the identified rehab impairments via gait training, therapeutic activities, therapeutic exercises and progress toward the following goals:    · Plan of Care Expires:  09/06/20    Subjective     Chief Complaint: none  Patient/Family Comments/goals: transplant work-up  Pain/Comfort:  · Pain Rating 1: 0/10  · Pain Rating Post-Intervention 1: 0/10    Patients cultural, spiritual, Denominational conflicts given the current situation: no    Living Environment:  Pt. Lives with spouse and son in [facility] with no [place]  Prior to admission, patients level of function was amb. with/without RW.  Equipment used at home: bedside commode, cane, straight, shower chair, walker, rolling, wheelchair.  Upon discharge, patient will have assistance from spouse.    Objective:     Communicated with nursing prior to session.  Patient found supine with peripheral IV  upon PT entry to room.    General Precautions: Standard, fall   Orthopedic Precautions:N/A   Braces:       Exams:  · RLE ROM: WFL  · RLE Strength: WFL  · LLE ROM: WFL  · LLE Strength: WFL    Functional Mobility:  · Bed Mobility:     · Rolling Left:  stand by  assistance  · Scooting: stand by assistance  · Supine to Sit: stand by assistance  · Transfers:     · Sit to Stand:  contact guard assistance with rolling walker  · Bed to Chair: contact guard assistance with  rolling walker  using  Stand Pivot  · Gait: 300' with RW and SBA-CGA without LOB  · Balance: fair+    Therapeutic Activities and Exercises:   Discussed therapy needs, goals, LE therex, and POC.    AM-PAC 6 CLICK MOBILITY  Total Score:18     Patient left up in chair with all lines intact and call button in reach.    GOALS:   Multidisciplinary Problems     Physical Therapy Goals        Problem: Physical Therapy Goal    Goal Priority Disciplines Outcome Goal Variances Interventions   Physical Therapy Goal     PT, PT/OT Ongoing, Progressing     Description: Goals to be met by: 2020     Patient will increase functional independence with mobility by performin. Supine to sit with Set-up [place]  2. Sit to supine with Set-up [place]  3. Sit to stand transfer with Supervision  4. Bed to chair transfer with Supervision using LRAD  5. Gait  x 200 feet with Supervision using LRAD.   6. Lower extremity exercise program x15 reps per handout, with supervision                     History:     Past Medical History:   Diagnosis Date    Chronic Hepatic encephalopathy 2020    Decompensated hepatic cirrhosis 2020    History of pancreatitis 2020    Liver cirrhosis secondary to CHAUDHARY 2020    Macrocytic anemia 2020    Other ascites 2020    Thrombocytopenia 2020       History reviewed. No pertinent surgical history.    Time Tracking:     PT Received On: 20  PT Start Time: 930     PT Stop Time: 0949  PT Total Time (min): 19 min     Billable Minutes: Evaluation 11 and Therapeutic Activity 8      Gerard Rivera, PT  2020

## 2020-08-07 NOTE — SIGNIFICANT EVENT
Pt pulled out PIV - RN restarted, in which she pulled out again.     Pt demanding to leave hospital. RN educating pt on reason for hospitalization. Pr requested to leave floor. Pt then called 911 from her personal cell phone in front of RN. Dispatcher requested to speak to this RN and the pt handed RN the phone. Dispatcher made aware of issue of pt being confused and situation handled.      notified and permission given to take patients phone away. Phone is currently locked up in [place] office of [facility].     TORIN GARCÍA.

## 2020-08-07 NOTE — TELEPHONE ENCOUNTER
PATIENT NAME: Zeina Mahoney  [facility] #: [id number]    Lab Results   Component Value Date    CREATININE 1.4 08/07/2020     (L) 08/07/2020    BILITOT 6.2 (H) 08/07/2020    ALBUMIN 2.6 (L) 08/07/2020    INR 1.2 08/07/2020     MELD 21   Encephalopathy: 1 - 2  Ascites: moderate  Dialysis: no     Recertification requestor: Lindsey Peterson

## 2020-08-07 NOTE — ASSESSMENT & PLAN NOTE
- presented to ED with AMS 8/6. HE acute on chronic  - ammonia mildly elevated at 81  - lactulose enemas/PO lactulose until mental status improves  - Infectious work-up in process

## 2020-08-07 NOTE — PLAN OF CARE
AOx4 - but with bizarre behavior. VSS. Denies pain. NS infusing @ 75cc/hr.  Skin remains CDI w/ old L leg wound - currently covered w/ dressing. Wound care consult placed. Pt voiding per hat. Pt w/ multiple BMs o/n. Occasionally able to measure urine when stool is not mixed. Lactulose enema administered, however - pt unable to hold and had bowel movement in middle of admin. PO lactulose administered in place.  Pt ambulatory. Please refer to previous notes w/ episodes o/n surrounding pts non-compliance and bizarre behavior. Pt remains free from falls. Bed remains locked and lowered w/ personal items and call light w/in reach. Camera and bed alarm activated. NAD, TORIN.

## 2020-08-07 NOTE — HPI
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 Recently admitted 7/18-7/21 with abdominal pain/ SBO vs. pancreatitis. While inpatient, case was discussed with [facility] who felt symptoms and lipase not attributable to pancreatitis as CT did not show pancreatic inflammation. Per [facility], they believe some small bowel inflammation likely caused transient pancreatitis and symptoms. She now presents to the ED 8/6 with AMS. Patient's  states patient has been taking less and less of her latulose at home and has subsequently been more confused, asking for Uber rides and wanting to leave. The confusion is persistent, progressive, and without aggravating factors; recently there are no relieving factors, as the  has given lactulose enemas each of the past 4 days with an additional 105 mg this morning. Pt's only physical complaint at this time is intermittent nausea. She denies fever, chills, CP, SOB, constipation, and diarrhea. Oriented to person, place, and month but not exact date. Minimal asterixis. Labs and CXR reviewed. Plan to admit to observation for management of acute on chronic HE.

## 2020-08-07 NOTE — NURSING
- Pt calm and cooperative.  No further elopement or interference with medical devices.  Restraints d/c this AM  - Creat improved following NS infusion and resuming PO intake  - SB assist to restroom  - Spouse states he will stay overnight if needed, call if he is needed  - Bed alarm in use, call light in reach, nonslip socks in use  - Pt with very good appetite throughout the day

## 2020-08-07 NOTE — SUBJECTIVE & OBJECTIVE
Scheduled Meds:   lactulose  45 g Oral TID    levothyroxine  112 mcg Oral Before breakfast    midodrine  10 mg Oral TID    pantoprazole  40 mg Oral Before breakfast    polyethylene glycol  17 g Oral Daily    propofoL        rifAXIMin  550 mg Oral BID     Continuous Infusions:  PRN Meds:acetaminophen, haloperidol lactate, lactulose, melatonin, ondansetron, sodium chloride 0.9%, sodium chloride 0.9%    Review of Systems   Constitutional: Negative for appetite change, chills and fever.   HENT: Negative for facial swelling and trouble swallowing.    Eyes: Negative for photophobia and redness.   Respiratory: Negative for cough, shortness of breath, wheezing and stridor.    Cardiovascular: Positive for leg swelling. Negative for chest pain and palpitations.   Gastrointestinal: Positive for abdominal distention and nausea. Negative for constipation, diarrhea and vomiting.   Genitourinary: Negative for decreased urine volume, difficulty urinating and dysuria.   Neurological: Positive for weakness. Negative for dizziness and light-headedness.   Hematological: Bruises/bleeds easily.   Psychiatric/Behavioral: Positive for confusion. Negative for agitation and behavioral problems.     Objective:     Vital Signs (Most Recent):  Temp: 98.2 °F (36.8 °C) (08/07/20 0825)  Pulse: 81 (08/07/20 0825)  Resp: 16 (08/07/20 0825)  BP: 103/74 (08/07/20 0825)  SpO2: 99 % (08/07/20 0825) Vital Signs (24h Range):  Temp:  [98.2 °F (36.8 °C)] 98.2 °F (36.8 °C)  Pulse:  [72-88] 81  Resp:  [16-19] 16  SpO2:  [99 %-100 %] 99 %  BP: ()/(54-74) 103/74     Weight: 59.9 kg (132 lb)  Body mass index is 22.66 kg/m².    Intake/Output - Last 3 Shifts       08/05 0700 - 08/06 0659 08/06 0700 - 08/07 0659 08/07 0700 - 08/08 0659    P.O.  480 400    I.V. (mL/kg)  581.3 (9.7) 387.5 (6.5)    Other  0     Total Intake(mL/kg)  1061.3 (17.7) 787.5 (13.1)    Urine (mL/kg/hr)  200     Emesis/NG output  0     Other  0     Stool  0     Blood  0     Total  Output  200     Net  +861.3 +787.5           Urine Occurrence  1 x 1 x    Stool Occurrence  3 x 1 x    Emesis Occurrence  0 x           Physical Exam  Vitals signs and nursing note reviewed.   Constitutional:       General: She is not in acute distress.  HENT:      Head: Normocephalic and atraumatic.      Mouth/Throat:      Mouth: Mucous membranes are dry.   Eyes:      General: No scleral icterus.        Right eye: No discharge.         Left eye: No discharge.   Neck:      Musculoskeletal: Normal range of motion.   Cardiovascular:      Rate and Rhythm: Normal rate and regular rhythm.      Heart sounds: No murmur.   Pulmonary:      Effort: Pulmonary effort is normal. No respiratory distress.      Breath sounds: No wheezing or rales.   Abdominal:      General: There is distension.      Tenderness: There is abdominal tenderness (mild diffuse). There is no guarding.   Musculoskeletal:      Right lower leg: Edema present.      Left lower leg: Edema present.   Skin:     General: Skin is warm and dry.      Capillary Refill: Capillary refill takes 2 to 3 seconds.   Neurological:      General: No focal deficit present.      Mental Status: She is alert and oriented to person, place, and time.   Psychiatric:         Mood and Affect: Mood normal.         Thought Content: Thought content normal.         Laboratory:  Immunosuppressants     None        CBC:   Recent Labs   Lab 08/07/20  0641   WBC 5.05   RBC 2.50*   HGB 8.9*   HCT 28.7*   *   *   MCH 35.6*   MCHC 31.0*     CMP:   Recent Labs   Lab 08/07/20  0641   *   CALCIUM 10.2   ALBUMIN 2.6*   PROT 6.0   *   K 4.2   CO2 19*      BUN 30*   CREATININE 1.4   ALKPHOS 92   ALT 28   AST 38   BILITOT 6.2*     Labs within the past 24 hours have been reviewed.    Diagnostic Results:  None    Debility/Functional status: No debility noted.

## 2020-08-07 NOTE — PROGRESS NOTES
Admit Note     Met with patient's  to assess patients needs due to pt being taken down for testing.  Patient is a 54 y.o.  female, admitted nausea.     Patient admitted from the emergency room on 8/6/2020 .  At this time, patient presents as unavailable.  At this time, patients caregiver presents as alert and oriented x 4, pleasant, good eye contact, well groomed, recall good, concentration/judgement good, average intelligence, calm, communicative and cooperative.      Household/Family Systems (as reported by patients caregiver)     Patient resides with patient's , at [address] [facility] [address]. Local Lodging:  [employer] ([address] [place] [address]) Support system includes pt's , Yonis.  Patient does not have dependents that are need of being cared for.     Patients primary caregiver is Yonis Mahoney, patients , phone number [phone].  Confirmed patients contact information is [phone] (home);   Telephone Information:   Mobile [phone]   .    During admission, patient's caregiver plans to stay at their local lodging.  Confirmed patient and patients caregivers do have access to reliable transportation.    Cognitive Status/Learning     Patients caregiver reports patients reading ability as 12th grade and states patient does have difficulty with and wears readers.  Patients caregiver reports patient learns best by seeing, hearing and visual.   Needed: No.   Highest education level: High School (9-12) or GED    Vocation/Disability (as reported by patients caregiver)    Working for Income: No  If no, reason not working: Patient Choice - Homemaker  Patient is retired from working in retail.  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    Adherence     Patients caregiver reports patient has a high level of adherence to patients health care regimen.  Adherence counseling and education provided.  Patient's caregiver verbalizes understanding.    Substance  Use    Patients caregiver reports patients substance usage as the following:    Tobacco: none, patient denies any use.  Alcohol: none, patient denies any use.  Illicit Drugs/Non-prescribed Medications: none, patient denies any use.  Patients caregiver states clear understanding of the potential impact of substance use.  Substance abstinence/cessation counseling, education and resources provided and reviewed.     Services Utilizing/ADLS (as reported by patients caregiver)    Infusion Service: Prior to admission, patient utilizing? no  Home Health: Prior to admission, patient utilizing? yes; [facility] for PT/SN  DME: Prior to admission, yes: waker, w/c, bathroom pull-bars and shower chair; pt also utilizes a hospital bed at home  Pulmonary/Cardiac Rehab: Prior to admission, no  Dialysis:  Prior to admission, no  Transplant Specialty Pharmacy:  Prior to admission, yes; [facility].    Prior to admission, patients caregiver reports patient was independent with ADLS mostly; however, requiring little assistance with dressing and bathing periodically and was not driving.  Patients caregiver reports patient is not able to care for self at this time due to compromised medical condition (as documented in medical record) and physical weakness..  Patients caregiver reports patient indicates a willingness to care for self once medically cleared to do so.    Insurance/Medications    Insured by   Payor/Plan Subscr  Sex Relation Sub. Ins. ID Effective Group Num   1. BLUE CROSS BL* FLORIAN MEDINA [birth year]/1964 Male  [id number]                                    [address] [id number]      Primary Insurance (for UNOS reporting): Private Insurance-BCBS  Secondary Insurance (for UNOS reporting): None    Patients caregiver reports patient is able to obtain and afford medications at this time and at time of discharge.    Living Will/Healthcare Power of     Patients caregiver reports patient does not have a LW and/or HCPA.  Social  worker provided education regarding LW and HCPA and the completion of forms.    Coping/Mental Health (as reported by patients caregiver)    Patient's coping was not assessed; however, pt's  denied any issues or concerns on the pt's behalf. Patients caregiver denied any issues.      Discharge Planning (as reported by patients caregiver)    At time of discharge, patient plans to return to pt's local lodging under the care of pt's .  Patients  will transport patient.  Per rounds today, expected discharge date has not been medically determined at this time. Patients caretaker verbalizes understanding and is involved in treatment planning and discharge process.    Additional Concerns    Patient's caretaker denies additional needs and/or concerns at this time. Patient is being followed for needs, education, resources, information, emotional support, supportive counseling, and for supportive and skilled discharge plan of care.  providing ongoing psychosocial support, education, resources and d/c planning as needed.  SW remains available. Patient's caregiver verbalizes understanding and agreement with information reviewed,  availability and how to access available resources as needed.

## 2020-08-07 NOTE — HOSPITAL COURSE
Interval history: Pt pulled recently placed midline overnight unknowingly. Will plan to replace Midline today. Will cont with IV antibxs for treatment of Staph Epi bacteremia as per ID recs. Patient admitted with acute on chronic HE but now improved with lactulose. Cont lactulose with goal 3-4 BM/day. PRBCs given on 8/10 and H&H stable. Wound care consulted for skin abrasion on lower extremity. Appreciate assistance. AAOx3. Monitor

## 2020-08-07 NOTE — SIGNIFICANT EVENT
"While pt was using restroom, PIV accidentally dislodged from site. RN to gather materials to restart PIV. Upon entering room, pt not found in room. Unit checked for pt and pt was not located. PCT notified RN that pt was going to vending machine. Pt was not found by vending machines. Pt left unit ambulatory w/o permission. Pt was brought back to TSU by the  for the 9th floor.     Security was called to report elopement of pt.    Camera and bed alarm now activated on pt. This RN remains seated outside of pts room for elopement precautions. Pt is AOx4. Stating she only left because "I wanted chettos."     Chettos given to pt by this RN. Pt now satisfied and resting in bed.     Lactulose enema given earlier in the shift, however - oral lactulose administered.     KEAGAN Hoffmann notified of elopement.      WCTCM.   "

## 2020-08-07 NOTE — PT/OT/SLP EVAL
Occupational Therapy   Evaluation    Name: Zeina Mahoney  MRN: [id number]  Admitting Diagnosis:  Hepatic encephalopathy      Recommendations:     Discharge Recommendations: home health OT  Discharge Equipment Recommendations:  none  Barriers to discharge:  None    Assessment:     Zeina Mahoney is a 54 y.o. female with a medical diagnosis of Hepatic encephalopathy.  She presents with decline in ADLs and functional mobility. Pt . Performance deficits affecting function: weakness, impaired endurance, impaired self care skills, impaired functional mobilty, gait instability.      Rehab Prognosis: Good; patient would benefit from acute skilled OT services to address these deficits and reach maximum level of function.       Plan:     Patient to be seen 3 x/week to address the above listed problems via self-care/home management, therapeutic activities, therapeutic exercises, neuromuscular re-education  · Plan of Care Expires: 09/06/20  · Plan of Care Reviewed with: patient    Subjective     Chief Complaint: No complaints  Patient/Family Comments/goals: Pt agreeable to progressing towards independence with ADLs and functional mobility.     Occupational Profile:  Living Environment: Pt lives with her  in a [facility] with no steps and a walk-in shower with a chair.  Previous level of function: (I) with ADLs / Walks with a RW   Equipment Used at home:  wheelchair, shower chair, walker, rolling  Assistance upon Discharge:     Pain/Comfort:  · Pain Rating 1: 0/10  · Pain Rating 2: 0/10    Patients cultural, spiritual, Moravian conflicts given the current situation:  no    Objective:     Communicated with: Rn prior to session.  Patient found HOB elevated with telemetry, peripheral IV, pulse ox (continuous) upon OT entry to room.    General Precautions: Standard, fall   Orthopedic Precautions:N/A   Braces: N/A     Occupational Performance:    Bed Mobility:    · Patient completed Supine to Sit with stand by  assistance    Functional Mobility/Transfers:  · Patient completed Sit <> Stand Transfer with contact guard assistance  with  rolling walker   · Patient completed Bed <> Chair Transfer using Step Transfer technique with stand by assistance with rolling walker  · Functional Mobility: Pt ambulated in hallways with CGA using RW.  Attempted without RW but gait unsteady .    Activities of Daily Living:  · Feeding:  independence    · Grooming: set up assist seated    · Bathing: DNT    · Upper Body Dressing: independence    · Lower Body Dressing: supervision    · Toileting: supervision    Cognitive/Visual Perceptual:  Cognitive/Psychosocial Skills:     -       Safety awareness/insight to disability: appears intact during evaluation  -       Mood/Affect/Coping skills/emotional control: Appropriate to situation  Visual/Perceptual:      -Intact    Physical Exam:  Balance: -       needs CGA for static standing  Upper Extremity Range of Motion:  -       Right Upper Extremity: WFL  -       Left Upper Extremity: WFL  Upper Extremity Strength:    -       Right Upper Extremity: WFL  -       Left Upper Extremity: WFL  Neurological: -       intact    AMPAC 6 Click ADL:  AMPAC Total Score: 18    Treatment & Education:  · Pt educated on role of OT in acute care setting.   · Assisted with ADLs and functional mobility with assist levels noted above  Education:    Patient left up in chair with all lines intact and call button in reach    GOALS:   Multidisciplinary Problems     Occupational Therapy Goals        Problem: Occupational Therapy Goal    Goal Priority Disciplines Outcome Interventions   Occupational Therapy Goal     OT, PT/OT Ongoing, Progressing    Description: Goals to be met by: 8/17/2020    Patient will increase functional independence with ADLs by performing:    UE Dressing with Maverick.  LE Dressing with Maverick.  Grooming while standing at sink with SBA..  Toileting from toilet with Maverick for hygiene and  clothing management.   Bathing from  standing at sink with Supervision.                     History:     Past Medical History:   Diagnosis Date    Chronic Hepatic encephalopathy 6/25/2020    Decompensated hepatic cirrhosis 6/25/2020    History of pancreatitis 6/25/2020    Liver cirrhosis secondary to CHAUDHARY 6/25/2020    Macrocytic anemia 6/25/2020    Other ascites 6/25/2020    Thrombocytopenia 6/25/2020       History reviewed. No pertinent surgical history.    Time Tracking:     OT Date of Treatment: 08/07/20  OT Start Time: 0938  OT Stop Time: 0951  OT Total Time (min): 13 min    Billable Minutes:Evaluation 13    FRANCISCO Vasquez  8/7/2020

## 2020-08-07 NOTE — NURSING
Pt w/ frustration and refusal to stay in bed despite efforts. KEAGAN Hoffmann notified. Restraints ordered. Pt demanding to leave. Education provided to pt on importance of staying within hospital. Pt in agreement. Restraints placed to bilateral wrist. Order due to  .     Okay to hold morning meds until 0800 d/t pts increasing agitation.

## 2020-08-08 LAB
ALBUMIN SERPL BCP-MCNC: 2.2 G/DL (ref 3.5–5.2)
ALP SERPL-CCNC: 75 U/L (ref 55–135)
ALT SERPL W/O P-5'-P-CCNC: 29 U/L (ref 10–44)
ANION GAP SERPL CALC-SCNC: 5 MMOL/L (ref 8–16)
AST SERPL-CCNC: 37 U/L (ref 10–40)
BASOPHILS # BLD AUTO: 0.04 K/UL (ref 0–0.2)
BASOPHILS NFR BLD: 0.7 % (ref 0–1.9)
BILIRUB SERPL-MCNC: 5.5 MG/DL (ref 0.1–1)
BUN SERPL-MCNC: 23 MG/DL (ref 6–20)
CALCIUM SERPL-MCNC: 9.9 MG/DL (ref 8.7–10.5)
CHLORIDE SERPL-SCNC: 109 MMOL/L (ref 95–110)
CO2 SERPL-SCNC: 19 MMOL/L (ref 23–29)
CREAT SERPL-MCNC: 1.1 MG/DL (ref 0.5–1.4)
DIFFERENTIAL METHOD: ABNORMAL
EOSINOPHIL # BLD AUTO: 0.1 K/UL (ref 0–0.5)
EOSINOPHIL NFR BLD: 1.6 % (ref 0–8)
ERYTHROCYTE [DISTWIDTH] IN BLOOD BY AUTOMATED COUNT: 14.7 % (ref 11.5–14.5)
EST. GFR  (AFRICAN AMERICAN): >60 ML/MIN/1.73 M^2
EST. GFR  (NON AFRICAN AMERICAN): 57.1 ML/MIN/1.73 M^2
GLUCOSE SERPL-MCNC: 101 MG/DL (ref 70–110)
HCT VFR BLD AUTO: 22.5 % (ref 37–48.5)
HGB BLD-MCNC: 7.4 G/DL (ref 12–16)
IMM GRANULOCYTES # BLD AUTO: 0.03 K/UL (ref 0–0.04)
IMM GRANULOCYTES NFR BLD AUTO: 0.5 % (ref 0–0.5)
INR PPP: 1.2 (ref 0.8–1.2)
LYMPHOCYTES # BLD AUTO: 0.6 K/UL (ref 1–4.8)
LYMPHOCYTES NFR BLD: 11.2 % (ref 18–48)
MAGNESIUM SERPL-MCNC: 1.6 MG/DL (ref 1.6–2.6)
MCH RBC QN AUTO: 35.7 PG (ref 27–31)
MCHC RBC AUTO-ENTMCNC: 32.9 G/DL (ref 32–36)
MCV RBC AUTO: 109 FL (ref 82–98)
MONOCYTES # BLD AUTO: 0.5 K/UL (ref 0.3–1)
MONOCYTES NFR BLD: 9.1 % (ref 4–15)
NEUTROPHILS # BLD AUTO: 4.3 K/UL (ref 1.8–7.7)
NEUTROPHILS NFR BLD: 76.9 % (ref 38–73)
NRBC BLD-RTO: 0 /100 WBC
PHOSPHATE SERPL-MCNC: 3 MG/DL (ref 2.7–4.5)
PLATELET # BLD AUTO: 68 K/UL (ref 150–350)
PMV BLD AUTO: 9.6 FL (ref 9.2–12.9)
POTASSIUM SERPL-SCNC: 5 MMOL/L (ref 3.5–5.1)
PROT SERPL-MCNC: 5.1 G/DL (ref 6–8.4)
PROTHROMBIN TIME: 13.5 SEC (ref 9–12.5)
RBC # BLD AUTO: 2.07 M/UL (ref 4–5.4)
SODIUM SERPL-SCNC: 133 MMOL/L (ref 136–145)
WBC # BLD AUTO: 5.63 K/UL (ref 3.9–12.7)

## 2020-08-08 PROCEDURE — 25000003 PHARM REV CODE 250: Mod: NTX | Performed by: PHYSICIAN ASSISTANT

## 2020-08-08 PROCEDURE — 36415 COLL VENOUS BLD VENIPUNCTURE: CPT | Mod: NTX

## 2020-08-08 PROCEDURE — 20600001 HC STEP DOWN PRIVATE ROOM: Mod: NTX

## 2020-08-08 PROCEDURE — 85610 PROTHROMBIN TIME: CPT | Mod: NTX

## 2020-08-08 PROCEDURE — 85025 COMPLETE CBC W/AUTO DIFF WBC: CPT | Mod: NTX

## 2020-08-08 PROCEDURE — 84100 ASSAY OF PHOSPHORUS: CPT | Mod: NTX

## 2020-08-08 PROCEDURE — 63600175 PHARM REV CODE 636 W HCPCS: Mod: NTX | Performed by: SURGERY

## 2020-08-08 PROCEDURE — 83735 ASSAY OF MAGNESIUM: CPT | Mod: NTX

## 2020-08-08 PROCEDURE — 80053 COMPREHEN METABOLIC PANEL: CPT | Mod: NTX

## 2020-08-08 RX ADMIN — LEVOTHYROXINE SODIUM 112 MCG: 112 TABLET ORAL at 06:08

## 2020-08-08 RX ADMIN — LACTULOSE 45 G: 20 SOLUTION ORAL at 08:08

## 2020-08-08 RX ADMIN — POLYETHYLENE GLYCOL 3350 17 G: 17 POWDER, FOR SOLUTION ORAL at 08:08

## 2020-08-08 RX ADMIN — MIDODRINE HYDROCHLORIDE 10 MG: 5 TABLET ORAL at 09:08

## 2020-08-08 RX ADMIN — RIFAXIMIN 550 MG: 550 TABLET ORAL at 08:08

## 2020-08-08 RX ADMIN — PANTOPRAZOLE SODIUM 40 MG: 40 TABLET, DELAYED RELEASE ORAL at 06:08

## 2020-08-08 RX ADMIN — MIDODRINE HYDROCHLORIDE 10 MG: 5 TABLET ORAL at 02:08

## 2020-08-08 RX ADMIN — DEXTROSE MONOHYDRATE 750 MG: 50 INJECTION, SOLUTION INTRAVENOUS at 09:08

## 2020-08-08 RX ADMIN — LACTULOSE 45 G: 20 SOLUTION ORAL at 09:08

## 2020-08-08 RX ADMIN — MIDODRINE HYDROCHLORIDE 10 MG: 5 TABLET ORAL at 08:08

## 2020-08-08 RX ADMIN — RIFAXIMIN 550 MG: 550 TABLET ORAL at 09:08

## 2020-08-08 RX ADMIN — LACTULOSE 45 G: 20 SOLUTION ORAL at 02:08

## 2020-08-08 NOTE — PROGRESS NOTES
Pharmacokinetic Initial Assessment: IV Vancomycin    Assessment/Plan:    Initiate intravenous vancomycin with loading dose of 1250 mg once with subsequent doses when random concentrations are less than 20 mcg/mL  Desired empiric serum trough concentration is 15 to 20 mcg/mL  Draw vancomycin random level on 08/08 at 2100.  Pharmacy will continue to follow and monitor vancomycin.      Please contact pharmacy at extension [phone] with any questions regarding this assessment.     Thank you for the consult,   Jourdan RHONDA Tiarra       Patient brief summary:  Zeina Mahoney is a 54 y.o. female initiated on antimicrobial therapy with IV Vancomycin for treatment of suspected bacteremia    Drug Allergies:   Review of patient's allergies indicates:   Allergen Reactions    Aspirin Tinitus    Ciprofloxacin Rash    Clindamycin Itching       Actual Body Weight:   59.9 kg    Renal Function:   Estimated Creatinine Clearance: 39.7 mL/min (based on SCr of 1.4 mg/dL).,     Dialysis Method (if applicable):  N/A    CBC (last 72 hours):  Recent Labs   Lab Result Units 08/06/20  1203 08/07/20  0641   WBC K/uL 5.91 5.05   Hemoglobin g/dL 9.5* 8.9*   Hematocrit % 29.3* 28.7*   Platelets K/uL 87* 108*   Gran% % 72.7 69.5   Lymph% % 19.0 20.0   Mono% % 6.1 6.9   Eosinophil% % 0.8 1.8   Basophil% % 0.7 1.0   Differential Method  Automated Automated       Metabolic Panel (last 72 hours):  Recent Labs   Lab Result Units 08/06/20  1203 08/07/20  0641   Sodium mmol/L 134* 134*   Potassium mmol/L 4.3 4.2   Chloride mmol/L 103 109   CO2 mmol/L 23 19*   Glucose mg/dL 173* 139*   BUN, Bld mg/dL 39* 30*   Creatinine mg/dL 1.6* 1.4   Albumin g/dL 3.0* 2.6*   Total Bilirubin mg/dL 6.8* 6.2*   Alkaline Phosphatase U/L 94 92   AST U/L 40 38   ALT U/L 32 28   Magnesium mg/dL  --  1.7   Phosphorus mg/dL  --  3.6       Drug levels (last 3 results):  No results for input(s): VANCOMYCINRA, VANCOMYCINPE, VANCOMYCINTR in the last 72 hours.    Microbiologic  Results:  Microbiology Results (last 7 days)     Procedure Component Value Units Date/Time    Blood culture [[id number]]     Order Status: Sent Specimen: Blood     Blood culture [[id number]]     Order Status: Sent Specimen: Blood     Blood culture - site #2 [[id number]] Collected: 08/06/20 1808    Order Status: Completed Specimen: Blood from Peripheral, Antecubital, Right Updated: 08/07/20 2117     Blood Culture, Routine Gram stain stan bottle: Gram positive cocci in clusters resembling Staph      Results called to and read back by:Malia Robledo RN 08/07/2020  21:15    Blood culture - site #1 [[id number]] Collected: 08/06/20 1808    Order Status: Completed Specimen: Blood from Peripheral, Forearm, Left Updated: 08/07/20 0315     Blood Culture, Routine No Growth to date

## 2020-08-08 NOTE — PROGRESS NOTES
Liver Transplant Service  Hepatology progress Note        HPI: Ms. Mahoney is a 54 y.o. 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         Interval History: No acute events overnight.       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 Recently admitted 7/18-7/21 with abdominal pain/ SBO vs. pancreatitis. While inpatient, case was discussed with [facility] who felt symptoms and lipase not attributable to pancreatitis as CT did not show pancreatic inflammation. Per [facility], they believe some small bowel inflammation likely caused transient pancreatitis and symptoms. She now presents to the ED 8/6 with AMS. Patient's  states patient has been taking less and less of her latulose at home and has subsequently been more confused, asking for Uber rides and wanting to leave. Confusion has improved however G+ cultures 1/2 bottles. IV Vanco started.    Sub/Obj: unchanged. A+O x 3. No SOB. Abdo pain. Minimal abdo distension. Mild ankle edema.  No asterixis. Lung fields clear. Abdo soft and non-tender.        Labs:  Labs within the past 24 hours have been reviewed.      MELD-Na score: 21 at 8/8/2020  7:07 AM  MELD score: 19 at 8/8/2020  7:07 AM  Calculated from:  Serum Creatinine: 1.4 mg/dL at 8/7/2020  6:41 AM  Serum Sodium: 134 mmol/L at 8/7/2020  6:41 AM  Total Bilirubin: 6.2 mg/dL at 8/7/2020  6:41 AM  INR(ratio): 1.2 at 8/8/2020  7:07 AM  Age: 54 years 10 months      Assessment/Plan:    1. ESLD- Currently listed for  liver. Stable for transplant at this time.     2. Anemia of chronic disease- H&H stable. Monitor.     3. Hepatic encephalopathy- Cont with Lactulose for HE control. Mentation improved.     4. Electrolytes- stable/   5. Bacteremia. G+ 1/2 bottles. IV vancomycin.

## 2020-08-08 NOTE — NURSING
"Avasys camera alarming as patient attempting to get out of bed. RN asked patient where she was going. Patient replied "I'm leaving." RN informed patient that she couldn't leave as she was not discharged yet. Patient informed RN she would "sign herself out." Patient informed she needed to stay and talk to the doctor if she wanted to leave. Charge RN and Deanna CORDOBA NP notified of patient wanting to leave. Will continue to monitor patient as patient has history of escalation in attempts to leave.  "

## 2020-08-08 NOTE — PLAN OF CARE
"Pt AAO x 4 with intermittent confusion related to situation. Pt adamant she will sign herself out and go home. Pt able to be redirected. Pt up with stand by assist throughout shift. Lactulose given per order. IV vanc to be given tonight for bacteremia. Urine sample needed - patient "aware." Specimen cup at bedside  "

## 2020-08-09 PROBLEM — R78.81 BACTEREMIA: Status: ACTIVE | Noted: 2020-08-09

## 2020-08-09 LAB
ALBUMIN SERPL BCP-MCNC: 2 G/DL (ref 3.5–5.2)
ALP SERPL-CCNC: 82 U/L (ref 55–135)
ALT SERPL W/O P-5'-P-CCNC: 30 U/L (ref 10–44)
ANION GAP SERPL CALC-SCNC: 4 MMOL/L (ref 8–16)
AST SERPL-CCNC: 38 U/L (ref 10–40)
BASOPHILS # BLD AUTO: 0.03 K/UL (ref 0–0.2)
BASOPHILS NFR BLD: 0.6 % (ref 0–1.9)
BILIRUB SERPL-MCNC: 4.2 MG/DL (ref 0.1–1)
BILIRUB UR QL STRIP: NEGATIVE
BUN SERPL-MCNC: 21 MG/DL (ref 6–20)
CALCIUM SERPL-MCNC: 9.7 MG/DL (ref 8.7–10.5)
CHLORIDE SERPL-SCNC: 110 MMOL/L (ref 95–110)
CLARITY UR REFRACT.AUTO: CLEAR
CO2 SERPL-SCNC: 18 MMOL/L (ref 23–29)
COLOR UR AUTO: YELLOW
CREAT SERPL-MCNC: 1 MG/DL (ref 0.5–1.4)
DIFFERENTIAL METHOD: ABNORMAL
EOSINOPHIL # BLD AUTO: 0.1 K/UL (ref 0–0.5)
EOSINOPHIL NFR BLD: 2.8 % (ref 0–8)
ERYTHROCYTE [DISTWIDTH] IN BLOOD BY AUTOMATED COUNT: 14.6 % (ref 11.5–14.5)
EST. GFR  (AFRICAN AMERICAN): >60 ML/MIN/1.73 M^2
EST. GFR  (NON AFRICAN AMERICAN): >60 ML/MIN/1.73 M^2
GLUCOSE SERPL-MCNC: 138 MG/DL (ref 70–110)
GLUCOSE UR QL STRIP: NEGATIVE
HCT VFR BLD AUTO: 22.4 % (ref 37–48.5)
HGB BLD-MCNC: 7.2 G/DL (ref 12–16)
HGB UR QL STRIP: NEGATIVE
IMM GRANULOCYTES # BLD AUTO: 0.04 K/UL (ref 0–0.04)
IMM GRANULOCYTES NFR BLD AUTO: 0.8 % (ref 0–0.5)
INR PPP: 1.2 (ref 0.8–1.2)
KETONES UR QL STRIP: NEGATIVE
LEUKOCYTE ESTERASE UR QL STRIP: NEGATIVE
LYMPHOCYTES # BLD AUTO: 1 K/UL (ref 1–4.8)
LYMPHOCYTES NFR BLD: 20 % (ref 18–48)
MAGNESIUM SERPL-MCNC: 1.8 MG/DL (ref 1.6–2.6)
MCH RBC QN AUTO: 35.8 PG (ref 27–31)
MCHC RBC AUTO-ENTMCNC: 32.1 G/DL (ref 32–36)
MCV RBC AUTO: 111 FL (ref 82–98)
MONOCYTES # BLD AUTO: 0.4 K/UL (ref 0.3–1)
MONOCYTES NFR BLD: 8.8 % (ref 4–15)
NEUTROPHILS # BLD AUTO: 3.4 K/UL (ref 1.8–7.7)
NEUTROPHILS NFR BLD: 67 % (ref 38–73)
NITRITE UR QL STRIP: NEGATIVE
NRBC BLD-RTO: 0 /100 WBC
PH UR STRIP: 6 [PH] (ref 5–8)
PHOSPHATE SERPL-MCNC: 2.8 MG/DL (ref 2.7–4.5)
PLATELET # BLD AUTO: 58 K/UL (ref 150–350)
PMV BLD AUTO: 9.4 FL (ref 9.2–12.9)
POTASSIUM SERPL-SCNC: 4.7 MMOL/L (ref 3.5–5.1)
PROT SERPL-MCNC: 4.8 G/DL (ref 6–8.4)
PROT UR QL STRIP: NEGATIVE
PROTHROMBIN TIME: 13.2 SEC (ref 9–12.5)
RBC # BLD AUTO: 2.01 M/UL (ref 4–5.4)
SODIUM SERPL-SCNC: 132 MMOL/L (ref 136–145)
SP GR UR STRIP: 1.01 (ref 1–1.03)
URN SPEC COLLECT METH UR: NORMAL
WBC # BLD AUTO: 5.01 K/UL (ref 3.9–12.7)

## 2020-08-09 PROCEDURE — 83735 ASSAY OF MAGNESIUM: CPT | Mod: NTX

## 2020-08-09 PROCEDURE — 85610 PROTHROMBIN TIME: CPT | Mod: NTX

## 2020-08-09 PROCEDURE — 81003 URINALYSIS AUTO W/O SCOPE: CPT | Mod: NTX

## 2020-08-09 PROCEDURE — 25000003 PHARM REV CODE 250: Mod: NTX | Performed by: SURGERY

## 2020-08-09 PROCEDURE — 99255 IP/OBS CONSLTJ NEW/EST HI 80: CPT | Mod: NTX,,, | Performed by: INTERNAL MEDICINE

## 2020-08-09 PROCEDURE — 84100 ASSAY OF PHOSPHORUS: CPT | Mod: NTX

## 2020-08-09 PROCEDURE — 99255 PR INITIAL INPATIENT CONSULT,LEVL V: ICD-10-PCS | Mod: NTX,,, | Performed by: INTERNAL MEDICINE

## 2020-08-09 PROCEDURE — 63600175 PHARM REV CODE 636 W HCPCS: Mod: NTX | Performed by: SURGERY

## 2020-08-09 PROCEDURE — 63600175 PHARM REV CODE 636 W HCPCS: Mod: NTX | Performed by: PHYSICIAN ASSISTANT

## 2020-08-09 PROCEDURE — 80053 COMPREHEN METABOLIC PANEL: CPT | Mod: NTX

## 2020-08-09 PROCEDURE — 94761 N-INVAS EAR/PLS OXIMETRY MLT: CPT | Mod: NTX

## 2020-08-09 PROCEDURE — 20600001 HC STEP DOWN PRIVATE ROOM: Mod: NTX

## 2020-08-09 PROCEDURE — 25000003 PHARM REV CODE 250: Mod: NTX | Performed by: PHYSICIAN ASSISTANT

## 2020-08-09 PROCEDURE — 85025 COMPLETE CBC W/AUTO DIFF WBC: CPT | Mod: NTX

## 2020-08-09 PROCEDURE — 36415 COLL VENOUS BLD VENIPUNCTURE: CPT | Mod: NTX

## 2020-08-09 RX ADMIN — LACTULOSE 45 G: 20 SOLUTION ORAL at 02:08

## 2020-08-09 RX ADMIN — LACTULOSE 45 G: 20 SOLUTION ORAL at 08:08

## 2020-08-09 RX ADMIN — LEVOTHYROXINE SODIUM 112 MCG: 112 TABLET ORAL at 06:08

## 2020-08-09 RX ADMIN — MIDODRINE HYDROCHLORIDE 10 MG: 5 TABLET ORAL at 08:08

## 2020-08-09 RX ADMIN — RIFAXIMIN 550 MG: 550 TABLET ORAL at 08:08

## 2020-08-09 RX ADMIN — MIDODRINE HYDROCHLORIDE 10 MG: 5 TABLET ORAL at 02:08

## 2020-08-09 RX ADMIN — DEXTROSE MONOHYDRATE 750 MG: 50 INJECTION, SOLUTION INTRAVENOUS at 08:08

## 2020-08-09 RX ADMIN — ONDANSETRON 4 MG: 2 INJECTION INTRAMUSCULAR; INTRAVENOUS at 08:08

## 2020-08-09 RX ADMIN — PANTOPRAZOLE SODIUM 40 MG: 40 TABLET, DELAYED RELEASE ORAL at 06:08

## 2020-08-09 NOTE — PROGRESS NOTES
Liver Transplant Service  Hepatology progress Note        HPI: Ms. Mahoney is a 54 y.o. 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         Interval History: Being treated for G+ bacteremia with IV vancomycin. Threatening to leave AMA. But now  at bedside and issue is for now resolved.        Sub/Obj: unchanged. A+O x 3. No SOB. Abdo pain. Minimal abdo distension. Mild ankle edema.  No asterixis. Lung fields clear. Abdo soft and non-tender.    Labs:  Labs within the past 24 hours have been reviewed.      MELD-Na score: 19 at 8/8/2020  7:07 AM  MELD score: 16 at 8/8/2020  7:07 AM  Calculated from:  Serum Creatinine: 1.1 mg/dL at 8/8/2020  7:07 AM  Serum Sodium: 133 mmol/L at 8/8/2020  7:07 AM  Total Bilirubin: 5.5 mg/dL at 8/8/2020  7:07 AM  INR(ratio): 1.2 at 8/8/2020  7:07 AM  Age: 54 years 10 months      Assessment/Plan:     1. ESLD- Currently listed for  liver. Stable for transplant at this time.      2. Anemia of chronic disease- H&H stable. Monitor.      3. Hepatic encephalopathy- Cont with Lactulose for HE control. Mentation improved.      4. Electrolytes- stable  5. G + bacteremia 2/2 bottles being treated with IV vancomycin   5. Bacteremia. G+ 2/2 bottles. IV vancomycin.

## 2020-08-09 NOTE — NURSING
Lab at bedside to draw AM labs. Upon lab entering room it was noted that pt had pulled her PIV out. RN informed and site no longer bleeding. Will place new PIV.  at bedside throughout night per night RN due to patient attempting to leave AMA overnight.

## 2020-08-09 NOTE — CONSULTS
[facility]  Infectious Disease  Consult Note    Patient Name: Zeina Mahoney  MRN: [id number]  Admission Date: 8/6/2020  Hospital Length of Stay: 3 days  Attending Physician: Lloyd Hedrick MD  Primary Care Provider: Primary Doctor No     Isolation Status: No active isolations    Patient information was obtained from patient, past medical records and ER records.      Consults  Assessment/Plan:     Bacteremia  54-year-old female with past medical history of CHAUDHARY cirrhosis (c/b ascites, HE) hypothyroidism, adjustment disorder, presented 8/6/2020 with AMS, found to have 2 sets of blood cultures with Staph epi, started on vancomycin IV. Source possibly from her LLE wound, no history of any foreign devices.    Recommendations:  - Follow-up susceptibilities of Staph epi  - Follow-up blood cultures from 8/7/2020 to ensure clearance  - Continue IV vancomycin        Thank you for your consult. I will follow-up with patient. Please contact us if you have any additional questions.    Lima King MD  Infectious Disease  [facility]    Subjective:     Principal Problem: Hepatic encephalopathy    HPI: 54-year-old female with past medical history of HFpEF, CHAUDHARY cirrhosis (listed for OLTx), portal hypertension, hypothyroidism, adjustment disorder, presented 8/6/2020 with AMS. Patient was recently admitted 7/18/2020-7/21/2020 with abdominal pain related to either SBO vs. pancreatitis. On work-up patient found to have 2/2 sets of blood cultures with Staph epi. Patient reports feeling malaise and fatigue for about a week prior to admission. She denied any localizing signs or symptoms of infection, no fevers, chills, sweats, nvd, SOB, cough, hematuria, dysuria. Patient with abdominal pain related to abdominal distension. She has a wound on her anterior left shin after falling. Patient reports feeling much better compared to on admission. Patient is from around [facility] [place] [facility], [place]. Dogs at home. No other  travel besides [place] and [place].    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 No pertinent surgical history.    Review of patient's allergies indicates:   Allergen Reactions    Aspirin Tinitus    Ciprofloxacin Rash    Clindamycin Itching       Medications:  Medications Prior to Admission   Medication Sig    furosemide (LASIX) 40 MG tablet Take 1 tablet (40 mg total) by mouth 2 (two) times a day.    levothyroxine (SYNTHROID) 112 MCG tablet Take 1 tablet (112 mcg total) by mouth before breakfast.    rifAXIMin (XIFAXAN) 550 mg Tab Take 550 mg by mouth 2 (two) times daily.     spironolactone (ALDACTONE) 50 MG tablet Take 100 mg by mouth once daily.     acetaminophen (TYLENOL) 500 MG tablet Take 1 tablet (500 mg total) by mouth every 6 (six) hours as needed for Pain. Max 2000mg daily.    diaper,brief,adult,disposable Misc 5 mg/kg/day by Misc.(Non-Drug; Combo Route) route 5 (five) times daily. Patient with incontinence. Please supply enough depends for 5xs/day    ergocalciferol (VITAMIN D2) 50,000 unit Cap Take 50,000 Units by mouth every 7 days.    haloperidoL (HALDOL) 1 MG tablet Take 1 tablet (1 mg total) by mouth daily as needed (non redirectable agitation).    lactulose (CEPHULAC) 10 gram packet Take 1 and a half packets three times a day     lactulose (CEPHULAC) 10 gram packet Mix 3 packets (30 g total) with liquid and take by mouth 3 (three) times daily as needed.    lactulose (CHRONULAC) 10 gram/15 mL solution Take 30 mLs (20 g total) by mouth 3 (three) times daily as needed. Alternate with kristalose if not having enough BMs.    lidocaine (LIDODERM) 5 % Place 1 patch onto the skin every 24 hours. Remove &  Discard patch within 12 hours or as directed by MD    magnesium hydroxide 400 mg/5 ml (MILK OF MAGNESIA) 400 mg/5 mL Susp Take 30 mLs (2,400 mg total) by mouth daily as needed (Take if constipated despite lactulose).    melatonin 10 mg Tab Take 10 mg by mouth nightly as needed (sleep).    midodrine (PROAMATINE) 10 MG tablet Take 10 mg by mouth 3 (three) times daily.    nystatin (MYCOSTATIN) powder Apply topically 2 (two) times daily. Apply to affected area twice a day    pantoprazole (PROTONIX) 40 MG tablet Take 40 mg by mouth before breakfast.    polyethylene glycol (GLYCOLAX) 17 gram PwPk Take 17 g by mouth once daily. Or miralax OTC. Take if constipated despite lactulose    simethicone (MYLICON) 80 MG chewable tablet Take 80 mg by mouth every 6 (six) hours as needed for Flatulence. Take 2 tablets by mouth every 6 hours as needed for gas    zinc sulfate (ZINCATE) 220 (50) mg capsule Take 220 mg by mouth before breakfast.     Antibiotics (From admission, onward)    Start     Stop Route Frequency Ordered    08/08/20 2100  vancomycin 750 mg in dextrose 5 % 250 mL IVPB (ready to mix system)      -- IV Every 24 hours (non-standard times) 08/08/20 0731    08/07/20 2219  vancomycin - pharmacy to dose  (vancomycin IVPB)      -- IV pharmacy to manage frequency 08/07/20 2119 08/06/20 2100  rifAXIMin tablet 550 mg      -- Oral 2 times daily 08/06/20 1751        Antifungals (From admission, onward)    None        Antivirals (From admission, onward)    None           Immunization History   Administered Date(s) Administered    Tdap 07/12/2020       Family History     None        Social History     Socioeconomic History    Marital status:      Spouse name: Not on file    Number of children: Not on file    Years of education: Not on file    Highest education level: Not on file   Occupational History    Not on file   Social Needs    Financial resource strain: Not on file    Food insecurity     Worry: Not  on file     Inability: Not on file    Transportation needs     Medical: Not on file     Non-medical: Not on file   Tobacco Use    Smoking status: Never Smoker    Smokeless tobacco: Never Used   Substance and Sexual Activity    Alcohol use: Never     Frequency: Never    Drug use: Never    Sexual activity: Not on file   Lifestyle    Physical activity     Days per week: Not on file     Minutes per session: Not on file    Stress: Not on file   Relationships    Social connections     Talks on phone: Not on file     Gets together: Not on file     Attends [employer] service: Not on file     Active member of club or organization: Not on file     Attends meetings of clubs or organizations: Not on file     Relationship status: Not on file   Other Topics Concern    Not on file   Social History Narrative    Not on file     Review of Systems   Constitutional: Negative for chills, diaphoresis and fever.   HENT: Negative for rhinorrhea and sore throat.    Respiratory: Negative for cough and shortness of breath.    Cardiovascular: Negative for chest pain and leg swelling.   Gastrointestinal: Positive for abdominal pain. Negative for diarrhea, nausea and vomiting.   Genitourinary: Negative for dysuria and hematuria.   Musculoskeletal: Negative for arthralgias and myalgias.   Skin: Positive for wound. Negative for rash.   Neurological: Negative for headaches.     Objective:     Vital Signs (Most Recent):  Temp: 98.8 °F (37.1 °C) (08/09/20 0800)  Pulse: 78 (08/09/20 0800)  Resp: 18 (08/09/20 0800)  BP: (!) 108/54 (08/09/20 0800)  SpO2: 100 % (08/09/20 0800) Vital Signs (24h Range):  Temp:  [98.2 °F (36.8 °C)-98.9 °F (37.2 °C)] 98.8 °F (37.1 °C)  Pulse:  [75-87] 78  Resp:  [16-18] 18  SpO2:  [98 %-100 %] 100 %  BP: ()/(51-55) 108/54     Weight: 66.2 kg (145 lb 15.1 oz)  Body mass index is 25.05 kg/m².    Estimated Creatinine Clearance: 60.2 mL/min (based on SCr of 1 mg/dL).    Physical Exam  Vitals signs and nursing  note reviewed.   Constitutional:       General: She is not in acute distress.     Appearance: She is well-developed. She is not diaphoretic.   HENT:      Head: Normocephalic and atraumatic.   Eyes:      Conjunctiva/sclera: Conjunctivae normal.   Neck:      Musculoskeletal: Normal range of motion and neck supple.   Cardiovascular:      Rate and Rhythm: Normal rate and regular rhythm.      Heart sounds: Normal heart sounds.   Pulmonary:      Effort: Pulmonary effort is normal. No respiratory distress.      Breath sounds: Normal breath sounds.   Abdominal:      General: There is distension.      Palpations: Abdomen is soft.      Tenderness: There is no abdominal tenderness.   Musculoskeletal: Normal range of motion.   Skin:     General: Skin is warm and dry.      Findings: No erythema or rash.      Comments: Left anterior leg with two 3 cm wounds   Neurological:      Mental Status: She is alert and oriented to person, place, and time.   Psychiatric:         Behavior: Behavior normal.         Significant Labs: All pertinent labs within the past 24 hours have been reviewed.    Significant Imaging: I have reviewed all pertinent imaging results/findings within the past 24 hours.

## 2020-08-09 NOTE — ASSESSMENT & PLAN NOTE
54-year-old female with past medical history of CHAUDHARY cirrhosis (c/b ascites, HE) hypothyroidism, adjustment disorder, presented 8/6/2020 with AMS, found to have 2 sets of blood cultures with Staph epi, started on vancomycin IV. Source possibly from her LLE wound, no history of any foreign devices.    Recommendations:  - Follow-up susceptibilities of Staph epi  - Follow-up blood cultures from 8/7/2020 to ensure clearance  - Continue IV vancomycin

## 2020-08-09 NOTE — SUBJECTIVE & OBJECTIVE
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 No pertinent surgical history.    Review of patient's allergies indicates:   Allergen Reactions    Aspirin Tinitus    Ciprofloxacin Rash    Clindamycin Itching       Medications:  Medications Prior to Admission   Medication Sig    furosemide (LASIX) 40 MG tablet Take 1 tablet (40 mg total) by mouth 2 (two) times a day.    levothyroxine (SYNTHROID) 112 MCG tablet Take 1 tablet (112 mcg total) by mouth before breakfast.    rifAXIMin (XIFAXAN) 550 mg Tab Take 550 mg by mouth 2 (two) times daily.     spironolactone (ALDACTONE) 50 MG tablet Take 100 mg by mouth once daily.     acetaminophen (TYLENOL) 500 MG tablet Take 1 tablet (500 mg total) by mouth every 6 (six) hours as needed for Pain. Max 2000mg daily.    diaper,brief,adult,disposable Misc 5 mg/kg/day by Misc.(Non-Drug; Combo Route) route 5 (five) times daily. Patient with incontinence. Please supply enough depends for 5xs/day    ergocalciferol (VITAMIN D2) 50,000 unit Cap Take 50,000 Units by mouth every 7 days.    haloperidoL (HALDOL) 1 MG tablet Take 1 tablet (1 mg total) by mouth daily as needed (non redirectable agitation).    lactulose (CEPHULAC) 10 gram packet Take 1 and a half packets three times a day     lactulose (CEPHULAC) 10 gram packet Mix 3 packets (30 g total) with liquid and take by mouth 3 (three) times daily as needed.    lactulose (CHRONULAC) 10 gram/15 mL solution Take 30 mLs (20 g total) by mouth 3 (three) times daily as needed. Alternate with kristalose if not having enough BMs.    lidocaine (LIDODERM) 5 % Place 1 patch onto the skin every 24 hours. Remove & Discard patch within 12 hours or as directed  by MD    magnesium hydroxide 400 mg/5 ml (MILK OF MAGNESIA) 400 mg/5 mL Susp Take 30 mLs (2,400 mg total) by mouth daily as needed (Take if constipated despite lactulose).    melatonin 10 mg Tab Take 10 mg by mouth nightly as needed (sleep).    midodrine (PROAMATINE) 10 MG tablet Take 10 mg by mouth 3 (three) times daily.    nystatin (MYCOSTATIN) powder Apply topically 2 (two) times daily. Apply to affected area twice a day    pantoprazole (PROTONIX) 40 MG tablet Take 40 mg by mouth before breakfast.    polyethylene glycol (GLYCOLAX) 17 gram PwPk Take 17 g by mouth once daily. Or miralax OTC. Take if constipated despite lactulose    simethicone (MYLICON) 80 MG chewable tablet Take 80 mg by mouth every 6 (six) hours as needed for Flatulence. Take 2 tablets by mouth every 6 hours as needed for gas    zinc sulfate (ZINCATE) 220 (50) mg capsule Take 220 mg by mouth before breakfast.     Antibiotics (From admission, onward)    Start     Stop Route Frequency Ordered    08/08/20 2100  vancomycin 750 mg in dextrose 5 % 250 mL IVPB (ready to mix system)      -- IV Every 24 hours (non-standard times) 08/08/20 0731    08/07/20 2219  vancomycin - pharmacy to dose  (vancomycin IVPB)      -- IV pharmacy to manage frequency 08/07/20 2119 08/06/20 2100  rifAXIMin tablet 550 mg      -- Oral 2 times daily 08/06/20 1751        Antifungals (From admission, onward)    None        Antivirals (From admission, onward)    None           Immunization History   Administered Date(s) Administered    Tdap 07/12/2020       Family History     None        Social History     Socioeconomic History    Marital status:      Spouse name: Not on file    Number of children: Not on file    Years of education: Not on file    Highest education level: Not on file   Occupational History    Not on file   Social Needs    Financial resource strain: Not on file    Food insecurity     Worry: Not on file     Inability: Not on file     Transportation needs     Medical: Not on file     Non-medical: Not on file   Tobacco Use    Smoking status: Never Smoker    Smokeless tobacco: Never Used   Substance and Sexual Activity    Alcohol use: Never     Frequency: Never    Drug use: Never    Sexual activity: Not on file   Lifestyle    Physical activity     Days per week: Not on file     Minutes per session: Not on file    Stress: Not on file   Relationships    Social connections     Talks on phone: Not on file     Gets together: Not on file     Attends Yazdanism service: Not on file     Active member of club or organization: Not on file     Attends meetings of clubs or organizations: Not on file     Relationship status: Not on file   Other Topics Concern    Not on file   Social History Narrative    Not on file     Review of Systems   Constitutional: Negative for chills, diaphoresis and fever.   HENT: Negative for rhinorrhea and sore throat.    Respiratory: Negative for cough and shortness of breath.    Cardiovascular: Negative for chest pain and leg swelling.   Gastrointestinal: Positive for abdominal pain. Negative for diarrhea, nausea and vomiting.   Genitourinary: Negative for dysuria and hematuria.   Musculoskeletal: Negative for arthralgias and myalgias.   Skin: Positive for wound. Negative for rash.   Neurological: Negative for headaches.     Objective:     Vital Signs (Most Recent):  Temp: 98.8 °F (37.1 °C) (08/09/20 0800)  Pulse: 78 (08/09/20 0800)  Resp: 18 (08/09/20 0800)  BP: (!) 108/54 (08/09/20 0800)  SpO2: 100 % (08/09/20 0800) Vital Signs (24h Range):  Temp:  [98.2 °F (36.8 °C)-98.9 °F (37.2 °C)] 98.8 °F (37.1 °C)  Pulse:  [75-87] 78  Resp:  [16-18] 18  SpO2:  [98 %-100 %] 100 %  BP: ()/(51-55) 108/54     Weight: 66.2 kg (145 lb 15.1 oz)  Body mass index is 25.05 kg/m².    Estimated Creatinine Clearance: 60.2 mL/min (based on SCr of 1 mg/dL).    Physical Exam  Vitals signs and nursing note reviewed.   Constitutional:        General: She is not in acute distress.     Appearance: She is well-developed. She is not diaphoretic.   HENT:      Head: Normocephalic and atraumatic.   Eyes:      Conjunctiva/sclera: Conjunctivae normal.   Neck:      Musculoskeletal: Normal range of motion and neck supple.   Cardiovascular:      Rate and Rhythm: Normal rate and regular rhythm.      Heart sounds: Normal heart sounds.   Pulmonary:      Effort: Pulmonary effort is normal. No respiratory distress.      Breath sounds: Normal breath sounds.   Abdominal:      General: There is distension.      Palpations: Abdomen is soft.      Tenderness: There is no abdominal tenderness.   Musculoskeletal: Normal range of motion.   Skin:     General: Skin is warm and dry.      Findings: No erythema or rash.      Comments: Left anterior leg with two 3 cm wounds   Neurological:      Mental Status: She is alert and oriented to person, place, and time.   Psychiatric:         Behavior: Behavior normal.         Significant Labs: All pertinent labs within the past 24 hours have been reviewed.    Significant Imaging: I have reviewed all pertinent imaging results/findings within the past 24 hours.

## 2020-08-09 NOTE — CONSULTS
[facility]  Infectious Disease  Consult Note    Patient Name: Zeina Mahoney  MRN: [id number]  Admission Date: 8/6/2020  Hospital Length of Stay: 3 days  Attending Physician: Lloyd Hedrick MD  Primary Care Provider: Primary Doctor No     Isolation Status: No active isolations      Inpatient consult to Infectious Diseases  Consult performed by: Lima King MD  Consult ordered by: SAEED Miller        Consult received full consult to follow

## 2020-08-09 NOTE — PLAN OF CARE
"Pt AAO x 3. Pt confused to situation and multiple times informed RN she was going to "sign herself out."  and son aware. NICHOLAS Huerta also made aware of patient wanting to sign herself out. Avasys at bedside for patient safety. IV vanc to be given nightly.  "

## 2020-08-09 NOTE — HPI
54-year-old female with past medical history of HFpEF, CHAUDHARY cirrhosis (listed for OLTx), portal hypertension, hypothyroidism, adjustment disorder, presented 8/6/2020 with AMS. Patient was recently admitted 7/18/2020-7/21/2020 with abdominal pain related to either SBO vs. pancreatitis. On work-up patient found to have 2/2 sets of blood cultures with Staph epi. Patient reports feeling malaise and fatigue for about a week prior to admission. She denied any localizing signs or symptoms of infection, no fevers, chills, sweats, nvd, SOB, cough, hematuria, dysuria. Patient with abdominal pain related to abdominal distension. She has a wound on her anterior left shin after falling. Patient reports feeling much better compared to on admission. Patient is from around [place], [place]. Dogs at home. No other travel besides [place] and [place].

## 2020-08-10 PROBLEM — D63.8 ANEMIA OF CHRONIC DISEASE: Status: ACTIVE | Noted: 2020-08-10

## 2020-08-10 LAB
ABO + RH BLD: NORMAL
ALBUMIN SERPL BCP-MCNC: 2 G/DL (ref 3.5–5.2)
ALP SERPL-CCNC: 81 U/L (ref 55–135)
ALT SERPL W/O P-5'-P-CCNC: 29 U/L (ref 10–44)
ANION GAP SERPL CALC-SCNC: 2 MMOL/L (ref 8–16)
AST SERPL-CCNC: 37 U/L (ref 10–40)
BACTERIA BLD CULT: ABNORMAL
BASOPHILS # BLD AUTO: 0.05 K/UL (ref 0–0.2)
BASOPHILS NFR BLD: 1.1 % (ref 0–1.9)
BILIRUB SERPL-MCNC: 3.8 MG/DL (ref 0.1–1)
BLD GP AB SCN CELLS X3 SERPL QL: NORMAL
BLD PROD TYP BPU: NORMAL
BLOOD UNIT EXPIRATION DATE: NORMAL
BLOOD UNIT TYPE CODE: 5100
BLOOD UNIT TYPE: NORMAL
BUN SERPL-MCNC: 21 MG/DL (ref 6–20)
CALCIUM SERPL-MCNC: 9.2 MG/DL (ref 8.7–10.5)
CHLORIDE SERPL-SCNC: 112 MMOL/L (ref 95–110)
CO2 SERPL-SCNC: 19 MMOL/L (ref 23–29)
CODING SYSTEM: NORMAL
CREAT SERPL-MCNC: 1.2 MG/DL (ref 0.5–1.4)
DIFFERENTIAL METHOD: ABNORMAL
DISPENSE STATUS: NORMAL
EOSINOPHIL # BLD AUTO: 0.1 K/UL (ref 0–0.5)
EOSINOPHIL NFR BLD: 2.7 % (ref 0–8)
ERYTHROCYTE [DISTWIDTH] IN BLOOD BY AUTOMATED COUNT: 14.6 % (ref 11.5–14.5)
EST. GFR  (AFRICAN AMERICAN): 59.2 ML/MIN/1.73 M^2
EST. GFR  (NON AFRICAN AMERICAN): 51.4 ML/MIN/1.73 M^2
GLUCOSE SERPL-MCNC: 183 MG/DL (ref 70–110)
HCT VFR BLD AUTO: 22 % (ref 37–48.5)
HGB BLD-MCNC: 7.1 G/DL (ref 12–16)
IMM GRANULOCYTES # BLD AUTO: 0.03 K/UL (ref 0–0.04)
IMM GRANULOCYTES NFR BLD AUTO: 0.6 % (ref 0–0.5)
INR PPP: 1.2 (ref 0.8–1.2)
LYMPHOCYTES # BLD AUTO: 1.1 K/UL (ref 1–4.8)
LYMPHOCYTES NFR BLD: 22.3 % (ref 18–48)
MAGNESIUM SERPL-MCNC: 1.7 MG/DL (ref 1.6–2.6)
MCH RBC QN AUTO: 35.9 PG (ref 27–31)
MCHC RBC AUTO-ENTMCNC: 32.3 G/DL (ref 32–36)
MCV RBC AUTO: 111 FL (ref 82–98)
MONOCYTES # BLD AUTO: 0.5 K/UL (ref 0.3–1)
MONOCYTES NFR BLD: 9.7 % (ref 4–15)
NEUTROPHILS # BLD AUTO: 3 K/UL (ref 1.8–7.7)
NEUTROPHILS NFR BLD: 63.6 % (ref 38–73)
NRBC BLD-RTO: 0 /100 WBC
NUM UNITS TRANS PACKED RBC: NORMAL
PHOSPHATE SERPL-MCNC: 3.5 MG/DL (ref 2.7–4.5)
PLATELET # BLD AUTO: 56 K/UL (ref 150–350)
PMV BLD AUTO: 9.9 FL (ref 9.2–12.9)
POTASSIUM SERPL-SCNC: 4.4 MMOL/L (ref 3.5–5.1)
PROT SERPL-MCNC: 4.9 G/DL (ref 6–8.4)
PROTHROMBIN TIME: 13.5 SEC (ref 9–12.5)
RBC # BLD AUTO: 1.98 M/UL (ref 4–5.4)
SODIUM SERPL-SCNC: 133 MMOL/L (ref 136–145)
VANCOMYCIN SERPL-MCNC: 11.1 UG/ML
VANCOMYCIN TROUGH SERPL-MCNC: 5.9 UG/ML (ref 10–22)
WBC # BLD AUTO: 4.75 K/UL (ref 3.9–12.7)

## 2020-08-10 PROCEDURE — 85025 COMPLETE CBC W/AUTO DIFF WBC: CPT | Mod: NTX

## 2020-08-10 PROCEDURE — 80202 ASSAY OF VANCOMYCIN: CPT | Mod: NTX

## 2020-08-10 PROCEDURE — 97116 GAIT TRAINING THERAPY: CPT | Mod: NTX

## 2020-08-10 PROCEDURE — 86901 BLOOD TYPING SEROLOGIC RH(D): CPT | Mod: NTX

## 2020-08-10 PROCEDURE — 36430 TRANSFUSION BLD/BLD COMPNT: CPT

## 2020-08-10 PROCEDURE — 86920 COMPATIBILITY TEST SPIN: CPT | Mod: NTX

## 2020-08-10 PROCEDURE — 36410 VNPNXR 3YR/> PHY/QHP DX/THER: CPT | Mod: NTX

## 2020-08-10 PROCEDURE — 25000003 PHARM REV CODE 250: Mod: NTX | Performed by: PHYSICIAN ASSISTANT

## 2020-08-10 PROCEDURE — 80202 ASSAY OF VANCOMYCIN: CPT | Mod: 91,NTX

## 2020-08-10 PROCEDURE — 84100 ASSAY OF PHOSPHORUS: CPT | Mod: NTX

## 2020-08-10 PROCEDURE — P9016 RBC LEUKOCYTES REDUCED: HCPCS | Mod: NTX

## 2020-08-10 PROCEDURE — C1751 CATH, INF, PER/CENT/MIDLINE: HCPCS | Mod: NTX

## 2020-08-10 PROCEDURE — 99233 SBSQ HOSP IP/OBS HIGH 50: CPT | Mod: NTX,,, | Performed by: PHYSICIAN ASSISTANT

## 2020-08-10 PROCEDURE — 20600001 HC STEP DOWN PRIVATE ROOM: Mod: NTX

## 2020-08-10 PROCEDURE — 85610 PROTHROMBIN TIME: CPT | Mod: NTX

## 2020-08-10 PROCEDURE — 97110 THERAPEUTIC EXERCISES: CPT | Mod: NTX

## 2020-08-10 PROCEDURE — 63600175 PHARM REV CODE 636 W HCPCS: Mod: NTX | Performed by: SURGERY

## 2020-08-10 PROCEDURE — 36415 COLL VENOUS BLD VENIPUNCTURE: CPT | Mod: NTX

## 2020-08-10 PROCEDURE — 99233 PR SUBSEQUENT HOSPITAL CARE,LEVL III: ICD-10-PCS | Mod: NTX,,, | Performed by: INTERNAL MEDICINE

## 2020-08-10 PROCEDURE — 83735 ASSAY OF MAGNESIUM: CPT | Mod: NTX

## 2020-08-10 PROCEDURE — 80053 COMPREHEN METABOLIC PANEL: CPT | Mod: NTX

## 2020-08-10 PROCEDURE — 99233 PR SUBSEQUENT HOSPITAL CARE,LEVL III: ICD-10-PCS | Mod: NTX,,, | Performed by: PHYSICIAN ASSISTANT

## 2020-08-10 PROCEDURE — 99233 SBSQ HOSP IP/OBS HIGH 50: CPT | Mod: NTX,,, | Performed by: INTERNAL MEDICINE

## 2020-08-10 PROCEDURE — 76937 US GUIDE VASCULAR ACCESS: CPT | Mod: NTX

## 2020-08-10 RX ORDER — ZINC SULFATE 50(220)MG
220 CAPSULE ORAL DAILY
Status: DISCONTINUED | OUTPATIENT
Start: 2020-08-10 | End: 2020-08-12 | Stop reason: HOSPADM

## 2020-08-10 RX ORDER — HYDROCODONE BITARTRATE AND ACETAMINOPHEN 500; 5 MG/1; MG/1
TABLET ORAL
Status: DISCONTINUED | OUTPATIENT
Start: 2020-08-10 | End: 2020-08-12 | Stop reason: HOSPADM

## 2020-08-10 RX ADMIN — DEXTROSE MONOHYDRATE 750 MG: 50 INJECTION, SOLUTION INTRAVENOUS at 09:08

## 2020-08-10 RX ADMIN — MIDODRINE HYDROCHLORIDE 10 MG: 5 TABLET ORAL at 09:08

## 2020-08-10 RX ADMIN — ZINC SULFATE 220 MG (50 MG) CAPSULE 220 MG: CAPSULE at 12:08

## 2020-08-10 RX ADMIN — POLYETHYLENE GLYCOL 3350 17 G: 17 POWDER, FOR SOLUTION ORAL at 12:08

## 2020-08-10 RX ADMIN — LEVOTHYROXINE SODIUM 112 MCG: 112 TABLET ORAL at 06:08

## 2020-08-10 RX ADMIN — MIDODRINE HYDROCHLORIDE 10 MG: 5 TABLET ORAL at 08:08

## 2020-08-10 RX ADMIN — PANTOPRAZOLE SODIUM 40 MG: 40 TABLET, DELAYED RELEASE ORAL at 06:08

## 2020-08-10 RX ADMIN — RIFAXIMIN 550 MG: 550 TABLET ORAL at 09:08

## 2020-08-10 RX ADMIN — LACTULOSE 45 G: 20 SOLUTION ORAL at 03:08

## 2020-08-10 RX ADMIN — LACTULOSE 45 G: 20 SOLUTION ORAL at 09:08

## 2020-08-10 RX ADMIN — MIDODRINE HYDROCHLORIDE 10 MG: 5 TABLET ORAL at 03:08

## 2020-08-10 NOTE — PROGRESS NOTES
[facility]  Infectious Disease  Progress Note    Patient Name: Zeina Mahoney  MRN: [id number]  Admission Date: 8/6/2020  Length of Stay: 4 days  Attending Physician: Lloyd Hedrick MD  Primary Care Provider: Primary Doctor No    Isolation Status: No active isolations  Assessment/Plan:      Bacteremia  54-year-old female with past medical history of CHAUDHARY cirrhosis (c/b ascites, HE) hypothyroidism, adjustment disorder, presented 8/6/2020 with AMS, found to have 2 sets of blood cultures with Staph epi, started on vancomycin IV. Source possibly from her LLE wound, no history of any foreign devices.    Recommendations:  - Follow-up blood cultures from 8/7/2020 to ensure clearance  - Continue IV vancomycin x 2 weeks from clearance  - f/u with me or Dr. King in clinic    Infection: staph epi bactermia from leg wound    Discharge antibiotics:   vanco    End date of IV antibiotics: 8/21  Weekly outpatient laboratory on Monday or Tuesday while on IV antibiotics.    CBC   CMP     Vancomycin trough. Target 15-20      If vancomycin trough is not at target (15-20) prior to discharge, the please perform vancomycin trough before their fourth outpatient dose.    Fax laboratory results to [facility] at [phone] with attn: dr Sharma    Outpatient Infectious Diseases clinic follow up will be arranged and found in patient calendar.    Prior to discharge, please ensure the patient's follow-up has been scheduled.  If there is still no follow-up scheduled in Infectious Diseases clinic, please send an EPIC message to Siri Krishnamurthy LPN in Infectious Diseases.                  Anticipated Disposition: pending    Thank you for your consult. I will sign off. Please contact us if you have any additional questions.    Vin Sharma MD  Infectious Disease  [facility]    Subjective:     Principal Problem:Hepatic encephalopathy    HPI: 54-year-old female with past medical history of HFpEF, CHAUDHARY  cirrhosis (listed for OLTx), portal hypertension, hypothyroidism, adjustment disorder, presented 8/6/2020 with AMS. Patient was recently admitted 7/18/2020-7/21/2020 with abdominal pain related to either SBO vs. pancreatitis. On work-up patient found to have 2/2 sets of blood cultures with Staph epi. Patient reports feeling malaise and fatigue for about a week prior to admission. She denied any localizing signs or symptoms of infection, no fevers, chills, sweats, nvd, SOB, cough, hematuria, dysuria. Patient with abdominal pain related to abdominal distension. She has a wound on her anterior left shin after falling. Patient reports feeling much better compared to on admission. Patient is from around [place], [place]. Dogs at home. No other travel besides [place] and [place].  Interval History: afebrile, bcx cleared  Review of Systems   Constitutional: Negative for chills, diaphoresis and fever.   HENT: Negative for rhinorrhea and sore throat.    Respiratory: Negative for cough and shortness of breath.    Cardiovascular: Negative for chest pain and leg swelling.   Gastrointestinal: Positive for abdominal pain. Negative for diarrhea, nausea and vomiting.   Genitourinary: Negative for dysuria and hematuria.   Musculoskeletal: Negative for arthralgias and myalgias.   Skin: Positive for wound. Negative for rash.   Neurological: Negative for headaches.     Objective:     Vital Signs (Most Recent):  Temp: 98.5 °F (36.9 °C) (08/10/20 1133)  Pulse: 78 (08/10/20 1133)  Resp: 18 (08/10/20 1133)  BP: (!) 113/53 (08/10/20 1133)  SpO2: 99 % (08/10/20 1133) Vital Signs (24h Range):  Temp:  [98.3 °F (36.8 °C)-99.8 °F (37.7 °C)] 98.5 °F (36.9 °C)  Pulse:  [] 78  Resp:  [15-18] 18  SpO2:  [97 %-100 %] 99 %  BP: ()/(46-59) 113/53     Weight: 67.9 kg (149 lb 11.1 oz)  Body mass index is 25.69 kg/m².    Estimated Creatinine Clearance: 50.8 mL/min (based on SCr of 1.2 mg/dL).    Physical Exam  Vitals signs and nursing note  reviewed.   Constitutional:       General: She is not in acute distress.     Appearance: She is well-developed. She is not diaphoretic.   HENT:      Head: Normocephalic and atraumatic.   Eyes:      Conjunctiva/sclera: Conjunctivae normal.   Neck:      Musculoskeletal: Normal range of motion and neck supple.   Cardiovascular:      Rate and Rhythm: Normal rate and regular rhythm.      Heart sounds: Normal heart sounds.   Pulmonary:      Effort: Pulmonary effort is normal. No respiratory distress.      Breath sounds: Normal breath sounds.   Abdominal:      General: There is distension.      Palpations: Abdomen is soft.      Tenderness: There is no abdominal tenderness.   Musculoskeletal: Normal range of motion.   Skin:     General: Skin is warm and dry.      Findings: No erythema or rash.      Comments: Left anterior leg with two 3 cm wounds   Neurological:      Mental Status: She is alert and oriented to person, place, and time.   Psychiatric:         Behavior: Behavior normal.         Significant Labs:   CBC:   Recent Labs   Lab 08/09/20  0712 08/10/20  0617   WBC 5.01 4.75   HGB 7.2* 7.1*   HCT 22.4* 22.0*   PLT 58* 56*     CMP:   Recent Labs   Lab 08/09/20  0712 08/10/20  0617   * 133*   K 4.7 4.4    112*   CO2 18* 19*   * 183*   BUN 21* 21*   CREATININE 1.0 1.2   CALCIUM 9.7 9.2   PROT 4.8* 4.9*   ALBUMIN 2.0* 2.0*   BILITOT 4.2* 3.8*   ALKPHOS 82 81   AST 38 37   ALT 30 29   ANIONGAP 4* 2*   EGFRNONAA >60.0 51.4*       Significant Imaging:

## 2020-08-10 NOTE — PROGRESS NOTES
Pharmacokinetic Assessment Follow Up: IV Vancomycin    Vancomycin serum concentration assessment(s):    · The random level was drawn correctly and can be used to guide therapy at this time. The measurement is below the desired definitive target range of 15 to 20 mcg/mL.    Vancomycin Regimen Plan:    · Change regimen to Vancomycin 1000 mg IV every 24 hours  · Next serum trough concentration measured at 1930 prior to 3rd dose on 8/11    Drug levels (last 3 results):  Recent Labs   Lab Result Units 08/10/20  0617   Vancomycin, Random ug/mL 11.1       Pharmacy will continue to follow and monitor vancomycin.    Please contact pharmacy at extension [phone] for questions regarding this assessment.    Thank you for the consult,   Lázaro Fontenot       Patient brief summary:  Zeina Mahoney is a 54 y.o. female initiated on antimicrobial therapy with IV Vancomycin for treatment of bacteremia    The patient's current regimen is 1000 mg q24h    Drug Allergies:   Review of patient's allergies indicates:   Allergen Reactions    Aspirin Tinitus    Ciprofloxacin Rash    Clindamycin Itching       Actual Body Weight:   67.9 kg    Renal Function:   Estimated Creatinine Clearance: 50.8 mL/min (based on SCr of 1.2 mg/dL).,     Dialysis Method (if applicable):  N/A    CBC (last 72 hours):  Recent Labs   Lab Result Units 08/08/20  0707 08/09/20  0712 08/10/20  0617   WBC K/uL 5.63 5.01 4.75   Hemoglobin g/dL 7.4* 7.2* 7.1*   Hematocrit % 22.5* 22.4* 22.0*   Platelets K/uL 68* 58* 56*   Gran% % 76.9* 67.0 63.6   Lymph% % 11.2* 20.0 22.3   Mono% % 9.1 8.8 9.7   Eosinophil% % 1.6 2.8 2.7   Basophil% % 0.7 0.6 1.1   Differential Method  Automated Automated Automated       Metabolic Panel (last 72 hours):  Recent Labs   Lab Result Units 08/08/20  0707 08/09/20  0618 08/09/20  0712 08/10/20  0617   Sodium mmol/L 133*  --  132* 133*   Potassium mmol/L 5.0  --  4.7 4.4   Chloride mmol/L 109  --  110 112*   CO2 mmol/L 19*  --  18* 19*   Glucose mg/dL  101  --  138* 183*   Glucose, UA   --  Negative  --   --    BUN, Bld mg/dL 23*  --  21* 21*   Creatinine mg/dL 1.1  --  1.0 1.2   Albumin g/dL 2.2*  --  2.0* 2.0*   Total Bilirubin mg/dL 5.5*  --  4.2* 3.8*   Alkaline Phosphatase U/L 75  --  82 81   AST U/L 37  --  38 37   ALT U/L 29  --  30 29   Magnesium mg/dL 1.6  --  1.8 1.7   Phosphorus mg/dL 3.0  --  2.8 3.5       Vancomycin Administrations:  vancomycin given in the last 96 hours                   vancomycin 750 mg in dextrose 5 % 250 mL IVPB (ready to mix system) (mg) 750 mg New Bag 08/09/20 2009     750 mg New Bag 08/08/20 2136    vancomycin 1.25 g in dextrose 5% 250 mL IVPB (ready to mix) (mg) 1,250 mg New Bag 08/07/20 2237                Microbiologic Results:  Microbiology Results (last 7 days)     Procedure Component Value Units Date/Time    Blood culture - site #1 [[id number]]  (Abnormal)  (Susceptibility) Collected: 08/06/20 1808    Order Status: Completed Specimen: Blood from Peripheral, Forearm, Left Updated: 08/10/20 1110     Blood Culture, Routine Gram stain stan bottle: Gram positive cocci in clusters resembling Staph      Positive results previously called 08/07/2020      Gram stain aer bottle: Gram positive cocci in clusters resembling Staph       Results called to and read back by:Roxana Romero RN  08/08/2020  12:27      STAPHYLOCOCCUS EPIDERMIDIS    Blood culture - site #2 [[id number]]  (Abnormal)  (Susceptibility) Collected: 08/06/20 1808    Order Status: Completed Specimen: Blood from Peripheral, Antecubital, Right Updated: 08/10/20 1110     Blood Culture, Routine Gram stain stan bottle: Gram positive cocci in clusters resembling Staph      Results called to and read back by:Malia Robledo RN 08/07/2020  21:15      Gram stain aer bottle: Gram positive cocci in clusters resembling Staph       Positive results previously called 08/08/2020  23:00      STAPHYLOCOCCUS EPIDERMIDIS    Blood culture [[id number]] Collected: 08/07/20 2140    Order Status:  Completed Specimen: Blood from Antecubital, Left Arm Updated: 08/09/20 2312     Blood Culture, Routine No Growth to date      No Growth to date      No Growth to date    Blood culture [[id number]] Collected: 08/07/20 2140    Order Status: Completed Specimen: Blood from Antecubital, Right Arm Updated: 08/09/20 2312     Blood Culture, Routine No Growth to date      No Growth to date      No Growth to date

## 2020-08-10 NOTE — ASSESSMENT & PLAN NOTE
54-year-old female with past medical history of CHAUDHARY cirrhosis (c/b ascites, HE) hypothyroidism, adjustment disorder, presented 8/6/2020 with AMS, found to have 2 sets of blood cultures with Staph epi, started on vancomycin IV. Source possibly from her LLE wound, no history of any foreign devices.    Recommendations:  - Follow-up blood cultures from 8/7/2020 to ensure clearance  - Continue IV vancomycin x 2 weeks from clearance  - f/u with me or Dr. King in clinic    Infection: staph epi bactermia from leg wound    Discharge antibiotics:   vanco    End date of IV antibiotics: 8/21  Weekly outpatient laboratory on Monday or Tuesday while on IV antibiotics.    CBC   CMP     Vancomycin trough. Target 15-20      If vancomycin trough is not at target (15-20) prior to discharge, the please perform vancomycin trough before their fourth outpatient dose.    Fax laboratory results to [facility] at [phone] with attn: dr Sharma    Outpatient Infectious Diseases clinic follow up will be arranged and found in patient calendar.    Prior to discharge, please ensure the patient's follow-up has been scheduled.  If there is still no follow-up scheduled in Infectious Diseases clinic, please send an EPIC message to Siri Krishnamurthy LPN in Infectious Diseases.

## 2020-08-10 NOTE — PT/OT/SLP PROGRESS
Physical Therapy Treatment    Patient Name:  Zeina Mahoney   MRN:  [id number]    Recommendations:     Discharge Recommendations:  home health PT   Discharge Equipment Recommendations: none   Barriers to discharge: None    Assessment:     Zeina Mahoney is a 54 y.o. female admitted with a medical diagnosis of Hepatic encephalopathy.  She presents with the following impairments/functional limitations:  weakness, impaired endurance, impaired self care skills, impaired functional mobilty, gait instability, impaired balance Pt. cooperative and tolerated treatment well. Pt. progressing with mobility.    Rehab Prognosis: Good; patient would benefit from acute skilled PT services to address these deficits and reach maximum level of function.    Recent Surgery: * No surgery found *      Plan:     During this hospitalization, patient to be seen 3 x/week to address the identified rehab impairments via gait training, therapeutic activities, therapeutic exercises and progress toward the following goals:    · Plan of Care Expires:  09/06/20    Subjective     Chief Complaint: tired  Patient/Family Comments/goals: to go home  Pain/Comfort:  · Pain Rating 1: 0/10  · Pain Rating Post-Intervention 1: 0/10      Objective:     Communicated with nursing prior to session.  Patient found supine with peripheral IV upon PT entry to room.     General Precautions: Standard, fall   Orthopedic Precautions:N/A   Braces:       Functional Mobility:  · Bed Mobility:     · Rolling Left:  stand by assistance  · Scooting: stand by assistance  · Supine to Sit: stand by assistance  · Sit to Supine: stand by assistance  · Transfers:     · Sit to Stand:  stand by assistance with rolling walker  · Gait: 300' with RW and SBA  · Balance: fair      AM-PAC 6 CLICK MOBILITY  Turning over in bed (including adjusting bedclothes, sheets and blankets)?: 3  Sitting down on and standing up from a chair with arms (e.g., wheelchair, bedside commode, etc.): 3  Moving from  lying on back to sitting on the side of the bed?: 3  Moving to and from a bed to a chair (including a wheelchair)?: 3  Need to walk in hospital room?: 3  Climbing 3-5 steps with a railing?: 3  Basic Mobility Total Score: 18       Therapeutic Activities and Exercises:   Discussed pt.'s progress, goals, and POC.    Patient left supine with all lines intact and call button in reach..    GOALS:   Multidisciplinary Problems     Physical Therapy Goals        Problem: Physical Therapy Goal    Goal Priority Disciplines Outcome Goal Variances Interventions   Physical Therapy Goal     PT, PT/OT Ongoing, Progressing     Description: Goals to be met by: 2020     Patient will increase functional independence with mobility by performin. Supine to sit with Set-up [place]  2. Sit to supine with Set-up [place]  3. Sit to stand transfer with Supervision  4. Bed to chair transfer with Supervision using LRAD  5. Gait  x 200 feet with Supervision using LRAD.   6. Lower extremity exercise program x15 reps per handout, with supervision                     Time Tracking:     PT Received On: 08/10/20  PT Start Time: 1315     PT Stop Time: 1330  PT Total Time (min): 15 min     Billable Minutes: Gait Training 15    Treatment Type: Treatment  PT/PTA: PT           Gerard Rivera, PT  08/10/2020

## 2020-08-10 NOTE — ASSESSMENT & PLAN NOTE
- presented to ED with AMS 8/6. HE acute on chronic  - ammonia mildly elevated at 81  - lactulose enemas/PO lactulose until mental status improves  - Blood cx positive for staph epi. Cont IV Vanc

## 2020-08-10 NOTE — SUBJECTIVE & OBJECTIVE
Interval History: afebrile, bcx cleared  Review of Systems   Constitutional: Negative for chills, diaphoresis and fever.   HENT: Negative for rhinorrhea and sore throat.    Respiratory: Negative for cough and shortness of breath.    Cardiovascular: Negative for chest pain and leg swelling.   Gastrointestinal: Positive for abdominal pain. Negative for diarrhea, nausea and vomiting.   Genitourinary: Negative for dysuria and hematuria.   Musculoskeletal: Negative for arthralgias and myalgias.   Skin: Positive for wound. Negative for rash.   Neurological: Negative for headaches.     Objective:     Vital Signs (Most Recent):  Temp: 98.5 °F (36.9 °C) (08/10/20 1133)  Pulse: 78 (08/10/20 1133)  Resp: 18 (08/10/20 1133)  BP: (!) 113/53 (08/10/20 1133)  SpO2: 99 % (08/10/20 1133) Vital Signs (24h Range):  Temp:  [98.3 °F (36.8 °C)-99.8 °F (37.7 °C)] 98.5 °F (36.9 °C)  Pulse:  [] 78  Resp:  [15-18] 18  SpO2:  [97 %-100 %] 99 %  BP: ()/(46-59) 113/53     Weight: 67.9 kg (149 lb 11.1 oz)  Body mass index is 25.69 kg/m².    Estimated Creatinine Clearance: 50.8 mL/min (based on SCr of 1.2 mg/dL).    Physical Exam  Vitals signs and nursing note reviewed.   Constitutional:       General: She is not in acute distress.     Appearance: She is well-developed. She is not diaphoretic.   HENT:      Head: Normocephalic and atraumatic.   Eyes:      Conjunctiva/sclera: Conjunctivae normal.   Neck:      Musculoskeletal: Normal range of motion and neck supple.   Cardiovascular:      Rate and Rhythm: Normal rate and regular rhythm.      Heart sounds: Normal heart sounds.   Pulmonary:      Effort: Pulmonary effort is normal. No respiratory distress.      Breath sounds: Normal breath sounds.   Abdominal:      General: There is distension.      Palpations: Abdomen is soft.      Tenderness: There is no abdominal tenderness.   Musculoskeletal: Normal range of motion.   Skin:     General: Skin is warm and dry.      Findings: No erythema  or rash.      Comments: Left anterior leg with two 3 cm wounds   Neurological:      Mental Status: She is alert and oriented to person, place, and time.   Psychiatric:         Behavior: Behavior normal.         Significant Labs:   CBC:   Recent Labs   Lab 08/09/20  0712 08/10/20  0617   WBC 5.01 4.75   HGB 7.2* 7.1*   HCT 22.4* 22.0*   PLT 58* 56*     CMP:   Recent Labs   Lab 08/09/20  0712 08/10/20  0617   * 133*   K 4.7 4.4    112*   CO2 18* 19*   * 183*   BUN 21* 21*   CREATININE 1.0 1.2   CALCIUM 9.7 9.2   PROT 4.8* 4.9*   ALBUMIN 2.0* 2.0*   BILITOT 4.2* 3.8*   ALKPHOS 82 81   AST 38 37   ALT 30 29   ANIONGAP 4* 2*   EGFRNONAA >60.0 51.4*       Significant Imaging:

## 2020-08-10 NOTE — ASSESSMENT & PLAN NOTE
- blood cx 8/6 positive for staph epi  - IV vanc started 8/7. Susceptible to vanc. ID following. Awaiting further recs.  - afebrile  - repeat cx NGTD  - monitor

## 2020-08-10 NOTE — PLAN OF CARE
Plan of care reviewed on am rounds,afrebrile, vss WBC 4  TRANSFUSED WITH 1 UNIT PRBCS 7.1/22  PLTS 58 inr 1.2 cr 1.2, alert, oriented to person, place, events but unsure of day of week, [employer] camera in use along with bed alarm. spent night at bs and son later in shift. Appetite good, worked with PT/OT, ambulated in hallls. Wound care consult placed to eval old rle wound ( open abrasion from fall in May) Midline cath being placed for home IV Vanc therapy,  and son both attentive w to patient's needs, emotional support provided to all

## 2020-08-10 NOTE — PROGRESS NOTES
[facility]  Liver Transplant  Progress Note    Patient Name: Zeina Mahoney  MRN: [id number]  Admission Date: 8/6/2020  Hospital Length of Stay: 4 days  Code Status: Full Code  Primary Care Provider: Primary Doctor No  Post-Operative Day:     ORGAN:     Disease Etiology: Cirrhosis: Fatty Liver (Chaudhary)  Donor Type:     CDC High Risk:     Donor CMV Status:   Donor CMV Status:   Donor HBcAB:     Donor HCV Status:     Donor HBV HERACLIO: Organ record is missing.  Donor HCV HERACLIO: Organ record is missing.  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 Recently admitted 7/18-7/21 with abdominal pain/ SBO vs. pancreatitis. While inpatient, case was discussed with [facility] who felt symptoms and lipase not attributable to pancreatitis as CT did not show pancreatic inflammation. Per [facility], they believe some small bowel inflammation likely caused transient pancreatitis and symptoms. She now presents to the ED 8/6 with AMS. Patient's  states patient has been taking less and less of her latulose at home and has subsequently been more confused, asking for Uber rides and wanting to leave. The confusion is persistent, progressive, and without aggravating factors; recently there are no relieving factors, as the  has given lactulose enemas each of the past 4 days with an additional 105 mg this morning. Pt's only physical complaint at this time is intermittent nausea. She denies fever, chills, CP, SOB, constipation, and diarrhea. Oriented to person, place, and month but not exact date. Minimal asterixis. Labs and CXR reviewed. Plan to admit to observation for management of acute on chronic HE    Hospital Course:  Patient admitted with acute on chronic HE. On admit patient extremely agitated, pulling at lines  requiring soft restraints. Mentation much improved with PO lactulose. Cont lactulose with goal 3-4 BM/day. Blood cultures 8/6 positive for staph epi. ID consulted. Placed on IV Vanc. Susceptibilities pending. H/H slowly trending down. Plan for 1u pRBC. Wound care consulted for skin abrasion on lower extremity. Appreciate assistance. AAOx3. Monitor    Scheduled Meds:   lactulose  45 g Oral TID    levothyroxine  112 mcg Oral Before breakfast    midodrine  10 mg Oral TID    pantoprazole  40 mg Oral Before breakfast    polyethylene glycol  17 g Oral Daily    rifAXIMin  550 mg Oral BID    vancomycin (VANCOCIN) IVPB  750 mg Intravenous Q24H     Continuous Infusions:  PRN Meds:acetaminophen, haloperidol lactate, lactulose, melatonin, ondansetron, sodium chloride 0.9%, sodium chloride 0.9%, Pharmacy to dose Vancomycin consult **AND** vancomycin - pharmacy to dose    Review of Systems   Constitutional: Negative for appetite change, chills and fever.   HENT: Negative for facial swelling and trouble swallowing.    Eyes: Negative for photophobia and redness.   Respiratory: Negative for cough, shortness of breath, wheezing and stridor.    Cardiovascular: Positive for leg swelling. Negative for chest pain and palpitations.   Gastrointestinal: Positive for abdominal distention and nausea. Negative for constipation, diarrhea and vomiting.   Genitourinary: Negative for decreased urine volume, difficulty urinating and dysuria.   Neurological: Positive for weakness. Negative for dizziness and light-headedness.   Hematological: Bruises/bleeds easily.   Psychiatric/Behavioral: Positive for confusion. Negative for agitation and behavioral problems.     Objective:     Vital Signs (Most Recent):  Temp: 98.5 °F (36.9 °C) (08/10/20 0810)  Pulse: 87 (08/10/20 0810)  Resp: 16 (08/10/20 0810)  BP: (!) 96/46 (08/10/20 0810)  SpO2: 100 % (08/10/20 0810) Vital Signs (24h Range):  Temp:  [98.2 °F (36.8 °C)-99.8 °F (37.7 °C)] 98.5 °F (36.9  °C)  Pulse:  [] 87  Resp:  [15-18] 16  SpO2:  [97 %-100 %] 100 %  BP: ()/(46-59) 96/46     Weight: 67.9 kg (149 lb 11.1 oz)  Body mass index is 25.69 kg/m².    Intake/Output - Last 3 Shifts       08/08 0700 - 08/09 0659 08/09 0700 - 08/10 0659 08/10 0700 - 08/11 0659    P.O. 1440 1740 330    I.V. (mL/kg)       IV Piggyback 250 250     Total Intake(mL/kg) 1690 (25.5) 1990 (29.3) 330 (4.9)    Urine (mL/kg/hr) 225 (0.1) 300 (0.2) 400 (1.5)    Emesis/NG output 0 0     Other 0 0     Stool 0 0     Blood 0 0     Total Output 225 300 400    Net +1465 +1690 -70           Urine Occurrence 5 x 5 x     Stool Occurrence 5 x 6 x     Emesis Occurrence 0 x 0 x           Physical Exam  Vitals signs and nursing note reviewed.   Constitutional:       General: She is not in acute distress.  HENT:      Head: Normocephalic and atraumatic.      Mouth/Throat:      Mouth: Mucous membranes are dry.   Eyes:      General: No scleral icterus.        Right eye: No discharge.         Left eye: No discharge.   Neck:      Musculoskeletal: Normal range of motion.   Cardiovascular:      Rate and Rhythm: Normal rate and regular rhythm.      Heart sounds: No murmur.   Pulmonary:      Effort: Pulmonary effort is normal. No respiratory distress.      Breath sounds: No wheezing or rales.   Abdominal:      General: There is distension.      Tenderness: There is abdominal tenderness (mild diffuse). There is no guarding.   Musculoskeletal:      Right lower leg: Edema present.      Left lower leg: Edema present.   Skin:     General: Skin is warm and dry.      Capillary Refill: Capillary refill takes 2 to 3 seconds.   Neurological:      General: No focal deficit present.      Mental Status: She is alert and oriented to person, place, and time.   Psychiatric:         Mood and Affect: Mood normal.         Thought Content: Thought content normal.         Laboratory:  Immunosuppressants     None        CBC:   Recent Labs   Lab 08/10/20  0617   WBC 4.75  "  RBC 1.98*   HGB 7.1*   HCT 22.0*   PLT 56*   *   MCH 35.9*   MCHC 32.3     CMP:   Recent Labs   Lab 08/10/20  0617   *   CALCIUM 9.2   ALBUMIN 2.0*   PROT 4.9*   *   K 4.4   CO2 19*   *   BUN 21*   CREATININE 1.2   ALKPHOS 81   ALT 29   AST 37   BILITOT 3.8*     Labs within the past 24 hours have been reviewed.    Diagnostic Results:  None    Debility/Functional status: No debility noted.    Assessment/Plan:     * Acute Hepatic encephalopathy  - presented to ED with AMS 8/6. HE acute on chronic  - ammonia mildly elevated at 81  - lactulose enemas/PO lactulose until mental status improves  - Blood cx positive for staph epi. Cont IV Vanc    Anemia of chronic disease  - 2/2 ESLD  - H/H trending down. No overt signs of bleeding  - Plan to transfuse 1u pRBC 8/10      Bacteremia  - blood cx 8/6 positive for staph epi  - IV vanc started 8/7. Susceptible to vanc. ID following. Awaiting further recs.  - afebrile  - repeat cx NGTD  - monitor      KG (acute kidney injury)  - improved with IVF      Acquired coagulation factor deficiency  - 2/2 ESLD. 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  - see "decompensated hepatic cirrhosis"      Decompensated hepatic cirrhosis  - 2/2 CHAUDHARY with ascites, HE, portal HTN  - currently listed for transplant with MELD of 21        VTE Risk Mitigation (From admission, onward)         Ordered     IP VTE LOW RISK PATIENT  Once      08/06/20 1440                The patients clinical status was discussed at multidisplinary rounds, involving transplant surgery, transplant medicine, pharmacy, nursing, nutrition, and social work    Discharge Planning: tentative d/c Tuesday/wednesday pending prince Dominguez PA-C  Liver Transplant  [facility] [place]

## 2020-08-10 NOTE — CONSULTS
Pt will possible need PICC for 2 weeks of Vancomycin. Notified team. Please re consult as necessary.

## 2020-08-10 NOTE — PT/OT/SLP PROGRESS
Occupational Therapy   Treatment    Name: Zeina Mahoney  MRN: [id number]  Admitting Diagnosis:  Hepatic encephalopathy       Recommendations:     Discharge Recommendations: home with home health  Discharge Equipment Recommendations:  none  Barriers to discharge:  None    Assessment:     Zeina Mahoney is a 54 y.o. female with a medical diagnosis of Hepatic encephalopathy. Performance deficits affecting function are weakness, impaired endurance, impaired functional mobilty, impaired self care skills, gait instability, decreased coordination.     Rehab Prognosis:  Good; patient would benefit from acute skilled OT services to address these deficits and reach maximum level of function.       Plan:     Patient to be seen 3 x/week to address the above listed problems via self-care/home management, therapeutic activities, therapeutic exercises, neuromuscular re-education  · Plan of Care Expires: 09/06/20  · Plan of Care Reviewed with: patient, spouse    Subjective     Pain/Comfort:  · Pain Rating 1: 0/10    Objective:     Communicated with: rn prior to session.  Patient found supine with   upon OT entry to room.    General Precautions: Standard, fall   Orthopedic Precautions:N/A     Occupational Performance:     Bed Mobility:    · Patient completed Rolling/Turning to Right with supervision  · Patient completed Scooting/Bridging with supervision  · Patient completed Supine to Sit with minimum assistance  · Patient completed Sit to Supine with supervision     Functional Mobility/Transfers:  · Patient completed Sit <> Stand Transfer with contact guard assistance  with  rolling walker   · Functional Mobility: walked ~ 130' CGA using a RW.    Activities of Daily Living:  · Declined.    [facility] 6 Click ADL: 21    Treatment & Education:  From supine level, pt completed BUE therex (x 15 reps each) including:  - bicep curls  - resistive tricep extension  - straight arm raises  - hor Abd/Add  - lat rows    *Discussed OT POC and  progress.    Patient left supine with call button in reachEducation:      GOALS:   Multidisciplinary Problems     Occupational Therapy Goals        Problem: Occupational Therapy Goal    Goal Priority Disciplines Outcome Interventions   Occupational Therapy Goal     OT, PT/OT Ongoing, Progressing    Description: Goals to be met by: 8/17/2020    Patient will increase functional independence with ADLs by performing:    UE Dressing with [place].  LE Dressing with [place].  Grooming while standing at sink with SBA..  Toileting from toilet with [place] for hygiene and clothing management.   Bathing from  standing at sink with Supervision.                     Time Tracking:     OT Date of Treatment: 08/10/20  OT Start Time: 1129  OT Stop Time: 1148  OT Total Time (min): 19 min    Billable Minutes:Therapeutic Exercise 19    FRANCISCO Poole  8/10/2020

## 2020-08-10 NOTE — SUBJECTIVE & OBJECTIVE
Scheduled Meds:   lactulose  45 g Oral TID    levothyroxine  112 mcg Oral Before breakfast    midodrine  10 mg Oral TID    pantoprazole  40 mg Oral Before breakfast    polyethylene glycol  17 g Oral Daily    rifAXIMin  550 mg Oral BID    vancomycin (VANCOCIN) IVPB  750 mg Intravenous Q24H     Continuous Infusions:  PRN Meds:acetaminophen, haloperidol lactate, lactulose, melatonin, ondansetron, sodium chloride 0.9%, sodium chloride 0.9%, Pharmacy to dose Vancomycin consult **AND** vancomycin - pharmacy to dose    Review of Systems   Constitutional: Negative for appetite change, chills and fever.   HENT: Negative for facial swelling and trouble swallowing.    Eyes: Negative for photophobia and redness.   Respiratory: Negative for cough, shortness of breath, wheezing and stridor.    Cardiovascular: Positive for leg swelling. Negative for chest pain and palpitations.   Gastrointestinal: Positive for abdominal distention and nausea. Negative for constipation, diarrhea and vomiting.   Genitourinary: Negative for decreased urine volume, difficulty urinating and dysuria.   Neurological: Positive for weakness. Negative for dizziness and light-headedness.   Hematological: Bruises/bleeds easily.   Psychiatric/Behavioral: Positive for confusion. Negative for agitation and behavioral problems.     Objective:     Vital Signs (Most Recent):  Temp: 98.5 °F (36.9 °C) (08/10/20 0810)  Pulse: 87 (08/10/20 0810)  Resp: 16 (08/10/20 0810)  BP: (!) 96/46 (08/10/20 0810)  SpO2: 100 % (08/10/20 0810) Vital Signs (24h Range):  Temp:  [98.2 °F (36.8 °C)-99.8 °F (37.7 °C)] 98.5 °F (36.9 °C)  Pulse:  [] 87  Resp:  [15-18] 16  SpO2:  [97 %-100 %] 100 %  BP: ()/(46-59) 96/46     Weight: 67.9 kg (149 lb 11.1 oz)  Body mass index is 25.69 kg/m².    Intake/Output - Last 3 Shifts       08/08 0700 - 08/09 0659 08/09 0700 - 08/10 0659 08/10 0700 - 08/11 0659    P.O. 1440 1740 330    I.V. (mL/kg)       IV Piggyback 250 250     Total  Intake(mL/kg) 1690 (25.5) 1990 (29.3) 330 (4.9)    Urine (mL/kg/hr) 225 (0.1) 300 (0.2) 400 (1.5)    Emesis/NG output 0 0     Other 0 0     Stool 0 0     Blood 0 0     Total Output 225 300 400    Net +1465 +1690 -70           Urine Occurrence 5 x 5 x     Stool Occurrence 5 x 6 x     Emesis Occurrence 0 x 0 x           Physical Exam  Vitals signs and nursing note reviewed.   Constitutional:       General: She is not in acute distress.  HENT:      Head: Normocephalic and atraumatic.      Mouth/Throat:      Mouth: Mucous membranes are dry.   Eyes:      General: No scleral icterus.        Right eye: No discharge.         Left eye: No discharge.   Neck:      Musculoskeletal: Normal range of motion.   Cardiovascular:      Rate and Rhythm: Normal rate and regular rhythm.      Heart sounds: No murmur.   Pulmonary:      Effort: Pulmonary effort is normal. No respiratory distress.      Breath sounds: No wheezing or rales.   Abdominal:      General: There is distension.      Tenderness: There is abdominal tenderness (mild diffuse). There is no guarding.   Musculoskeletal:      Right lower leg: Edema present.      Left lower leg: Edema present.   Skin:     General: Skin is warm and dry.      Capillary Refill: Capillary refill takes 2 to 3 seconds.   Neurological:      General: No focal deficit present.      Mental Status: She is alert and oriented to person, place, and time.   Psychiatric:         Mood and Affect: Mood normal.         Thought Content: Thought content normal.         Laboratory:  Immunosuppressants     None        CBC:   Recent Labs   Lab 08/10/20  0617   WBC 4.75   RBC 1.98*   HGB 7.1*   HCT 22.0*   PLT 56*   *   MCH 35.9*   MCHC 32.3     CMP:   Recent Labs   Lab 08/10/20  0617   *   CALCIUM 9.2   ALBUMIN 2.0*   PROT 4.9*   *   K 4.4   CO2 19*   *   BUN 21*   CREATININE 1.2   ALKPHOS 81   ALT 29   AST 37   BILITOT 3.8*     Labs within the past 24 hours have been reviewed.    Diagnostic  Results:  None    Debility/Functional status: No debility noted.

## 2020-08-10 NOTE — PROGRESS NOTES
Pharmacokinetic Assessment Follow Up: IV Vancomycin    Vancomycin serum concentration assessment(s):    The trough level was drawn incorrectly and cannot be used to guide therapy at this time. Level reflective of ~10 hour level on a q24h regimen.      Vancomycin Regimen Plan:    Continue regimen to Vancomycin 750 mg mg IV every 24 hours with next serum trough concentration measured at 2030 prior to 4th dose on 8/10.     Drug levels (last 3 results):  Recent Labs   Lab Result Units 08/10/20  0617   Vancomycin, Random ug/mL 11.1        Pharmacy will continue to follow and monitor vancomycin.    Please contact pharmacy at extension [phone] for questions regarding this assessment.    Thank you for the consult,   Imani Rivera       Patient brief summary:  Zeina Mahoney is a 54 y.o. female initiated on antimicrobial therapy with IV Vancomycin for treatment of bacteremia    The patient's current regimen is 750 mg q24h    Drug Allergies:   Review of patient's allergies indicates:   Allergen Reactions    Aspirin Tinitus    Ciprofloxacin Rash    Clindamycin Itching       Actual Body Weight:   67.9 kg    Renal Function:   Estimated Creatinine Clearance: 50.8 mL/min (based on SCr of 1.2 mg/dL).,     Dialysis Method (if applicable):  N/A    CBC (last 72 hours):  Recent Labs   Lab Result Units 08/08/20  0707 08/09/20  0712 08/10/20  0617   WBC K/uL 5.63 5.01 4.75   Hemoglobin g/dL 7.4* 7.2* 7.1*   Hematocrit % 22.5* 22.4* 22.0*   Platelets K/uL 68* 58* 56*   Gran% % 76.9* 67.0 63.6   Lymph% % 11.2* 20.0 22.3   Mono% % 9.1 8.8 9.7   Eosinophil% % 1.6 2.8 2.7   Basophil% % 0.7 0.6 1.1   Differential Method  Automated Automated Automated       Metabolic Panel (last 72 hours):  Recent Labs   Lab Result Units 08/08/20  0707 08/09/20  0618 08/09/20  0712 08/10/20  0617   Sodium mmol/L 133*  --  132* 133*   Potassium mmol/L 5.0  --  4.7 4.4   Chloride mmol/L 109  --  110 112*   CO2 mmol/L 19*  --  18* 19*   Glucose mg/dL 101   --  138* 183*   Glucose, UA   --  Negative  --   --    BUN, Bld mg/dL 23*  --  21* 21*   Creatinine mg/dL 1.1  --  1.0 1.2   Albumin g/dL 2.2*  --  2.0* 2.0*   Total Bilirubin mg/dL 5.5*  --  4.2* 3.8*   Alkaline Phosphatase U/L 75  --  82 81   AST U/L 37  --  38 37   ALT U/L 29  --  30 29   Magnesium mg/dL 1.6  --  1.8 1.7   Phosphorus mg/dL 3.0  --  2.8 3.5       Vancomycin Administrations:  vancomycin given in the last 96 hours                   vancomycin 750 mg in dextrose 5 % 250 mL IVPB (ready to mix system) (mg) 750 mg New Bag 08/09/20 2009     750 mg New Bag 08/08/20 2136    vancomycin 1.25 g in dextrose 5% 250 mL IVPB (ready to mix) (mg) 1,250 mg New Bag 08/07/20 2237                Microbiologic Results:  Microbiology Results (last 7 days)     Procedure Component Value Units Date/Time    Blood culture - site #1 [[id number]]  (Abnormal)  (Susceptibility) Collected: 08/06/20 1808    Order Status: Completed Specimen: Blood from Peripheral, Forearm, Left Updated: 08/10/20 1110     Blood Culture, Routine Gram stain stan bottle: Gram positive cocci in clusters resembling Staph      Positive results previously called 08/07/2020      Gram stain aer bottle: Gram positive cocci in clusters resembling Staph       Results called to and read back by:Roxana Romero RN  08/08/2020  12:27      STAPHYLOCOCCUS EPIDERMIDIS    Blood culture - site #2 [[id number]]  (Abnormal)  (Susceptibility) Collected: 08/06/20 1808    Order Status: Completed Specimen: Blood from Peripheral, Antecubital, Right Updated: 08/10/20 1110     Blood Culture, Routine Gram stain stan bottle: Gram positive cocci in clusters resembling Staph      Results called to and read back by:Malia Robledo RN 08/07/2020  21:15      Gram stain aer bottle: Gram positive cocci in clusters resembling Staph       Positive results previously called 08/08/2020  23:00      STAPHYLOCOCCUS EPIDERMIDIS    Blood culture [[id number]] Collected: 08/07/20 2140    Order Status:  Completed Specimen: Blood from Antecubital, Left Arm Updated: 08/09/20 2312     Blood Culture, Routine No Growth to date      No Growth to date      No Growth to date    Blood culture [[id number]] Collected: 08/07/20 2140    Order Status: Completed Specimen: Blood from Antecubital, Right Arm Updated: 08/09/20 2312     Blood Culture, Routine No Growth to date      No Growth to date      No Growth to date

## 2020-08-10 NOTE — CONSULTS
Midline placed  in right basilic, size [id number] Lot# [id number] Removal date on or before 9/8/20. Needle advanced into vessel with real time ultrasound guidance. Image recorded and saved.

## 2020-08-11 LAB
ALBUMIN SERPL BCP-MCNC: 1.9 G/DL (ref 3.5–5.2)
ALP SERPL-CCNC: 96 U/L (ref 55–135)
ALT SERPL W/O P-5'-P-CCNC: 26 U/L (ref 10–44)
ANION GAP SERPL CALC-SCNC: 6 MMOL/L (ref 8–16)
ANISOCYTOSIS BLD QL SMEAR: SLIGHT
AST SERPL-CCNC: 34 U/L (ref 10–40)
BASOPHILS # BLD AUTO: 0.05 K/UL (ref 0–0.2)
BASOPHILS NFR BLD: 0.9 % (ref 0–1.9)
BILIRUB SERPL-MCNC: 4.2 MG/DL (ref 0.1–1)
BUN SERPL-MCNC: 23 MG/DL (ref 6–20)
BURR CELLS BLD QL SMEAR: ABNORMAL
CALCIUM SERPL-MCNC: 8.7 MG/DL (ref 8.7–10.5)
CHLORIDE SERPL-SCNC: 110 MMOL/L (ref 95–110)
CO2 SERPL-SCNC: 15 MMOL/L (ref 23–29)
CREAT SERPL-MCNC: 1.1 MG/DL (ref 0.5–1.4)
DIFFERENTIAL METHOD: ABNORMAL
EOSINOPHIL # BLD AUTO: 0.1 K/UL (ref 0–0.5)
EOSINOPHIL NFR BLD: 2.5 % (ref 0–8)
ERYTHROCYTE [DISTWIDTH] IN BLOOD BY AUTOMATED COUNT: 19.9 % (ref 11.5–14.5)
EST. GFR  (AFRICAN AMERICAN): >60 ML/MIN/1.73 M^2
EST. GFR  (NON AFRICAN AMERICAN): 57.1 ML/MIN/1.73 M^2
GLUCOSE SERPL-MCNC: 127 MG/DL (ref 70–110)
HCT VFR BLD AUTO: 24.9 % (ref 37–48.5)
HGB BLD-MCNC: 8.2 G/DL (ref 12–16)
HYPOCHROMIA BLD QL SMEAR: ABNORMAL
IMM GRANULOCYTES # BLD AUTO: 0.05 K/UL (ref 0–0.04)
IMM GRANULOCYTES NFR BLD AUTO: 0.9 % (ref 0–0.5)
INR PPP: 1.2 (ref 0.8–1.2)
LYMPHOCYTES # BLD AUTO: 1.1 K/UL (ref 1–4.8)
LYMPHOCYTES NFR BLD: 18.6 % (ref 18–48)
MAGNESIUM SERPL-MCNC: 1.6 MG/DL (ref 1.6–2.6)
MCH RBC QN AUTO: 34.5 PG (ref 27–31)
MCHC RBC AUTO-ENTMCNC: 32.9 G/DL (ref 32–36)
MCV RBC AUTO: 105 FL (ref 82–98)
MONOCYTES # BLD AUTO: 0.6 K/UL (ref 0.3–1)
MONOCYTES NFR BLD: 9.9 % (ref 4–15)
NEUTROPHILS # BLD AUTO: 3.8 K/UL (ref 1.8–7.7)
NEUTROPHILS NFR BLD: 67.2 % (ref 38–73)
NRBC BLD-RTO: 0 /100 WBC
OVALOCYTES BLD QL SMEAR: ABNORMAL
PHOSPHATE SERPL-MCNC: 3 MG/DL (ref 2.7–4.5)
PLATELET # BLD AUTO: 58 K/UL (ref 150–350)
PLATELET BLD QL SMEAR: ABNORMAL
PMV BLD AUTO: 10.2 FL (ref 9.2–12.9)
POIKILOCYTOSIS BLD QL SMEAR: SLIGHT
POLYCHROMASIA BLD QL SMEAR: ABNORMAL
POTASSIUM SERPL-SCNC: 4.4 MMOL/L (ref 3.5–5.1)
PROT SERPL-MCNC: 4.7 G/DL (ref 6–8.4)
PROTHROMBIN TIME: 13.5 SEC (ref 9–12.5)
RBC # BLD AUTO: 2.38 M/UL (ref 4–5.4)
SCHISTOCYTES BLD QL SMEAR: ABNORMAL
SODIUM SERPL-SCNC: 131 MMOL/L (ref 136–145)
TARGETS BLD QL SMEAR: ABNORMAL
WBC # BLD AUTO: 5.65 K/UL (ref 3.9–12.7)

## 2020-08-11 PROCEDURE — 36415 COLL VENOUS BLD VENIPUNCTURE: CPT | Mod: NTX

## 2020-08-11 PROCEDURE — 63600175 PHARM REV CODE 636 W HCPCS: Mod: NTX | Performed by: SURGERY

## 2020-08-11 PROCEDURE — 36410 VNPNXR 3YR/> PHY/QHP DX/THER: CPT | Mod: NTX

## 2020-08-11 PROCEDURE — 63600175 PHARM REV CODE 636 W HCPCS: Mod: NTX | Performed by: NURSE PRACTITIONER

## 2020-08-11 PROCEDURE — 25000003 PHARM REV CODE 250: Mod: NTX | Performed by: PHYSICIAN ASSISTANT

## 2020-08-11 PROCEDURE — 94761 N-INVAS EAR/PLS OXIMETRY MLT: CPT | Mod: NTX

## 2020-08-11 PROCEDURE — 99233 PR SUBSEQUENT HOSPITAL CARE,LEVL III: ICD-10-PCS | Mod: NTX,,, | Performed by: NURSE PRACTITIONER

## 2020-08-11 PROCEDURE — 84100 ASSAY OF PHOSPHORUS: CPT | Mod: NTX

## 2020-08-11 PROCEDURE — 20600001 HC STEP DOWN PRIVATE ROOM: Mod: NTX

## 2020-08-11 PROCEDURE — 85025 COMPLETE CBC W/AUTO DIFF WBC: CPT | Mod: NTX

## 2020-08-11 PROCEDURE — 85610 PROTHROMBIN TIME: CPT | Mod: NTX

## 2020-08-11 PROCEDURE — 83735 ASSAY OF MAGNESIUM: CPT | Mod: NTX

## 2020-08-11 PROCEDURE — 76937 US GUIDE VASCULAR ACCESS: CPT | Mod: NTX

## 2020-08-11 PROCEDURE — 99233 SBSQ HOSP IP/OBS HIGH 50: CPT | Mod: NTX,,, | Performed by: NURSE PRACTITIONER

## 2020-08-11 PROCEDURE — C1751 CATH, INF, PER/CENT/MIDLINE: HCPCS | Mod: NTX

## 2020-08-11 PROCEDURE — 80053 COMPREHEN METABOLIC PANEL: CPT | Mod: NTX

## 2020-08-11 RX ORDER — LEVOTHYROXINE SODIUM 88 UG/1
88 TABLET ORAL
Status: DISCONTINUED | OUTPATIENT
Start: 2020-08-12 | End: 2020-08-12 | Stop reason: HOSPADM

## 2020-08-11 RX ORDER — VANCOMYCIN HCL IN 5 % DEXTROSE 1G/250ML
1000 PLASTIC BAG, INJECTION (ML) INTRAVENOUS
Status: DISCONTINUED | OUTPATIENT
Start: 2020-08-11 | End: 2020-08-12 | Stop reason: HOSPADM

## 2020-08-11 RX ORDER — HEPARIN SODIUM 5000 [USP'U]/ML
5000 INJECTION, SOLUTION INTRAVENOUS; SUBCUTANEOUS EVERY 8 HOURS
Status: DISCONTINUED | OUTPATIENT
Start: 2020-08-11 | End: 2020-08-12 | Stop reason: HOSPADM

## 2020-08-11 RX ADMIN — ZINC SULFATE 220 MG (50 MG) CAPSULE 220 MG: CAPSULE at 09:08

## 2020-08-11 RX ADMIN — HEPARIN SODIUM 5000 UNITS: 5000 INJECTION, SOLUTION INTRAVENOUS; SUBCUTANEOUS at 08:08

## 2020-08-11 RX ADMIN — VANCOMYCIN HYDROCHLORIDE 1000 MG: 1 INJECTION, POWDER, LYOPHILIZED, FOR SOLUTION INTRAVENOUS at 09:08

## 2020-08-11 RX ADMIN — LACTULOSE 45 G: 20 SOLUTION ORAL at 03:08

## 2020-08-11 RX ADMIN — LACTULOSE 45 G: 20 SOLUTION ORAL at 09:08

## 2020-08-11 RX ADMIN — MIDODRINE HYDROCHLORIDE 10 MG: 5 TABLET ORAL at 08:08

## 2020-08-11 RX ADMIN — RIFAXIMIN 550 MG: 550 TABLET ORAL at 08:08

## 2020-08-11 RX ADMIN — VANCOMYCIN HYDROCHLORIDE 1000 MG: 1 INJECTION, POWDER, LYOPHILIZED, FOR SOLUTION INTRAVENOUS at 10:08

## 2020-08-11 RX ADMIN — POLYETHYLENE GLYCOL 3350 17 G: 17 POWDER, FOR SOLUTION ORAL at 09:08

## 2020-08-11 RX ADMIN — PANTOPRAZOLE SODIUM 40 MG: 40 TABLET, DELAYED RELEASE ORAL at 05:08

## 2020-08-11 RX ADMIN — MIDODRINE HYDROCHLORIDE 10 MG: 5 TABLET ORAL at 03:08

## 2020-08-11 RX ADMIN — RIFAXIMIN 550 MG: 550 TABLET ORAL at 09:08

## 2020-08-11 RX ADMIN — HEPARIN SODIUM 5000 UNITS: 5000 INJECTION, SOLUTION INTRAVENOUS; SUBCUTANEOUS at 03:08

## 2020-08-11 RX ADMIN — LEVOTHYROXINE SODIUM 112 MCG: 112 TABLET ORAL at 05:08

## 2020-08-11 RX ADMIN — MIDODRINE HYDROCHLORIDE 10 MG: 5 TABLET ORAL at 09:08

## 2020-08-11 RX ADMIN — LACTULOSE 45 G: 20 SOLUTION ORAL at 08:08

## 2020-08-11 NOTE — NURSING
Jerry Perdue NP notified that patient apparently had pulled out newly placed midline catheter overnight.

## 2020-08-11 NOTE — ASSESSMENT & PLAN NOTE
- blood cx 8/6 positive for staph epi  - IV vanc started 8/7. Susceptible to vanc. ID following.   - Will plan for IV vanc x2 weeks as per ID recs.   - afebrile  - repeat cx NGTD  - Midline placed 8/10 but pt removed accidentally.   - Midline to be replaced on 8/11.   - monitor

## 2020-08-11 NOTE — PROGRESS NOTES
Pharmacokinetic Assessment Follow Up: IV Vancomycin    Vancomycin serum concentration assessment(s):    The trough level was drawn correctly and can be used to guide therapy at this time. The measurement is below the desired definitive target range of 15 to 20 mcg/mL.    Vancomycin Regimen Plan:    Change regimen to Vancomycin 1000 mg IV every 12 hours with next serum trough concentration measured at 2100 prior to 4th dose of new regimen on 8/12    Drug levels (last 3 results):  Recent Labs   Lab Result Units 08/10/20  0617 08/10/20  2100   Vancomycin, Random ug/mL 11.1  --    Vancomycin-Trough ug/mL  --  5.9*       Pharmacy will continue to follow and monitor vancomycin.    Please contact pharmacy at extension [phone] for questions regarding this assessment.    Thank you for the consult,   Imani Rivera       Patient brief summary:  Zeina Mahoney is a 54 y.o. female initiated on antimicrobial therapy with IV Vancomycin for treatment of bacteremia    The patient's current regimen is 750 mg q24    Drug Allergies:   Review of patient's allergies indicates:   Allergen Reactions    Aspirin Tinitus    Ciprofloxacin Rash    Clindamycin Itching       Actual Body Weight:   68.1 kg    Renal Function:   Estimated Creatinine Clearance: 55.5 mL/min (based on SCr of 1.1 mg/dL).,     Dialysis Method (if applicable):  N/A    CBC (last 72 hours):  Recent Labs   Lab Result Units 08/09/20  0712 08/10/20  0617 08/11/20  0558   WBC K/uL 5.01 4.75 5.65   Hemoglobin g/dL 7.2* 7.1* 8.2*   Hematocrit % 22.4* 22.0* 24.9*   Platelets K/uL 58* 56* 58*   Gran% % 67.0 63.6  --    Lymph% % 20.0 22.3  --    Mono% % 8.8 9.7  --    Eosinophil% % 2.8 2.7  --    Basophil% % 0.6 1.1  --    Differential Method  Automated Automated  --        Metabolic Panel (last 72 hours):  Recent Labs   Lab Result Units 08/09/20  0618 08/09/20  0712 08/10/20  0617 08/11/20  0558   Sodium mmol/L  --  132* 133* 131*   Potassium mmol/L  --  4.7 4.4 4.4    Chloride mmol/L  --  110 112* 110   CO2 mmol/L  --  18* 19* 15*   Glucose mg/dL  --  138* 183* 127*   Glucose, UA  Negative  --   --   --    BUN, Bld mg/dL  --  21* 21* 23*   Creatinine mg/dL  --  1.0 1.2 1.1   Albumin g/dL  --  2.0* 2.0* 1.9*   Total Bilirubin mg/dL  --  4.2* 3.8* 4.2*   Alkaline Phosphatase U/L  --  82 81 96   AST U/L  --  38 37 34   ALT U/L  --  30 29 26   Magnesium mg/dL  --  1.8 1.7 1.6   Phosphorus mg/dL  --  2.8 3.5 3.0       Vancomycin Administrations:  vancomycin given in the last 96 hours                   vancomycin 750 mg in dextrose 5 % 250 mL IVPB (ready to mix system) (mg) 750 mg New Bag 08/10/20 2154     750 mg New Bag 08/09/20 2009     750 mg New Bag 08/08/20 2136    vancomycin 1.25 g in dextrose 5% 250 mL IVPB (ready to mix) (mg) 1,250 mg New Bag 08/07/20 2237                Microbiologic Results:  Microbiology Results (last 7 days)     Procedure Component Value Units Date/Time    Blood culture [[id number]] Collected: 08/07/20 2140    Order Status: Completed Specimen: Blood from Antecubital, Left Arm Updated: 08/10/20 2312     Blood Culture, Routine No Growth to date      No Growth to date      No Growth to date      No Growth to date    Blood culture [[id number]] Collected: 08/07/20 2140    Order Status: Completed Specimen: Blood from Antecubital, Right Arm Updated: 08/10/20 2312     Blood Culture, Routine No Growth to date      No Growth to date      No Growth to date      No Growth to date    Blood culture - site #1 [[id number]]  (Abnormal)  (Susceptibility) Collected: 08/06/20 1808    Order Status: Completed Specimen: Blood from Peripheral, Forearm, Left Updated: 08/10/20 1110     Blood Culture, Routine Gram stain stan bottle: Gram positive cocci in clusters resembling Staph      Positive results previously called 08/07/2020      Gram stain aer bottle: Gram positive cocci in clusters resembling Staph       Results called to and read back by:Roxana Romero RN  08/08/2020  12:27       STAPHYLOCOCCUS EPIDERMIDIS    Blood culture - site #2 [[id number]]  (Abnormal)  (Susceptibility) Collected: 08/06/20 3442    Order Status: Completed Specimen: Blood from Peripheral, Antecubital, Right Updated: 08/10/20 1110     Blood Culture, Routine Gram stain stan bottle: Gram positive cocci in clusters resembling Staph      Results called to and read back by:Malia Robledo RN 08/07/2020  21:15      Gram stain aer bottle: Gram positive cocci in clusters resembling Staph       Positive results previously called 08/08/2020  23:00      STAPHYLOCOCCUS EPIDERMIDIS

## 2020-08-11 NOTE — CONSULTS
Midline placed  in left brachial, size [id number] Lot# [id number] Removal date on or before 9/9/20. Needle advanced into vessel with real time ultrasound guidance. Image recorded and saved.

## 2020-08-11 NOTE — PROGRESS NOTES
[facility]  Liver Transplant  Progress Note    Patient Name: Zeina Mahoney  MRN: [id number]  Admission Date: 8/6/2020  Hospital Length of Stay: 5 days  Code Status: Full Code  Primary Care Provider: Primary Doctor No  Post-Operative Day:     ORGAN:     Disease Etiology: Cirrhosis: Fatty Liver (Chaudhary)  Donor Type:     CDC High Risk:     Donor CMV Status:   Donor CMV Status:   Donor HBcAB:     Donor HCV Status:     Donor HBV HERACLIO: Organ record is missing.  Donor HCV HERACLIO: Organ record is missing.  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 Recently admitted 7/18-7/21 with abdominal pain/ SBO vs. pancreatitis. While inpatient, case was discussed with [facility] who felt symptoms and lipase not attributable to pancreatitis as CT did not show pancreatic inflammation. Per [facility], they believe some small bowel inflammation likely caused transient pancreatitis and symptoms. She now presents to the ED 8/6 with AMS. Patient's  states patient has been taking less and less of her latulose at home and has subsequently been more confused, asking for Uber rides and wanting to leave. The confusion is persistent, progressive, and without aggravating factors; recently there are no relieving factors, as the  has given lactulose enemas each of the past 4 days with an additional 105 mg this morning. Pt's only physical complaint at this time is intermittent nausea. She denies fever, chills, CP, SOB, constipation, and diarrhea. Oriented to person, place, and month but not exact date. Minimal asterixis. Labs and CXR reviewed. Plan to admit to observation for management of acute on chronic HE.     Hospital Course:  Interval history: Pt pulled recently placed midline overnight unknowingly. Will plan to  replace Midline today. Will cont with IV antibxs for treatment of Staph Epi bacteremia as per ID recs. Patient admitted with acute on chronic HE but now improved with lactulose. Cont lactulose with goal 3-4 BM/day. PRBCs given on 8/10 and H&H stable. Wound care consulted for skin abrasion on lower extremity. Appreciate assistance. ROMAINEOx3. Monitor    Scheduled Meds:   heparin (porcine)  5,000 Units Subcutaneous Q8H    lactulose  45 g Oral TID    levothyroxine  112 mcg Oral Before breakfast    midodrine  10 mg Oral TID    pantoprazole  40 mg Oral Before breakfast    polyethylene glycol  17 g Oral Daily    rifAXIMin  550 mg Oral BID    vancomycin (VANCOCIN) IVPB  1,000 mg Intravenous Q12H    zinc sulfate  220 mg Oral Daily     Continuous Infusions:  PRN Meds:sodium chloride, acetaminophen, haloperidol lactate, melatonin, ondansetron, sodium chloride 0.9%, sodium chloride 0.9%, Pharmacy to dose Vancomycin consult **AND** vancomycin - pharmacy to dose    Review of Systems   Constitutional: Negative for appetite change, chills and fever.   HENT: Negative for facial swelling and trouble swallowing.    Eyes: Negative for photophobia and redness.   Respiratory: Negative for cough, shortness of breath, wheezing and stridor.    Cardiovascular: Positive for leg swelling. Negative for chest pain and palpitations.   Gastrointestinal: Positive for abdominal distention and nausea. Negative for constipation, diarrhea and vomiting.   Genitourinary: Negative for decreased urine volume, difficulty urinating and dysuria.   Neurological: Positive for weakness. Negative for dizziness and light-headedness.   Hematological: Bruises/bleeds easily.   Psychiatric/Behavioral: Positive for confusion. Negative for agitation and behavioral problems.     Objective:     Vital Signs (Most Recent):  Temp: 98.7 °F (37.1 °C) (08/11/20 1102)  Pulse: 82 (08/11/20 1102)  Resp: 18 (08/11/20 1102)  BP: (!) 114/56 (08/11/20 1102)  SpO2: 98 % (08/11/20  1102) Vital Signs (24h Range):  Temp:  [98.4 °F (36.9 °C)-98.8 °F (37.1 °C)] 98.7 °F (37.1 °C)  Pulse:  [82-95] 82  Resp:  [16-18] 18  SpO2:  [97 %-100 %] 98 %  BP: (106-118)/(52-62) 114/56     Weight: 68.1 kg (150 lb 3.2 oz)  Body mass index is 25.78 kg/m².    Intake/Output - Last 3 Shifts       08/09 0700 - 08/10 0659 08/10 0700 - 08/11 0659 08/11 0700 - 08/12 0659    P.O. 1740 1670 600    Blood  575     IV Piggyback 250 250 250    Total Intake(mL/kg) 1990 (29.3) 2495 (36.6) 850 (12.5)    Urine (mL/kg/hr) 300 (0.2) 1100 (0.7) 200 (0.4)    Emesis/NG output 0      Other 0      Stool 0      Blood 0      Total Output 300 1100 200    Net +1690 +1395 +650           Urine Occurrence 5 x 2 x     Stool Occurrence 6 x 3 x     Emesis Occurrence 0 x            Physical Exam  Vitals signs and nursing note reviewed.   Constitutional:       General: She is not in acute distress.  HENT:      Head: Normocephalic and atraumatic.      Mouth/Throat:      Mouth: Mucous membranes are dry.   Eyes:      General: No scleral icterus.        Right eye: No discharge.         Left eye: No discharge.   Neck:      Musculoskeletal: Normal range of motion.   Cardiovascular:      Rate and Rhythm: Normal rate and regular rhythm.      Heart sounds: No murmur.   Pulmonary:      Effort: Pulmonary effort is normal. No respiratory distress.      Breath sounds: Examination of the right-lower field reveals decreased breath sounds. Examination of the left-lower field reveals decreased breath sounds. Decreased breath sounds present. No wheezing or rales.   Abdominal:      General: There is distension.      Tenderness: There is abdominal tenderness (mild diffuse). There is no guarding.   Musculoskeletal:      Right lower leg: Edema present.      Left lower leg: Edema present.   Skin:     General: Skin is warm and dry.      Capillary Refill: Capillary refill takes 2 to 3 seconds.   Neurological:      General: No focal deficit present.      Mental Status: She  "is alert and oriented to person, place, and time.   Psychiatric:         Mood and Affect: Mood normal.         Thought Content: Thought content normal.         Laboratory:  Immunosuppressants     None        CBC:   Recent Labs   Lab 08/11/20  0558   WBC 5.65   RBC 2.38*   HGB 8.2*   HCT 24.9*   PLT 58*   *   MCH 34.5*   MCHC 32.9     CMP:   Recent Labs   Lab 08/11/20  0558   *   CALCIUM 8.7   ALBUMIN 1.9*   PROT 4.7*   *   K 4.4   CO2 15*      BUN 23*   CREATININE 1.1   ALKPHOS 96   ALT 26   AST 34   BILITOT 4.2*     Labs within the past 24 hours have been reviewed.    Diagnostic Results:  None    Debility/Functional status: No debility noted.    Assessment/Plan:     * Acute Hepatic encephalopathy  - presented to ED with AMS 8/6. HE acute on chronic  - ammonia mildly elevated at 81  - lactulose enemas/PO lactulose given and improvement in mentation at this time.   - Blood cx positive for staph epi and pt on IV Vanc for treatment.   - Cont IV Vanc x2 weeks as per ID recs.     Anemia of chronic disease  - 2/2 ESLD  - No overt signs of bleeding  - Transfused 1u pRBC 8/10.   - H&H stable. Monitor.       Bacteremia  - blood cx 8/6 positive for staph epi  - IV vanc started 8/7. Susceptible to vanc. ID following.   - Will plan for IV vanc x2 weeks as per ID recs.   - afebrile  - repeat cx NGTD  - Midline placed 8/10 but pt removed accidentally.   - Midline to be replaced on 8/11.   - monitor      KG (acute kidney injury)  - improved with IVF  - Cr level at baseline.       Acquired coagulation factor deficiency  - 2/2 ESLD. 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  - see "decompensated hepatic cirrhosis"      Decompensated hepatic cirrhosis  - 2/2 CHAUDHARY with ascites, HE, portal HTN  - " currently listed for transplant with MELD of 21        VTE Risk Mitigation (From admission, onward)         Ordered     heparin (porcine) injection 5,000 Units  Every 8 hours      08/11/20 1147     IP VTE LOW RISK PATIENT  Once      08/06/20 1440                The patients clinical status was discussed at multidisplinary rounds, involving transplant surgery, transplant medicine, pharmacy, nursing, nutrition, and social work    Discharge Planning:  No Patient Care Coordination Note on file.      Jerry Perdue NP  Liver Transplant  [facility]

## 2020-08-11 NOTE — SUBJECTIVE & OBJECTIVE
Scheduled Meds:   heparin (porcine)  5,000 Units Subcutaneous Q8H    lactulose  45 g Oral TID    levothyroxine  112 mcg Oral Before breakfast    midodrine  10 mg Oral TID    pantoprazole  40 mg Oral Before breakfast    polyethylene glycol  17 g Oral Daily    rifAXIMin  550 mg Oral BID    vancomycin (VANCOCIN) IVPB  1,000 mg Intravenous Q12H    zinc sulfate  220 mg Oral Daily     Continuous Infusions:  PRN Meds:sodium chloride, acetaminophen, haloperidol lactate, melatonin, ondansetron, sodium chloride 0.9%, sodium chloride 0.9%, Pharmacy to dose Vancomycin consult **AND** vancomycin - pharmacy to dose    Review of Systems   Constitutional: Negative for appetite change, chills and fever.   HENT: Negative for facial swelling and trouble swallowing.    Eyes: Negative for photophobia and redness.   Respiratory: Negative for cough, shortness of breath, wheezing and stridor.    Cardiovascular: Positive for leg swelling. Negative for chest pain and palpitations.   Gastrointestinal: Positive for abdominal distention and nausea. Negative for constipation, diarrhea and vomiting.   Genitourinary: Negative for decreased urine volume, difficulty urinating and dysuria.   Neurological: Positive for weakness. Negative for dizziness and light-headedness.   Hematological: Bruises/bleeds easily.   Psychiatric/Behavioral: Positive for confusion. Negative for agitation and behavioral problems.     Objective:     Vital Signs (Most Recent):  Temp: 98.7 °F (37.1 °C) (08/11/20 1102)  Pulse: 82 (08/11/20 1102)  Resp: 18 (08/11/20 1102)  BP: (!) 114/56 (08/11/20 1102)  SpO2: 98 % (08/11/20 1102) Vital Signs (24h Range):  Temp:  [98.4 °F (36.9 °C)-98.8 °F (37.1 °C)] 98.7 °F (37.1 °C)  Pulse:  [82-95] 82  Resp:  [16-18] 18  SpO2:  [97 %-100 %] 98 %  BP: (106-118)/(52-62) 114/56     Weight: 68.1 kg (150 lb 3.2 oz)  Body mass index is 25.78 kg/m².    Intake/Output - Last 3 Shifts       08/09 0700 - 08/10 0659 08/10 0700 - 08/11 0659  08/11 0700 - 08/12 0659    P.O. 1740 1670 600    Blood  575     IV Piggyback 250 250 250    Total Intake(mL/kg) 1990 (29.3) 2495 (36.6) 850 (12.5)    Urine (mL/kg/hr) 300 (0.2) 1100 (0.7) 200 (0.4)    Emesis/NG output 0      Other 0      Stool 0      Blood 0      Total Output 300 1100 200    Net +1690 +1395 +650           Urine Occurrence 5 x 2 x     Stool Occurrence 6 x 3 x     Emesis Occurrence 0 x            Physical Exam  Vitals signs and nursing note reviewed.   Constitutional:       General: She is not in acute distress.  HENT:      Head: Normocephalic and atraumatic.      Mouth/Throat:      Mouth: Mucous membranes are dry.   Eyes:      General: No scleral icterus.        Right eye: No discharge.         Left eye: No discharge.   Neck:      Musculoskeletal: Normal range of motion.   Cardiovascular:      Rate and Rhythm: Normal rate and regular rhythm.      Heart sounds: No murmur.   Pulmonary:      Effort: Pulmonary effort is normal. No respiratory distress.      Breath sounds: Examination of the right-lower field reveals decreased breath sounds. Examination of the left-lower field reveals decreased breath sounds. Decreased breath sounds present. No wheezing or rales.   Abdominal:      General: There is distension.      Tenderness: There is abdominal tenderness (mild diffuse). There is no guarding.   Musculoskeletal:      Right lower leg: Edema present.      Left lower leg: Edema present.   Skin:     General: Skin is warm and dry.      Capillary Refill: Capillary refill takes 2 to 3 seconds.   Neurological:      General: No focal deficit present.      Mental Status: She is alert and oriented to person, place, and time.   Psychiatric:         Mood and Affect: Mood normal.         Thought Content: Thought content normal.         Laboratory:  Immunosuppressants     None        CBC:   Recent Labs   Lab 08/11/20  0558   WBC 5.65   RBC 2.38*   HGB 8.2*   HCT 24.9*   PLT 58*   *   MCH 34.5*   MCHC 32.9      CMP:   Recent Labs   Lab 08/11/20  0558   *   CALCIUM 8.7   ALBUMIN 1.9*   PROT 4.7*   *   K 4.4   CO2 15*      BUN 23*   CREATININE 1.1   ALKPHOS 96   ALT 26   AST 34   BILITOT 4.2*     Labs within the past 24 hours have been reviewed.    Diagnostic Results:  None    Debility/Functional status: No debility noted.

## 2020-08-11 NOTE — CONSULTS
Wound care consult received from MD team.  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 Recently admitted 7/18-7/21 with abdominal pain/ SBO vs. pancreatitis, admitted for management of acute on chronic HE.   Upon assessment with spouse at bedside, noted dry full thickness wound to L superior cardenas. Spouse reports this wound began in May from a laceration of dropping a dinner plate.   Recommend daily MediHoney to wound to provide moist wound healing and reduce bioburden. Notified nursing of recs and care provided.  Wound care team to follow pt prn [phone]    L upper shin 3x0.5x0.1cm

## 2020-08-11 NOTE — PLAN OF CARE
Plan of care reviewed on am rounds with  and patient. Oriented to person,place and situation but unsure of day of week.wbc 5, h/h improved after transfusion yesterday. Plts 58 INR 1.2   Cr stable , apppetite good, eating/ snacking ATC, lactulose continues along with Midodrine and Rifaximin. Remains listed for LT , IV Vanc  ( + BC)adjusted to q 12 hours with ID following midline cath replaced after patient pulled out overnight,  voicing frustration with patient after discovered. Left late morn and son returned this afternoon, wound care  orders with Medihoney  placed by wound care, [employer] camera and bed alarm in use, ambulates with standby assist. Emotional support provided througout shift to both patient and family

## 2020-08-11 NOTE — ASSESSMENT & PLAN NOTE
- presented to ED with AMS 8/6. HE acute on chronic  - ammonia mildly elevated at 81  - lactulose enemas/PO lactulose given and improvement in mentation at this time.   - Blood cx positive for staph epi and pt on IV Vanc for treatment.   - Cont IV Vanc x2 weeks as per ID recs.

## 2020-08-12 VITALS
SYSTOLIC BLOOD PRESSURE: 122 MMHG | HEIGHT: 64 IN | WEIGHT: 150.19 LBS | TEMPERATURE: 98 F | BODY MASS INDEX: 25.64 KG/M2 | OXYGEN SATURATION: 100 % | DIASTOLIC BLOOD PRESSURE: 57 MMHG | RESPIRATION RATE: 18 BRPM | HEART RATE: 78 BPM

## 2020-08-12 DIAGNOSIS — T36.95XA ANTIBIOTICS CAUSING ADVERSE EFFECT IN THERAPEUTIC USE, INITIAL ENCOUNTER: Primary | ICD-10-CM

## 2020-08-12 LAB
ALBUMIN SERPL BCP-MCNC: 2 G/DL (ref 3.5–5.2)
ALP SERPL-CCNC: 85 U/L (ref 55–135)
ALT SERPL W/O P-5'-P-CCNC: 27 U/L (ref 10–44)
ANION GAP SERPL CALC-SCNC: 4 MMOL/L (ref 8–16)
AST SERPL-CCNC: 34 U/L (ref 10–40)
BACTERIA BLD CULT: NORMAL
BACTERIA BLD CULT: NORMAL
BASOPHILS # BLD AUTO: 0.05 K/UL (ref 0–0.2)
BASOPHILS NFR BLD: 1 % (ref 0–1.9)
BILIRUB SERPL-MCNC: 4.9 MG/DL (ref 0.1–1)
BUN SERPL-MCNC: 20 MG/DL (ref 6–20)
CALCIUM SERPL-MCNC: 9.4 MG/DL (ref 8.7–10.5)
CHLORIDE SERPL-SCNC: 113 MMOL/L (ref 95–110)
CO2 SERPL-SCNC: 16 MMOL/L (ref 23–29)
CREAT SERPL-MCNC: 1.1 MG/DL (ref 0.5–1.4)
DIFFERENTIAL METHOD: ABNORMAL
EOSINOPHIL # BLD AUTO: 0.2 K/UL (ref 0–0.5)
EOSINOPHIL NFR BLD: 2.9 % (ref 0–8)
ERYTHROCYTE [DISTWIDTH] IN BLOOD BY AUTOMATED COUNT: 19.4 % (ref 11.5–14.5)
EST. GFR  (AFRICAN AMERICAN): >60 ML/MIN/1.73 M^2
EST. GFR  (NON AFRICAN AMERICAN): 57.1 ML/MIN/1.73 M^2
GLUCOSE SERPL-MCNC: 158 MG/DL (ref 70–110)
HCT VFR BLD AUTO: 26.7 % (ref 37–48.5)
HGB BLD-MCNC: 8.9 G/DL (ref 12–16)
IMM GRANULOCYTES # BLD AUTO: 0.04 K/UL (ref 0–0.04)
IMM GRANULOCYTES NFR BLD AUTO: 0.8 % (ref 0–0.5)
INR PPP: 1.2 (ref 0.8–1.2)
LYMPHOCYTES # BLD AUTO: 1.1 K/UL (ref 1–4.8)
LYMPHOCYTES NFR BLD: 21.8 % (ref 18–48)
MAGNESIUM SERPL-MCNC: 1.8 MG/DL (ref 1.6–2.6)
MCH RBC QN AUTO: 34.6 PG (ref 27–31)
MCHC RBC AUTO-ENTMCNC: 33.3 G/DL (ref 32–36)
MCV RBC AUTO: 104 FL (ref 82–98)
MONOCYTES # BLD AUTO: 0.6 K/UL (ref 0.3–1)
MONOCYTES NFR BLD: 10.7 % (ref 4–15)
NEUTROPHILS # BLD AUTO: 3.2 K/UL (ref 1.8–7.7)
NEUTROPHILS NFR BLD: 62.8 % (ref 38–73)
NRBC BLD-RTO: 0 /100 WBC
PHOSPHATE SERPL-MCNC: 2.8 MG/DL (ref 2.7–4.5)
PLATELET # BLD AUTO: 57 K/UL (ref 150–350)
PMV BLD AUTO: 10.1 FL (ref 9.2–12.9)
POTASSIUM SERPL-SCNC: 4 MMOL/L (ref 3.5–5.1)
PROT SERPL-MCNC: 4.9 G/DL (ref 6–8.4)
PROTHROMBIN TIME: 13.5 SEC (ref 9–12.5)
RBC # BLD AUTO: 2.57 M/UL (ref 4–5.4)
SODIUM SERPL-SCNC: 133 MMOL/L (ref 136–145)
WBC # BLD AUTO: 5.14 K/UL (ref 3.9–12.7)

## 2020-08-12 PROCEDURE — 25000003 PHARM REV CODE 250: Mod: NTX | Performed by: PHYSICIAN ASSISTANT

## 2020-08-12 PROCEDURE — 84100 ASSAY OF PHOSPHORUS: CPT | Mod: NTX

## 2020-08-12 PROCEDURE — 63600175 PHARM REV CODE 636 W HCPCS: Mod: NTX | Performed by: NURSE PRACTITIONER

## 2020-08-12 PROCEDURE — 97530 THERAPEUTIC ACTIVITIES: CPT | Mod: NTX

## 2020-08-12 PROCEDURE — 99239 HOSP IP/OBS DSCHRG MGMT >30: CPT | Mod: NTX,,, | Performed by: PHYSICIAN ASSISTANT

## 2020-08-12 PROCEDURE — 25000003 PHARM REV CODE 250: Mod: NTX | Performed by: NURSE PRACTITIONER

## 2020-08-12 PROCEDURE — 83735 ASSAY OF MAGNESIUM: CPT | Mod: NTX

## 2020-08-12 PROCEDURE — 80053 COMPREHEN METABOLIC PANEL: CPT | Mod: NTX

## 2020-08-12 PROCEDURE — 63600175 PHARM REV CODE 636 W HCPCS: Mod: NTX | Performed by: SURGERY

## 2020-08-12 PROCEDURE — 97110 THERAPEUTIC EXERCISES: CPT | Mod: NTX

## 2020-08-12 PROCEDURE — 85610 PROTHROMBIN TIME: CPT | Mod: NTX

## 2020-08-12 PROCEDURE — 85025 COMPLETE CBC W/AUTO DIFF WBC: CPT | Mod: NTX

## 2020-08-12 PROCEDURE — 36415 COLL VENOUS BLD VENIPUNCTURE: CPT | Mod: NTX

## 2020-08-12 PROCEDURE — 99239 PR HOSPITAL DISCHARGE DAY,>30 MIN: ICD-10-PCS | Mod: NTX,,, | Performed by: PHYSICIAN ASSISTANT

## 2020-08-12 RX ORDER — FUROSEMIDE 40 MG/1
40 TABLET ORAL DAILY
Qty: 30 TABLET | Refills: 2 | Status: SHIPPED | OUTPATIENT
Start: 2020-08-12 | End: 2020-08-19

## 2020-08-12 RX ORDER — VANCOMYCIN HCL IN 5 % DEXTROSE 1G/250ML
1000 PLASTIC BAG, INJECTION (ML) INTRAVENOUS EVERY 12 HOURS
Start: 2020-08-12 | End: 2020-08-20

## 2020-08-12 RX ORDER — LEVOTHYROXINE SODIUM 88 UG/1
88 TABLET ORAL
Qty: 30 TABLET | Refills: 3 | Status: SHIPPED | OUTPATIENT
Start: 2020-08-13 | End: 2020-12-11 | Stop reason: SDUPTHER

## 2020-08-12 RX ORDER — SPIRONOLACTONE 50 MG/1
50 TABLET, FILM COATED ORAL DAILY
Qty: 30 TABLET | Refills: 2 | Status: SHIPPED | OUTPATIENT
Start: 2020-08-12 | End: 2020-08-19 | Stop reason: SDUPTHER

## 2020-08-12 RX ORDER — SODIUM BICARBONATE 650 MG/1
1300 TABLET ORAL 2 TIMES DAILY
Qty: 120 TABLET | Refills: 3 | Status: ON HOLD | OUTPATIENT
Start: 2020-08-12 | End: 2020-09-12 | Stop reason: HOSPADM

## 2020-08-12 RX ORDER — SODIUM BICARBONATE 650 MG/1
1300 TABLET ORAL 2 TIMES DAILY
Status: DISCONTINUED | OUTPATIENT
Start: 2020-08-12 | End: 2020-08-12 | Stop reason: HOSPADM

## 2020-08-12 RX ADMIN — HEPARIN SODIUM 5000 UNITS: 5000 INJECTION, SOLUTION INTRAVENOUS; SUBCUTANEOUS at 06:08

## 2020-08-12 RX ADMIN — LACTULOSE 45 G: 20 SOLUTION ORAL at 08:08

## 2020-08-12 RX ADMIN — SODIUM BICARBONATE 650 MG TABLET 1300 MG: at 11:08

## 2020-08-12 RX ADMIN — ZINC SULFATE 220 MG (50 MG) CAPSULE 220 MG: CAPSULE at 08:08

## 2020-08-12 RX ADMIN — RIFAXIMIN 550 MG: 550 TABLET ORAL at 08:08

## 2020-08-12 RX ADMIN — LEVOTHYROXINE SODIUM 88 MCG: 88 TABLET ORAL at 06:08

## 2020-08-12 RX ADMIN — VANCOMYCIN HYDROCHLORIDE 1000 MG: 1 INJECTION, POWDER, LYOPHILIZED, FOR SOLUTION INTRAVENOUS at 11:08

## 2020-08-12 RX ADMIN — PANTOPRAZOLE SODIUM 40 MG: 40 TABLET, DELAYED RELEASE ORAL at 06:08

## 2020-08-12 RX ADMIN — MIDODRINE HYDROCHLORIDE 10 MG: 5 TABLET ORAL at 08:08

## 2020-08-12 NOTE — PROGRESS NOTES
Discharge    Patient scheduled to discharge today, 8/12/20. SW presented to patient's room to discuss discharge plans. Pt was laying in the bed and presented aaox4 and was communicative and responding appropriately. Pt discharging to a local [employer] ([address] [place] [address]) under the care of her  Yonis Mahoney. Pt will be transported to their local lodging by her son Yonis Mahoney Jr.    SW informed the pt that in order for the IV antibiotics to be delivered to the home where she is staying, her insurance company needs approval from her PCP. Pt informed GERHARD of who her PCP is, Dr. Merlin Charles. GERHARD provided this information to Sudha with [employer]. Sudha with [employer] (ph#[phone]).  informed GERHARD that the pt's  will contact the pt's PCP to obtain approval for the insurance company. Sudha confirmed approval that [employer] would be apply to deliver these services to the home where the patient is staying. GERHARD contacted the Veronica with [facility] ([id number]) and informed her that a home health order was put in for continuation of home health services for the patient. GERHARD informed the pt of the continuation of these services and the pt expressed understanding.    Pt reports that she is coping well by spending time with her family. She reports to SW that she is feeling better. SW provided reflective listening, support and validation. Patient denied having any questions or concerns regarding discharge. No further needs identified at this time. SW will continue to follow patient for resources, education, support dc planning as appropriate.

## 2020-08-12 NOTE — PLAN OF CARE
Pt was attempting twice to pull out midline. Took the coban and tegaderm off iv site twice. Md stated ok to apply soft mitt restraints and pt has tolerated well. Pt is confused to situation and time,.  at bedside but unable to stay up all night to watch her constantly to prevent her from pulling this midline out she did yesterday. Bed in low position and callbell within reach emily camera and bed alarm in use. Pt had 2 bms in 24 hrs. Lactulose given. Pt ambulates to bathroom with standby assist.

## 2020-08-12 NOTE — PT/OT/SLP PROGRESS
Occupational Therapy   Treatment    Name: Zeina Mahoney  MRN: [id number]  Admitting Diagnosis:  Hepatic encephalopathy       Recommendations:     Discharge Recommendations: home with home health  Discharge Equipment Recommendations:  none  Barriers to discharge:  None    Assessment:     Zeina Mahoney is a 54 y.o. female with a medical diagnosis of Hepatic encephalopathy. Likely D/C home today to await liver transplant. All therapy D/C needs addressed. Performance deficits affecting function are impaired endurance, weakness, impaired self care skills, impaired functional mobilty, gait instability, impaired balance, decreased coordination.     Rehab Prognosis:  Good; patient would benefit from acute skilled OT services to address these deficits and reach maximum level of function.       Plan:     Patient to be seen 3 x/week to address the above listed problems via self-care/home management, therapeutic activities, therapeutic exercises  · Plan of Care Expires: 09/06/20  · Plan of Care Reviewed with: patient, spouse    Subjective     Pain/Comfort:  · Pain Rating 1: 0/10    Objective:     Communicated with: rn prior to session.  Patient found up in chair with   upon OT entry to room.    General Precautions: Standard, fall   Orthopedic Precautions:N/A     Occupational Performance:     Functional Mobility/Transfers:  · Patient completed Sit <> Stand Transfer with stand by assistance  with  rolling walker   · Functional Mobility: Walked ~ 200' SBA with a .    [facility] 6 Click ADL: 21    Treatment & Education:  From chair level, pt completed BUE/LE therex (x 15 reps each) including:  - bicep curls  - lat pulls  - resistive tricep extension  - straight arm raises  - hor Abd/Add  - punches  - mini-squats    Patient left up in chair with call button in reachEducation:      GOALS:   Multidisciplinary Problems     Occupational Therapy Goals        Problem: Occupational Therapy Goal    Goal Priority Disciplines Outcome Interventions    Occupational Therapy Goal     OT, PT/OT Ongoing, Progressing    Description: Goals to be met by: 8/17/2020    Patient will increase functional independence with ADLs by performing:    UE Dressing with [place].  LE Dressing with [place].  Grooming while standing at sink with SBA..  Toileting from toilet with [place] for hygiene and clothing management.   Bathing from  standing at sink with Supervision.                     Time Tracking:     OT Date of Treatment: 08/12/20  OT Start Time: 1105  OT Stop Time: 1128  OT Total Time (min): 23 min    Billable Minutes:Therapeutic Activity 11  Therapeutic Exercise 12    FRANCISCO Poole  8/12/2020

## 2020-08-12 NOTE — PROGRESS NOTES
Met with patient and her  in preparation for discharge today.  Instructed to get labs tomorrow 8:00 am for vanc trough.  [facility] labs and clinic as previously scheduled next week.   they have [facility] booklet and uses it as a resource.  Allowed time for questions and answers.

## 2020-08-12 NOTE — PT/OT/SLP PROGRESS
Physical Therapy      Patient Name:  Zeina Mahoney   MRN:  [id number]    Patient not seen today secondary to (pt taking shower on first attempt and then pt working with OT on second attempt). Will follow-up next scheduled treatment per PT POC.       Yonis Spann, PTA

## 2020-08-12 NOTE — NURSING
Patient discharged per MD order. Patient received medication information and blue card updated by pharmacist. Patient aware of what medications were given today and when they are due tonight and tomorrow.     Patient aware of future lab and clinic appointments. Patient educated on signs and symptoms require MD notification.     [place] midline in place for Home IV antibiotics-- antibiotics set up to be delivered to their home this afternoon. Patient and her son verbalized full understanding of discharge instructions.     Patient waiting for transportation and wheelchair. Patient's son to provide transportation to their home.

## 2020-08-12 NOTE — PROGRESS NOTES
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  Blood cultures from 8/6 growing staph epi - will continue IV vancomycin for 2 weeks (EOT: 8/20/2020).  Vancomycin dose adjusted from 750 mg IV daily to 1000 mg IV q12h - will obtain Trough on 8/13 AM to adjust accordingly.  Patient's TSH/freeT4 - suggestive of hyperthyroidism - therefore adjusted synthroid dose from 112 mcg to 88 mcg -- will need TSH/free T4 in 4-6 weeks. Patient started on sodium bicarbonate for biarb of 16 - continue to monitor.      Met with Zeina Mahoney at discharge to review discharge medications and to update the blue medication card.       Zeina Mahoney   Home Medication Instructions LORETTA:[id number]    Printed on:08/12/20 1131   Medication Information                      acetaminophen (TYLENOL) 500 MG tablet  Take 1 tablet (500 mg total) by mouth every 6 (six) hours as needed for Pain. Max 2000mg daily.             diaper,brief,adult,disposable Misc  5 mg/kg/day by Misc.(Non-Drug; Combo Route) route 5 (five) times daily. Patient with incontinence. Please supply enough depends for 5xs/day             ergocalciferol (VITAMIN D2) 50,000 unit Cap  Take 50,000 Units by mouth every 7 days.             furosemide (LASIX) 40 MG tablet  Take 1 tablet (40 mg total) by mouth once daily.             haloperidoL (HALDOL) 1 MG tablet  Take 1 tablet (1 mg total) by mouth daily as needed (non redirectable agitation).             lactulose (CEPHULAC) 10 gram packet  Mix 3 packets (30 g total) with liquid and take by mouth 3 (three) times daily as needed.             lactulose (CHRONULAC) 10 gram/15 mL solution  Take 30 mLs (20 g total) by mouth 3 (three) times daily as needed. Alternate with kristalose if not having enough BMs.             levothyroxine (SYNTHROID) 88 MCG tablet  Take 1 tablet (88 mcg total) by mouth before breakfast.             lidocaine (LIDODERM) 5 %  Place 1 patch  onto the skin every 24 hours. Remove & Discard patch within 12 hours or as directed by MD             magnesium hydroxide 400 mg/5 ml (MILK OF MAGNESIA) 400 mg/5 mL Susp  Take 30 mLs (2,400 mg total) by mouth daily as needed (Take if constipated despite lactulose).             melatonin 10 mg Tab  Take 10 mg by mouth nightly as needed (sleep).             midodrine (PROAMATINE) 10 MG tablet  Take 10 mg by mouth 3 (three) times daily.             nystatin (MYCOSTATIN) powder  Apply topically 2 (two) times daily. Apply to affected area twice a day             pantoprazole (PROTONIX) 40 MG tablet  Take 40 mg by mouth before breakfast.             polyethylene glycol (GLYCOLAX) 17 gram PwPk  Take 17 g by mouth once daily. Or miralax OTC. Take if constipated despite lactulose             rifAXIMin (XIFAXAN) 550 mg Tab  Take 550 mg by mouth 2 (two) times daily.              simethicone (MYLICON) 80 MG chewable tablet  Take 80 mg by mouth every 6 (six) hours as needed for Flatulence. Take 2 tablets by mouth every 6 hours as needed for gas             sodium bicarbonate 650 MG tablet  Take 2 tablets (1,300 mg total) by mouth 2 (two) times daily.             spironolactone (ALDACTONE) 50 MG tablet  Take 1 tablet (50 mg total) by mouth once daily.             vancomycin HCl in 5 % dextrose (VANCOMYCIN IN DEXTROSE 5 %) 1 gram/250 mL Soln  Inject 250 mLs (1,000 mg total) into the vein every 12 (twelve) hours. for 8 days             zinc sulfate (ZINCATE) 220 (50) mg capsule  Take 220 mg by mouth before breakfast.                 Pt was provided with prescriptions for the following meds:  E-rx: lasix + spironolactone - dose adjustments; bicarbonate, synthroid -- ORx; IV vancomycin via home health  Printed rx: n/a  Phone-in or faxed rx: n/a to n/a pharmacy per pt request.    The following medications have been placed on HOLD and should be restarted in the outpatient setting (when appropriate): n/a    Zeina Mahoney verbalized  understanding and had the opportunity to ask questions.

## 2020-08-13 ENCOUNTER — PATIENT OUTREACH (OUTPATIENT)
Dept: ADMINISTRATIVE | Facility: CLINIC | Age: 55
End: 2020-08-13

## 2020-08-13 ENCOUNTER — TELEPHONE (OUTPATIENT)
Dept: TRANSPLANT | Facility: CLINIC | Age: 55
End: 2020-08-13

## 2020-08-13 ENCOUNTER — LAB VISIT (OUTPATIENT)
Dept: LAB | Facility: HOSPITAL | Age: 55
End: 2020-08-13
Attending: TRANSPLANT SURGERY
Payer: COMMERCIAL

## 2020-08-13 ENCOUNTER — TELEPHONE (OUTPATIENT)
Dept: TRANSPLANT | Facility: HOSPITAL | Age: 55
End: 2020-08-13

## 2020-08-13 DIAGNOSIS — T36.95XA ANTIBIOTICS CAUSING ADVERSE EFFECT IN THERAPEUTIC USE, INITIAL ENCOUNTER: ICD-10-CM

## 2020-08-13 LAB — VANCOMYCIN TROUGH SERPL-MCNC: 20.1 UG/ML (ref 10–22)

## 2020-08-13 PROCEDURE — 80202 ASSAY OF VANCOMYCIN: CPT | Mod: NTX

## 2020-08-13 PROCEDURE — 36415 COLL VENOUS BLD VENIPUNCTURE: CPT | Mod: TXP

## 2020-08-13 NOTE — PATIENT INSTRUCTIONS
Discharge Instructions for Cirrhosis of the Liver  You have been diagnosed with cirrhosis of the liver. This is a long-term (chronic) problem. It occurs when liver tissue is destroyed and replaced by scar tissue. Causes of cirrhosis include:  · Infection such as viral hepatitis  · Chronic alcoholism  · The bodys immune system attacks healthy cells (autoimmune disorders)  · Obesity  · Medicine side effects  · Genetic diseases  Sometimes the exact cause is unknown. You may not have any symptoms at first. Or your symptoms may be mild. But they usually get worse. Cirrhosis is likely to occur if you have a history of long-term alcohol abuse. Cirrhosis cant be cured. But it can be treated.   Home care  Alcohol  · People with liver disease should not drink alcohol. If you stop drinking, you may feel better and live longer.  · If you are a chronic alcohol user, you will have withdrawal symptoms. Talk with your healthcare provider for more information.    · If alcohol is a problem, ask your provider about medicine that can help you quit drinking.    · Find a local Alcoholics Anonymous support group online at www.aa.org.  Diet  · Ask your provider what kind of diet you should follow. You may be asked to limit or not eat certain foods. Do not limit your protein intake.  · Weigh yourself daily and keep a weight log. If you have a sudden change in weight, call your provider.  · Cut back on salt:  ¨ Limit canned, dried, packaged, and fast foods.  ¨ Dont add salt to your food at the table.  ¨ Season foods with herbs instead of salt when you cook.  Medicines, supplements, and vaccines  · Take your medicines exactly as directed.  · Talk to your provider before taking vitamins, over-the-counter medicines, or herbal supplements. Many herbal supplements may be poisonous (toxic) to the liver.  · Avoid aspirin and other blood-thinning medicines.  · Discuss vitamin supplements and deficiencies with your provider.  · Ask your provider  about getting vaccinations for viruses that can cause liver diseases.  Follow-up care  Follow up with your healthcare provider, or as advised. You will likely have the following tests:  · Lab tests  · Blood tests for liver cancer  · Ultrasound of your liver every 6 months  · Endoscopy to check for swollen veins (varices) in your digestive tract  When to call your provider  Call your healthcare provider right away if you have any of the following:  · Fever of 100.4°F (38.0°C) or higher  · Extreme tiredness (fatigue), weakness, or lack of appetite  · Vomiting (with or without blood)  · Yellowing of your skin or eyes (jaundice)  · Itching  · Swelling in your belly or legs  · Black or tarry stools  · Skin that bruises easily  · Confusion or trouble thinking clearly     © 7578-2996 [employer]. [address] [place] [address], [place] [address]. All rights reserved. This information is not intended as a substitute for professional medical care. Always follow your healthcare professional's instructions.

## 2020-08-13 NOTE — TELEPHONE ENCOUNTER
GERHARD received a forwarded voicemail from Josesito with [facility] (ph:[phone]) calling to confirm the pt's discharge from the hospital as well as confirmation on discharge plans with home health. GERHARD confirmed with the pt's [facility]  that the pt discharged yesterday and is receiving home health services with [facility] and IV services with [facility]. GERHARD provided the  with contact information for both agencies.  Veronica with [facility] ([phone]) and Tawny with [facility] [phone].

## 2020-08-13 NOTE — TELEPHONE ENCOUNTER
PDOL call made and spoke to patient's .   stated that patient is doing ok today.  [facility] had been there already to get labs and will plan to go back on Saturday, 8/15/20.

## 2020-08-15 ENCOUNTER — TELEPHONE (OUTPATIENT)
Dept: INFECTIOUS DISEASES | Facility: HOSPITAL | Age: 55
End: 2020-08-15

## 2020-08-15 ENCOUNTER — LAB VISIT (OUTPATIENT)
Dept: LAB | Facility: HOSPITAL | Age: 55
End: 2020-08-15
Attending: INTERNAL MEDICINE
Payer: COMMERCIAL

## 2020-08-15 DIAGNOSIS — K76.82 HEPATIC ENCEPHALOPATHY: Primary | ICD-10-CM

## 2020-08-15 LAB
ALBUMIN SERPL BCP-MCNC: 2.1 G/DL (ref 3.5–5.2)
ALP SERPL-CCNC: 91 U/L (ref 55–135)
ALT SERPL W/O P-5'-P-CCNC: 24 U/L (ref 10–44)
ANION GAP SERPL CALC-SCNC: 4 MMOL/L (ref 8–16)
AST SERPL-CCNC: 36 U/L (ref 10–40)
BASOPHILS # BLD AUTO: 0.06 K/UL (ref 0–0.2)
BASOPHILS NFR BLD: 1 % (ref 0–1.9)
BILIRUB SERPL-MCNC: 4.7 MG/DL (ref 0.1–1)
BUN SERPL-MCNC: 26 MG/DL (ref 6–20)
CALCIUM SERPL-MCNC: 9.3 MG/DL (ref 8.7–10.5)
CHLORIDE SERPL-SCNC: 109 MMOL/L (ref 95–110)
CO2 SERPL-SCNC: 19 MMOL/L (ref 23–29)
CREAT SERPL-MCNC: 1.1 MG/DL (ref 0.5–1.4)
CRP SERPL-MCNC: 10.2 MG/L (ref 0–8.2)
DIFFERENTIAL METHOD: ABNORMAL
EOSINOPHIL # BLD AUTO: 0.1 K/UL (ref 0–0.5)
EOSINOPHIL NFR BLD: 2.1 % (ref 0–8)
ERYTHROCYTE [DISTWIDTH] IN BLOOD BY AUTOMATED COUNT: 18.8 % (ref 11.5–14.5)
ERYTHROCYTE [SEDIMENTATION RATE] IN BLOOD BY WESTERGREN METHOD: 7 MM/HR (ref 0–36)
EST. GFR  (AFRICAN AMERICAN): >60 ML/MIN/1.73 M^2
EST. GFR  (NON AFRICAN AMERICAN): 57.1 ML/MIN/1.73 M^2
GLUCOSE SERPL-MCNC: 182 MG/DL (ref 70–110)
HCT VFR BLD AUTO: 25.6 % (ref 37–48.5)
HGB BLD-MCNC: 8.4 G/DL (ref 12–16)
IMM GRANULOCYTES # BLD AUTO: 0.03 K/UL (ref 0–0.04)
IMM GRANULOCYTES NFR BLD AUTO: 0.5 % (ref 0–0.5)
LYMPHOCYTES # BLD AUTO: 1.1 K/UL (ref 1–4.8)
LYMPHOCYTES NFR BLD: 18.4 % (ref 18–48)
MCH RBC QN AUTO: 34.3 PG (ref 27–31)
MCHC RBC AUTO-ENTMCNC: 32.8 G/DL (ref 32–36)
MCV RBC AUTO: 105 FL (ref 82–98)
MONOCYTES # BLD AUTO: 0.4 K/UL (ref 0.3–1)
MONOCYTES NFR BLD: 6.8 % (ref 4–15)
NEUTROPHILS # BLD AUTO: 4.1 K/UL (ref 1.8–7.7)
NEUTROPHILS NFR BLD: 71.2 % (ref 38–73)
NRBC BLD-RTO: 0 /100 WBC
PLATELET # BLD AUTO: 67 K/UL (ref 150–350)
PMV BLD AUTO: 10.4 FL (ref 9.2–12.9)
POTASSIUM SERPL-SCNC: 4.2 MMOL/L (ref 3.5–5.1)
PROT SERPL-MCNC: 5.1 G/DL (ref 6–8.4)
RBC # BLD AUTO: 2.45 M/UL (ref 4–5.4)
SODIUM SERPL-SCNC: 132 MMOL/L (ref 136–145)
VANCOMYCIN TROUGH SERPL-MCNC: 27.1 UG/ML (ref 10–22)
WBC # BLD AUTO: 5.75 K/UL (ref 3.9–12.7)

## 2020-08-15 PROCEDURE — 85652 RBC SED RATE AUTOMATED: CPT | Mod: NTX

## 2020-08-15 PROCEDURE — 80202 ASSAY OF VANCOMYCIN: CPT | Mod: NTX

## 2020-08-15 PROCEDURE — 86140 C-REACTIVE PROTEIN: CPT | Mod: NTX

## 2020-08-15 PROCEDURE — 80053 COMPREHEN METABOLIC PANEL: CPT | Mod: TXP

## 2020-08-15 PROCEDURE — 85025 COMPLETE CBC W/AUTO DIFF WBC: CPT | Mod: TXP

## 2020-08-17 ENCOUNTER — TELEPHONE (OUTPATIENT)
Dept: TRANSPLANT | Facility: CLINIC | Age: 55
End: 2020-08-17

## 2020-08-17 DIAGNOSIS — T82.524A DISPLACEMENT OF PERIPHERALLY INSERTED CENTRAL CATHETER (PICC): Primary | ICD-10-CM

## 2020-08-17 DIAGNOSIS — Z76.82 ORGAN TRANSPLANT CANDIDATE: ICD-10-CM

## 2020-08-17 DIAGNOSIS — K74.60 LIVER CIRRHOSIS SECONDARY TO NASH: ICD-10-CM

## 2020-08-17 DIAGNOSIS — K75.81 LIVER CIRRHOSIS SECONDARY TO NASH: ICD-10-CM

## 2020-08-17 NOTE — TELEPHONE ENCOUNTER
----- Message from Jose Meraz sent at 8/17/2020 10:21 AM CDT -----  Regarding: :  Calling because of Iv line issues administering the medicine to pt      [phone] (M)

## 2020-08-17 NOTE — TELEPHONE ENCOUNTER
Returned call to pt's , Yonis. Pt DCed home with IV Vancomycin q24 hrs via PICC line. Line was accidentally pulled last night. Will contact IR to see if line can be replaced today.

## 2020-08-17 NOTE — PROGRESS NOTES
Pt accidentally removed PICC line overnight & still needs Vanc administration through Wednesday. Discussed with Dr. Reyes. Pt OK to have replaced tomorrow morning. Scheduled in IR at 0800 on 8/18. Patient notified by coordinator via portal. IR RN to call with instructions.

## 2020-08-18 ENCOUNTER — HOSPITAL ENCOUNTER (OUTPATIENT)
Dept: INTERVENTIONAL RADIOLOGY/VASCULAR | Facility: HOSPITAL | Age: 55
Discharge: HOME | End: 2020-08-18
Attending: INTERNAL MEDICINE
Payer: COMMERCIAL

## 2020-08-18 ENCOUNTER — TELEPHONE (OUTPATIENT)
Dept: INTERVENTIONAL RADIOLOGY/VASCULAR | Facility: HOSPITAL | Age: 55
End: 2020-08-18

## 2020-08-18 ENCOUNTER — NURSE TRIAGE (OUTPATIENT)
Dept: ADMINISTRATIVE | Facility: CLINIC | Age: 55
End: 2020-08-18

## 2020-08-18 ENCOUNTER — DOCUMENT SCAN (OUTPATIENT)
Dept: HOME HEALTH SERVICES | Facility: HOSPITAL | Age: 55
End: 2020-08-18
Payer: COMMERCIAL

## 2020-08-18 VITALS
HEART RATE: 73 BPM | SYSTOLIC BLOOD PRESSURE: 99 MMHG | OXYGEN SATURATION: 100 % | RESPIRATION RATE: 18 BRPM | DIASTOLIC BLOOD PRESSURE: 57 MMHG

## 2020-08-18 DIAGNOSIS — Z76.82 ORGAN TRANSPLANT CANDIDATE: ICD-10-CM

## 2020-08-18 DIAGNOSIS — K74.60 LIVER CIRRHOSIS SECONDARY TO NASH: ICD-10-CM

## 2020-08-18 DIAGNOSIS — K75.81 LIVER CIRRHOSIS SECONDARY TO NASH: ICD-10-CM

## 2020-08-18 DIAGNOSIS — T82.524A DISPLACEMENT OF PERIPHERALLY INSERTED CENTRAL CATHETER (PICC): ICD-10-CM

## 2020-08-18 PROCEDURE — C1751 CATH, INF, PER/CENT/MIDLINE: HCPCS | Mod: NTX

## 2020-08-18 PROCEDURE — 25500020 PHARM REV CODE 255: Mod: TXP | Performed by: INTERNAL MEDICINE

## 2020-08-18 PROCEDURE — 36573 INSJ PICC RS&I 5 YR+: CPT | Mod: NTX,,, | Performed by: RADIOLOGY

## 2020-08-18 PROCEDURE — 36573 IR PICC LINE PLACEMENT W/O PORT, W/IMG > 5 Y/O: ICD-10-PCS | Mod: NTX,,, | Performed by: RADIOLOGY

## 2020-08-18 RX ADMIN — IOHEXOL 10 ML: 300 INJECTION, SOLUTION INTRAVENOUS at 09:08

## 2020-08-18 NOTE — DISCHARGE SUMMARY
Radiology Discharge Summary      Hospital Course: No complications    Admit Date: 8/18/2020  Discharge Date: 08/18/2020     Instructions Given to Patient: Yes  Diet: Resume prior diet  Activity: activity as tolerated and no driving for today    Description of Condition on Discharge: Stable  Vital Signs (Most Recent): Pulse: 73 (08/18/20 0900)  Resp: 18 (08/18/20 0900)  BP: (!) 99/57 (08/18/20 0900)  SpO2: 100 % (08/18/20 0900)    Discharge Disposition: Home    Discharge Diagnosis: Bacteremia, dislodge PICC line s/p  PICC placement. Follow up as scheduled.    Alexis Padron M.D.  Diagnostic and Interventional Radiologist  Department of Radiology  Pager: [phone]

## 2020-08-18 NOTE — NURSING
Returned pt husbands call.  Spoke to Mr. Mahoney.  He had questions regarding PICC.  Pt previously had single lumen PICC, today pt had double lumen placed.  Questions answered.  Pt provided clinic # for further future questions.

## 2020-08-18 NOTE — H&P
Vascular and Interventional Radiology History & Physical    Date:  8/18/2020      History of Present Illness:  Zeina Mahoney is a 54 y.o. female who presents for PICC line placement after accidentally pulling it out last night.     Past Medical History:  Past Medical History:   Diagnosis Date    Chronic Hepatic encephalopathy 6/25/2020    Decompensated hepatic cirrhosis 6/25/2020    History of pancreatitis 6/25/2020    Liver cirrhosis secondary to CHAUDHARY 6/25/2020    Macrocytic anemia 6/25/2020    Other ascites 6/25/2020    Thrombocytopenia 6/25/2020       Past Surgical History:  No past surgical history on file.     Sedation History:    Denies any adverse reactions.  Denies problems laying flat.    Social History:  Social History     Tobacco Use    Smoking status: Never Smoker    Smokeless tobacco: Never Used   Substance Use Topics    Alcohol use: Never     Frequency: Never    Drug use: Never        Home Medications:   Prior to Admission medications    Medication Sig Start Date End Date Taking? Authorizing Provider   acetaminophen (TYLENOL) 500 MG tablet Take 1 tablet (500 mg total) by mouth every 6 (six) hours as needed for Pain. Max 2000mg daily. 6/29/20   Elen Dumont MD   diaper,brief,adult,disposable Misc 5 mg/kg/day by Misc.(Non-Drug; Combo Route) route 5 (five) times daily. Patient with incontinence. Please supply enough depends for 5xs/day 6/26/20   Elen Dumont MD   ergocalciferol (VITAMIN D2) 50,000 unit Cap Take 50,000 Units by mouth every 7 days.    Historical Provider, MD   furosemide (LASIX) 40 MG tablet Take 1 tablet (40 mg total) by mouth once daily. 8/12/20   Jairo Reyes MD   haloperidoL (HALDOL) 1 MG tablet Take 1 tablet (1 mg total) by mouth daily as needed (non redirectable agitation). 7/10/20   Elen Dumont MD   lactulose (CEPHULAC) 10 gram packet Mix 3 packets (30 g total) with liquid and take by mouth 3 (three) times daily as needed. 7/21/20   Jairo eRyes MD    levothyroxine (SYNTHROID) 88 MCG tablet Take 1 tablet (88 mcg total) by mouth before breakfast. 8/13/20   Jairo Reyes MD   lidocaine (LIDODERM) 5 % Place 1 patch onto the skin every 24 hours. Remove & Discard patch within 12 hours or as directed by MD [place] Provider, MD   magnesium hydroxide 400 mg/5 ml (MILK OF MAGNESIA) 400 mg/5 mL Susp Take 30 mLs (2,400 mg total) by mouth daily as needed (Take if constipated despite lactulose). 7/10/20   Elen Dumont MD   melatonin 10 mg Tab Take 10 mg by mouth nightly as needed (sleep).    Historical Provider, MD   midodrine (PROAMATINE) 10 MG tablet Take 10 mg by mouth 3 (three) times daily.    Historical Provider, MD   nystatin (MYCOSTATIN) powder Apply topically 2 (two) times daily. Apply to affected area twice a day    Historical Provider, MD   pantoprazole (PROTONIX) 40 MG tablet Take 40 mg by mouth before breakfast.    Historical Provider, MD   polyethylene glycol (GLYCOLAX) 17 gram PwPk Take 17 g by mouth once daily. Or miralax OTC. Take if constipated despite lactulose 7/10/20   Elen Dumont MD   rifAXIMin (XIFAXAN) 550 mg Tab Take 550 mg by mouth 2 (two) times daily.     Historical Provider, MD   simethicone (MYLICON) 80 MG chewable tablet Take 80 mg by mouth every 6 (six) hours as needed for Flatulence. Take 2 tablets by mouth every 6 hours as needed for gas    Historical Provider, MD   sodium bicarbonate 650 MG tablet Take 2 tablets (1,300 mg total) by mouth 2 (two) times daily. 8/12/20   Jairo Reyes MD   spironolactone (ALDACTONE) 50 MG tablet Take 1 tablet (50 mg total) by mouth once daily. 8/12/20   Jairo Reyes MD   vancomycin HCl in 5 % dextrose (VANCOMYCIN IN DEXTROSE 5 %) 1 gram/250 mL Soln Inject 250 mLs (1,000 mg total) into the vein every 12 (twelve) hours. for 8 days 8/12/20 8/20/20  Jairo Reyes MD   zinc sulfate (ZINCATE) 220 (50) mg capsule Take 220 mg by mouth before breakfast.    Historical Provider, MD       Inpatient  Medications:    Current Outpatient Medications:     acetaminophen (TYLENOL) 500 MG tablet, Take 1 tablet (500 mg total) by mouth every 6 (six) hours as needed for Pain. Max 2000mg daily., Disp: , Rfl: 0    diaper,brief,adult,disposable Misc, 5 mg/kg/day by Misc.(Non-Drug; Combo Route) route 5 (five) times daily. Patient with incontinence. Please supply enough depends for 5xs/day, Disp: , Rfl:     ergocalciferol (VITAMIN D2) 50,000 unit Cap, Take 50,000 Units by mouth every 7 days., Disp: , Rfl:     furosemide (LASIX) 40 MG tablet, Take 1 tablet (40 mg total) by mouth once daily., Disp: 30 tablet, Rfl: 2    haloperidoL (HALDOL) 1 MG tablet, Take 1 tablet (1 mg total) by mouth daily as needed (non redirectable agitation)., Disp: , Rfl:     lactulose (CEPHULAC) 10 gram packet, Mix 3 packets (30 g total) with liquid and take by mouth 3 (three) times daily as needed., Disp: 90 packet, Rfl: 11    levothyroxine (SYNTHROID) 88 MCG tablet, Take 1 tablet (88 mcg total) by mouth before breakfast., Disp: 30 tablet, Rfl: 3    lidocaine (LIDODERM) 5 %, Place 1 patch onto the skin every 24 hours. Remove & Discard patch within 12 hours or as directed by MD, Disp: , Rfl:     magnesium hydroxide 400 mg/5 ml (MILK OF MAGNESIA) 400 mg/5 mL Susp, Take 30 mLs (2,400 mg total) by mouth daily as needed (Take if constipated despite lactulose)., Disp:  , Rfl:     melatonin 10 mg Tab, Take 10 mg by mouth nightly as needed (sleep)., Disp: , Rfl:     midodrine (PROAMATINE) 10 MG tablet, Take 10 mg by mouth 3 (three) times daily., Disp: , Rfl:     nystatin (MYCOSTATIN) powder, Apply topically 2 (two) times daily. Apply to affected area twice a day, Disp: , Rfl:     pantoprazole (PROTONIX) 40 MG tablet, Take 40 mg by mouth before breakfast., Disp: , Rfl:     polyethylene glycol (GLYCOLAX) 17 gram PwPk, Take 17 g by mouth once daily. Or miralax OTC. Take if constipated despite lactulose, Disp: , Rfl: 0    rifAXIMin (XIFAXAN) 550 mg  Tab, Take 550 mg by mouth 2 (two) times daily. , Disp: , Rfl:     simethicone (MYLICON) 80 MG chewable tablet, Take 80 mg by mouth every 6 (six) hours as needed for Flatulence. Take 2 tablets by mouth every 6 hours as needed for gas, Disp: , Rfl:     sodium bicarbonate 650 MG tablet, Take 2 tablets (1,300 mg total) by mouth 2 (two) times daily., Disp: 120 tablet, Rfl: 3    spironolactone (ALDACTONE) 50 MG tablet, Take 1 tablet (50 mg total) by mouth once daily., Disp: 30 tablet, Rfl: 2    vancomycin HCl in 5 % dextrose (VANCOMYCIN IN DEXTROSE 5 %) 1 gram/250 mL Soln, Inject 250 mLs (1,000 mg total) into the vein every 12 (twelve) hours. for 8 days, Disp: , Rfl:     zinc sulfate (ZINCATE) 220 (50) mg capsule, Take 220 mg by mouth before breakfast., Disp: , Rfl:      Anticoagulants/Antiplatelets:   no anticoagulation    Allergies:   Review of patient's allergies indicates:   Allergen Reactions    Aspirin Tinitus    Ciprofloxacin Rash    Clindamycin Itching       Review of Systems:   As documented in primary provider H&P.    Vital Signs (Most Recent):       Physical Exam:  No acute distress, laying comfortably in bed, pleasant and cooperative  Regular rate and rhythm  Breathing unlabored  Abdomen benign  Extremities warm and well perfused    Sedation Exam:  ASA: II - Patient appears to have mild systemic disease, adequately controlled   Mallampati: II (hard and soft palate, upper portion of tonsils anduvula visible)     Laboratory:  Lab Results   Component Value Date    INR 1.2 08/12/2020       Lab Results   Component Value Date    WBC 5.75 08/15/2020    HGB 8.4 (L) 08/15/2020    HCT 25.6 (L) 08/15/2020     (H) 08/15/2020    PLT 67 (L) 08/15/2020      Lab Results   Component Value Date     (H) 08/15/2020     (L) 08/15/2020    K 4.2 08/15/2020     08/15/2020    CO2 19 (L) 08/15/2020    BUN 26 (H) 08/15/2020    CREATININE 1.1 08/15/2020    CALCIUM 9.3 08/15/2020    MG 1.8 08/12/2020     ALT 24 08/15/2020    AST 36 08/15/2020    ALBUMIN 2.1 (L) 08/15/2020    BILITOT 4.7 (H) 08/15/2020    BILIDIR 4.9 (H) 07/06/2020    BILIDIR 4.9 (H) 07/06/2020       Imaging:  Reviewed.      ASSESSMENT/PLAN:   Pt is 54yoF who presents for PICC line placement.                       Sedation Plan: local   Patient will undergo: PICC line placement.       Alba Briscoe MD  R1 Interventional/Diagnostic Radiology

## 2020-08-18 NOTE — PROGRESS NOTES
Pt arrived to room 190 for peripherally inserted central venous catheter . Pt awake, alert, and oriented. Waiting consent. Allergies reviewed, patient ID'd using 2 identifiers

## 2020-08-18 NOTE — TELEPHONE ENCOUNTER
Calling  concerned because has single lumen PICC line in past and now has 2 lumens and no green caps on either one.   able to verbalize her knows how to use, including wipe off tip with alcohol pad thoroughly prior to hooking up antibiotic.     Pt has Tried to call iv company.  No return call yet. meds are in home. abx due now. I instructed  is ok to use as he knows how and I will message MD staff to call re flushing and green caps for PICC line.     Reason for Disposition   [1] Caller requesting NON-URGENT health information AND [2] PCP's office is the best resource    Protocols used: INFORMATION ONLY CALL - NO TRIAGE-A-

## 2020-08-18 NOTE — DISCHARGE INSTRUCTIONS
For scheduling: Call Mai at [phone]    For questions or concerns call: ARNOLDO MON-FRI 8 AM- 5PM [phone]. Radiology resident on call [phone].    For immediate concerns that are not emergent, you may call our radiology clinic at: [phone]

## 2020-08-18 NOTE — PROGRESS NOTES
Procedure complete without difficulty.  Written and verbal discharge/care instructions given and voices understanding.  Escorted to waiting room to family by this RN.

## 2020-08-18 NOTE — PROCEDURES
Radiology Post-Procedure Note    Pre Op Diagnosis: Bacteremia, dislodged PICC    Post Op Diagnosis: Same    Procedure: PICC placement    Procedure performed by: Alexis Padron MD    Written Informed Consent Obtained: Yes    Specimen Removed: No    Estimated Blood Loss: Minimal    Findings:   Using realtime U/S guidance a 5 Fr PICC catheter was placed into the right Basilic Vein with tip of the catheter in the RA.    PICC is ready for use.     Alexis Padron M.D.  Diagnostic and Interventional Radiologist  Department of Radiology  Pager: [phone]

## 2020-08-19 ENCOUNTER — TELEPHONE (OUTPATIENT)
Dept: TRANSPLANT | Facility: CLINIC | Age: 55
End: 2020-08-19

## 2020-08-19 ENCOUNTER — CONFERENCE (OUTPATIENT)
Dept: TRANSPLANT | Facility: CLINIC | Age: 55
End: 2020-08-19

## 2020-08-19 ENCOUNTER — OFFICE VISIT (OUTPATIENT)
Dept: TRANSPLANT | Facility: CLINIC | Age: 55
End: 2020-08-19
Payer: COMMERCIAL

## 2020-08-19 ENCOUNTER — DOCUMENT SCAN (OUTPATIENT)
Dept: HOME HEALTH SERVICES | Facility: HOSPITAL | Age: 55
End: 2020-08-19
Payer: COMMERCIAL

## 2020-08-19 VITALS
BODY MASS INDEX: 27.62 KG/M2 | TEMPERATURE: 97 F | DIASTOLIC BLOOD PRESSURE: 48 MMHG | SYSTOLIC BLOOD PRESSURE: 105 MMHG | HEART RATE: 82 BPM | RESPIRATION RATE: 16 BRPM | WEIGHT: 160.94 LBS | OXYGEN SATURATION: 100 %

## 2020-08-19 DIAGNOSIS — K74.60 DECOMPENSATED HEPATIC CIRRHOSIS: Primary | ICD-10-CM

## 2020-08-19 DIAGNOSIS — K75.81 LIVER CIRRHOSIS SECONDARY TO NASH: ICD-10-CM

## 2020-08-19 DIAGNOSIS — D73.1 THROMBOCYTOPENIA DUE TO HYPERSPLENISM: ICD-10-CM

## 2020-08-19 DIAGNOSIS — E44.0 PROTEIN-CALORIE MALNUTRITION, MODERATE: ICD-10-CM

## 2020-08-19 DIAGNOSIS — R60.0 PERIPHERAL EDEMA: ICD-10-CM

## 2020-08-19 DIAGNOSIS — K72.90 DECOMPENSATED HEPATIC CIRRHOSIS: Primary | ICD-10-CM

## 2020-08-19 DIAGNOSIS — D69.59 THROMBOCYTOPENIA DUE TO HYPERSPLENISM: ICD-10-CM

## 2020-08-19 DIAGNOSIS — Z76.82 LIVER TRANSPLANT CANDIDATE: ICD-10-CM

## 2020-08-19 DIAGNOSIS — K74.60 LIVER CIRRHOSIS SECONDARY TO NASH: ICD-10-CM

## 2020-08-19 DIAGNOSIS — K76.6 PORTAL HYPERTENSION: ICD-10-CM

## 2020-08-19 DIAGNOSIS — K76.82 HEPATIC ENCEPHALOPATHY: ICD-10-CM

## 2020-08-19 PROCEDURE — 3008F PR BODY MASS INDEX (BMI) DOCUMENTED: ICD-10-PCS | Mod: CPTII,S$GLB,TXP, | Performed by: INTERNAL MEDICINE

## 2020-08-19 PROCEDURE — 3008F BODY MASS INDEX DOCD: CPT | Mod: CPTII,S$GLB,TXP, | Performed by: INTERNAL MEDICINE

## 2020-08-19 PROCEDURE — 99999 PR PBB SHADOW E&M-EST. PATIENT-LVL V: ICD-10-PCS | Mod: PBBFAC,TXP,, | Performed by: INTERNAL MEDICINE

## 2020-08-19 PROCEDURE — 99214 PR OFFICE/OUTPT VISIT, EST, LEVL IV, 30-39 MIN: ICD-10-PCS | Mod: S$GLB,TXP,, | Performed by: INTERNAL MEDICINE

## 2020-08-19 PROCEDURE — 99214 OFFICE O/P EST MOD 30 MIN: CPT | Mod: S$GLB,TXP,, | Performed by: INTERNAL MEDICINE

## 2020-08-19 PROCEDURE — 99999 PR PBB SHADOW E&M-EST. PATIENT-LVL V: CPT | Mod: PBBFAC,TXP,, | Performed by: INTERNAL MEDICINE

## 2020-08-19 RX ORDER — SPIRONOLACTONE 50 MG/1
100 TABLET, FILM COATED ORAL DAILY
Qty: 60 TABLET | Refills: 6 | Status: ON HOLD | OUTPATIENT
Start: 2020-08-19 | End: 2020-09-23 | Stop reason: HOSPADM

## 2020-08-19 RX ORDER — FUROSEMIDE 40 MG/1
40 TABLET ORAL DAILY
Qty: 30 TABLET | Refills: 2 | Status: ON HOLD | OUTPATIENT
Start: 2020-08-19 | End: 2020-09-23 | Stop reason: HOSPADM

## 2020-08-19 NOTE — Clinical Note
Doing ok, she was able to get up and go, walk. No confusion. Advised to increase furosemide to 80/40 and sarahi 100 mg. Labs next week. RTC in 4 weeks.

## 2020-08-19 NOTE — TELEPHONE ENCOUNTER
Returned call to [facility] to clarify order: Take 2 tablets (80 mg) by mouth in the morning and 1 tablet (40mg) in the evening. - Oral

## 2020-08-19 NOTE — PROGRESS NOTES
Transplant Hepatology (Transplant Evaluation) Consult Note    Referring provider: Reuben Vee MD    Chief complaint:   Chief Complaint   Patient presents with    Waitlist Maintenance    Cirrhosis       HPI:  Zeina Mahoney is a 54 y.o. female that presents to Transplant Hepatology Clinic for consultation of had concerns including Waitlist Maintenance and Cirrhosis.  She  is accompanied by her .    Follow up  8/19/20: Pulled out PICC line again yesterday. ID - we can stop vanc as she has almost completed her course. Today, she appears good. Get-up and go: easily done, not confused, no asterixis. MELD: 18. Will increase diuretics.    7/28/20: feels ok, no acute complaints. Denies confusion. Working with home PT, able to ambulate with assistance. Denies GI bleed, SOB, Fever. Still with 1-2+ LL edema. /45 but did not get to take her midodrine in am. Abd is soft. Await insurance approval to get on UNOS list. Labs weekly, RTC 1 month.    Background  CHAUDHARY cirrhosis with decompensations. She had episodes of abdominal pain and elevated lipase, however not consistent with true acute pancreatitis as CT findings of the pancreas were normal. Thought to be GI process.     MELD-Na score: 18 at 8/18/2020  7:54 AM  MELD score: 16 at 8/18/2020  7:54 AM  Calculated from:  Serum Creatinine: 1.3 mg/dL at 8/18/2020  7:54 AM  Serum Sodium: 135 mmol/L at 8/18/2020  7:54 AM  Total Bilirubin: 5.3 mg/dL at 8/18/2020  7:54 AM  INR(ratio): 1.1 at 8/18/2020  7:54 AM  Age: 54 years 11 months    Liver disease:                   CHAUDHARY    MELD-Na:                         18  Child-Myers Class:             C    Blood type:                        O    Transplant status:             listed    Cirrhosis is decompensated with:    Ascites:                                                       yes large  Spontaneous bacterial peritonitis:              no   Hepatic Encephalopathy:                           Yes, [place] grade 2  Portal  bleeding:                                          no  Hepatocellular carcinoma:                          no    Hepatorenal syndrome:                              no  Hyponatremia:                                            yes  Sarcopenia:                                                yes   Portal vein thrombosis:                               no      Lab Results   Component Value Date    AFP 16 (H) 07/06/2020    AFP 16 (H) 07/06/2020       Patient Active Problem List   Diagnosis    Decompensated hepatic cirrhosis    Macrocytic anemia    Acute Hepatic encephalopathy    Liver cirrhosis secondary to CHAUDHARY    Other ascites    Thrombocytopenia    History of pancreatitis    Postablative hypothyroidism    Hypercalcemia    Adjustment disorder with anxious mood    Abnormal serum lipase level    Acquired coagulation factor deficiency    Hyperkalemia    Hyponatremia    Hyponatremia with decreased serum osmolality    Hypothyroidism    Portal hypertension    Thrombocytopenia due to hypersplenism    Protein-calorie malnutrition, moderate    Nausea    KG (acute kidney injury)    Hyperammonemia    Bacteremia    Anemia of chronic disease       Past Medical History:   Diagnosis Date    Chronic Hepatic encephalopathy 6/25/2020    Decompensated hepatic cirrhosis 6/25/2020    History of pancreatitis 6/25/2020    Liver cirrhosis secondary to CHAUDHARY 6/25/2020    Macrocytic anemia 6/25/2020    Other ascites 6/25/2020    Thrombocytopenia 6/25/2020       No past surgical history on file.    No family history on file.    Social History     Socioeconomic History    Marital status:      Spouse name: Not on file    Number of children: Not on file    Years of education: Not on file    Highest education level: Not on file   Occupational History    Not on file   Social Needs    Financial resource strain: Not on file    Food insecurity     Worry: Not on file     Inability: Not on file    Transportation  needs     Medical: Not on file     Non-medical: Not on file   Tobacco Use    Smoking status: Never Smoker    Smokeless tobacco: Never Used   Substance and Sexual Activity    Alcohol use: Never     Frequency: Never    Drug use: Never    Sexual activity: Not on file   Lifestyle    Physical activity     Days per week: Not on file     Minutes per session: Not on file    Stress: Not on file   Relationships    Social connections     Talks on phone: Not on file     Gets together: Not on file     Attends [employer] witness service: Not on file     Active member of club or organization: Not on file     Attends meetings of clubs or organizations: Not on file     Relationship status: Not on file   Other Topics Concern    Not on file   Social History Narrative    Not on file       Current Outpatient Medications   Medication Sig Dispense Refill    acetaminophen (TYLENOL) 500 MG tablet Take 1 tablet (500 mg total) by mouth every 6 (six) hours as needed for Pain. Max 2000mg daily.  0    diaper,brief,adult,disposable Misc 5 mg/kg/day by Misc.(Non-Drug; Combo Route) route 5 (five) times daily. Patient with incontinence. Please supply enough depends for 5xs/day      ergocalciferol (VITAMIN D2) 50,000 unit Cap Take 50,000 Units by mouth every 7 days.      furosemide (LASIX) 40 MG tablet Take 1 tablet (40 mg total) by mouth once daily. 30 tablet 2    haloperidoL (HALDOL) 1 MG tablet Take 1 tablet (1 mg total) by mouth daily as needed (non redirectable agitation).      lactulose (CEPHULAC) 10 gram packet Mix 3 packets (30 g total) with liquid and take by mouth 3 (three) times daily as needed. 90 packet 11    levothyroxine (SYNTHROID) 88 MCG tablet Take 1 tablet (88 mcg total) by mouth before breakfast. 30 tablet 3    lidocaine (LIDODERM) 5 % Place 1 patch onto the skin every 24 hours. Remove & Discard patch within 12 hours or as directed by MD      magnesium hydroxide 400 mg/5 ml (MILK OF MAGNESIA) 400 mg/5 mL Susp Take 30 mLs  (2,400 mg total) by mouth daily as needed (Take if constipated despite lactulose).      melatonin 10 mg Tab Take 10 mg by mouth nightly as needed (sleep).      midodrine (PROAMATINE) 10 MG tablet Take 10 mg by mouth 3 (three) times daily.      nystatin (MYCOSTATIN) powder Apply topically 2 (two) times daily. Apply to affected area twice a day      pantoprazole (PROTONIX) 40 MG tablet Take 40 mg by mouth before breakfast.      polyethylene glycol (GLYCOLAX) 17 gram PwPk Take 17 g by mouth once daily. Or miralax OTC. Take if constipated despite lactulose  0    rifAXIMin (XIFAXAN) 550 mg Tab Take 550 mg by mouth 2 (two) times daily.       simethicone (MYLICON) 80 MG chewable tablet Take 80 mg by mouth every 6 (six) hours as needed for Flatulence. Take 2 tablets by mouth every 6 hours as needed for gas      sodium bicarbonate 650 MG tablet Take 2 tablets (1,300 mg total) by mouth 2 (two) times daily. 120 tablet 3    spironolactone (ALDACTONE) 50 MG tablet Take 1 tablet (50 mg total) by mouth once daily. 30 tablet 2    vancomycin HCl in 5 % dextrose (VANCOMYCIN IN DEXTROSE 5 %) 1 gram/250 mL Soln Inject 250 mLs (1,000 mg total) into the vein every 12 (twelve) hours. for 8 days      zinc sulfate (ZINCATE) 220 (50) mg capsule Take 220 mg by mouth before breakfast.       No current facility-administered medications for this visit.        Review of patient's allergies indicates:   Allergen Reactions    Aspirin Tinitus    Ciprofloxacin Rash    Clindamycin Itching       Review of Systems   Constitutional: Positive for malaise/fatigue. Negative for chills, fever and weight loss.   HENT: Negative for sore throat.    Respiratory: Negative for cough, shortness of breath and wheezing.    Cardiovascular: Positive for leg swelling. Negative for chest pain.   Gastrointestinal: Negative for abdominal pain, blood in stool, constipation, diarrhea, melena, nausea and vomiting.   Musculoskeletal: Positive for back pain.    Neurological: Positive for dizziness and weakness. Negative for tremors, focal weakness, seizures and headaches.   Endo/Heme/Allergies: Bruises/bleeds easily.       There were no vitals filed for this visit.    Physical Exam  Vitals signs and nursing note reviewed.   Constitutional:       Appearance: She is well-developed.      Comments: Chronically ill-appearing. Malnourished. Temporal wasting.     HENT:      Head: Normocephalic and atraumatic.   Eyes:      General: Scleral icterus present.   Cardiovascular:      Rate and Rhythm: Normal rate and regular rhythm.      Heart sounds: Normal heart sounds.   Pulmonary:      Effort: Pulmonary effort is normal. No respiratory distress.      Breath sounds: Normal breath sounds.   Abdominal:      General: Bowel sounds are normal. There is distension.      Palpations: Abdomen is soft.      Tenderness: There is no abdominal tenderness.      Comments: Soft, small ascites     Musculoskeletal:         General: Swelling present.   Skin:     General: Skin is dry.      Coloration: Skin is jaundiced.      Findings: No rash.   Neurological:      Mental Status: She is alert and oriented to person, place, and time.   Psychiatric:         Mood and Affect: Mood is not anxious.         Behavior: Behavior normal.         LABS: I personally reviewed pertinent laboratory findings.    Lab Results   Component Value Date    ALT 24 08/18/2020    AST 35 08/18/2020    GGT 32 07/06/2020    GGT 32 07/06/2020    ALKPHOS 91 08/18/2020    BILITOT 5.3 (H) 08/18/2020       Lab Results   Component Value Date    WBC 7.87 08/18/2020    HGB 8.6 (L) 08/18/2020    HCT 26.7 (L) 08/18/2020     (H) 08/18/2020    PLT 74 (L) 08/18/2020       Lab Results   Component Value Date     (L) 08/18/2020    K 4.3 08/18/2020     08/18/2020    CO2 25 08/18/2020    BUN 29 (H) 08/18/2020    CREATININE 1.3 08/18/2020    CALCIUM 9.6 08/18/2020    ANIONGAP 6 (L) 08/18/2020    ESTGFRAFRICA 53.7 (A) 08/18/2020     EGFRNONAA 46.6 (A) 08/18/2020       Lab Results   Component Value Date    INR 1.1 08/18/2020    INR 1.2 08/12/2020    INR 1.2 08/11/2020       No results found for: SMOOTHMUSCAB, MITOAB  Lab Results   Component Value Date    IRON 185 (H) 07/06/2020    IRON 185 (H) 07/06/2020    TIBC 231 (L) 07/06/2020    TIBC 231 (L) 07/06/2020    FERRITIN 547 (H) 07/06/2020    FERRITIN 547 (H) 07/06/2020     Lab Results   Component Value Date    HEPBCAB Negative 07/06/2020    HEPBCAB Negative 07/06/2020    HEPCAB Negative 07/06/2020    HEPCAB Negative 07/06/2020     Lab Results   Component Value Date    TSH 0.050 (L) 07/06/2020    TSH 0.050 (L) 07/06/2020     No results found for: MELIDA    No results found for: ABORH    Lab Results   Component Value Date    HGBA1C <4.0 (A) 07/06/2020     Lab Results   Component Value Date    CHOL 109 (L) 07/18/2020     Lab Results   Component Value Date    HDL 9 (L) 07/18/2020     Lab Results   Component Value Date    LDLCALC 87.4 07/18/2020     Lab Results   Component Value Date    TRIG 63 07/18/2020     Lab Results   Component Value Date    CHOLHDL 8.3 (L) 07/18/2020         Imaging:   No results found for this or any previous visit.  I personally reviewed recent cardiology studies available on the chart and from outside medical records.  I personally reviewed recent imaging studies available on the chart and from outside medical records.      Assessment:  54 y.o. female presenting with     1. Decompensated hepatic cirrhosis    2. Peripheral edema    3. Liver cirrhosis secondary to CHAUDHARY    4. Portal hypertension    5. Protein-calorie malnutrition, moderate    6. Thrombocytopenia due to hypersplenism    7. Hepatic encephalopathy    8. Liver transplant candidate        MELD-Na score: 18 at 8/18/2020  7:54 AM  MELD score: 16 at 8/18/2020  7:54 AM  Calculated from:  Serum Creatinine: 1.3 mg/dL at 8/18/2020  7:54 AM  Serum Sodium: 135 mmol/L at 8/18/2020  7:54 AM  Total Bilirubin: 5.3 mg/dL at 8/18/2020   7:54 AM  INR(ratio): 1.1 at 8/18/2020  7:54 AM  Age: 54 years 11 months    Functional Status: 40% - Disabled: requires special care and assistance  Physical Capacity: No Limitations    Transplant Candidacy: listed    8/19/20: Pulled out PICC line again yesterday. ID - we can stop vanc as she has almost completed her course. Today, she appears good. Get-up and go: easily done, not confused, no asterixis. MELD: 18. Will increase diuretics.      Recommendation(s):  - await liver transplant  - will increase furosemide to 80 mg in am and 40 mg in pm, sarahi 100 mg daily  - RTC in 1 month    Cirrhosis Counseling  - strict abstinence of alcohol use  - compliance with lactulose and rifaximin if you have developed hepatic encephalopathy (confusion due to high ammonia level)  - avoid non-steroidal anti-inflammatory drugs (NSAIDs) such as ibuprofen, Motrin, naprosyn, Alleve due to the risk of kidney damage  - can take acetaminophen (Tylenol), no more than 2000 mg per day  - low sodium (salt) 2 gram per day diet  - nutrition: 25-30kcal (calorie per body body weight in kilogram) per day  - no need to restrict protein in diet  - high protein diet: 1.2-1.5 gram/kg (protein per body weight in kilogram) per day to prevent muscle mass loss  - avoid fasting  - Late night snack with 50 gram of complex carbohydrates and protein  - resistance exercises for muscle strength  - avoid raw seafoods due to the risk of fatal Vibrio vulnificus infection  - ultrasound or MRI of the liver every 6 months for liver cancer screening (you are at risk of developing liver cancer due to scar tissue in the liver)  - Upper endoscopy every 1-2 years to screen for varices in the stomach and foodpipe which can burst and cause fatal bleeding    A total of 30 minutes were spent face-to-face with the patient during this encounter and over half of that time was spent on counseling and coordination of care.  We discussed in depth the nature of the patient's liver  disease, the management plan and liver transplant evaluation in details. I also educated the patient about lifestyle modifications which may improve hepatic steatosis, overweight/obesity, insulin resistance and high blood pressure issues. I have provided the patient with an opportunity to ask questions and have all questions answered to his satisfaction.     I have sent communication to the referring physician and/or primary care provider.    Jairo Reyes MD  Staff Physician  Hepatology and Liver Transplant  [facility]  [facility]

## 2020-08-19 NOTE — TELEPHONE ENCOUNTER
GERHARD met with pt and pts  Yonis in transplant clinic this afternoon per request of Dr. Reyes.   Pt presents sitting in wheelchair, awake, alert and oriented x 3 but with slow speech and odd affect.   Pts  alert and oriented x 4 engaged and often speaking for patient.    Pt and pts  continue to stay locally at at [employer] with their son Ramiro.   Pts  reports that patient having a difficult time coping with being away from home and other family members.  Pts sisters and daughter coming to visit for the long weekend.       SW addressed with pt and pts  the PICC line issues with pts PICC lines getting pulled out by patient.   Pts 's response was all incidents accidental and pt not pulling out on purpose.   Pts  worried the team will think that patient not being compliant.   SW expressed concerns that pt might be having episodes of confusion/encephalopathy, which pts  denies.   SW questioned pts being prescribed Haldol at home in [place].  Pts  reports pt still taking PRN when she gets anxious.  Pts  reports it was prescribed for anxiety back in [place].   GERHARD suggests that patient follow-up with [facility] outpatient [facility].  Patient denies any current anxious mood at this time or any other mental health concerns.  Patient is continuing with home health SN/PT at local lodging.  GERHARD discussed case with pts hepatologist, Dr. Reyes during clinic.  Pt and pts  denies any further psychosocial needs at this time.  SW remains available for all transplant resources, education and psychosocial support.

## 2020-08-19 NOTE — TELEPHONE ENCOUNTER
Patient's case discussed in the liver meeting today.  Recently had PICC line placed for IV antibiotics for positive blood cultures.  Patient pulled the line several times, which is concerning, especially managing her post transplant.  Patient has a history of hepatic encephalopathy, but has also been prescribed mood stabilizers and this may also have interfered (patient falls asleep easily and may have pulled the PICC line out while asleep).  Plan/ Recommendation:  Patient to remain actively listed for transplant.  Will need to schedule a follow-up appointment with Psych to streamline mood altering medications.  Follow-up with SW also recommended.  Will attempt to schedule this appointment today.

## 2020-08-19 NOTE — TELEPHONE ENCOUNTER
Called patient as requested via portal. No answer. Left VM with instructions to call IR team with mechanical issues related to IV placed yesterday (IR RN gave pt a number to call yesterday) & to call txp team back with an update. Callback number left.

## 2020-08-19 NOTE — LETTER
August 20, 2020        Reuben Vee  [address]  [place], [place] [address]  Phone: [phone]  Fax: [phone]             [facility]  [address]  [place] [address]  Phone: [phone]   Patient: Zeina Mahoney   MR Number: [id number]   YOB: 1965   Date of Visit: 8/19/2020       Dear Dr. Reuben Vee    Thank you for referring Zeina Mahoney to me for evaluation. Attached you will find relevant portions of my assessment and plan of care.    If you have questions, please do not hesitate to call me. I look forward to following Zeina Mahoney along with you.    Sincerely,    Jairo Reyes MD    Enclosure    If you would like to receive this communication electronically, please contact [email or web address] or [phone] to request [employer] Link access.    [employer] Link is a tool which provides read-only access to select patient information with whom you have a relationship. Its easy to use and provides real time access to review your patients record including encounter summaries, notes, results, and demographic information.    If you feel you have received this communication in error or would no longer like to receive these types of communications, please e-mail [email or web address]

## 2020-08-19 NOTE — TELEPHONE ENCOUNTER
----- Message from Elise Wilkinson sent at 8/19/2020  4:09 PM CDT -----  She states that Rx has two set of instructions and she wants to know which one is correct     furosemide (LASIX) 40 MG tablet    Sindy with [facility] # [phone]

## 2020-08-19 NOTE — TELEPHONE ENCOUNTER
Pt's , Yonis, returned call. Pt accidentally removed PICC line again last night. Yonis and patient to discuss with Dr. Reyes at appointment this afternoon.

## 2020-08-20 ENCOUNTER — DOCUMENT SCAN (OUTPATIENT)
Dept: HOME HEALTH SERVICES | Facility: HOSPITAL | Age: 55
End: 2020-08-20

## 2020-08-20 ENCOUNTER — DOCUMENT SCAN (OUTPATIENT)
Dept: HOME HEALTH SERVICES | Facility: HOSPITAL | Age: 55
End: 2020-08-20
Payer: COMMERCIAL

## 2020-08-25 ENCOUNTER — LAB VISIT (OUTPATIENT)
Dept: LAB | Facility: HOSPITAL | Age: 55
End: 2020-08-25
Payer: COMMERCIAL

## 2020-08-25 DIAGNOSIS — K74.60 LIVER CIRRHOSIS SECONDARY TO NASH: ICD-10-CM

## 2020-08-25 DIAGNOSIS — Z76.82 ORGAN TRANSPLANT CANDIDATE: ICD-10-CM

## 2020-08-25 DIAGNOSIS — K75.81 LIVER CIRRHOSIS SECONDARY TO NASH: ICD-10-CM

## 2020-08-25 LAB
ALBUMIN SERPL BCP-MCNC: 2.5 G/DL (ref 3.5–5.2)
ALP SERPL-CCNC: 89 U/L (ref 55–135)
ALT SERPL W/O P-5'-P-CCNC: 23 U/L (ref 10–44)
ANION GAP SERPL CALC-SCNC: 6 MMOL/L (ref 8–16)
AST SERPL-CCNC: 35 U/L (ref 10–40)
BASOPHILS # BLD AUTO: 0.05 K/UL (ref 0–0.2)
BASOPHILS NFR BLD: 0.8 % (ref 0–1.9)
BILIRUB SERPL-MCNC: 6 MG/DL (ref 0.1–1)
BUN SERPL-MCNC: 27 MG/DL (ref 6–20)
CALCIUM SERPL-MCNC: 9.6 MG/DL (ref 8.7–10.5)
CHLORIDE SERPL-SCNC: 103 MMOL/L (ref 95–110)
CO2 SERPL-SCNC: 26 MMOL/L (ref 23–29)
CREAT SERPL-MCNC: 1.5 MG/DL (ref 0.5–1.4)
DIFFERENTIAL METHOD: ABNORMAL
EOSINOPHIL # BLD AUTO: 0.1 K/UL (ref 0–0.5)
EOSINOPHIL NFR BLD: 1.6 % (ref 0–8)
ERYTHROCYTE [DISTWIDTH] IN BLOOD BY AUTOMATED COUNT: 17 % (ref 11.5–14.5)
EST. GFR  (AFRICAN AMERICAN): 45.2 ML/MIN/1.73 M^2
EST. GFR  (NON AFRICAN AMERICAN): 39.2 ML/MIN/1.73 M^2
GLUCOSE SERPL-MCNC: 145 MG/DL (ref 70–110)
HCT VFR BLD AUTO: 27.2 % (ref 37–48.5)
HGB BLD-MCNC: 9 G/DL (ref 12–16)
IMM GRANULOCYTES # BLD AUTO: 0.05 K/UL (ref 0–0.04)
IMM GRANULOCYTES NFR BLD AUTO: 0.8 % (ref 0–0.5)
INR PPP: 1.2 (ref 0.8–1.2)
LYMPHOCYTES # BLD AUTO: 1.2 K/UL (ref 1–4.8)
LYMPHOCYTES NFR BLD: 19.2 % (ref 18–48)
MCH RBC QN AUTO: 34.9 PG (ref 27–31)
MCHC RBC AUTO-ENTMCNC: 33.1 G/DL (ref 32–36)
MCV RBC AUTO: 105 FL (ref 82–98)
MONOCYTES # BLD AUTO: 0.4 K/UL (ref 0.3–1)
MONOCYTES NFR BLD: 6.9 % (ref 4–15)
NEUTROPHILS # BLD AUTO: 4.4 K/UL (ref 1.8–7.7)
NEUTROPHILS NFR BLD: 70.7 % (ref 38–73)
NRBC BLD-RTO: 0 /100 WBC
PLATELET # BLD AUTO: 93 K/UL (ref 150–350)
PMV BLD AUTO: 10 FL (ref 9.2–12.9)
POTASSIUM SERPL-SCNC: 4.2 MMOL/L (ref 3.5–5.1)
PROT SERPL-MCNC: 6 G/DL (ref 6–8.4)
PROTHROMBIN TIME: 13 SEC (ref 9–12.5)
RBC # BLD AUTO: 2.58 M/UL (ref 4–5.4)
SODIUM SERPL-SCNC: 135 MMOL/L (ref 136–145)
WBC # BLD AUTO: 6.24 K/UL (ref 3.9–12.7)

## 2020-08-25 PROCEDURE — 85025 COMPLETE CBC W/AUTO DIFF WBC: CPT | Mod: NTX

## 2020-08-25 PROCEDURE — 85610 PROTHROMBIN TIME: CPT | Mod: TXP

## 2020-08-25 PROCEDURE — 36415 COLL VENOUS BLD VENIPUNCTURE: CPT | Mod: NTX

## 2020-08-25 PROCEDURE — 80053 COMPREHEN METABOLIC PANEL: CPT | Mod: NTX

## 2020-08-26 DIAGNOSIS — F40.01 PANIC DISORDER WITH AGORAPHOBIA: ICD-10-CM

## 2020-08-26 DIAGNOSIS — Z76.82 ORGAN TRANSPLANT CANDIDATE: ICD-10-CM

## 2020-08-26 DIAGNOSIS — F43.22 ADJUSTMENT DISORDER WITH ANXIOUS MOOD: ICD-10-CM

## 2020-08-26 DIAGNOSIS — K74.60 LIVER CIRRHOSIS SECONDARY TO NASH: Primary | ICD-10-CM

## 2020-08-26 DIAGNOSIS — K75.81 LIVER CIRRHOSIS SECONDARY TO NASH: Primary | ICD-10-CM

## 2020-08-28 ENCOUNTER — TELEPHONE (OUTPATIENT)
Dept: TRANSPLANT | Facility: CLINIC | Age: 55
End: 2020-08-28

## 2020-08-28 NOTE — TELEPHONE ENCOUNTER
----- Message from Jillian Delgado sent at 8/28/2020  3:52 PM CDT -----  Regarding: FW: RTC    Called and sp to pt ; appt kelton'ed with Dr. Reyes for 9/24. Will do labs on 9/22.  .  ----- Message -----  From: Tay Kumar RN  Sent: 8/26/2020   2:11 PM CDT  To: Jillian Delgado  Subject: RTC                                              RTC in 4 weeks (she's Dr. Reyes's so she can go to whoever has an opening)  MELD labs

## 2020-08-31 ENCOUNTER — TELEPHONE (OUTPATIENT)
Dept: TRANSPLANT | Facility: CLINIC | Age: 55
End: 2020-08-31

## 2020-08-31 NOTE — TELEPHONE ENCOUNTER
PATIENT NAME: Zeina Mahoney  [facility] #: [id number]    Lab Results   Component Value Date    CREATININE 1.5 (H) 08/25/2020     (L) 08/25/2020    BILITOT 6.0 (H) 08/25/2020    ALBUMIN 2.5 (L) 08/25/2020    INR 1.2 08/25/2020     MELD 20  Encephalopathy: 1 - 2  Ascites: moderate  Dialysis: no     Recertification requestor: Lindsey Peterson

## 2020-09-01 ENCOUNTER — LAB VISIT (OUTPATIENT)
Dept: LAB | Facility: HOSPITAL | Age: 55
End: 2020-09-01
Attending: INTERNAL MEDICINE
Payer: COMMERCIAL

## 2020-09-01 DIAGNOSIS — K74.60 LIVER CIRRHOSIS SECONDARY TO NASH: ICD-10-CM

## 2020-09-01 DIAGNOSIS — Z76.82 ORGAN TRANSPLANT CANDIDATE: ICD-10-CM

## 2020-09-01 DIAGNOSIS — K75.81 LIVER CIRRHOSIS SECONDARY TO NASH: ICD-10-CM

## 2020-09-01 LAB
ALBUMIN SERPL BCP-MCNC: 2.4 G/DL (ref 3.5–5.2)
ALP SERPL-CCNC: 110 U/L (ref 55–135)
ALT SERPL W/O P-5'-P-CCNC: 19 U/L (ref 10–44)
ANION GAP SERPL CALC-SCNC: 8 MMOL/L (ref 8–16)
AST SERPL-CCNC: 34 U/L (ref 10–40)
BASOPHILS # BLD AUTO: 0.07 K/UL (ref 0–0.2)
BASOPHILS NFR BLD: 1.1 % (ref 0–1.9)
BILIRUB SERPL-MCNC: 4.3 MG/DL (ref 0.1–1)
BUN SERPL-MCNC: 21 MG/DL (ref 6–20)
CALCIUM SERPL-MCNC: 8.9 MG/DL (ref 8.7–10.5)
CHLORIDE SERPL-SCNC: 103 MMOL/L (ref 95–110)
CO2 SERPL-SCNC: 24 MMOL/L (ref 23–29)
CREAT SERPL-MCNC: 1.3 MG/DL (ref 0.5–1.4)
DIFFERENTIAL METHOD: ABNORMAL
EOSINOPHIL # BLD AUTO: 0.1 K/UL (ref 0–0.5)
EOSINOPHIL NFR BLD: 1.2 % (ref 0–8)
ERYTHROCYTE [DISTWIDTH] IN BLOOD BY AUTOMATED COUNT: 15.7 % (ref 11.5–14.5)
EST. GFR  (AFRICAN AMERICAN): 53.7 ML/MIN/1.73 M^2
EST. GFR  (NON AFRICAN AMERICAN): 46.6 ML/MIN/1.73 M^2
GLUCOSE SERPL-MCNC: 218 MG/DL (ref 70–110)
HCT VFR BLD AUTO: 26.7 % (ref 37–48.5)
HGB BLD-MCNC: 9 G/DL (ref 12–16)
IMM GRANULOCYTES # BLD AUTO: 0.04 K/UL (ref 0–0.04)
IMM GRANULOCYTES NFR BLD AUTO: 0.6 % (ref 0–0.5)
INR PPP: 1.2 (ref 0.8–1.2)
LYMPHOCYTES # BLD AUTO: 1.2 K/UL (ref 1–4.8)
LYMPHOCYTES NFR BLD: 17.8 % (ref 18–48)
MCH RBC QN AUTO: 34.6 PG (ref 27–31)
MCHC RBC AUTO-ENTMCNC: 33.7 G/DL (ref 32–36)
MCV RBC AUTO: 103 FL (ref 82–98)
MONOCYTES # BLD AUTO: 0.5 K/UL (ref 0.3–1)
MONOCYTES NFR BLD: 7.1 % (ref 4–15)
NEUTROPHILS # BLD AUTO: 4.8 K/UL (ref 1.8–7.7)
NEUTROPHILS NFR BLD: 72.2 % (ref 38–73)
NRBC BLD-RTO: 0 /100 WBC
PLATELET # BLD AUTO: 100 K/UL (ref 150–350)
PMV BLD AUTO: 11 FL (ref 9.2–12.9)
POTASSIUM SERPL-SCNC: 3.8 MMOL/L (ref 3.5–5.1)
PROT SERPL-MCNC: 5.8 G/DL (ref 6–8.4)
PROTHROMBIN TIME: 13.3 SEC (ref 9–12.5)
RBC # BLD AUTO: 2.6 M/UL (ref 4–5.4)
SODIUM SERPL-SCNC: 135 MMOL/L (ref 136–145)
WBC # BLD AUTO: 6.62 K/UL (ref 3.9–12.7)

## 2020-09-01 PROCEDURE — 85610 PROTHROMBIN TIME: CPT | Mod: TXP

## 2020-09-01 PROCEDURE — 36415 COLL VENOUS BLD VENIPUNCTURE: CPT | Mod: TXP

## 2020-09-01 PROCEDURE — 85025 COMPLETE CBC W/AUTO DIFF WBC: CPT | Mod: TXP

## 2020-09-01 PROCEDURE — 80053 COMPREHEN METABOLIC PANEL: CPT | Mod: TXP

## 2020-09-02 ENCOUNTER — TELEPHONE (OUTPATIENT)
Dept: TRANSPLANT | Facility: CLINIC | Age: 55
End: 2020-09-02

## 2020-09-02 ENCOUNTER — DOCUMENT SCAN (OUTPATIENT)
Dept: HOME HEALTH SERVICES | Facility: HOSPITAL | Age: 55
End: 2020-09-02
Payer: COMMERCIAL

## 2020-09-02 NOTE — TELEPHONE ENCOUNTER
----- Message from Fco Patton MA sent at 9/2/2020  4:35 PM CDT -----  Contact: Cesilia estevez/[facility]isa    ----- Message -----  From: Colleen Aranda  Sent: 9/2/2020   4:18 PM CDT  To: Eric Gill Staff    Cesilia reported that docs that was sent to [facility]isa she can not do anything with. This is a Medical UM ISSUE             Medical  contact [phone]

## 2020-09-04 ENCOUNTER — DOCUMENT SCAN (OUTPATIENT)
Dept: HOME HEALTH SERVICES | Facility: HOSPITAL | Age: 55
End: 2020-09-04
Payer: COMMERCIAL

## 2020-09-04 ENCOUNTER — TELEPHONE (OUTPATIENT)
Dept: TRANSPLANT | Facility: CLINIC | Age: 55
End: 2020-09-04

## 2020-09-04 NOTE — TELEPHONE ENCOUNTER
----- Message from Jose Meraz sent at 9/4/2020 12:13 PM CDT -----  Regarding: Yonis:   Calling to speak with coord regarding out pt wound care for pt, because their home health nurse is leaving       [phone]

## 2020-09-04 NOTE — TELEPHONE ENCOUNTER
Returned call to patient's , Yonis. Yonis is asking if we can either extend her current home health (pt is to be discharged from home health ot/pt next week) or have a home health wound care RN come out to the house to continue care to pt's chronic wounds. Will discuss with the social work team to see what pt's options may be. Pt is progressing well with her therapies & will no longer need pt/ot. Understanding expressed. No other questions at this time.

## 2020-09-07 ENCOUNTER — NURSE TRIAGE (OUTPATIENT)
Dept: ADMINISTRATIVE | Facility: CLINIC | Age: 55
End: 2020-09-07

## 2020-09-07 NOTE — TELEPHONE ENCOUNTER
Liver tx list: 8/19/2020   Coord: Tay Kumar    Speaking to Yonis () on behalf. From [place], in town waiting on transplant but pt wants to go home.  wants to know, if they decide to leave, what is the protocol about staying on tx list. Also suppose to have weekly labs but next labs aren't scheduled until 9/22.   Has appt with psychiatrist on Wednesday.     Reason for Disposition   [1] Caller requesting NON-URGENT health information AND [2] PCP's office is the best resource    Protocols used: INFORMATION ONLY CALL - NO TRIAGE-A-

## 2020-09-08 ENCOUNTER — HOSPITAL ENCOUNTER (INPATIENT)
Facility: HOSPITAL | Age: 55
LOS: 5 days | Discharge: HOME | DRG: 871 | End: 2020-09-13
Attending: EMERGENCY MEDICINE | Admitting: INTERNAL MEDICINE
Payer: COMMERCIAL

## 2020-09-08 ENCOUNTER — TELEPHONE (OUTPATIENT)
Dept: TRANSPLANT | Facility: CLINIC | Age: 55
End: 2020-09-08

## 2020-09-08 DIAGNOSIS — R10.9 ABDOMINAL PAIN, UNSPECIFIED ABDOMINAL LOCATION: Primary | ICD-10-CM

## 2020-09-08 DIAGNOSIS — R65.10 SIRS (SYSTEMIC INFLAMMATORY RESPONSE SYNDROME): ICD-10-CM

## 2020-09-08 DIAGNOSIS — E03.9 HYPOTHYROIDISM, UNSPECIFIED TYPE: ICD-10-CM

## 2020-09-08 DIAGNOSIS — E44.0 PROTEIN-CALORIE MALNUTRITION, MODERATE: ICD-10-CM

## 2020-09-08 DIAGNOSIS — D68.4 ACQUIRED COAGULATION FACTOR DEFICIENCY: ICD-10-CM

## 2020-09-08 DIAGNOSIS — K75.81 LIVER CIRRHOSIS SECONDARY TO NASH: ICD-10-CM

## 2020-09-08 DIAGNOSIS — D69.59 THROMBOCYTOPENIA DUE TO HYPERSPLENISM: ICD-10-CM

## 2020-09-08 DIAGNOSIS — R10.9 ABDOMINAL PAIN: ICD-10-CM

## 2020-09-08 DIAGNOSIS — R00.0 TACHYCARDIA: ICD-10-CM

## 2020-09-08 DIAGNOSIS — F43.22 ADJUSTMENT DISORDER WITH ANXIOUS MOOD: ICD-10-CM

## 2020-09-08 DIAGNOSIS — K76.82 HEPATIC ENCEPHALOPATHY: ICD-10-CM

## 2020-09-08 DIAGNOSIS — D63.8 ANEMIA OF CHRONIC DISEASE: ICD-10-CM

## 2020-09-08 DIAGNOSIS — K74.60 LIVER CIRRHOSIS SECONDARY TO NASH: ICD-10-CM

## 2020-09-08 DIAGNOSIS — K76.6 PORTAL HYPERTENSION: ICD-10-CM

## 2020-09-08 DIAGNOSIS — D73.1 THROMBOCYTOPENIA DUE TO HYPERSPLENISM: ICD-10-CM

## 2020-09-08 DIAGNOSIS — L03.119 CELLULITIS OF LOWER EXTREMITY, UNSPECIFIED LATERALITY: ICD-10-CM

## 2020-09-08 PROBLEM — F51.02 ADJUSTMENT INSOMNIA: Status: ACTIVE | Noted: 2020-09-08

## 2020-09-08 PROBLEM — R78.81 BACTEREMIA: Status: RESOLVED | Noted: 2020-08-09 | Resolved: 2020-09-08

## 2020-09-08 LAB
ALBUMIN SERPL BCP-MCNC: 2.5 G/DL (ref 3.5–5.2)
ALP SERPL-CCNC: 97 U/L (ref 55–135)
ALT SERPL W/O P-5'-P-CCNC: 23 U/L (ref 10–44)
AMMONIA PLAS-SCNC: 54 UMOL/L (ref 10–50)
ANION GAP SERPL CALC-SCNC: 7 MMOL/L (ref 8–16)
AST SERPL-CCNC: 39 U/L (ref 10–40)
BASOPHILS # BLD AUTO: 0.06 K/UL (ref 0–0.2)
BASOPHILS NFR BLD: 1.1 % (ref 0–1.9)
BILIRUB SERPL-MCNC: 4.4 MG/DL (ref 0.1–1)
BILIRUB UR QL STRIP: NEGATIVE
BUN SERPL-MCNC: 20 MG/DL (ref 6–20)
CALCIUM SERPL-MCNC: 8.9 MG/DL (ref 8.7–10.5)
CHLORIDE SERPL-SCNC: 104 MMOL/L (ref 95–110)
CLARITY UR REFRACT.AUTO: CLEAR
CO2 SERPL-SCNC: 26 MMOL/L (ref 23–29)
COLOR UR AUTO: YELLOW
CREAT SERPL-MCNC: 1.3 MG/DL (ref 0.5–1.4)
DIFFERENTIAL METHOD: ABNORMAL
EOSINOPHIL # BLD AUTO: 0.1 K/UL (ref 0–0.5)
EOSINOPHIL NFR BLD: 2.1 % (ref 0–8)
ERYTHROCYTE [DISTWIDTH] IN BLOOD BY AUTOMATED COUNT: 15 % (ref 11.5–14.5)
EST. GFR  (AFRICAN AMERICAN): 53.7 ML/MIN/1.73 M^2
EST. GFR  (NON AFRICAN AMERICAN): 46.6 ML/MIN/1.73 M^2
GLUCOSE SERPL-MCNC: 170 MG/DL (ref 70–110)
GLUCOSE UR QL STRIP: NEGATIVE
HCT VFR BLD AUTO: 25.5 % (ref 37–48.5)
HGB BLD-MCNC: 8.6 G/DL (ref 12–16)
HGB UR QL STRIP: NEGATIVE
IMM GRANULOCYTES # BLD AUTO: 0.06 K/UL (ref 0–0.04)
IMM GRANULOCYTES NFR BLD AUTO: 1.1 % (ref 0–0.5)
INR PPP: 1.3 (ref 0.8–1.2)
KETONES UR QL STRIP: NEGATIVE
LACTATE SERPL-SCNC: 0.8 MMOL/L (ref 0.5–2.2)
LEUKOCYTE ESTERASE UR QL STRIP: NEGATIVE
LIPASE SERPL-CCNC: 154 U/L (ref 4–60)
LYMPHOCYTES # BLD AUTO: 0.9 K/UL (ref 1–4.8)
LYMPHOCYTES NFR BLD: 16.6 % (ref 18–48)
MCH RBC QN AUTO: 35.4 PG (ref 27–31)
MCHC RBC AUTO-ENTMCNC: 33.7 G/DL (ref 32–36)
MCV RBC AUTO: 105 FL (ref 82–98)
MONOCYTES # BLD AUTO: 0.4 K/UL (ref 0.3–1)
MONOCYTES NFR BLD: 7.5 % (ref 4–15)
NEUTROPHILS # BLD AUTO: 4 K/UL (ref 1.8–7.7)
NEUTROPHILS NFR BLD: 71.6 % (ref 38–73)
NITRITE UR QL STRIP: NEGATIVE
NRBC BLD-RTO: 0 /100 WBC
PH UR STRIP: 7 [PH] (ref 5–8)
PLATELET # BLD AUTO: 77 K/UL (ref 150–350)
PMV BLD AUTO: 11.1 FL (ref 9.2–12.9)
POTASSIUM SERPL-SCNC: 3.8 MMOL/L (ref 3.5–5.1)
PROT SERPL-MCNC: 5.8 G/DL (ref 6–8.4)
PROT UR QL STRIP: NEGATIVE
PROTHROMBIN TIME: 13.8 SEC (ref 9–12.5)
RBC # BLD AUTO: 2.43 M/UL (ref 4–5.4)
SARS-COV-2 RDRP RESP QL NAA+PROBE: NEGATIVE
SODIUM SERPL-SCNC: 137 MMOL/L (ref 136–145)
SP GR UR STRIP: 1.01 (ref 1–1.03)
URN SPEC COLLECT METH UR: NORMAL
WBC # BLD AUTO: 5.59 K/UL (ref 3.9–12.7)

## 2020-09-08 PROCEDURE — 99284 PR EMERGENCY DEPT VISIT,LEVEL IV: ICD-10-PCS | Mod: NTX,,, | Performed by: EMERGENCY MEDICINE

## 2020-09-08 PROCEDURE — 85610 PROTHROMBIN TIME: CPT | Mod: NTX

## 2020-09-08 PROCEDURE — 87040 BLOOD CULTURE FOR BACTERIA: CPT | Mod: NTX

## 2020-09-08 PROCEDURE — 63600175 PHARM REV CODE 636 W HCPCS: Mod: NTX | Performed by: EMERGENCY MEDICINE

## 2020-09-08 PROCEDURE — 99214 PR OFFICE/OUTPT VISIT, EST, LEVL IV, 30-39 MIN: ICD-10-PCS | Mod: NTX,,, | Performed by: NURSE PRACTITIONER

## 2020-09-08 PROCEDURE — 99214 OFFICE O/P EST MOD 30 MIN: CPT | Mod: NTX,,, | Performed by: NURSE PRACTITIONER

## 2020-09-08 PROCEDURE — 96376 TX/PRO/DX INJ SAME DRUG ADON: CPT | Mod: NTX

## 2020-09-08 PROCEDURE — U0002 COVID-19 LAB TEST NON-CDC: HCPCS | Mod: NTX

## 2020-09-08 PROCEDURE — 85025 COMPLETE CBC W/AUTO DIFF WBC: CPT | Mod: NTX

## 2020-09-08 PROCEDURE — 96374 THER/PROPH/DIAG INJ IV PUSH: CPT | Mod: NTX

## 2020-09-08 PROCEDURE — 63600175 PHARM REV CODE 636 W HCPCS: Mod: NTX | Performed by: INTERNAL MEDICINE

## 2020-09-08 PROCEDURE — 25000003 PHARM REV CODE 250: Mod: NTX | Performed by: NURSE PRACTITIONER

## 2020-09-08 PROCEDURE — 82140 ASSAY OF AMMONIA: CPT | Mod: NTX

## 2020-09-08 PROCEDURE — 99284 EMERGENCY DEPT VISIT MOD MDM: CPT | Mod: NTX,,, | Performed by: EMERGENCY MEDICINE

## 2020-09-08 PROCEDURE — 80053 COMPREHEN METABOLIC PANEL: CPT | Mod: NTX

## 2020-09-08 PROCEDURE — 83690 ASSAY OF LIPASE: CPT | Mod: NTX

## 2020-09-08 PROCEDURE — 83605 ASSAY OF LACTIC ACID: CPT | Mod: NTX

## 2020-09-08 PROCEDURE — 25000003 PHARM REV CODE 250: Mod: NTX | Performed by: INTERNAL MEDICINE

## 2020-09-08 PROCEDURE — 25500020 PHARM REV CODE 255: Mod: NTX | Performed by: EMERGENCY MEDICINE

## 2020-09-08 PROCEDURE — 81003 URINALYSIS AUTO W/O SCOPE: CPT | Mod: NTX

## 2020-09-08 PROCEDURE — 20600001 HC STEP DOWN PRIVATE ROOM: Mod: NTX

## 2020-09-08 PROCEDURE — 99285 EMERGENCY DEPT VISIT HI MDM: CPT | Mod: 25,NTX

## 2020-09-08 RX ORDER — LACTULOSE 10 G/15ML
20 SOLUTION ORAL 3 TIMES DAILY
Status: DISCONTINUED | OUTPATIENT
Start: 2020-09-08 | End: 2020-09-08

## 2020-09-08 RX ORDER — MORPHINE SULFATE 4 MG/ML
4 INJECTION, SOLUTION INTRAMUSCULAR; INTRAVENOUS
Status: COMPLETED | OUTPATIENT
Start: 2020-09-08 | End: 2020-09-08

## 2020-09-08 RX ORDER — LACTULOSE 10 G/15ML
30 SOLUTION ORAL 3 TIMES DAILY
Status: DISCONTINUED | OUTPATIENT
Start: 2020-09-08 | End: 2020-09-08

## 2020-09-08 RX ORDER — ACETAMINOPHEN 500 MG
500 TABLET ORAL EVERY 6 HOURS PRN
Status: DISCONTINUED | OUTPATIENT
Start: 2020-09-08 | End: 2020-09-09

## 2020-09-08 RX ORDER — LEVOTHYROXINE SODIUM 88 UG/1
88 TABLET ORAL
Status: DISCONTINUED | OUTPATIENT
Start: 2020-09-09 | End: 2020-09-13 | Stop reason: HOSPADM

## 2020-09-08 RX ORDER — PANTOPRAZOLE SODIUM 40 MG/1
40 TABLET, DELAYED RELEASE ORAL
Status: DISCONTINUED | OUTPATIENT
Start: 2020-09-09 | End: 2020-09-13 | Stop reason: HOSPADM

## 2020-09-08 RX ORDER — FUROSEMIDE 40 MG/1
40 TABLET ORAL 2 TIMES DAILY
Status: DISCONTINUED | OUTPATIENT
Start: 2020-09-08 | End: 2020-09-09

## 2020-09-08 RX ORDER — LACTULOSE 10 G/10G
30 SOLUTION ORAL 3 TIMES DAILY
Status: DISCONTINUED | OUTPATIENT
Start: 2020-09-08 | End: 2020-09-12

## 2020-09-08 RX ORDER — ONDANSETRON 8 MG/1
8 TABLET, ORALLY DISINTEGRATING ORAL EVERY 8 HOURS PRN
Status: DISCONTINUED | OUTPATIENT
Start: 2020-09-08 | End: 2020-09-13 | Stop reason: HOSPADM

## 2020-09-08 RX ORDER — OXYCODONE HYDROCHLORIDE 5 MG/1
5 TABLET ORAL EVERY 6 HOURS PRN
Status: DISCONTINUED | OUTPATIENT
Start: 2020-09-08 | End: 2020-09-09

## 2020-09-08 RX ORDER — SODIUM CHLORIDE 0.9 % (FLUSH) 0.9 %
10 SYRINGE (ML) INJECTION
Status: DISCONTINUED | OUTPATIENT
Start: 2020-09-08 | End: 2020-09-13 | Stop reason: HOSPADM

## 2020-09-08 RX ORDER — ZINC SULFATE 50(220)MG
220 CAPSULE ORAL
Status: DISCONTINUED | OUTPATIENT
Start: 2020-09-09 | End: 2020-09-13 | Stop reason: HOSPADM

## 2020-09-08 RX ORDER — VANCOMYCIN HCL IN 5 % DEXTROSE 1G/250ML
1000 PLASTIC BAG, INJECTION (ML) INTRAVENOUS
Status: DISCONTINUED | OUTPATIENT
Start: 2020-09-08 | End: 2020-09-10

## 2020-09-08 RX ORDER — SPIRONOLACTONE 25 MG/1
100 TABLET ORAL DAILY
Status: DISCONTINUED | OUTPATIENT
Start: 2020-09-08 | End: 2020-09-13 | Stop reason: HOSPADM

## 2020-09-08 RX ORDER — LACTULOSE 10 G/10G
10 SOLUTION ORAL 3 TIMES DAILY
Status: DISCONTINUED | OUTPATIENT
Start: 2020-09-08 | End: 2020-09-08

## 2020-09-08 RX ORDER — TRAMADOL HYDROCHLORIDE 50 MG/1
50 TABLET ORAL EVERY 6 HOURS PRN
Status: DISCONTINUED | OUTPATIENT
Start: 2020-09-08 | End: 2020-09-08

## 2020-09-08 RX ORDER — MIDODRINE HYDROCHLORIDE 5 MG/1
10 TABLET ORAL 3 TIMES DAILY
Status: DISCONTINUED | OUTPATIENT
Start: 2020-09-08 | End: 2020-09-13 | Stop reason: HOSPADM

## 2020-09-08 RX ADMIN — OXYCODONE HYDROCHLORIDE 5 MG: 5 TABLET ORAL at 08:09

## 2020-09-08 RX ADMIN — SPIRONOLACTONE 100 MG: 25 TABLET ORAL at 11:09

## 2020-09-08 RX ADMIN — MIDODRINE HYDROCHLORIDE 10 MG: 5 TABLET ORAL at 03:09

## 2020-09-08 RX ADMIN — MORPHINE SULFATE 4 MG: 4 INJECTION INTRAVENOUS at 08:09

## 2020-09-08 RX ADMIN — MIDODRINE HYDROCHLORIDE 10 MG: 5 TABLET ORAL at 08:09

## 2020-09-08 RX ADMIN — LACTULOSE 20 G: 20 SOLUTION ORAL at 11:09

## 2020-09-08 RX ADMIN — RIFAXIMIN 550 MG: 550 TABLET ORAL at 11:09

## 2020-09-08 RX ADMIN — LACTULOSE 30 G: 10 SOLUTION ORAL at 08:09

## 2020-09-08 RX ADMIN — TRAMADOL HYDROCHLORIDE 50 MG: 50 TABLET, FILM COATED ORAL at 07:09

## 2020-09-08 RX ADMIN — LACTULOSE 30 G: 10 SOLUTION ORAL at 04:09

## 2020-09-08 RX ADMIN — RIFAXIMIN 550 MG: 550 TABLET ORAL at 08:09

## 2020-09-08 RX ADMIN — IOHEXOL 75 ML: 350 INJECTION, SOLUTION INTRAVENOUS at 07:09

## 2020-09-08 RX ADMIN — MORPHINE SULFATE 4 MG: 4 INJECTION INTRAVENOUS at 05:09

## 2020-09-08 RX ADMIN — VANCOMYCIN HYDROCHLORIDE 1000 MG: 1 INJECTION, POWDER, LYOPHILIZED, FOR SOLUTION INTRAVENOUS at 04:09

## 2020-09-08 RX ADMIN — FUROSEMIDE 40 MG: 40 TABLET ORAL at 05:09

## 2020-09-08 NOTE — HPI
55 y/o female with pmh of esld secondary CHAUDHARY.  Presents to the ED with complaint of lower abdominal pain.  CT scan obtained, unremarkable to source of pain.  Lipase elevated on admit, has hx of pancreatic lesions.  Upon assessment mental status slowed.  Patient's  reports having regular bowel movements and mental status better then baseline.  However states very anxious needing haldol for symptom control.  Lower extremities with swelling, redness, warmth, and weeping open sores.   states wound was being dressed by home health. Plan to admit for observation to address these issues.  Will consult psychiatry to address anxiety and recommendations of better medication in setting of liver failure.  Wound care consult for multiple skin tears and lesions.  Infectious work up and empirically treat for cellulitis.

## 2020-09-08 NOTE — ASSESSMENT & PLAN NOTE
- decompensated  - listed for liver transplant    MELD-Na score: 17 at 9/8/2020  5:47 AM  MELD score: 17 at 9/8/2020  5:47 AM  Calculated from:  Serum Creatinine: 1.3 mg/dL at 9/8/2020  5:47 AM  Serum Sodium: 137 mmol/L at 9/8/2020  5:47 AM  Total Bilirubin: 4.4 mg/dL at 9/8/2020  5:47 AM  INR(ratio): 1.3 at 9/8/2020  5:47 AM  Age: 54 years 11 months

## 2020-09-08 NOTE — ED NOTES
Zeina Mahoney, a 54 y.o. female presents to the ED via personal transport from home with CC abdominal pain.      Patient identifiers verified verbally with patient and correct for Zeina Mahoney.    LOC/ APPEARANCE: The patient is AAOx4. Pt is speaking appropriately, no slurred speech. Pt changed into hospital gown. Continuous cardiac monitor, cont pulse ox, and auto BP cuff applied to patient. Pt is clean and well groomed. No JVD visible. Pt reports pain level of 6/10. Pt updated on POC. Bed low and locked with side rails up x2, call bell in pt reach.  SKIN: Skin is warm dry and intact, and color is consistent with ethnicity. Capillary refill <3 seconds. No breakdown or brusing visible. Mucus membranes moist, acyanotic.  RESPIRATORY: Airway is open and patent. Respirations-spontaneous, unlabored, regular rate, equal bilaterally on inspiration and expiration. No accessory muscle use noted. Lungs clear to auscultation in all fields bilaterally anterior and posterior.   CARDIAC: Patient has regular heart rate. Patient has no c/o chest pain. Peripheral pulses present equal and strong throughout. 5+ pitting edema noted to both bilateral lower extremities.   ABDOMEN: Soft and non-tender to palpation with no distention noted. Normoactive bowel sounds x4 quadrants. Pt has no complaints of abnormal bowel movements, denies blood in stool. Pt reports normal appetite.   NEUROLOGIC: Eyes open spontaneously and facial expression symmetrical. Pt behavior appropriate to situation, and pt follows commands. Pt reports sensation present in all extremities when touched with a finger, denies any numbness or tingling. PERRLA  MUSCULOSKELETAL: Spontaneous movement noted to all extremities. Hand  equal and leg strength strong +5 bilaterally.   : No complaints of frequency, burning, urgency or blood in the urine. No complaints of incontinence.

## 2020-09-08 NOTE — CONSULTS
"[facility]  Psychiatry  Consult Note    Patient Name: Zeina Mahoney  MRN: [id number]   Code Status: Full Code  Admission Date: 9/8/2020  Hospital Length of Stay: 0 days  Attending Physician: Joaquin Malhotra MD  Primary Care Provider: Primary Doctor No    Current Legal Status: N/A    Patient information was obtained from patient, past medical records and ER records.   Inpatient consult to Psychiatry  Consult performed by: Polly Pacheco DNP  Consult ordered by: Yaritza Nazario NP  Reason for consult: anxiety        Subjective:     Principal Problem:<principal problem not specified>    Chief Complaint:  anxiety    HPI:   Zeina Mahoney is a 53 y/o female with CHAUDHARY, known to CL service related to acute deliriurm during previous admission. During previous visits  had expressed concerns for anxiety however patients AMS complicated the assessment for other psychiatric disorders such as anxiety. She had an initial outpatient psych appointment on 9/920 but it was canceled due to her hospitalization. Psych was consulted to help address her anxiety. Today she is much more alert than previous admit and is able to participate in interview and answer all questions appropriately. She admits to anxiety. Immediately identifies feeling restless "I can't stop walking." She reports she was "never that type of girl." She explains normally she is able to let things go/brush them off but lately finds herself becoming easily annoyed with her . She aslo reports Insomnia - "everynight. Can't rest during the day or night." She reports times its due to rumination but other times nothing is going on in her mind but she still paces. Reports this started around May but didn't realize what it was. She expressed "Now I understand when people say its debilitating." She admits to "fear of the unknown of the known" related to her diagnosis.    She has the following symptoms: feelings of losing control, insomnia, " irritable, palpitations, psychomotor agitation, racing thoughts, shortness of breath, emotional fatigue. Onset of symptoms was approximately 4 months ago, gradually worsening since that time. She denies current suicidal and homicidal ideation. Family history significant for anxiety and depression.Possible organic causes contributing are: endocrine/metabolic. Risk factors: positive family history in  sister(s) and negative life event CHAUDHARY, being away from home. Previous treatment includes none.    Other stressors include: She misses her home in [place] - been away since June. Son will be 27 in December and she wants to be with him for his birthday. She feels her  is not treating her like an adult. She admits to guilt about someone having to die for her to get her liver transplant.     She denies history of depression, inpatient psychiatric hospitalization, dell, suicide attempts. Stopped haldol- was using as needed. She does not believe she has tried Zyprexa or Seroquel (prescribed during previous admission). Patient desires medication to help manage symptoms.     Past Medical/Surgical History  Past Medical History:   Diagnosis Date    Chronic Hepatic encephalopathy 6/25/2020    Decompensated hepatic cirrhosis 6/25/2020    History of pancreatitis 6/25/2020    Liver cirrhosis secondary to CHAUDHARY 6/25/2020    Macrocytic anemia 6/25/2020    Other ascites 6/25/2020    Thrombocytopenia 6/25/2020     History reviewed. No pertinent surgical history.    Past Psychiatric History:  Previous Medication Trials: yes - Haldol - feels its ineffective for sleep, Zyprexa, Seroquel - she is unsure if she received these.   Previous Psychiatric Hospitalizations: no   Previous Suicide Attempts: no   History of Violence: no  Outpatient Psychiatrist: no    Social History:  Marital Status: -  together since age 15 yoa- he is supprotive but feels he babys her  Children: 3 children 29 (son), 26 (son), 21  (daughter)  Employment Status/Info:working on getting on disability  Education: some college  Special Ed: no  Housing Status: home  History of phys/sexual abuse: no  Access to gun: no -  has gone but she does not have access    Substance Abuse History:  Recreational Drugs: denies  Use of Alcohol: denied  Tobacco Use: no  Rehab History: no     Legal History:  Past Charges/Incarcerations: no,  Pending charges: no     Family Psychiatric History:   Sisters - with anxiety and depression      Hospital Course: No notes on file         Patient History           Medical as of 9/8/2020     Past Medical History     Diagnosis Date Comments Source    Chronic Hepatic encephalopathy 6/25/2020 -- Provider    Decompensated hepatic cirrhosis 6/25/2020 -- Provider    History of pancreatitis 6/25/2020 -- Provider    Liver cirrhosis secondary to CHAUDHARY 6/25/2020 -- Provider    Macrocytic anemia 6/25/2020 -- Provider    Other ascites 6/25/2020 -- Provider    Thrombocytopenia 6/25/2020 -- Provider                  Surgical as of 9/8/2020    Past Surgical History: Patient provided no pertinent surgical history.           Family as of 9/8/2020    None           Tobacco Use as of 9/8/2020     Smoking Status Smoking Start Date Smoking Quit Date Packs/Day Years Used    Never Smoker -- -- -- --    Types Comments Smokeless Tobacco Status Smokeless Tobacco Quit Date Source    -- -- Never Used -- Provider            Alcohol Use as of 9/8/2020     Alcohol Use Drinks/Week Alcohol/Week Comments Source    Never   -- -- Provider    Frequency Typical Drinks Binge Drinking        Never -- --              Drug Use as of 9/8/2020     Drug Use Types Frequency Comments Source    Never -- -- -- Provider            Sexual Activity as of 9/8/2020     Sexually Active Birth Control Partners Comments Source    Not Asked -- -- -- Provider            Activities of Daily Living as of 9/8/2020    None           Social Documentation as of 9/8/2020    None            Occupational as of 9/8/2020    None           Socioeconomic as of 9/8/2020     Marital Status Spouse Name Number of Children Years Education Education Level Preferred Language Ethnicity Race Source     -- -- -- -- English Austrian  --    Financial Resource Strain Food Insecurity: Worry Food Insecurity: Inability Transportation Needs: Medical Transportation Needs: Non-medical    -- -- -- -- --            Pertinent History     Question Response Comments    Lives with -- --    Place in Birth Order -- --    Lives in -- --    Number of Siblings -- --    Raised by -- --    Legal Involvement -- --    Childhood Trauma -- --    Criminal History of -- --    Financial Status -- --    Highest Level of Education -- --    Does patient have access to a firearm? -- --     Service -- --    Primary Leisure Activity -- --    Spirituality -- --        Past Medical History:   Diagnosis Date    Chronic Hepatic encephalopathy 6/25/2020    Decompensated hepatic cirrhosis 6/25/2020    History of pancreatitis 6/25/2020    Liver cirrhosis secondary to CHAUDHARY 6/25/2020    Macrocytic anemia 6/25/2020    Other ascites 6/25/2020    Thrombocytopenia 6/25/2020     History reviewed. No pertinent surgical history.  Family History     None        Tobacco Use    Smoking status: Never Smoker    Smokeless tobacco: Never Used   Substance and Sexual Activity    Alcohol use: Never     Frequency: Never    Drug use: Never    Sexual activity: Not on file     Review of patient's allergies indicates:   Allergen Reactions    Aspirin Tinitus    Ciprofloxacin Rash    Clindamycin Itching       No current facility-administered medications on file prior to encounter.      Current Outpatient Medications on File Prior to Encounter   Medication Sig    furosemide (LASIX) 40 MG tablet Take 1 tablet (40 mg total) by mouth once daily. Take 2 tablets (80 mg) by mouth in the morning and 1 tablet (40mg) in the evening.    haloperidoL  (HALDOL) 1 MG tablet Take 1 tablet (1 mg total) by mouth daily as needed (non redirectable agitation).    lactulose (CEPHULAC) 10 gram packet Mix 3 packets (30 g total) with liquid and take by mouth 3 (three) times daily as needed.    levothyroxine (SYNTHROID) 88 MCG tablet Take 1 tablet (88 mcg total) by mouth before breakfast.    magnesium hydroxide 400 mg/5 ml (MILK OF MAGNESIA) 400 mg/5 mL Susp Take 30 mLs (2,400 mg total) by mouth daily as needed (Take if constipated despite lactulose).    nystatin (MYCOSTATIN) powder Apply topically 2 (two) times daily. Apply to affected area twice a day    pantoprazole (PROTONIX) 40 MG tablet Take 40 mg by mouth before breakfast.    polyethylene glycol (GLYCOLAX) 17 gram PwPk Take 17 g by mouth once daily. Or miralax OTC. Take if constipated despite lactulose    rifAXIMin (XIFAXAN) 550 mg Tab Take 550 mg by mouth 2 (two) times daily.     simethicone (MYLICON) 80 MG chewable tablet Take 80 mg by mouth every 6 (six) hours as needed for Flatulence. Take 2 tablets by mouth every 6 hours as needed for gas    sodium bicarbonate 650 MG tablet Take 2 tablets (1,300 mg total) by mouth 2 (two) times daily.    spironolactone (ALDACTONE) 50 MG tablet Take 2 tablets (100 mg total) by mouth once daily.    zinc sulfate (ZINCATE) 220 (50) mg capsule Take 220 mg by mouth before breakfast.    acetaminophen (TYLENOL) 500 MG tablet Take 1 tablet (500 mg total) by mouth every 6 (six) hours as needed for Pain. Max 2000mg daily.    diaper,brief,adult,disposable Misc 5 mg/kg/day by Misc.(Non-Drug; Combo Route) route 5 (five) times daily. Patient with incontinence. Please supply enough depends for 5xs/day    ergocalciferol (VITAMIN D2) 50,000 unit Cap Take 50,000 Units by mouth every 7 days.    lidocaine (LIDODERM) 5 % Place 1 patch onto the skin every 24 hours. Remove & Discard patch within 12 hours or as directed by MD    melatonin 10 mg Tab Take 10 mg by mouth nightly as needed  "(sleep).    midodrine (PROAMATINE) 10 MG tablet Take 10 mg by mouth 3 (three) times daily.     Psychotherapeutics (From admission, onward)    None        Review of Systems   Constitutional: Positive for fatigue.   Cardiovascular: Positive for leg swelling.   Gastrointestinal: Positive for abdominal pain. Negative for nausea.   Psychiatric/Behavioral: Positive for sleep disturbance. The patient is nervous/anxious.      Strengths and Liabilities: Strength: Patient accepts guidance/feedback, Strength: Patient is expressive/articulate., Strength: Patient is intelligent., Liability: Patient has poor health., Liability: Patient lacks coping skills.    Objective:     Vital Signs (Most Recent):  Temp: 97.8 °F (36.6 °C) (09/08/20 0457)  Pulse: 93 (09/08/20 1048)  Resp: 16 (09/08/20 0810)  BP: 111/62 (09/08/20 1048)  SpO2: 100 % (09/08/20 1048) Vital Signs (24h Range):  Temp:  [97.8 °F (36.6 °C)] 97.8 °F (36.6 °C)  Pulse:  [86-93] 93  Resp:  [16-22] 16  SpO2:  [99 %-100 %] 100 %  BP: ()/(49-62) 111/62     Height: 5' 4" (162.6 cm)  Weight: 63.5 kg (140 lb)  Body mass index is 24.03 kg/m².      Intake/Output Summary (Last 24 hours) at 9/8/2020 1424  Last data filed at 9/8/2020 0620  Gross per 24 hour   Intake --   Output 300 ml   Net -300 ml       Physical Exam  Psychiatric:      Comments: Mental Status Exam  Appearance: age appropriate, jaundice  Behavior/Copperation: normal, cooperative  Speech: normal tone, normal rate, normal pitch, normal volume  Mood:  "trying to hang on"  Affect: normal  Thought Process: normal and logical  Thought Content: normal, no suicidality, no homicidality, delusions, or paranoia  Orientation:  person, place, situation, day of week, month of year, year  Memory: Registers 3/3 and recalls 2/3 objects at 5 minutes  Attention/Concentration:  Normal  Cognition: fund of knowledge 4 of 4 recent presidents  Insight: intact  Judgement: intact            Significant Labs: All pertinent labs within " the past 24 hours have been reviewed.    Significant Imaging: I have reviewed all pertinent imaging results/findings within the past 24 hours.    Assessment/Plan:     Adjustment disorder with anxious mood  Assessment:Zeina Mahoney is a 53 y/o female with CHAUDHARY, known to CL service related to acute deliriurm during previous admission. Psych was re-consulted to help address her anxiety.Today she is much more alert than previous admit and is able to participate in interview and answer all questions appropriately. She admits to anxiety -restlessnes, irritability, insomnia, rumination, palpilations. Patient desires medication to help manage symptoms.     Recommendations:  - Patient does not meet criteria for PEC or inpatient psychiatric admission at this time. Patient is not currently an imminent danger to self or others and is not gravely disabled due to a psychiatric illness.     -Start Lexapro 5 mg daily for anxiety    -Follow-up with outpatient psychiatry once discharged    -CL will continue to follow       Total Time:  60 minutes      Polly Pacheco, RAUDEL   Psychiatry  [facility]

## 2020-09-08 NOTE — TELEPHONE ENCOUNTER
"-GERHARD received the following message from pt's [place] coordinator Tay Kumar, RN:     "Returned call to patient's , Yonis. Yonis is asking if we can either extend her current home health (pt is to be discharged from home health ot/pt next week) or have a home health wound care RN come out to the house to continue care to pt's chronic wounds. Will discuss with the social work team to see what pt's options may be. Pt is progressing well with her therapies & will no longer need pt/ot. Understanding expressed. No other questions at this time."      GERHARD contacted pt's  Yonis Mahoney (ph: [phone]), Pt's  reported pt having chronic wound care and requesting extended HH wound care. GERHARD informed caregiver, GERHARD will contact [facility] for further information. GERHARD spoke with Shira with [facility]. Wolfgang informed GERHARD pt's episode will end 9/12/20, will contact  for further information and reassessing pt for extended wound care. If pt will require continued services will request resumption orders from pt's physician. GERHARD remains following and available.   "

## 2020-09-08 NOTE — ED TRIAGE NOTES
Zeina Mahoney, an 54 y.o. female presents to the ED c/o Abd. pain starting around 0100 tonight. Mid-lower abdominal pain radiating to right and left. Hx liver cirrhosis- on transplant list. -NVD. Last BM yesterday am.       Chief Complaint   Patient presents with    Abdominal Pain     RUQ without nvd. Hx pancreatitis & cirrhosis. Being worked up for transplant waiting list.     Review of patient's allergies indicates:   Allergen Reactions    Aspirin Tinitus    Ciprofloxacin Rash    Clindamycin Itching     Past Medical History:   Diagnosis Date    Chronic Hepatic encephalopathy 6/25/2020    Decompensated hepatic cirrhosis 6/25/2020    History of pancreatitis 6/25/2020    Liver cirrhosis secondary to CHADUHARY 6/25/2020    Macrocytic anemia 6/25/2020    Other ascites 6/25/2020    Thrombocytopenia 6/25/2020       LOC: The patient is awake, alert and aware of environment with an appropriate affect, the patient is oriented x 3 and speaking appropriately.   APPEARANCE: Patient appears comfortable and in no acute distress, patient is clean and well groomed.  SKIN: The skin is warm and dry, color jaundice, patient has normal skin turgor and moist mucus membranes, no breakdown, some bruising/skin tear on left forearm noted from recent fall- Will dress with nonadherent dressing.  MUSCULOSKELETAL: Patient moving all extremities spontaneously, BLE swelling noted. Ambulatory.  RESPIRATORY: Airway is open and patent, respirations are spontaneous, patient has a normal effort and rate, no accessory muscle use noted.  CARDIAC: Patient has a normal rate and regular rhythm, BLE pitting,weeping edema noted.  GASTRO: Soft and tender to palpation, no distention noted. +Abd pain 10/10 pain. -NVD.   : Pt denies any pain or frequency with urination.  NEURO: Pt opens eyes spontaneously, behavior appropriate to situation, follows commands, facial expression symmetrical, bilateral hand grasp equal and even, purposeful motor response  noted, normal sensation in all extremities when touched with a finger.

## 2020-09-08 NOTE — H&P
[facility]  Liver Transplant  History & Physical    Patient Name: Zeina Mahoney  MRN: [id number]  Admission Date: 9/8/2020  Code Status: Full Code  Primary Care Provider: Primary Doctor No    Subjective:     History of Present Illness:  55 y/o female with pmh of esld secondary CHAUDHARY.  Presents to the ED with complaint of lower abdominal pain.  CT scan obtained, unremarkable to source of pain.  Lipase elevated on admit, has hx of pancreatic lesions.  Upon assessment mental status slowed.  Patient's  reports having regular bowel movements and mental status better then baseline.  However states very anxious needing haldol for symptom control.  Lower extremities with swelling, redness, warmth, and weeping open sores.   states wound was being dressed by home health. Plan to admit for observation to address these issues.  Will consult psychiatry to address anxiety and recommendations of better medication in setting of liver failure.  Wound care consult for multiple skin tears and lesions.  Infectious work up and empirically treat for cellulitis.    Scheduled Meds:   furosemide  40 mg Oral BID    lactulose  20 g Oral TID    [START ON 9/9/2020] levothyroxine  88 mcg Oral Before breakfast    midodrine  10 mg Oral TID    [START ON 9/9/2020] pantoprazole  40 mg Oral Before breakfast    rifAXIMin  550 mg Oral BID    spironolactone  100 mg Oral Daily    [START ON 9/9/2020] zinc sulfate  220 mg Oral Before breakfast     Continuous Infusions:  PRN Meds:acetaminophen, ondansetron, sodium chloride 0.9%    Review of Systems   Constitutional: Negative for appetite change, chills and fever.   HENT: Negative for facial swelling and trouble swallowing.    Eyes: Negative for photophobia and redness.   Respiratory: Negative for cough, shortness of breath, wheezing and stridor.    Cardiovascular: Positive for leg swelling. Negative for chest pain and palpitations.   Gastrointestinal: Positive for  abdominal pain. Negative for constipation, diarrhea and vomiting.   Genitourinary: Negative for decreased urine volume, difficulty urinating and dysuria.   Skin: Positive for wound.   Neurological: Positive for weakness. Negative for dizziness and light-headedness.   Hematological: Bruises/bleeds easily.   Psychiatric/Behavioral: Positive for confusion and decreased concentration. Negative for agitation and behavioral problems. The patient is nervous/anxious.      Objective:     Vital Signs (Most Recent):  Temp: 97.8 °F (36.6 °C) (09/08/20 0457)  Pulse: 93 (09/08/20 1048)  Resp: 16 (09/08/20 0810)  BP: 111/62 (09/08/20 1048)  SpO2: 100 % (09/08/20 1048) Vital Signs (24h Range):  Temp:  [97.8 °F (36.6 °C)] 97.8 °F (36.6 °C)  Pulse:  [86-93] 93  Resp:  [16-22] 16  SpO2:  [99 %-100 %] 100 %  BP: ()/(49-62) 111/62     Weight: 63.5 kg (140 lb)  Body mass index is 24.03 kg/m².    Intake/Output - Last 3 Shifts       09/06 0700 - 09/07 0659 09/07 0700 - 09/08 0659 09/08 0700 - 09/09 0659    Urine (mL/kg/hr)  300     Total Output  300     Net  -300                  Physical Exam  Vitals signs and nursing note reviewed.   Constitutional:       General: She is not in acute distress.  HENT:      Head: Normocephalic and atraumatic.      Mouth/Throat:      Mouth: Mucous membranes are dry.   Eyes:      General: No scleral icterus.        Right eye: No discharge.         Left eye: No discharge.   Neck:      Musculoskeletal: Normal range of motion.   Cardiovascular:      Rate and Rhythm: Normal rate and regular rhythm.      Heart sounds: No murmur.   Pulmonary:      Effort: Pulmonary effort is normal. No respiratory distress.      Breath sounds: Examination of the right-lower field reveals decreased breath sounds. Examination of the left-lower field reveals decreased breath sounds. Decreased breath sounds present. No wheezing or rales.   Abdominal:      General: There is distension.      Tenderness: There is abdominal  tenderness (mild diffuse). There is no guarding.   Musculoskeletal:      Right lower leg: Edema present.      Left lower leg: Edema present.   Skin:     General: Skin is warm and dry.      Capillary Refill: Capillary refill takes 2 to 3 seconds.      Findings: Erythema (ble) present.   Neurological:      General: No focal deficit present.      Mental Status: She is alert and oriented to person, place, and time.   Psychiatric:         Mood and Affect: Mood normal.         Thought Content: Thought content normal.         Laboratory:  Immunosuppressants     None        CBC:   Recent Labs   Lab 09/08/20  0547   WBC 5.59   RBC 2.43*   HGB 8.6*   HCT 25.5*   PLT 77*   *   MCH 35.4*   MCHC 33.7     CMP:   Recent Labs   Lab 09/08/20  0547   *   CALCIUM 8.9   ALBUMIN 2.5*   PROT 5.8*      K 3.8   CO2 26      BUN 20   CREATININE 1.3   ALKPHOS 97   ALT 23   AST 39   BILITOT 4.4*     Labs within the past 24 hours have been reviewed.    Diagnostic Results:  CT Abd/Pelvis:   Impression:     Irregular hepatic contour, splenomegaly with prominent collateral vessels, mesenteric edema, and intraperitoneal free fluid.  Findings compatible with hepatic dysfunction with sequela of portal venous hypertension.     Hypoattenuating lesion at the dome of the liver, better evaluated on triple phase CT abdomen July 9, 2020.     Gallstone within the gallbladder neck without evidence of acute cholecystitis.     Stable hypodensities within the pancreas.  Measurements as above.  Recommend surveillance re-imaging with MRI with contrast or CT with pancreas protocol in 1 year.     Multiple vertebral body compression fractures, similar to prior examinations.    Debility/Functional status: No debility noted.    Assessment/Plan:     Cellulitis of lower extremity  - bilateral lower extremities swollen, warm, erythematous  - send blood cx, recent hx of bacteremia  - begin empiric vanc for cellulitis        Abdominal pain  Ct scan  unremarkable to source of pain  Treat with tylenol  monitor      Anemia of chronic disease  H/h stable on admit  Monitor labs daily      Protein-calorie malnutrition, moderate  - encourage oral intake with suppplements    Thrombocytopenia due to hypersplenism  - platelets stable  - monitor labs daily      Portal hypertension  - secondary liver disease  - monitor      Acquired coagulation factor deficiency  - secondary liver disease  - no avert signs of bleeding  - monitor pt/inr daily      Adjustment disorder with anxious mood  - previously prescribed haldol  - consult psychiatry - appreciate recs      Liver cirrhosis secondary to CHAUDHARY  - decompensated  - listed for liver transplant    MELD-Na score: 17 at 9/8/2020  5:47 AM  MELD score: 17 at 9/8/2020  5:47 AM  Calculated from:  Serum Creatinine: 1.3 mg/dL at 9/8/2020  5:47 AM  Serum Sodium: 137 mmol/L at 9/8/2020  5:47 AM  Total Bilirubin: 4.4 mg/dL at 9/8/2020  5:47 AM  INR(ratio): 1.3 at 9/8/2020  5:47 AM  Age: 54 years 11 months        Acute Hepatic encephalopathy  Acute on chronic encephalopathy  Continue Lactulose and rifaximin  Increase dose of lactulose, goal for 3-4 BM/day  Ammonia 54 on admit  Monitor            MELD-Na score: 17 at 9/8/2020  5:47 AM  MELD score: 17 at 9/8/2020  5:47 AM  Calculated from:  Serum Creatinine: 1.3 mg/dL at 9/8/2020  5:47 AM  Serum Sodium: 137 mmol/L at 9/8/2020  5:47 AM  Total Bilirubin: 4.4 mg/dL at 9/8/2020  5:47 AM  INR(ratio): 1.3 at 9/8/2020  5:47 AM  Age: 54 years 11 months      Planning:  Place in observation  Infectious w/u  Psych consullt  Above discussed with Dr. Mg  No Patient Care Coordination Note on file.      Yaritza Nazario NP  Liver Transplant  [facility]

## 2020-09-08 NOTE — HPI
"Zeina Mahoney is a 53 y/o female with CHAUDHARY, known to CL service related to acute deliriurm during previous admission. During previous visits  had expressed concerns for anxiety however patients AMS complicated the assessment for other psychiatric disorders such as anxiety. She had an initial outpatient psych appointment on 9/920 but it was canceled due to her hospitalization. Psych was consulted to help address her anxiety. Today she is much more alert than previous admit and is able to participate in interview and answer all questions appropriately. She admits to anxiety. Immediately identifies feeling restless "I can't stop walking." She reports she was "never that type of girl." She explains normally she is able to let things go/brush them off but lately finds herself becoming easily annoyed with her . She aslo reports Insomnia - "everynight. Can't rest during the day or night." She reports times its due to rumination but other times nothing is going on in her mind but she still paces. Reports this started around May but didn't realize what it was. She expressed "Now I understand when people say its debilitating." She admits to "fear of the unknown of the known" related to her diagnosis.    She has the following symptoms: feelings of losing control, insomnia, irritable, palpitations, psychomotor agitation, racing thoughts, shortness of breath, emotional fatigue. Onset of symptoms was approximately 4 months ago, gradually worsening since that time. She denies current suicidal and homicidal ideation. Family history significant for anxiety and depression.Possible organic causes contributing are: endocrine/metabolic. Risk factors: positive family history in  sister(s) and negative life event CHAUDHARY, being away from home. Previous treatment includes none.    Other stressors include: She misses her home in [place] - been away since June. Son will be 27 in December and she wants to be with him for his " birthday. She feels her  is not treating her like an adult. She admits to guilt about someone having to die for her to get her liver transplant.     She denies history of depression, inpatient psychiatric hospitalization, dell, suicide attempts. Stopped haldol- was using as needed. She does not believe she has tried Zyprexa or Seroquel (prescribed during previous admission). Patient desires medication to help manage symptoms.     Past Medical/Surgical History  Past Medical History:   Diagnosis Date    Chronic Hepatic encephalopathy 6/25/2020    Decompensated hepatic cirrhosis 6/25/2020    History of pancreatitis 6/25/2020    Liver cirrhosis secondary to CHAUDHARY 6/25/2020    Macrocytic anemia 6/25/2020    Other ascites 6/25/2020    Thrombocytopenia 6/25/2020     History reviewed. No pertinent surgical history.    Past Psychiatric History:  Previous Medication Trials: yes - Haldol - feels its ineffective for sleep, Zyprexa, Seroquel - she is unsure if she received these.   Previous Psychiatric Hospitalizations: no   Previous Suicide Attempts: no   History of Violence: no  Outpatient Psychiatrist: no    Social History:  Marital Status: -  together since age 15 yoa- he is supprotive but feels he babys her  Children: 3 children 29 (son), 26 (son), 21 (daughter)  Employment Status/Info:working on getting on disability  Education: some college  Special Ed: no  Housing Status: home  History of phys/sexual abuse: no  Access to gun: no -  has gone but she does not have access    Substance Abuse History:  Recreational Drugs: denies  Use of Alcohol: denied  Tobacco Use: no  Rehab History: no     Legal History:  Past Charges/Incarcerations: no,  Pending charges: no     Family Psychiatric History:   Sisters - with anxiety and depression

## 2020-09-08 NOTE — ASSESSMENT & PLAN NOTE
Acute on chronic encephalopathy  Continue Lactulose and rifaximin  Increase dose of lactulose, goal for 3-4 BM/day  Ammonia 54 on admit  Monitor

## 2020-09-08 NOTE — PROGRESS NOTES
Pt admitted from ED.  with pt. AAO. Ambulated from stretcher to bed. Oriented to room and call bell. Hat placed in the bathroom, instructed to void in it. Fall education reviewed with pt. Instructed to call if assistance needed, verbalized understanding. Informed NICHOLAS Vigil of pt's arrival.

## 2020-09-08 NOTE — PROGRESS NOTES
Pharmacokinetic Initial Assessment: IV Vancomycin    Assessment/Plan:    Initiate intravenous vancomycin 1000 mg IV q24h   Desired empiric serum trough concentration is 10 to 20 mcg/mL  Draw vancomycin trough level 30 min prior to third dose on 9/10 at approximately 1530  Pharmacy will continue to follow and monitor vancomycin.      Please contact pharmacy at extension [phone] with any questions regarding this assessment.     Thank you for the consult,   Imani Rivera       Patient brief summary:  Zeina Mahoney is a 54 y.o. female initiated on antimicrobial therapy with IV Vancomycin for treatment of suspected skin & soft tissue infection    Drug Allergies:   Review of patient's allergies indicates:   Allergen Reactions    Aspirin Tinitus    Ciprofloxacin Rash    Clindamycin Itching       Actual Body Weight:   63.5 kg     Renal Function:   Estimated Creatinine Clearance: 42.7 mL/min (based on SCr of 1.3 mg/dL).,     Dialysis Method (if applicable):  N/A    CBC (last 72 hours):  Recent Labs   Lab Result Units 09/08/20  0547   WBC K/uL 5.59   Hemoglobin g/dL 8.6*   Hematocrit % 25.5*   Platelets K/uL 77*   Gran% % 71.6   Lymph% % 16.6*   Mono% % 7.5   Eosinophil% % 2.1   Basophil% % 1.1   Differential Method  Automated       Metabolic Panel (last 72 hours):  Recent Labs   Lab Result Units 09/08/20  0547 09/08/20  0622   Sodium mmol/L 137  --    Potassium mmol/L 3.8  --    Chloride mmol/L 104  --    CO2 mmol/L 26  --    Glucose mg/dL 170*  --    Glucose, UA   --  Negative   BUN, Bld mg/dL 20  --    Creatinine mg/dL 1.3  --    Albumin g/dL 2.5*  --    Total Bilirubin mg/dL 4.4*  --    Alkaline Phosphatase U/L 97  --    AST U/L 39  --    ALT U/L 23  --        Drug levels (last 3 results):  No results for input(s): VANCOMYCINRA, VANCOMYCINPE, VANCOMYCINTR in the last 72 hours.    Microbiologic Results:  Microbiology Results (last 7 days)     Procedure Component Value Units Date/Time    Blood culture [[id number]]      Order Status: Sent Specimen: Blood     Blood culture [[id number]]     Order Status: Sent Specimen: Blood

## 2020-09-08 NOTE — ED PROVIDER NOTES
Source of History:  Patient, chart    Chief complaint:  Abdominal Pain (RUQ without nvd. Hx pancreatitis & cirrhosis. Being worked up for transplant waiting list.)      HPI:  Zeina Mahoney is a 54 y.o. female with history of liver cirrhosis secondary to CHAUDHARY, portal hypertension, hepatic encephalopathy, on the liver transplant list pancreatitis, thrombocytopenia presenting to emergency department with complaint of worsening abdominal pain.  Patient states her pain has been gradually worsening over several days.  Pain is primarily located to the lower abdomen and is non-radiating, constant.  This is not similar to her previous pancreatitis pain.  Her  notes that she does seem more jaundiced than usual.  He does not feel that she is more confused.    No fevers or chills.  No vomiting.  Bowel movements have been normal.  No urinary complaints.  No increasing abdominal distention.  She has not taken any medication for pain prior to arrival today.    Per chart review, patient was added to the liver transplant list on 08/19/2020.  Meld score on 08/19/2020 was 18.  She does take midodrine.    ROS: As per HPI and below:  Review of Systems   Constitutional: Negative for fever.   HENT: Negative for sore throat.    Eyes: Negative for double vision.   Respiratory: Negative for cough and shortness of breath.    Cardiovascular: Negative for chest pain.   Gastrointestinal: Positive for abdominal pain. Negative for vomiting.   Genitourinary: Negative for dysuria.   Musculoskeletal: Negative for falls.   Skin: Negative for rash.   Neurological: Negative for headaches.       Review of patient's allergies indicates:   Allergen Reactions    Aspirin Tinitus    Ciprofloxacin Rash    Clindamycin Itching       No current facility-administered medications on file prior to encounter.      Current Outpatient Medications on File Prior to Encounter   Medication Sig Dispense Refill    furosemide (LASIX) 40 MG tablet Take 1 tablet (40  mg total) by mouth once daily. Take 2 tablets (80 mg) by mouth in the morning and 1 tablet (40mg) in the evening. 30 tablet 2    haloperidoL (HALDOL) 1 MG tablet Take 1 tablet (1 mg total) by mouth daily as needed (non redirectable agitation).      lactulose (CEPHULAC) 10 gram packet Mix 3 packets (30 g total) with liquid and take by mouth 3 (three) times daily as needed. 90 packet 11    levothyroxine (SYNTHROID) 88 MCG tablet Take 1 tablet (88 mcg total) by mouth before breakfast. 30 tablet 3    magnesium hydroxide 400 mg/5 ml (MILK OF MAGNESIA) 400 mg/5 mL Susp Take 30 mLs (2,400 mg total) by mouth daily as needed (Take if constipated despite lactulose).      nystatin (MYCOSTATIN) powder Apply topically 2 (two) times daily. Apply to affected area twice a day      pantoprazole (PROTONIX) 40 MG tablet Take 40 mg by mouth before breakfast.      polyethylene glycol (GLYCOLAX) 17 gram PwPk Take 17 g by mouth once daily. Or miralax OTC. Take if constipated despite lactulose  0    rifAXIMin (XIFAXAN) 550 mg Tab Take 550 mg by mouth 2 (two) times daily.       simethicone (MYLICON) 80 MG chewable tablet Take 80 mg by mouth every 6 (six) hours as needed for Flatulence. Take 2 tablets by mouth every 6 hours as needed for gas      sodium bicarbonate 650 MG tablet Take 2 tablets (1,300 mg total) by mouth 2 (two) times daily. 120 tablet 3    spironolactone (ALDACTONE) 50 MG tablet Take 2 tablets (100 mg total) by mouth once daily. 60 tablet 6    zinc sulfate (ZINCATE) 220 (50) mg capsule Take 220 mg by mouth before breakfast.      acetaminophen (TYLENOL) 500 MG tablet Take 1 tablet (500 mg total) by mouth every 6 (six) hours as needed for Pain. Max 2000mg daily.  0    diaper,brief,adult,disposable Misc 5 mg/kg/day by Misc.(Non-Drug; Combo Route) route 5 (five) times daily. Patient with incontinence. Please supply enough depends for 5xs/day      ergocalciferol (VITAMIN D2) 50,000 unit Cap Take 50,000 Units by  mouth every 7 days.      lidocaine (LIDODERM) 5 % Place 1 patch onto the skin every 24 hours. Remove & Discard patch within 12 hours or as directed by MD      melatonin 10 mg Tab Take 10 mg by mouth nightly as needed (sleep).      midodrine (PROAMATINE) 10 MG tablet Take 10 mg by mouth 3 (three) times daily.         PMH:  As per HPI and below:  Past Medical History:   Diagnosis Date    Chronic Hepatic encephalopathy 6/25/2020    Decompensated hepatic cirrhosis 6/25/2020    History of pancreatitis 6/25/2020    Liver cirrhosis secondary to CHAUDHARY 6/25/2020    Macrocytic anemia 6/25/2020    Other ascites 6/25/2020    Thrombocytopenia 6/25/2020     History reviewed. No pertinent surgical history.    Social History     Socioeconomic History    Marital status:      Spouse name: Not on file    Number of children: Not on file    Years of education: Not on file    Highest education level: Not on file   Occupational History    Not on file   Social Needs    Financial resource strain: Not on file    Food insecurity     Worry: Not on file     Inability: Not on file    Transportation needs     Medical: Not on file     Non-medical: Not on file   Tobacco Use    Smoking status: Never Smoker    Smokeless tobacco: Never Used   Substance and Sexual Activity    Alcohol use: Never     Frequency: Never    Drug use: Never    Sexual activity: Not on file   Lifestyle    Physical activity     Days per week: Not on file     Minutes per session: Not on file    Stress: Not on file   Relationships    Social connections     Talks on phone: Not on file     Gets together: Not on file     Attends Confucianist service: Not on file     Active member of club or organization: Not on file     Attends meetings of clubs or organizations: Not on file     Relationship status: Not on file   Other Topics Concern    Not on file   Social History Narrative    Not on file       History reviewed. No pertinent family history.    Physical  Exam:      Vitals:    09/08/20 0547   BP:    Pulse:    Resp: (!) 22   Temp:        Gen: No acute distress.  Chronically ill-appearing.  Mental Status:  Alert and oriented x 3.  Appropriate, conversant.  Skin: Warm, dry.  Jaundiced.  Ulceration to the left shin which patient states has been nonhealing for several months.  No surrounding cellulitis.  No fluctuance or crepitance.  Eyes:  Scleral icterus present.  Pulm: No increased work of breathing.  No significant tachypnea.  No audible stridor or wheezing.  No conversational dyspnea.  Auscultation limited secondary to PPE.  CV: Regular rate. Regular rhythm. Normal peripheral perfusion.  Bilateral lower extremity edema.  Abd: Soft.  Not distended.  Tenderness to palpation of the bilateral lower quadrants without rebound tenderness or guarding.  MSK: Good range of motion all joints.  No deformities.    Neuro: Awake. Speech quiet. No focal neuro deficit observed.    Laboratory Studies:  Labs Reviewed   CBC W/ AUTO DIFFERENTIAL   COMPREHENSIVE METABOLIC PANEL   LIPASE   PROTIME-INR   URINALYSIS, REFLEX TO URINE CULTURE   AMMONIA   LACTIC ACID, PLASMA     Chart reviewed.  See summary in HPI    Imaging Results    None       Medications Given:  Medications   morphine injection 4 mg (4 mg Intravenous Given 9/8/20 0547)       MDM:    54 y.o. female with decompensated cirrhosis currently listed for liver transplant presenting to ER with complaint of abdominal pain.  Here she is afebrile, stable, nontoxic.    Differential diagnosis including but not limited to:  Decompensated cirrhosis, SBP, intra-abdominal infection, electrolyte abnormality, urinary tract infection.    Patient's abdomen is minimally distended, do not believe that there is a fluid pocket large enough to tap with paracentesis.    Will obtain labs, urinalysis, CT scan.    Signed out to oncoming physician pending results of workup for final disposition.    Diagnostic Impression:    1. Abdominal pain, unspecified  abdominal location               Patient and/or family understands the plan and is in agreement, verbalized understanding, questions answered    Roxana Carlton MD  Emergency Medicine       Roxana Carlton MD  09/08/20 1853

## 2020-09-08 NOTE — SUBJECTIVE & OBJECTIVE
Scheduled Meds:   furosemide  40 mg Oral BID    lactulose  20 g Oral TID    [START ON 9/9/2020] levothyroxine  88 mcg Oral Before breakfast    midodrine  10 mg Oral TID    [START ON 9/9/2020] pantoprazole  40 mg Oral Before breakfast    rifAXIMin  550 mg Oral BID    spironolactone  100 mg Oral Daily    [START ON 9/9/2020] zinc sulfate  220 mg Oral Before breakfast     Continuous Infusions:  PRN Meds:acetaminophen, ondansetron, sodium chloride 0.9%    Review of Systems   Constitutional: Negative for appetite change, chills and fever.   HENT: Negative for facial swelling and trouble swallowing.    Eyes: Negative for photophobia and redness.   Respiratory: Negative for cough, shortness of breath, wheezing and stridor.    Cardiovascular: Positive for leg swelling. Negative for chest pain and palpitations.   Gastrointestinal: Positive for abdominal pain. Negative for constipation, diarrhea and vomiting.   Genitourinary: Negative for decreased urine volume, difficulty urinating and dysuria.   Skin: Positive for wound.   Neurological: Positive for weakness. Negative for dizziness and light-headedness.   Hematological: Bruises/bleeds easily.   Psychiatric/Behavioral: Positive for confusion and decreased concentration. Negative for agitation and behavioral problems. The patient is nervous/anxious.      Objective:     Vital Signs (Most Recent):  Temp: 97.8 °F (36.6 °C) (09/08/20 0457)  Pulse: 93 (09/08/20 1048)  Resp: 16 (09/08/20 0810)  BP: 111/62 (09/08/20 1048)  SpO2: 100 % (09/08/20 1048) Vital Signs (24h Range):  Temp:  [97.8 °F (36.6 °C)] 97.8 °F (36.6 °C)  Pulse:  [86-93] 93  Resp:  [16-22] 16  SpO2:  [99 %-100 %] 100 %  BP: ()/(49-62) 111/62     Weight: 63.5 kg (140 lb)  Body mass index is 24.03 kg/m².    Intake/Output - Last 3 Shifts       09/06 0700 - 09/07 0659 09/07 0700 - 09/08 0659 09/08 0700 - 09/09 0659    Urine (mL/kg/hr)  300     Total Output  300     Net  -300                  Physical  Exam  Vitals signs and nursing note reviewed.   Constitutional:       General: She is not in acute distress.  HENT:      Head: Normocephalic and atraumatic.      Mouth/Throat:      Mouth: Mucous membranes are dry.   Eyes:      General: No scleral icterus.        Right eye: No discharge.         Left eye: No discharge.   Neck:      Musculoskeletal: Normal range of motion.   Cardiovascular:      Rate and Rhythm: Normal rate and regular rhythm.      Heart sounds: No murmur.   Pulmonary:      Effort: Pulmonary effort is normal. No respiratory distress.      Breath sounds: Examination of the right-lower field reveals decreased breath sounds. Examination of the left-lower field reveals decreased breath sounds. Decreased breath sounds present. No wheezing or rales.   Abdominal:      General: There is distension.      Tenderness: There is abdominal tenderness (mild diffuse). There is no guarding.   Musculoskeletal:      Right lower leg: Edema present.      Left lower leg: Edema present.   Skin:     General: Skin is warm and dry.      Capillary Refill: Capillary refill takes 2 to 3 seconds.      Findings: Erythema (ble) present.   Neurological:      General: No focal deficit present.      Mental Status: She is alert and oriented to person, place, and time.   Psychiatric:         Mood and Affect: Mood normal.         Thought Content: Thought content normal.         Laboratory:  Immunosuppressants     None        CBC:   Recent Labs   Lab 09/08/20  0547   WBC 5.59   RBC 2.43*   HGB 8.6*   HCT 25.5*   PLT 77*   *   MCH 35.4*   MCHC 33.7     CMP:   Recent Labs   Lab 09/08/20  0547   *   CALCIUM 8.9   ALBUMIN 2.5*   PROT 5.8*      K 3.8   CO2 26      BUN 20   CREATININE 1.3   ALKPHOS 97   ALT 23   AST 39   BILITOT 4.4*     Labs within the past 24 hours have been reviewed.    Diagnostic Results:  CT Abd/Pelvis:   Impression:     Irregular hepatic contour, splenomegaly with prominent collateral vessels,  mesenteric edema, and intraperitoneal free fluid.  Findings compatible with hepatic dysfunction with sequela of portal venous hypertension.     Hypoattenuating lesion at the dome of the liver, better evaluated on triple phase CT abdomen July 9, 2020.     Gallstone within the gallbladder neck without evidence of acute cholecystitis.     Stable hypodensities within the pancreas.  Measurements as above.  Recommend surveillance re-imaging with MRI with contrast or CT with pancreas protocol in 1 year.     Multiple vertebral body compression fractures, similar to prior examinations.    Debility/Functional status: No debility noted.

## 2020-09-08 NOTE — SUBJECTIVE & OBJECTIVE
Patient History           Medical as of 9/8/2020     Past Medical History     Diagnosis Date Comments Source    Chronic Hepatic encephalopathy 6/25/2020 -- Provider    Decompensated hepatic cirrhosis 6/25/2020 -- Provider    History of pancreatitis 6/25/2020 -- Provider    Liver cirrhosis secondary to CHAUDHARY 6/25/2020 -- Provider    Macrocytic anemia 6/25/2020 -- Provider    Other ascites 6/25/2020 -- Provider    Thrombocytopenia 6/25/2020 -- Provider                  Surgical as of 9/8/2020    Past Surgical History: Patient provided no pertinent surgical history.           Family as of 9/8/2020    None           Tobacco Use as of 9/8/2020     Smoking Status Smoking Start Date Smoking Quit Date Packs/Day Years Used    Never Smoker -- -- -- --    Types Comments Smokeless Tobacco Status Smokeless Tobacco Quit Date Source    -- -- Never Used -- Provider            Alcohol Use as of 9/8/2020     Alcohol Use Drinks/Week Alcohol/Week Comments Source    Never   -- -- Provider    Frequency Typical Drinks Binge Drinking        Never -- --              Drug Use as of 9/8/2020     Drug Use Types Frequency Comments Source    Never -- -- -- Provider            Sexual Activity as of 9/8/2020     Sexually Active Birth Control Partners Comments Source    Not Asked -- -- -- Provider            Activities of Daily Living as of 9/8/2020    None           Social Documentation as of 9/8/2020    None           Occupational as of 9/8/2020    None           Socioeconomic as of 9/8/2020     Marital Status Spouse Name Number of Children Years Education Education Level Preferred Language Ethnicity Race Source     -- -- -- -- English Welsh  --    Financial Resource Strain Food Insecurity: Worry Food Insecurity: Inability Transportation Needs: Medical Transportation Needs: Non-medical    -- -- -- -- --            Pertinent History     Question Response Comments    Lives with -- --    Place in Birth Order -- --    Lives in --  --    Number of Siblings -- --    Raised by -- --    Legal Involvement -- --    Childhood Trauma -- --    Criminal History of -- --    Financial Status -- --    Highest Level of Education -- --    Does patient have access to a firearm? -- --     Service -- --    Primary Leisure Activity -- --    Spirituality -- --        Past Medical History:   Diagnosis Date    Chronic Hepatic encephalopathy 6/25/2020    Decompensated hepatic cirrhosis 6/25/2020    History of pancreatitis 6/25/2020    Liver cirrhosis secondary to CHAUDHARY 6/25/2020    Macrocytic anemia 6/25/2020    Other ascites 6/25/2020    Thrombocytopenia 6/25/2020     History reviewed. No pertinent surgical history.  Family History     None        Tobacco Use    Smoking status: Never Smoker    Smokeless tobacco: Never Used   Substance and Sexual Activity    Alcohol use: Never     Frequency: Never    Drug use: Never    Sexual activity: Not on file     Review of patient's allergies indicates:   Allergen Reactions    Aspirin Tinitus    Ciprofloxacin Rash    Clindamycin Itching       No current facility-administered medications on file prior to encounter.      Current Outpatient Medications on File Prior to Encounter   Medication Sig    furosemide (LASIX) 40 MG tablet Take 1 tablet (40 mg total) by mouth once daily. Take 2 tablets (80 mg) by mouth in the morning and 1 tablet (40mg) in the evening.    haloperidoL (HALDOL) 1 MG tablet Take 1 tablet (1 mg total) by mouth daily as needed (non redirectable agitation).    lactulose (CEPHULAC) 10 gram packet Mix 3 packets (30 g total) with liquid and take by mouth 3 (three) times daily as needed.    levothyroxine (SYNTHROID) 88 MCG tablet Take 1 tablet (88 mcg total) by mouth before breakfast.    magnesium hydroxide 400 mg/5 ml (MILK OF MAGNESIA) 400 mg/5 mL Susp Take 30 mLs (2,400 mg total) by mouth daily as needed (Take if constipated despite lactulose).    nystatin (MYCOSTATIN) powder Apply  topically 2 (two) times daily. Apply to affected area twice a day    pantoprazole (PROTONIX) 40 MG tablet Take 40 mg by mouth before breakfast.    polyethylene glycol (GLYCOLAX) 17 gram PwPk Take 17 g by mouth once daily. Or miralax OTC. Take if constipated despite lactulose    rifAXIMin (XIFAXAN) 550 mg Tab Take 550 mg by mouth 2 (two) times daily.     simethicone (MYLICON) 80 MG chewable tablet Take 80 mg by mouth every 6 (six) hours as needed for Flatulence. Take 2 tablets by mouth every 6 hours as needed for gas    sodium bicarbonate 650 MG tablet Take 2 tablets (1,300 mg total) by mouth 2 (two) times daily.    spironolactone (ALDACTONE) 50 MG tablet Take 2 tablets (100 mg total) by mouth once daily.    zinc sulfate (ZINCATE) 220 (50) mg capsule Take 220 mg by mouth before breakfast.    acetaminophen (TYLENOL) 500 MG tablet Take 1 tablet (500 mg total) by mouth every 6 (six) hours as needed for Pain. Max 2000mg daily.    diaper,brief,adult,disposable Misc 5 mg/kg/day by Misc.(Non-Drug; Combo Route) route 5 (five) times daily. Patient with incontinence. Please supply enough depends for 5xs/day    ergocalciferol (VITAMIN D2) 50,000 unit Cap Take 50,000 Units by mouth every 7 days.    lidocaine (LIDODERM) 5 % Place 1 patch onto the skin every 24 hours. Remove & Discard patch within 12 hours or as directed by MD    melatonin 10 mg Tab Take 10 mg by mouth nightly as needed (sleep).    midodrine (PROAMATINE) 10 MG tablet Take 10 mg by mouth 3 (three) times daily.     Psychotherapeutics (From admission, onward)    None        Review of Systems   Constitutional: Positive for fatigue.   Cardiovascular: Positive for leg swelling.   Gastrointestinal: Positive for abdominal pain. Negative for nausea.   Psychiatric/Behavioral: Positive for sleep disturbance. The patient is nervous/anxious.      Strengths and Liabilities: Strength: Patient accepts guidance/feedback, Strength: Patient is expressive/articulate.,  "Strength: Patient is intelligent., Liability: Patient has poor health., Liability: Patient lacks coping skills.    Objective:     Vital Signs (Most Recent):  Temp: 97.8 °F (36.6 °C) (09/08/20 0457)  Pulse: 93 (09/08/20 1048)  Resp: 16 (09/08/20 0810)  BP: 111/62 (09/08/20 1048)  SpO2: 100 % (09/08/20 1048) Vital Signs (24h Range):  Temp:  [97.8 °F (36.6 °C)] 97.8 °F (36.6 °C)  Pulse:  [86-93] 93  Resp:  [16-22] 16  SpO2:  [99 %-100 %] 100 %  BP: ()/(49-62) 111/62     Height: 5' 4" (162.6 cm)  Weight: 63.5 kg (140 lb)  Body mass index is 24.03 kg/m².      Intake/Output Summary (Last 24 hours) at 9/8/2020 1424  Last data filed at 9/8/2020 0620  Gross per 24 hour   Intake --   Output 300 ml   Net -300 ml       Physical Exam  Psychiatric:      Comments: Mental Status Exam  Appearance: age appropriate, jaundice  Behavior/Copperation: normal, cooperative  Speech: normal tone, normal rate, normal pitch, normal volume  Mood:  "trying to hang on"  Affect: normal  Thought Process: normal and logical  Thought Content: normal, no suicidality, no homicidality, delusions, or paranoia  Orientation:  person, place, situation, day of week, month of year, year  Memory: Registers 3/3 and recalls 2/3 objects at 5 minutes  Attention/Concentration:  Normal  Cognition: fund of knowledge 4 of 4 recent presidents  Insight: intact  Judgement: intact            Significant Labs: All pertinent labs within the past 24 hours have been reviewed.    Significant Imaging: I have reviewed all pertinent imaging results/findings within the past 24 hours.  "

## 2020-09-08 NOTE — ASSESSMENT & PLAN NOTE
- bilateral lower extremities swollen, warm, erythematous  - send blood cx, recent hx of bacteremia  - begin empiric vanc for cellulitis

## 2020-09-08 NOTE — ASSESSMENT & PLAN NOTE
Assessment:Zeina Mahoney is a 55 y/o female with CHAUDHARY, known to CL service related to acute deliriurm during previous admission. Psych was re-consulted to help address her anxiety.Today she is much more alert than previous admit and is able to participate in interview and answer all questions appropriately. She admits to anxiety -restlessnes, irritability, insomnia, rumination, palpilations. Patient desires medication to help manage symptoms.     Recommendations:  - Patient does not meet criteria for PEC or inpatient psychiatric admission at this time. Patient is not currently an imminent danger to self or others and is not gravely disabled due to a psychiatric illness.     -Start Lexapro 5 mg daily for anxiety    -Follow-up with outpatient psychiatry once discharged    -Will continue to follow

## 2020-09-09 PROBLEM — R65.10 SIRS (SYSTEMIC INFLAMMATORY RESPONSE SYNDROME): Status: ACTIVE | Noted: 2020-09-09

## 2020-09-09 LAB
ANION GAP SERPL CALC-SCNC: 11 MMOL/L (ref 8–16)
BASOPHILS # BLD AUTO: 0.06 K/UL (ref 0–0.2)
BASOPHILS NFR BLD: 0.6 % (ref 0–1.9)
BILIRUB SERPL-MCNC: 6.1 MG/DL (ref 0.1–1)
BUN SERPL-MCNC: 19 MG/DL (ref 6–20)
CALCIUM SERPL-MCNC: 10 MG/DL (ref 8.7–10.5)
CHLORIDE SERPL-SCNC: 99 MMOL/L (ref 95–110)
CO2 SERPL-SCNC: 24 MMOL/L (ref 23–29)
CREAT SERPL-MCNC: 1.1 MG/DL (ref 0.5–1.4)
DIFFERENTIAL METHOD: ABNORMAL
EOSINOPHIL # BLD AUTO: 0.1 K/UL (ref 0–0.5)
EOSINOPHIL NFR BLD: 1 % (ref 0–8)
ERYTHROCYTE [DISTWIDTH] IN BLOOD BY AUTOMATED COUNT: 14.8 % (ref 11.5–14.5)
EST. GFR  (AFRICAN AMERICAN): >60 ML/MIN/1.73 M^2
EST. GFR  (NON AFRICAN AMERICAN): 57.1 ML/MIN/1.73 M^2
GLUCOSE SERPL-MCNC: 131 MG/DL (ref 70–110)
HCT VFR BLD AUTO: 29 % (ref 37–48.5)
HGB BLD-MCNC: 9.4 G/DL (ref 12–16)
IMM GRANULOCYTES # BLD AUTO: 0.03 K/UL (ref 0–0.04)
IMM GRANULOCYTES NFR BLD AUTO: 0.3 % (ref 0–0.5)
INR PPP: 1.1 (ref 0.8–1.2)
LACTATE SERPL-SCNC: 1.6 MMOL/L (ref 0.5–2.2)
LYMPHOCYTES # BLD AUTO: 0.5 K/UL (ref 1–4.8)
LYMPHOCYTES NFR BLD: 4.7 % (ref 18–48)
MCH RBC QN AUTO: 34.6 PG (ref 27–31)
MCHC RBC AUTO-ENTMCNC: 32.4 G/DL (ref 32–36)
MCV RBC AUTO: 107 FL (ref 82–98)
MONOCYTES # BLD AUTO: 0.6 K/UL (ref 0.3–1)
MONOCYTES NFR BLD: 5.7 % (ref 4–15)
NEUTROPHILS # BLD AUTO: 8.7 K/UL (ref 1.8–7.7)
NEUTROPHILS NFR BLD: 87.7 % (ref 38–73)
NRBC BLD-RTO: 0 /100 WBC
PLATELET # BLD AUTO: 85 K/UL (ref 150–350)
PMV BLD AUTO: 11.4 FL (ref 9.2–12.9)
POTASSIUM SERPL-SCNC: 4.7 MMOL/L (ref 3.5–5.1)
PROTHROMBIN TIME: 12.2 SEC (ref 9–12.5)
RBC # BLD AUTO: 2.72 M/UL (ref 4–5.4)
SODIUM SERPL-SCNC: 134 MMOL/L (ref 136–145)
WBC # BLD AUTO: 9.91 K/UL (ref 3.9–12.7)

## 2020-09-09 PROCEDURE — 93005 ELECTROCARDIOGRAM TRACING: CPT | Mod: NTX

## 2020-09-09 PROCEDURE — 36415 COLL VENOUS BLD VENIPUNCTURE: CPT | Mod: NTX

## 2020-09-09 PROCEDURE — 80048 BASIC METABOLIC PNL TOTAL CA: CPT | Mod: NTX

## 2020-09-09 PROCEDURE — 93010 ELECTROCARDIOGRAM REPORT: CPT | Mod: NTX,,, | Performed by: INTERNAL MEDICINE

## 2020-09-09 PROCEDURE — 82247 BILIRUBIN TOTAL: CPT | Mod: NTX

## 2020-09-09 PROCEDURE — 85610 PROTHROMBIN TIME: CPT | Mod: NTX

## 2020-09-09 PROCEDURE — 25000003 PHARM REV CODE 250: Mod: NTX | Performed by: PHYSICIAN ASSISTANT

## 2020-09-09 PROCEDURE — 83605 ASSAY OF LACTIC ACID: CPT | Mod: NTX

## 2020-09-09 PROCEDURE — 99233 PR SUBSEQUENT HOSPITAL CARE,LEVL III: ICD-10-PCS | Mod: NTX,,, | Performed by: PHYSICIAN ASSISTANT

## 2020-09-09 PROCEDURE — 99233 PR SUBSEQUENT HOSPITAL CARE,LEVL III: ICD-10-PCS | Mod: NTX,,, | Performed by: PSYCHIATRY & NEUROLOGY

## 2020-09-09 PROCEDURE — 63600175 PHARM REV CODE 636 W HCPCS: Mod: JG,NTX | Performed by: PHYSICIAN ASSISTANT

## 2020-09-09 PROCEDURE — 99233 SBSQ HOSP IP/OBS HIGH 50: CPT | Mod: NTX,,, | Performed by: PHYSICIAN ASSISTANT

## 2020-09-09 PROCEDURE — 20600001 HC STEP DOWN PRIVATE ROOM: Mod: NTX

## 2020-09-09 PROCEDURE — 93010 EKG 12-LEAD: ICD-10-PCS | Mod: NTX,,, | Performed by: INTERNAL MEDICINE

## 2020-09-09 PROCEDURE — 85025 COMPLETE CBC W/AUTO DIFF WBC: CPT | Mod: NTX

## 2020-09-09 PROCEDURE — P9047 ALBUMIN (HUMAN), 25%, 50ML: HCPCS | Mod: JG,NTX | Performed by: PHYSICIAN ASSISTANT

## 2020-09-09 PROCEDURE — 87040 BLOOD CULTURE FOR BACTERIA: CPT | Mod: 59,NTX

## 2020-09-09 PROCEDURE — 25000003 PHARM REV CODE 250: Mod: NTX | Performed by: NURSE PRACTITIONER

## 2020-09-09 PROCEDURE — 99233 SBSQ HOSP IP/OBS HIGH 50: CPT | Mod: NTX,,, | Performed by: PSYCHIATRY & NEUROLOGY

## 2020-09-09 PROCEDURE — 63600175 PHARM REV CODE 636 W HCPCS: Mod: NTX | Performed by: INTERNAL MEDICINE

## 2020-09-09 RX ORDER — CEFEPIME HYDROCHLORIDE 1 G/1
1 INJECTION, POWDER, FOR SOLUTION INTRAMUSCULAR; INTRAVENOUS
Status: DISCONTINUED | OUTPATIENT
Start: 2020-09-09 | End: 2020-09-12

## 2020-09-09 RX ORDER — ACETAMINOPHEN 325 MG/1
650 TABLET ORAL EVERY 6 HOURS PRN
Status: DISCONTINUED | OUTPATIENT
Start: 2020-09-09 | End: 2020-09-13 | Stop reason: HOSPADM

## 2020-09-09 RX ORDER — ALBUMIN HUMAN 250 G/1000ML
25 SOLUTION INTRAVENOUS ONCE
Status: COMPLETED | OUTPATIENT
Start: 2020-09-09 | End: 2020-09-09

## 2020-09-09 RX ORDER — ESCITALOPRAM OXALATE 5 MG/1
5 TABLET ORAL DAILY
Status: DISCONTINUED | OUTPATIENT
Start: 2020-09-09 | End: 2020-09-13 | Stop reason: HOSPADM

## 2020-09-09 RX ORDER — LACTULOSE 10 G/15ML
200 SOLUTION ORAL; RECTAL ONCE
Status: COMPLETED | OUTPATIENT
Start: 2020-09-09 | End: 2020-09-09

## 2020-09-09 RX ADMIN — CEFEPIME 1 G: 1 INJECTION, POWDER, FOR SOLUTION INTRAMUSCULAR; INTRAVENOUS at 08:09

## 2020-09-09 RX ADMIN — FUROSEMIDE 40 MG: 40 TABLET ORAL at 08:09

## 2020-09-09 RX ADMIN — MIDODRINE HYDROCHLORIDE 10 MG: 5 TABLET ORAL at 08:09

## 2020-09-09 RX ADMIN — LACTULOSE 30 G: 10 SOLUTION ORAL at 09:09

## 2020-09-09 RX ADMIN — ALBUMIN (HUMAN) 25 G: 12.5 SOLUTION INTRAVENOUS at 09:09

## 2020-09-09 RX ADMIN — SPIRONOLACTONE 100 MG: 25 TABLET ORAL at 08:09

## 2020-09-09 RX ADMIN — LACTULOSE 30 G: 10 SOLUTION ORAL at 02:09

## 2020-09-09 RX ADMIN — ACETAMINOPHEN 650 MG: 325 TABLET ORAL at 11:09

## 2020-09-09 RX ADMIN — LACTULOSE 200 G: 10 SOLUTION ORAL at 11:09

## 2020-09-09 RX ADMIN — LACTULOSE 30 G: 10 SOLUTION ORAL at 08:09

## 2020-09-09 RX ADMIN — ESCITALOPRAM OXALATE 5 MG: 5 TABLET, FILM COATED ORAL at 08:09

## 2020-09-09 RX ADMIN — RIFAXIMIN 550 MG: 550 TABLET ORAL at 09:09

## 2020-09-09 RX ADMIN — CEFEPIME 1 G: 1 INJECTION, POWDER, FOR SOLUTION INTRAMUSCULAR; INTRAVENOUS at 11:09

## 2020-09-09 RX ADMIN — RIFAXIMIN 550 MG: 550 TABLET ORAL at 08:09

## 2020-09-09 RX ADMIN — ZINC SULFATE 220 MG (50 MG) CAPSULE 220 MG: CAPSULE at 05:09

## 2020-09-09 RX ADMIN — PANTOPRAZOLE SODIUM 40 MG: 40 TABLET, DELAYED RELEASE ORAL at 05:09

## 2020-09-09 RX ADMIN — VANCOMYCIN HYDROCHLORIDE 1000 MG: 1 INJECTION, POWDER, LYOPHILIZED, FOR SOLUTION INTRAVENOUS at 03:09

## 2020-09-09 RX ADMIN — MIDODRINE HYDROCHLORIDE 10 MG: 5 TABLET ORAL at 02:09

## 2020-09-09 RX ADMIN — LEVOTHYROXINE SODIUM 88 MCG: 88 TABLET ORAL at 05:09

## 2020-09-09 RX ADMIN — MIDODRINE HYDROCHLORIDE 10 MG: 5 TABLET ORAL at 09:09

## 2020-09-09 NOTE — PROGRESS NOTES
[facility]  Liver Transplant  Progress Note    Patient Name: Zeina Mahoney  MRN: [id number]  Admission Date: 9/8/2020  Hospital Length of Stay: 1 days  Code Status: Full Code  Primary Care Provider: Primary Doctor No  Post-Operative Day:     ORGAN:     Disease Etiology: Cirrhosis: Fatty Liver (Chaudhary)  Donor Type:     CDC High Risk:     Donor CMV Status:   Donor CMV Status:   Donor HBcAB:     Donor HCV Status:     Donor HBV HERACLIO: Organ record is missing.  Donor HCV HERACLIO: Organ record is missing.  Whole or Partial:   Biliary Anastomosis:   Arterial Anatomy:   Subjective:     History of Present Illness:  53 y/o female with pmh of esld secondary CHAUDHARY.  Presents to the ED with complaint of lower abdominal pain.  CT scan obtained, unremarkable to source of pain.  Lipase elevated on admit, has hx of pancreatic lesions.  Upon assessment mental status slowed.  Patient's  reports having regular bowel movements and mental status better then baseline.  However states very anxious needing haldol for symptom control.  Lower extremities with swelling, redness, warmth, and weeping open sores.   states wound was being dressed by home health. Plan to admit for observation to address these issues.  Will consult psychiatry to address anxiety and recommendations of better medication in setting of liver failure.  Wound care consult for multiple skin tears and lesions.  Infectious work up and empirically treat for cellulitis.      Interval History: Patient admitted 9/8 with lower abdominal pain. CT A/P unremarkable for source of pain. Vanc started for possible LLE cellulitis (& recent staph epi bacteremia). Psych consulted for worsening anxiety. Psych recommended Lexapro which was started this morning. Patient with worsening mental status today; grade II-III HE. Lactulose enema ordered. Oxycodone discontinued. Also tachycardic (EKG ST tele ordered). Albumin x 1 given. CXR with LLL atelectasis, otherwise  unremarkable. Later this AM, patient spiked temp of 103.1F. Tylenol given and abx broadened to Vanc/Cefepime. Lactic 1.6 from 0.8 yesterday. Blood cx repeated. Continue to monitor closely.    Scheduled Meds:   ceFEPime (MAXIPIME) IVPB  1 g Intravenous Q8H    escitalopram oxalate  5 mg Oral Daily    lactulose  30 g Oral TID    levothyroxine  88 mcg Oral Before breakfast    midodrine  10 mg Oral TID    pantoprazole  40 mg Oral Before breakfast    rifAXIMin  550 mg Oral BID    spironolactone  100 mg Oral Daily    vancomycin (VANCOCIN) IVPB  1,000 mg Intravenous Q24H    zinc sulfate  220 mg Oral Before breakfast     Continuous Infusions:  PRN Meds:acetaminophen, ondansetron, sodium chloride 0.9%, Pharmacy to dose Vancomycin consult **AND** vancomycin - pharmacy to dose    Review of Systems   Constitutional: Positive for activity change, appetite change and fever.   HENT: Negative for facial swelling and trouble swallowing.    Respiratory: Negative for cough, shortness of breath, wheezing and stridor.    Cardiovascular: Positive for leg swelling. Negative for chest pain and palpitations.   Gastrointestinal: Positive for abdominal pain. Negative for abdominal distention, constipation, diarrhea, nausea and vomiting.   Genitourinary: Negative for difficulty urinating and dysuria.   Skin: Positive for wound.   Neurological: Positive for weakness. Negative for dizziness and light-headedness.   Hematological: Bruises/bleeds easily.   Psychiatric/Behavioral: Positive for confusion and decreased concentration. The patient is nervous/anxious.      Objective:     Vital Signs (Most Recent):  Temp: (!) 101.1 °F (38.4 °C) (09/09/20 1239)  Pulse: (!) 120 (09/09/20 1115)  Resp: 20 (09/09/20 1115)  BP: 131/60 (09/09/20 1115)  SpO2: 95 % (09/09/20 1115) Vital Signs (24h Range):  Temp:  [97.4 °F (36.3 °C)-103.1 °F (39.5 °C)] 101.1 °F (38.4 °C)  Pulse:  [] 120  Resp:  [16-20] 20  SpO2:  [95 %-100 %] 95 %  BP:  (112-141)/(55-72) 131/60     Weight: 67.8 kg (149 lb 7.6 oz)  Body mass index is 25.66 kg/m².    Intake/Output - Last 3 Shifts       09/07 0700 - 09/08 0659 09/08 0700 - 09/09 0659 09/09 0700 - 09/10 0659    P.O.  960 180    I.V. (mL/kg)   100 (1.5)    IV Piggyback  250     Total Intake(mL/kg)  1210 (17.8) 280 (4.1)    Urine (mL/kg/hr) 300 2700 (1.7) 300 (0.7)    Stool  0 0    Total Output 300 2700 300    Net -300 -1490 -20           Urine Occurrence   1 x    Stool Occurrence  2 x 1 x          Physical Exam  Vitals signs and nursing note reviewed.   Constitutional:       Appearance: She is ill-appearing.   HENT:      Head: Normocephalic and atraumatic.   Eyes:      General: Scleral icterus present.         Right eye: No discharge.         Left eye: No discharge.   Cardiovascular:      Rate and Rhythm: Regular rhythm. Tachycardia present.      Pulses: Normal pulses.      Heart sounds: No murmur.   Pulmonary:      Effort: Pulmonary effort is normal. No respiratory distress.      Breath sounds: No stridor. No wheezing or rales.   Abdominal:      General: Bowel sounds are normal. There is no distension.      Palpations: Abdomen is soft.      Tenderness: There is no abdominal tenderness. There is no guarding.   Musculoskeletal:      Right lower leg: Edema present.      Left lower leg: Edema present.   Skin:     General: Skin is warm and dry.      Capillary Refill: Capillary refill takes 2 to 3 seconds.      Coloration: Skin is jaundiced.      Comments: BLE with a few weeping ulcers & LUE abrasions   Neurological:      Mental Status: She is disoriented.      Comments: Responds to verbal stimuli but disoriented x 3 and unable to follow commands.         Laboratory:  Immunosuppressants     None        CBC:   Recent Labs   Lab 09/09/20  0623   WBC 9.91   RBC 2.72*   HGB 9.4*   HCT 29.0*   PLT 85*   *   MCH 34.6*   MCHC 32.4     CMP:   Recent Labs   Lab 09/08/20  0547 09/09/20  0623   * 131*   CALCIUM 8.9 10.0  "  ALBUMIN 2.5*  --    PROT 5.8*  --     134*   K 3.8 4.7   CO2 26 24    99   BUN 20 19   CREATININE 1.3 1.1   ALKPHOS 97  --    ALT 23  --    AST 39  --    BILITOT 4.4* 6.1*       Diagnostic Results:  I have personally reviewed all pertinent imaging studies.    Debility/Functional status: Patient debilitated by evidence of Muscle wasting and atrophy. Physical and occupational therapy ordered daily to evaluate and treat. Debility was: present on admission.    Assessment/Plan:     * SIRS (systemic inflammatory response syndrome)  - Febrile to 103.1 9/9 with tachycardia and AMS  - CT A/P unremarkable for source of pain on admission.  - Vanc started for possible LLE cellulitis (& recent staph epi bacteremia) 9/8. Cefepime added 9/9.  - CXR with LLL atelectasis, otherwise unremarkable.   - Lactic 1.6 9/9 from 0.8 9/8.   - Blood cx 9/8 NGTD. UA clean. Blood cx repeated 9/9.   - Seems insufficient ascites for para on exam/imaging, but may consider attempt at diagnostic tap if clinical status does not improve.  - Lasix held 9/9 (did receive AM dose).  - Monitor closely.    Acute Hepatic encephalopathy  - Acute on chronic encephalopathy  - Lactulose and Rifaximin continued on admit  - Increased dose of lactulose, goal for 3-4 BM/day  - Ammonia 54 on admit  - Mental status worsening 9/9. Lactulose enema ordered. Spiked temp so may be exacerbated by infection. BS abx ordered.  - Monitor closely.        Cellulitis of lower extremity  - bilateral lower extremities swollen, warm, erythematous (worse on left)  - sent blood cx 9/8, recent hx of bacteremia  - began empiric vanc 9/8 for cellulitis  - wound care consulted  - see "SIRs"    Abdominal pain  - CT A/P 9/8 unremarkable for source of pain  - Avoid narcotics in treating pain  - Monitor      Anemia of chronic disease  - h/h stable on admit  - Monitor labs daily      Protein-calorie malnutrition, moderate  - encourage oral intake with " suppplements    Thrombocytopenia due to hypersplenism  - platelets stable  - monitor labs daily      Portal hypertension  - secondary to liver disease  - monitor      Hypothyroidism  - Continue Synthroid.  - TSH/free T4 added to 9/10 labs.  - Monitor.      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  - no overt signs of bleeding  - monitor pt/inr daily      Adjustment disorder with anxious mood  - previously prescribed haldol  - consulted psychiatry who recommended Lexapro 5 mg daily (started 9/9)      Liver cirrhosis secondary to CHAUDHARY  - decompensated  - listed for liver transplant with MELD of 20    MELD-Na score: 17 at 9/9/2020  6:23 AM  MELD score: 15 at 9/9/2020  6:23 AM  Calculated from:  Serum Creatinine: 1.1 mg/dL at 9/9/2020  6:23 AM  Serum Sodium: 134 mmol/L at 9/9/2020  6:23 AM  Total Bilirubin: 6.1 mg/dL at 9/9/2020  6:23 AM  INR(ratio): 1.1 at 9/9/2020  6:23 AM  Age: 54 years 11 months            VTE Risk Mitigation (From admission, onward)         Ordered     Place HORACE hose  Until discontinued      09/08/20 1105     Place sequential compression device  Until discontinued      09/08/20 1105     IP VTE LOW RISK PATIENT  Once      09/08/20 1105                The patients clinical status was discussed at multidisplinary rounds, involving transplant surgery, transplant medicine, pharmacy, nursing, nutrition, and social work    Discharge Planning:  Not stable for dc at this time.    Polly Irene PA-C  Liver Transplant  [facility]

## 2020-09-09 NOTE — PROGRESS NOTES
Admit Note      Met with  to assess needs due to patient sleeping and not responding to verbal prompts. Patient is a 54 y.o.  female, admitted for abdominal pain.      Patient admitted from ED on 9/8/2020 .  At this time, patient presents as sleeping.  At this time, patients caregiver presents as alert and oriented x 4, communicative, cooperative and asking and answering questions appropriately.    Household/Family Systems     Patient resides with patient's , at [address] [facility] [address]. Local Lodging:  [employer] ([address] [place] [address]) Support system includes pt's , Yonis.  Patient does not have dependents that are need of being cared for.     Patients primary caregiver is Yonis Mahoney, patients , phone number [phone].  Confirmed patients contact information is [phone] (home);   Telephone Information:   Mobile [phone]   .    During admission, patient's caregiver plans to stay at their local lodging.  Confirmed patient and patients caregivers do have access to reliable transportation.    Cognitive Status/Learning     Patients' caregiver reports pt's reading ability as 12th grade and states patient does have difficulty with seeing and wears readers.  Patient previously reports patient learns best by seeing, hearing, and visual.   Needed: No.   Highest education level: High School (9-12) or GED    Vocation/Disability   .  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    Adherence     Patient's  reports a medium level of adherence to patients health care regimen. Patient has a history of taking out IVs while admitted.  Adherence counseling and education provided. Patient verbalizes understanding.    Substance Use    Patient reports the following substance usage.    Tobacco: none, patient denies any use.  Alcohol: none, patient  denies any use.  Illicit Drugs/Non-prescribed Medications: none, patient denies any use.  Patient states clear understanding of the potential impact of substance use.  Substance abstinence/cessation counseling, education and resources provided and reviewed.     Services Utilizing/ADLS    Infusion Service: Prior to admission, patient utilizing? yes - Pt recently utilized IV services through [facility]  Home Health: Prior to admission, patient utilizing? yes - [facility]  for PT/SN  DME: Prior to admission, yes - walker, w/c, bathroom pull-bars and shower chair; pt also utilizes a hospital bed at home  Pulmonary/Cardiac Rehab: Prior to admission, no  Dialysis:  Prior to admission, no  Transplant Specialty Pharmacy:  Prior to admission, yes; [facility].    Prior to admission, patient reports patient was independent with ADLS but requiring some assistance around the home and was not driving.  Patient's caregiver reports patient is not able to care for self at this time due to compromised medical condition (as documented in medical record) and physical weakness. Patient's caregiver indicates pt's willingness to care for self once medically cleared to do so.    Insurance/Medications    Insured by   Payor/Plan Subscr  Sex Relation Sub. Ins. ID Effective Group Num   1. BLUE CROSS BL* FLORIAN MEDINA [birth year]1964 Male  [id number]                                   [address] [address]      Primary Insurance (for UNOS reporting): Private Insurance - BCBS (out of state)  Secondary Insurance (for UNOS reporting): None    Patient reports patient is able to obtain and afford medications at this time and at time of discharge.    Living Will/Healthcare Power of     Patient states patient does not have a LW and/or HCPA.   provided education regarding LW and HCPA and the completion of forms.    Coping/Mental Health    Patient is not coping well.  reports that the patient has anxiety and an ongoing desire to go  home. Patient and her  are originally from [place] but are staying locally in [place] until the patient receives a Liver Transplant. Pt's  reports that the patient may benefit from a short trip home to [place] to reduce her desires with wanting to move back home. Pt's  reports the patient is taking Haldol to manage some of these symptoms she is having. Psyc is following the patient while she is admitted for this admission.  Patient's  indicates the patient is having mental health difficulties.     Discharge Planning    At time of discharge, patient plans to return to local lodging at [facility] under the care of .  Patients  will transport patient.  Per rounds today, expected discharge date has not been medically determined at this time. Patient and patients caregiver  verbalize understanding and are involved in treatment planning and discharge process.    Additional Concerns    SW explained to the pt's  that if the patient were to travel back home, she would become inactive on the wait list until she returns to [place]. Patient's caretaker denies additional needs and/or concerns at this time. Patient is being followed for needs, education, resources, information, emotional support, supportive counseling, and for supportive and skilled discharge plan of care. Patient's caregiver verbalizes understanding and agreement with information reviewed,  availability and how to access available resources as needed.  remains available.

## 2020-09-09 NOTE — SUBJECTIVE & OBJECTIVE
"Interval History: per hospital course    Family History     None        Tobacco Use    Smoking status: Never Smoker    Smokeless tobacco: Never Used   Substance and Sexual Activity    Alcohol use: Never     Frequency: Never    Drug use: Never    Sexual activity: Not on file     Psychotherapeutics (From admission, onward)    Start     Stop Route Frequency Ordered    09/09/20 0945  escitalopram oxalate tablet 5 mg      -- Oral Daily 09/09/20 0844           Review of Systems   Unable to perform ROS: Acuity of condition     Objective:     Vital Signs (Most Recent):  Temp: 98.6 °F (37 °C) (09/09/20 1526)  Pulse: 101 (09/09/20 1526)  Resp: 18 (09/09/20 1526)  BP: (!) 106/54 (09/09/20 1526)  SpO2: 96 % (09/09/20 1526) Vital Signs (24h Range):  Temp:  [98.6 °F (37 °C)-103.1 °F (39.5 °C)] 98.6 °F (37 °C)  Pulse:  [] 101  Resp:  [16-20] 18  SpO2:  [95 %-100 %] 96 %  BP: ()/(54-72) 106/54     Height: 5' 4" (162.6 cm)  Weight: 67.8 kg (149 lb 7.6 oz)  Body mass index is 25.66 kg/m².      Intake/Output Summary (Last 24 hours) at 9/9/2020 1602  Last data filed at 9/9/2020 0945  Gross per 24 hour   Intake 1070 ml   Output 2800 ml   Net -1730 ml       Physical Exam  Psychiatric:      Comments: Mental Status Exam:  Appearance: lying in bed, icterus present  Level of Consciousness: somnolent, responds to verbal stimuli  Behavior/Cooperation: eye contact minimal  Psychomotor: Unable to assess  Speech: Unable to assess  Language: Unable to assess  Orientation: Unable to assess  Attention Span/Concentration: Unable to assess  Memory: Unable to assess  Mood: Unable to assess  Affect: Unable to assess  Thought Process: Unable to assess  Associations: Unable to assess  Thought Content: Unable to assess  Fund of Knowledge: Unable to assess  Abstraction:Unable to assess  Insight:Unable to assess  Judgment: Unable to assess            Significant Labs:   Last 24 Hours:   Recent Lab Results       09/09/20  1210   " 09/09/20  0623   09/08/20  1614   09/08/20  1607        Anion Gap   11         Baso #   0.06         Basophil%   0.6         BILIRUBIN TOTAL   6.1  Comment:  For infants and newborns, interpretation of results should be based  on gestational age, weight and in agreement with clinical  observations.  Premature Infant recommended reference ranges:  Up to 24 hours.............<8.0 mg/dL  Up to 48 hours............<12.0 mg/dL  3-5 days..................<15.0 mg/dL  6-29 days.................<15.0 mg/dL           Blood Culture, Routine     No Growth to date[P] No Growth to date[P]     BUN, Bld   19         Calcium   10.0         Chloride   99         CO2   24         Creatinine   1.1         Differential Method   Automated         eGFR if    >60.0         eGFR if non    57.1  Comment:  Calculation used to obtain the estimated glomerular filtration  rate (eGFR) is the CKD-EPI equation.            Eos #   0.1         Eosinophil%   1.0         Glucose   131         Gran # (ANC)   8.7         Gran%   87.7         Hematocrit   29.0         Hemoglobin   9.4         Immature Grans (Abs)   0.03  Comment:  Mild elevation in immature granulocytes is non specific and   can be seen in a variety of conditions including stress response,   acute inflammation, trauma and pregnancy. Correlation with other   laboratory and clinical findings is essential.           Immature Granulocytes   0.3         INR   1.1  Comment:  Coumadin Therapy:  2.0 - 3.0 for INR for all indicators except mechanical heart valves  and antiphospholipid syndromes which should use 2.5 - 3.5.           Lactate, Jani 1.6  Comment:  Falsely low lactic acid results can be found in samples   containing >=13.0 mg/dL total bilirubin and/or >=3.5 mg/dL   direct bilirubin.             Lymph #   0.5         Lymph%   4.7         MCH   34.6         MCHC   32.4         MCV   107         Mono #   0.6         Mono%   5.7         MPV   11.4          nRBC   0         Platelets   85         Potassium   4.7         Protime   12.2         RBC   2.72         RDW   14.8         Sodium   134         WBC   9.91               Significant Imaging: I have reviewed all pertinent imaging results/findings within the past 24 hours.

## 2020-09-09 NOTE — CARE UPDATE
Rapid Response Nurse Chart Check     Chart check completed, abnormal VS noted.   -130s, sustaining.  M. KEAGAN Irene notified.   Pt placed on telemetry.   EKG ordered per PA.  CXR ordered per PA.  Please call [phone] for further concerns or assistance.

## 2020-09-09 NOTE — ASSESSMENT & PLAN NOTE
Assessment:Zeina Mahoney is a 53 y/o female with CHAUDHARY, known to CL service related to acute deliriurm during previous admission. Psych was re-consulted to help address her anxiety.Today she is much more alert than previous admit and is able to participate in interview and answer all questions appropriately. She admits to anxiety -restlessnes, irritability, insomnia, rumination, palpilations. Patient desires medication to help manage symptoms.     Recommendations:   - Patient does not meet criteria for PEC or inpatient psychiatric admission at this time. Patient is not currently an imminent danger to self or others and is not gravely disabled due to a psychiatric illness.     -Start Lexapro 5 mg daily for anxiety    -Follow-up with outpatient psychiatry once discharged    -Will continue to follow

## 2020-09-09 NOTE — PROGRESS NOTES
Pt with AMS. Able to state name and carry on conversation, but falls asleep mid sentence. Scheduled Kristlose given. HR ^ 1 teens-130's. Informed KEAGAN Matias, EKG and tele ordered. Also states will come see pt.

## 2020-09-09 NOTE — ASSESSMENT & PLAN NOTE
- Febrile to 103.1 9/9 with tachycardia and AMS  - CT A/P unremarkable for source of pain on admission.  - Vanc started for possible LLE cellulitis (& recent staph epi bacteremia) 9/8. Cefepime added 9/9.  - CXR with LLL atelectasis, otherwise unremarkable.   - Lactic 1.6 9/9 from 0.8 9/8.   - Blood cx 9/8 NGTD. UA clean. Blood cx repeated 9/9.   - Seems insufficient ascites for para on exam/imaging, but may consider attempt at diagnostic tap if clinical status does not improve.  - Lasix held 9/9 (did receive AM dose).  - Monitor closely.

## 2020-09-09 NOTE — HOSPITAL COURSE
9/9/2020  Spoke to  Yonis at bedside:  Patient has been confused after receiving oxycodone.  A lot of anxiety stemming from needing to be in hospital and away from home.  Scheduled to have enema later today.    Patient seen at bedside.  Patient somnolent- responds to verbal stimuli but falls back asleep immediately.  Scleral icterus present.

## 2020-09-09 NOTE — ASSESSMENT & PLAN NOTE
"- bilateral lower extremities swollen, warm, erythematous (worse on left)  - sent blood cx 9/8, recent hx of bacteremia  - began empiric vanc 9/8 for cellulitis  - wound care consulted  - see "SIRs"  "

## 2020-09-09 NOTE — PROGRESS NOTES
"[facility]  Psychiatry  Progress Note    Patient Name: Zeina Mahoney  MRN: [id number]   Code Status: Full Code  Admission Date: 9/8/2020  Hospital Length of Stay: 1 days  Expected Discharge Date: 9/12/2020  Attending Physician: Joaquin Malhotra MD  Primary Care Provider: Primary Doctor No    Current Legal Status: Uncontested    Patient information was obtained from patient, spouse    Subjective:     Principal Problem:SIRS (systemic inflammatory response syndrome)    Chief Complaint: Anxiety    HPI:   Zeina Mahoney is a 55 y/o female with CHAUDHARY, known to CL service related to acute deliriurm during previous admission. During previous visits  had expressed concerns for anxiety however patients AMS complicated the assessment for other psychiatric disorders such as anxiety. She had an initial outpatient psych appointment on 9/920 but it was canceled due to her hospitalization. Psych was consulted to help address her anxiety. Today she is much more alert than previous admit and is able to participate in interview and answer all questions appropriately. She admits to anxiety. Immediately identifies feeling restless "I can't stop walking." She reports she was "never that type of girl." She explains normally she is able to let things go/brush them off but lately finds herself becoming easily annoyed with her . She aslo reports Insomnia - "everynight. Can't rest during the day or night." She reports times its due to rumination but other times nothing is going on in her mind but she still paces. Reports this started around May but didn't realize what it was. She expressed "Now I understand when people say its debilitating." She admits to "fear of the unknown of the known" related to her diagnosis.    She has the following symptoms: feelings of losing control, insomnia, irritable, palpitations, psychomotor agitation, racing thoughts, shortness of breath, emotional fatigue. Onset of symptoms was " approximately 4 months ago, gradually worsening since that time. She denies current suicidal and homicidal ideation. Family history significant for anxiety and depression.Possible organic causes contributing are: endocrine/metabolic. Risk factors: positive family history in  sister(s) and negative life event CHAUDHARY, being away from home. Previous treatment includes none.    Other stressors include: She misses her home in [place] - been away since June. Son will be 27 in December and she wants to be with him for his birthday. She feels her  is not treating her like an adult. She admits to guilt about someone having to die for her to get her liver transplant.     She denies history of depression, inpatient psychiatric hospitalization, dell, suicide attempts. Stopped haldol- was using as needed. She does not believe she has tried Zyprexa or Seroquel (prescribed during previous admission). Patient desires medication to help manage symptoms.     Past Medical/Surgical History  Past Medical History:   Diagnosis Date    Chronic Hepatic encephalopathy 6/25/2020    Decompensated hepatic cirrhosis 6/25/2020    History of pancreatitis 6/25/2020    Liver cirrhosis secondary to CHAUDHARY 6/25/2020    Macrocytic anemia 6/25/2020    Other ascites 6/25/2020    Thrombocytopenia 6/25/2020     History reviewed. No pertinent surgical history.    Past Psychiatric History:  Previous Medication Trials: yes - Haldol - feels its ineffective for sleep, Zyprexa, Seroquel - she is unsure if she received these.   Previous Psychiatric Hospitalizations: no   Previous Suicide Attempts: no   History of Violence: no  Outpatient Psychiatrist: no    Social History:  Marital Status: -  together since age 15 yoa- he is supprotive but feels he babys her  Children: 3 children 29 (son), 26 (son), 21 (daughter)  Employment Status/Info:working on getting on disability  Education: some college  Special Ed: no  Housing Status: home  History of  "phys/sexual abuse: no  Access to gun: no -  has gone but she does not have access    Substance Abuse History:  Recreational Drugs: denies  Use of Alcohol: denied  Tobacco Use: no  Rehab History: no     Legal History:  Past Charges/Incarcerations: no,  Pending charges: no     Family Psychiatric History:   Sisters - with anxiety and depression      Hospital Course: 9/9/2020  Spoke to  Yonis at bedside:  Patient has been confused after receiving oxycodone.  A lot of anxiety stemming from needing to be in hospital and away from home.  Scheduled to have enema later today.    Patient seen at bedside.  Patient somnolent- responds to verbal stimuli but falls back asleep immediately.  Scleral icterus present.    Interval History: per hospital course    Family History     None        Tobacco Use    Smoking status: Never Smoker    Smokeless tobacco: Never Used   Substance and Sexual Activity    Alcohol use: Never     Frequency: Never    Drug use: Never    Sexual activity: Not on file     Psychotherapeutics (From admission, onward)    Start     Stop Route Frequency Ordered    09/09/20 0945  escitalopram oxalate tablet 5 mg      -- Oral Daily 09/09/20 0844           Review of Systems   Unable to perform ROS: Acuity of condition     Objective:     Vital Signs (Most Recent):  Temp: 98.6 °F (37 °C) (09/09/20 1526)  Pulse: 101 (09/09/20 1526)  Resp: 18 (09/09/20 1526)  BP: (!) 106/54 (09/09/20 1526)  SpO2: 96 % (09/09/20 1526) Vital Signs (24h Range):  Temp:  [98.6 °F (37 °C)-103.1 °F (39.5 °C)] 98.6 °F (37 °C)  Pulse:  [] 101  Resp:  [16-20] 18  SpO2:  [95 %-100 %] 96 %  BP: ()/(54-72) 106/54     Height: 5' 4" (162.6 cm)  Weight: 67.8 kg (149 lb 7.6 oz)  Body mass index is 25.66 kg/m².      Intake/Output Summary (Last 24 hours) at 9/9/2020 1602  Last data filed at 9/9/2020 0945  Gross per 24 hour   Intake 1070 ml   Output 2800 ml   Net -1730 ml       Physical Exam  Psychiatric:      Comments: Mental " Status Exam:  Appearance: lying in bed, icterus present  Level of Consciousness: somnolent, responds to verbal stimuli  Behavior/Cooperation: eye contact minimal  Psychomotor: Unable to assess  Speech: Unable to assess  Language: Unable to assess  Orientation: Unable to assess  Attention Span/Concentration: Unable to assess  Memory: Unable to assess  Mood: Unable to assess  Affect: Unable to assess  Thought Process: Unable to assess  Associations: Unable to assess  Thought Content: Unable to assess  Fund of Knowledge: Unable to assess  Abstraction:Unable to assess  Insight:Unable to assess  Judgment: Unable to assess            Significant Labs:   Last 24 Hours:   Recent Lab Results       09/09/20  1210   09/09/20  0623   09/08/20  1614   09/08/20  1607        Anion Gap   11         Baso #   0.06         Basophil%   0.6         BILIRUBIN TOTAL   6.1  Comment:  For infants and newborns, interpretation of results should be based  on gestational age, weight and in agreement with clinical  observations.  Premature Infant recommended reference ranges:  Up to 24 hours.............<8.0 mg/dL  Up to 48 hours............<12.0 mg/dL  3-5 days..................<15.0 mg/dL  6-29 days.................<15.0 mg/dL           Blood Culture, Routine     No Growth to date[P] No Growth to date[P]     BUN, Bld   19         Calcium   10.0         Chloride   99         CO2   24         Creatinine   1.1         Differential Method   Automated         eGFR if    >60.0         eGFR if non    57.1  Comment:  Calculation used to obtain the estimated glomerular filtration  rate (eGFR) is the CKD-EPI equation.            Eos #   0.1         Eosinophil%   1.0         Glucose   131         Gran # (ANC)   8.7         Gran%   87.7         Hematocrit   29.0         Hemoglobin   9.4         Immature Grans (Abs)   0.03  Comment:  Mild elevation in immature granulocytes is non specific and   can be seen in a variety of  conditions including stress response,   acute inflammation, trauma and pregnancy. Correlation with other   laboratory and clinical findings is essential.           Immature Granulocytes   0.3         INR   1.1  Comment:  Coumadin Therapy:  2.0 - 3.0 for INR for all indicators except mechanical heart valves  and antiphospholipid syndromes which should use 2.5 - 3.5.           Lactate, Jani 1.6  Comment:  Falsely low lactic acid results can be found in samples   containing >=13.0 mg/dL total bilirubin and/or >=3.5 mg/dL   direct bilirubin.             Lymph #   0.5         Lymph%   4.7         MCH   34.6         MCHC   32.4         MCV   107         Mono #   0.6         Mono%   5.7         MPV   11.4         nRBC   0         Platelets   85         Potassium   4.7         Protime   12.2         RBC   2.72         RDW   14.8         Sodium   134         WBC   9.91               Significant Imaging: I have reviewed all pertinent imaging results/findings within the past 24 hours.    Assessment/Plan:     Adjustment disorder with anxious mood  Assessment:Zeina Mahoney is a 55 y/o female with CHAUDHARY, known to CL service related to acute deliriurm during previous admission. Psych was re-consulted to help address her anxiety.Today she is much more alert than previous admit and is able to participate in interview and answer all questions appropriately. She admits to anxiety -restlessnes, irritability, insomnia, rumination, palpilations. Patient desires medication to help manage symptoms.     Recommendations:   - Patient does not meet criteria for PEC or inpatient psychiatric admission at this time. Patient is not currently an imminent danger to self or others and is not gravely disabled due to a psychiatric illness.     -Start Lexapro 5 mg daily for anxiety    -Follow-up with outpatient psychiatry once discharged    -Will continue to follow         Need for Continued Hospitalization:   No need for inpatient psychiatric  hospitalization. Continue medical care as per the primary team.    Anticipated Disposition: Per Primary     Total time:  15 with greater than 50% of this time spent in counseling and/or coordination of care.       Nadine Valdez MD  \[facility]\""-[facility], PGY-2

## 2020-09-09 NOTE — SUBJECTIVE & OBJECTIVE
Scheduled Meds:   ceFEPime (MAXIPIME) IVPB  1 g Intravenous Q8H    escitalopram oxalate  5 mg Oral Daily    lactulose  30 g Oral TID    levothyroxine  88 mcg Oral Before breakfast    midodrine  10 mg Oral TID    pantoprazole  40 mg Oral Before breakfast    rifAXIMin  550 mg Oral BID    spironolactone  100 mg Oral Daily    vancomycin (VANCOCIN) IVPB  1,000 mg Intravenous Q24H    zinc sulfate  220 mg Oral Before breakfast     Continuous Infusions:  PRN Meds:acetaminophen, ondansetron, sodium chloride 0.9%, Pharmacy to dose Vancomycin consult **AND** vancomycin - pharmacy to dose    Review of Systems   Constitutional: Positive for activity change, appetite change and fever.   HENT: Negative for facial swelling and trouble swallowing.    Respiratory: Negative for cough, shortness of breath, wheezing and stridor.    Cardiovascular: Positive for leg swelling. Negative for chest pain and palpitations.   Gastrointestinal: Positive for abdominal pain. Negative for abdominal distention, constipation, diarrhea, nausea and vomiting.   Genitourinary: Negative for difficulty urinating and dysuria.   Skin: Positive for wound.   Neurological: Positive for weakness. Negative for dizziness and light-headedness.   Hematological: Bruises/bleeds easily.   Psychiatric/Behavioral: Positive for confusion and decreased concentration. The patient is nervous/anxious.      Objective:     Vital Signs (Most Recent):  Temp: (!) 101.1 °F (38.4 °C) (09/09/20 1239)  Pulse: (!) 120 (09/09/20 1115)  Resp: 20 (09/09/20 1115)  BP: 131/60 (09/09/20 1115)  SpO2: 95 % (09/09/20 1115) Vital Signs (24h Range):  Temp:  [97.4 °F (36.3 °C)-103.1 °F (39.5 °C)] 101.1 °F (38.4 °C)  Pulse:  [] 120  Resp:  [16-20] 20  SpO2:  [95 %-100 %] 95 %  BP: (112-141)/(55-72) 131/60     Weight: 67.8 kg (149 lb 7.6 oz)  Body mass index is 25.66 kg/m².    Intake/Output - Last 3 Shifts       09/07 0700 - 09/08 0659 09/08 0700 - 09/09 0659 09/09 0700 - 09/10 0659     P.O.  960 180    I.V. (mL/kg)   100 (1.5)    IV Piggyback  250     Total Intake(mL/kg)  1210 (17.8) 280 (4.1)    Urine (mL/kg/hr) 300 2700 (1.7) 300 (0.7)    Stool  0 0    Total Output 300 2700 300    Net -300 -1490 -20           Urine Occurrence   1 x    Stool Occurrence  2 x 1 x          Physical Exam  Vitals signs and nursing note reviewed.   Constitutional:       Appearance: She is ill-appearing.   HENT:      Head: Normocephalic and atraumatic.   Eyes:      General: Scleral icterus present.         Right eye: No discharge.         Left eye: No discharge.   Cardiovascular:      Rate and Rhythm: Regular rhythm. Tachycardia present.      Pulses: Normal pulses.      Heart sounds: No murmur.   Pulmonary:      Effort: Pulmonary effort is normal. No respiratory distress.      Breath sounds: No stridor. No wheezing or rales.   Abdominal:      General: Bowel sounds are normal. There is no distension.      Palpations: Abdomen is soft.      Tenderness: There is no abdominal tenderness. There is no guarding.   Musculoskeletal:      Right lower leg: Edema present.      Left lower leg: Edema present.   Skin:     General: Skin is warm and dry.      Capillary Refill: Capillary refill takes 2 to 3 seconds.      Coloration: Skin is jaundiced.      Comments: BLE with a few weeping ulcers & LUE abrasions   Neurological:      Mental Status: She is disoriented.      Comments: Responds to verbal stimuli but disoriented x 3 and unable to follow commands.         Laboratory:  Immunosuppressants     None        CBC:   Recent Labs   Lab 09/09/20  0623   WBC 9.91   RBC 2.72*   HGB 9.4*   HCT 29.0*   PLT 85*   *   MCH 34.6*   MCHC 32.4     CMP:   Recent Labs   Lab 09/08/20  0547 09/09/20  0623   * 131*   CALCIUM 8.9 10.0   ALBUMIN 2.5*  --    PROT 5.8*  --     134*   K 3.8 4.7   CO2 26 24    99   BUN 20 19   CREATININE 1.3 1.1   ALKPHOS 97  --    ALT 23  --    AST 39  --    BILITOT 4.4* 6.1*       Diagnostic  Results:  I have personally reviewed all pertinent imaging studies.    Debility/Functional status: Patient debilitated by evidence of Muscle wasting and atrophy. Physical and occupational therapy ordered daily to evaluate and treat. Debility was: present on admission.

## 2020-09-09 NOTE — PLAN OF CARE
Pt has call bell in reach, non slip socks on, and bedrails up x2. Pt encouraged to wash hands. Pt has prn pain meds. Pt has labs in am. Pt has bed alarm activated. Pt has prn nasal cannula.

## 2020-09-09 NOTE — ASSESSMENT & PLAN NOTE
- decompensated  - listed for liver transplant with MELD of 20    MELD-Na score: 17 at 9/9/2020  6:23 AM  MELD score: 15 at 9/9/2020  6:23 AM  Calculated from:  Serum Creatinine: 1.1 mg/dL at 9/9/2020  6:23 AM  Serum Sodium: 134 mmol/L at 9/9/2020  6:23 AM  Total Bilirubin: 6.1 mg/dL at 9/9/2020  6:23 AM  INR(ratio): 1.1 at 9/9/2020  6:23 AM  Age: 54 years 11 months

## 2020-09-09 NOTE — ASSESSMENT & PLAN NOTE
- Acute on chronic encephalopathy  - Lactulose and Rifaximin continued on admit  - Increased dose of lactulose, goal for 3-4 BM/day  - Ammonia 54 on admit  - Mental status worsening 9/9. Lactulose enema ordered. Spiked temp so may be exacerbated by infection. BS abx ordered.  - Monitor closely.

## 2020-09-09 NOTE — HOSPITAL COURSE
Patient admitted 9/8 with lower abdominal pain. CT A/P unremarkable for source of pain. Vanc started for possible LLE cellulitis (& recent staph epi bacteremia). Psych consulted for worsening anxiety. Psych recommended Lexapro which was started 9/9 AM. Patient with worsening mental status 9/9; grade II-III HE. Also tachycardic (EKG ST tele ordered). Lactulose enema given with improvement in AMS noted. CXR with LLL atelectasis, otherwise unremarkable. Albumin x 1 given for hydration. Pt then spiked temp of 103.1F. Abx broadened to Vanc/Cefepime. Blood cx repeated 9/9 (NGTD). Paracentesis attempted 9/10, but insufficient fluid to drain. Mental status much improved 9/11 and abdominal pain resolved.    Interval History:  came in to stay with patient overnight due to patient's agitation related to food choices. Patient oriented and cooperative, but a bit lethargic this AM. Plan for additional dose of lactulose. Remains afebrile and leukocytosis resolved. Plan for US BLE to r/o DVT. Will dc Vanc today and monitor on Cefepime. Earliest possible dc is Sunday. Monitor.

## 2020-09-09 NOTE — ASSESSMENT & PLAN NOTE
- previously prescribed haldol  - consulted psychiatry who recommended Lexapro 5 mg daily (started 9/9)

## 2020-09-09 NOTE — PLAN OF CARE
-Pt free from fall or injury so far this shift. Instructed to call if assistance needed, verbalized understanding.   -Sats high 90's on RA. Chest X-ray today.   -AMS. PRN Oxycodone DC'd. Lactulose enema given, large BM noted. Mentation improved some.    -HR ^ this shift, EKG-ST. Cardiac monitoring applied, ST noted. Albumin x 1 given.   -T-max 103.1, Tylenol given, most recent temp 98.6. Blood cx sent, results pending. Lactic Acid 1.6. Vanc continued, cefepime started. Daily labs monitored.

## 2020-09-09 NOTE — PROGRESS NOTES
Temp 103.1, all other VSS. Scheduled Cefepime given. Informed KEAGAN Matias, Tylenol ordered. Will continue to monitor.

## 2020-09-10 ENCOUNTER — TELEPHONE (OUTPATIENT)
Dept: PSYCHIATRY | Facility: CLINIC | Age: 55
End: 2020-09-10

## 2020-09-10 ENCOUNTER — TELEPHONE (OUTPATIENT)
Dept: TRANSPLANT | Facility: CLINIC | Age: 55
End: 2020-09-10

## 2020-09-10 LAB
ALBUMIN SERPL BCP-MCNC: 2.3 G/DL (ref 3.5–5.2)
ALP SERPL-CCNC: 77 U/L (ref 55–135)
ALT SERPL W/O P-5'-P-CCNC: 21 U/L (ref 10–44)
ANION GAP SERPL CALC-SCNC: 6 MMOL/L (ref 8–16)
ANISOCYTOSIS BLD QL SMEAR: SLIGHT
AST SERPL-CCNC: 41 U/L (ref 10–40)
BASOPHILS # BLD AUTO: 0.08 K/UL (ref 0–0.2)
BASOPHILS NFR BLD: 0.5 % (ref 0–1.9)
BILIRUB SERPL-MCNC: 6.3 MG/DL (ref 0.1–1)
BUN SERPL-MCNC: 32 MG/DL (ref 6–20)
CALCIUM SERPL-MCNC: 9.1 MG/DL (ref 8.7–10.5)
CHLORIDE SERPL-SCNC: 98 MMOL/L (ref 95–110)
CO2 SERPL-SCNC: 26 MMOL/L (ref 23–29)
CREAT SERPL-MCNC: 1.2 MG/DL (ref 0.5–1.4)
DIFFERENTIAL METHOD: ABNORMAL
EOSINOPHIL # BLD AUTO: 0.1 K/UL (ref 0–0.5)
EOSINOPHIL NFR BLD: 0.3 % (ref 0–8)
ERYTHROCYTE [DISTWIDTH] IN BLOOD BY AUTOMATED COUNT: 14.6 % (ref 11.5–14.5)
EST. GFR  (AFRICAN AMERICAN): 58.8 ML/MIN/1.73 M^2
EST. GFR  (NON AFRICAN AMERICAN): 51 ML/MIN/1.73 M^2
GLUCOSE SERPL-MCNC: 138 MG/DL (ref 70–110)
HCT VFR BLD AUTO: 22.1 % (ref 37–48.5)
HGB BLD-MCNC: 7.4 G/DL (ref 12–16)
HYPOCHROMIA BLD QL SMEAR: ABNORMAL
IMM GRANULOCYTES # BLD AUTO: 0.1 K/UL (ref 0–0.04)
IMM GRANULOCYTES NFR BLD AUTO: 0.6 % (ref 0–0.5)
INR PPP: 1.4 (ref 0.8–1.2)
LYMPHOCYTES # BLD AUTO: 1 K/UL (ref 1–4.8)
LYMPHOCYTES NFR BLD: 6.4 % (ref 18–48)
MAGNESIUM SERPL-MCNC: 1.6 MG/DL (ref 1.6–2.6)
MCH RBC QN AUTO: 35.2 PG (ref 27–31)
MCHC RBC AUTO-ENTMCNC: 33.5 G/DL (ref 32–36)
MCV RBC AUTO: 105 FL (ref 82–98)
MONOCYTES # BLD AUTO: 0.7 K/UL (ref 0.3–1)
MONOCYTES NFR BLD: 4.4 % (ref 4–15)
NEUTROPHILS # BLD AUTO: 13.9 K/UL (ref 1.8–7.7)
NEUTROPHILS NFR BLD: 87.8 % (ref 38–73)
NRBC BLD-RTO: 0 /100 WBC
OVALOCYTES BLD QL SMEAR: ABNORMAL
PLATELET # BLD AUTO: 61 K/UL (ref 150–350)
PMV BLD AUTO: 11.1 FL (ref 9.2–12.9)
POIKILOCYTOSIS BLD QL SMEAR: SLIGHT
POLYCHROMASIA BLD QL SMEAR: ABNORMAL
POTASSIUM SERPL-SCNC: 3.9 MMOL/L (ref 3.5–5.1)
PROT SERPL-MCNC: 5.2 G/DL (ref 6–8.4)
PROTHROMBIN TIME: 14.9 SEC (ref 9–12.5)
RBC # BLD AUTO: 2.1 M/UL (ref 4–5.4)
SODIUM SERPL-SCNC: 130 MMOL/L (ref 136–145)
T4 FREE SERPL-MCNC: 0.78 NG/DL (ref 0.71–1.51)
TSH SERPL DL<=0.005 MIU/L-ACNC: 1.93 UIU/ML (ref 0.4–4)
WBC # BLD AUTO: 15.79 K/UL (ref 3.9–12.7)

## 2020-09-10 PROCEDURE — 63600175 PHARM REV CODE 636 W HCPCS: Mod: NTX | Performed by: PHYSICIAN ASSISTANT

## 2020-09-10 PROCEDURE — 80053 COMPREHEN METABOLIC PANEL: CPT | Mod: NTX

## 2020-09-10 PROCEDURE — 36415 COLL VENOUS BLD VENIPUNCTURE: CPT | Mod: NTX

## 2020-09-10 PROCEDURE — 85025 COMPLETE CBC W/AUTO DIFF WBC: CPT | Mod: NTX

## 2020-09-10 PROCEDURE — 83735 ASSAY OF MAGNESIUM: CPT | Mod: NTX

## 2020-09-10 PROCEDURE — 63600175 PHARM REV CODE 636 W HCPCS: Mod: NTX | Performed by: INTERNAL MEDICINE

## 2020-09-10 PROCEDURE — 20600001 HC STEP DOWN PRIVATE ROOM: Mod: NTX

## 2020-09-10 PROCEDURE — 84439 ASSAY OF FREE THYROXINE: CPT | Mod: NTX

## 2020-09-10 PROCEDURE — 25000003 PHARM REV CODE 250: Mod: NTX | Performed by: PHYSICIAN ASSISTANT

## 2020-09-10 PROCEDURE — 85610 PROTHROMBIN TIME: CPT | Mod: NTX

## 2020-09-10 PROCEDURE — 25000003 PHARM REV CODE 250: Mod: NTX | Performed by: NURSE PRACTITIONER

## 2020-09-10 PROCEDURE — 25000003 PHARM REV CODE 250: Mod: NTX | Performed by: INTERNAL MEDICINE

## 2020-09-10 PROCEDURE — 99233 SBSQ HOSP IP/OBS HIGH 50: CPT | Mod: NTX,,, | Performed by: PHYSICIAN ASSISTANT

## 2020-09-10 PROCEDURE — 84443 ASSAY THYROID STIM HORMONE: CPT | Mod: NTX

## 2020-09-10 PROCEDURE — 99233 PR SUBSEQUENT HOSPITAL CARE,LEVL III: ICD-10-PCS | Mod: NTX,,, | Performed by: PHYSICIAN ASSISTANT

## 2020-09-10 RX ORDER — VANCOMYCIN HCL IN 5 % DEXTROSE 1G/250ML
1000 PLASTIC BAG, INJECTION (ML) INTRAVENOUS
Status: DISCONTINUED | OUTPATIENT
Start: 2020-09-10 | End: 2020-09-11

## 2020-09-10 RX ADMIN — MIDODRINE HYDROCHLORIDE 10 MG: 5 TABLET ORAL at 03:09

## 2020-09-10 RX ADMIN — VANCOMYCIN HYDROCHLORIDE 1000 MG: 1 INJECTION, POWDER, LYOPHILIZED, FOR SOLUTION INTRAVENOUS at 09:09

## 2020-09-10 RX ADMIN — SPIRONOLACTONE 100 MG: 25 TABLET ORAL at 08:09

## 2020-09-10 RX ADMIN — LACTULOSE 30 G: 10 SOLUTION ORAL at 09:09

## 2020-09-10 RX ADMIN — VANCOMYCIN HYDROCHLORIDE 1000 MG: 1 INJECTION, POWDER, LYOPHILIZED, FOR SOLUTION INTRAVENOUS at 11:09

## 2020-09-10 RX ADMIN — LACTULOSE 30 G: 10 SOLUTION ORAL at 03:09

## 2020-09-10 RX ADMIN — CEFEPIME 1 G: 1 INJECTION, POWDER, FOR SOLUTION INTRAMUSCULAR; INTRAVENOUS at 05:09

## 2020-09-10 RX ADMIN — RIFAXIMIN 550 MG: 550 TABLET ORAL at 08:09

## 2020-09-10 RX ADMIN — MIDODRINE HYDROCHLORIDE 10 MG: 5 TABLET ORAL at 08:09

## 2020-09-10 RX ADMIN — CEFEPIME 1 G: 1 INJECTION, POWDER, FOR SOLUTION INTRAMUSCULAR; INTRAVENOUS at 11:09

## 2020-09-10 RX ADMIN — MIDODRINE HYDROCHLORIDE 10 MG: 5 TABLET ORAL at 09:09

## 2020-09-10 RX ADMIN — CEFEPIME 1 G: 1 INJECTION, POWDER, FOR SOLUTION INTRAMUSCULAR; INTRAVENOUS at 09:09

## 2020-09-10 RX ADMIN — PANTOPRAZOLE SODIUM 40 MG: 40 TABLET, DELAYED RELEASE ORAL at 05:09

## 2020-09-10 RX ADMIN — ZINC SULFATE 220 MG (50 MG) CAPSULE 220 MG: CAPSULE at 05:09

## 2020-09-10 RX ADMIN — LEVOTHYROXINE SODIUM 88 MCG: 88 TABLET ORAL at 05:09

## 2020-09-10 RX ADMIN — RIFAXIMIN 550 MG: 550 TABLET ORAL at 09:09

## 2020-09-10 RX ADMIN — LACTULOSE 30 G: 10 SOLUTION ORAL at 08:09

## 2020-09-10 RX ADMIN — ESCITALOPRAM OXALATE 5 MG: 5 TABLET, FILM COATED ORAL at 08:09

## 2020-09-10 NOTE — H&P
Inpatient Radiology Pre-procedure Note    History of Present Illness:  Zeina Mahoney is a 55 y.o. female who presents for ultrasound guided paracentesis.  Admission H&P reviewed.  Past Medical History:   Diagnosis Date    Chronic Hepatic encephalopathy 6/25/2020    Decompensated hepatic cirrhosis 6/25/2020    History of pancreatitis 6/25/2020    Liver cirrhosis secondary to CHAUDHARY 6/25/2020    Macrocytic anemia 6/25/2020    Other ascites 6/25/2020    Thrombocytopenia 6/25/2020     History reviewed. No pertinent surgical history.    Review of Systems:   As documented in primary team H&P    Home Meds:   Prior to Admission medications    Medication Sig Start Date End Date Taking? Authorizing Provider   furosemide (LASIX) 40 MG tablet Take 1 tablet (40 mg total) by mouth once daily. Take 2 tablets (80 mg) by mouth in the morning and 1 tablet (40mg) in the evening. 8/19/20  Yes Jairo Reyes MD   haloperidoL (HALDOL) 1 MG tablet Take 1 tablet (1 mg total) by mouth daily as needed (non redirectable agitation). 7/10/20  Yes Elen Dumont MD   lactulose (CEPHULAC) 10 gram packet Mix 3 packets (30 g total) with liquid and take by mouth 3 (three) times daily as needed. 7/21/20  Yes Jairo Reyes MD   levothyroxine (SYNTHROID) 88 MCG tablet Take 1 tablet (88 mcg total) by mouth before breakfast. 8/13/20  Yes Jairo Reyes MD   magnesium hydroxide 400 mg/5 ml (MILK OF MAGNESIA) 400 mg/5 mL Susp Take 30 mLs (2,400 mg total) by mouth daily as needed (Take if constipated despite lactulose). 7/10/20  Yes Elen Dumont MD   nystatin (MYCOSTATIN) powder Apply topically 2 (two) times daily. Apply to affected area twice a day   Yes Historical Provider, MD   pantoprazole (PROTONIX) 40 MG tablet Take 40 mg by mouth before breakfast.   Yes Historical Provider, MD   polyethylene glycol (GLYCOLAX) 17 gram PwPk Take 17 g by mouth once daily. Or miralax OTC. Take if constipated despite lactulose 7/10/20  Yes Elen Dumont  MD   rifAXIMin (XIFAXAN) 550 mg Tab Take 550 mg by mouth 2 (two) times daily.    Yes Historical Provider, MD   simethicone (MYLICON) 80 MG chewable tablet Take 80 mg by mouth every 6 (six) hours as needed for Flatulence. Take 2 tablets by mouth every 6 hours as needed for gas   Yes Historical Provider, MD   sodium bicarbonate 650 MG tablet Take 2 tablets (1,300 mg total) by mouth 2 (two) times daily. 8/12/20  Yes Jairo Reyes MD   spironolactone (ALDACTONE) 50 MG tablet Take 2 tablets (100 mg total) by mouth once daily. 8/19/20  Yes Jairo Reyes MD   zinc sulfate (ZINCATE) 220 (50) mg capsule Take 220 mg by mouth before breakfast.   Yes Historical Provider, MD   acetaminophen (TYLENOL) 500 MG tablet Take 1 tablet (500 mg total) by mouth every 6 (six) hours as needed for Pain. Max 2000mg daily. 6/29/20   Elen Dumont MD   diaper,brief,adult,disposable Misc 5 mg/kg/day by Misc.(Non-Drug; Combo Route) route 5 (five) times daily. Patient with incontinence. Please supply enough depends for 5xs/day 6/26/20   Elen Dumont MD   ergocalciferol (VITAMIN D2) 50,000 unit Cap Take 50,000 Units by mouth every 7 days.    Historical Provider, MD   lidocaine (LIDODERM) 5 % Place 1 patch onto the skin every 24 hours. Remove & Discard patch within 12 hours or as directed by MD Gresham Provider, MD   melatonin 10 mg Tab Take 10 mg by mouth nightly as needed (sleep).    Historical Provider, MD   midodrine (PROAMATINE) 10 MG tablet Take 10 mg by mouth 3 (three) times daily.    Historical Provider, MD     Scheduled Meds:    ceFEPime (MAXIPIME) IVPB  1 g Intravenous Q8H    escitalopram oxalate  5 mg Oral Daily    lactulose  30 g Oral TID    levothyroxine  88 mcg Oral Before breakfast    midodrine  10 mg Oral TID    pantoprazole  40 mg Oral Before breakfast    rifAXIMin  550 mg Oral BID    spironolactone  100 mg Oral Daily    vancomycin (VANCOCIN) IVPB  1,000 mg Intravenous Q12H    zinc sulfate  220 mg Oral  Before breakfast     Continuous Infusions:   PRN Meds:acetaminophen, ondansetron, sodium chloride 0.9%, Pharmacy to dose Vancomycin consult **AND** vancomycin - pharmacy to dose  Anticoagulants/Antiplatelets: no anticoagulation    Allergies:   Review of patient's allergies indicates:   Allergen Reactions    Aspirin Tinitus    Ciprofloxacin Rash    Clindamycin Itching     Sedation Hx: have not been any systemic reactions    Vitals:  Temp: 97.8 °F (36.6 °C) (09/10/20 0845)  Pulse: 95 (09/10/20 0845)  Resp: 16 (09/10/20 0845)  BP: (!) 114/58 (09/10/20 0845)  SpO2: 97 % (09/10/20 0845)     Physical Exam:  ASA: 2  Mallampati: n/a    General: no acute distress  Mental Status: alert and oriented to person, place and time  HEENT: normocephalic, atraumatic  Chest: unlabored breathing  Heart: regular heart rate  Abdomen: nondistended  Extremity: moves all extremities    Plan: local  Sedation Plan: ultrasound guided paracentesis    Lindsey Ernst PA-C  Interventional Radiology  Clinic [phone]

## 2020-09-10 NOTE — PROGRESS NOTES
Unable to perform paracentesis. Per KEAGAN Navarrete, insufficient fluid found on ultrasound. Report called to floor nurse. Patient awaiting transport back to room.

## 2020-09-10 NOTE — ASSESSMENT & PLAN NOTE
- previously prescribed haldol  - consulted psychiatry who recommended Lexapro 5 mg daily (started 9/9)  - pt to f/u with psych outpatient (appointment 9/23)

## 2020-09-10 NOTE — TELEPHONE ENCOUNTER
Updated patients primary team (HERNANDEZ Irene PA-C) about her upcoming appointment with psychiatry so they could inform the patient.

## 2020-09-10 NOTE — ASSESSMENT & PLAN NOTE
- CT A/P 9/8 unremarkable for source of pain  - Avoid narcotics in treating pain  - Insufficient fluid for para  - Pain improved  - Monitor

## 2020-09-10 NOTE — ASSESSMENT & PLAN NOTE
- Febrile to 103.1 9/9 with tachycardia and AMS  - CT A/P unremarkable for source of pain on admission.  - Vanc started for possible LLE cellulitis (& recent staph epi bacteremia) 9/8. Cefepime added 9/9.  - CXR with LLL atelectasis, otherwise unremarkable.   - Lactic 1.6 9/9 from 0.8 9/8.   - Blood cx 9/8 NGTD. UA clean. Blood cx repeated 9/9 (NGTD).   - Leukocytosis 9/10. Afebrile since 9/9 afternoon. Para attempted 9/10. Insufficient fluid to sample.  - Lasix held 9/9 (did receive AM dose).  - Mental status much improved 9/10  - Monitor closely.

## 2020-09-10 NOTE — PLAN OF CARE
Pt has call bell in reach, non slip socks on, and bedrails up x2. Pt encouraged to wash hands. Pt has prn pain meds. Pt has labs in am. Pt has bed alarm activated. Pt has prn nasal cannula. Pt taking lactulose for ammonia level.

## 2020-09-10 NOTE — TELEPHONE ENCOUNTER
PATIENT NAME: Zeina Mahoney  [facility] #: [id number]     [address]    Lab Results   Component Value Date    CREATININE 1.2 09/10/2020     (L) 09/10/2020    BILITOT 6.3 (H) 09/10/2020    ALBUMIN 2.3 (L) 09/10/2020    INR 1.4 (H) 09/10/2020       Encephalopathy: 1 - 2  Ascites: moderate  Dialysis: no     Recertification requestor: Almas Reynoso

## 2020-09-10 NOTE — PROCEDURES
During ultrasound evaluation, no ascites identified for safe paracentesis. No paracentesis performed.     Lindsey Ernst PA-C  Interventional Radiology  Clinic [phone]

## 2020-09-10 NOTE — PLAN OF CARE
VSS  No signs of evident distress or complaint of pain.  Pt. AAOx4  Ambulating to restroom independently.  Non slip socks worn when OOB.  Para ordered but was not performed due to lack to fluid to be drained.  Right wrist PIV dressing CDI.  No sings of redness or inflammation.  Telly normal sinus   at bedside.    Will continue to monitor.

## 2020-09-10 NOTE — ASSESSMENT & PLAN NOTE
- Acute on chronic encephalopathy  - Lactulose and Rifaximin continued on admit  - Increased dose of lactulose, goal for 3-4 BM/day  - Ammonia 54 on admit  - Patient with worsening mental status 9/9; grade II-III HE. Improved with lactulose enema. Thought to be exacerbated by infection of unknown origin. Continue abx.  - Monitor closely.

## 2020-09-10 NOTE — SUBJECTIVE & OBJECTIVE
Scheduled Meds:   ceFEPime (MAXIPIME) IVPB  1 g Intravenous Q8H    escitalopram oxalate  5 mg Oral Daily    lactulose  30 g Oral TID    levothyroxine  88 mcg Oral Before breakfast    midodrine  10 mg Oral TID    pantoprazole  40 mg Oral Before breakfast    rifAXIMin  550 mg Oral BID    spironolactone  100 mg Oral Daily    vancomycin (VANCOCIN) IVPB  1,000 mg Intravenous Q12H    zinc sulfate  220 mg Oral Before breakfast     Continuous Infusions:  PRN Meds:acetaminophen, ondansetron, sodium chloride 0.9%, Pharmacy to dose Vancomycin consult **AND** vancomycin - pharmacy to dose    Review of Systems   Constitutional: Positive for activity change and fatigue. Negative for fever.   HENT: Negative for facial swelling and trouble swallowing.    Respiratory: Negative for cough, shortness of breath, wheezing and stridor.    Cardiovascular: Positive for leg swelling. Negative for chest pain and palpitations.   Gastrointestinal: Positive for abdominal pain (improved). Negative for abdominal distention, constipation, diarrhea, nausea and vomiting.   Genitourinary: Negative for difficulty urinating and dysuria.   Skin: Positive for wound.   Neurological: Positive for weakness. Negative for dizziness and light-headedness.   Hematological: Bruises/bleeds easily.   Psychiatric/Behavioral: Negative for agitation, behavioral problems and confusion. The patient is nervous/anxious.      Objective:     Vital Signs (Most Recent):  Temp: 98.4 °F (36.9 °C) (09/10/20 1101)  Pulse: 77 (09/10/20 1104)  Resp: 18 (09/10/20 1101)  BP: (!) 113/58 (09/10/20 1101)  SpO2: 97 % (09/10/20 1101) Vital Signs (24h Range):  Temp:  [97.8 °F (36.6 °C)-101.7 °F (38.7 °C)] 98.4 °F (36.9 °C)  Pulse:  [] 77  Resp:  [16-18] 18  SpO2:  [96 %-100 %] 97 %  BP: ()/(51-59) 113/58     Weight: 67 kg (147 lb 11.3 oz)  Body mass index is 25.35 kg/m².    Intake/Output - Last 3 Shifts       09/08 0700 - 09/09 0659 09/09 0700 - 09/10 0659 09/10 0700 -  09/11 0659    P.O. 960 900 240    I.V. (mL/kg)  100 (1.5)     IV Piggyback 250 250 250    Total Intake(mL/kg) 1210 (17.8) 1250 (18.7) 490 (7.3)    Urine (mL/kg/hr) 2700 (1.7) 1100 (0.7) 300 (0.7)    Stool 0 0 0    Total Output 2700 1100 300    Net -1490 +150 +190           Urine Occurrence  1 x     Stool Occurrence 2 x 2 x 1 x          Physical Exam  Vitals signs and nursing note reviewed.   Constitutional:       General: She is not in acute distress.  HENT:      Head: Normocephalic and atraumatic.   Eyes:      General: Scleral icterus present.         Right eye: No discharge.         Left eye: No discharge.   Cardiovascular:      Rate and Rhythm: Normal rate and regular rhythm.      Pulses: Normal pulses.      Heart sounds: No murmur.   Pulmonary:      Effort: Pulmonary effort is normal. No respiratory distress.      Breath sounds: No stridor. No wheezing or rales.   Abdominal:      General: Bowel sounds are normal. There is no distension.      Palpations: Abdomen is soft.      Tenderness: There is no abdominal tenderness. There is no guarding.   Musculoskeletal:      Right lower leg: Edema present.      Left lower leg: Edema present.   Skin:     General: Skin is warm and dry.      Capillary Refill: Capillary refill takes 2 to 3 seconds.      Coloration: Skin is jaundiced.      Comments: BLE with a few healing ulcers & LUE abrasions   Neurological:      Mental Status: She is alert and oriented to person, place, and time. Mental status is at baseline.         Laboratory:  Immunosuppressants     None        CBC:   Recent Labs   Lab 09/10/20  0632   WBC 15.79*   RBC 2.10*   HGB 7.4*   HCT 22.1*   PLT 61*   *   MCH 35.2*   MCHC 33.5     CMP:   Recent Labs   Lab 09/10/20  0632   *   CALCIUM 9.1   ALBUMIN 2.3*   PROT 5.2*   *   K 3.9   CO2 26   CL 98   BUN 32*   CREATININE 1.2   ALKPHOS 77   ALT 21   AST 41*   BILITOT 6.3*       Diagnostic Results:  I have personally reviewed all pertinent imaging  studies.    Debility/Functional status: Patient debilitated by evidence of Muscle wasting and atrophy. Physical and occupational therapy ordered daily to evaluate and treat. Debility was: present on admission.

## 2020-09-10 NOTE — ASSESSMENT & PLAN NOTE
- decompensated  - listed for liver transplant with MELD of 20    MELD-Na score: 24 at 9/10/2020  6:32 AM  MELD score: 19 at 9/10/2020  6:32 AM  Calculated from:  Serum Creatinine: 1.2 mg/dL at 9/10/2020  6:32 AM  Serum Sodium: 130 mmol/L at 9/10/2020  6:32 AM  Total Bilirubin: 6.3 mg/dL at 9/10/2020  6:32 AM  INR(ratio): 1.4 at 9/10/2020  6:32 AM  Age: 55 years

## 2020-09-10 NOTE — CONSULTS
Wound care consult received from NP for assessment of lower extremities and left forearm. Patient known to wound care team from previous admission. Patient is a 53 y/o female with past medical history liver cirrhosis secondary to CHAUDHARY, acute hepatic encephalopathy and portal hypertension.  [place] [facility] and complaint of lower abdominal pain.     Upon assessment to left lower leg noted scabbing partial thickness ulceration. No odor. Scant serosanguineous drainage. Patient denies pain at the site. Small ulceration to right leg has resolved. Sacrum and heels are intact. No skin breakdown noted to upper extremities at this time.     Recommend nursing to cleanse wound to left lower leg with sterile normal saline/ gauze and apply medi-honey to affected area daily to reduce bio-burden and promote a moisture balanced environment conducive to wound healing. Cover with bordered foam dressing.      Nursing and MD team notified with recommendations.   Wound care to sign off. Please re-consult for any other concerns [phone]    Left lower leg

## 2020-09-10 NOTE — PROGRESS NOTES
[facility]  Liver Transplant  Progress Note    Patient Name: Zeina Mahoney  MRN: [id number]  Admission Date: 9/8/2020  Hospital Length of Stay: 2 days  Code Status: Full Code  Primary Care Provider: Primary Doctor No  Post-Operative Day:     ORGAN:     Disease Etiology: Cirrhosis: Fatty Liver (Chaudhary)  Donor Type:     CDC High Risk:     Donor CMV Status:   Donor CMV Status:   Donor HBcAB:     Donor HCV Status:     Donor HBV HERACLIO: Organ record is missing.  Donor HCV HERACLIO: Organ record is missing.  Whole or Partial:   Biliary Anastomosis:   Arterial Anatomy:   Subjective:     History of Present Illness:  55 y/o female with pmh of esld secondary CHAUDHARY.  Presents to the ED with complaint of lower abdominal pain.  CT scan obtained, unremarkable to source of pain.  Lipase elevated on admit, has hx of pancreatic lesions.  Upon assessment mental status slowed.  Patient's  reports having regular bowel movements and mental status better then baseline.  However states very anxious needing haldol for symptom control.  Lower extremities with swelling, redness, warmth, and weeping open sores.   states wound was being dressed by home health. Plan to admit for observation to address these issues.  Will consult psychiatry to address anxiety and recommendations of better medication in setting of liver failure.  Wound care consult for multiple skin tears and lesions.  Infectious work up and empirically treat for cellulitis.    Hospital Course:  Patient admitted 9/8 with lower abdominal pain. CT A/P unremarkable for source of pain. Vanc started for possible LLE cellulitis (& recent staph epi bacteremia). Psych consulted for worsening anxiety. Psych recommended Lexapro which was started 9/9 AM. Patient with worsening mental status 9/9; grade II-III HE. Also tachycardic (EKG ST tele ordered). Lactulose enema given with improvement in AMS noted. CXR with LLL atelectasis, otherwise unremarkable. Albumin x 1 given  for hydration. Pt then spiked temp of 103.1F. Abx broadened to Vanc/Cefepime. Blood cx repeated 9/9 (NGTD).     Interval History: No acute events overnight. Mental status much improved this AM and no c/o abdominal pain. Afebrile since yesterday afternoon. Leukocytosis noted on AM labs. Attempted paracentesis, but insufficient fluid to tap. Plan to continue Vanc/Cefepime and monitor for now.     Scheduled Meds:   ceFEPime (MAXIPIME) IVPB  1 g Intravenous Q8H    escitalopram oxalate  5 mg Oral Daily    lactulose  30 g Oral TID    levothyroxine  88 mcg Oral Before breakfast    midodrine  10 mg Oral TID    pantoprazole  40 mg Oral Before breakfast    rifAXIMin  550 mg Oral BID    spironolactone  100 mg Oral Daily    vancomycin (VANCOCIN) IVPB  1,000 mg Intravenous Q12H    zinc sulfate  220 mg Oral Before breakfast     Continuous Infusions:  PRN Meds:acetaminophen, ondansetron, sodium chloride 0.9%, Pharmacy to dose Vancomycin consult **AND** vancomycin - pharmacy to dose    Review of Systems   Constitutional: Positive for activity change and fatigue. Negative for fever.   HENT: Negative for facial swelling and trouble swallowing.    Respiratory: Negative for cough, shortness of breath, wheezing and stridor.    Cardiovascular: Positive for leg swelling. Negative for chest pain and palpitations.   Gastrointestinal: Positive for abdominal pain (improved). Negative for abdominal distention, constipation, diarrhea, nausea and vomiting.   Genitourinary: Negative for difficulty urinating and dysuria.   Skin: Positive for wound.   Neurological: Positive for weakness. Negative for dizziness and light-headedness.   Hematological: Bruises/bleeds easily.   Psychiatric/Behavioral: Negative for agitation, behavioral problems and confusion. The patient is nervous/anxious.      Objective:     Vital Signs (Most Recent):  Temp: 98.4 °F (36.9 °C) (09/10/20 1101)  Pulse: 77 (09/10/20 1104)  Resp: 18 (09/10/20 1101)  BP: (!)  113/58 (09/10/20 1101)  SpO2: 97 % (09/10/20 1101) Vital Signs (24h Range):  Temp:  [97.8 °F (36.6 °C)-101.7 °F (38.7 °C)] 98.4 °F (36.9 °C)  Pulse:  [] 77  Resp:  [16-18] 18  SpO2:  [96 %-100 %] 97 %  BP: ()/(51-59) 113/58     Weight: 67 kg (147 lb 11.3 oz)  Body mass index is 25.35 kg/m².    Intake/Output - Last 3 Shifts       09/08 0700 - 09/09 0659 09/09 0700 - 09/10 0659 09/10 0700 - 09/11 0659    P.O. 960 900 240    I.V. (mL/kg)  100 (1.5)     IV Piggyback 250 250 250    Total Intake(mL/kg) 1210 (17.8) 1250 (18.7) 490 (7.3)    Urine (mL/kg/hr) 2700 (1.7) 1100 (0.7) 300 (0.7)    Stool 0 0 0    Total Output 2700 1100 300    Net -1490 +150 +190           Urine Occurrence  1 x     Stool Occurrence 2 x 2 x 1 x          Physical Exam  Vitals signs and nursing note reviewed.   Constitutional:       General: She is not in acute distress.  HENT:      Head: Normocephalic and atraumatic.   Eyes:      General: Scleral icterus present.         Right eye: No discharge.         Left eye: No discharge.   Cardiovascular:      Rate and Rhythm: Normal rate and regular rhythm.      Pulses: Normal pulses.      Heart sounds: No murmur.   Pulmonary:      Effort: Pulmonary effort is normal. No respiratory distress.      Breath sounds: No stridor. No wheezing or rales.   Abdominal:      General: Bowel sounds are normal. There is no distension.      Palpations: Abdomen is soft.      Tenderness: There is no abdominal tenderness. There is no guarding.   Musculoskeletal:      Right lower leg: Edema present.      Left lower leg: Edema present.   Skin:     General: Skin is warm and dry.      Capillary Refill: Capillary refill takes 2 to 3 seconds.      Coloration: Skin is jaundiced.      Comments: BLE with a few healing ulcers & LUE abrasions   Neurological:      Mental Status: She is alert and oriented to person, place, and time. Mental status is at baseline.         Laboratory:  Immunosuppressants     None        CBC:   Recent  Labs   Lab 09/10/20  0632   WBC 15.79*   RBC 2.10*   HGB 7.4*   HCT 22.1*   PLT 61*   *   MCH 35.2*   MCHC 33.5     CMP:   Recent Labs   Lab 09/10/20  0632   *   CALCIUM 9.1   ALBUMIN 2.3*   PROT 5.2*   *   K 3.9   CO2 26   CL 98   BUN 32*   CREATININE 1.2   ALKPHOS 77   ALT 21   AST 41*   BILITOT 6.3*       Diagnostic Results:  I have personally reviewed all pertinent imaging studies.    Debility/Functional status: Patient debilitated by evidence of Muscle wasting and atrophy. Physical and occupational therapy ordered daily to evaluate and treat. Debility was: present on admission.    Assessment/Plan:     * SIRS (systemic inflammatory response syndrome)  - Febrile to 103.1 9/9 with tachycardia and AMS  - CT A/P unremarkable for source of pain on admission.  - Vanc started for possible LLE cellulitis (& recent staph epi bacteremia) 9/8. Cefepime added 9/9.  - CXR with LLL atelectasis, otherwise unremarkable.   - Lactic 1.6 9/9 from 0.8 9/8.   - Blood cx 9/8 NGTD. UA clean. Blood cx repeated 9/9 (NGTD).   - Leukocytosis 9/10. Afebrile since 9/9 afternoon. Para attempted 9/10. Insufficient fluid to sample.  - Lasix held 9/9 (did receive AM dose).  - Mental status much improved 9/10  - Monitor closely.    Acute Hepatic encephalopathy  - Acute on chronic encephalopathy  - Lactulose and Rifaximin continued on admit  - Increased dose of lactulose, goal for 3-4 BM/day  - Ammonia 54 on admit  - Patient with worsening mental status 9/9; grade II-III HE. Improved with lactulose enema. Thought to be exacerbated by infection of unknown origin. Continue abx.  - Monitor closely.        Tachycardia  - Sinus tach likely due to infection  - Improved on BS abx  - Monitor with tele      Cellulitis of lower extremity  - bilateral lower extremities swollen, warm, erythematous (worse on left)  - sent blood cx 9/8, recent hx of bacteremia  - began empiric vanc 9/8 for cellulitis  - wound care consulted  - see  ""SIRs"    Abdominal pain  - CT A/P 9/8 unremarkable for source of pain  - Avoid narcotics in treating pain  - Insufficient fluid for para  - Pain improved  - Monitor      Anemia of chronic disease  - h/h stable on admit  - Monitor labs daily      Protein-calorie malnutrition, moderate  - encourage oral intake with suppplements    Thrombocytopenia due to hypersplenism  - platelets stable  - monitor labs daily      Portal hypertension  - secondary to liver disease  - monitor      Hypothyroidism  - Continue Synthroid.  - TSH/free T4 added to 9/10 labs (wnl)  - Monitor.      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  - decompensated  - listed for liver transplant with MELD of 20    MELD-Na score: 24 at 9/10/2020  6:32 AM  MELD score: 19 at 9/10/2020  6:32 AM  Calculated from:  Serum Creatinine: 1.2 mg/dL at 9/10/2020  6:32 AM  Serum Sodium: 130 mmol/L at 9/10/2020  6:32 AM  Total Bilirubin: 6.3 mg/dL at 9/10/2020  6:32 AM  INR(ratio): 1.4 at 9/10/2020  6:32 AM  Age: 55 years            VTE Risk Mitigation (From admission, onward)         Ordered     Place HORACE hose  Until discontinued      09/08/20 1105     Place sequential compression device  Until discontinued      09/08/20 1105     IP VTE LOW RISK PATIENT  Once      09/08/20 1105                The patients clinical status was discussed at multidisplinary rounds, involving transplant surgery, transplant medicine, pharmacy, nursing, nutrition, and social work    Discharge Planning:  Not a candidate for dc today.    Polly Irene PA-C  Liver Transplant  [facility]  "

## 2020-09-11 LAB
ALBUMIN SERPL BCP-MCNC: 2.2 G/DL (ref 3.5–5.2)
ALP SERPL-CCNC: 92 U/L (ref 55–135)
ALT SERPL W/O P-5'-P-CCNC: 19 U/L (ref 10–44)
ANION GAP SERPL CALC-SCNC: 4 MMOL/L (ref 8–16)
ANISOCYTOSIS BLD QL SMEAR: SLIGHT
AST SERPL-CCNC: 33 U/L (ref 10–40)
BASOPHILS # BLD AUTO: 0.04 K/UL (ref 0–0.2)
BASOPHILS NFR BLD: 0.4 % (ref 0–1.9)
BILIRUB SERPL-MCNC: 3.7 MG/DL (ref 0.1–1)
BUN SERPL-MCNC: 25 MG/DL (ref 6–20)
CALCIUM SERPL-MCNC: 9.1 MG/DL (ref 8.7–10.5)
CHLORIDE SERPL-SCNC: 102 MMOL/L (ref 95–110)
CO2 SERPL-SCNC: 25 MMOL/L (ref 23–29)
CREAT SERPL-MCNC: 1.1 MG/DL (ref 0.5–1.4)
DIFFERENTIAL METHOD: ABNORMAL
EOSINOPHIL # BLD AUTO: 0.1 K/UL (ref 0–0.5)
EOSINOPHIL NFR BLD: 1.4 % (ref 0–8)
ERYTHROCYTE [DISTWIDTH] IN BLOOD BY AUTOMATED COUNT: 14.6 % (ref 11.5–14.5)
EST. GFR  (AFRICAN AMERICAN): >60 ML/MIN/1.73 M^2
EST. GFR  (NON AFRICAN AMERICAN): 56.7 ML/MIN/1.73 M^2
GLUCOSE SERPL-MCNC: 208 MG/DL (ref 70–110)
HCT VFR BLD AUTO: 23.3 % (ref 37–48.5)
HGB BLD-MCNC: 7.6 G/DL (ref 12–16)
IMM GRANULOCYTES # BLD AUTO: 0.05 K/UL (ref 0–0.04)
IMM GRANULOCYTES NFR BLD AUTO: 0.5 % (ref 0–0.5)
INR PPP: 1.2 (ref 0.8–1.2)
LYMPHOCYTES # BLD AUTO: 1.3 K/UL (ref 1–4.8)
LYMPHOCYTES NFR BLD: 13.3 % (ref 18–48)
MAGNESIUM SERPL-MCNC: 1.6 MG/DL (ref 1.6–2.6)
MCH RBC QN AUTO: 34.9 PG (ref 27–31)
MCHC RBC AUTO-ENTMCNC: 32.6 G/DL (ref 32–36)
MCV RBC AUTO: 107 FL (ref 82–98)
MONOCYTES # BLD AUTO: 0.6 K/UL (ref 0.3–1)
MONOCYTES NFR BLD: 6 % (ref 4–15)
NEUTROPHILS # BLD AUTO: 7.5 K/UL (ref 1.8–7.7)
NEUTROPHILS NFR BLD: 78.4 % (ref 38–73)
NRBC BLD-RTO: 0 /100 WBC
PLATELET # BLD AUTO: 58 K/UL (ref 150–350)
PLATELET BLD QL SMEAR: ABNORMAL
PMV BLD AUTO: 11.3 FL (ref 9.2–12.9)
POTASSIUM SERPL-SCNC: 3.8 MMOL/L (ref 3.5–5.1)
PROT SERPL-MCNC: 5.1 G/DL (ref 6–8.4)
PROTHROMBIN TIME: 13.6 SEC (ref 9–12.5)
RBC # BLD AUTO: 2.18 M/UL (ref 4–5.4)
SODIUM SERPL-SCNC: 131 MMOL/L (ref 136–145)
TOXIC GRANULES BLD QL SMEAR: PRESENT
VANCOMYCIN TROUGH SERPL-MCNC: 24.4 UG/ML (ref 10–22)
WBC # BLD AUTO: 9.53 K/UL (ref 3.9–12.7)

## 2020-09-11 PROCEDURE — 80053 COMPREHEN METABOLIC PANEL: CPT | Mod: NTX

## 2020-09-11 PROCEDURE — 63600175 PHARM REV CODE 636 W HCPCS: Mod: NTX | Performed by: PHYSICIAN ASSISTANT

## 2020-09-11 PROCEDURE — 25000003 PHARM REV CODE 250: Mod: NTX | Performed by: PHYSICIAN ASSISTANT

## 2020-09-11 PROCEDURE — 99233 SBSQ HOSP IP/OBS HIGH 50: CPT | Mod: NTX,,, | Performed by: PHYSICIAN ASSISTANT

## 2020-09-11 PROCEDURE — 99233 PR SUBSEQUENT HOSPITAL CARE,LEVL III: ICD-10-PCS | Mod: NTX,,, | Performed by: PHYSICIAN ASSISTANT

## 2020-09-11 PROCEDURE — 94761 N-INVAS EAR/PLS OXIMETRY MLT: CPT | Mod: NTX

## 2020-09-11 PROCEDURE — 20600001 HC STEP DOWN PRIVATE ROOM: Mod: NTX

## 2020-09-11 PROCEDURE — 80202 ASSAY OF VANCOMYCIN: CPT | Mod: NTX

## 2020-09-11 PROCEDURE — 85025 COMPLETE CBC W/AUTO DIFF WBC: CPT | Mod: NTX

## 2020-09-11 PROCEDURE — 85610 PROTHROMBIN TIME: CPT | Mod: NTX

## 2020-09-11 PROCEDURE — 63600175 PHARM REV CODE 636 W HCPCS: Mod: NTX | Performed by: INTERNAL MEDICINE

## 2020-09-11 PROCEDURE — 83735 ASSAY OF MAGNESIUM: CPT | Mod: NTX

## 2020-09-11 PROCEDURE — 36415 COLL VENOUS BLD VENIPUNCTURE: CPT | Mod: NTX

## 2020-09-11 PROCEDURE — 25000003 PHARM REV CODE 250: Mod: NTX | Performed by: NURSE PRACTITIONER

## 2020-09-11 RX ORDER — LACTULOSE 10 G/15ML
30 SOLUTION ORAL ONCE
Status: COMPLETED | OUTPATIENT
Start: 2020-09-11 | End: 2020-09-11

## 2020-09-11 RX ADMIN — MIDODRINE HYDROCHLORIDE 10 MG: 5 TABLET ORAL at 08:09

## 2020-09-11 RX ADMIN — LACTULOSE 30 G: 10 SOLUTION ORAL at 08:09

## 2020-09-11 RX ADMIN — CEFEPIME 1 G: 1 INJECTION, POWDER, FOR SOLUTION INTRAMUSCULAR; INTRAVENOUS at 05:09

## 2020-09-11 RX ADMIN — LEVOTHYROXINE SODIUM 88 MCG: 88 TABLET ORAL at 05:09

## 2020-09-11 RX ADMIN — ESCITALOPRAM OXALATE 5 MG: 5 TABLET, FILM COATED ORAL at 08:09

## 2020-09-11 RX ADMIN — RIFAXIMIN 550 MG: 550 TABLET ORAL at 08:09

## 2020-09-11 RX ADMIN — ZINC SULFATE 220 MG (50 MG) CAPSULE 220 MG: CAPSULE at 05:09

## 2020-09-11 RX ADMIN — SPIRONOLACTONE 100 MG: 25 TABLET ORAL at 08:09

## 2020-09-11 RX ADMIN — MIDODRINE HYDROCHLORIDE 10 MG: 5 TABLET ORAL at 03:09

## 2020-09-11 RX ADMIN — CEFEPIME 1 G: 1 INJECTION, POWDER, FOR SOLUTION INTRAMUSCULAR; INTRAVENOUS at 11:09

## 2020-09-11 RX ADMIN — LACTULOSE 30 G: 20 SOLUTION ORAL at 11:09

## 2020-09-11 RX ADMIN — LACTULOSE 30 G: 10 SOLUTION ORAL at 03:09

## 2020-09-11 RX ADMIN — CEFEPIME 1 G: 1 INJECTION, POWDER, FOR SOLUTION INTRAMUSCULAR; INTRAVENOUS at 08:09

## 2020-09-11 RX ADMIN — PANTOPRAZOLE SODIUM 40 MG: 40 TABLET, DELAYED RELEASE ORAL at 05:09

## 2020-09-11 RX ADMIN — VANCOMYCIN HYDROCHLORIDE 1000 MG: 1 INJECTION, POWDER, LYOPHILIZED, FOR SOLUTION INTRAVENOUS at 08:09

## 2020-09-11 NOTE — NURSING
Pt is AAOx3. Pt demanding to leave AMA. She is on the phone screaming at her . OC notified. Will continue to monitor.

## 2020-09-11 NOTE — ASSESSMENT & PLAN NOTE
- Febrile to 103.1 9/9 with tachycardia and AMS  - CT A/P unremarkable for source of pain on admission.  - Vanc started for possible LLE cellulitis (& recent staph epi bacteremia) 9/8. Cefepime added 9/9.  - Vanc discontinued 9/11. LE erythema/edema thought to be venous stasis, but US ordered to r/o DVT 9/11 as R>L.  - CXR with LLL atelectasis, otherwise unremarkable.   - Lactic 1.6 9/9 from 0.8 9/8.   - Blood cx 9/8 NGTD. UA clean. Blood cx repeated 9/9 (NGTD).   - Leukocytosis 9/10. Afebrile since 9/9 afternoon. Para attempted 9/10. Insufficient fluid to sample.   - Abdominal pain resolved 9/10.  - Lasix held since 9/9 PM.  - Mental status much improved 9/10   - Monitor closely.

## 2020-09-11 NOTE — PROGRESS NOTES
SW Discharge:    Patient scheduled to discharge today, 9/11/20. SW met with patient and patient's spouse/caregiver, Yonis ([phone]), in patient's room. Patient was observed sleeping in bed when SW presented.  Caregiver observed AAOx4, and communicative. Per spouse and medical records, patient attempted to leave AMA overnight. Caregiver allowed to stay at that the bedside in order to assist with calming the patient down. Patient will discharge via car to home in [place] with no additional needs at this time. The patient reported adequate coping and denies any current mental health difficulties. SW will continue to follow patient for resources, support and discharge planning as appropriate.

## 2020-09-11 NOTE — PLAN OF CARE
Pt is AAOx3.Pt needs stand by assistance only. Family at bedside. Pt denies pian and/or discomfort at this time.Pt turns independently, pt is aware of bony area and pressure reduction positions Pt remains injury and fall free, non skid footwear donned, call light within reach, personal items within reach, bed in low/locked position, pt able to voice needs all needs voiced have been met at this time.

## 2020-09-11 NOTE — ASSESSMENT & PLAN NOTE
"- bilateral lower extremities swollen, warm, erythematous (worse on left)  - sent blood cx 9/8, recent hx of bacteremia; all cx NGTD  - began empiric vanc 9/8 for cellulitis; discontinued 9/11  - BLE more likely venous stasis, but US ordered to r/o DVT 9/11  - wound care consulted/reviewed recs with patient and caregiver  - see "SIRs"  "

## 2020-09-11 NOTE — ASSESSMENT & PLAN NOTE
- decompensated  - listed for liver transplant with MELD of 24    MELD-Na score: 19 at 9/11/2020  5:46 AM  MELD score: 14 at 9/11/2020  5:46 AM  Calculated from:  Serum Creatinine: 1.1 mg/dL at 9/11/2020  5:46 AM  Serum Sodium: 131 mmol/L at 9/11/2020  5:46 AM  Total Bilirubin: 3.7 mg/dL at 9/11/2020  5:46 AM  INR(ratio): 1.2 at 9/11/2020  5:46 AM  Age: 55 years

## 2020-09-11 NOTE — PROGRESS NOTES
[facility]  Liver Transplant  Progress Note    Patient Name: Zeina Mahoney  MRN: [id number]  Admission Date: 9/8/2020  Hospital Length of Stay: 3 days  Code Status: Full Code  Primary Care Provider: Primary Doctor No  Post-Operative Day:     ORGAN:     Disease Etiology: Cirrhosis: Fatty Liver (Chaudhary)  Donor Type:     CDC High Risk:     Donor CMV Status:   Donor CMV Status:   Donor HBcAB:     Donor HCV Status:     Donor HBV HERACLIO: Organ record is missing.  Donor HCV HERACLIO: Organ record is missing.  Whole or Partial:   Biliary Anastomosis:   Arterial Anatomy:   Subjective:     History of Present Illness:  53 y/o female with pmh of esld secondary CHAUDHARY.  Presents to the ED with complaint of lower abdominal pain.  CT scan obtained, unremarkable to source of pain.  Lipase elevated on admit, has hx of pancreatic lesions.  Upon assessment mental status slowed.  Patient's  reports having regular bowel movements and mental status better then baseline.  However states very anxious needing haldol for symptom control.  Lower extremities with swelling, redness, warmth, and weeping open sores.   states wound was being dressed by home health. Plan to admit for observation to address these issues.  Will consult psychiatry to address anxiety and recommendations of better medication in setting of liver failure.  Wound care consult for multiple skin tears and lesions.  Infectious work up and empirically treat for cellulitis.    Hospital Course:  Patient admitted 9/8 with lower abdominal pain. CT A/P unremarkable for source of pain. Vanc started for possible LLE cellulitis (& recent staph epi bacteremia). Psych consulted for worsening anxiety. Psych recommended Lexapro which was started 9/9 AM. Patient with worsening mental status 9/9; grade II-III HE. Also tachycardic (EKG ST tele ordered). Lactulose enema given with improvement in AMS noted. CXR with LLL atelectasis, otherwise unremarkable. Albumin x 1 given  for hydration. Pt then spiked temp of 103.1F. Abx broadened to Vanc/Cefepime. Blood cx repeated 9/9 (NGTD). Paracentesis attempted 9/10, but insufficient fluid to drain. Mental status much improved 9/11 and abdominal pain resolved.    Interval History:  came in to stay with patient overnight due to patient's agitation related to food choices. Patient oriented and cooperative, but a bit lethargic this AM. Plan for additional dose of lactulose. Remains afebrile and leukocytosis resolved. Plan for US BLE to r/o DVT. Will dc Vanc today and monitor on Cefepime. Earliest possible dc is Sunday. Monitor.    Scheduled Meds:   ceFEPime (MAXIPIME) IVPB  1 g Intravenous Q8H    escitalopram oxalate  5 mg Oral Daily    lactulose  30 g Oral TID    levothyroxine  88 mcg Oral Before breakfast    midodrine  10 mg Oral TID    pantoprazole  40 mg Oral Before breakfast    rifAXIMin  550 mg Oral BID    spironolactone  100 mg Oral Daily    zinc sulfate  220 mg Oral Before breakfast     Continuous Infusions:  PRN Meds:acetaminophen, ondansetron, sodium chloride 0.9%    Review of Systems   Constitutional: Positive for activity change and fatigue. Negative for fever.   HENT: Negative for facial swelling and trouble swallowing.    Respiratory: Negative for cough, shortness of breath, wheezing and stridor.    Cardiovascular: Positive for leg swelling. Negative for chest pain and palpitations.   Gastrointestinal: Positive for abdominal pain (improved). Negative for abdominal distention, constipation, diarrhea, nausea and vomiting.   Genitourinary: Negative for difficulty urinating and dysuria.   Skin: Positive for wound.   Neurological: Positive for weakness. Negative for dizziness and light-headedness.   Hematological: Bruises/bleeds easily.   Psychiatric/Behavioral: Negative for agitation, behavioral problems and confusion. The patient is nervous/anxious.      Objective:     Vital Signs (Most Recent):  Temp: 98 °F (36.7 °C)  (09/11/20 1128)  Pulse: 86 (09/11/20 1508)  Resp: 18 (09/11/20 1128)  BP: (!) 112/59 (09/11/20 1128)  SpO2: 99 % (09/11/20 1128) Vital Signs (24h Range):  Temp:  [98 °F (36.7 °C)-99.2 °F (37.3 °C)] 98 °F (36.7 °C)  Pulse:  [74-99] 86  Resp:  [16-18] 18  SpO2:  [99 %-100 %] 99 %  BP: (100-123)/(55-69) 112/59     Weight: 67 kg (147 lb 11.3 oz)  Body mass index is 25.35 kg/m².    Intake/Output - Last 3 Shifts       09/09 0700 - 09/10 0659 09/10 0700 - 09/11 0659 09/11 0700 - 09/12 0659    P.O. 900 240 360    I.V. (mL/kg) 100 (1.5)      IV Piggyback 250 250 250    Total Intake(mL/kg) 1250 (18.7) 490 (7.3) 610 (9.1)    Urine (mL/kg/hr) 1100 (0.7) 300 (0.2) 600 (1.1)    Stool 0 0 0    Total Output 1100 300 600    Net +150 +190 +10           Urine Occurrence 1 x  1 x    Stool Occurrence 2 x 1 x 0 x          Physical Exam  Vitals signs and nursing note reviewed.   Constitutional:       General: She is not in acute distress.  HENT:      Head: Normocephalic and atraumatic.   Eyes:      General: Scleral icterus present.         Right eye: No discharge.         Left eye: No discharge.   Cardiovascular:      Rate and Rhythm: Normal rate and regular rhythm.      Pulses: Normal pulses.      Heart sounds: No murmur.   Pulmonary:      Effort: Pulmonary effort is normal. No respiratory distress.      Breath sounds: No stridor. No wheezing or rales.   Abdominal:      General: Bowel sounds are normal. There is no distension.      Palpations: Abdomen is soft.      Tenderness: There is no abdominal tenderness. There is no guarding.   Musculoskeletal:      Right lower leg: Edema present.      Left lower leg: Edema present.   Skin:     General: Skin is warm and dry.      Capillary Refill: Capillary refill takes 2 to 3 seconds.      Coloration: Skin is jaundiced.      Comments: BLE with a few healing ulcers & LUE abrasions   Neurological:      Mental Status: She is alert and oriented to person, place, and time.      Comments: Slightly  lethargic         Laboratory:  Immunosuppressants     None        CBC:   Recent Labs   Lab 09/11/20  0546   WBC 9.53   RBC 2.18*   HGB 7.6*   HCT 23.3*   PLT 58*   *   MCH 34.9*   MCHC 32.6     CMP:   Recent Labs   Lab 09/11/20  0546   *   CALCIUM 9.1   ALBUMIN 2.2*   PROT 5.1*   *   K 3.8   CO2 25      BUN 25*   CREATININE 1.1   ALKPHOS 92   ALT 19   AST 33   BILITOT 3.7*       Diagnostic Results:  I have personally reviewed all pertinent imaging studies.    Debility/Functional status: Patient debilitated by evidence of Muscle wasting and atrophy. Physical and occupational therapy ordered daily to evaluate and treat. Debility was: present on admission.    Assessment/Plan:     * SIRS (systemic inflammatory response syndrome)  - Febrile to 103.1 9/9 with tachycardia and AMS  - CT A/P unremarkable for source of pain on admission.  - Vanc started for possible LLE cellulitis (& recent staph epi bacteremia) 9/8. Cefepime added 9/9.  - Vanc discontinued 9/11. LE erythema/edema thought to be venous stasis, but US ordered to r/o DVT 9/11 as R>L.  - CXR with LLL atelectasis, otherwise unremarkable.   - Lactic 1.6 9/9 from 0.8 9/8.   - Blood cx 9/8 NGTD. UA clean. Blood cx repeated 9/9 (NGTD).   - Leukocytosis 9/10. Afebrile since 9/9 afternoon. Para attempted 9/10. Insufficient fluid to sample.   - Abdominal pain resolved 9/10.  - Lasix held since 9/9 PM.  - Mental status much improved 9/10   - Monitor closely.    Acute Hepatic encephalopathy  - Acute on chronic encephalopathy  - Lactulose and Rifaximin continued on admit  - Increased dose of lactulose, goal for 3-4 BM/day  - Ammonia 54 on admit  - Patient with worsening mental status 9/9; grade II-III HE. Improved with lactulose enema. Thought to be exacerbated by infection of unknown origin. Continue abx.  - Oriented/alert 9/10  - Lethargic 9/11 AM. Extra dose of lactulose ordered.  - Monitor closely.        Cellulitis of lower extremity  -  "bilateral lower extremities swollen, warm, erythematous (worse on left)  - sent blood cx 9/8, recent hx of bacteremia; all cx NGTD  - began empiric vanc 9/8 for cellulitis; discontinued 9/11  - BLE more likely venous stasis, but US ordered to r/o DVT 9/11  - wound care consulted/reviewed recs with patient and caregiver  - see "SIRs"    Abdominal pain  - CT A/P 9/8 unremarkable for source of pain  - Avoid narcotics in treating pain  - Insufficient fluid for para  - Pain improved  - Monitor      Anemia of chronic disease  - h/h stable on admit  - Monitor labs daily      Protein-calorie malnutrition, moderate  - encourage oral intake with suppplements    Thrombocytopenia due to hypersplenism  - platelets stable  - monitor labs daily      Portal hypertension  - secondary to liver disease  - monitor      Hypothyroidism  - Continue Synthroid.  - TSH/free T4 added to 9/10 labs (wnl)  - Monitor.      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  - decompensated  - listed for liver transplant with MELD of 24    MELD-Na score: 19 at 9/11/2020  5:46 AM  MELD score: 14 at 9/11/2020  5:46 AM  Calculated from:  Serum Creatinine: 1.1 mg/dL at 9/11/2020  5:46 AM  Serum Sodium: 131 mmol/L at 9/11/2020  5:46 AM  Total Bilirubin: 3.7 mg/dL at 9/11/2020  5:46 AM  INR(ratio): 1.2 at 9/11/2020  5:46 AM  Age: 55 years            VTE Risk Mitigation (From admission, onward)         Ordered     Place HORACE hose  Until discontinued      09/08/20 1105     Place sequential compression device  Until discontinued      09/08/20 1105     IP VTE LOW RISK PATIENT  Once      09/08/20 1105                The patients clinical status was " discussed at multidisplinary rounds, involving transplant surgery, transplant medicine, pharmacy, nursing, nutrition, and social work    Discharge Planning:  Not a candidate for dc today.    Polly Irene PA-C  Liver Transplant  [facility]

## 2020-09-11 NOTE — NURSING
Pt demanding food or she will leave. Pt offered all snacks stocked. Pt wants steak, hambugers and pancakes none of which are available at this time. Will continue to monitor.

## 2020-09-11 NOTE — ASSESSMENT & PLAN NOTE
- Acute on chronic encephalopathy  - Lactulose and Rifaximin continued on admit  - Increased dose of lactulose, goal for 3-4 BM/day  - Ammonia 54 on admit  - Patient with worsening mental status 9/9; grade II-III HE. Improved with lactulose enema. Thought to be exacerbated by infection of unknown origin. Continue abx.  - Oriented/alert 9/10  - Lethargic 9/11 AM. Extra dose of lactulose ordered.  - Monitor closely.

## 2020-09-11 NOTE — SUBJECTIVE & OBJECTIVE
Scheduled Meds:   ceFEPime (MAXIPIME) IVPB  1 g Intravenous Q8H    escitalopram oxalate  5 mg Oral Daily    lactulose  30 g Oral TID    levothyroxine  88 mcg Oral Before breakfast    midodrine  10 mg Oral TID    pantoprazole  40 mg Oral Before breakfast    rifAXIMin  550 mg Oral BID    spironolactone  100 mg Oral Daily    zinc sulfate  220 mg Oral Before breakfast     Continuous Infusions:  PRN Meds:acetaminophen, ondansetron, sodium chloride 0.9%    Review of Systems   Constitutional: Positive for activity change and fatigue. Negative for fever.   HENT: Negative for facial swelling and trouble swallowing.    Respiratory: Negative for cough, shortness of breath, wheezing and stridor.    Cardiovascular: Positive for leg swelling. Negative for chest pain and palpitations.   Gastrointestinal: Positive for abdominal pain (improved). Negative for abdominal distention, constipation, diarrhea, nausea and vomiting.   Genitourinary: Negative for difficulty urinating and dysuria.   Skin: Positive for wound.   Neurological: Positive for weakness. Negative for dizziness and light-headedness.   Hematological: Bruises/bleeds easily.   Psychiatric/Behavioral: Negative for agitation, behavioral problems and confusion. The patient is nervous/anxious.      Objective:     Vital Signs (Most Recent):  Temp: 98 °F (36.7 °C) (09/11/20 1128)  Pulse: 86 (09/11/20 1508)  Resp: 18 (09/11/20 1128)  BP: (!) 112/59 (09/11/20 1128)  SpO2: 99 % (09/11/20 1128) Vital Signs (24h Range):  Temp:  [98 °F (36.7 °C)-99.2 °F (37.3 °C)] 98 °F (36.7 °C)  Pulse:  [74-99] 86  Resp:  [16-18] 18  SpO2:  [99 %-100 %] 99 %  BP: (100-123)/(55-69) 112/59     Weight: 67 kg (147 lb 11.3 oz)  Body mass index is 25.35 kg/m².    Intake/Output - Last 3 Shifts       09/09 0700 - 09/10 0659 09/10 0700 - 09/11 0659 09/11 0700 - 09/12 0659    P.O. 900 240 360    I.V. (mL/kg) 100 (1.5)      IV Piggyback 250 250 250    Total Intake(mL/kg) 1250 (18.7) 490 (7.3) 610  (9.1)    Urine (mL/kg/hr) 1100 (0.7) 300 (0.2) 600 (1.1)    Stool 0 0 0    Total Output 1100 300 600    Net +150 +190 +10           Urine Occurrence 1 x  1 x    Stool Occurrence 2 x 1 x 0 x          Physical Exam  Vitals signs and nursing note reviewed.   Constitutional:       General: She is not in acute distress.  HENT:      Head: Normocephalic and atraumatic.   Eyes:      General: Scleral icterus present.         Right eye: No discharge.         Left eye: No discharge.   Cardiovascular:      Rate and Rhythm: Normal rate and regular rhythm.      Pulses: Normal pulses.      Heart sounds: No murmur.   Pulmonary:      Effort: Pulmonary effort is normal. No respiratory distress.      Breath sounds: No stridor. No wheezing or rales.   Abdominal:      General: Bowel sounds are normal. There is no distension.      Palpations: Abdomen is soft.      Tenderness: There is no abdominal tenderness. There is no guarding.   Musculoskeletal:      Right lower leg: Edema present.      Left lower leg: Edema present.   Skin:     General: Skin is warm and dry.      Capillary Refill: Capillary refill takes 2 to 3 seconds.      Coloration: Skin is jaundiced.      Comments: BLE with a few healing ulcers & LUE abrasions   Neurological:      Mental Status: She is alert and oriented to person, place, and time.      Comments: Slightly lethargic         Laboratory:  Immunosuppressants     None        CBC:   Recent Labs   Lab 09/11/20  0546   WBC 9.53   RBC 2.18*   HGB 7.6*   HCT 23.3*   PLT 58*   *   MCH 34.9*   MCHC 32.6     CMP:   Recent Labs   Lab 09/11/20  0546   *   CALCIUM 9.1   ALBUMIN 2.2*   PROT 5.1*   *   K 3.8   CO2 25      BUN 25*   CREATININE 1.1   ALKPHOS 92   ALT 19   AST 33   BILITOT 3.7*       Diagnostic Results:  I have personally reviewed all pertinent imaging studies.    Debility/Functional status: Patient debilitated by evidence of Muscle wasting and atrophy. Physical and occupational therapy  ordered daily to evaluate and treat. Debility was: present on admission.

## 2020-09-12 LAB
ALBUMIN SERPL BCP-MCNC: 2.2 G/DL (ref 3.5–5.2)
ALP SERPL-CCNC: 90 U/L (ref 55–135)
ALT SERPL W/O P-5'-P-CCNC: 21 U/L (ref 10–44)
ANION GAP SERPL CALC-SCNC: 5 MMOL/L (ref 8–16)
AST SERPL-CCNC: 36 U/L (ref 10–40)
BASOPHILS # BLD AUTO: 0.07 K/UL (ref 0–0.2)
BASOPHILS NFR BLD: 1 % (ref 0–1.9)
BILIRUB SERPL-MCNC: 3.4 MG/DL (ref 0.1–1)
BUN SERPL-MCNC: 18 MG/DL (ref 6–20)
CALCIUM SERPL-MCNC: 9.6 MG/DL (ref 8.7–10.5)
CHLORIDE SERPL-SCNC: 102 MMOL/L (ref 95–110)
CO2 SERPL-SCNC: 26 MMOL/L (ref 23–29)
CREAT SERPL-MCNC: 0.8 MG/DL (ref 0.5–1.4)
DIFFERENTIAL METHOD: ABNORMAL
EOSINOPHIL # BLD AUTO: 0.2 K/UL (ref 0–0.5)
EOSINOPHIL NFR BLD: 2.3 % (ref 0–8)
ERYTHROCYTE [DISTWIDTH] IN BLOOD BY AUTOMATED COUNT: 14.7 % (ref 11.5–14.5)
EST. GFR  (AFRICAN AMERICAN): >60 ML/MIN/1.73 M^2
EST. GFR  (NON AFRICAN AMERICAN): >60 ML/MIN/1.73 M^2
GLUCOSE SERPL-MCNC: 119 MG/DL (ref 70–110)
HCT VFR BLD AUTO: 24.4 % (ref 37–48.5)
HGB BLD-MCNC: 7.9 G/DL (ref 12–16)
IMM GRANULOCYTES # BLD AUTO: 0.03 K/UL (ref 0–0.04)
IMM GRANULOCYTES NFR BLD AUTO: 0.4 % (ref 0–0.5)
INR PPP: 1.1 (ref 0.8–1.2)
LYMPHOCYTES # BLD AUTO: 1.4 K/UL (ref 1–4.8)
LYMPHOCYTES NFR BLD: 20.3 % (ref 18–48)
MAGNESIUM SERPL-MCNC: 1.9 MG/DL (ref 1.6–2.6)
MCH RBC QN AUTO: 35 PG (ref 27–31)
MCHC RBC AUTO-ENTMCNC: 32.4 G/DL (ref 32–36)
MCV RBC AUTO: 108 FL (ref 82–98)
MONOCYTES # BLD AUTO: 0.5 K/UL (ref 0.3–1)
MONOCYTES NFR BLD: 6.5 % (ref 4–15)
NEUTROPHILS # BLD AUTO: 4.9 K/UL (ref 1.8–7.7)
NEUTROPHILS NFR BLD: 69.5 % (ref 38–73)
NRBC BLD-RTO: 0 /100 WBC
PLATELET # BLD AUTO: 69 K/UL (ref 150–350)
PMV BLD AUTO: 11.8 FL (ref 9.2–12.9)
POTASSIUM SERPL-SCNC: 3.9 MMOL/L (ref 3.5–5.1)
PROT SERPL-MCNC: 5.3 G/DL (ref 6–8.4)
PROTHROMBIN TIME: 12.4 SEC (ref 9–12.5)
RBC # BLD AUTO: 2.26 M/UL (ref 4–5.4)
SODIUM SERPL-SCNC: 133 MMOL/L (ref 136–145)
WBC # BLD AUTO: 7.04 K/UL (ref 3.9–12.7)

## 2020-09-12 PROCEDURE — 25000003 PHARM REV CODE 250: Mod: NTX | Performed by: PHYSICIAN ASSISTANT

## 2020-09-12 PROCEDURE — 20600001 HC STEP DOWN PRIVATE ROOM: Mod: NTX

## 2020-09-12 PROCEDURE — 63600175 PHARM REV CODE 636 W HCPCS: Mod: NTX | Performed by: PHYSICIAN ASSISTANT

## 2020-09-12 PROCEDURE — 25000003 PHARM REV CODE 250: Mod: NTX | Performed by: NURSE PRACTITIONER

## 2020-09-12 PROCEDURE — 80053 COMPREHEN METABOLIC PANEL: CPT | Mod: NTX

## 2020-09-12 PROCEDURE — 25000003 PHARM REV CODE 250: Mod: NTX | Performed by: INTERNAL MEDICINE

## 2020-09-12 PROCEDURE — 85610 PROTHROMBIN TIME: CPT | Mod: NTX

## 2020-09-12 PROCEDURE — 85025 COMPLETE CBC W/AUTO DIFF WBC: CPT | Mod: NTX

## 2020-09-12 PROCEDURE — 99233 SBSQ HOSP IP/OBS HIGH 50: CPT | Mod: NTX,,, | Performed by: STUDENT IN AN ORGANIZED HEALTH CARE EDUCATION/TRAINING PROGRAM

## 2020-09-12 PROCEDURE — 36415 COLL VENOUS BLD VENIPUNCTURE: CPT | Mod: NTX

## 2020-09-12 PROCEDURE — 99233 PR SUBSEQUENT HOSPITAL CARE,LEVL III: ICD-10-PCS | Mod: NTX,,, | Performed by: STUDENT IN AN ORGANIZED HEALTH CARE EDUCATION/TRAINING PROGRAM

## 2020-09-12 PROCEDURE — 83735 ASSAY OF MAGNESIUM: CPT | Mod: NTX

## 2020-09-12 RX ORDER — LACTULOSE 10 G/15ML
30 SOLUTION ORAL ONCE
Status: COMPLETED | OUTPATIENT
Start: 2020-09-12 | End: 2020-09-12

## 2020-09-12 RX ORDER — LACTULOSE 10 G/15ML
30 SOLUTION ORAL 3 TIMES DAILY
Status: DISCONTINUED | OUTPATIENT
Start: 2020-09-12 | End: 2020-09-13 | Stop reason: HOSPADM

## 2020-09-12 RX ADMIN — ESCITALOPRAM OXALATE 5 MG: 5 TABLET, FILM COATED ORAL at 09:09

## 2020-09-12 RX ADMIN — SPIRONOLACTONE 100 MG: 25 TABLET ORAL at 09:09

## 2020-09-12 RX ADMIN — RIFAXIMIN 550 MG: 550 TABLET ORAL at 08:09

## 2020-09-12 RX ADMIN — RIFAXIMIN 550 MG: 550 TABLET ORAL at 09:09

## 2020-09-12 RX ADMIN — LACTULOSE 30 G: 10 SOLUTION ORAL at 12:09

## 2020-09-12 RX ADMIN — LEVOTHYROXINE SODIUM 88 MCG: 88 TABLET ORAL at 05:09

## 2020-09-12 RX ADMIN — MIDODRINE HYDROCHLORIDE 10 MG: 5 TABLET ORAL at 09:09

## 2020-09-12 RX ADMIN — CEFEPIME 1 G: 1 INJECTION, POWDER, FOR SOLUTION INTRAMUSCULAR; INTRAVENOUS at 04:09

## 2020-09-12 RX ADMIN — LACTULOSE 30 G: 10 SOLUTION ORAL at 03:09

## 2020-09-12 RX ADMIN — MIDODRINE HYDROCHLORIDE 10 MG: 5 TABLET ORAL at 08:09

## 2020-09-12 RX ADMIN — MIDODRINE HYDROCHLORIDE 10 MG: 5 TABLET ORAL at 03:09

## 2020-09-12 RX ADMIN — PANTOPRAZOLE SODIUM 40 MG: 40 TABLET, DELAYED RELEASE ORAL at 05:09

## 2020-09-12 RX ADMIN — ZINC SULFATE 220 MG (50 MG) CAPSULE 220 MG: CAPSULE at 05:09

## 2020-09-12 RX ADMIN — LACTULOSE 30 G: 10 SOLUTION ORAL at 09:09

## 2020-09-12 NOTE — PROGRESS NOTES
Liver Transplant Service  Hepatology Progress Note      HPI: Ms. Mahoney is a 55 y.o. 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       Subjective: No acute events overnight. Patient states she is ready to go home but is otherwise without complaints.      Objective:   Vitals:    09/12/20 0413 09/12/20 0738 09/12/20 0806 09/12/20 1230   BP: (!) 119/59  114/60 (!) 115/58   BP Location:       Patient Position:   Lying    Pulse: 68 77 75 78   Resp: 20 16 18   Temp:   98.2 °F (36.8 °C) 97.7 °F (36.5 °C)   TempSrc:   Oral Oral   SpO2: 99%  98% 98%   Weight:       Height:            GEN:  Alert and oriented, no acute distress  RESP:  No respiratory distress, CTAB.  CV:  Normal rate, regular rhythm  ABD:  Bowel sounds normal, soft, nontender, nondistended  SKIN: Warm, dry  PSYCH: Cooperate, appropriate mood and affect  EXT: Bilateral LE edema L>R    Labs:  CBC:   Recent Labs   Lab 09/12/20  0555   WBC 7.04   RBC 2.26*   HGB 7.9*   HCT 24.4*   PLT 69*   *   MCH 35.0*   MCHC 32.4     CMP:   Recent Labs   Lab 09/12/20  0555   *   CALCIUM 9.6   ALBUMIN 2.2*   PROT 5.3*   *   K 3.9   CO2 26      BUN 18   CREATININE 0.8   ALKPHOS 90   ALT 21   AST 36   BILITOT 3.4*     Coagulation:   Recent Labs   Lab 09/12/20  0555   INR 1.1     Labs within the past 24 hours have been reviewed.      MELD-Na score: 16 at 9/12/2020  5:55 AM  MELD score: 12 at 9/12/2020  5:55 AM  Calculated from:  Serum Creatinine: 0.8 mg/dL (Rounded to 1 mg/dL) at 9/12/2020  5:55 AM  Serum Sodium: 133 mmol/L at 9/12/2020  5:55 AM  Total Bilirubin: 3.4 mg/dL at 9/12/2020  5:55 AM  INR(ratio): 1.1 at 9/12/2020  5:55 AM  Age: 55 years      Assessment/Plan:  1. ESLD- Currently listed for  liver. Stable for transplant at this time.     2. Anemia of chronic disease- H&H stable. Monitor.     3. Hepatic encephalopathy- Continue lactulose and rifaximin for HE control. Mentation is good.     4. Sepsis  (fever, leukocytosis)  - Infectious work up negative to date. Afebrile >48 hours. Leukocytosis resolved.  - Vanc discontinued 9/11. Discontinue cefepime today given negative work up.    5. Abdominal pain: Resolved. Abdominal CT without acute findings.    Dispo: Possible discharge home tomorrow

## 2020-09-12 NOTE — PLAN OF CARE
· Pt remains AAO x 4 with VSS throughout shift  · Pt cooperative throughout shift  · No chief complaint or acute changes at this time  · Cefepime continued for abx therapy  · PIV replaced; removed inadverntly by pt  · PO intake encouraged as tolerable   · I/O documented in flow sheets  · Tele monitor remain in place; NSR on tele  ·  remains at bedside; attentive to pt  · Pt remains free from falls and injuries  · Will continue to monitor

## 2020-09-12 NOTE — PLAN OF CARE
Patient can answer questions appropriately but is off, receiving lactulose, 2 bms noted.  Slow with responses. Patient has asked multiple times to leave.  Patients IV antibxs were stopped today. Will probably DC tomorrow.  Patients  at the bedside today, very attentive to the patient, involved in patients care. Dressing applied with medihoney to non healing wound to left chin. Patient has had 3 IVs placed in the last 24 hrs, keeps pulling out, also keeps pulling off telementry.  Vitals stable, patient is receiving midodrine.

## 2020-09-12 NOTE — PROGRESS NOTES
Patient has pulled out two IVs, pulled off tele twice this am as well, all leads. Will continue to monitor.

## 2020-09-12 NOTE — PROGRESS NOTES
Patients  spoke with me outside the patients room and stated that the patient is currently depressed and falls into this if she is in the hospital for long periods of time. I spoke with CHARISSA BOOGIE of the current situation. Patient is unable to be DCD today, IV antibxs were stopped and the team wants to make sure that labs are good in the am and that the patients does not spike any temps overnight.  Notified the patient. Will continue to monitor.

## 2020-09-13 ENCOUNTER — TELEPHONE (OUTPATIENT)
Dept: TRANSPLANT | Facility: CLINIC | Age: 55
End: 2020-09-13

## 2020-09-13 VITALS
TEMPERATURE: 98 F | SYSTOLIC BLOOD PRESSURE: 117 MMHG | HEART RATE: 77 BPM | HEIGHT: 64 IN | BODY MASS INDEX: 24.65 KG/M2 | WEIGHT: 144.38 LBS | RESPIRATION RATE: 18 BRPM | DIASTOLIC BLOOD PRESSURE: 61 MMHG | OXYGEN SATURATION: 98 %

## 2020-09-13 LAB
ALBUMIN SERPL BCP-MCNC: 2.1 G/DL (ref 3.5–5.2)
ALP SERPL-CCNC: 87 U/L (ref 55–135)
ALT SERPL W/O P-5'-P-CCNC: 19 U/L (ref 10–44)
ANION GAP SERPL CALC-SCNC: 4 MMOL/L (ref 8–16)
AST SERPL-CCNC: 30 U/L (ref 10–40)
BACTERIA BLD CULT: NORMAL
BACTERIA BLD CULT: NORMAL
BASOPHILS # BLD AUTO: 0.06 K/UL (ref 0–0.2)
BASOPHILS NFR BLD: 1 % (ref 0–1.9)
BILIRUB SERPL-MCNC: 3.5 MG/DL (ref 0.1–1)
BUN SERPL-MCNC: 12 MG/DL (ref 6–20)
CALCIUM SERPL-MCNC: 9.6 MG/DL (ref 8.7–10.5)
CHLORIDE SERPL-SCNC: 103 MMOL/L (ref 95–110)
CO2 SERPL-SCNC: 25 MMOL/L (ref 23–29)
CREAT SERPL-MCNC: 0.9 MG/DL (ref 0.5–1.4)
DIFFERENTIAL METHOD: ABNORMAL
EOSINOPHIL # BLD AUTO: 0.2 K/UL (ref 0–0.5)
EOSINOPHIL NFR BLD: 3 % (ref 0–8)
ERYTHROCYTE [DISTWIDTH] IN BLOOD BY AUTOMATED COUNT: 14.9 % (ref 11.5–14.5)
EST. GFR  (AFRICAN AMERICAN): >60 ML/MIN/1.73 M^2
EST. GFR  (NON AFRICAN AMERICAN): >60 ML/MIN/1.73 M^2
GLUCOSE SERPL-MCNC: 161 MG/DL (ref 70–110)
HCT VFR BLD AUTO: 23.6 % (ref 37–48.5)
HGB BLD-MCNC: 7.7 G/DL (ref 12–16)
IMM GRANULOCYTES # BLD AUTO: 0.06 K/UL (ref 0–0.04)
IMM GRANULOCYTES NFR BLD AUTO: 1 % (ref 0–0.5)
INR PPP: 1.2 (ref 0.8–1.2)
LYMPHOCYTES # BLD AUTO: 1.4 K/UL (ref 1–4.8)
LYMPHOCYTES NFR BLD: 23.5 % (ref 18–48)
MAGNESIUM SERPL-MCNC: 1.7 MG/DL (ref 1.6–2.6)
MCH RBC QN AUTO: 34.8 PG (ref 27–31)
MCHC RBC AUTO-ENTMCNC: 32.6 G/DL (ref 32–36)
MCV RBC AUTO: 107 FL (ref 82–98)
MONOCYTES # BLD AUTO: 0.5 K/UL (ref 0.3–1)
MONOCYTES NFR BLD: 8.5 % (ref 4–15)
NEUTROPHILS # BLD AUTO: 3.8 K/UL (ref 1.8–7.7)
NEUTROPHILS NFR BLD: 63 % (ref 38–73)
NRBC BLD-RTO: 0 /100 WBC
PLATELET # BLD AUTO: 78 K/UL (ref 150–350)
PMV BLD AUTO: 10.5 FL (ref 9.2–12.9)
POTASSIUM SERPL-SCNC: 4.4 MMOL/L (ref 3.5–5.1)
PROT SERPL-MCNC: 5 G/DL (ref 6–8.4)
PROTHROMBIN TIME: 12.8 SEC (ref 9–12.5)
RBC # BLD AUTO: 2.21 M/UL (ref 4–5.4)
SODIUM SERPL-SCNC: 132 MMOL/L (ref 136–145)
WBC # BLD AUTO: 6.03 K/UL (ref 3.9–12.7)

## 2020-09-13 PROCEDURE — 99239 PR HOSPITAL DISCHARGE DAY,>30 MIN: ICD-10-PCS | Mod: NTX,,, | Performed by: PHYSICIAN ASSISTANT

## 2020-09-13 PROCEDURE — 25000003 PHARM REV CODE 250: Mod: NTX | Performed by: NURSE PRACTITIONER

## 2020-09-13 PROCEDURE — 80053 COMPREHEN METABOLIC PANEL: CPT | Mod: NTX

## 2020-09-13 PROCEDURE — 25000003 PHARM REV CODE 250: Mod: NTX | Performed by: PHYSICIAN ASSISTANT

## 2020-09-13 PROCEDURE — 36415 COLL VENOUS BLD VENIPUNCTURE: CPT | Mod: NTX

## 2020-09-13 PROCEDURE — 99239 HOSP IP/OBS DSCHRG MGMT >30: CPT | Mod: NTX,,, | Performed by: PHYSICIAN ASSISTANT

## 2020-09-13 PROCEDURE — 94761 N-INVAS EAR/PLS OXIMETRY MLT: CPT | Mod: NTX

## 2020-09-13 PROCEDURE — 85610 PROTHROMBIN TIME: CPT | Mod: NTX

## 2020-09-13 PROCEDURE — 83735 ASSAY OF MAGNESIUM: CPT | Mod: NTX

## 2020-09-13 PROCEDURE — 25000003 PHARM REV CODE 250: Mod: NTX | Performed by: INTERNAL MEDICINE

## 2020-09-13 PROCEDURE — 85025 COMPLETE CBC W/AUTO DIFF WBC: CPT | Mod: NTX

## 2020-09-13 RX ORDER — ESCITALOPRAM OXALATE 5 MG/1
5 TABLET ORAL DAILY
Qty: 30 TABLET | Refills: 11 | Status: SHIPPED | OUTPATIENT
Start: 2020-09-14 | End: 2021-09-14

## 2020-09-13 RX ADMIN — ESCITALOPRAM OXALATE 5 MG: 5 TABLET, FILM COATED ORAL at 08:09

## 2020-09-13 RX ADMIN — RIFAXIMIN 550 MG: 550 TABLET ORAL at 08:09

## 2020-09-13 RX ADMIN — ZINC SULFATE 220 MG (50 MG) CAPSULE 220 MG: CAPSULE at 05:09

## 2020-09-13 RX ADMIN — MIDODRINE HYDROCHLORIDE 10 MG: 5 TABLET ORAL at 08:09

## 2020-09-13 RX ADMIN — LEVOTHYROXINE SODIUM 88 MCG: 88 TABLET ORAL at 05:09

## 2020-09-13 RX ADMIN — SPIRONOLACTONE 100 MG: 25 TABLET ORAL at 08:09

## 2020-09-13 RX ADMIN — LACTULOSE 30 G: 10 SOLUTION ORAL at 08:09

## 2020-09-13 RX ADMIN — PANTOPRAZOLE SODIUM 40 MG: 40 TABLET, DELAYED RELEASE ORAL at 05:09

## 2020-09-13 NOTE — PROGRESS NOTES
Discharge Medication Note:    Hospital Course: admitted with lower abdominal pain. CT A/P unremarkable for source of pain. Vancomycin started for possible LLE cellulitis (& recent staph epi bacteremia). Patient with worsening mental status and fever so abx broadened to Vanc/Cefepime. Blood cx repeated 9/9 (NGTD). She remained afebrile. Paracentesis attempted 9/10, but insufficient fluid to drain. Mental status much improved by 9/11 and abdominal pain resolved. Vancomycin discontinued on 9/11. Patient remained afebrile and continued to feel better, so Cefepime was discontinued on 9/12. She was monitored off abx overnight with no episodes of decompensation. Of note, Psych consulted for worsening anxiety on admission and started on Lexapro daily .    Met with Zeina Mahoney at discharge to review discharge medications and to update the blue medication card.       Zeina Mahoney   Home Medication Instructions LORETTA:[id number]    Printed on:09/13/20 8500   Medication Information                      acetaminophen (TYLENOL) 500 MG tablet  Take 1 tablet (500 mg total) by mouth every 6 (six) hours as needed for Pain. Max 2000mg daily.             diaper,brief,adult,disposable Misc  5 mg/kg/day by Misc.(Non-Drug; Combo Route) route 5 (five) times daily. Patient with incontinence. Please supply enough depends for 5xs/day             ergocalciferol (VITAMIN D2) 50,000 unit Cap  Take 50,000 Units by mouth every 7 days.             escitalopram oxalate (LEXAPRO) 5 MG Tab  Take 1 tablet (5 mg total) by mouth once daily.             furosemide (LASIX) 40 MG tablet  Take 1 tablet (40 mg total) by mouth once daily. Take 2 tablets (80 mg) by mouth in the morning and 1 tablet (40mg) in the evening.             haloperidoL (HALDOL) 1 MG tablet  Take 1 tablet (1 mg total) by mouth daily as needed (non redirectable agitation).             lactulose (CEPHULAC) 10 gram packet  Mix 3 packets (30 g total) with liquid and take by mouth 3 (three)  times daily as needed.             levothyroxine (SYNTHROID) 88 MCG tablet  Take 1 tablet (88 mcg total) by mouth before breakfast.             lidocaine (LIDODERM) 5 %  Place 1 patch onto the skin every 24 hours. Remove & Discard patch within 12 hours or as directed by MD             magnesium hydroxide 400 mg/5 ml (MILK OF MAGNESIA) 400 mg/5 mL Susp  Take 30 mLs (2,400 mg total) by mouth daily as needed (Take if constipated despite lactulose).             melatonin 10 mg Tab  Take 10 mg by mouth nightly as needed (sleep).             midodrine (PROAMATINE) 10 MG tablet  Take 10 mg by mouth 3 (three) times daily.             nystatin (MYCOSTATIN) powder  Apply topically 2 (two) times daily. Apply to affected area twice a day             pantoprazole (PROTONIX) 40 MG tablet  Take 40 mg by mouth before breakfast.             polyethylene glycol (GLYCOLAX) 17 gram PwPk  Take 17 g by mouth once daily. Or miralax OTC. Take if constipated despite lactulose             rifAXIMin (XIFAXAN) 550 mg Tab  Take 550 mg by mouth 2 (two) times daily.              simethicone (MYLICON) 80 MG chewable tablet  Take 80 mg by mouth every 6 (six) hours as needed for Flatulence. Take 2 tablets by mouth every 6 hours as needed for gas             spironolactone (ALDACTONE) 50 MG tablet  Take 2 tablets (100 mg total) by mouth once daily.             zinc sulfate (ZINCATE) 220 (50) mg capsule  Take 220 mg by mouth before breakfast.                 Pt was provided with prescriptions for the following meds:  E-rx: lexapro    The following medications have been placed on HOLD and should be restarted in the outpatient setting (when appropriate): none    Zeina Mahoney verbalized understanding and had the opportunity to ask questions.

## 2020-09-13 NOTE — PLAN OF CARE
Pt remains AAO x 4 with VSS, afebrile, sats upper 90s throughout shift  No chief complaints this shift.   Wound on left leg with foam dressing applied.   Possible discharge today  Pt remains free from falls and injuries, call light in reach, bed in low position.  remains at bedside  Will continue to monitor

## 2020-09-13 NOTE — PROGRESS NOTES
Discharge instructions given to and reviewed with patient and her . Emphasized calling [facility] on call for any needs/concerns upon discharge this weekend. PIV already removed per patient earlier today. VSS. Patient up in wheelchair - transported off unit per . Mask on patient.

## 2020-09-13 NOTE — TELEPHONE ENCOUNTER
ON CALL NOTE; PT DISCHARGED AND WILL REPEAT LABS ON Monday. PER  THEY ARE PLANNING ON LEAVING [place] AND WILL SEEK [place] OF THE [place].

## 2020-09-14 ENCOUNTER — LAB VISIT (OUTPATIENT)
Dept: LAB | Facility: HOSPITAL | Age: 55
End: 2020-09-14
Attending: INTERNAL MEDICINE
Payer: COMMERCIAL

## 2020-09-14 ENCOUNTER — PATIENT OUTREACH (OUTPATIENT)
Dept: ADMINISTRATIVE | Facility: CLINIC | Age: 55
End: 2020-09-14

## 2020-09-14 ENCOUNTER — TELEPHONE (OUTPATIENT)
Dept: TRANSPLANT | Facility: CLINIC | Age: 55
End: 2020-09-14

## 2020-09-14 DIAGNOSIS — K75.81 LIVER CIRRHOSIS SECONDARY TO NASH: ICD-10-CM

## 2020-09-14 DIAGNOSIS — K74.60 LIVER CIRRHOSIS SECONDARY TO NASH: ICD-10-CM

## 2020-09-14 DIAGNOSIS — Z76.82 AWAITING ORGAN TRANSPLANT STATUS: ICD-10-CM

## 2020-09-14 LAB
ALBUMIN SERPL BCP-MCNC: 2.8 G/DL (ref 3.5–5.2)
ALP SERPL-CCNC: 112 U/L (ref 55–135)
ALT SERPL W/O P-5'-P-CCNC: 24 U/L (ref 10–44)
ANION GAP SERPL CALC-SCNC: 6 MMOL/L (ref 8–16)
AST SERPL-CCNC: 43 U/L (ref 10–40)
BACTERIA BLD CULT: NORMAL
BACTERIA BLD CULT: NORMAL
BASOPHILS # BLD AUTO: 0.08 K/UL (ref 0–0.2)
BASOPHILS NFR BLD: 1.5 % (ref 0–1.9)
BILIRUB SERPL-MCNC: 4.3 MG/DL (ref 0.1–1)
BUN SERPL-MCNC: 14 MG/DL (ref 6–20)
CALCIUM SERPL-MCNC: 10.5 MG/DL (ref 8.7–10.5)
CHLORIDE SERPL-SCNC: 101 MMOL/L (ref 95–110)
CO2 SERPL-SCNC: 27 MMOL/L (ref 23–29)
CREAT SERPL-MCNC: 1.1 MG/DL (ref 0.5–1.4)
DIFFERENTIAL METHOD: ABNORMAL
EOSINOPHIL # BLD AUTO: 0.1 K/UL (ref 0–0.5)
EOSINOPHIL NFR BLD: 2.5 % (ref 0–8)
ERYTHROCYTE [DISTWIDTH] IN BLOOD BY AUTOMATED COUNT: 14.9 % (ref 11.5–14.5)
EST. GFR  (AFRICAN AMERICAN): >60 ML/MIN/1.73 M^2
EST. GFR  (NON AFRICAN AMERICAN): 56.7 ML/MIN/1.73 M^2
GLUCOSE SERPL-MCNC: 147 MG/DL (ref 70–110)
HCT VFR BLD AUTO: 27.6 % (ref 37–48.5)
HGB BLD-MCNC: 8.8 G/DL (ref 12–16)
IMM GRANULOCYTES # BLD AUTO: 0.08 K/UL (ref 0–0.04)
IMM GRANULOCYTES NFR BLD AUTO: 1.5 % (ref 0–0.5)
INR PPP: 1.1 (ref 0.8–1.2)
LYMPHOCYTES # BLD AUTO: 1.1 K/UL (ref 1–4.8)
LYMPHOCYTES NFR BLD: 21 % (ref 18–48)
MCH RBC QN AUTO: 34.5 PG (ref 27–31)
MCHC RBC AUTO-ENTMCNC: 31.9 G/DL (ref 32–36)
MCV RBC AUTO: 108 FL (ref 82–98)
MONOCYTES # BLD AUTO: 0.5 K/UL (ref 0.3–1)
MONOCYTES NFR BLD: 9.8 % (ref 4–15)
NEUTROPHILS # BLD AUTO: 3.4 K/UL (ref 1.8–7.7)
NEUTROPHILS NFR BLD: 63.7 % (ref 38–73)
NRBC BLD-RTO: 0 /100 WBC
PLATELET # BLD AUTO: 106 K/UL (ref 150–350)
PMV BLD AUTO: 10.4 FL (ref 9.2–12.9)
POTASSIUM SERPL-SCNC: 4.4 MMOL/L (ref 3.5–5.1)
PROT SERPL-MCNC: 6.5 G/DL (ref 6–8.4)
PROTHROMBIN TIME: 12.3 SEC (ref 9–12.5)
RBC # BLD AUTO: 2.55 M/UL (ref 4–5.4)
SODIUM SERPL-SCNC: 134 MMOL/L (ref 136–145)
WBC # BLD AUTO: 5.29 K/UL (ref 3.9–12.7)

## 2020-09-14 PROCEDURE — 36415 COLL VENOUS BLD VENIPUNCTURE: CPT | Mod: TXP

## 2020-09-14 PROCEDURE — 80053 COMPREHEN METABOLIC PANEL: CPT | Mod: TXP

## 2020-09-14 PROCEDURE — 85025 COMPLETE CBC W/AUTO DIFF WBC: CPT | Mod: TXP

## 2020-09-14 PROCEDURE — 85610 PROTHROMBIN TIME: CPT | Mod: TXP

## 2020-09-14 RX ORDER — MIDODRINE HYDROCHLORIDE 10 MG/1
10 TABLET ORAL 3 TIMES DAILY
Qty: 90 TABLET | Refills: 3 | Status: SHIPPED | OUTPATIENT
Start: 2020-09-14
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
## 2020-09-14 NOTE — PATIENT INSTRUCTIONS
Abdominal Pain    Abdominal pain is pain in the stomach or belly area. Everyone has this pain from time to time. In many cases it goes away on its own. But abdominal pain can sometimes be due to a serious problem, such as appendicitis. So its important to know when to seek help.  Causes of abdominal pain  There are many possible causes of abdominal pain. Common causes in adults include:  · Constipation, diarrhea, or gas  · Stomach acid flowing back up into the esophagus (acid reflux or heartburn)  · Severe acid reflux, called GERD (gastroesophageal reflux disease)  · A sore in the lining of the stomach or small intestine (peptic ulcer)  · Inflammation of the gallbladder, liver, or pancreas  · Gallstones or kidney stones  · Appendicitis   · Intestinal blockage   · An internal organ pushing through a muscle or other tissue (hernia)  · Urinary tract infections  · In women, menstrual cramps, fibroids, or endometriosis  · Inflammation or infection of the intestines  Diagnosing the cause of abdominal pain  Your healthcare provider will do a physical exam help find the cause of your pain. If needed, tests will be ordered. Belly pain has many possible causes. So it can be hard to find the reason for your pain. Giving details about your pain can help. Tell your provider where and when you feel the pain, and what makes it better or worse. Also let your provider know if you have other symptoms such as:  · Fever  · Tiredness  · Upset stomach (nausea)  · Vomiting  · Changes in bathroom habits  Treating abdominal pain  Some causes of pain need emergency medical treatment right away. These include appendicitis or a bowel blockage. Other problems can be treated with rest, fluids, or medicines. Your healthcare provider can give you specific instructions for treatment or self-care based on what is causing your pain.  If you have vomiting or diarrhea, sip water or other clear fluids. When you are ready to eat solid foods again,  start with small amounts of easy-to-digest, low-fat foods. These include apple sauce, toast, or crackers.   When to seek medical care  Call 911 or go to the hospital right away if you:  · Cant pass stool and are vomiting  · Are vomiting blood or have bloody diarrhea or black, tarry diarrhea  · Have chest, neck, or shoulder pain  · Feel like you might pass out  · Have pain in your shoulder blades with nausea  · Have sudden, severe belly pain  · Have new, severe pain unlike any you have felt before  · Have a belly that is rigid, hard, and tender to touch  Call your healthcare provider if you have:  · Pain for more than 5 days  · Bloating for more than 2 days  · Diarrhea for more than 5 days  · A fever of 100.4°F (38.0°C) or higher, or as directed by your provider  · Pain that gets worse  · Weight loss for no reason  · Continued lack of appetite  · Blood in your stool  How to prevent abdominal pain  Here are some tips to help prevent abdominal pain:  · Eat smaller amounts of food at one time.  · Avoid greasy, fried, or other high-fat foods.  · Avoid foods that give you gas.  · Exercise regularly.  · Drink plenty of fluids.  To help prevent GERD symptoms:  · Quit smoking.  · Reduce alcohol and certain foods that increase stomach acid.  · Avoid aspirin and over-the-counter pain and fever medicines (NSAIDS or nonsteroidal anti-inflammatory drugs), if possible  · Lose extra weight.  · Finish eating at least 2 hours before you go to bed or lie down.  · Raise the head of your bed.  Date Last Reviewed: 7/1/2016  © 1251-3928 [employer]. [address] [place] [address], [place] [address]. All rights reserved. This information is not intended as a substitute for professional medical care. Always follow your healthcare professional's instructions.

## 2020-09-15 ENCOUNTER — TELEPHONE (OUTPATIENT)
Dept: TRANSPLANT | Facility: CLINIC | Age: 55
End: 2020-09-15

## 2020-09-15 NOTE — PHYSICIAN QUERY
PT Name: Zeina Mahoney  MR #: [id number]     Etiology of Condition Clarification      CDS/: Елена Cline               Contact information: [email or web address]   This form is a permanent document in the medical record.     Query Date: September 15, 2020    By submitting this query, we are merely seeking further clarification of documentation.  Please utilize your independent clinical judgment when addressing the question(s) below.     The Medical Record contains the following:    Clinical Information Location in Medical Record   Presents to the ED with complaint of lower abdominal pain.  CT scan obtained, unremarkable to source of pain.  Lipase elevated on admit, has hx of pancreatic lesions.      pmh of esld secondary CHAUDHARY    CT A/P unremarkable for source of pain    Paracentesis attempted 9/10, but insufficient fluid to drain    spiked temp of 103.F. Abx broadened to Vanc/Cefepime. Blood cx repeated 9/9 (NGTD). She remained afebrile after 9/9 afternoon.     Differential diagnosis including but not limited to:  Decompensated cirrhosis, SBP, intra-abdominal infection DC Summary         DC Summary     DC Summary     DC Summary    DC Summary      ED Provider 9/8     Please document your best medical opinion regarding the etiology of _abdominal pain _ _____?       [   ] SBP      [   ] Pancreatic lesions      [   ] CHAUDHARY   [   ] Other etiology (please specify):___________________   [ x ] Clinically Undetermined       Present on admission (POA) status:   [ X ] Yes (Y)                          [  ] Clinically Undetermined (W)  [   ] No (N)                            [   ] Documentation insufficient to determine if condition is POA (U)         Please document in your progress notes daily for the duration of treatment, until resolved, and include in your discharge summary.
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
## 2020-09-15 NOTE — PHYSICIAN QUERY
PT Name: Zeina Mahoney  MR #: [id number]     Diagnosis Clarification      CDS/: Елена Cline               Contact information: [email or web address]     This form is a permanent document in the medical record.     Query Date: September 15, 2020    Dear Provider,  By submitting this query, we are merely seeking further clarification of documentation.  Please utilize your independent clinical judgment when addressing the question(s) below.     The medical record contains the following:    Supporting Clinical Information Location in Medical Record   Sepsis (fever, leukocytosis)  - Infectious work up negative to date. Afebrile >48 hours. Leukocytosis resolved.  - Vanc discontinued 9/11. Discontinue cefepime today given negative work up.    Lactate 1.6     SIRS (systemic inflammatory response syndrome)    Vancomycin started for possible LLE cellulitis (& recent staph epi bacteremia).     Liver Transplant PN 9/12            Lab 9/9    DC Summary     DC Summary        Please clarify if the ___Sepsis ___________ diagnosis has been:    [  ] Ruled In   [ X ] Ruled In, Now Resolved   [  ] Ruled Out   [  ] Other/Clarification of findings (please specify)_______________    [   ] Clinically undetermined       Present on admission (POA) status:   [   ] Yes (Y)                          [  ] Clinically Undetermined (W)  [ X  ] No (N)                            [   ] Documentation insufficient to determine if condition is POA (U)       Please document in your progress notes daily for the duration of treatment, until resolved, and include in your discharge summary.

## 2020-09-17 DIAGNOSIS — K74.60 LIVER CIRRHOSIS SECONDARY TO NASH: Primary | ICD-10-CM

## 2020-09-17 DIAGNOSIS — K75.81 LIVER CIRRHOSIS SECONDARY TO NASH: Primary | ICD-10-CM

## 2020-09-17 DIAGNOSIS — Z76.82 ORGAN TRANSPLANT CANDIDATE: ICD-10-CM

## 2020-09-19 ENCOUNTER — HOSPITAL ENCOUNTER (INPATIENT)
Facility: HOSPITAL | Age: 55
LOS: 1 days | Discharge: HOME | DRG: 442 | End: 2020-09-19
Attending: SURGERY | Admitting: TRANSPLANT SURGERY
Payer: COMMERCIAL

## 2020-09-19 ENCOUNTER — TELEPHONE (OUTPATIENT)
Dept: TRANSPLANT | Facility: CLINIC | Age: 55
End: 2020-09-19

## 2020-09-19 VITALS
TEMPERATURE: 98 F | HEIGHT: 65 IN | WEIGHT: 136.81 LBS | SYSTOLIC BLOOD PRESSURE: 99 MMHG | BODY MASS INDEX: 22.8 KG/M2 | DIASTOLIC BLOOD PRESSURE: 54 MMHG | OXYGEN SATURATION: 99 % | HEART RATE: 82 BPM | RESPIRATION RATE: 18 BRPM

## 2020-09-19 DIAGNOSIS — E44.0 PROTEIN-CALORIE MALNUTRITION, MODERATE: ICD-10-CM

## 2020-09-19 DIAGNOSIS — D68.4 ACQUIRED COAGULATION FACTOR DEFICIENCY: ICD-10-CM

## 2020-09-19 DIAGNOSIS — K75.81 LIVER CIRRHOSIS SECONDARY TO NASH: Primary | ICD-10-CM

## 2020-09-19 DIAGNOSIS — Z01.818 PRE-OP EVALUATION: ICD-10-CM

## 2020-09-19 DIAGNOSIS — K74.60 LIVER CIRRHOSIS SECONDARY TO NASH: Primary | ICD-10-CM

## 2020-09-19 DIAGNOSIS — D63.8 ANEMIA OF CHRONIC DISEASE: ICD-10-CM

## 2020-09-19 LAB
ABO + RH BLD: NORMAL
ALBUMIN SERPL BCP-MCNC: 2.9 G/DL (ref 3.5–5.2)
ALP SERPL-CCNC: 124 U/L (ref 55–135)
ALT SERPL W/O P-5'-P-CCNC: 23 U/L (ref 10–44)
ANION GAP SERPL CALC-SCNC: 9 MMOL/L (ref 8–16)
APTT BLDCRRT: 30.5 SEC (ref 21–32)
AST SERPL-CCNC: 40 U/L (ref 10–40)
BASOPHILS # BLD AUTO: 0.08 K/UL (ref 0–0.2)
BASOPHILS NFR BLD: 1.1 % (ref 0–1.9)
BILIRUB DIRECT SERPL-MCNC: 2.9 MG/DL (ref 0.1–0.3)
BILIRUB SERPL-MCNC: 4.4 MG/DL (ref 0.1–1)
BILIRUB UR QL STRIP: NEGATIVE
BLD GP AB SCN CELLS X3 SERPL QL: NORMAL
BUN SERPL-MCNC: 22 MG/DL (ref 6–20)
CALCIUM SERPL-MCNC: 9.8 MG/DL (ref 8.7–10.5)
CHLORIDE SERPL-SCNC: 95 MMOL/L (ref 95–110)
CLARITY UR REFRACT.AUTO: CLEAR
CO2 SERPL-SCNC: 28 MMOL/L (ref 23–29)
COLOR UR AUTO: YELLOW
CREAT SERPL-MCNC: 1.6 MG/DL (ref 0.5–1.4)
DIFFERENTIAL METHOD: ABNORMAL
EOSINOPHIL # BLD AUTO: 0.1 K/UL (ref 0–0.5)
EOSINOPHIL NFR BLD: 1.5 % (ref 0–8)
ERYTHROCYTE [DISTWIDTH] IN BLOOD BY AUTOMATED COUNT: 14.9 % (ref 11.5–14.5)
EST. GFR  (AFRICAN AMERICAN): 41.5 ML/MIN/1.73 M^2
EST. GFR  (NON AFRICAN AMERICAN): 36 ML/MIN/1.73 M^2
ESTIMATED AVG GLUCOSE: 68 MG/DL (ref 68–131)
FIBRINOGEN PPP-MCNC: 195 MG/DL (ref 182–366)
GLUCOSE SERPL-MCNC: 181 MG/DL (ref 70–110)
GLUCOSE UR QL STRIP: NEGATIVE
HBA1C MFR BLD HPLC: 4 % (ref 4–5.6)
HCT VFR BLD AUTO: 28.9 % (ref 37–48.5)
HGB BLD-MCNC: 9.4 G/DL (ref 12–16)
HGB UR QL STRIP: NEGATIVE
IMM GRANULOCYTES # BLD AUTO: 0.09 K/UL (ref 0–0.04)
IMM GRANULOCYTES NFR BLD AUTO: 1.2 % (ref 0–0.5)
INR PPP: 1.2 (ref 0.8–1.2)
KETONES UR QL STRIP: NEGATIVE
LEUKOCYTE ESTERASE UR QL STRIP: NEGATIVE
LYMPHOCYTES # BLD AUTO: 1.6 K/UL (ref 1–4.8)
LYMPHOCYTES NFR BLD: 21.6 % (ref 18–48)
MAGNESIUM SERPL-MCNC: 1.6 MG/DL (ref 1.6–2.6)
MCH RBC QN AUTO: 35.1 PG (ref 27–31)
MCHC RBC AUTO-ENTMCNC: 32.5 G/DL (ref 32–36)
MCV RBC AUTO: 108 FL (ref 82–98)
MONOCYTES # BLD AUTO: 0.4 K/UL (ref 0.3–1)
MONOCYTES NFR BLD: 4.7 % (ref 4–15)
NEUTROPHILS # BLD AUTO: 5.2 K/UL (ref 1.8–7.7)
NEUTROPHILS NFR BLD: 69.9 % (ref 38–73)
NITRITE UR QL STRIP: NEGATIVE
NRBC BLD-RTO: 0 /100 WBC
PH UR STRIP: 7 [PH] (ref 5–8)
PLATELET # BLD AUTO: 99 K/UL (ref 150–350)
PMV BLD AUTO: 9.5 FL (ref 9.2–12.9)
POTASSIUM SERPL-SCNC: 4.6 MMOL/L (ref 3.5–5.1)
PROT SERPL-MCNC: 6.6 G/DL (ref 6–8.4)
PROT UR QL STRIP: NEGATIVE
PROTHROMBIN TIME: 13 SEC (ref 9–12.5)
RBC # BLD AUTO: 2.68 M/UL (ref 4–5.4)
SARS-COV-2 RDRP RESP QL NAA+PROBE: NEGATIVE
SODIUM SERPL-SCNC: 132 MMOL/L (ref 136–145)
SP GR UR STRIP: 1 (ref 1–1.03)
URN SPEC COLLECT METH UR: NORMAL
WBC # BLD AUTO: 7.45 K/UL (ref 3.9–12.7)

## 2020-09-19 PROCEDURE — U0002 COVID-19 LAB TEST NON-CDC: HCPCS | Mod: NTX

## 2020-09-19 PROCEDURE — 99239 HOSP IP/OBS DSCHRG MGMT >30: CPT | Mod: NTX,,, | Performed by: NURSE PRACTITIONER

## 2020-09-19 PROCEDURE — 87340 HEPATITIS B SURFACE AG IA: CPT | Mod: NTX

## 2020-09-19 PROCEDURE — 85730 THROMBOPLASTIN TIME PARTIAL: CPT | Mod: NTX

## 2020-09-19 PROCEDURE — 82248 BILIRUBIN DIRECT: CPT | Mod: NTX

## 2020-09-19 PROCEDURE — 83036 HEMOGLOBIN GLYCOSYLATED A1C: CPT | Mod: NTX

## 2020-09-19 PROCEDURE — 93005 ELECTROCARDIOGRAM TRACING: CPT | Mod: NTX

## 2020-09-19 PROCEDURE — 85025 COMPLETE CBC W/AUTO DIFF WBC: CPT | Mod: NTX

## 2020-09-19 PROCEDURE — 99239 PR HOSPITAL DISCHARGE DAY,>30 MIN: ICD-10-PCS | Mod: NTX,,, | Performed by: NURSE PRACTITIONER

## 2020-09-19 PROCEDURE — 86703 HIV-1/HIV-2 1 RESULT ANTBDY: CPT | Mod: NTX

## 2020-09-19 PROCEDURE — 81003 URINALYSIS AUTO W/O SCOPE: CPT | Mod: NTX

## 2020-09-19 PROCEDURE — 85384 FIBRINOGEN ACTIVITY: CPT | Mod: NTX

## 2020-09-19 PROCEDURE — 36415 COLL VENOUS BLD VENIPUNCTURE: CPT | Mod: NTX

## 2020-09-19 PROCEDURE — 83735 ASSAY OF MAGNESIUM: CPT | Mod: NTX

## 2020-09-19 PROCEDURE — 87086 URINE CULTURE/COLONY COUNT: CPT | Mod: NTX

## 2020-09-19 PROCEDURE — 25000003 PHARM REV CODE 250: Mod: NTX | Performed by: NURSE PRACTITIONER

## 2020-09-19 PROCEDURE — 99222 PR INITIAL HOSPITAL CARE,LEVL II: ICD-10-PCS | Mod: NTX,,, | Performed by: NURSE PRACTITIONER

## 2020-09-19 PROCEDURE — 93010 EKG 12-LEAD: ICD-10-PCS | Mod: NTX,,, | Performed by: INTERNAL MEDICINE

## 2020-09-19 PROCEDURE — 93010 ELECTROCARDIOGRAM REPORT: CPT | Mod: NTX,,, | Performed by: INTERNAL MEDICINE

## 2020-09-19 PROCEDURE — 87517 HEPATITIS B DNA QUANT: CPT | Mod: NTX

## 2020-09-19 PROCEDURE — 85610 PROTHROMBIN TIME: CPT | Mod: NTX

## 2020-09-19 PROCEDURE — 87522 HEPATITIS C REVRS TRNSCRPJ: CPT | Mod: NTX

## 2020-09-19 PROCEDURE — 20600001 HC STEP DOWN PRIVATE ROOM: Mod: NTX

## 2020-09-19 PROCEDURE — 80053 COMPREHEN METABOLIC PANEL: CPT | Mod: NTX

## 2020-09-19 PROCEDURE — 86901 BLOOD TYPING SEROLOGIC RH(D): CPT | Mod: NTX

## 2020-09-19 PROCEDURE — 99222 1ST HOSP IP/OBS MODERATE 55: CPT | Mod: NTX,,, | Performed by: NURSE PRACTITIONER

## 2020-09-19 RX ORDER — MUPIROCIN 20 MG/G
OINTMENT TOPICAL
Status: DISCONTINUED | OUTPATIENT
Start: 2020-09-19 | End: 2020-09-19 | Stop reason: HOSPADM

## 2020-09-19 RX ORDER — HYDROCODONE BITARTRATE AND ACETAMINOPHEN 500; 5 MG/1; MG/1
TABLET ORAL
Status: DISCONTINUED | OUTPATIENT
Start: 2020-09-19 | End: 2020-09-19 | Stop reason: HOSPADM

## 2020-09-19 RX ORDER — METHYLPREDNISOLONE SODIUM SUCCINATE 500 MG/8ML
500 INJECTION INTRAMUSCULAR; INTRAVENOUS
Status: DISCONTINUED | OUTPATIENT
Start: 2020-09-19 | End: 2020-09-19 | Stop reason: HOSPADM

## 2020-09-19 RX ORDER — POLYETHYLENE GLYCOL 3350 17 G/17G
17 POWDER, FOR SOLUTION ORAL ONCE
Status: COMPLETED | OUTPATIENT
Start: 2020-09-19 | End: 2020-09-19

## 2020-09-19 RX ADMIN — POLYETHYLENE GLYCOL 3350 17 G: 17 POWDER, FOR SOLUTION ORAL at 03:09

## 2020-09-19 NOTE — PLAN OF CARE
Pt AAOx4, ambulates independently, VSS, afebrile. Pt going for liver txp @ 1400. EKA complete. CXR complete. Labs drawn. TEDS/SCDs at bedside. anasthesia consent obtained. UA sent. COVID swab sent. . Pt NPO since 0600. PT has hibcleanse in bathroom, advised to shower tomorrow couple hours prior to surgery. Pre Op checklist began. Fall risk precautions maintained and reviewed with pt. Plan of care reviewed with pt. Pt verbalized understanding. Look at flowsheet for assessment findings. NAD noted throughout shift. Pt remains free from injury for shift. Will continue to monitor.

## 2020-09-19 NOTE — HPI
53 y/o female with pmh of esld secondary CHAUDHARY.  Listed for liver transplant with MELD 24.  Being admitted to undergo liver transplant.  Recently admitted secondary abdominal pain and confusion.  Both since resolved.  Presents today in usual state of health.

## 2020-09-19 NOTE — NURSING
Pt admitted to unit. Oriented to call bell and room. Pt verbalized understanding of calling for assistance. Non skid socks applied. HCP notified of pts arrival. VSS. Admission documentation completed.

## 2020-09-19 NOTE — DISCHARGE SUMMARY
[facility]  Liver Transplant  Discharge Summary      Patient Name: Zeina Mahoney  MRN: [id number]  Admission Date: 9/19/2020  Hospital Length of Stay: 0 days  Discharge Date and Time:  09/19/2020 2:13 PM  Attending Physician: Lloyd Hedrick MD   Discharging Provider: Deanna Sanchez NP  Primary Care Provider: Primary Doctor No  HPI:   53 y/o female with pmh of esld secondary CHAUDHARY.  Listed for liver transplant with MELD 24.  Being admitted to undergo liver transplant.  Recently admitted secondary abdominal pain and confusion.  Both since resolved.  Presents today in usual state of health.    Procedure(s) (LRB):  TRANSPLANT, LIVER (N/A)     Hospital Course:    Pt admitted for transplant.  Transplant cancelled due to donor quality.  Pt is AOX4, denies abdominal pain and has requested to be discharged.  Labs stable.   at bedside and in agreement w discharge.      Discharge instructions reviewed by Nursing.  Patient and CG verbalize understanding and are in agreement with discharge from hospital today.  Patient is medically stable for discharge.  Patient will f/u w labs/clinic per PDOL.        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] 07/18/2020 Yes      Problems Resolved During this Admission:       Consults (From admission, onward)        Status Ordering Provider     Inpatient consult to Registered Dietitian/Nutritionist  Once     Provider:  (Not yet assigned)    Completed DARIO BUTT          Pending Diagnostic Studies:     Procedure Component Value Units Date/Time    Freeze and Hold -BB HEP [[id number]]     Order Status: Sent Lab Status: No result     Specimen: Blood      HEPATITIS B VIRAL DNA, QUANTITATIVE [[id number]] Collected: 09/19/20 0515    Order Status: Sent Lab Status: In process Updated: 09/19/20 0538    Specimen: Blood     HIV 1/2 Ag/Ab (4th Gen) [[id number]] Collected: 09/19/20 0515    Order Status: Sent Lab Status: In process Updated: 09/19/20 0516    Specimen: Blood     Hepatitis B surface antigen [[id number]] Collected: 09/19/20 0515    Order Status: Sent Lab Status: In process Updated: 09/19/20 0516    Specimen: Blood     Hepatitis C RNA, quantitative, PCR [[id number]] Collected: 09/19/20 0515    Order Status: Sent Lab Status: In process Updated: 09/19/20 0538    Specimen: Blood         Significant Diagnostic Studies: Labs:   CMP   Recent Labs   Lab 09/19/20 0515   *   K 4.6   CL 95   CO2 28   *   BUN 22*   CREATININE 1.6*   CALCIUM 9.8   PROT 6.6   ALBUMIN 2.9*   BILITOT 4.4*   ALKPHOS 124   AST 40   ALT 23   ANIONGAP 9   ESTGFRAFRICA 41.5*   EGFRNONAA 36.0*     CBC   Recent Labs   Lab 09/19/20 0515   WBC 7.45   HGB 9.4*   HCT 28.9*   PLT 99*     INR   Lab Results   Component Value Date    INR 1.2 09/19/2020    INR 1.1 09/14/2020    INR 1.2 09/13/2020      All labs within the past 24 hours have been reviewed  Microbiology:   Blood Culture   Lab Results   Component Value Date    LABBLOO No growth after 5 days. 09/09/2020    LABBLOO No growth after 5 days. 09/09/2020       The patients clinical status was discussed at multidisplinary rounds, involving transplant surgery, transplant medicine, pharmacy, nursing, nutrition, and social work    Discharged Condition: good    Disposition: Home or Self Care    Follow Up: per PDOL    Patient Instructions:      No dressing needed     Notify your health care provider if you experience any of the following:  increased confusion or weakness     Notify your health care provider if you experience any of the following:  persistent dizziness, light-headedness, or visual disturbances     Notify your health care provider if  you experience any of the following:  worsening rash     Notify your health care provider if you experience any of the following:  severe persistent headache     Notify your health care provider if you experience any of the following:  difficulty breathing or increased cough     Notify your health care provider if you experience any of the following:  redness, tenderness, or signs of infection (pain, swelling, redness, odor or green/yellow discharge around incision site)     Notify your health care provider if you experience any of the following:  severe uncontrolled pain     Notify your health care provider if you experience any of the following:  persistent nausea and vomiting or diarrhea     Notify your health care provider if you experience any of the following:  temperature >100.4     Activity as tolerated     Medications:  Reconciled Home Medications:      Medication List      ASK your doctor about these medications    acetaminophen 500 MG tablet  Commonly known as: TYLENOL  Take 1 tablet (500 mg total) by mouth every 6 (six) hours as needed for Pain. Max 2000mg daily.     diaper,brief,adult,disposable Misc  5 mg/kg/day by Misc.(Non-Drug; Combo Route) route 5 (five) times daily. Patient with incontinence. Please supply enough depends for 5xs/day     escitalopram oxalate 5 MG Tab  Commonly known as: LEXAPRO  Take 1 tablet (5 mg total) by mouth once daily.     furosemide 40 MG tablet  Commonly known as: LASIX  Take 1 tablet (40 mg total) by mouth once daily. Take 2 tablets (80 mg) by mouth in the morning and 1 tablet (40mg) in the evening.     haloperidoL 1 MG tablet  Commonly known as: HALDOL  Take 1 tablet (1 mg total) by mouth daily as needed (non redirectable agitation).     KRISTALOSE 10 gram packet  Generic drug: lactulose  Mix 3 packets (30 g total) with liquid and take by mouth 3 (three) times daily as needed.     levothyroxine 88 MCG tablet  Commonly known as: SYNTHROID  Take 1 tablet (88 mcg total) by  mouth before breakfast.     lidocaine 5 %  Commonly known as: LIDODERM  Place 1 patch onto the skin every 24 hours. Remove & Discard patch within 12 hours or as directed by MD     magnesium hydroxide 400 mg/5 ml 400 mg/5 mL Susp  Commonly known as: MILK OF MAGNESIA  Take 30 mLs (2,400 mg total) by mouth daily as needed (Take if constipated despite lactulose).     melatonin 10 mg Tab  Take 10 mg by mouth nightly as needed (sleep).     midodrine 10 MG tablet  Commonly known as: PROAMATINE  Take 1 tablet (10 mg total) by mouth 3 (three) times daily.     nystatin powder  Commonly known as: MYCOSTATIN  Apply topically 2 (two) times daily. Apply to affected area twice a day     pantoprazole 40 MG tablet  Commonly known as: PROTONIX  Take 40 mg by mouth before breakfast.     polyethylene glycol 17 gram Pwpk  Commonly known as: GLYCOLAX  Take 17 g by mouth once daily. Or miralax OTC. Take if constipated despite lactulose     rifAXIMin 550 mg Tab  Commonly known as: XIFAXAN  Take 550 mg by mouth 2 (two) times daily.     simethicone 80 MG chewable tablet  Commonly known as: MYLICON  Take 80 mg by mouth every 6 (six) hours as needed for Flatulence. Take 2 tablets by mouth every 6 hours as needed for gas     spironolactone 50 MG tablet  Commonly known as: ALDACTONE  Take 2 tablets (100 mg total) by mouth once daily.     VITAMIN D2 50,000 unit Cap  Generic drug: ergocalciferol  Take 50,000 Units by mouth every 7 days.     zinc sulfate 220 (50) mg capsule  Commonly known as: ZINCATE  Take 220 mg by mouth before breakfast.          Time spent caring for patient (Greater than 1/2 spent in direct face-to-face contact): > 30 minutes    Deanna Sanchez NP  Liver Transplant  [facility]

## 2020-09-19 NOTE — SUBJECTIVE & OBJECTIVE
Scheduled Meds:    Continuous Infusions:  PRN Meds:sodium chloride, sodium chloride, ampicillin-sulbactim (UNASYN) IVPB, methylPREDNISolone sodium succinate, mupirocin    Review of Systems   Constitutional: Positive for activity change and fatigue. Negative for fever.   HENT: Negative for facial swelling and trouble swallowing.    Respiratory: Negative for cough, shortness of breath, wheezing and stridor.    Cardiovascular: Negative for chest pain and palpitations.   Gastrointestinal: Negative for abdominal distention, constipation, diarrhea, nausea and vomiting.   Genitourinary: Negative for difficulty urinating and dysuria.   Neurological: Positive for weakness. Negative for dizziness and light-headedness.   Hematological: Bruises/bleeds easily.   Psychiatric/Behavioral: Negative for agitation, behavioral problems and confusion. The patient is nervous/anxious.      Objective:     Vital Signs (Most Recent):  Temp: 98.2 °F (36.8 °C) (09/19/20 0415)  Pulse: 82 (09/19/20 0415)  Resp: 16 (09/19/20 0415)  BP: (!) 117/56 (09/19/20 0415)  SpO2: 100 % (09/19/20 0415) Vital Signs (24h Range):  Temp:  [98.2 °F (36.8 °C)] 98.2 °F (36.8 °C)  Pulse:  [82] 82  Resp:  [16] 16  SpO2:  [100 %] 100 %  BP: (117)/(56) 117/56        There is no height or weight on file to calculate BMI.    Intake/Output - Last 3 Shifts     None          Physical Exam  Vitals signs and nursing note reviewed.   Constitutional:       General: She is not in acute distress.  HENT:      Head: Normocephalic and atraumatic.   Eyes:      General: Scleral icterus present.         Right eye: No discharge.         Left eye: No discharge.   Cardiovascular:      Rate and Rhythm: Normal rate and regular rhythm.      Pulses: Normal pulses.      Heart sounds: No murmur.   Pulmonary:      Effort: Pulmonary effort is normal. No respiratory distress.      Breath sounds: No stridor. No wheezing or rales.   Abdominal:      General: Bowel sounds are normal. There is no  distension.      Palpations: Abdomen is soft.      Tenderness: There is no abdominal tenderness. There is no guarding.   Skin:     General: Skin is warm and dry.      Capillary Refill: Capillary refill takes 2 to 3 seconds.      Coloration: Skin is jaundiced.      Comments: BLE with a few healing ulcers & LUE abrasions   Neurological:      Mental Status: She is alert and oriented to person, place, and time.      Comments: Slightly lethargic         Laboratory:  Immunosuppressants     None        CBC:   Recent Labs   Lab 09/14/20  0807   WBC 5.29   RBC 2.55*   HGB 8.8*   HCT 27.6*   *   *   MCH 34.5*   MCHC 31.9*     CMP:   Recent Labs   Lab 09/14/20  0807   *   CALCIUM 10.5   ALBUMIN 2.8*   PROT 6.5   *   K 4.4   CO2 27      BUN 14   CREATININE 1.1   ALKPHOS 112   ALT 24   AST 43*   BILITOT 4.3*       Diagnostic Results:  I have personally reviewed all pertinent imaging studies.    Debility/Functional status: Patient debilitated by evidence of Muscle wasting and atrophy. Physical and occupational therapy ordered daily to evaluate and treat. Debility was: present on admission.

## 2020-09-19 NOTE — H&P
[facility]  Liver Transplant  History & Physical    Patient Name: Zeina Mahoney  MRN: [id number]  Admission Date: 9/19/2020  Code Status: Full Code  Primary Care Provider: Primary Doctor No    Subjective:     History of Present Illness:  53 y/o female with pmh of esld secondary CHAUDHARY.  Listed for liver transplant with MELD 24.  Being admitted to undergo liver transplant.  Recently admitted secondary abdominal pain and confusion.  Both since resolved.  Presents today in usual state of health.    Scheduled Meds:    Continuous Infusions:  PRN Meds:sodium chloride, sodium chloride, ampicillin-sulbactim (UNASYN) IVPB, methylPREDNISolone sodium succinate, mupirocin    Review of Systems   Constitutional: Positive for activity change and fatigue. Negative for fever.   HENT: Negative for facial swelling and trouble swallowing.    Respiratory: Negative for cough, shortness of breath, wheezing and stridor.    Cardiovascular: Negative for chest pain and palpitations.   Gastrointestinal: Negative for abdominal distention, constipation, diarrhea, nausea and vomiting.   Genitourinary: Negative for difficulty urinating and dysuria.   Neurological: Positive for weakness. Negative for dizziness and light-headedness.   Hematological: Bruises/bleeds easily.   Psychiatric/Behavioral: Negative for agitation, behavioral problems and confusion. The patient is nervous/anxious.      Objective:     Vital Signs (Most Recent):  Temp: 98.2 °F (36.8 °C) (09/19/20 0415)  Pulse: 82 (09/19/20 0415)  Resp: 16 (09/19/20 0415)  BP: (!) 117/56 (09/19/20 0415)  SpO2: 100 % (09/19/20 0415) Vital Signs (24h Range):  Temp:  [98.2 °F (36.8 °C)] 98.2 °F (36.8 °C)  Pulse:  [82] 82  Resp:  [16] 16  SpO2:  [100 %] 100 %  BP: (117)/(56) 117/56        There is no height or weight on file to calculate BMI.    Intake/Output - Last 3 Shifts     None          Physical Exam  Vitals signs and nursing note reviewed.   Constitutional:       General: She is  not in acute distress.  HENT:      Head: Normocephalic and atraumatic.   Eyes:      General: Scleral icterus present.         Right eye: No discharge.         Left eye: No discharge.   Cardiovascular:      Rate and Rhythm: Normal rate and regular rhythm.      Pulses: Normal pulses.      Heart sounds: No murmur.   Pulmonary:      Effort: Pulmonary effort is normal. No respiratory distress.      Breath sounds: No stridor. No wheezing or rales.   Abdominal:      General: Bowel sounds are normal. There is no distension.      Palpations: Abdomen is soft.      Tenderness: There is no abdominal tenderness. There is no guarding.   Skin:     General: Skin is warm and dry.      Capillary Refill: Capillary refill takes 2 to 3 seconds.      Coloration: Skin is jaundiced.      Comments: BLE with a few healing ulcers & LUE abrasions   Neurological:      Mental Status: She is alert and oriented to person, place, and time.      Comments: Slightly lethargic         Laboratory:  Immunosuppressants     None        CBC:   Recent Labs   Lab 09/14/20  0807   WBC 5.29   RBC 2.55*   HGB 8.8*   HCT 27.6*   *   *   MCH 34.5*   MCHC 31.9*     CMP:   Recent Labs   Lab 09/14/20  0807   *   CALCIUM 10.5   ALBUMIN 2.8*   PROT 6.5   *   K 4.4   CO2 27      BUN 14   CREATININE 1.1   ALKPHOS 112   ALT 24   AST 43*   BILITOT 4.3*       Diagnostic Results:  I have personally reviewed all pertinent imaging studies.    Debility/Functional status: Patient debilitated by evidence of Muscle wasting and atrophy. Physical and occupational therapy ordered daily to evaluate and treat. Debility was: present on admission.    Assessment/Plan:     * Liver cirrhosis secondary to CHAUDHARY  Listed for liver txp  Plan for OR today at 2pm  Npo  Pre op labs and imaging pending    MELD-Na score: 17 at 9/14/2020  8:07 AM  MELD score: 14 at 9/14/2020  8:07 AM  Calculated from:  Serum Creatinine: 1.1 mg/dL at 9/14/2020  8:07 AM  Serum Sodium:  134 mmol/L at 9/14/2020  8:07 AM  Total Bilirubin: 4.3 mg/dL at 9/14/2020  8:07 AM  INR(ratio): 1.1 at 9/14/2020  8:07 AM  Age: 55 years        Anemia of chronic disease  H/h stable  monitor      Protein-calorie malnutrition, moderate  - secondary esld  - dietary consulted      Acquired coagulation factor deficiency  Secondary esld  No signs of avert bleeding  monitor          The patient presents for liver transplant.  There are no apparent contraindications to proceeding with the planned transplant.  The patient understands that the transplant could potentially be cancelled pending detailed assessment of the donor organ.    MELD-Na score: 17 at 9/14/2020  8:07 AM  MELD score: 14 at 9/14/2020  8:07 AM  Calculated from:  Serum Creatinine: 1.1 mg/dL at 9/14/2020  8:07 AM  Serum Sodium: 134 mmol/L at 9/14/2020  8:07 AM  Total Bilirubin: 4.3 mg/dL at 9/14/2020  8:07 AM  INR(ratio): 1.1 at 9/14/2020  8:07 AM  Age: 55 years    She will receive IV steroids pulse induction.    A complete discussion of the transplant procedure, including risks, complications, and alternatives, as well as any donor-specific risk factors requiring specific disclosure, will be carried out by the responsible staff surgeon prior to the procedure.     Planning:  Admit to tsu  To OR for liver txp at 2pm  NPO at 6am  No Patient Care Coordination Note on file.      Yaritza Nazario, NP  Liver Transplant  [facility]

## 2020-09-19 NOTE — TELEPHONE ENCOUNTER
ORGAN OFFER NOTE    Notified by Reinaldo Fitzgerald, , of liver offer with donor information.  Donor and recipient information read back and verified.  Spoke with Zeina Mahoney and identified no acute medical issues during telephone assessment.  Patient instructed NPO at 6 am. Patient instructed to leave within 30 minutes of this phone call and report to the ER/admit office.  Patient verbalized understanding and willingness to come in for transplant.  ETA: 4 am.

## 2020-09-19 NOTE — CONSULTS
"  [facility]  Adult Nutrition  Consult Note    SUMMARY     Recommendations    1.) Advance diet as tolerated to low sodium once medically appropriate per SLP texture recommendations.   2.) If pt remains intubated, initiate Impact Peptide 1.5; begin at 10mL/hr and advance 5-10mL Q4hr as tolerated to goal rate at 35mL/hr. EN provides 1260kcal, 78gm, 646mL of free water. Add free water flushes per MD recommendations.    Goals: 1.) Pt to meet >75% of estimated energy and protein needs over the course of 7 days.  Nutrition Goal Status: new  Communication of RD Recs: (POC)    Reason for Assessment    Reason For Assessment: consult  Diagnosis: (Liver cirrhosis secondary to CHAUDHARY)  Relevant Medical History: Decompensated hepatic cirrhosis, thrombocytopenia, pancreatitis  Interdisciplinary Rounds: did not attend  General Information Comments: Pt scheduled for liver transplant today. Pt appears well nourished, NFPE not warranted at this time. Pt caregiver reports wt fluctuates due to fluid, however pt eating well at home 3 meals dialy. GI- LBM   Nutrition Discharge Planning: post transplant nutrition educaiton to be completed once on TSU    Nutrition Risk Screen    Nutrition Risk Screen: no indicators present    Nutrition/Diet History    Typical Food/Fluid Intake: 3 meals daily  Spiritual, Cultural Beliefs, Shinto Practices, Values that Affect Care: no  Food Allergies: NKFA  Factors Affecting Nutritional Intake: NPO    Anthropometrics    Temp: 98.2 °F (36.8 °C)  Height Method: Stated  Height: 5' 5" (165.1 cm)  Height (inches): 65 in  Weight Method: Standard Scale  Weight: 62.1 kg (136 lb 12.7 oz)  Weight (lb): 136.8 lb  Ideal Body Weight (IBW), Female: 125 lb  % Ideal Body Weight, Female (lb): 109.44 %  BMI (Calculated): 22.8  BMI Grade: 18.5-24.9 - normal  Usual Body Weight (UBW), k.9 kg(6-25)  % Usual Body Weight: 102.1       Lab/Procedures/Meds    Pertinent Labs Reviewed: reviewed  Pertinent " Labs Comments: Na 132, BUN 22, Cr 1.6, GFR 36.0, Alb 2.9  Pertinent Medications Reviewed: reviewed  Pertinent Medications Comments: ampicillin, methylprednisolone    Physical Findings/Assessment     Edema 2+ generalized  Skin wound left leg    Estimated/Assessed Needs    Weight Used For Calorie Calculations: 62.1 kg (136 lb 14.5 oz)  Energy Calorie Requirements (kcal): 1399  Energy Need Method: [place]-St Jeor(x1.2 PAL)  Protein Requirements: 62-74gm ( 1.0-1.2gm/kg)  Weight Used For Protein Calculations: 62 kg (136 lb 11 oz)  Fluid Requirements (mL): 1mL/kcal or per MD recommendations  Estimated Fluid Requirement Method: RDA Method  RDA Method (mL): 1399         Nutrition Prescription Ordered    Current Diet Order: NPO (9/19)    Evaluation of Received Nutrient/Fluid Intake    I/O: +400mL since admission  Energy Calories Required: not meeting needs  Protein Required: not meeting needs  Fluid Required: not meeting needs  Tolerance: (RD to monitor)  % Intake of Estimated Energy Needs: 0  % Meal Intake: 0    Nutrition Risk    Level of Risk/Frequency of Follow-up: high , 2x weekly    Assessment and Plan    Nutrition Problem  Inadequate energy intake    Related to (etiology):   NPO    Signs and Symptoms (as evidenced by):   NPO    Interventions/Recommendations (treatment strategy):  1.) collaboration with other providers    Nutrition Diagnosis Status:   new         Monitor and Evaluation    Food and Nutrient Intake: energy intake, food and beverage intake  Food and Nutrient Adminstration: diet order  Knowledge/Beliefs/Attitudes: food and nutrition knowledge/skill  Anthropometric Measurements: weight, weight change  Biochemical Data, Medical Tests and Procedures: electrolyte and renal panel, gastrointestinal profile  Nutrition-Focused Physical Findings: skin, overall appearance       Nutrition Follow-Up    RD Follow-up?: Yes

## 2020-09-19 NOTE — ASSESSMENT & PLAN NOTE
Listed for liver txp  Plan for OR today at 2pm  Npo  Pre op labs and imaging pending    MELD-Na score: 17 at 9/14/2020  8:07 AM  MELD score: 14 at 9/14/2020  8:07 AM  Calculated from:  Serum Creatinine: 1.1 mg/dL at 9/14/2020  8:07 AM  Serum Sodium: 134 mmol/L at 9/14/2020  8:07 AM  Total Bilirubin: 4.3 mg/dL at 9/14/2020  8:07 AM  INR(ratio): 1.1 at 9/14/2020  8:07 AM  Age: 55 years

## 2020-09-19 NOTE — TELEPHONE ENCOUNTER
Received call from DILAN Sr,  liver transplant case for patient being cancelled. Ms. Mahoney called and notified. She and spouse expressed frustration. Acknowledged feelings provided therapeutic listening.   Called CORINNE on service to notify patient could be discharged.

## 2020-09-19 NOTE — PLAN OF CARE
Recommendations     1.) Advance diet as tolerated to low sodium once medically appropriate per SLP texture recommendations.   2.) If pt remains intubated, initiate Impact Peptide 1.5; begin at 10mL/hr and advance 5-10mL Q4hr as tolerated to goal rate at 35mL/hr. EN provides 1260kcal, 78gm, 646mL of free water. Add free water flushes per MD recommendations.     Goals: 1.) Pt to meet >75% of estimated energy and protein needs over the course of 7 days.  Nutrition Goal Status: new  Communication of RD Recs: (POC)

## 2020-09-20 ENCOUNTER — TELEPHONE (OUTPATIENT)
Dept: TRANSPLANT | Facility: CLINIC | Age: 55
End: 2020-09-20

## 2020-09-20 LAB — BACTERIA UR CULT: NORMAL

## 2020-09-20 NOTE — TELEPHONE ENCOUNTER
"[facility] INCREASED RISK BEHAVIOR DONOR ORGAN OFFER NOTE    Notified by Avelino Suarez, , of liver offer with donor information.  Donor and recipient information read back and verified.  Spoke with Zeina Mahoney and identified no acute medical issues during telephone assessment.  Patient instructed NPO.    Patient verbalized understanding that he/she has been called as backup recipient.    The donor's reported social history includes PHS increased risk behavior IVDA/All ID testing g negative    The risk of getting HIV or other hepatitis viruses from this donor is very small compared to the risk of dying while waiting for another liver. The risk of clinical illness from any transmitted infection is also small due to the availability of highly effective oral drugs for all three of these viruses. While the patient has the right to decline any organ for any reason, available scientific data do not support turning down an organ based on Hepatitis C or behavioral risk factors as the risks associated with waiting longer for the transplant are many times greater. Informed patient that Dr. Snider thinks that this is a good liver for the patient.     Patient was also informed that if she turned down the offer, "A transplant doctor will call you after we hang up". Patient was also informed that if she turned down this donor, she would not be punished or removed from the transplant list, that she would remain on the list at her current status/MELD score. However, by waiting on the list longer rather than receiving this transplant, she will face a greater risk of death than any risk from the slight possibility of transmitted infection.    Patient was also informed that she would have the opportunity to see and talk to the surgeon when she got to the hospital and before she went down to the operating room.    Patient understands risk and agrees to proceed with transplant.    "

## 2020-09-21 LAB
HBV SURFACE AG SERPL QL IA: NEGATIVE
HCV RNA SERPL NAA+PROBE-LOG IU: <1.08 LOG (10) IU/ML
HCV RNA SERPL QL NAA+PROBE: NOT DETECTED IU/ML
HCV RNA SPEC NAA+PROBE-ACNC: <12 IU/ML
HIV 1+2 AB+HIV1 P24 AG SERPL QL IA: NEGATIVE

## 2020-09-21 PROCEDURE — 80047 BASIC METABLC PNL IONIZED CA: CPT | Mod: NTX

## 2020-09-21 PROCEDURE — 96376 TX/PRO/DX INJ SAME DRUG ADON: CPT | Mod: NTX

## 2020-09-21 PROCEDURE — 99284 PR EMERGENCY DEPT VISIT,LEVEL IV: ICD-10-PCS | Mod: ,,, | Performed by: PHYSICIAN ASSISTANT

## 2020-09-21 PROCEDURE — 99284 EMERGENCY DEPT VISIT MOD MDM: CPT | Mod: ,,, | Performed by: PHYSICIAN ASSISTANT

## 2020-09-21 PROCEDURE — 96374 THER/PROPH/DIAG INJ IV PUSH: CPT | Mod: NTX

## 2020-09-21 PROCEDURE — 99285 EMERGENCY DEPT VISIT HI MDM: CPT | Mod: 25,NTX

## 2020-09-21 PROCEDURE — U0002 COVID-19 LAB TEST NON-CDC: HCPCS | Mod: NTX | Performed by: PHYSICIAN ASSISTANT

## 2020-09-21 NOTE — TELEPHONE ENCOUNTER
Have spoke to pt's  twice since they arrived to [facility] waiting area at 0900. He appears very anxious and impatient about the whole situation of his wife being a back up patient. Coord explained procedure being back up and that patients have to sometimes wait a few hours before we know if primary recipient received the liver. He went on to state in [place] it is done differently. Reminded him hospitals have different protocols and we can answer any questions he has.He stated that he understood.

## 2020-09-21 NOTE — TELEPHONE ENCOUNTER
Received call from Avelino Suarez that donor for case going to OR at 9am. Patient's  contacted to be at hospital and got to [facility] waiting area for 9am.  Instructed that patient should be NPO from this point on and can take meds with sip of water in am. Verbalized understanding.l  Mr. Mahoney then wanted reassurance that if she was backup for this case we would not pass up other offers for his wife. I reassured him that if another offer for her as a primary came, we would not pass it up and that being back up for one case does not take her off of the list.

## 2020-09-22 ENCOUNTER — HOSPITAL ENCOUNTER (OUTPATIENT)
Facility: HOSPITAL | Age: 55
Discharge: HOME | End: 2020-09-23
Attending: EMERGENCY MEDICINE | Admitting: STUDENT IN AN ORGANIZED HEALTH CARE EDUCATION/TRAINING PROGRAM
Payer: COMMERCIAL

## 2020-09-22 DIAGNOSIS — K74.60 LIVER CIRRHOSIS SECONDARY TO NASH: ICD-10-CM

## 2020-09-22 DIAGNOSIS — E87.5 HYPERKALEMIA: ICD-10-CM

## 2020-09-22 DIAGNOSIS — K75.81 LIVER CIRRHOSIS SECONDARY TO NASH: ICD-10-CM

## 2020-09-22 DIAGNOSIS — K76.7 HEPATORENAL SYNDROME: ICD-10-CM

## 2020-09-22 DIAGNOSIS — R60.0 PERIPHERAL EDEMA: ICD-10-CM

## 2020-09-22 DIAGNOSIS — N17.9 AKI (ACUTE KIDNEY INJURY): Primary | ICD-10-CM

## 2020-09-22 DIAGNOSIS — R10.9 ABDOMINAL PAIN: ICD-10-CM

## 2020-09-22 DIAGNOSIS — K76.82 HEPATIC ENCEPHALOPATHY: ICD-10-CM

## 2020-09-22 DIAGNOSIS — D68.4 ACQUIRED COAGULATION FACTOR DEFICIENCY: ICD-10-CM

## 2020-09-22 DIAGNOSIS — R10.84 GENERALIZED ABDOMINAL PAIN: ICD-10-CM

## 2020-09-22 DIAGNOSIS — D63.8 ANEMIA OF CHRONIC DISEASE: ICD-10-CM

## 2020-09-22 LAB
ALBUMIN SERPL BCP-MCNC: 3.3 G/DL (ref 3.5–5.2)
ALBUMIN SERPL BCP-MCNC: 3.9 G/DL (ref 3.5–5.2)
ALP SERPL-CCNC: 133 U/L (ref 55–135)
ALP SERPL-CCNC: 96 U/L (ref 55–135)
ALT SERPL W/O P-5'-P-CCNC: 22 U/L (ref 10–44)
ALT SERPL W/O P-5'-P-CCNC: 28 U/L (ref 10–44)
AMMONIA PLAS-SCNC: 41 UMOL/L (ref 10–50)
ANION GAP SERPL CALC-SCNC: 12 MMOL/L (ref 8–16)
ANION GAP SERPL CALC-SCNC: 9 MMOL/L (ref 8–16)
AST SERPL-CCNC: 31 U/L (ref 10–40)
AST SERPL-CCNC: 43 U/L (ref 10–40)
BASOPHILS # BLD AUTO: 0.09 K/UL (ref 0–0.2)
BASOPHILS # BLD AUTO: 0.11 K/UL (ref 0–0.2)
BASOPHILS NFR BLD: 1 % (ref 0–1.9)
BASOPHILS NFR BLD: 1.1 % (ref 0–1.9)
BILIRUB SERPL-MCNC: 5.8 MG/DL (ref 0.1–1)
BILIRUB SERPL-MCNC: 5.9 MG/DL (ref 0.1–1)
BILIRUB UR QL STRIP: NEGATIVE
BUN SERPL-MCNC: 27 MG/DL (ref 6–20)
BUN SERPL-MCNC: 27 MG/DL (ref 6–20)
BUN SERPL-MCNC: 27 MG/DL (ref 6–30)
CALCIUM SERPL-MCNC: 10.4 MG/DL (ref 8.7–10.5)
CALCIUM SERPL-MCNC: 10.5 MG/DL (ref 8.7–10.5)
CHLORIDE SERPL-SCNC: 100 MMOL/L (ref 95–110)
CHLORIDE SERPL-SCNC: 96 MMOL/L (ref 95–110)
CHLORIDE SERPL-SCNC: 98 MMOL/L (ref 95–110)
CLARITY UR REFRACT.AUTO: CLEAR
CO2 SERPL-SCNC: 24 MMOL/L (ref 23–29)
CO2 SERPL-SCNC: 26 MMOL/L (ref 23–29)
COLOR UR AUTO: YELLOW
CREAT SERPL-MCNC: 1.5 MG/DL (ref 0.5–1.4)
CREAT SERPL-MCNC: 1.6 MG/DL (ref 0.5–1.4)
CREAT SERPL-MCNC: 1.7 MG/DL (ref 0.5–1.4)
CTP QC/QA: YES
DIFFERENTIAL METHOD: ABNORMAL
DIFFERENTIAL METHOD: ABNORMAL
EOSINOPHIL # BLD AUTO: 0.1 K/UL (ref 0–0.5)
EOSINOPHIL # BLD AUTO: 0.1 K/UL (ref 0–0.5)
EOSINOPHIL NFR BLD: 0.8 % (ref 0–8)
EOSINOPHIL NFR BLD: 1 % (ref 0–8)
ERYTHROCYTE [DISTWIDTH] IN BLOOD BY AUTOMATED COUNT: 14.3 % (ref 11.5–14.5)
ERYTHROCYTE [DISTWIDTH] IN BLOOD BY AUTOMATED COUNT: 14.4 % (ref 11.5–14.5)
EST. GFR  (AFRICAN AMERICAN): 41.5 ML/MIN/1.73 M^2
EST. GFR  (AFRICAN AMERICAN): 44.9 ML/MIN/1.73 M^2
EST. GFR  (NON AFRICAN AMERICAN): 36 ML/MIN/1.73 M^2
EST. GFR  (NON AFRICAN AMERICAN): 38.9 ML/MIN/1.73 M^2
GLUCOSE SERPL-MCNC: 122 MG/DL (ref 70–110)
GLUCOSE SERPL-MCNC: 123 MG/DL (ref 70–110)
GLUCOSE SERPL-MCNC: 142 MG/DL (ref 70–110)
GLUCOSE UR QL STRIP: NEGATIVE
HBV DNA SERPL NAA+PROBE-ACNC: <10 IU/ML
HBV DNA SERPL NAA+PROBE-LOG IU: <1 LOG (10) IU/ML
HBV DNA SERPL QL NAA+PROBE: NOT DETECTED
HCT VFR BLD AUTO: 25.9 % (ref 37–48.5)
HCT VFR BLD AUTO: 33.4 % (ref 37–48.5)
HCT VFR BLD CALC: 36 %PCV (ref 36–54)
HGB BLD-MCNC: 10.9 G/DL (ref 12–16)
HGB BLD-MCNC: 8.5 G/DL (ref 12–16)
HGB UR QL STRIP: NEGATIVE
IMM GRANULOCYTES # BLD AUTO: 0.06 K/UL (ref 0–0.04)
IMM GRANULOCYTES # BLD AUTO: 0.06 K/UL (ref 0–0.04)
IMM GRANULOCYTES NFR BLD AUTO: 0.6 % (ref 0–0.5)
IMM GRANULOCYTES NFR BLD AUTO: 0.7 % (ref 0–0.5)
INR PPP: 1.1 (ref 0.8–1.2)
KETONES UR QL STRIP: NEGATIVE
LEUKOCYTE ESTERASE UR QL STRIP: NEGATIVE
LIPASE SERPL-CCNC: 249 U/L (ref 4–60)
LYMPHOCYTES # BLD AUTO: 1 K/UL (ref 1–4.8)
LYMPHOCYTES # BLD AUTO: 1.6 K/UL (ref 1–4.8)
LYMPHOCYTES NFR BLD: 17.8 % (ref 18–48)
LYMPHOCYTES NFR BLD: 9.4 % (ref 18–48)
MAGNESIUM SERPL-MCNC: 1.6 MG/DL (ref 1.6–2.6)
MCH RBC QN AUTO: 34.5 PG (ref 27–31)
MCH RBC QN AUTO: 35 PG (ref 27–31)
MCHC RBC AUTO-ENTMCNC: 32.6 G/DL (ref 32–36)
MCHC RBC AUTO-ENTMCNC: 32.8 G/DL (ref 32–36)
MCV RBC AUTO: 106 FL (ref 82–98)
MCV RBC AUTO: 107 FL (ref 82–98)
MONOCYTES # BLD AUTO: 0.4 K/UL (ref 0.3–1)
MONOCYTES # BLD AUTO: 0.5 K/UL (ref 0.3–1)
MONOCYTES NFR BLD: 4.2 % (ref 4–15)
MONOCYTES NFR BLD: 6.1 % (ref 4–15)
NEUTROPHILS # BLD AUTO: 6.5 K/UL (ref 1.8–7.7)
NEUTROPHILS # BLD AUTO: 8.8 K/UL (ref 1.8–7.7)
NEUTROPHILS NFR BLD: 73.4 % (ref 38–73)
NEUTROPHILS NFR BLD: 83.9 % (ref 38–73)
NITRITE UR QL STRIP: NEGATIVE
NRBC BLD-RTO: 0 /100 WBC
NRBC BLD-RTO: 0 /100 WBC
PH UR STRIP: 7 [PH] (ref 5–8)
PLATELET # BLD AUTO: 73 K/UL (ref 150–350)
PLATELET # BLD AUTO: 96 K/UL (ref 150–350)
PMV BLD AUTO: 9.4 FL (ref 9.2–12.9)
PMV BLD AUTO: 9.5 FL (ref 9.2–12.9)
POC IONIZED CALCIUM: 1.34 MMOL/L (ref 1.06–1.42)
POC TCO2 (MEASURED): 25 MMOL/L (ref 23–29)
POTASSIUM BLD-SCNC: 5.2 MMOL/L (ref 3.5–5.1)
POTASSIUM SERPL-SCNC: 3.9 MMOL/L (ref 3.5–5.1)
POTASSIUM SERPL-SCNC: 5.3 MMOL/L (ref 3.5–5.1)
PROT SERPL-MCNC: 7 G/DL (ref 6–8.4)
PROT SERPL-MCNC: 7.7 G/DL (ref 6–8.4)
PROT UR QL STRIP: NEGATIVE
PROTHROMBIN TIME: 11.9 SEC (ref 9–12.5)
RBC # BLD AUTO: 2.43 M/UL (ref 4–5.4)
RBC # BLD AUTO: 3.16 M/UL (ref 4–5.4)
SAMPLE: ABNORMAL
SARS-COV-2 RDRP RESP QL NAA+PROBE: NEGATIVE
SODIUM BLD-SCNC: 133 MMOL/L (ref 136–145)
SODIUM SERPL-SCNC: 131 MMOL/L (ref 136–145)
SODIUM SERPL-SCNC: 134 MMOL/L (ref 136–145)
SP GR UR STRIP: 1.01 (ref 1–1.03)
URN SPEC COLLECT METH UR: NORMAL
WBC # BLD AUTO: 10.42 K/UL (ref 3.9–12.7)
WBC # BLD AUTO: 8.91 K/UL (ref 3.9–12.7)

## 2020-09-22 PROCEDURE — 80053 COMPREHEN METABOLIC PANEL: CPT | Mod: NTX

## 2020-09-22 PROCEDURE — G0378 HOSPITAL OBSERVATION PER HR: HCPCS | Mod: NTX

## 2020-09-22 PROCEDURE — 81003 URINALYSIS AUTO W/O SCOPE: CPT | Mod: NTX

## 2020-09-22 PROCEDURE — 63600175 PHARM REV CODE 636 W HCPCS: Mod: JG,NTX | Performed by: PHYSICIAN ASSISTANT

## 2020-09-22 PROCEDURE — 63600175 PHARM REV CODE 636 W HCPCS: Mod: NTX | Performed by: EMERGENCY MEDICINE

## 2020-09-22 PROCEDURE — P9047 ALBUMIN (HUMAN), 25%, 50ML: HCPCS | Mod: JG,NTX | Performed by: PHYSICIAN ASSISTANT

## 2020-09-22 PROCEDURE — 83735 ASSAY OF MAGNESIUM: CPT | Mod: NTX

## 2020-09-22 PROCEDURE — 96376 TX/PRO/DX INJ SAME DRUG ADON: CPT | Mod: NTX

## 2020-09-22 PROCEDURE — 99215 PR OFFICE/OUTPT VISIT, EST, LEVL V, 40-54 MIN: ICD-10-PCS | Mod: NTX,,, | Performed by: PHYSICIAN ASSISTANT

## 2020-09-22 PROCEDURE — 94761 N-INVAS EAR/PLS OXIMETRY MLT: CPT | Mod: NTX

## 2020-09-22 PROCEDURE — 85610 PROTHROMBIN TIME: CPT | Mod: NTX

## 2020-09-22 PROCEDURE — 85025 COMPLETE CBC W/AUTO DIFF WBC: CPT | Mod: NTX

## 2020-09-22 PROCEDURE — 82140 ASSAY OF AMMONIA: CPT | Mod: NTX

## 2020-09-22 PROCEDURE — 99215 OFFICE O/P EST HI 40 MIN: CPT | Mod: NTX,,, | Performed by: NURSE PRACTITIONER

## 2020-09-22 PROCEDURE — 63600175 PHARM REV CODE 636 W HCPCS: Mod: JG,NTX | Performed by: NURSE PRACTITIONER

## 2020-09-22 PROCEDURE — 25500020 PHARM REV CODE 255: Mod: NTX | Performed by: EMERGENCY MEDICINE

## 2020-09-22 PROCEDURE — 80053 COMPREHEN METABOLIC PANEL: CPT | Mod: 91,NTX

## 2020-09-22 PROCEDURE — 99215 PR OFFICE/OUTPT VISIT, EST, LEVL V, 40-54 MIN: ICD-10-PCS | Mod: NTX,,, | Performed by: NURSE PRACTITIONER

## 2020-09-22 PROCEDURE — 96375 TX/PRO/DX INJ NEW DRUG ADDON: CPT | Mod: NTX

## 2020-09-22 PROCEDURE — 25000003 PHARM REV CODE 250: Mod: NTX | Performed by: PHYSICIAN ASSISTANT

## 2020-09-22 PROCEDURE — 83690 ASSAY OF LIPASE: CPT | Mod: NTX

## 2020-09-22 PROCEDURE — 99215 OFFICE O/P EST HI 40 MIN: CPT | Mod: NTX,,, | Performed by: PHYSICIAN ASSISTANT

## 2020-09-22 PROCEDURE — 36415 COLL VENOUS BLD VENIPUNCTURE: CPT | Mod: NTX

## 2020-09-22 PROCEDURE — P9047 ALBUMIN (HUMAN), 25%, 50ML: HCPCS | Mod: JG,NTX | Performed by: NURSE PRACTITIONER

## 2020-09-22 RX ORDER — SODIUM CHLORIDE 0.9 % (FLUSH) 0.9 %
10 SYRINGE (ML) INJECTION
Status: DISCONTINUED | OUTPATIENT
Start: 2020-09-22 | End: 2020-09-23 | Stop reason: HOSPADM

## 2020-09-22 RX ORDER — LEVOTHYROXINE SODIUM 88 UG/1
88 TABLET ORAL
Status: DISCONTINUED | OUTPATIENT
Start: 2020-09-22 | End: 2020-09-23 | Stop reason: HOSPADM

## 2020-09-22 RX ORDER — SIMETHICONE 80 MG
80 TABLET,CHEWABLE ORAL EVERY 6 HOURS PRN
Status: DISCONTINUED | OUTPATIENT
Start: 2020-09-22 | End: 2020-09-23 | Stop reason: HOSPADM

## 2020-09-22 RX ORDER — MORPHINE SULFATE 4 MG/ML
4 INJECTION, SOLUTION INTRAMUSCULAR; INTRAVENOUS
Status: COMPLETED | OUTPATIENT
Start: 2020-09-22 | End: 2020-09-22

## 2020-09-22 RX ORDER — ONDANSETRON 8 MG/1
8 TABLET, ORALLY DISINTEGRATING ORAL EVERY 8 HOURS PRN
Status: DISCONTINUED | OUTPATIENT
Start: 2020-09-22 | End: 2020-09-23 | Stop reason: HOSPADM

## 2020-09-22 RX ORDER — ACETAMINOPHEN 325 MG/1
650 TABLET ORAL EVERY 8 HOURS PRN
Status: DISCONTINUED | OUTPATIENT
Start: 2020-09-22 | End: 2020-09-23 | Stop reason: HOSPADM

## 2020-09-22 RX ORDER — ALBUMIN HUMAN 250 G/1000ML
25 SOLUTION INTRAVENOUS ONCE
Status: COMPLETED | OUTPATIENT
Start: 2020-09-22 | End: 2020-09-22

## 2020-09-22 RX ORDER — PANTOPRAZOLE SODIUM 40 MG/1
40 TABLET, DELAYED RELEASE ORAL
Status: DISCONTINUED | OUTPATIENT
Start: 2020-09-22 | End: 2020-09-23 | Stop reason: HOSPADM

## 2020-09-22 RX ORDER — LACTULOSE 10 G/15ML
30 SOLUTION ORAL 3 TIMES DAILY
Status: DISCONTINUED | OUTPATIENT
Start: 2020-09-22 | End: 2020-09-23 | Stop reason: HOSPADM

## 2020-09-22 RX ORDER — ESCITALOPRAM OXALATE 5 MG/1
5 TABLET ORAL DAILY
Status: DISCONTINUED | OUTPATIENT
Start: 2020-09-22 | End: 2020-09-23 | Stop reason: HOSPADM

## 2020-09-22 RX ORDER — MIDODRINE HYDROCHLORIDE 5 MG/1
10 TABLET ORAL 3 TIMES DAILY
Status: DISCONTINUED | OUTPATIENT
Start: 2020-09-22 | End: 2020-09-23 | Stop reason: HOSPADM

## 2020-09-22 RX ORDER — MORPHINE SULFATE 2 MG/ML
2 INJECTION, SOLUTION INTRAMUSCULAR; INTRAVENOUS
Status: COMPLETED | OUTPATIENT
Start: 2020-09-22 | End: 2020-09-22

## 2020-09-22 RX ORDER — TALC
6 POWDER (GRAM) TOPICAL NIGHTLY PRN
Status: DISCONTINUED | OUTPATIENT
Start: 2020-09-22 | End: 2020-09-23 | Stop reason: HOSPADM

## 2020-09-22 RX ORDER — ALBUMIN HUMAN 250 G/1000ML
25 SOLUTION INTRAVENOUS
Status: COMPLETED | OUTPATIENT
Start: 2020-09-22 | End: 2020-09-22

## 2020-09-22 RX ADMIN — ALBUMIN (HUMAN) 25 G: 12.5 SOLUTION INTRAVENOUS at 05:09

## 2020-09-22 RX ADMIN — LACTULOSE 30 G: 20 SOLUTION ORAL at 08:09

## 2020-09-22 RX ADMIN — LEVOTHYROXINE SODIUM 88 MCG: 88 TABLET ORAL at 06:09

## 2020-09-22 RX ADMIN — PANTOPRAZOLE SODIUM 40 MG: 40 TABLET, DELAYED RELEASE ORAL at 06:09

## 2020-09-22 RX ADMIN — ALBUMIN (HUMAN) 25 G: 12.5 SOLUTION INTRAVENOUS at 06:09

## 2020-09-22 RX ADMIN — MIDODRINE HYDROCHLORIDE 10 MG: 5 TABLET ORAL at 03:09

## 2020-09-22 RX ADMIN — RIFAXIMIN 550 MG: 550 TABLET ORAL at 08:09

## 2020-09-22 RX ADMIN — ESCITALOPRAM 5 MG: 5 TABLET, FILM COATED ORAL at 08:09

## 2020-09-22 RX ADMIN — ALBUMIN (HUMAN) 25 G: 12.5 SOLUTION INTRAVENOUS at 01:09

## 2020-09-22 RX ADMIN — LACTULOSE 30 G: 20 SOLUTION ORAL at 03:09

## 2020-09-22 RX ADMIN — IOHEXOL 75 ML: 350 INJECTION, SOLUTION INTRAVENOUS at 03:09

## 2020-09-22 RX ADMIN — MIDODRINE HYDROCHLORIDE 10 MG: 5 TABLET ORAL at 08:09

## 2020-09-22 RX ADMIN — MORPHINE SULFATE 4 MG: 4 INJECTION INTRAVENOUS at 03:09

## 2020-09-22 RX ADMIN — MORPHINE SULFATE 2 MG: 2 INJECTION, SOLUTION INTRAMUSCULAR; INTRAVENOUS at 12:09

## 2020-09-22 NOTE — ED PROVIDER NOTES
Source of History:  Patient, significant other    Chief complaint:  Abdominal Pain (liver transplant pt. Pt reports abdominal pain that started 2 hrs PTA; pain is generalized and described as sharp. Denies N/V/D)    HPI:  Zeina Mahoney is a 55 y.o. female with history of end-stage liver disease secondary to CHAUDHARY, listed for liver transplant, pancreatitis, portal hypertension, hyper and anemia presenting to emergency department with complaint of abdominal pain.  Patient states she was here at the hospital all day today as a backup for a liver transplant case.  She states that she was sent home and several hours later developed pain in her epigastric and right upper quadrant.  Pain is sharp, severe, nonradiating.  No chest pain or shortness of breath.  No fevers or chills.  She is not vomiting.  She is taking all her medications as directed, and her  does not feel that she is confused or encephalopathic.  Normal bowel movements.  No urinary complaints.    Of note, patient was admitted to this hospital approximately 1 week ago for similar issue.  No cause of her pain was found.  She developed encephalopathy while hospitalized, thought to be secondary to the Percocet that she was given.  She was not discharged home with pain medication.    ROS: As per HPI and below:  Review of Systems   Constitutional: Negative for fever.   HENT: Negative for sore throat.    Eyes: Negative for double vision.   Respiratory: Negative for cough and shortness of breath.    Cardiovascular: Negative for chest pain.   Gastrointestinal: Positive for abdominal pain. Negative for diarrhea and vomiting.   Genitourinary: Negative for dysuria.   Musculoskeletal: Negative for falls.   Skin: Negative for rash.   Neurological: Negative for headaches.       Review of patient's allergies indicates:   Allergen Reactions    Aspirin Tinitus    Ciprofloxacin Rash    Clindamycin Itching       No current facility-administered medications on file prior  to encounter.      Current Outpatient Medications on File Prior to Encounter   Medication Sig Dispense Refill    acetaminophen (TYLENOL) 500 MG tablet Take 1 tablet (500 mg total) by mouth every 6 (six) hours as needed for Pain. Max 2000mg daily.  0    diaper,brief,adult,disposable Misc 5 mg/kg/day by Misc.(Non-Drug; Combo Route) route 5 (five) times daily. Patient with incontinence. Please supply enough depends for 5xs/day      ergocalciferol (VITAMIN D2) 50,000 unit Cap Take 50,000 Units by mouth every 7 days.      escitalopram oxalate (LEXAPRO) 5 MG Tab Take 1 tablet (5 mg total) by mouth once daily. 30 tablet 11    furosemide (LASIX) 40 MG tablet Take 1 tablet (40 mg total) by mouth once daily. Take 2 tablets (80 mg) by mouth in the morning and 1 tablet (40mg) in the evening. 30 tablet 2    haloperidoL (HALDOL) 1 MG tablet Take 1 tablet (1 mg total) by mouth daily as needed (non redirectable agitation). (Patient not taking: Reported on 9/14/2020)      lactulose (CEPHULAC) 10 gram packet Mix 3 packets (30 g total) with liquid and take by mouth 3 (three) times daily as needed. 90 packet 11    levothyroxine (SYNTHROID) 88 MCG tablet Take 1 tablet (88 mcg total) by mouth before breakfast. 30 tablet 3    lidocaine (LIDODERM) 5 % Place 1 patch onto the skin every 24 hours. Remove & Discard patch within 12 hours or as directed by MD      magnesium hydroxide 400 mg/5 ml (MILK OF MAGNESIA) 400 mg/5 mL Susp Take 30 mLs (2,400 mg total) by mouth daily as needed (Take if constipated despite lactulose).      melatonin 10 mg Tab Take 10 mg by mouth nightly as needed (sleep).      midodrine (PROAMATINE) 10 MG tablet Take 1 tablet (10 mg total) by mouth 3 (three) times daily. 90 tablet 3    nystatin (MYCOSTATIN) powder Apply topically 2 (two) times daily. Apply to affected area twice a day      pantoprazole (PROTONIX) 40 MG tablet Take 40 mg by mouth before breakfast.      polyethylene glycol (GLYCOLAX) 17 gram  PwPk Take 17 g by mouth once daily. Or miralax OTC. Take if constipated despite lactulose  0    rifAXIMin (XIFAXAN) 550 mg Tab Take 550 mg by mouth 2 (two) times daily.       simethicone (MYLICON) 80 MG chewable tablet Take 80 mg by mouth every 6 (six) hours as needed for Flatulence. Take 2 tablets by mouth every 6 hours as needed for gas      spironolactone (ALDACTONE) 50 MG tablet Take 2 tablets (100 mg total) by mouth once daily. 60 tablet 6    zinc sulfate (ZINCATE) 220 (50) mg capsule Take 220 mg by mouth before breakfast.         PMH:  As per HPI and below:  Past Medical History:   Diagnosis Date    Chronic Hepatic encephalopathy 6/25/2020    Decompensated hepatic cirrhosis 6/25/2020    History of pancreatitis 6/25/2020    Liver cirrhosis secondary to CHAUDHARY 6/25/2020    Macrocytic anemia 6/25/2020    Other ascites 6/25/2020    Thrombocytopenia 6/25/2020     No past surgical history on file.    Social History     Socioeconomic History    Marital status:      Spouse name: Not on file    Number of children: Not on file    Years of education: Not on file    Highest education level: Not on file   Occupational History    Not on file   Social Needs    Financial resource strain: Not on file    Food insecurity     Worry: Not on file     Inability: Not on file    Transportation needs     Medical: Not on file     Non-medical: Not on file   Tobacco Use    Smoking status: Never Smoker    Smokeless tobacco: Never Used   Substance and Sexual Activity    Alcohol use: Never     Frequency: Never    Drug use: Never    Sexual activity: Not on file   Lifestyle    Physical activity     Days per week: Not on file     Minutes per session: Not on file    Stress: Not on file   Relationships    Social connections     Talks on phone: Not on file     Gets together: Not on file     Attends Mandaeism service: Not on file     Active member of club or organization: Not on file     Attends meetings of clubs or  organizations: Not on file     Relationship status: Not on file   Other Topics Concern    Not on file   Social History Narrative    Not on file       No family history on file.    Physical Exam:      Vitals:    09/22/20 1930   BP: (!) 104/53   Pulse: 75   Resp: 18   Temp: 98 °F (36.7 °C)     Gen: No acute distress.  Mental Status:  Alert and oriented x 3.  Appropriate, conversant.  Skin:  Jaundiced.  Warm, dry. No rashes seen.  Eyes: No conjunctival injection.  Pulm: No increased work of breathing.  No significant tachypnea.  No audible stridor or wheezing.  No conversational dyspnea.  Auscultation limited secondary to PPE.  CV: Regular rate. Regular rhythm. Normal peripheral perfusion. No lower extremity edema.  Abd: Soft.  Distended.  Tenderness to palpation of the epigastric and right upper quadrant without rebound tenderness or guarding.  MSK: Good range of motion all joints.  No deformities.    Neuro: Awake. Speech normal. No focal neuro deficit observed.    Laboratory Studies:  Labs Reviewed   CBC W/ AUTO DIFFERENTIAL - Abnormal; Notable for the following components:       Result Value    RBC 3.16 (*)     Hemoglobin 10.9 (*)     Hematocrit 33.4 (*)     Mean Corpuscular Volume 106 (*)     Mean Corpuscular Hemoglobin 34.5 (*)     Platelets 96 (*)     Immature Granulocytes 0.6 (*)     Gran # (ANC) 8.8 (*)     Immature Grans (Abs) 0.06 (*)     Gran% 83.9 (*)     Lymph% 9.4 (*)     All other components within normal limits   COMPREHENSIVE METABOLIC PANEL - Abnormal; Notable for the following components:    Sodium 134 (*)     Potassium 5.3 (*)     Glucose 123 (*)     BUN, Bld 27 (*)     Creatinine 1.6 (*)     Albumin 3.3 (*)     Total Bilirubin 5.9 (*)     AST 43 (*)     eGFR if  41.5 (*)     eGFR if non  36.0 (*)     All other components within normal limits   LIPASE - Abnormal; Notable for the following components:    Lipase 249 (*)     All other components within normal limits    ISTAT PROCEDURE - Abnormal; Notable for the following components:    POC Glucose 122 (*)     POC Creatinine 1.7 (*)     POC Sodium 133 (*)     POC Potassium 5.2 (*)     All other components within normal limits   URINALYSIS, REFLEX TO URINE CULTURE    Narrative:     Specimen Source->Urine   PROTIME-INR   MAGNESIUM   AMMONIA   SARS-COV-2 RDRP GENE   ISTAT CHEM8     Chart reviewed.  See summary in HPI.    Imaging Results          CT Abdomen Pelvis With Contrast (Final result)  Result time 09/22/20 03:35:08    Final result by Joaquin Thomas MD (09/22/20 03:35:08)                 Impression:      Cholelithiasis with mild gallbladder wall thickening and enhancement, possibly related to underlying chronic liver disease.  Consider further evaluation with right upper quadrant ultrasound if there is a specific concern for acute cholecystitis.    Cirrhotic liver morphology and stigmata of portal hypertension including splenomegaly, large perisplenic and mesenteric collateral vessels with a splenorenal shunt, gastroesophageal varices, and mild mesenteric edema.  Trace pelvic free fluid, though with overall decreased volume of ascites when compared with 09/08/2020.    Stable small hypodensity in the right hepatic dome, likely a simple cyst as seen on prior MRI and multiphase CT.    Stable pancreatic cystic lesions.    Multiple compression deformities in the lumbar spine, noting increase in height loss involving the L5 vertebral body when compared with 09/08/2020.  Mild compression deformities at L1, L3, and L4 appear stable.    Cardiomegaly.    Mild anasarca.      Electronically signed by: Joaquin Thomas MD  Date:    09/22/2020  Time:    03:35             Narrative:    EXAMINATION:  CT ABDOMEN PELVIS WITH CONTRAST    CLINICAL HISTORY:  Abdominal pain, acute, nonlocalized;Right upper quadrant, epigastric, periumbilical;    TECHNIQUE:  Low dose axial images, sagittal and coronal reformations were obtained from the lung  bases to the pubic symphysis following the IV administration of 75 mL of Omnipaque 350 .  Oral contrast was not given.    COMPARISON:  CT abdomen and pelvis with IV contrast, 09/08/2020.  Abdominal MRI, 07/08/2020.  Multiphase abdominal CT, 07/10/2020.    FINDINGS:  Lower Chest:    Heart size is at the upper limits of normal, but stable.  No pericardial effusion.  There is mild right greater than left bibasilar subsegmental atelectasis and possible trace right pleural effusion.    Abdomen:    Cirrhotic liver morphology with nodular contour and lobar redistribution.  Stable 1.2 cm hypodensity in the right hepatic dome (axial series 3, image 7), most likely a small cyst, though better characterized on prior abdominal MRI and prior multiphase CT dated 07/10/2020. No new worrisome hepatic mass.  Small calcified gallstone is noted layering dependently within the gallbladder.  There is mild gallbladder wall thickening and wall enhancement.  No significant intra or extrahepatic biliary ductal dilatation.    Spleen is enlarged and appears stable.  Adrenal glands are unremarkable.    Pancreas is stable in appearance and demonstrates multiple small cystic lesions.  Stable 1.8 cm cystic lesion in the pancreatic tail (axial image 22).  Additional stable cystic lesion in the pancreatic head measures approximately 1.7 cm (axial image 23).    The kidneys are symmetric.  No hydronephrosis.    No small bowel obstruction.  Stable variant bowel anatomy, noting stable displacement of small bowel extending to the lateral aspect of the ascending colon.  Mobile cecum extends into the lower midline abdomen.  Displacement of colon anterior to the liver just beneath the right hemidiaphragm is also stable.  No significant inflammatory changes identified in the GI track.    No pneumoperitoneum or organized fluid collection.  There is mild diffuse mesenteric edema.    No bulky lymphadenopathy.    Abdominal aorta is normal in caliber with mild  calcific atherosclerosis.    Portal, splenic, and superior mesenteric veins are grossly patent.  There are numerous large perisplenic collateral vessels and left-sided splenorenal shunt.  Multiple large right mesenteric collateral vessels, similar to the prior study.  Small gastroesophageal varices are present.    Pelvis:    Urinary bladder and rectum are unremarkable.  Uterus is unremarkable.  No adnexal masses identified.  Small volume pelvic free fluid.  No pelvic lymphadenopathy.    Bones and soft tissues:    No aggressive osseous lesions.  Stable mild remote appearing compression deformities involving L1, L3, L4, and L5.  Slight worsening of L5 vertebral body height loss when compared with the prior study, now approximately 20% height loss.  No significant retropulsion of fracture fragments into the central canal.  Mild diffuse body wall edema.                                Medications Given:  Medications   sodium chloride 0.9% flush 10 mL (has no administration in time range)   ondansetron disintegrating tablet 8 mg (has no administration in time range)   melatonin tablet 6 mg (has no administration in time range)   acetaminophen tablet 650 mg (has no administration in time range)   escitalopram oxalate tablet 5 mg (5 mg Oral Given 9/22/20 0839)   lactulose 20 gram/30 mL solution Soln 30 g (30 g Oral Given 9/22/20 2033)   levothyroxine tablet 88 mcg (88 mcg Oral Given 9/22/20 0647)   midodrine tablet 10 mg (10 mg Oral Given 9/22/20 2033)   pantoprazole EC tablet 40 mg (40 mg Oral Given 9/22/20 0647)   rifAXIMin tablet 550 mg (550 mg Oral Given 9/22/20 2033)   simethicone chewable tablet 80 mg (has no administration in time range)   morphine injection 2 mg (2 mg Intravenous Given 9/22/20 0040)   iohexoL (OMNIPAQUE 350) injection 75 mL (75 mLs Intravenous Given 9/22/20 0301)   morphine injection 4 mg (4 mg Intravenous Given 9/22/20 0342)   albumin human 25% bottle 25 g (25 g Intravenous New Bag 9/22/20 0647)    albumin human 25% bottle 25 g (25 g Intravenous New Bag 9/22/20 1651)     Discussed with: LTS    MDM:    55 y.o. female with complaint of abdominal pain.  She is currently listed for liver transplant.  She is afebrile, stable, nontoxic.  She is not encephalopathic.    Will obtain labs, CT scan, and re-evaluate.    Labs reviewed.  No leukocytosis.  Potassium 5.3, without hemolysis.  No acute kidney injury noted.  No acidosis.  Her lipase is 249.  Her MELD score is 22.    COVID negative.  Urinalysis not consistent with infection.    CT without acute abnormality.  No significant ascites, doubt SBP is a cause of the patient's abdominal pain today.    Pain well controlled with morphine.    Case was discussed with liver transplant service.  They have evaluated patient in the emergency department and feel that she should be admitted to their service for further management.    Diagnostic Impression:    1. Abdominal pain    2. Generalized abdominal pain    3. Acquired coagulation factor deficiency    4. Anemia of chronic disease    5. Hepatic encephalopathy    6. Hepatorenal syndrome    7. Hyperkalemia    8. Liver cirrhosis secondary to CHAUDHARY    9. KG (acute kidney injury)         ED Disposition Condition    Observation              Patient and/or family understands the plan and is in agreement, verbalized understanding, questions answered    Roxana Carlton MD  Emergency Medicine       Roxana Carlton MD  09/22/20 8908

## 2020-09-22 NOTE — ASSESSMENT & PLAN NOTE
- Lipase elevated on admit  - CT scan unremarkable to source of pain  - Treat with tylenol  - Pt with large BM which has now relived abdominal pain.   - monitor

## 2020-09-22 NOTE — H&P
[facility]  Liver Transplant  History & Physical    Patient Name: Zeina Mahoney  MRN: [id number]  Admission Date: 9/22/2020  Code Status: Full Code  Primary Care Provider: Primary Doctor No    Subjective:     History of Present Illness:  Ms. 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 CT scan obtained, unremarkable to source of pain.  Denies N/V/D. Passing gas and +BM. Lipase elevated on admit, has hx of pancreatic lesions. Upon assessment mental status slow, but AAO x 3.  Patient's  at bedside- reports having regular bowel movements. Labs in the ED also notable for hyperkalemia and KG. Plan to admit for observation to address these issues.    Past Medical History:   Diagnosis Date    Chronic Hepatic encephalopathy 6/25/2020    Decompensated hepatic cirrhosis 6/25/2020    History of pancreatitis 6/25/2020    Liver cirrhosis secondary to CHAUDHARY 6/25/2020    Macrocytic anemia 6/25/2020    Other ascites 6/25/2020    Thrombocytopenia 6/25/2020       No past surgical history on file.    Review of patient's allergies indicates:   Allergen Reactions    Aspirin Tinitus    Ciprofloxacin Rash    Clindamycin Itching       Family History     None        Tobacco Use    Smoking status: Never Smoker    Smokeless tobacco: Never Used   Substance and Sexual Activity    Alcohol use: Never     Frequency: Never    Drug use: Never    Sexual activity: Not on file       (Not in a hospital admission)      Review of Systems   Constitutional: Positive for activity change and fatigue. Negative for fever.   HENT: Negative for facial swelling and trouble swallowing.    Respiratory: Negative for cough, shortness of breath, wheezing and stridor.    Cardiovascular: Negative for chest pain and palpitations.   Gastrointestinal: Positive for abdominal pain. Negative for abdominal distention, constipation, diarrhea, nausea and vomiting.    Genitourinary: Negative for difficulty urinating and dysuria.   Neurological: Positive for weakness. Negative for dizziness and light-headedness.   Hematological: Bruises/bleeds easily.   Psychiatric/Behavioral: Negative for agitation, behavioral problems and confusion. The patient is nervous/anxious.      Objective:     Vital Signs (Most Recent):  Temp: 98.2 °F (36.8 °C) (09/21/20 2319)  Pulse: 81 (09/22/20 0432)  Resp: 14 (09/22/20 0342)  BP: 117/61 (09/22/20 0432)  SpO2: 100 % (09/22/20 0432) Vital Signs (24h Range):  Temp:  [98.2 °F (36.8 °C)] 98.2 °F (36.8 °C)  Pulse:  [73-83] 81  Resp:  [14-17] 14  SpO2:  [100 %] 100 %  BP: (113-152)/(56-70) 117/61        There is no height or weight on file to calculate BMI.    No intake or output data in the 24 hours ending 09/22/20 0538    Physical Exam  Vitals signs and nursing note reviewed.   Constitutional:       General: She is not in acute distress.  HENT:      Head: Normocephalic and atraumatic.   Eyes:      General: Scleral icterus present.         Right eye: No discharge.         Left eye: No discharge.   Cardiovascular:      Rate and Rhythm: Normal rate and regular rhythm.      Pulses: Normal pulses.      Heart sounds: No murmur.   Pulmonary:      Effort: Pulmonary effort is normal. No respiratory distress.      Breath sounds: No stridor. No wheezing or rales.   Abdominal:      General: Bowel sounds are normal. There is no distension.      Palpations: Abdomen is soft.      Tenderness: There is no abdominal tenderness. There is no guarding.   Skin:     General: Skin is warm and dry.      Capillary Refill: Capillary refill takes 2 to 3 seconds.      Coloration: Skin is jaundiced.      Comments: BLE with a few healing ulcers & LUE abrasions   Neurological:      Mental Status: She is alert and oriented to person, place, and time.      Comments: Slightly lethargic         Laboratory:  CBC:   Recent Labs   Lab 09/22/20  0038 09/22/20  0051   WBC 10.42  --    RBC 3.16*   --    HGB 10.9*  --    HCT 33.4* 36   PLT 96*  --    *  --    MCH 34.5*  --    MCHC 32.6  --      CMP:   Recent Labs   Lab 09/22/20  0038   *   CALCIUM 10.4   ALBUMIN 3.3*   PROT 7.7   *   K 5.3*   CO2 24   CL 98   BUN 27*   CREATININE 1.6*   ALKPHOS 133   ALT 28   AST 43*   BILITOT 5.9*     Labs within the past 24 hours have been reviewed.    Diagnostic Results:  I have personally reviewed all pertinent imaging studies.    Assessment/Plan:     * Abdominal pain  - Lipase elevated on admit  -Ct scan unremarkable to source of pain  - Treat with tylenol  - monitor         Hepatorenal syndrome  - Cr 1.6 on admit  - Albumin ordered  - Lasix held. To be restarted when appropriate  - monitor        Hyperkalemia  - K minimally elevated  - Aldactone held  - monitor      Anemia of chronic disease  - H/H stable. No overt signs of bleeding.  - Monitor      Acquired coagulation factor deficiency  - secondary liver disease  - no avert signs of bleeding  - monitor pt/inr daily      Liver cirrhosis secondary to CHAUDHARY  - listed for liver transplant      Acute Hepatic encephalopathy  - chronic  - continue lactulose and rifaximin  - goal 3-4 BM /day. Monitor          MELD-Na score: 21 at 9/22/2020 12:38 AM  MELD score: 19 at 9/22/2020 12:38 AM  Calculated from:  Serum Creatinine: 1.6 mg/dL at 9/22/2020 12:38 AM  Serum Sodium: 134 mmol/L at 9/22/2020 12:38 AM  Total Bilirubin: 5.9 mg/dL at 9/22/2020 12:38 AM  INR(ratio): 1.1 at 9/22/2020 12:38 AM  Age: 55 years       Discharge Planning:  No Patient Care Coordination Note on file.      Brandee Dominguez PA-C  Liver Transplant  [facility]

## 2020-09-22 NOTE — ASSESSMENT & PLAN NOTE
- Lipase elevated on admit  -Ct scan unremarkable to source of pain  - Treat with tylenol  - monitor

## 2020-09-22 NOTE — HPI
Ms. 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 CT scan obtained, unremarkable to source of pain.  Denies N/V/D. Passing gas and +BM. Lipase elevated on admit, has hx of pancreatic lesions. Upon assessment mental status slow, but AAO x 3.  Patient's  at bedside- reports having regular bowel movements. Labs in the ED also notable for hyperkalemia and KG. Plan to admit for observation to address these issues.

## 2020-09-22 NOTE — SUBJECTIVE & OBJECTIVE
Past Medical History:   Diagnosis Date    Chronic Hepatic encephalopathy 6/25/2020    Decompensated hepatic cirrhosis 6/25/2020    History of pancreatitis 6/25/2020    Liver cirrhosis secondary to CHAUDHARY 6/25/2020    Macrocytic anemia 6/25/2020    Other ascites 6/25/2020    Thrombocytopenia 6/25/2020       No past surgical history on file.    Review of patient's allergies indicates:   Allergen Reactions    Aspirin Tinitus    Ciprofloxacin Rash    Clindamycin Itching       Family History     None        Tobacco Use    Smoking status: Never Smoker    Smokeless tobacco: Never Used   Substance and Sexual Activity    Alcohol use: Never     Frequency: Never    Drug use: Never    Sexual activity: Not on file       PTA Medications   Medication Sig    acetaminophen (TYLENOL) 500 MG tablet Take 1 tablet (500 mg total) by mouth every 6 (six) hours as needed for Pain. Max 2000mg daily.    diaper,brief,adult,disposable Misc 5 mg/kg/day by Misc.(Non-Drug; Combo Route) route 5 (five) times daily. Patient with incontinence. Please supply enough depends for 5xs/day    ergocalciferol (VITAMIN D2) 50,000 unit Cap Take 50,000 Units by mouth every 7 days.    escitalopram oxalate (LEXAPRO) 5 MG Tab Take 1 tablet (5 mg total) by mouth once daily.    furosemide (LASIX) 40 MG tablet Take 1 tablet (40 mg total) by mouth once daily. Take 2 tablets (80 mg) by mouth in the morning and 1 tablet (40mg) in the evening.    haloperidoL (HALDOL) 1 MG tablet Take 1 tablet (1 mg total) by mouth daily as needed (non redirectable agitation). (Patient not taking: Reported on 9/14/2020)    lactulose (CEPHULAC) 10 gram packet Mix 3 packets (30 g total) with liquid and take by mouth 3 (three) times daily as needed.    levothyroxine (SYNTHROID) 88 MCG tablet Take 1 tablet (88 mcg total) by mouth before breakfast.    lidocaine (LIDODERM) 5 % Place 1 patch onto the skin every 24 hours. Remove & Discard patch within 12 hours or as directed  by MD    magnesium hydroxide 400 mg/5 ml (MILK OF MAGNESIA) 400 mg/5 mL Susp Take 30 mLs (2,400 mg total) by mouth daily as needed (Take if constipated despite lactulose).    melatonin 10 mg Tab Take 10 mg by mouth nightly as needed (sleep).    midodrine (PROAMATINE) 10 MG tablet Take 1 tablet (10 mg total) by mouth 3 (three) times daily.    nystatin (MYCOSTATIN) powder Apply topically 2 (two) times daily. Apply to affected area twice a day    pantoprazole (PROTONIX) 40 MG tablet Take 40 mg by mouth before breakfast.    polyethylene glycol (GLYCOLAX) 17 gram PwPk Take 17 g by mouth once daily. Or miralax OTC. Take if constipated despite lactulose    rifAXIMin (XIFAXAN) 550 mg Tab Take 550 mg by mouth 2 (two) times daily.     simethicone (MYLICON) 80 MG chewable tablet Take 80 mg by mouth every 6 (six) hours as needed for Flatulence. Take 2 tablets by mouth every 6 hours as needed for gas    spironolactone (ALDACTONE) 50 MG tablet Take 2 tablets (100 mg total) by mouth once daily.    zinc sulfate (ZINCATE) 220 (50) mg capsule Take 220 mg by mouth before breakfast.       Review of Systems   Constitutional: Positive for activity change and fatigue. Negative for fever.   HENT: Negative for facial swelling and trouble swallowing.    Respiratory: Negative for cough, shortness of breath, wheezing and stridor.    Cardiovascular: Negative for chest pain and palpitations.   Gastrointestinal: Positive for abdominal pain. Negative for abdominal distention, constipation, diarrhea, nausea and vomiting.   Genitourinary: Negative for difficulty urinating and dysuria.   Neurological: Positive for weakness. Negative for dizziness and light-headedness.   Hematological: Bruises/bleeds easily.   Psychiatric/Behavioral: Negative for agitation, behavioral problems and confusion. The patient is nervous/anxious.      Objective:     Vital Signs (Most Recent):  Temp: 98.3 °F (36.8 °C) (09/22/20 0641)  Pulse: 81 (09/22/20  0641)  Resp: 16 (09/22/20 0641)  BP: (!) 121/56 (09/22/20 0641)  SpO2: 96 % (09/22/20 0641) Vital Signs (24h Range):  Temp:  [98.2 °F (36.8 °C)-98.3 °F (36.8 °C)] 98.3 °F (36.8 °C)  Pulse:  [73-84] 81  Resp:  [14-17] 16  SpO2:  [96 %-100 %] 96 %  BP: (113-152)/(56-70) 121/56     Weight: 60.9 kg (134 lb 4.2 oz)  Body mass index is 22.34 kg/m².    No intake or output data in the 24 hours ending 09/22/20 1018    Physical Exam  Vitals signs and nursing note reviewed.   Constitutional:       General: She is not in acute distress.  HENT:      Head: Normocephalic and atraumatic.   Eyes:      General: Scleral icterus present.         Right eye: No discharge.         Left eye: No discharge.   Cardiovascular:      Rate and Rhythm: Normal rate and regular rhythm.      Pulses: Normal pulses.      Heart sounds: No murmur.   Pulmonary:      Effort: Pulmonary effort is normal. No respiratory distress.      Breath sounds: No stridor. No wheezing or rales.   Abdominal:      General: Bowel sounds are normal. There is no distension.      Palpations: Abdomen is soft.      Tenderness: There is no abdominal tenderness. There is no guarding.   Skin:     General: Skin is warm and dry.      Capillary Refill: Capillary refill takes 2 to 3 seconds.      Coloration: Skin is jaundiced.      Comments: BLE with a few healing ulcers & LUE abrasions   Neurological:      Mental Status: She is alert and oriented to person, place, and time.      Comments: Slightly lethargic         Laboratory:  CBC:   Recent Labs   Lab 09/22/20  0038 09/22/20  0051   WBC 10.42  --    RBC 3.16*  --    HGB 10.9*  --    HCT 33.4* 36   PLT 96*  --    *  --    MCH 34.5*  --    MCHC 32.6  --      CMP:   Recent Labs   Lab 09/22/20  0038   *   CALCIUM 10.4   ALBUMIN 3.3*   PROT 7.7   *   K 5.3*   CO2 24   CL 98   BUN 27*   CREATININE 1.6*   ALKPHOS 133   ALT 28   AST 43*   BILITOT 5.9*     Labs within the past 24 hours have been reviewed.    Diagnostic  Results:  I have personally reviewed all pertinent imaging studies.

## 2020-09-22 NOTE — PROGRESS NOTES
Admit Note     Met with patient to assess needs. Patient is a 55 y.o.  female, admitted for abdominal pains.      Patient admitted from the emergency room on 9/22/2020 .  At this time, patient presents as alert and oriented x 4, pleasant, good eye contact, recall good, concentration/judgement good, average intelligence, calm, communicative and cooperative.  At this time, patients caregiver presents as not present.    Household/Family Systems     Patient resides with patient's , [facility] [place] [address]. Local Lodging:  [facility] ([address] [place] [address]).  Support system includes pt's  Yonis.  Patient does not have dependents that are need of being cared for.     Patients primary caregiver is Yonis Mahoney, patients , phone number [phone].  Confirmed patients contact information is [phone] (home);   Telephone Information:   Mobile [phone]   .    During admission, patient's caregiver plans to stay at home.  Confirmed patient and patients caregivers do have access to reliable transportation.    Cognitive Status/Learning     Patient reports reading ability as 12th grade and states patient does have difficulty with vision and wears readers.  Patient reports patient learns best by seeing, hearing and visual.   Needed: No.   Highest education level: High School (9-12) or GED    Vocation/Disability   .  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    Adherence     Patient reports a high level of adherence to patients health care regimen.  Adherence counseling and education provided. Patient verbalizes understanding.    Substance Use    Patient reports the following substance usage.    Tobacco: none, patient denies any use.  Alcohol: none, patient denies any use.  Illicit Drugs/Non-prescribed Medications: none, patient denies any use.  Patient  states clear understanding of the potential impact of substance use.  Substance abstinence/cessation counseling, education and resources provided and reviewed.     Services Utilizing/ADLS    Infusion Service: Prior to admission, patient utilizing? no; however, in the past pt utilized [facility] for services  Home Health: Prior to admission, patient utilizing? yes: current with [facility] for PT/SN  DME: Prior to admission, yes: walker, w/c, bathroom pull bars and shower chair.  Pt also utilizes a hospital bed at home    Pulmonary/Cardiac Rehab: Prior to admission, no  Dialysis:  Prior to admission, no  Transplant Specialty Pharmacy:  Prior to admission, yes; [facility].    Prior to admission, patient reports patient was independent with ADLS and was not driving.  Patient reports patient is able to care for self at this time..  Patient indicates a willingness to care for self once medically cleared to do so.    Insurance/Medications    Insured by   Payor/Plan Subscr  Sex Relation Sub. Ins. ID Effective Group Num   1. BLUE CROSS BL* FLORIAN MEDINA [birth year]1964 Male  [id number]                                   [address] [address]      Primary Insurance (for UNOS reporting): Private Insurance-BCBS  Secondary Insurance (for UNOS reporting): None    Patient reports patient is able to obtain and afford medications at this time and at time of discharge.    Living Will/Healthcare Power of     Patient states patient does not have a LW and/or HCPA.   provided education regarding LW and HCPA and the completion of forms.    Coping/Mental Health    Patient is coping well with the aid of  family members.  Pt denied any issues or concerns; however, reported being scheduled for an appointment with [facility] on 20 for unknown reasons (a review of pt's chart did not reflect a cause).  Patient denies mental health difficulties.     Discharge Planning    At time of discharge, patient plans to return to pt's  local lodging under the care of self/pt's .  Patients  will transport patient.  Per rounds today, expected discharge date has not been medically determined at this time. Patient and patients caregiver  verbalize understanding and are involved in treatment planning and discharge process.    Additional Concerns    Patient is being followed for needs, education, resources, information, emotional support, supportive counseling, and for supportive and skilled discharge plan of care.  providing ongoing psychosocial support, education, resources and d/c planning as needed.  SW remains available.  remains available. Patient denies additional needs and/or concerns at this time. Patient verbalizes understanding and agreement with information reviewed, social work availability, and how to access available resources as needed.

## 2020-09-22 NOTE — ED TRIAGE NOTES
Zeina Mahoney, a 55 y.o. female presents to the ED w/ complaint of abdominal pain that started 2 hours ago. Pt is a liver transplant pt.     Triage note:  Chief Complaint   Patient presents with    Abdominal Pain     liver transplant pt. Pt reports abdominal pain that started 2 hrs PTA; pain is generalized and described as sharp. Denies N/V/D     Review of patient's allergies indicates:   Allergen Reactions    Aspirin Tinitus    Ciprofloxacin Rash    Clindamycin Itching     Past Medical History:   Diagnosis Date    Chronic Hepatic encephalopathy 6/25/2020    Decompensated hepatic cirrhosis 6/25/2020    History of pancreatitis 6/25/2020    Liver cirrhosis secondary to CHAUDHARY 6/25/2020    Macrocytic anemia 6/25/2020    Other ascites 6/25/2020    Thrombocytopenia 6/25/2020     LOC: The patient is awake, alert, and oriented to place, time, situation. Affect is appropriate.  Speech is appropriate and clear.   APPEARANCE: Patient resting comfortably in no acute distress.  Patient is clean and well groomed.  SKIN: The skin is warm and dry; color consistent with ethnicity.  Patient has normal skin turgor and moist mucus membranes.  Skin intact; no breakdown or bruising noted.   MUSCULOSKELETAL: Patient moving upper and lower extremities without difficulty.  Denies weakness.   RESPIRATORY: Airway is open and patent. Respirations spontaneous, even, easy, and non-labored.  Patient has a normal effort and rate.  No accessory muscle use noted. Denies cough.   CARDIAC:  Normal rhythm and rate noted.  No peripheral edema noted. No complaints of chest pain.    ABDOMEN: generalized abdominal pain. No distention noted.   NEUROLOGIC: Eyes open spontaneously.  Behavior appropriate to situation.  Follows commands; facial expression symmetrical.  Purposeful motor response noted; normal sensation in all extremities.

## 2020-09-22 NOTE — HOSPITAL COURSE
Interval history: no acute events overnight. Pt reports feeling well at this time and abdominal pain has since resolved on assessment this am. Abdomen tender to palpation. Pt with slowed speech but remains AAOx3. Will cont with lactulose at this time. Recent abdominal CT noted with only trace free fluid and will hold off on paracentesis at this time. Diet now advanced and pt reports desire to eat. Slight KG noted and will plan for albumin for hydration. Monitor.

## 2020-09-22 NOTE — PROGRESS NOTES
[facility]  Liver Transplant  Progress Note    Patient Name: Zeina Mahoney  MRN: [id number]  Admission Date: 9/22/2020  Hospital Length of Stay: 0 days  Code Status: Full Code  Primary Care Provider: Primary Doctor No  Post-Operative Day:     ORGAN:   LIVER  Disease Etiology: Cirrhosis: Fatty Liver (Chaudhary)  Donor Type:   Donation after Brain Death  CDC High Risk:   No  Donor CMV Status:   Donor CMV Status: Positive  Donor HBcAB:   Negative  Donor HCV Status:   Negative  Donor HBV HERACLIO: Negative  Donor HCV HERACLIO: Negative  Whole or Partial:   Biliary Anastomosis:   Arterial Anatomy:   Subjective:     History of Present Illness:  Ms. aMhoney is a 56 y/o female with pmh of ESLD secondary to CHAUDHARY who presents to the ED with complaint of abdominal pain. CT scan obtained, unremarkable to source of pain.  Denies N/V/D. Passing gas and +BM. Lipase elevated on admit, has hx of pancreatic lesions. Upon assessment mental status slow, but AAO x 3.  Patient's  at bedside- reports having regular bowel movements. Labs in the ED also notable for hyperkalemia and KG. Plan to admit for observation to address these issues.    Hospital Course:  Interval history: no acute events overnight. Pt reports feeling well at this time and abdominal pain has since resolved on assessment this am. Abdomen tender to palpation. Pt with slowed speech but remains AAOx3. Will cont with lactulose at this time. Recent abdominal CT noted with only trace free fluid and will hold off on paracentesis at this time. Diet now advanced and pt reports desire to eat. Slight KG noted and will plan for albumin for hydration. Monitor.     Past Medical History:   Diagnosis Date    Chronic Hepatic encephalopathy 6/25/2020    Decompensated hepatic cirrhosis 6/25/2020    History of pancreatitis 6/25/2020    Liver cirrhosis secondary to CHAUDHARY 6/25/2020    Macrocytic anemia 6/25/2020    Other ascites 6/25/2020    Thrombocytopenia 6/25/2020       No  past surgical history on file.    Review of patient's allergies indicates:   Allergen Reactions    Aspirin Tinitus    Ciprofloxacin Rash    Clindamycin Itching       Family History     None        Tobacco Use    Smoking status: Never Smoker    Smokeless tobacco: Never Used   Substance and Sexual Activity    Alcohol use: Never     Frequency: Never    Drug use: Never    Sexual activity: Not on file       PTA Medications   Medication Sig    acetaminophen (TYLENOL) 500 MG tablet Take 1 tablet (500 mg total) by mouth every 6 (six) hours as needed for Pain. Max 2000mg daily.    diaper,brief,adult,disposable Misc 5 mg/kg/day by Misc.(Non-Drug; Combo Route) route 5 (five) times daily. Patient with incontinence. Please supply enough depends for 5xs/day    ergocalciferol (VITAMIN D2) 50,000 unit Cap Take 50,000 Units by mouth every 7 days.    escitalopram oxalate (LEXAPRO) 5 MG Tab Take 1 tablet (5 mg total) by mouth once daily.    furosemide (LASIX) 40 MG tablet Take 1 tablet (40 mg total) by mouth once daily. Take 2 tablets (80 mg) by mouth in the morning and 1 tablet (40mg) in the evening.    haloperidoL (HALDOL) 1 MG tablet Take 1 tablet (1 mg total) by mouth daily as needed (non redirectable agitation). (Patient not taking: Reported on 9/14/2020)    lactulose (CEPHULAC) 10 gram packet Mix 3 packets (30 g total) with liquid and take by mouth 3 (three) times daily as needed.    levothyroxine (SYNTHROID) 88 MCG tablet Take 1 tablet (88 mcg total) by mouth before breakfast.    lidocaine (LIDODERM) 5 % Place 1 patch onto the skin every 24 hours. Remove & Discard patch within 12 hours or as directed by MD    magnesium hydroxide 400 mg/5 ml (MILK OF MAGNESIA) 400 mg/5 mL Susp Take 30 mLs (2,400 mg total) by mouth daily as needed (Take if constipated despite lactulose).    melatonin 10 mg Tab Take 10 mg by mouth nightly as needed (sleep).    midodrine (PROAMATINE) 10 MG tablet Take 1 tablet (10 mg total) by  mouth 3 (three) times daily.    nystatin (MYCOSTATIN) powder Apply topically 2 (two) times daily. Apply to affected area twice a day    pantoprazole (PROTONIX) 40 MG tablet Take 40 mg by mouth before breakfast.    polyethylene glycol (GLYCOLAX) 17 gram PwPk Take 17 g by mouth once daily. Or miralax OTC. Take if constipated despite lactulose    rifAXIMin (XIFAXAN) 550 mg Tab Take 550 mg by mouth 2 (two) times daily.     simethicone (MYLICON) 80 MG chewable tablet Take 80 mg by mouth every 6 (six) hours as needed for Flatulence. Take 2 tablets by mouth every 6 hours as needed for gas    spironolactone (ALDACTONE) 50 MG tablet Take 2 tablets (100 mg total) by mouth once daily.    zinc sulfate (ZINCATE) 220 (50) mg capsule Take 220 mg by mouth before breakfast.       Review of Systems   Constitutional: Positive for activity change and fatigue. Negative for fever.   HENT: Negative for facial swelling and trouble swallowing.    Respiratory: Negative for cough, shortness of breath, wheezing and stridor.    Cardiovascular: Negative for chest pain and palpitations.   Gastrointestinal: Positive for abdominal pain. Negative for abdominal distention, constipation, diarrhea, nausea and vomiting.   Genitourinary: Negative for difficulty urinating and dysuria.   Neurological: Positive for weakness. Negative for dizziness and light-headedness.   Hematological: Bruises/bleeds easily.   Psychiatric/Behavioral: Negative for agitation, behavioral problems and confusion. The patient is nervous/anxious.      Objective:     Vital Signs (Most Recent):  Temp: 98.3 °F (36.8 °C) (09/22/20 0641)  Pulse: 81 (09/22/20 0641)  Resp: 16 (09/22/20 0641)  BP: (!) 121/56 (09/22/20 0641)  SpO2: 96 % (09/22/20 0641) Vital Signs (24h Range):  Temp:  [98.2 °F (36.8 °C)-98.3 °F (36.8 °C)] 98.3 °F (36.8 °C)  Pulse:  [73-84] 81  Resp:  [14-17] 16  SpO2:  [96 %-100 %] 96 %  BP: (113-152)/(56-70) 121/56     Weight: 60.9 kg (134 lb 4.2 oz)  Body mass  index is 22.34 kg/m².    No intake or output data in the 24 hours ending 09/22/20 1018    Physical Exam  Vitals signs and nursing note reviewed.   Constitutional:       General: She is not in acute distress.  HENT:      Head: Normocephalic and atraumatic.   Eyes:      General: Scleral icterus present.         Right eye: No discharge.         Left eye: No discharge.   Cardiovascular:      Rate and Rhythm: Normal rate and regular rhythm.      Pulses: Normal pulses.      Heart sounds: No murmur.   Pulmonary:      Effort: Pulmonary effort is normal. No respiratory distress.      Breath sounds: No stridor. No wheezing or rales.   Abdominal:      General: Bowel sounds are normal. There is no distension.      Palpations: Abdomen is soft.      Tenderness: There is no abdominal tenderness. There is no guarding.   Skin:     General: Skin is warm and dry.      Capillary Refill: Capillary refill takes 2 to 3 seconds.      Coloration: Skin is jaundiced.      Comments: BLE with a few healing ulcers & LUE abrasions   Neurological:      Mental Status: She is alert and oriented to person, place, and time.      Comments: Slightly lethargic         Laboratory:  CBC:   Recent Labs   Lab 09/22/20  0038 09/22/20  0051   WBC 10.42  --    RBC 3.16*  --    HGB 10.9*  --    HCT 33.4* 36   PLT 96*  --    *  --    MCH 34.5*  --    MCHC 32.6  --      CMP:   Recent Labs   Lab 09/22/20  0038   *   CALCIUM 10.4   ALBUMIN 3.3*   PROT 7.7   *   K 5.3*   CO2 24   CL 98   BUN 27*   CREATININE 1.6*   ALKPHOS 133   ALT 28   AST 43*   BILITOT 5.9*     Labs within the past 24 hours have been reviewed.    Diagnostic Results:  I have personally reviewed all pertinent imaging studies.    Assessment/Plan:     * Abdominal pain  - Lipase elevated on admit  - CT scan unremarkable to source of pain  - Treat with tylenol  - Pt with large BM which has now relived abdominal pain.   - monitor         Hepatorenal syndrome  - Cr 1.6 on admit  -  Albumin ordered for hydration.   - home diuretics now on hold   - monitor        Hyperkalemia  - K minimally elevated  - Aldactone held  - monitor      Anemia of chronic disease  - H/H stable. No overt signs of bleeding.  - Monitor      Acquired coagulation factor deficiency  - secondary liver disease  - no avert signs of bleeding  - monitor pt/inr daily      Liver cirrhosis secondary to CHAUDHARY  - listed for liver transplant      Acute Hepatic encephalopathy  - chronic  - continue lactulose and rifaximin  - goal 3-4 BM /day.   - AAOx3  - Monitor          VTE Risk Mitigation (From admission, onward)         Ordered     IP VTE HIGH RISK PATIENT  Once      09/22/20 0524     Place sequential compression device  Until discontinued      09/22/20 0524                The patients clinical status was discussed at multidisplinary rounds, involving transplant surgery, transplant medicine, pharmacy, nursing, nutrition, and social work    Discharge Planning:  No Patient Care Coordination Note on file.      Jerry Perdue NP  Liver Transplant  [facility]

## 2020-09-22 NOTE — ED NOTES
I-STAT Chem-8+ Results:   Value Reference Range   Sodium 133 136-145 mmol/L   Potassium  5.2 3.5-5.1 mmol/L   Chloride 100  mmol/L   Ionized Calcium 1.34 1.06-1.42 mmol/L   CO2 (measured) 25 23-29 mmol/L   Glucose 122  mg/dL   BUN 27 6-30 mg/dL   Creatinine 1.7 0.5-1.4 mg/dL   Hematocrit 36 36-54%

## 2020-09-22 NOTE — SUBJECTIVE & OBJECTIVE
Past Medical History:   Diagnosis Date    Chronic Hepatic encephalopathy 6/25/2020    Decompensated hepatic cirrhosis 6/25/2020    History of pancreatitis 6/25/2020    Liver cirrhosis secondary to CHAUDHARY 6/25/2020    Macrocytic anemia 6/25/2020    Other ascites 6/25/2020    Thrombocytopenia 6/25/2020       No past surgical history on file.    Review of patient's allergies indicates:   Allergen Reactions    Aspirin Tinitus    Ciprofloxacin Rash    Clindamycin Itching       Family History     None        Tobacco Use    Smoking status: Never Smoker    Smokeless tobacco: Never Used   Substance and Sexual Activity    Alcohol use: Never     Frequency: Never    Drug use: Never    Sexual activity: Not on file       (Not in a hospital admission)      Review of Systems   Constitutional: Positive for activity change and fatigue. Negative for fever.   HENT: Negative for facial swelling and trouble swallowing.    Respiratory: Negative for cough, shortness of breath, wheezing and stridor.    Cardiovascular: Negative for chest pain and palpitations.   Gastrointestinal: Positive for abdominal pain. Negative for abdominal distention, constipation, diarrhea, nausea and vomiting.   Genitourinary: Negative for difficulty urinating and dysuria.   Neurological: Positive for weakness. Negative for dizziness and light-headedness.   Hematological: Bruises/bleeds easily.   Psychiatric/Behavioral: Negative for agitation, behavioral problems and confusion. The patient is nervous/anxious.      Objective:     Vital Signs (Most Recent):  Temp: 98.2 °F (36.8 °C) (09/21/20 2319)  Pulse: 81 (09/22/20 0432)  Resp: 14 (09/22/20 0342)  BP: 117/61 (09/22/20 0432)  SpO2: 100 % (09/22/20 0432) Vital Signs (24h Range):  Temp:  [98.2 °F (36.8 °C)] 98.2 °F (36.8 °C)  Pulse:  [73-83] 81  Resp:  [14-17] 14  SpO2:  [100 %] 100 %  BP: (113-152)/(56-70) 117/61        There is no height or weight on file to calculate BMI.    No intake or output data  in the 24 hours ending 09/22/20 0538    Physical Exam  Vitals signs and nursing note reviewed.   Constitutional:       General: She is not in acute distress.  HENT:      Head: Normocephalic and atraumatic.   Eyes:      General: Scleral icterus present.         Right eye: No discharge.         Left eye: No discharge.   Cardiovascular:      Rate and Rhythm: Normal rate and regular rhythm.      Pulses: Normal pulses.      Heart sounds: No murmur.   Pulmonary:      Effort: Pulmonary effort is normal. No respiratory distress.      Breath sounds: No stridor. No wheezing or rales.   Abdominal:      General: Bowel sounds are normal. There is no distension.      Palpations: Abdomen is soft.      Tenderness: There is no abdominal tenderness. There is no guarding.   Skin:     General: Skin is warm and dry.      Capillary Refill: Capillary refill takes 2 to 3 seconds.      Coloration: Skin is jaundiced.      Comments: BLE with a few healing ulcers & LUE abrasions   Neurological:      Mental Status: She is alert and oriented to person, place, and time.      Comments: Slightly lethargic         Laboratory:  CBC:   Recent Labs   Lab 09/22/20  0038 09/22/20  0051   WBC 10.42  --    RBC 3.16*  --    HGB 10.9*  --    HCT 33.4* 36   PLT 96*  --    *  --    MCH 34.5*  --    MCHC 32.6  --      CMP:   Recent Labs   Lab 09/22/20  0038   *   CALCIUM 10.4   ALBUMIN 3.3*   PROT 7.7   *   K 5.3*   CO2 24   CL 98   BUN 27*   CREATININE 1.6*   ALKPHOS 133   ALT 28   AST 43*   BILITOT 5.9*     Labs within the past 24 hours have been reviewed.    Diagnostic Results:  I have personally reviewed all pertinent imaging studies.

## 2020-09-22 NOTE — ASSESSMENT & PLAN NOTE
- Cr 1.6 on admit  - Albumin ordered  - Lasix held. To be restarted when appropriate  - monitor

## 2020-09-22 NOTE — PLAN OF CARE
AAOx4, confused at times and pulling out IVs.   LFA 20g PIV saline locked.   Left leg wound. Wound care consult placed. Skin otherwise intact.   Cr 1.6. Albumin given x2. If Cr is better in the am then pt should dc.   No Bms this shift despite 2 doses of lactulose.   No other issues this shift.   Ambulates independently. Bed alarm in place. Wears non slip socks when oob. Free from falls/injuries.  Bed in lowest, locked position. Bed rails up x2. Call light and personal belongings within reach.  Will continue to monitor.

## 2020-09-22 NOTE — ASSESSMENT & PLAN NOTE
- Cr 1.6 on admit  - Albumin ordered for hydration.   - home diuretics now on hold   - monitor

## 2020-09-23 ENCOUNTER — OFFICE VISIT (OUTPATIENT)
Dept: PSYCHIATRY | Facility: CLINIC | Age: 55
End: 2020-09-23
Payer: COMMERCIAL

## 2020-09-23 VITALS
HEIGHT: 64 IN | HEART RATE: 71 BPM | BODY MASS INDEX: 22.88 KG/M2 | WEIGHT: 134 LBS | DIASTOLIC BLOOD PRESSURE: 54 MMHG | SYSTOLIC BLOOD PRESSURE: 102 MMHG

## 2020-09-23 VITALS
RESPIRATION RATE: 16 BRPM | HEART RATE: 73 BPM | HEIGHT: 65 IN | DIASTOLIC BLOOD PRESSURE: 59 MMHG | BODY MASS INDEX: 22.37 KG/M2 | OXYGEN SATURATION: 97 % | SYSTOLIC BLOOD PRESSURE: 115 MMHG | TEMPERATURE: 98 F | WEIGHT: 134.25 LBS

## 2020-09-23 DIAGNOSIS — F41.9 ANXIETY: Primary | ICD-10-CM

## 2020-09-23 DIAGNOSIS — Z76.82 AWAITING ORGAN TRANSPLANT STATUS: ICD-10-CM

## 2020-09-23 DIAGNOSIS — K75.81 LIVER CIRRHOSIS SECONDARY TO NASH: Primary | ICD-10-CM

## 2020-09-23 DIAGNOSIS — K74.60 LIVER CIRRHOSIS SECONDARY TO NASH: Primary | ICD-10-CM

## 2020-09-23 LAB
ALBUMIN SERPL BCP-MCNC: 3.3 G/DL (ref 3.5–5.2)
ALP SERPL-CCNC: 104 U/L (ref 55–135)
ALT SERPL W/O P-5'-P-CCNC: 16 U/L (ref 10–44)
ANION GAP SERPL CALC-SCNC: 4 MMOL/L (ref 8–16)
AST SERPL-CCNC: 29 U/L (ref 10–40)
BASOPHILS # BLD AUTO: 0.07 K/UL (ref 0–0.2)
BASOPHILS NFR BLD: 0.8 % (ref 0–1.9)
BILIRUB SERPL-MCNC: 4.4 MG/DL (ref 0.1–1)
BUN SERPL-MCNC: 25 MG/DL (ref 6–20)
CALCIUM SERPL-MCNC: 10.5 MG/DL (ref 8.7–10.5)
CHLORIDE SERPL-SCNC: 98 MMOL/L (ref 95–110)
CO2 SERPL-SCNC: 29 MMOL/L (ref 23–29)
CREAT SERPL-MCNC: 1.4 MG/DL (ref 0.5–1.4)
DIFFERENTIAL METHOD: ABNORMAL
EOSINOPHIL # BLD AUTO: 0.2 K/UL (ref 0–0.5)
EOSINOPHIL NFR BLD: 2.2 % (ref 0–8)
ERYTHROCYTE [DISTWIDTH] IN BLOOD BY AUTOMATED COUNT: 14.4 % (ref 11.5–14.5)
EST. GFR  (AFRICAN AMERICAN): 48.8 ML/MIN/1.73 M^2
EST. GFR  (NON AFRICAN AMERICAN): 42.3 ML/MIN/1.73 M^2
GLUCOSE SERPL-MCNC: 140 MG/DL (ref 70–110)
HCT VFR BLD AUTO: 25.2 % (ref 37–48.5)
HGB BLD-MCNC: 8.5 G/DL (ref 12–16)
IMM GRANULOCYTES # BLD AUTO: 0.04 K/UL (ref 0–0.04)
IMM GRANULOCYTES NFR BLD AUTO: 0.5 % (ref 0–0.5)
INR PPP: 1.2 (ref 0.8–1.2)
LYMPHOCYTES # BLD AUTO: 1.2 K/UL (ref 1–4.8)
LYMPHOCYTES NFR BLD: 13.3 % (ref 18–48)
MAGNESIUM SERPL-MCNC: 1.8 MG/DL (ref 1.6–2.6)
MCH RBC QN AUTO: 35.3 PG (ref 27–31)
MCHC RBC AUTO-ENTMCNC: 33.7 G/DL (ref 32–36)
MCV RBC AUTO: 105 FL (ref 82–98)
MONOCYTES # BLD AUTO: 0.5 K/UL (ref 0.3–1)
MONOCYTES NFR BLD: 6.3 % (ref 4–15)
NEUTROPHILS # BLD AUTO: 6.6 K/UL (ref 1.8–7.7)
NEUTROPHILS NFR BLD: 76.9 % (ref 38–73)
NRBC BLD-RTO: 0 /100 WBC
PHOSPHATE SERPL-MCNC: 2.9 MG/DL (ref 2.7–4.5)
PLATELET # BLD AUTO: 67 K/UL (ref 150–350)
PMV BLD AUTO: 9.8 FL (ref 9.2–12.9)
POTASSIUM SERPL-SCNC: 5.1 MMOL/L (ref 3.5–5.1)
PROT SERPL-MCNC: 6.1 G/DL (ref 6–8.4)
PROTHROMBIN TIME: 13.1 SEC (ref 9–12.5)
RBC # BLD AUTO: 2.41 M/UL (ref 4–5.4)
SODIUM SERPL-SCNC: 131 MMOL/L (ref 136–145)
WBC # BLD AUTO: 8.63 K/UL (ref 3.9–12.7)

## 2020-09-23 PROCEDURE — 80053 COMPREHEN METABOLIC PANEL: CPT | Mod: NTX

## 2020-09-23 PROCEDURE — 83735 ASSAY OF MAGNESIUM: CPT | Mod: NTX

## 2020-09-23 PROCEDURE — 99217 PR OBSERVATION CARE DISCHARGE: CPT | Mod: NTX,,, | Performed by: NURSE PRACTITIONER

## 2020-09-23 PROCEDURE — 84100 ASSAY OF PHOSPHORUS: CPT | Mod: NTX

## 2020-09-23 PROCEDURE — 85025 COMPLETE CBC W/AUTO DIFF WBC: CPT | Mod: NTX

## 2020-09-23 PROCEDURE — 90792 PSYCH DIAG EVAL W/MED SRVCS: CPT | Mod: GT,S$GLB,TXP, | Performed by: PSYCHIATRY & NEUROLOGY

## 2020-09-23 PROCEDURE — 90792 PR PSYCHIATRIC DIAGNOSTIC EVALUATION W/MEDICAL SERVICES: ICD-10-PCS | Mod: GT,S$GLB,TXP, | Performed by: PSYCHIATRY & NEUROLOGY

## 2020-09-23 PROCEDURE — 36415 COLL VENOUS BLD VENIPUNCTURE: CPT | Mod: NTX

## 2020-09-23 PROCEDURE — G0378 HOSPITAL OBSERVATION PER HR: HCPCS | Mod: NTX

## 2020-09-23 PROCEDURE — 99217 PR OBSERVATION CARE DISCHARGE: ICD-10-PCS | Mod: NTX,,, | Performed by: NURSE PRACTITIONER

## 2020-09-23 PROCEDURE — 85610 PROTHROMBIN TIME: CPT | Mod: NTX

## 2020-09-23 PROCEDURE — 25000003 PHARM REV CODE 250: Mod: NTX | Performed by: PHYSICIAN ASSISTANT

## 2020-09-23 RX ORDER — FUROSEMIDE 40 MG/1
40 TABLET ORAL 2 TIMES DAILY
Status: DISCONTINUED | OUTPATIENT
Start: 2020-09-23 | End: 2020-09-23 | Stop reason: HOSPADM

## 2020-09-23 RX ADMIN — ESCITALOPRAM 5 MG: 5 TABLET, FILM COATED ORAL at 09:09

## 2020-09-23 RX ADMIN — LACTULOSE 30 G: 20 SOLUTION ORAL at 09:09

## 2020-09-23 RX ADMIN — MIDODRINE HYDROCHLORIDE 10 MG: 5 TABLET ORAL at 09:09

## 2020-09-23 RX ADMIN — RIFAXIMIN 550 MG: 550 TABLET ORAL at 09:09

## 2020-09-23 RX ADMIN — LEVOTHYROXINE SODIUM 88 MCG: 88 TABLET ORAL at 05:09

## 2020-09-23 RX ADMIN — PANTOPRAZOLE SODIUM 40 MG: 40 TABLET, DELAYED RELEASE ORAL at 05:09

## 2020-09-23 NOTE — PLAN OF CARE
LLE wound evaluated by Wound care. Pt to continue using Medi honey as instructed before admit. Cr=1.4 decreased from 1.5. Tolerating po well. UOP adequate. Had BM early am. Denies N/V. Plan to discharge today. Will follow-up in clinic tomorrow with labs.

## 2020-09-23 NOTE — NURSING
Discharge instructions given and reviewed with pt and . No needed RX. Plans to attend appt today scheduled for 1400. Verbalized understanding.

## 2020-09-23 NOTE — PROGRESS NOTES
Discharge   (SW) met with patient (pt) to discuss discharge plans for today.  Pt presented alert/oriented and engaged.  Pt denied any coping issues or concerns.  Pt's  will arrive to transport the pt to their local lodging.  Pt will discharge without any needs or orders for services.  SW remains available for education, resources, support and discharge planning as appropriate.

## 2020-09-23 NOTE — DISCHARGE SUMMARY
[facility]  Liver Transplant  Discharge Summary      Patient Name: Zeina Mahoney  MRN: [id number]  Admission Date: 9/22/2020  Hospital Length of Stay: 0 days  Discharge Date and Time:  09/23/2020 11:20 AM  Attending Physician: Oskar Crawford MD   Discharging Provider: Jerry Perdue NP  Primary Care Provider: Primary Doctor No      Hospital Course:     Ms. 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 CT scan obtained, unremarkable to source of pain. Lipase elevated on admit, has hx of pancreatic lesions. Upon assessment mental status slow, but AAO x 3. Lactulose was continued for control of HE. Pt also with KG on admit lab work. Diuretics were placed on hold and pt was given albumin for hydration. Pt was able to have large BM on this admission which subsequently resolved her abdominal pain. On 9/23, Cr level was noted with improvement after hold diuretics. Pt reports feeling well at this time and tolerating diet. Will plan to hold diuretics until pt is seen in clinic per Dr. Reyes on 9/24. She will get lab work that morning as well. Will plan to d/c home today. F/u as per PDOL protocol, lab work in am, and clinic visit tomorrow. She remains AAOx3 with family at bedside.     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] 07/18/2020 Yes    Liver cirrhosis secondary to CHAUDHARY [K75.81, K74.60] 06/25/2020 Yes    Acute Hepatic encephalopathy [K72.90] 06/25/2020 Yes      Problems Resolved During this Admission:           Pending Diagnostic Studies:     None        Significant Diagnostic  Studies: Labs:   CMP   Recent Labs   Lab 09/22/20  0038 09/22/20  1330 09/23/20  0630   * 131* 131*   K 5.3* 3.9 5.1   CL 98 96 98   CO2 24 26 29   * 142* 140*   BUN 27* 27* 25*   CREATININE 1.6* 1.5* 1.4   CALCIUM 10.4 10.5 10.5   PROT 7.7 7.0 6.1   ALBUMIN 3.3* 3.9 3.3*   BILITOT 5.9* 5.8* 4.4*   ALKPHOS 133 96 104   AST 43* 31 29   ALT 28 22 16   ANIONGAP 12 9 4*   ESTGFRAFRICA 41.5* 44.9* 48.8*   EGFRNONAA 36.0* 38.9* 42.3*   , CBC   Recent Labs   Lab 09/22/20  0038  09/22/20  1330 09/23/20  0630   WBC 10.42  --  8.91 8.63   HGB 10.9*  --  8.5* 8.5*   HCT 33.4*   < > 25.9* 25.2*   PLT 96*  --  73* 67*    < > = values in this interval not displayed.   , INR   Lab Results   Component Value Date    INR 1.2 09/23/2020    INR 1.1 09/22/2020    INR 1.2 09/19/2020    and All labs within the past 24 hours have been reviewed    The patients clinical status was discussed at multidisplinary rounds, involving transplant surgery, transplant medicine, pharmacy, nursing, nutrition, and social work    Discharged Condition: good    Disposition:     Follow Up:    Patient Instructions:      Lifting restrictions   Order Comments: Do not lift anything greater than 10lbs for at least 6 weeks from surgery date.     Call MD for:  temperature >100.4     Call MD for:  persistent nausea and vomiting or diarrhea     Call MD for:  severe uncontrolled pain     Call MD for:  redness, tenderness, or signs of infection (pain, swelling, redness, odor or green/yellow discharge around incision site)     Call MD for:  difficulty breathing or increased cough     Call MD for:  severe persistent headache     Call MD for:  worsening rash     Call MD for:  persistent dizziness, light-headedness, or visual disturbances     Call MD for:  increased confusion or weakness     Call MD for:   Order Comments: For any concerning signs or symptoms.     Medications:  Reconciled Home Medications:      Medication List      CONTINUE taking these  medications    acetaminophen 500 MG tablet  Commonly known as: TYLENOL  Take 1 tablet (500 mg total) by mouth every 6 (six) hours as needed for Pain. Max 2000mg daily.     diaper,brief,adult,disposable Misc  5 mg/kg/day by Misc.(Non-Drug; Combo Route) route 5 (five) times daily. Patient with incontinence. Please supply enough depends for 5xs/day     escitalopram oxalate 5 MG Tab  Commonly known as: LEXAPRO  Take 1 tablet (5 mg total) by mouth once daily.     KRISTALOSE 10 gram packet  Generic drug: lactulose  Mix 3 packets (30 g total) with liquid and take by mouth 3 (three) times daily as needed.     levothyroxine 88 MCG tablet  Commonly known as: SYNTHROID  Take 1 tablet (88 mcg total) by mouth before breakfast.     lidocaine 5 %  Commonly known as: LIDODERM  Place 1 patch onto the skin every 24 hours. Remove & Discard patch within 12 hours or as directed by MD     magnesium hydroxide 400 mg/5 ml 400 mg/5 mL Susp  Commonly known as: MILK OF MAGNESIA  Take 30 mLs (2,400 mg total) by mouth daily as needed (Take if constipated despite lactulose).     melatonin 10 mg Tab  Take 10 mg by mouth nightly as needed (sleep).     midodrine 10 MG tablet  Commonly known as: PROAMATINE  Take 1 tablet (10 mg total) by mouth 3 (three) times daily.     nystatin powder  Commonly known as: MYCOSTATIN  Apply topically 2 (two) times daily. Apply to affected area twice a day     pantoprazole 40 MG tablet  Commonly known as: PROTONIX  Take 40 mg by mouth before breakfast.     polyethylene glycol 17 gram Pwpk  Commonly known as: GLYCOLAX  Take 17 g by mouth once daily. Or miralax OTC. Take if constipated despite lactulose     rifAXIMin 550 mg Tab  Commonly known as: XIFAXAN  Take 550 mg by mouth 2 (two) times daily.     simethicone 80 MG chewable tablet  Commonly known as: MYLICON  Take 80 mg by mouth every 6 (six) hours as needed for Flatulence. Take 2 tablets by mouth every 6 hours as needed for gas     VITAMIN D2 50,000 unit  Cap  Generic drug: ergocalciferol  Take 50,000 Units by mouth every 7 days.     zinc sulfate 220 (50) mg capsule  Commonly known as: ZINCATE  Take 220 mg by mouth before breakfast.        STOP taking these medications    furosemide 40 MG tablet  Commonly known as: LASIX     haloperidoL 1 MG tablet  Commonly known as: HALDOL     spironolactone 50 MG tablet  Commonly known as: ALDACTONE          Time spent caring for patient (Greater than 1/2 spent in direct face-to-face contact): > 30 minutes    Jerry Perdue NP  Liver Transplant  [facility]

## 2020-09-23 NOTE — PROGRESS NOTES
Spoke to patient's .  Appointments for tomorrow reviewed.  Patient has [facility] booklet.  Asked to review the booklet.  Allowed time for questions and answers. .

## 2020-09-23 NOTE — PLAN OF CARE
AAOX4.  VSS.  Pt receiving lactulose, having bm's.  Creatinine was 1.6, albumin given.  Will look at creatinine this am if if better pt may dc.  Bed alarm is on for pt safety.  Bed is in lowest position, call bell is in reach, and pt instructed to call nurse when needing assistance.  Will continue to monitor.

## 2020-09-24 ENCOUNTER — LAB VISIT (OUTPATIENT)
Dept: TRANSPLANT | Facility: CLINIC | Age: 55
End: 2020-09-24
Payer: COMMERCIAL

## 2020-09-24 ENCOUNTER — LAB VISIT (OUTPATIENT)
Dept: LAB | Facility: HOSPITAL | Age: 55
End: 2020-09-24
Attending: INTERNAL MEDICINE
Payer: COMMERCIAL

## 2020-09-24 ENCOUNTER — OFFICE VISIT (OUTPATIENT)
Dept: TRANSPLANT | Facility: CLINIC | Age: 55
End: 2020-09-24
Payer: COMMERCIAL

## 2020-09-24 ENCOUNTER — TELEPHONE (OUTPATIENT)
Dept: TRANSPLANT | Facility: CLINIC | Age: 55
End: 2020-09-24

## 2020-09-24 VITALS
DIASTOLIC BLOOD PRESSURE: 56 MMHG | BODY MASS INDEX: 26.85 KG/M2 | HEIGHT: 61 IN | RESPIRATION RATE: 18 BRPM | OXYGEN SATURATION: 100 % | WEIGHT: 142.19 LBS | HEART RATE: 81 BPM | SYSTOLIC BLOOD PRESSURE: 105 MMHG | TEMPERATURE: 98 F

## 2020-09-24 DIAGNOSIS — Z76.82 AWAITING ORGAN TRANSPLANT STATUS: ICD-10-CM

## 2020-09-24 DIAGNOSIS — K74.60 LIVER CIRRHOSIS SECONDARY TO NASH: ICD-10-CM

## 2020-09-24 DIAGNOSIS — K74.60 DECOMPENSATED HEPATIC CIRRHOSIS: ICD-10-CM

## 2020-09-24 DIAGNOSIS — E44.0 PROTEIN-CALORIE MALNUTRITION, MODERATE: ICD-10-CM

## 2020-09-24 DIAGNOSIS — K72.90 DECOMPENSATED HEPATIC CIRRHOSIS: ICD-10-CM

## 2020-09-24 DIAGNOSIS — R18.8 OTHER ASCITES: Primary | ICD-10-CM

## 2020-09-24 DIAGNOSIS — K76.6 PORTAL HYPERTENSION: ICD-10-CM

## 2020-09-24 DIAGNOSIS — K75.81 LIVER CIRRHOSIS SECONDARY TO NASH: ICD-10-CM

## 2020-09-24 DIAGNOSIS — Z01.818 PRE-OP TESTING: Primary | ICD-10-CM

## 2020-09-24 LAB
ALBUMIN SERPL BCP-MCNC: 3.3 G/DL (ref 3.5–5.2)
ALP SERPL-CCNC: 100 U/L (ref 55–135)
ALT SERPL W/O P-5'-P-CCNC: 20 U/L (ref 10–44)
ANION GAP SERPL CALC-SCNC: 5 MMOL/L (ref 8–16)
AST SERPL-CCNC: 31 U/L (ref 10–40)
BASOPHILS # BLD AUTO: 0.05 K/UL (ref 0–0.2)
BASOPHILS NFR BLD: 0.9 % (ref 0–1.9)
BILIRUB SERPL-MCNC: 3.9 MG/DL (ref 0.1–1)
BUN SERPL-MCNC: 18 MG/DL (ref 6–20)
CALCIUM SERPL-MCNC: 10.3 MG/DL (ref 8.7–10.5)
CHLORIDE SERPL-SCNC: 103 MMOL/L (ref 95–110)
CO2 SERPL-SCNC: 25 MMOL/L (ref 23–29)
CREAT SERPL-MCNC: 1.3 MG/DL (ref 0.5–1.4)
DIFFERENTIAL METHOD: ABNORMAL
EOSINOPHIL # BLD AUTO: 0.1 K/UL (ref 0–0.5)
EOSINOPHIL NFR BLD: 1.7 % (ref 0–8)
ERYTHROCYTE [DISTWIDTH] IN BLOOD BY AUTOMATED COUNT: 14.6 % (ref 11.5–14.5)
EST. GFR  (AFRICAN AMERICAN): 53.4 ML/MIN/1.73 M^2
EST. GFR  (NON AFRICAN AMERICAN): 46.3 ML/MIN/1.73 M^2
GLUCOSE SERPL-MCNC: 180 MG/DL (ref 70–110)
HCT VFR BLD AUTO: 24.9 % (ref 37–48.5)
HGB BLD-MCNC: 8.1 G/DL (ref 12–16)
IMM GRANULOCYTES # BLD AUTO: 0.03 K/UL (ref 0–0.04)
IMM GRANULOCYTES NFR BLD AUTO: 0.6 % (ref 0–0.5)
INR PPP: 1.2 (ref 0.8–1.2)
LYMPHOCYTES # BLD AUTO: 0.9 K/UL (ref 1–4.8)
LYMPHOCYTES NFR BLD: 17.8 % (ref 18–48)
MCH RBC QN AUTO: 35.5 PG (ref 27–31)
MCHC RBC AUTO-ENTMCNC: 32.5 G/DL (ref 32–36)
MCV RBC AUTO: 109 FL (ref 82–98)
MONOCYTES # BLD AUTO: 0.3 K/UL (ref 0.3–1)
MONOCYTES NFR BLD: 5.9 % (ref 4–15)
NEUTROPHILS # BLD AUTO: 3.9 K/UL (ref 1.8–7.7)
NEUTROPHILS NFR BLD: 73.1 % (ref 38–73)
NRBC BLD-RTO: 0 /100 WBC
PLATELET # BLD AUTO: 58 K/UL (ref 150–350)
PMV BLD AUTO: 10 FL (ref 9.2–12.9)
POTASSIUM SERPL-SCNC: 5.1 MMOL/L (ref 3.5–5.1)
PROT SERPL-MCNC: 6.1 G/DL (ref 6–8.4)
PROTHROMBIN TIME: 12.8 SEC (ref 9–12.5)
RBC # BLD AUTO: 2.28 M/UL (ref 4–5.4)
SARS-COV-2 RDRP RESP QL NAA+PROBE: NEGATIVE
SODIUM SERPL-SCNC: 133 MMOL/L (ref 136–145)
WBC # BLD AUTO: 5.28 K/UL (ref 3.9–12.7)

## 2020-09-24 PROCEDURE — 3008F BODY MASS INDEX DOCD: CPT | Mod: CPTII,S$GLB,TXP, | Performed by: INTERNAL MEDICINE

## 2020-09-24 PROCEDURE — 99999 PR PBB SHADOW E&M-EST. PATIENT-LVL IV: ICD-10-PCS | Mod: PBBFAC,TXP,, | Performed by: INTERNAL MEDICINE

## 2020-09-24 PROCEDURE — 99215 PR OFFICE/OUTPT VISIT, EST, LEVL V, 40-54 MIN: ICD-10-PCS | Mod: S$GLB,TXP,, | Performed by: INTERNAL MEDICINE

## 2020-09-24 PROCEDURE — 80053 COMPREHEN METABOLIC PANEL: CPT | Mod: TXP

## 2020-09-24 PROCEDURE — 85610 PROTHROMBIN TIME: CPT | Mod: TXP

## 2020-09-24 PROCEDURE — 99999 PR PBB SHADOW E&M-EST. PATIENT-LVL IV: CPT | Mod: PBBFAC,TXP,, | Performed by: INTERNAL MEDICINE

## 2020-09-24 PROCEDURE — 99215 OFFICE O/P EST HI 40 MIN: CPT | Mod: S$GLB,TXP,, | Performed by: INTERNAL MEDICINE

## 2020-09-24 PROCEDURE — 3008F PR BODY MASS INDEX (BMI) DOCUMENTED: ICD-10-PCS | Mod: CPTII,S$GLB,TXP, | Performed by: INTERNAL MEDICINE

## 2020-09-24 PROCEDURE — U0002 COVID-19 LAB TEST NON-CDC: HCPCS | Mod: NTX

## 2020-09-24 PROCEDURE — 85025 COMPLETE CBC W/AUTO DIFF WBC: CPT | Mod: TXP

## 2020-09-24 PROCEDURE — 36415 COLL VENOUS BLD VENIPUNCTURE: CPT | Mod: TXP

## 2020-09-24 RX ORDER — SPIRONOLACTONE 50 MG/1
50 TABLET, FILM COATED ORAL DAILY
Qty: 30 TABLET | Refills: 11 | Status: SHIPPED | OUTPATIENT
Start: 2020-09-24 | End: 2020-10-12

## 2020-09-24 RX ORDER — FUROSEMIDE 40 MG/1
80 TABLET ORAL DAILY
Qty: 60 TABLET | Refills: 11 | Status: SHIPPED | OUTPATIENT
Start: 2020-09-24 | End: 2020-10-12

## 2020-09-24 NOTE — Clinical Note
MELD 24, no issue today. Cr is better, restarting diuretics furosemide 80 and sarahi 50, labs next week. Patient is back up. Hopefully she gets a liver soon. RTC in 3-4 weeks

## 2020-09-24 NOTE — PROGRESS NOTES
Patient to clinic for covid testing/backup for transplant.   Denies symptoms. Afebrile.   Specimen collected, sent to lab for rapid results.

## 2020-09-24 NOTE — LETTER
September 25, 2020        Reuben Vee  [address]  [facility] [address]  Phone: [phone]  Fax: [phone]             [facility]  [address]  [facility] [address]  Phone: [phone]   Patient: Zeina Mahoney   MR Number: [id number]   YOB: 1965   Date of Visit: 9/24/2020       Dear Dr. Reuben Vee    Thank you for referring Zeina Mahoney to me for evaluation. Attached you will find relevant portions of my assessment and plan of care.    If you have questions, please do not hesitate to call me. I look forward to following Zeina Mahoney along with you.    Sincerely,    Jairo Reyes MD    Enclosure    If you would like to receive this communication electronically, please contact [email or web address] or [phone] to request [facility] access.    [facility] is a tool which provides read-only access to select patient information with whom you have a relationship. Its easy to use and provides real time access to review your patients record including encounter summaries, notes, results, and demographic information.    If you feel you have received this communication in error or would no longer like to receive these types of communications, please e-mail [email or web address]

## 2020-09-24 NOTE — PATIENT INSTRUCTIONS
- restart diuretics    Cirrhosis Counseling  - strict abstinence of alcohol use  - compliance with lactulose and rifaximin if you have developed hepatic encephalopathy (confusion due to high ammonia level)  - avoid non-steroidal anti-inflammatory drugs (NSAIDs) such as ibuprofen, Motrin, naprosyn, Alleve due to the risk of kidney damage  - can take acetaminophen (Tylenol), no more than 2000 mg per day  - low sodium (salt) 2 gram per day diet  - nutrition: 25-30kcal (calorie per body body weight in kilogram) per day  - no need to restrict protein in diet  - high protein diet: 1.2-1.5 gram/kg (protein per body weight in kilogram) per day to prevent muscle mass loss  - avoid fasting  - Late night snack with 50 gram of complex carbohydrates and protein  - resistance exercises for muscle strength  - avoid raw seafoods due to the risk of fatal Vibrio vulnificus infection  - ultrasound or MRI of the liver every 6 months for liver cancer screening (you are at risk of developing liver cancer due to scar tissue in the liver)  - Upper endoscopy every 1-2 years to screen for varices in the stomach and foodpipe which can burst and cause fatal bleeding

## 2020-09-24 NOTE — PROGRESS NOTES
Transplant Hepatology (Transplant Evaluation) Consult Note    Referring provider: Reuben Vee MD    Chief complaint:   No chief complaint on file.      HPI:  Zeina Mahoney is a 55 y.o. female that presents to [facility] for consultation of had no chief complaint listed for this encounter.  She  is accompanied by her .    Follow up  9/25/20: admitted for 2 days, discharged 2 days ago. C/o: fatigue, mild confusion as baseline. No GI bleeding. Diuretics were held during admission due to KG.   8/19/20: Pulled out PICC line again yesterday. ID - we can stop vanc as she has almost completed her course. Today, she appears good. Get-up and go: easily done, not confused, no asterixis. MELD: 18. Will increase diuretics.    7/28/20: feels ok, no acute complaints. Denies confusion. Working with home PT, able to ambulate with assistance. Denies GI bleed, SOB, Fever. Still with 1-2+ LL edema. /45 but did not get to take her midodrine in am. Abd is soft. Await insurance approval to get on UNOS list. Labs weekly, RTC 1 month.    Background  CHAUDHARY cirrhosis with decompensations. She had episodes of abdominal pain and elevated lipase, however not consistent with true acute pancreatitis as CT findings of the pancreas were normal. Thought to be GI process.     MELD-Na score: 19 at 9/24/2020 12:55 PM  MELD score: 16 at 9/24/2020 12:55 PM  Calculated from:  Serum Creatinine: 1.3 mg/dL at 9/24/2020 12:55 PM  Serum Sodium: 133 mmol/L at 9/24/2020 12:55 PM  Total Bilirubin: 3.9 mg/dL at 9/24/2020 12:55 PM  INR(ratio): 1.2 at 9/24/2020 12:55 PM  Age: 55 years    Liver disease:                   CHAUDHARY    MELD-Na:                         18  Child-Myers Class:             C    Blood type:                        O    Transplant status:             listed    Cirrhosis is decompensated with:    Ascites:                                                       yes large  Spontaneous bacterial peritonitis:               no   Hepatic Encephalopathy:                           Yes, [place] grade 2  Portal bleeding:                                          no  Hepatocellular carcinoma:                          no    Hepatorenal syndrome:                              no  Hyponatremia:                                            yes  Sarcopenia:                                                yes   Portal vein thrombosis:                               no      Lab Results   Component Value Date    AFP 16 (H) 07/06/2020    AFP 16 (H) 07/06/2020       Patient Active Problem List   Diagnosis    Decompensated hepatic cirrhosis    Macrocytic anemia    Acute Hepatic encephalopathy    Liver cirrhosis secondary to CHAUDHARY    Other ascites    History of pancreatitis    Postablative hypothyroidism    Hypercalcemia    Adjustment disorder with anxious mood    Abnormal serum lipase level    Acquired coagulation factor deficiency    Hypothyroidism    Portal hypertension    Thrombocytopenia due to hypersplenism    Protein-calorie malnutrition, moderate    Hyperammonemia    Anemia of chronic disease    Cellulitis of lower extremity    SIRS (systemic inflammatory response syndrome)    Pre-op evaluation    Abdominal pain    Hyperkalemia    Hepatorenal syndrome    KG (acute kidney injury)       Past Medical History:   Diagnosis Date    Chronic Hepatic encephalopathy 6/25/2020    Decompensated hepatic cirrhosis 6/25/2020    History of pancreatitis 6/25/2020    Liver cirrhosis secondary to CHAUDHARY 6/25/2020    Macrocytic anemia 6/25/2020    Other ascites 6/25/2020    Thrombocytopenia 6/25/2020       No past surgical history on file.    No family history on file.    Social History     Socioeconomic History    Marital status:      Spouse name: Not on file    Number of children: Not on file    Years of education: Not on file    Highest education level: Not on file   Occupational History    Not on file   Social Needs     Financial resource strain: Not on file    Food insecurity     Worry: Not on file     Inability: Not on file    Transportation needs     Medical: Not on file     Non-medical: Not on file   Tobacco Use    Smoking status: Never Smoker    Smokeless tobacco: Never Used   Substance and Sexual Activity    Alcohol use: Never     Frequency: Never    Drug use: Never    Sexual activity: Not on file   Lifestyle    Physical activity     Days per week: Not on file     Minutes per session: Not on file    Stress: Not on file   Relationships    Social connections     Talks on phone: Not on file     Gets together: Not on file     Attends Buddhism service: Not on file     Active member of club or organization: Not on file     Attends meetings of clubs or organizations: Not on file     Relationship status: Not on file   Other Topics Concern    Not on file   Social History Narrative    Not on file       Current Outpatient Medications   Medication Sig Dispense Refill    acetaminophen (TYLENOL) 500 MG tablet Take 1 tablet (500 mg total) by mouth every 6 (six) hours as needed for Pain. Max 2000mg daily.  0    diaper,brief,adult,disposable Misc 5 mg/kg/day by Misc.(Non-Drug; Combo Route) route 5 (five) times daily. Patient with incontinence. Please supply enough depends for 5xs/day      ergocalciferol (VITAMIN D2) 50,000 unit Cap Take 50,000 Units by mouth every 7 days.      escitalopram oxalate (LEXAPRO) 5 MG Tab Take 1 tablet (5 mg total) by mouth once daily. 30 tablet 11    lactulose (CEPHULAC) 10 gram packet Mix 3 packets (30 g total) with liquid and take by mouth 3 (three) times daily as needed. 90 packet 11    levothyroxine (SYNTHROID) 88 MCG tablet Take 1 tablet (88 mcg total) by mouth before breakfast. 30 tablet 3    lidocaine (LIDODERM) 5 % Place 1 patch onto the skin every 24 hours. Remove & Discard patch within 12 hours or as directed by MD      magnesium hydroxide 400 mg/5 ml (MILK OF MAGNESIA) 400 mg/5 mL  "Susp Take 30 mLs (2,400 mg total) by mouth daily as needed (Take if constipated despite lactulose).      melatonin 10 mg Tab Take 10 mg by mouth nightly as needed (sleep).      midodrine (PROAMATINE) 10 MG tablet Take 1 tablet (10 mg total) by mouth 3 (three) times daily. 90 tablet 3    nystatin (MYCOSTATIN) powder Apply topically 2 (two) times daily. Apply to affected area twice a day      pantoprazole (PROTONIX) 40 MG tablet Take 40 mg by mouth before breakfast.      polyethylene glycol (GLYCOLAX) 17 gram PwPk Take 17 g by mouth once daily. Or miralax OTC. Take if constipated despite lactulose  0    rifAXIMin (XIFAXAN) 550 mg Tab Take 550 mg by mouth 2 (two) times daily.       simethicone (MYLICON) 80 MG chewable tablet Take 80 mg by mouth every 6 (six) hours as needed for Flatulence. Take 2 tablets by mouth every 6 hours as needed for gas      zinc sulfate (ZINCATE) 220 (50) mg capsule Take 220 mg by mouth before breakfast.       No current facility-administered medications for this visit.        Review of patient's allergies indicates:   Allergen Reactions    Aspirin Tinitus    Ciprofloxacin Rash    Clindamycin Itching       Review of Systems   Constitutional: Positive for malaise/fatigue. Negative for chills, fever and weight loss.   HENT: Negative for sore throat.    Respiratory: Negative for cough, shortness of breath and wheezing.    Cardiovascular: Positive for leg swelling. Negative for chest pain.   Gastrointestinal: Negative for abdominal pain, blood in stool, constipation, diarrhea, melena, nausea and vomiting.   Musculoskeletal: Positive for back pain.   Neurological: Positive for dizziness and weakness. Negative for tremors, focal weakness, seizures and headaches.   Endo/Heme/Allergies: Bruises/bleeds easily.       Vitals:    09/24/20 1401   BP: (!) 105/56   Pulse: 81   Resp: 18   Temp: 98.4 °F (36.9 °C)   TempSrc: Oral   SpO2: 100%   Weight: 64.5 kg (142 lb 3.2 oz)   Height: 5' 1" (1.549 " m)       Physical Exam  Vitals signs and nursing note reviewed.   Constitutional:       Appearance: She is well-developed.      Comments: Chronically ill-appearing. Malnourished. Temporal wasting.     HENT:      Head: Normocephalic and atraumatic.   Eyes:      General: Scleral icterus present.   Cardiovascular:      Rate and Rhythm: Normal rate and regular rhythm.      Heart sounds: Normal heart sounds.   Pulmonary:      Effort: Pulmonary effort is normal. No respiratory distress.      Breath sounds: Normal breath sounds.   Abdominal:      General: Bowel sounds are normal. There is distension.      Palpations: Abdomen is soft.      Tenderness: There is no abdominal tenderness.      Comments: Soft, small ascites     Musculoskeletal:         General: Swelling present.   Skin:     General: Skin is dry.      Coloration: Skin is jaundiced.      Findings: No rash.   Neurological:      Mental Status: She is alert and oriented to person, place, and time.   Psychiatric:         Mood and Affect: Mood is not anxious.         Behavior: Behavior normal.         LABS: I personally reviewed pertinent laboratory findings.    Lab Results   Component Value Date    ALT 20 09/24/2020    AST 31 09/24/2020    GGT 32 07/06/2020    GGT 32 07/06/2020    ALKPHOS 100 09/24/2020    BILITOT 3.9 (H) 09/24/2020       Lab Results   Component Value Date    WBC 5.28 09/24/2020    HGB 8.1 (L) 09/24/2020    HCT 24.9 (L) 09/24/2020     (H) 09/24/2020    PLT 58 (L) 09/24/2020       Lab Results   Component Value Date     (L) 09/24/2020    K 5.1 09/24/2020     09/24/2020    CO2 25 09/24/2020    BUN 18 09/24/2020    CREATININE 1.3 09/24/2020    CALCIUM 10.3 09/24/2020    ANIONGAP 5 (L) 09/24/2020    ESTGFRAFRICA 53.4 (A) 09/24/2020    EGFRNONAA 46.3 (A) 09/24/2020       Lab Results   Component Value Date    INR 1.2 09/24/2020    INR 1.2 09/23/2020    INR 1.1 09/22/2020       No results found for: SMOOTHMUSCAB, MITOAB  Lab Results    Component Value Date    IRON 185 (H) 07/06/2020    IRON 185 (H) 07/06/2020    TIBC 231 (L) 07/06/2020    TIBC 231 (L) 07/06/2020    FERRITIN 547 (H) 07/06/2020    FERRITIN 547 (H) 07/06/2020     Lab Results   Component Value Date    HEPBCAB Negative 07/06/2020    HEPBCAB Negative 07/06/2020    HEPCAB Negative 07/06/2020    HEPCAB Negative 07/06/2020     Lab Results   Component Value Date    TSH 1.926 09/10/2020     No results found for: MELIDA    No results found for: ABORH    Lab Results   Component Value Date    HGBA1C 4.0 09/19/2020     Lab Results   Component Value Date    CHOL 109 (L) 07/18/2020     Lab Results   Component Value Date    HDL 9 (L) 07/18/2020     Lab Results   Component Value Date    LDLCALC 87.4 07/18/2020     Lab Results   Component Value Date    TRIG 63 07/18/2020     Lab Results   Component Value Date    CHOLHDL 8.3 (L) 07/18/2020         Imaging:   No results found for this or any previous visit.  I personally reviewed recent cardiology studies available on the chart and from outside medical records.  I personally reviewed recent imaging studies available on the chart and from outside medical records.      Assessment:  55 y.o. female presenting with     1. Other ascites    2. Liver cirrhosis secondary to CHAUDHARY    3. Portal hypertension    4. Decompensated hepatic cirrhosis    5. Protein-calorie malnutrition, moderate        MELD-Na score: 19 at 9/24/2020 12:55 PM  MELD score: 16 at 9/24/2020 12:55 PM  Calculated from:  Serum Creatinine: 1.3 mg/dL at 9/24/2020 12:55 PM  Serum Sodium: 133 mmol/L at 9/24/2020 12:55 PM  Total Bilirubin: 3.9 mg/dL at 9/24/2020 12:55 PM  INR(ratio): 1.2 at 9/24/2020 12:55 PM  Age: 55 years    Functional Status: 40% - Disabled: requires special care and assistance  Physical Capacity: No Limitations    Transplant Candidacy: listed      Recommendation(s):  - await liver transplant  - restart furosemide to 80 mg in am and sarahi 50 mg daily  - RTC in 1 month    Cirrhosis  Counseling  - strict abstinence of alcohol use  - compliance with lactulose and rifaximin if you have developed hepatic encephalopathy (confusion due to high ammonia level)  - avoid non-steroidal anti-inflammatory drugs (NSAIDs) such as ibuprofen, Motrin, naprosyn, Alleve due to the risk of kidney damage  - can take acetaminophen (Tylenol), no more than 2000 mg per day  - low sodium (salt) 2 gram per day diet  - nutrition: 25-30kcal (calorie per body body weight in kilogram) per day  - no need to restrict protein in diet  - high protein diet: 1.2-1.5 gram/kg (protein per body weight in kilogram) per day to prevent muscle mass loss  - avoid fasting  - Late night snack with 50 gram of complex carbohydrates and protein  - resistance exercises for muscle strength  - avoid raw seafoods due to the risk of fatal Vibrio vulnificus infection  - ultrasound or MRI of the liver every 6 months for liver cancer screening (you are at risk of developing liver cancer due to scar tissue in the liver)  - Upper endoscopy every 1-2 years to screen for varices in the stomach and foodpipe which can burst and cause fatal bleeding    A total of 30 minutes were spent face-to-face with the patient during this encounter and over half of that time was spent on counseling and coordination of care.  We discussed in depth the nature of the patient's liver disease, the management plan and liver transplant evaluation in details. I also educated the patient about lifestyle modifications which may improve hepatic steatosis, overweight/obesity, insulin resistance and high blood pressure issues. I have provided the patient with an opportunity to ask questions and have all questions answered to his satisfaction.     I have sent communication to the referring physician and/or primary care provider.    Jairo Reyes MD  Staff Physician  Hepatology and Liver Transplant  [facility]  [facility]

## 2020-09-25 ENCOUNTER — TELEPHONE (OUTPATIENT)
Dept: TRANSPLANT | Facility: CLINIC | Age: 55
End: 2020-09-25

## 2020-09-29 ENCOUNTER — HOSPITAL ENCOUNTER (EMERGENCY)
Facility: HOSPITAL | Age: 55
Discharge: HOME | End: 2020-09-29
Attending: EMERGENCY MEDICINE
Payer: COMMERCIAL

## 2020-09-29 VITALS
SYSTOLIC BLOOD PRESSURE: 105 MMHG | HEART RATE: 70 BPM | RESPIRATION RATE: 16 BRPM | HEIGHT: 64 IN | TEMPERATURE: 99 F | DIASTOLIC BLOOD PRESSURE: 60 MMHG | WEIGHT: 138 LBS | BODY MASS INDEX: 23.56 KG/M2 | OXYGEN SATURATION: 99 %

## 2020-09-29 DIAGNOSIS — R10.13 EPIGASTRIC ABDOMINAL PAIN: Primary | ICD-10-CM

## 2020-09-29 DIAGNOSIS — K80.50 BILIARY COLIC: ICD-10-CM

## 2020-09-29 LAB
ALBUMIN SERPL BCP-MCNC: 3.3 G/DL (ref 3.5–5.2)
ALP SERPL-CCNC: 149 U/L (ref 55–135)
ALT SERPL W/O P-5'-P-CCNC: 23 U/L (ref 10–44)
AMMONIA PLAS-SCNC: 52 UMOL/L (ref 10–50)
ANION GAP SERPL CALC-SCNC: 9 MMOL/L (ref 8–16)
AST SERPL-CCNC: 39 U/L (ref 10–40)
BASOPHILS # BLD AUTO: 0.07 K/UL (ref 0–0.2)
BASOPHILS NFR BLD: 1.1 % (ref 0–1.9)
BILIRUB SERPL-MCNC: 4.2 MG/DL (ref 0.1–1)
BILIRUB UR QL STRIP: NEGATIVE
BUN SERPL-MCNC: 13 MG/DL (ref 6–20)
BUN SERPL-MCNC: 13 MG/DL (ref 6–30)
CALCIUM SERPL-MCNC: 9.9 MG/DL (ref 8.7–10.5)
CHLORIDE SERPL-SCNC: 99 MMOL/L (ref 95–110)
CHLORIDE SERPL-SCNC: 99 MMOL/L (ref 95–110)
CLARITY UR REFRACT.AUTO: CLEAR
CO2 SERPL-SCNC: 23 MMOL/L (ref 23–29)
COLOR UR AUTO: YELLOW
CREAT SERPL-MCNC: 1 MG/DL (ref 0.5–1.4)
CREAT SERPL-MCNC: 1.1 MG/DL (ref 0.5–1.4)
CTP QC/QA: YES
DIFFERENTIAL METHOD: ABNORMAL
EOSINOPHIL # BLD AUTO: 0.2 K/UL (ref 0–0.5)
EOSINOPHIL NFR BLD: 2.9 % (ref 0–8)
ERYTHROCYTE [DISTWIDTH] IN BLOOD BY AUTOMATED COUNT: 14.2 % (ref 11.5–14.5)
EST. GFR  (AFRICAN AMERICAN): >60 ML/MIN/1.73 M^2
EST. GFR  (NON AFRICAN AMERICAN): 56.7 ML/MIN/1.73 M^2
GLUCOSE SERPL-MCNC: 115 MG/DL (ref 70–110)
GLUCOSE SERPL-MCNC: 118 MG/DL (ref 70–110)
GLUCOSE UR QL STRIP: NEGATIVE
HCT VFR BLD AUTO: 27.4 % (ref 37–48.5)
HCT VFR BLD CALC: 28 %PCV (ref 36–54)
HGB BLD-MCNC: 9.1 G/DL (ref 12–16)
HGB UR QL STRIP: NEGATIVE
IMM GRANULOCYTES # BLD AUTO: 0.02 K/UL (ref 0–0.04)
IMM GRANULOCYTES NFR BLD AUTO: 0.3 % (ref 0–0.5)
INR PPP: 1.2 (ref 0.8–1.2)
KETONES UR QL STRIP: NEGATIVE
LEUKOCYTE ESTERASE UR QL STRIP: NEGATIVE
LIPASE SERPL-CCNC: 220 U/L (ref 4–60)
LYMPHOCYTES # BLD AUTO: 1.4 K/UL (ref 1–4.8)
LYMPHOCYTES NFR BLD: 22.9 % (ref 18–48)
MCH RBC QN AUTO: 35.3 PG (ref 27–31)
MCHC RBC AUTO-ENTMCNC: 33.2 G/DL (ref 32–36)
MCV RBC AUTO: 106 FL (ref 82–98)
MONOCYTES # BLD AUTO: 0.5 K/UL (ref 0.3–1)
MONOCYTES NFR BLD: 7.3 % (ref 4–15)
NEUTROPHILS # BLD AUTO: 4.1 K/UL (ref 1.8–7.7)
NEUTROPHILS NFR BLD: 65.5 % (ref 38–73)
NITRITE UR QL STRIP: NEGATIVE
NRBC BLD-RTO: 0 /100 WBC
PH UR STRIP: 6 [PH] (ref 5–8)
PLATELET # BLD AUTO: 76 K/UL (ref 150–350)
PMV BLD AUTO: 10 FL (ref 9.2–12.9)
POC IONIZED CALCIUM: 1.29 MMOL/L (ref 1.06–1.42)
POC TCO2 (MEASURED): 23 MMOL/L (ref 23–29)
POTASSIUM BLD-SCNC: 4.2 MMOL/L (ref 3.5–5.1)
POTASSIUM SERPL-SCNC: 4.3 MMOL/L (ref 3.5–5.1)
PROT SERPL-MCNC: 6.4 G/DL (ref 6–8.4)
PROT UR QL STRIP: NEGATIVE
PROTHROMBIN TIME: 12.7 SEC (ref 9–12.5)
RBC # BLD AUTO: 2.58 M/UL (ref 4–5.4)
SAMPLE: ABNORMAL
SARS-COV-2 RDRP RESP QL NAA+PROBE: NEGATIVE
SODIUM BLD-SCNC: 133 MMOL/L (ref 136–145)
SODIUM SERPL-SCNC: 131 MMOL/L (ref 136–145)
SP GR UR STRIP: 1 (ref 1–1.03)
URN SPEC COLLECT METH UR: NORMAL
WBC # BLD AUTO: 6.29 K/UL (ref 3.9–12.7)

## 2020-09-29 PROCEDURE — U0002 COVID-19 LAB TEST NON-CDC: HCPCS | Mod: NTX | Performed by: PHYSICIAN ASSISTANT

## 2020-09-29 PROCEDURE — 99285 EMERGENCY DEPT VISIT HI MDM: CPT | Mod: NTX,,, | Performed by: EMERGENCY MEDICINE

## 2020-09-29 PROCEDURE — 99285 PR EMERGENCY DEPT VISIT,LEVEL V: ICD-10-PCS | Mod: NTX,,, | Performed by: EMERGENCY MEDICINE

## 2020-09-29 PROCEDURE — 85610 PROTHROMBIN TIME: CPT | Mod: NTX

## 2020-09-29 PROCEDURE — 82140 ASSAY OF AMMONIA: CPT | Mod: NTX

## 2020-09-29 PROCEDURE — 81003 URINALYSIS AUTO W/O SCOPE: CPT | Mod: NTX

## 2020-09-29 PROCEDURE — 99285 EMERGENCY DEPT VISIT HI MDM: CPT | Mod: 25,NTX

## 2020-09-29 PROCEDURE — 96375 TX/PRO/DX INJ NEW DRUG ADDON: CPT | Mod: NTX

## 2020-09-29 PROCEDURE — 80053 COMPREHEN METABOLIC PANEL: CPT | Mod: NTX

## 2020-09-29 PROCEDURE — 85025 COMPLETE CBC W/AUTO DIFF WBC: CPT | Mod: NTX

## 2020-09-29 PROCEDURE — 93010 EKG 12-LEAD: ICD-10-PCS | Mod: NTX,,, | Performed by: INTERNAL MEDICINE

## 2020-09-29 PROCEDURE — 96374 THER/PROPH/DIAG INJ IV PUSH: CPT | Mod: NTX

## 2020-09-29 PROCEDURE — 80047 BASIC METABLC PNL IONIZED CA: CPT | Mod: NTX,59

## 2020-09-29 PROCEDURE — 93005 ELECTROCARDIOGRAM TRACING: CPT | Mod: NTX

## 2020-09-29 PROCEDURE — 63600175 PHARM REV CODE 636 W HCPCS: Mod: NTX | Performed by: PHYSICIAN ASSISTANT

## 2020-09-29 PROCEDURE — 83690 ASSAY OF LIPASE: CPT | Mod: NTX

## 2020-09-29 PROCEDURE — 93010 ELECTROCARDIOGRAM REPORT: CPT | Mod: NTX,,, | Performed by: INTERNAL MEDICINE

## 2020-09-29 RX ORDER — MORPHINE SULFATE 4 MG/ML
4 INJECTION, SOLUTION INTRAMUSCULAR; INTRAVENOUS
Status: COMPLETED | OUTPATIENT
Start: 2020-09-29 | End: 2020-09-29

## 2020-09-29 RX ORDER — ONDANSETRON 2 MG/ML
4 INJECTION INTRAMUSCULAR; INTRAVENOUS
Status: COMPLETED | OUTPATIENT
Start: 2020-09-29 | End: 2020-09-29

## 2020-09-29 RX ADMIN — ONDANSETRON 4 MG: 2 INJECTION INTRAMUSCULAR; INTRAVENOUS at 05:09

## 2020-09-29 RX ADMIN — MORPHINE SULFATE 4 MG: 4 INJECTION INTRAVENOUS at 05:09

## 2020-09-29 NOTE — HPI
Ms. Mahoney is a 54 y/o female with pmh of ESLD secondary to CHAUDHARY c/b chronic hepatic encephalopathy who presents to the ED with complaint of epigastric abdominal pain. Patient recently admitted 9/23 with similar complaint. CT a/p during prior admit w/ unremarkable source of pain. Patient w/ known hx pancreatic lesions. She denies fevers, chills, nausea, vomiting, sob, chest pain, dysuria, diarrhea, or constipation. Reports compliance with lactulose three times daily with adequate stool output. In the ED, work-up notable for....

## 2020-09-29 NOTE — ED NOTES
I-STAT Chem-8+ Results:   Value Reference Range   Sodium 133 136-145 mmol/L   Potassium  4.2 3.5-5.1 mmol/L   Chloride 99  mmol/L   Ionized Calcium 1.29 1.06-1.42 mmol/L   CO2 (measured) 23 23-29 mmol/L   Glucose 115  mg/dL   BUN 13 6-30 mg/dL   Creatinine 1.0 0.5-1.4 mg/dL   Hematocrit 28 36-54%

## 2020-09-29 NOTE — DISCHARGE INSTRUCTIONS
Please follow-up with liver transplant as already scheduled  Return to the emergency department for worsening pain, vomiting, fever or any other concerns

## 2020-09-29 NOTE — ED PROVIDER NOTES
"Encounter Date: 9/29/2020       History     Chief Complaint   Patient presents with    Abdominal Pain     Pt c/o central abd pain x 3 hours. Hx of liver cirrhosis.      Patient is a 55 year old female with PMHX of d-CHF with 65%EF, cirrhosis due to CHAUDHARY, hx of hepatic encephalopathy, portal HTN, thyroid disease, and anemia. She presents to the ED for abdominal pain. She reports having epigastric abdominal pain onset three hours ago at rest. Describes pain as constant and aching. Rates pain 9/10. Denies OTC medication use. Denies abdominal surgical hx. Reports last paracentesis "months ago." Reports compliance with lactulose three times daily with adequate stool output.  at bedside reports patient acting like normal self during ED visit. She denies fever,chills, nausea, vomiting, sob, chest pain, dysuria, diarrhea, or constipation. She is a non smoker and denies alcohol use.    The history is provided by the patient and medical records. No  was used.     Review of patient's allergies indicates:   Allergen Reactions    Aspirin Tinitus    Ciprofloxacin Rash    Clindamycin Itching     Past Medical History:   Diagnosis Date    Chronic Hepatic encephalopathy 6/25/2020    Decompensated hepatic cirrhosis 6/25/2020    History of pancreatitis 6/25/2020    Liver cirrhosis secondary to CHAUDHARY 6/25/2020    Macrocytic anemia 6/25/2020    Other ascites 6/25/2020    Thrombocytopenia 6/25/2020     No past surgical history on file.  No family history on file.  Social History     Tobacco Use    Smoking status: Never Smoker    Smokeless tobacco: Never Used   Substance Use Topics    Alcohol use: Never     Frequency: Never    Drug use: Never     Review of Systems   Constitutional: Negative for fever.   HENT: Negative for sore throat.    Respiratory: Negative for shortness of breath.    Cardiovascular: Negative for chest pain.   Gastrointestinal: Positive for abdominal pain. Negative for nausea and " vomiting.   Genitourinary: Negative for dysuria.   Musculoskeletal: Negative for back pain.   Skin: Negative for rash.   Neurological: Negative for weakness.   Hematological: Does not bruise/bleed easily.       Physical Exam     Initial Vitals [09/29/20 0408]   BP Pulse Resp Temp SpO2   (!) 124/56 86 15 98.5 °F (36.9 °C) 100 %      MAP       --         Physical Exam    Vitals reviewed.  Constitutional: She appears well-developed and well-nourished. No distress.   HENT:   Head: Normocephalic.   Eyes: Conjunctivae are normal. Scleral icterus is present.   Neck: Normal range of motion.   Cardiovascular: Normal rate and regular rhythm.   No murmur heard.  Pulmonary/Chest: Breath sounds normal. No respiratory distress. She has no wheezes. She has no rales.   Abdominal: Soft. Bowel sounds are normal. She exhibits no distension. There is no abdominal tenderness.   Musculoskeletal: Normal range of motion.   Neurological: She is alert and oriented to person, place, and time.   Skin: Skin is warm and dry.   jaundiced         ED Course   Procedures  Labs Reviewed   CBC W/ AUTO DIFFERENTIAL - Abnormal; Notable for the following components:       Result Value    RBC 2.58 (*)     Hemoglobin 9.1 (*)     Hematocrit 27.4 (*)     Mean Corpuscular Volume 106 (*)     Mean Corpuscular Hemoglobin 35.3 (*)     Platelets 76 (*)     All other components within normal limits   COMPREHENSIVE METABOLIC PANEL - Abnormal; Notable for the following components:    Sodium 131 (*)     Glucose 118 (*)     Albumin 3.3 (*)     Total Bilirubin 4.2 (*)     Alkaline Phosphatase 149 (*)     eGFR if non  56.7 (*)     All other components within normal limits   LIPASE - Abnormal; Notable for the following components:    Lipase 220 (*)     All other components within normal limits   PROTIME-INR - Abnormal; Notable for the following components:    Prothrombin Time 12.7 (*)     All other components within normal limits   AMMONIA - Abnormal;  Notable for the following components:    Ammonia 52 (*)     All other components within normal limits   ISTAT PROCEDURE - Abnormal; Notable for the following components:    POC Glucose 115 (*)     POC Sodium 133 (*)     POC Hematocrit 28 (*)     All other components within normal limits   URINALYSIS, REFLEX TO URINE CULTURE    Narrative:     Specimen Source->Urine   SARS-COV-2 RDRP GENE   ISTAT CHEM8     EKG Readings: (Independently Interpreted)   Normal sinus rhythm at rate of 80 bpm. No STEMI.       Imaging Results          US Abdomen Limited (Gallbladder) (In process)                  Medical Decision Making:   History:   Old Medical Records: I decided to obtain old medical records.  Clinical Tests:   Lab Tests: Ordered and Reviewed  Radiological Study: Ordered and Reviewed  Medical Tests: Ordered and Reviewed       APC / Resident Notes:   Patient is a 55 year old female presents to the ED for emergent evaluation of epigastric abdominal pain.     Will order labs. Will order pain medication and antiemetic for symptomatic relief. Will continue to monitor.     Differential diagnoses include, but are not limited to: hepatic encephalopathy, hepatic congestion, pancreatitis, cardiac arrhythmia, or electrolyte imbalance.     COVID negative. No leukocytosis. Hemodynamically stable. Thrombocytopenia. MELD score of 20. Chronically elevated lipase 220. Mildly elevated ammonia. UA unremarkable for infectious process.      6:00 AM  Case signed out to oncoming attending, Dr. Godoy, pending US abdomen. Anticipate admission.     I have discussed and reviewed with my supervising physician.         Attending Attestation:     Physician Attestation Statement for NP/PA:   I have conducted a face to face encounter with this patient in addition to the NP/PA, due to Medical Complexity    Other NP/PA Attestation Additions:      Medical Decision Makin-year-old with CHAUDHARY cirrhosis presents with abdominal pain for the last 3  hr      Labs reviewed no gross abnormalities compared to prior results  Ultrasound gallbladder      6:44 AM  Patient comfortable in bed reports the pain got better  Family member at the bedside reports that the previous admission was for a similar presentation, has been worked up for possible pancreatitis secondary to 2 elevated lipase.   Blood work was reviewed with the family and the patient with no acute changes from prior  Ultrasound pending    8:42 AM  Cholelithiasis versus sludge without sonographic evidence of acute cholecystitis.  Trace pericholecystic fluid is nonspecific, may be related to underlying hepatic dysfunction.  No biliary duct dilatation.   Cirrhotic liver morphology and small volume ascites.    8:46 AM  Liver transplant at the bedside.  Patient continues to feel well, comfortable.  Most likely this is biliary colic. diet to prevent further episodes discussed.  Patient comfortable to go home    Dg: biliary colic             Clinical Impression:       ICD-10-CM ICD-9-CM   1. Epigastric abdominal pain  R10.13 789.06                                               Agata Alexander PA-C  09/29/20 0644       Shaniqua Godoy MD  09/29/20 3415

## 2020-09-29 NOTE — ED NOTES
Zeina Mahoney, a 55 y.o. female presents       Patient identifiers verified verbally with patient and correct for Zeina Mahoney.    LOC/ APPEARANCE: The patient is AAOx4. Pt is speaking appropriately, no slurred speech. Pt changed into hospital gown. Continuous cardiac monitor, cont pulse ox, and auto BP cuff applied to patient. Pt is clean and well groomed. No JVD visible. Pt reports pain level of 0. Pt updated on POC. Bed low and locked with side rails up x2, call bell in pt reach.  SKIN: Skin is warm dry and intact. Pt. Jaundice. Capillary refill <3 seconds. No breakdown or brusing visible. Mucus membranes moist, acyanotic.  RESPIRATORY: Airway is open and patent. Respirations-spontaneous, unlabored, regular rate, equal bilaterally on inspiration and expiration. No accessory muscle use noted. Lungs clear to auscultation in all fields bilaterally anterior and posterior.   CARDIAC: Patient has regular heart rate.  No peripheral edema noted, and patient has no c/o chest pain. Peripheral pulses present equal and strong throughout.  ABDOMEN: Soft and non-tender to palpation with no distention noted. Normoactive bowel sounds x4 quadrants. Pt has no complaints of abnormal bowel movements, denies blood in stool. Pt reports normal appetite.   NEUROLOGIC: Eyes open spontaneously and facial expression symmetrical. Pt behavior appropriate to situation, and pt follows commands. Pt reports sensation present in all extremities when touched with a finger, denies any numbness or tingling. PERRLA  MUSCULOSKELETAL: Spontaneous movement noted to all extremities. Hand  equal and leg strength strong +5 bilaterally.   : No complaints of frequency, burning, urgency or blood in the urine. No complaints of incontinence.

## 2020-09-29 NOTE — ED TRIAGE NOTES
Zeina Mahoney, a 55 y.o. female presents to the ED w/ complaint of abd pain    Triage note: Pt presents with abd pain for 3 hours   Chief Complaint   Patient presents with    Abdominal Pain     Pt c/o central abd pain x 3 hours. Hx of liver cirrhosis.      Review of patient's allergies indicates:   Allergen Reactions    Aspirin Tinitus    Ciprofloxacin Rash    Clindamycin Itching     Past Medical History:   Diagnosis Date    Chronic Hepatic encephalopathy 6/25/2020    Decompensated hepatic cirrhosis 6/25/2020    History of pancreatitis 6/25/2020    Liver cirrhosis secondary to CHAUDHARY 6/25/2020    Macrocytic anemia 6/25/2020    Other ascites 6/25/2020    Thrombocytopenia 6/25/2020     Patient Identifiers for Zeina Mahoney checked and correct  LOC: The patient is awake, alert and aware of environment with an appropriate affect, the patient is oriented x 3 and speaking appropriate.  APPEARANCE: Patient resting comfortably and in no acute distress, patient is clean and well groomed, patient's clothing is properly fastened.  SKIN: Pt skin dry and jaundiced  Musculoskeletal :  Normal range of motion noted. Moves all extremeties well, No swelling or tenderness noted  RESPIRATORY: Airway is open and patent, respirations are spontaneous, patient has a normal effort and rate.  CARDIAC: Patient has a normal rate and rhythm, no periphreal edema noted, capillary refill < 3 seconds.   ABDOMEN: Soft and tender to palpation   PULSES: 2+  And symmetrical in all extremeties  NEUROLOGIC: PERRL,  facial expression is symmetrical, patient moving all extremities, normal sensation in all extremities when touched with a finger.The patient is awake, alert and cooperative with a calm affect, patient is aware of environment.    Will continue to monitor

## 2020-09-30 RX ORDER — HYDROXYZINE HYDROCHLORIDE 25 MG/1
25 TABLET, FILM COATED ORAL
Qty: 90 TABLET | Refills: 3 | Status: SHIPPED | OUTPATIENT
Start: 2020-09-30

## 2020-10-06 ENCOUNTER — LAB VISIT (OUTPATIENT)
Dept: LAB | Facility: HOSPITAL | Age: 55
End: 2020-10-06
Attending: INTERNAL MEDICINE
Payer: COMMERCIAL

## 2020-10-06 DIAGNOSIS — Z76.82 ORGAN TRANSPLANT CANDIDATE: ICD-10-CM

## 2020-10-06 DIAGNOSIS — K74.60 LIVER CIRRHOSIS SECONDARY TO NASH: ICD-10-CM

## 2020-10-06 DIAGNOSIS — K75.81 LIVER CIRRHOSIS SECONDARY TO NASH: ICD-10-CM

## 2020-10-06 LAB
ALBUMIN SERPL BCP-MCNC: 3.2 G/DL (ref 3.5–5.2)
ALP SERPL-CCNC: 190 U/L (ref 55–135)
ALT SERPL W/O P-5'-P-CCNC: 23 U/L (ref 10–44)
ANION GAP SERPL CALC-SCNC: 7 MMOL/L (ref 8–16)
AST SERPL-CCNC: 37 U/L (ref 10–40)
BASOPHILS # BLD AUTO: 0.06 K/UL (ref 0–0.2)
BASOPHILS NFR BLD: 1 % (ref 0–1.9)
BILIRUB SERPL-MCNC: 4 MG/DL (ref 0.1–1)
BUN SERPL-MCNC: 15 MG/DL (ref 6–20)
CALCIUM SERPL-MCNC: 10.2 MG/DL (ref 8.7–10.5)
CHLORIDE SERPL-SCNC: 99 MMOL/L (ref 95–110)
CO2 SERPL-SCNC: 28 MMOL/L (ref 23–29)
CREAT SERPL-MCNC: 1.5 MG/DL (ref 0.5–1.4)
DIFFERENTIAL METHOD: ABNORMAL
EOSINOPHIL # BLD AUTO: 0.2 K/UL (ref 0–0.5)
EOSINOPHIL NFR BLD: 2.9 % (ref 0–8)
ERYTHROCYTE [DISTWIDTH] IN BLOOD BY AUTOMATED COUNT: 13.6 % (ref 11.5–14.5)
EST. GFR  (AFRICAN AMERICAN): 44.9 ML/MIN/1.73 M^2
EST. GFR  (NON AFRICAN AMERICAN): 38.9 ML/MIN/1.73 M^2
GLUCOSE SERPL-MCNC: 136 MG/DL (ref 70–110)
HCT VFR BLD AUTO: 29.2 % (ref 37–48.5)
HGB BLD-MCNC: 9.5 G/DL (ref 12–16)
IMM GRANULOCYTES # BLD AUTO: 0.02 K/UL (ref 0–0.04)
IMM GRANULOCYTES NFR BLD AUTO: 0.3 % (ref 0–0.5)
INR PPP: 1.2 (ref 0.8–1.2)
LYMPHOCYTES # BLD AUTO: 1.7 K/UL (ref 1–4.8)
LYMPHOCYTES NFR BLD: 28.2 % (ref 18–48)
MCH RBC QN AUTO: 34.9 PG (ref 27–31)
MCHC RBC AUTO-ENTMCNC: 32.5 G/DL (ref 32–36)
MCV RBC AUTO: 107 FL (ref 82–98)
MONOCYTES # BLD AUTO: 0.4 K/UL (ref 0.3–1)
MONOCYTES NFR BLD: 7.1 % (ref 4–15)
NEUTROPHILS # BLD AUTO: 3.6 K/UL (ref 1.8–7.7)
NEUTROPHILS NFR BLD: 60.5 % (ref 38–73)
NRBC BLD-RTO: 0 /100 WBC
PLATELET # BLD AUTO: 70 K/UL (ref 150–350)
PMV BLD AUTO: 9.8 FL (ref 9.2–12.9)
POTASSIUM SERPL-SCNC: 4.3 MMOL/L (ref 3.5–5.1)
PROT SERPL-MCNC: 6.5 G/DL (ref 6–8.4)
PROTHROMBIN TIME: 13.1 SEC (ref 9–12.5)
RBC # BLD AUTO: 2.72 M/UL (ref 4–5.4)
SODIUM SERPL-SCNC: 134 MMOL/L (ref 136–145)
WBC # BLD AUTO: 5.89 K/UL (ref 3.9–12.7)

## 2020-10-06 PROCEDURE — 36415 COLL VENOUS BLD VENIPUNCTURE: CPT | Mod: NTX

## 2020-10-06 PROCEDURE — 80053 COMPREHEN METABOLIC PANEL: CPT | Mod: TXP

## 2020-10-06 PROCEDURE — 85610 PROTHROMBIN TIME: CPT | Mod: NTX

## 2020-10-06 PROCEDURE — 85025 COMPLETE CBC W/AUTO DIFF WBC: CPT | Mod: NTX

## 2020-10-07 ENCOUNTER — PATIENT MESSAGE (OUTPATIENT)
Dept: TRANSPLANT | Facility: CLINIC | Age: 55
End: 2020-10-07

## 2020-10-07 ENCOUNTER — TELEPHONE (OUTPATIENT)
Dept: TRANSPLANT | Facility: HOSPITAL | Age: 55
End: 2020-10-07

## 2020-10-07 RX ORDER — ERGOCALCIFEROL 1.25 MG/1
50000 CAPSULE ORAL
Qty: 12 CAPSULE | Refills: 3 | Status: SHIPPED | OUTPATIENT
Start: 2020-10-07
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
## 2020-10-08 DIAGNOSIS — K74.60 LIVER CIRRHOSIS SECONDARY TO NASH: Primary | ICD-10-CM

## 2020-10-08 DIAGNOSIS — Z76.82 ORGAN TRANSPLANT CANDIDATE: ICD-10-CM

## 2020-10-08 DIAGNOSIS — K75.81 LIVER CIRRHOSIS SECONDARY TO NASH: Primary | ICD-10-CM

## 2020-10-09 ENCOUNTER — TELEPHONE (OUTPATIENT)
Dept: TRANSPLANT | Facility: CLINIC | Age: 55
End: 2020-10-09

## 2020-10-09 ENCOUNTER — LAB VISIT (OUTPATIENT)
Dept: LAB | Facility: HOSPITAL | Age: 55
End: 2020-10-09
Attending: INTERNAL MEDICINE
Payer: COMMERCIAL

## 2020-10-09 DIAGNOSIS — Z76.82 ORGAN TRANSPLANT CANDIDATE: ICD-10-CM

## 2020-10-09 DIAGNOSIS — K74.60 LIVER CIRRHOSIS SECONDARY TO NASH: ICD-10-CM

## 2020-10-09 DIAGNOSIS — K75.81 LIVER CIRRHOSIS SECONDARY TO NASH: ICD-10-CM

## 2020-10-09 LAB
ALBUMIN SERPL BCP-MCNC: 2.9 G/DL (ref 3.5–5.2)
ALP SERPL-CCNC: 154 U/L (ref 55–135)
ALT SERPL W/O P-5'-P-CCNC: 18 U/L (ref 10–44)
ANION GAP SERPL CALC-SCNC: 7 MMOL/L (ref 8–16)
AST SERPL-CCNC: 30 U/L (ref 10–40)
BASOPHILS # BLD AUTO: 0.04 K/UL (ref 0–0.2)
BASOPHILS NFR BLD: 0.8 % (ref 0–1.9)
BILIRUB SERPL-MCNC: 3.6 MG/DL (ref 0.1–1)
BUN SERPL-MCNC: 16 MG/DL (ref 6–20)
CALCIUM SERPL-MCNC: 9.6 MG/DL (ref 8.7–10.5)
CHLORIDE SERPL-SCNC: 102 MMOL/L (ref 95–110)
CO2 SERPL-SCNC: 26 MMOL/L (ref 23–29)
CREAT SERPL-MCNC: 1.4 MG/DL (ref 0.5–1.4)
DIFFERENTIAL METHOD: ABNORMAL
EOSINOPHIL # BLD AUTO: 0.1 K/UL (ref 0–0.5)
EOSINOPHIL NFR BLD: 1.4 % (ref 0–8)
ERYTHROCYTE [DISTWIDTH] IN BLOOD BY AUTOMATED COUNT: 13.6 % (ref 11.5–14.5)
EST. GFR  (AFRICAN AMERICAN): 48.8 ML/MIN/1.73 M^2
EST. GFR  (NON AFRICAN AMERICAN): 42.3 ML/MIN/1.73 M^2
GLUCOSE SERPL-MCNC: 232 MG/DL (ref 70–110)
HCT VFR BLD AUTO: 27 % (ref 37–48.5)
HGB BLD-MCNC: 9 G/DL (ref 12–16)
IMM GRANULOCYTES # BLD AUTO: 0.01 K/UL (ref 0–0.04)
IMM GRANULOCYTES NFR BLD AUTO: 0.2 % (ref 0–0.5)
INR PPP: 1.2 (ref 0.8–1.2)
LYMPHOCYTES # BLD AUTO: 1.4 K/UL (ref 1–4.8)
LYMPHOCYTES NFR BLD: 27.1 % (ref 18–48)
MCH RBC QN AUTO: 34.7 PG (ref 27–31)
MCHC RBC AUTO-ENTMCNC: 33.3 G/DL (ref 32–36)
MCV RBC AUTO: 104 FL (ref 82–98)
MONOCYTES # BLD AUTO: 0.3 K/UL (ref 0.3–1)
MONOCYTES NFR BLD: 6.2 % (ref 4–15)
NEUTROPHILS # BLD AUTO: 3.3 K/UL (ref 1.8–7.7)
NEUTROPHILS NFR BLD: 64.3 % (ref 38–73)
NRBC BLD-RTO: 0 /100 WBC
PLATELET # BLD AUTO: 54 K/UL (ref 150–350)
PMV BLD AUTO: 10.2 FL (ref 9.2–12.9)
POTASSIUM SERPL-SCNC: 3.9 MMOL/L (ref 3.5–5.1)
PROT SERPL-MCNC: 5.9 G/DL (ref 6–8.4)
PROTHROMBIN TIME: 12.8 SEC (ref 9–12.5)
RBC # BLD AUTO: 2.59 M/UL (ref 4–5.4)
SODIUM SERPL-SCNC: 135 MMOL/L (ref 136–145)
WBC # BLD AUTO: 5.13 K/UL (ref 3.9–12.7)

## 2020-10-09 PROCEDURE — 85025 COMPLETE CBC W/AUTO DIFF WBC: CPT | Mod: TXP

## 2020-10-09 PROCEDURE — 80053 COMPREHEN METABOLIC PANEL: CPT | Mod: TXP

## 2020-10-09 PROCEDURE — 36415 COLL VENOUS BLD VENIPUNCTURE: CPT | Mod: TXP

## 2020-10-09 PROCEDURE — 85610 PROTHROMBIN TIME: CPT | Mod: TXP

## 2020-10-09 NOTE — TELEPHONE ENCOUNTER
PATIENT NAME: Zeina Mahoney  [facility] #: [id number]    Lab Results   Component Value Date    CREATININE 1.5 (H) 10/06/2020     (L) 10/06/2020    BILITOT 4.0 (H) 10/06/2020    ALBUMIN 3.2 (L) 10/06/2020    INR 1.2 10/06/2020     Meld 20  Encephalopathy: 1 - 2  Ascites: moderate  Dialysis: no     Recertification requestor: Lindsey Peterson

## 2020-10-12 ENCOUNTER — TELEPHONE (OUTPATIENT)
Dept: TRANSPLANT | Facility: CLINIC | Age: 55
End: 2020-10-12

## 2020-10-12 ENCOUNTER — OFFICE VISIT (OUTPATIENT)
Dept: INTERNAL MEDICINE | Facility: CLINIC | Age: 55
End: 2020-10-12
Payer: COMMERCIAL

## 2020-10-12 ENCOUNTER — HOSPITAL ENCOUNTER (OUTPATIENT)
Dept: RADIOLOGY | Facility: HOSPITAL | Age: 55
Discharge: HOME | End: 2020-10-12
Attending: STUDENT IN AN ORGANIZED HEALTH CARE EDUCATION/TRAINING PROGRAM
Payer: COMMERCIAL

## 2020-10-12 VITALS
BODY MASS INDEX: 24.24 KG/M2 | DIASTOLIC BLOOD PRESSURE: 58 MMHG | SYSTOLIC BLOOD PRESSURE: 98 MMHG | OXYGEN SATURATION: 99 % | HEART RATE: 79 BPM | HEIGHT: 64 IN | WEIGHT: 142 LBS

## 2020-10-12 DIAGNOSIS — K75.81 LIVER CIRRHOSIS SECONDARY TO NASH: ICD-10-CM

## 2020-10-12 DIAGNOSIS — F43.22 ADJUSTMENT DISORDER WITH ANXIOUS MOOD: ICD-10-CM

## 2020-10-12 DIAGNOSIS — R05.9 COUGH: Primary | ICD-10-CM

## 2020-10-12 DIAGNOSIS — E44.0 PROTEIN-CALORIE MALNUTRITION, MODERATE: ICD-10-CM

## 2020-10-12 DIAGNOSIS — R18.8 OTHER ASCITES: ICD-10-CM

## 2020-10-12 DIAGNOSIS — D69.59 THROMBOCYTOPENIA DUE TO HYPERSPLENISM: ICD-10-CM

## 2020-10-12 DIAGNOSIS — E03.9 HYPOTHYROIDISM, UNSPECIFIED TYPE: ICD-10-CM

## 2020-10-12 DIAGNOSIS — R05.9 COUGH: ICD-10-CM

## 2020-10-12 DIAGNOSIS — D73.1 THROMBOCYTOPENIA DUE TO HYPERSPLENISM: ICD-10-CM

## 2020-10-12 DIAGNOSIS — K74.60 LIVER CIRRHOSIS SECONDARY TO NASH: ICD-10-CM

## 2020-10-12 PROCEDURE — 99999 PR PBB SHADOW E&M-EST. PATIENT-LVL III: ICD-10-PCS | Mod: PBBFAC,,, | Performed by: STUDENT IN AN ORGANIZED HEALTH CARE EDUCATION/TRAINING PROGRAM

## 2020-10-12 PROCEDURE — 99999 PR PBB SHADOW E&M-EST. PATIENT-LVL III: CPT | Mod: PBBFAC,,, | Performed by: STUDENT IN AN ORGANIZED HEALTH CARE EDUCATION/TRAINING PROGRAM

## 2020-10-12 PROCEDURE — 71046 XR CHEST PA AND LATERAL: ICD-10-PCS | Mod: 26,,, | Performed by: RADIOLOGY

## 2020-10-12 PROCEDURE — 3008F BODY MASS INDEX DOCD: CPT | Mod: CPTII,S$GLB,, | Performed by: STUDENT IN AN ORGANIZED HEALTH CARE EDUCATION/TRAINING PROGRAM

## 2020-10-12 PROCEDURE — 99202 OFFICE O/P NEW SF 15 MIN: CPT | Mod: S$GLB,,, | Performed by: STUDENT IN AN ORGANIZED HEALTH CARE EDUCATION/TRAINING PROGRAM

## 2020-10-12 PROCEDURE — 99202 PR OFFICE/OUTPT VISIT, NEW, LEVL II, 15-29 MIN: ICD-10-PCS | Mod: S$GLB,,, | Performed by: STUDENT IN AN ORGANIZED HEALTH CARE EDUCATION/TRAINING PROGRAM

## 2020-10-12 PROCEDURE — 71046 X-RAY EXAM CHEST 2 VIEWS: CPT | Mod: 26,,, | Performed by: RADIOLOGY

## 2020-10-12 PROCEDURE — 3008F PR BODY MASS INDEX (BMI) DOCUMENTED: ICD-10-PCS | Mod: CPTII,S$GLB,, | Performed by: STUDENT IN AN ORGANIZED HEALTH CARE EDUCATION/TRAINING PROGRAM

## 2020-10-12 PROCEDURE — 71046 X-RAY EXAM CHEST 2 VIEWS: CPT | Mod: TC

## 2020-10-12 RX ORDER — ALBUTEROL SULFATE 90 UG/1
2 AEROSOL, METERED RESPIRATORY (INHALATION) EVERY 6 HOURS PRN
Qty: 18 G | Refills: 0 | Status: SHIPPED | OUTPATIENT
Start: 2020-10-12 | End: 2021-10-12

## 2020-10-12 RX ORDER — FUROSEMIDE 40 MG/1
40 TABLET ORAL DAILY
Qty: 30 TABLET | Refills: 11 | Status: SHIPPED | OUTPATIENT
Start: 2020-10-12 | End: 2021-10-12

## 2020-10-12 RX ORDER — SPIRONOLACTONE 50 MG/1
25 TABLET, FILM COATED ORAL DAILY
Qty: 15 TABLET | Refills: 11 | Status: ON HOLD | OUTPATIENT
Start: 2020-10-12 | End: 2020-11-02 | Stop reason: HOSPADM

## 2020-10-12 NOTE — TELEPHONE ENCOUNTER
----- Message from Jillian Delgado sent at 10/12/2020  4:24 PM CDT -----  Regarding: FW: RTC & Labs  Done.  ----- Message -----  From: Tay Kumar RN  Sent: 9/29/2020   3:22 PM CDT  To: Jillian Delgado  Subject: RTC & Labs                                       RTC with available doc in 1 month with [facility] labs.

## 2020-10-12 NOTE — PROGRESS NOTES
Clinic Note  10/12/2020      Subjective:       Patient ID: Zeina Mahoney is a 55 y.o. female being seen for an establish care visit.     Chief Complaint: Cough    55 year old F with ESLD 2/2 CHAUDHARY, hypothyroidism, and adjustment disorder with anxious mood presenting for cough.    #Cough - patient has had dry cough for ~10 days. Around that time she was admitted to [facility] abdominal pain and her COVID test was negative. There is no fever, shortness of breath, wheezing, time specificity, symptoms of reflux, or other associated symptoms. Patient had an episode of choking with admitted and had forceful J-thrust performed to get her to expel a food bolus.     #ESLD - on lactulose, rifaximin, furosemide, spironolactone, and midodrine. Patient has not had good BM for 24-36 hours, but  appears to quite well versed in her care, even being able to perform enemas at home. Follows with transplant hepatology, previously with Dr Reyes (who has now left [facility]) and will be having an appointment in the next month (possibly with Dr Crawford).     #hypothyroidism - on 88mcg of levothyroxine.        MELD-Na score: 19 at 10/9/2020  9:50 AM  MELD score: 17 at 10/9/2020  9:50 AM  Calculated from:  Serum Creatinine: 1.4 mg/dL at 10/9/2020  9:50 AM  Serum Sodium: 135 mmol/L at 10/9/2020  9:50 AM  Total Bilirubin: 3.6 mg/dL at 10/9/2020  9:50 AM  INR(ratio): 1.2 at 10/9/2020  9:50 AM  Age: 55 years      Past Medical History:   Diagnosis Date    Chronic Hepatic encephalopathy 6/25/2020    Decompensated hepatic cirrhosis 6/25/2020    History of pancreatitis 6/25/2020    Liver cirrhosis secondary to CHAUDHARY 6/25/2020    Macrocytic anemia 6/25/2020    Other ascites 6/25/2020    Thrombocytopenia 6/25/2020       No past surgical history on file.    No family history on file.    Social History     Socioeconomic History    Marital status:      Spouse name: Not on file    Number of children: Not on file    Years of education: Not on file     Highest education level: Not on file   Occupational History    Not on file   Social Needs    Financial resource strain: Not on file    Food insecurity     Worry: Not on file     Inability: Not on file    Transportation needs     Medical: Not on file     Non-medical: Not on file   Tobacco Use    Smoking status: Never Smoker    Smokeless tobacco: Never Used   Substance and Sexual Activity    Alcohol use: Never     Frequency: Never    Drug use: Never    Sexual activity: Not on file   Lifestyle    Physical activity     Days per week: Not on file     Minutes per session: Not on file    Stress: Not on file   Relationships    Social connections     Talks on phone: Not on file     Gets together: Not on file     Attends [employer] service: Not on file     Active member of club or organization: Not on file     Attends meetings of clubs or organizations: Not on file     Relationship status: Not on file   Other Topics Concern    Not on file   Social History Narrative    Not on file       Patient's Medications   New Prescriptions    No medications on file   Previous Medications    ACETAMINOPHEN (TYLENOL) 500 MG TABLET    Take 1 tablet (500 mg total) by mouth every 6 (six) hours as needed for Pain. Max 2000mg daily.    DIAPER,BRIEF,ADULT,DISPOSABLE MISC    5 mg/kg/day by Misc.(Non-Drug; Combo Route) route 5 (five) times daily. Patient with incontinence. Please supply enough depends for 5xs/day    ERGOCALCIFEROL (VITAMIN D2) 50,000 UNIT CAP    Take 1 capsule (50,000 Units total) by mouth every 7 days.    ESCITALOPRAM OXALATE (LEXAPRO) 5 MG TAB    Take 1 tablet (5 mg total) by mouth once daily.    FUROSEMIDE (LASIX) 40 MG TABLET    Take 2 tablets (80 mg total) by mouth once daily.    HYDROXYZINE HCL (ATARAX) 25 MG TABLET    Take 1 tablet (25 mg total) by mouth as needed for Itching.    LACTULOSE (CEPHULAC) 10 GRAM PACKET    Mix 3 packets (30 g total) with liquid and take by mouth 3 (three) times daily as needed.     LEVOTHYROXINE (SYNTHROID) 88 MCG TABLET    Take 1 tablet (88 mcg total) by mouth before breakfast.    LIDOCAINE (LIDODERM) 5 %    Place 1 patch onto the skin every 24 hours. Remove & Discard patch within 12 hours or as directed by MD    MAGNESIUM HYDROXIDE 400 MG/5 ML (MILK OF MAGNESIA) 400 MG/5 ML SUSP    Take 30 mLs (2,400 mg total) by mouth daily as needed (Take if constipated despite lactulose).    MELATONIN 10 MG TAB    Take 10 mg by mouth nightly as needed (sleep).    MIDODRINE (PROAMATINE) 10 MG TABLET    Take 1 tablet (10 mg total) by mouth 3 (three) times daily.    NYSTATIN (MYCOSTATIN) POWDER    Apply topically 2 (two) times daily. Apply to affected area twice a day    PANTOPRAZOLE (PROTONIX) 40 MG TABLET    Take 40 mg by mouth before breakfast.    POLYETHYLENE GLYCOL (GLYCOLAX) 17 GRAM PWPK    Take 17 g by mouth once daily. Or miralax OTC. Take if constipated despite lactulose    RIFAXIMIN (XIFAXAN) 550 MG TAB    Take 550 mg by mouth 2 (two) times daily.     SIMETHICONE (MYLICON) 80 MG CHEWABLE TABLET    Take 80 mg by mouth every 6 (six) hours as needed for Flatulence. Take 2 tablets by mouth every 6 hours as needed for gas    SPIRONOLACTONE (ALDACTONE) 50 MG TABLET    Take 1 tablet (50 mg total) by mouth once daily.    ZINC SULFATE (ZINCATE) 220 (50) MG CAPSULE    Take 220 mg by mouth before breakfast.   Modified Medications    No medications on file   Discontinued Medications    No medications on file       Patient Active Problem List    Diagnosis Date Noted    Abdominal pain 09/22/2020    Hyperkalemia 09/22/2020    Hepatorenal syndrome 09/22/2020    KG (acute kidney injury) 09/22/2020    Pre-op evaluation     SIRS (systemic inflammatory response syndrome) 09/09/2020    Cellulitis of lower extremity 09/08/2020    Anemia of chronic disease 08/10/2020    Hyperammonemia     Hypothyroidism 07/28/2020    Portal hypertension 07/28/2020    Thrombocytopenia due to hypersplenism 07/28/2020     Protein-calorie malnutrition, moderate 07/28/2020    Abnormal serum lipase level 07/18/2020    Acquired coagulation factor deficiency 07/18/2020    Adjustment disorder with anxious mood 07/09/2020    Postablative hypothyroidism 07/08/2020    Hypercalcemia 07/08/2020    Decompensated hepatic cirrhosis 06/25/2020    Macrocytic anemia 06/25/2020    Acute Hepatic encephalopathy 06/25/2020    Liver cirrhosis secondary to CHAUDHARY 06/25/2020    Other ascites 06/25/2020    History of pancreatitis 06/25/2020       Review of Systems   Constitutional: Negative for chills, diaphoresis, fever, malaise/fatigue and weight loss.   Respiratory: Positive for cough. Negative for hemoptysis, sputum production, shortness of breath and wheezing.    Cardiovascular: Negative for chest pain, palpitations, orthopnea, claudication, leg swelling and PND.   Genitourinary: Negative for dysuria, flank pain, frequency, hematuria and urgency.   Musculoskeletal: Negative for back pain, falls, joint pain, myalgias and neck pain.   Neurological: Negative for dizziness, tingling, tremors, focal weakness, weakness and headaches.           Objective:      LMP 07/10/2020 (LMP Unknown) Comment: manubeti  There is no height or weight on file to calculate BMI.    Physical Exam  Vitals signs and nursing note reviewed.   Constitutional:       General: She is not in acute distress.     Appearance: She is normal weight. She is not ill-appearing, toxic-appearing or diaphoretic.   HENT:      Head: Normocephalic and atraumatic.   Eyes:      General: Scleral icterus present.   Cardiovascular:      Rate and Rhythm: Normal rate.      Comments: BP normally low  Pulmonary:      Effort: Pulmonary effort is normal. No respiratory distress.      Breath sounds: Normal breath sounds. No stridor. No wheezing, rhonchi or rales.   Abdominal:      General: Abdomen is flat. There is no distension.      Tenderness: There is no abdominal tenderness. There is no guarding.    Musculoskeletal:         General: Swelling present.      Right lower leg: Edema (1+) present.      Left lower leg: Edema (1+) present.   Skin:     Capillary Refill: Capillary refill takes less than 2 seconds.   Neurological:      General: No focal deficit present.      Mental Status: She is alert and oriented to person, place, and time.         Assessment:         1. Cough  albuterol (VENTOLIN HFA) 90 mcg/actuation inhaler    X-Ray Chest PA And Lateral   2. Liver cirrhosis secondary to CHAUDHARY     3. Protein-calorie malnutrition, moderate     4. Hypothyroidism, unspecified type     5. Thrombocytopenia due to hypersplenism     6. Adjustment disorder with anxious mood     7. Other ascites  furosemide (LASIX) 40 MG tablet    spironolactone (ALDACTONE) 50 MG tablet         Plan:         Zeina Mahoney is a 55 y.o. female being seen for an urgent care visit    Zeina was seen today for cough.    Diagnoses and all orders for this visit:    Cough  -     albuterol (VENTOLIN HFA) 90 mcg/actuation inhaler; Inhale 2 puffs into the lungs every 6 (six) hours as needed for Wheezing. Rescue  -     X-Ray Chest PA And Lateral; Future  - Continue using cough medicine, avoid alcohol and acetaminophen dosing - Robitussin-DM  -  Recent negative COVID, low suspicion at this time  -  Cautioned patient and , any worsening or alarm symptoms should result in ED presentation    Liver cirrhosis secondary to CHAUDHARY  -    Adjusted medications per patient's  notes  -  furosemide (LASIX) 40 MG tablet; Take 1 tablet (40 mg total) by mouth once daily.  -     spironolactone (ALDACTONE) 50 MG tablet; Take 0.5 tablets (25 mg total) by mouth once daily.    Hypothyroidism, unspecified type      - Continue levethyroxine 88mcg    Adjustment disorder with anxious mood      -  Continue home lexapro      Patient seen and plan of care discussed with Dr. Maikol Marquez MD

## 2020-10-19 ENCOUNTER — PATIENT MESSAGE (OUTPATIENT)
Dept: TRANSPLANT | Facility: CLINIC | Age: 55
End: 2020-10-19

## 2020-10-19 ENCOUNTER — HOSPITAL ENCOUNTER (INPATIENT)
Facility: HOSPITAL | Age: 55
LOS: 2 days | Discharge: HOME | DRG: 603 | End: 2020-10-23
Attending: EMERGENCY MEDICINE | Admitting: INTERNAL MEDICINE
Payer: COMMERCIAL

## 2020-10-19 DIAGNOSIS — E03.9 HYPOTHYROIDISM, UNSPECIFIED TYPE: ICD-10-CM

## 2020-10-19 DIAGNOSIS — R10.11 RIGHT UPPER QUADRANT ABDOMINAL PAIN: ICD-10-CM

## 2020-10-19 DIAGNOSIS — K76.82 HEPATIC ENCEPHALOPATHY: ICD-10-CM

## 2020-10-19 DIAGNOSIS — D63.8 ANEMIA OF CHRONIC DISEASE: ICD-10-CM

## 2020-10-19 DIAGNOSIS — K72.10 END STAGE LIVER DISEASE: ICD-10-CM

## 2020-10-19 DIAGNOSIS — D69.59 THROMBOCYTOPENIA DUE TO HYPERSPLENISM: ICD-10-CM

## 2020-10-19 DIAGNOSIS — D68.4 ACQUIRED COAGULATION FACTOR DEFICIENCY: ICD-10-CM

## 2020-10-19 DIAGNOSIS — K74.60 DECOMPENSATED HEPATIC CIRRHOSIS: ICD-10-CM

## 2020-10-19 DIAGNOSIS — D73.1 THROMBOCYTOPENIA DUE TO HYPERSPLENISM: ICD-10-CM

## 2020-10-19 DIAGNOSIS — R78.81 BACTEREMIA: ICD-10-CM

## 2020-10-19 DIAGNOSIS — K72.90 DECOMPENSATED HEPATIC CIRRHOSIS: ICD-10-CM

## 2020-10-19 DIAGNOSIS — R10.13 EPIGASTRIC PAIN: Primary | ICD-10-CM

## 2020-10-19 DIAGNOSIS — R10.84 GENERALIZED ABDOMINAL PAIN: ICD-10-CM

## 2020-10-19 DIAGNOSIS — E44.0 PROTEIN-CALORIE MALNUTRITION, MODERATE: ICD-10-CM

## 2020-10-19 LAB
ALBUMIN SERPL BCP-MCNC: 3 G/DL (ref 3.5–5.2)
ALP SERPL-CCNC: 114 U/L (ref 55–135)
ALT SERPL W/O P-5'-P-CCNC: 22 U/L (ref 10–44)
AMMONIA PLAS-SCNC: 51 UMOL/L (ref 10–50)
ANION GAP SERPL CALC-SCNC: 7 MMOL/L (ref 8–16)
AST SERPL-CCNC: 52 U/L (ref 10–40)
BASOPHILS # BLD AUTO: 0.04 K/UL (ref 0–0.2)
BASOPHILS NFR BLD: 0.7 % (ref 0–1.9)
BILIRUB SERPL-MCNC: 3.8 MG/DL (ref 0.1–1)
BUN SERPL-MCNC: 14 MG/DL (ref 6–20)
CALCIUM SERPL-MCNC: 10 MG/DL (ref 8.7–10.5)
CHLORIDE SERPL-SCNC: 104 MMOL/L (ref 95–110)
CO2 SERPL-SCNC: 25 MMOL/L (ref 23–29)
CREAT SERPL-MCNC: 1 MG/DL (ref 0.5–1.4)
CTP QC/QA: YES
DIFFERENTIAL METHOD: ABNORMAL
EOSINOPHIL # BLD AUTO: 0 K/UL (ref 0–0.5)
EOSINOPHIL NFR BLD: 0.7 % (ref 0–8)
ERYTHROCYTE [DISTWIDTH] IN BLOOD BY AUTOMATED COUNT: 13.3 % (ref 11.5–14.5)
EST. GFR  (AFRICAN AMERICAN): >60 ML/MIN/1.73 M^2
EST. GFR  (NON AFRICAN AMERICAN): >60 ML/MIN/1.73 M^2
GLUCOSE SERPL-MCNC: 140 MG/DL (ref 70–110)
HCT VFR BLD AUTO: 29.6 % (ref 37–48.5)
HGB BLD-MCNC: 9.7 G/DL (ref 12–16)
IMM GRANULOCYTES # BLD AUTO: 0.03 K/UL (ref 0–0.04)
IMM GRANULOCYTES NFR BLD AUTO: 0.5 % (ref 0–0.5)
INR PPP: 1.1 (ref 0.8–1.2)
LACTATE SERPL-SCNC: 1.2 MMOL/L (ref 0.5–2.2)
LIPASE SERPL-CCNC: 117 U/L (ref 4–60)
LYMPHOCYTES # BLD AUTO: 0.6 K/UL (ref 1–4.8)
LYMPHOCYTES NFR BLD: 10.1 % (ref 18–48)
MCH RBC QN AUTO: 34.6 PG (ref 27–31)
MCHC RBC AUTO-ENTMCNC: 32.8 G/DL (ref 32–36)
MCV RBC AUTO: 106 FL (ref 82–98)
MONOCYTES # BLD AUTO: 0.4 K/UL (ref 0.3–1)
MONOCYTES NFR BLD: 6.3 % (ref 4–15)
NEUTROPHILS # BLD AUTO: 4.7 K/UL (ref 1.8–7.7)
NEUTROPHILS NFR BLD: 81.7 % (ref 38–73)
NRBC BLD-RTO: 0 /100 WBC
PLATELET # BLD AUTO: 88 K/UL (ref 150–350)
PMV BLD AUTO: 12 FL (ref 9.2–12.9)
POTASSIUM SERPL-SCNC: 4.5 MMOL/L (ref 3.5–5.1)
PROT SERPL-MCNC: 6.6 G/DL (ref 6–8.4)
PROTHROMBIN TIME: 12.5 SEC (ref 9–12.5)
RBC # BLD AUTO: 2.8 M/UL (ref 4–5.4)
SARS-COV-2 RDRP RESP QL NAA+PROBE: NEGATIVE
SODIUM SERPL-SCNC: 136 MMOL/L (ref 136–145)
WBC # BLD AUTO: 5.72 K/UL (ref 3.9–12.7)

## 2020-10-19 PROCEDURE — G0378 HOSPITAL OBSERVATION PER HR: HCPCS | Mod: NTX

## 2020-10-19 PROCEDURE — 99285 PR EMERGENCY DEPT VISIT,LEVEL V: ICD-10-PCS | Mod: NTX,,, | Performed by: PHYSICIAN ASSISTANT

## 2020-10-19 PROCEDURE — 99215 OFFICE O/P EST HI 40 MIN: CPT | Mod: NTX,,, | Performed by: PHYSICIAN ASSISTANT

## 2020-10-19 PROCEDURE — 85025 COMPLETE CBC W/AUTO DIFF WBC: CPT | Mod: NTX

## 2020-10-19 PROCEDURE — 96374 THER/PROPH/DIAG INJ IV PUSH: CPT | Mod: NTX

## 2020-10-19 PROCEDURE — 82140 ASSAY OF AMMONIA: CPT | Mod: NTX

## 2020-10-19 PROCEDURE — U0002 COVID-19 LAB TEST NON-CDC: HCPCS | Mod: NTX | Performed by: EMERGENCY MEDICINE

## 2020-10-19 PROCEDURE — 87186 SC STD MICRODIL/AGAR DIL: CPT | Mod: 59,NTX

## 2020-10-19 PROCEDURE — 85610 PROTHROMBIN TIME: CPT | Mod: NTX

## 2020-10-19 PROCEDURE — 25000003 PHARM REV CODE 250: Mod: NTX | Performed by: PHYSICIAN ASSISTANT

## 2020-10-19 PROCEDURE — 99285 EMERGENCY DEPT VISIT HI MDM: CPT | Mod: 25,NTX

## 2020-10-19 PROCEDURE — 87077 CULTURE AEROBIC IDENTIFY: CPT | Mod: NTX

## 2020-10-19 PROCEDURE — 80053 COMPREHEN METABOLIC PANEL: CPT | Mod: NTX

## 2020-10-19 PROCEDURE — 99285 EMERGENCY DEPT VISIT HI MDM: CPT | Mod: NTX,,, | Performed by: PHYSICIAN ASSISTANT

## 2020-10-19 PROCEDURE — 63600175 PHARM REV CODE 636 W HCPCS: Mod: NTX | Performed by: PHYSICIAN ASSISTANT

## 2020-10-19 PROCEDURE — 83690 ASSAY OF LIPASE: CPT | Mod: NTX

## 2020-10-19 PROCEDURE — 83605 ASSAY OF LACTIC ACID: CPT | Mod: NTX

## 2020-10-19 PROCEDURE — 87040 BLOOD CULTURE FOR BACTERIA: CPT | Mod: 59,NTX

## 2020-10-19 PROCEDURE — 99215 PR OFFICE/OUTPT VISIT, EST, LEVL V, 40-54 MIN: ICD-10-PCS | Mod: NTX,,, | Performed by: PHYSICIAN ASSISTANT

## 2020-10-19 RX ORDER — LACTULOSE 10 G/15ML
30 SOLUTION ORAL 3 TIMES DAILY
Status: DISCONTINUED | OUTPATIENT
Start: 2020-10-19 | End: 2020-10-23 | Stop reason: HOSPADM

## 2020-10-19 RX ORDER — SUCRALFATE 1 G/10ML
1 SUSPENSION ORAL
Status: COMPLETED | OUTPATIENT
Start: 2020-10-19 | End: 2020-10-19

## 2020-10-19 RX ORDER — HYDROXYZINE HYDROCHLORIDE 25 MG/1
25 TABLET, FILM COATED ORAL
Status: DISCONTINUED | OUTPATIENT
Start: 2020-10-19 | End: 2020-10-23 | Stop reason: HOSPADM

## 2020-10-19 RX ORDER — MAG HYDROX/ALUMINUM HYD/SIMETH 200-200-20
30 SUSPENSION, ORAL (FINAL DOSE FORM) ORAL
Status: COMPLETED | OUTPATIENT
Start: 2020-10-19 | End: 2020-10-19

## 2020-10-19 RX ORDER — MIDODRINE HYDROCHLORIDE 5 MG/1
10 TABLET ORAL 3 TIMES DAILY
Status: DISCONTINUED | OUTPATIENT
Start: 2020-10-19 | End: 2020-10-20

## 2020-10-19 RX ORDER — ZINC SULFATE 50(220)MG
220 CAPSULE ORAL
Status: DISCONTINUED | OUTPATIENT
Start: 2020-10-20 | End: 2020-10-23 | Stop reason: HOSPADM

## 2020-10-19 RX ORDER — TRAMADOL HYDROCHLORIDE 50 MG/1
50 TABLET ORAL EVERY 6 HOURS PRN
Status: DISCONTINUED | OUTPATIENT
Start: 2020-10-19 | End: 2020-10-20

## 2020-10-19 RX ORDER — ESCITALOPRAM OXALATE 5 MG/1
5 TABLET ORAL DAILY
Status: DISCONTINUED | OUTPATIENT
Start: 2020-10-20 | End: 2020-10-23 | Stop reason: HOSPADM

## 2020-10-19 RX ORDER — PANTOPRAZOLE SODIUM 40 MG/1
40 TABLET, DELAYED RELEASE ORAL
Status: DISCONTINUED | OUTPATIENT
Start: 2020-10-20 | End: 2020-10-23 | Stop reason: HOSPADM

## 2020-10-19 RX ORDER — ONDANSETRON 2 MG/ML
4 INJECTION INTRAMUSCULAR; INTRAVENOUS EVERY 6 HOURS PRN
Status: DISCONTINUED | OUTPATIENT
Start: 2020-10-19 | End: 2020-10-23 | Stop reason: HOSPADM

## 2020-10-19 RX ORDER — TALC
6 POWDER (GRAM) TOPICAL NIGHTLY PRN
Status: DISCONTINUED | OUTPATIENT
Start: 2020-10-19 | End: 2020-10-23 | Stop reason: HOSPADM

## 2020-10-19 RX ORDER — LEVOTHYROXINE SODIUM 88 UG/1
88 TABLET ORAL
Status: DISCONTINUED | OUTPATIENT
Start: 2020-10-20 | End: 2020-10-23 | Stop reason: HOSPADM

## 2020-10-19 RX ORDER — MORPHINE SULFATE 4 MG/ML
4 INJECTION, SOLUTION INTRAMUSCULAR; INTRAVENOUS
Status: COMPLETED | OUTPATIENT
Start: 2020-10-19 | End: 2020-10-19

## 2020-10-19 RX ORDER — SIMETHICONE 80 MG
80 TABLET,CHEWABLE ORAL EVERY 6 HOURS PRN
Status: DISCONTINUED | OUTPATIENT
Start: 2020-10-19 | End: 2020-10-23 | Stop reason: HOSPADM

## 2020-10-19 RX ORDER — HEPARIN SODIUM 5000 [USP'U]/ML
5000 INJECTION, SOLUTION INTRAVENOUS; SUBCUTANEOUS EVERY 8 HOURS
Status: DISCONTINUED | OUTPATIENT
Start: 2020-10-19 | End: 2020-10-23 | Stop reason: HOSPADM

## 2020-10-19 RX ORDER — FUROSEMIDE 40 MG/1
40 TABLET ORAL DAILY
Status: DISCONTINUED | OUTPATIENT
Start: 2020-10-20 | End: 2020-10-23 | Stop reason: HOSPADM

## 2020-10-19 RX ORDER — ACETAMINOPHEN 325 MG/1
650 TABLET ORAL EVERY 8 HOURS PRN
Status: DISCONTINUED | OUTPATIENT
Start: 2020-10-19 | End: 2020-10-23 | Stop reason: HOSPADM

## 2020-10-19 RX ORDER — SODIUM CHLORIDE 0.9 % (FLUSH) 0.9 %
3 SYRINGE (ML) INJECTION
Status: DISCONTINUED | OUTPATIENT
Start: 2020-10-19 | End: 2020-10-23 | Stop reason: HOSPADM

## 2020-10-19 RX ADMIN — LACTULOSE 30 G: 10 SOLUTION ORAL at 09:10

## 2020-10-19 RX ADMIN — RIFAXIMIN 550 MG: 550 TABLET ORAL at 09:10

## 2020-10-19 RX ADMIN — MORPHINE SULFATE 4 MG: 4 INJECTION INTRAVENOUS at 05:10

## 2020-10-19 RX ADMIN — MIDODRINE HYDROCHLORIDE 10 MG: 5 TABLET ORAL at 09:10

## 2020-10-19 RX ADMIN — SUCRALFATE 1 G: 1 SUSPENSION ORAL at 03:10

## 2020-10-19 RX ADMIN — ALUMINUM HYDROXIDE, MAGNESIUM HYDROXIDE, AND SIMETHICONE 30 ML: 200; 200; 20 SUSPENSION ORAL at 03:10

## 2020-10-19 NOTE — PROGRESS NOTES
ED Physician Hand-off Note:    ED Course: I assumed care of patient from off-going ED physician team. Briefly, Patient is a 55-year-old female with ESLD listed for liver txplant who presnts with epigastric pain.     At the time of signout plan was pending US, lab results.    US showed:  Interval decompression of the gallbladder noting residual sludge would be difficult to definitively exclude.  There is prominence of the common duct, increased since the previous examination without secondary findings to suggest acute cholecystitis.  Distal choledocholithiasis is not excluded.     Upon reassessment, patient appeared very comfortable on exam head reported improvement in her pain.    Discussed with liver transplant surgery team.  They evaluated the patient in the ED. She will be admitted to liver transplant surgery service for further treatment and management.  Patient comfortable with admission. Patient counseled regarding exam, results, diagnosis, treatment, and plan.    Medications given in the ED:    Medications   sodium chloride 0.9% flush 3 mL (has no administration in time range)   melatonin tablet 6 mg (has no administration in time range)   acetaminophen tablet 650 mg (has no administration in time range)   heparin (porcine) injection 5,000 Units (5,000 Units Subcutaneous Not Given 10/19/20 2205)   traMADoL tablet 50 mg (has no administration in time range)   ondansetron injection 4 mg (has no administration in time range)   escitalopram oxalate tablet 5 mg (has no administration in time range)   furosemide tablet 40 mg (has no administration in time range)   hydrOXYzine HCL tablet 25 mg (has no administration in time range)   levothyroxine tablet 88 mcg (has no administration in time range)   midodrine tablet 10 mg (10 mg Oral Given 10/19/20 2155)   pantoprazole EC tablet 40 mg (has no administration in time range)   rifAXIMin tablet 550 mg (550 mg Oral Given 10/19/20 2155)   spironolactone tablet 25 mg (has  no administration in time range)   simethicone chewable tablet 80 mg (has no administration in time range)   zinc sulfate capsule 220 mg (has no administration in time range)   lactulose 20 gram/30 mL solution Soln 30 g (30 g Oral Given 10/19/20 2155)   aluminum-magnesium hydroxide-simethicone 200-200-20 mg/5 mL suspension 30 mL (30 mLs Oral Given 10/19/20 1512)   sucralfate 100 mg/mL suspension 1 g (1 g Oral Given 10/19/20 1512)   morphine injection 4 mg (4 mg Intravenous Given 10/19/20 6080)       Disposition:  Observation        Impression:  Choledocholithiasis, Epigastric Abdominal pain, ESLD

## 2020-10-19 NOTE — ED TRIAGE NOTES
Patient presents to the ED with abdominal pain, nausea and vomiting. Patient has a hx of liver failure.

## 2020-10-19 NOTE — ED PROVIDER NOTES
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  Patient has been doing well over the past few days, per .  Reports she developed epigastric discomfort after bowel movement this morning.  This was followed by nausea and episode of emesis.  No residual nausea or vomiting on arrival.  Pain is intermittent, and does not radiate.  Denies infectious symptoms, swelling, chest pain or shortness of breath.  Compliant with lactulose, no increased confusion per  at bedside.  She has been experiencing a cough for about a month, nonproductive, negative chest x-ray several days ago.  She also reports several days of mild throat soreness, no voice change or unilateral throat pain.  The patients available PMH, PSH, Social History, medications, allergies, and triage vital signs were reviewed just prior to their medical evaluation.  A ten point review of systems was completed and is negative except as documented above.  Patient denies any other acute medical complaint.    Please be advised this text was dictated with [employer] software and may contain errors due to translation.           Review of patient's allergies indicates:   Allergen Reactions    Aspirin Tinitus    Ciprofloxacin Rash    Clindamycin Itching     Past Medical History:   Diagnosis Date    Chronic Hepatic encephalopathy 6/25/2020    Decompensated hepatic cirrhosis 6/25/2020    History of pancreatitis 6/25/2020    Liver cirrhosis secondary to CHAUDHARY 6/25/2020    Macrocytic anemia 6/25/2020    Other ascites 6/25/2020    Thrombocytopenia 6/25/2020     History reviewed. No pertinent surgical history.  History reviewed. No pertinent family history.  Social History     Tobacco Use     Smoking status: Never Smoker    Smokeless tobacco: Never Used   Substance Use Topics    Alcohol use: Never     Frequency: Never    Drug use: Never     Review of Systems   Constitutional: Negative for chills and fever.   HENT: Positive for sore throat (scratchy, worse in morning). Negative for congestion, rhinorrhea, sinus pressure, sinus pain, trouble swallowing and voice change.    Respiratory: Positive for cough. Negative for shortness of breath.    Cardiovascular: Negative for chest pain. Leg swelling: Improved.   Gastrointestinal: Positive for abdominal pain. Negative for constipation, diarrhea, nausea and vomiting.   Genitourinary: Negative for dysuria.   Musculoskeletal: Negative for back pain.   Skin: Negative for rash.   Neurological: Negative for dizziness, weakness and headaches.   Hematological: Does not bruise/bleed easily.   Psychiatric/Behavioral: Negative for confusion.       Physical Exam     Initial Vitals [10/19/20 1327]   BP Pulse Resp Temp SpO2   (!) 101/53 86 18 98.2 °F (36.8 °C) 98 %      MAP       --         Physical Exam    Nursing note and vitals reviewed.  Constitutional: She appears well-developed. She is not diaphoretic. No distress.   Chronically ill-appearing   HENT:   Head: Normocephalic and atraumatic.   Right Ear: External ear normal.   Left Ear: External ear normal.   Mouth/Throat: Oropharynx is clear and moist. No oropharyngeal exudate (throat unremarkable).   Eyes: Conjunctivae and EOM are normal. No scleral icterus.   Cardiovascular: Normal rate, regular rhythm and intact distal pulses.   Pulmonary/Chest: Breath sounds normal. No stridor. No respiratory distress. She has no wheezes. She has no rhonchi. She has no rales.   Minimal cough on exam   Abdominal: Soft. Bowel sounds are normal. There is abdominal tenderness (Epigastrium only). There is no rebound and no guarding.   Musculoskeletal: Normal range of motion. Edema ( trace, baseline per ) present.    Lymphadenopathy:     She has no cervical adenopathy.   Neurological: She is alert and oriented to person, place, and time. She has normal strength. No cranial nerve deficit or sensory deficit.   Skin: Skin is warm and dry. Capillary refill takes less than 2 seconds. No rash noted. No erythema. No pallor.   Icteric throughout   Psychiatric: She has a normal mood and affect. Her behavior is normal. Judgment and thought content normal.         ED Course   Procedures  Labs Reviewed   CBC W/ AUTO DIFFERENTIAL - Abnormal; Notable for the following components:       Result Value    RBC 2.80 (*)     Hemoglobin 9.7 (*)     Hematocrit 29.6 (*)     Mean Corpuscular Volume 106 (*)     Mean Corpuscular Hemoglobin 34.6 (*)     Platelets 88 (*)     Lymph # 0.6 (*)     Gran% 81.7 (*)     Lymph% 10.1 (*)     All other components within normal limits   COMPREHENSIVE METABOLIC PANEL - Abnormal; Notable for the following components:    Glucose 140 (*)     Albumin 3.0 (*)     Total Bilirubin 3.8 (*)     AST 52 (*)     Anion Gap 7 (*)     All other components within normal limits   LIPASE - Abnormal; Notable for the following components:    Lipase 117 (*)     All other components within normal limits   AMMONIA - Abnormal; Notable for the following components:    Ammonia 51 (*)     All other components within normal limits   LACTIC ACID, PLASMA   PROTIME-INR   SARS-COV-2 RDRP GENE          Imaging Results          US Abdomen Limited (Final result)  Result time 10/19/20 17:49:31    Final result by Emanuel Vu MD (10/19/20 17:49:31)                 Impression:      Interval decompression of the gallbladder noting residual sludge would be difficult to definitively exclude.  There is prominence of the common duct, increased since the previous examination without secondary findings to suggest acute cholecystitis.  Distal choledocholithiasis is not excluded.  Further evaluation with MRCP/ERCP as warranted.  Correlation is  advised.    There is a small amount of ascites, correlation recommended.      Electronically signed by: Emanuel Vu MD  Date:    10/19/2020  Time:    17:49             Narrative:    EXAMINATION:  US ABDOMEN LIMITED    CLINICAL HISTORY:  Epigastric pain known gallstone disease;    TECHNIQUE:  Limited ultrasound of the right upper quadrant of the abdomen (including pancreas, liver, gallbladder, common bile duct, and spleen) was performed.    COMPARISON:  Ultrasound 09/29/2020, CT 09/22/2020    FINDINGS:  The visualized portions of the pancreas are grossly unremarkable, for the most part obscured by overlying soft tissue.  The gallbladder is decompressed without significant wall thickening.  Sonographic Rosa sign is negative.  No gallbladder wall hyperemia.  No pericholecystic fluid.  The common duct measures 0.2 cm at the ana hepatis however expands to 0.6 cm distally.  There is a small amount of ascites.  Is partially visualized, underlying hepatic disease may be present.  The visualized right kidney is unremarkable.                                 Medical Decision Making:   History:   I obtained history from: someone other than patient.       <> Summary of History:  at bedside  Old Medical Records: I decided to obtain old medical records.  Old Records Summarized: records from clinic visits and records from previous admission(s).  Initial Assessment:   Patient who is listed for liver transplant presents for recurrent epigastric discomfort, no generalized abdominal pain, no acute infectious symptoms, VSS, afebrile (scratchy throat and cough x 1 month, pertinent exam unremarkable)  Differential Diagnosis:   DDx includes PUD, gastritis/gastropathy, biliary colic, pancreatitis. Physical exam and history taking lower clinical suspicion for ACS, aortic dissection, SBP, acute hepatic encephalopathy.  Clinical Tests:   Lab Tests: Ordered and Reviewed  Radiological Study: Ordered  ED Management:  Given Maalox  with sucralfate, no improvement symptoms. Known gallstone dz.  Additional imaging ordered.  Labs are largely unremarkable, lipase lower than prior lab, T bili largely stable.  Given morphine for symptoms.  Signed out at shift change pending imaging and final disposition. Patient agreed to plan of care and voiced understanding.    Lexis Cancino PA-C  10/20/2020    I discussed the following case, diagnosis and plan of care with attending physician.                     ED Course as of Oct 20 0948   Mon Oct 19, 2020   1555 Is her baseline     BP(!): 101/53 [GK]      ED Course User Index  [GK] Pushpa Thompson MD            Clinical Impression:     ICD-10-CM ICD-9-CM   1. Epigastric pain  R10.13 789.06   2. End stage liver disease  K72.90 572.8   3. Generalized abdominal pain  R10.84 789.07   4. Acquired coagulation factor deficiency  D68.4 286.7   5. Anemia of chronic disease  D63.8 285.29   6. Hepatic encephalopathy  K72.90 572.2   7. Hypothyroidism, unspecified type  E03.9 244.9   8. Thrombocytopenia due to hypersplenism  D69.59 287.49                          ED Disposition Condition    Observation                             Lexis Cancino PA-C  10/20/20 0951       Lexis Cancino PA-C  10/20/20 0951

## 2020-10-19 NOTE — ED NOTES
Patient identifiers verified and correct for Zeina Mahoney.    LOC: The patient is awake, alert and aware of environment with an appropriate affect, the patient is oriented x 3 and speaking appropriately.  APPEARANCE: Patient resting comfortably and in no acute distress, patient is clean and well groomed, patient's clothing is properly fastened.  SKIN: The skin is warm and dry, color consistent with ethnicity, patient has normal skin turgor and moist mucus membranes, skin intact, no breakdown or bruising noted.  MUSCULOSKELETAL: Patient moving all extremities spontaneously.  RESPIRATORY: Airway is open and patent, respirations are spontaneous.  CARDIAC: Patient has a normal rate, no periphreal edema noted, capillary refill < 3 seconds.  ABDOMEN: Soft and non tender to palpation. Patient complains of epigastric/abdominal pain that started this morning about 11AM. Nausea and vomiting associated.

## 2020-10-20 LAB
ALBUMIN SERPL BCP-MCNC: 2.5 G/DL (ref 3.5–5.2)
ALP SERPL-CCNC: 108 U/L (ref 55–135)
ALT SERPL W/O P-5'-P-CCNC: 18 U/L (ref 10–44)
AMYLASE SERPL-CCNC: 81 U/L (ref 20–110)
ANION GAP SERPL CALC-SCNC: 6 MMOL/L (ref 8–16)
AST SERPL-CCNC: 30 U/L (ref 10–40)
BASOPHILS # BLD AUTO: 0.05 K/UL (ref 0–0.2)
BASOPHILS NFR BLD: 0.9 % (ref 0–1.9)
BILIRUB SERPL-MCNC: 3.1 MG/DL (ref 0.1–1)
BUN SERPL-MCNC: 15 MG/DL (ref 6–20)
CALCIUM SERPL-MCNC: 9.3 MG/DL (ref 8.7–10.5)
CHLORIDE SERPL-SCNC: 102 MMOL/L (ref 95–110)
CO2 SERPL-SCNC: 27 MMOL/L (ref 23–29)
CREAT SERPL-MCNC: 1 MG/DL (ref 0.5–1.4)
DIFFERENTIAL METHOD: ABNORMAL
EOSINOPHIL # BLD AUTO: 0.1 K/UL (ref 0–0.5)
EOSINOPHIL NFR BLD: 1.9 % (ref 0–8)
ERYTHROCYTE [DISTWIDTH] IN BLOOD BY AUTOMATED COUNT: 13.2 % (ref 11.5–14.5)
EST. GFR  (AFRICAN AMERICAN): >60 ML/MIN/1.73 M^2
EST. GFR  (NON AFRICAN AMERICAN): >60 ML/MIN/1.73 M^2
GLUCOSE SERPL-MCNC: 114 MG/DL (ref 70–110)
HCT VFR BLD AUTO: 25.9 % (ref 37–48.5)
HGB BLD-MCNC: 8.4 G/DL (ref 12–16)
IMM GRANULOCYTES # BLD AUTO: 0.02 K/UL (ref 0–0.04)
IMM GRANULOCYTES NFR BLD AUTO: 0.4 % (ref 0–0.5)
INR PPP: 1.2 (ref 0.8–1.2)
LIPASE SERPL-CCNC: 87 U/L (ref 4–60)
LYMPHOCYTES # BLD AUTO: 1.6 K/UL (ref 1–4.8)
LYMPHOCYTES NFR BLD: 29.8 % (ref 18–48)
MAGNESIUM SERPL-MCNC: 1.6 MG/DL (ref 1.6–2.6)
MCH RBC QN AUTO: 34 PG (ref 27–31)
MCHC RBC AUTO-ENTMCNC: 32.4 G/DL (ref 32–36)
MCV RBC AUTO: 105 FL (ref 82–98)
MONOCYTES # BLD AUTO: 0.3 K/UL (ref 0.3–1)
MONOCYTES NFR BLD: 6.1 % (ref 4–15)
NEUTROPHILS # BLD AUTO: 3.2 K/UL (ref 1.8–7.7)
NEUTROPHILS NFR BLD: 60.9 % (ref 38–73)
NRBC BLD-RTO: 0 /100 WBC
PLATELET # BLD AUTO: 55 K/UL (ref 150–350)
PMV BLD AUTO: 9.7 FL (ref 9.2–12.9)
POTASSIUM SERPL-SCNC: 3.5 MMOL/L (ref 3.5–5.1)
PROT SERPL-MCNC: 5.4 G/DL (ref 6–8.4)
PROTHROMBIN TIME: 13 SEC (ref 9–12.5)
RBC # BLD AUTO: 2.47 M/UL (ref 4–5.4)
SODIUM SERPL-SCNC: 135 MMOL/L (ref 136–145)
WBC # BLD AUTO: 5.27 K/UL (ref 3.9–12.7)

## 2020-10-20 PROCEDURE — 63600175 PHARM REV CODE 636 W HCPCS: Mod: NTX | Performed by: PHYSICIAN ASSISTANT

## 2020-10-20 PROCEDURE — 99233 SBSQ HOSP IP/OBS HIGH 50: CPT | Mod: NTX,,, | Performed by: NURSE PRACTITIONER

## 2020-10-20 PROCEDURE — 85610 PROTHROMBIN TIME: CPT | Mod: NTX

## 2020-10-20 PROCEDURE — 25000003 PHARM REV CODE 250: Mod: NTX | Performed by: PHYSICIAN ASSISTANT

## 2020-10-20 PROCEDURE — 36415 COLL VENOUS BLD VENIPUNCTURE: CPT | Mod: NTX

## 2020-10-20 PROCEDURE — 96372 THER/PROPH/DIAG INJ SC/IM: CPT

## 2020-10-20 PROCEDURE — 83735 ASSAY OF MAGNESIUM: CPT | Mod: NTX

## 2020-10-20 PROCEDURE — G0378 HOSPITAL OBSERVATION PER HR: HCPCS | Mod: NTX

## 2020-10-20 PROCEDURE — 99215 PR OFFICE/OUTPT VISIT, EST, LEVL V, 40-54 MIN: ICD-10-PCS | Mod: NTX,,, | Performed by: NURSE PRACTITIONER

## 2020-10-20 PROCEDURE — 94761 N-INVAS EAR/PLS OXIMETRY MLT: CPT | Mod: NTX

## 2020-10-20 PROCEDURE — 83690 ASSAY OF LIPASE: CPT | Mod: NTX

## 2020-10-20 PROCEDURE — 99233 PR SUBSEQUENT HOSPITAL CARE,LEVL III: ICD-10-PCS | Mod: NTX,,, | Performed by: NURSE PRACTITIONER

## 2020-10-20 PROCEDURE — 85025 COMPLETE CBC W/AUTO DIFF WBC: CPT | Mod: NTX

## 2020-10-20 PROCEDURE — 80053 COMPREHEN METABOLIC PANEL: CPT | Mod: NTX

## 2020-10-20 PROCEDURE — 82150 ASSAY OF AMYLASE: CPT | Mod: NTX

## 2020-10-20 PROCEDURE — 25000003 PHARM REV CODE 250: Mod: NTX | Performed by: NURSE PRACTITIONER

## 2020-10-20 PROCEDURE — 99215 OFFICE O/P EST HI 40 MIN: CPT | Mod: NTX,,, | Performed by: NURSE PRACTITIONER

## 2020-10-20 RX ORDER — MIDODRINE HYDROCHLORIDE 5 MG/1
10 TABLET ORAL EVERY 8 HOURS
Status: DISCONTINUED | OUTPATIENT
Start: 2020-10-20 | End: 2020-10-23 | Stop reason: HOSPADM

## 2020-10-20 RX ADMIN — TRAMADOL HYDROCHLORIDE 50 MG: 50 TABLET, FILM COATED ORAL at 01:10

## 2020-10-20 RX ADMIN — MIDODRINE HYDROCHLORIDE 10 MG: 5 TABLET ORAL at 08:10

## 2020-10-20 RX ADMIN — PANTOPRAZOLE SODIUM 40 MG: 40 TABLET, DELAYED RELEASE ORAL at 05:10

## 2020-10-20 RX ADMIN — HEPARIN SODIUM 5000 UNITS: 5000 INJECTION INTRAVENOUS; SUBCUTANEOUS at 01:10

## 2020-10-20 RX ADMIN — LACTULOSE 30 G: 10 SOLUTION ORAL at 08:10

## 2020-10-20 RX ADMIN — HEPARIN SODIUM 5000 UNITS: 5000 INJECTION INTRAVENOUS; SUBCUTANEOUS at 08:10

## 2020-10-20 RX ADMIN — ESCITALOPRAM OXALATE 5 MG: 5 TABLET, FILM COATED ORAL at 08:10

## 2020-10-20 RX ADMIN — LACTULOSE 30 G: 10 SOLUTION ORAL at 01:10

## 2020-10-20 RX ADMIN — FUROSEMIDE 40 MG: 40 TABLET ORAL at 08:10

## 2020-10-20 RX ADMIN — RIFAXIMIN 550 MG: 550 TABLET ORAL at 09:10

## 2020-10-20 RX ADMIN — MIDODRINE HYDROCHLORIDE 10 MG: 5 TABLET ORAL at 01:10

## 2020-10-20 RX ADMIN — ZINC SULFATE 220 MG (50 MG) CAPSULE 220 MG: CAPSULE at 06:10

## 2020-10-20 RX ADMIN — HEPARIN SODIUM 5000 UNITS: 5000 INJECTION INTRAVENOUS; SUBCUTANEOUS at 05:10

## 2020-10-20 RX ADMIN — RIFAXIMIN 550 MG: 550 TABLET ORAL at 08:10

## 2020-10-20 RX ADMIN — LEVOTHYROXINE SODIUM 88 MCG: 88 TABLET ORAL at 05:10

## 2020-10-20 RX ADMIN — SPIRONOLACTONE 25 MG: 25 TABLET, FILM COATED ORAL at 08:10

## 2020-10-20 NOTE — H&P
"[facility]  Liver Transplant  History & Physical    Patient Name: Zeina Mahoney  MRN: [id number]  Admission Date: 10/19/2020  Code Status: Full Code  Primary Care Provider: Primary Doctor No    Subjective:     History of Present Illness:  Ms. 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 States she has recurrent persistent epigastric pain that is an ongoing issue. She presents to the ED today because the pain has increased in severity. Denies N/V/D. Passing gas and +BM. Pt has had multiple presentations for abdominal pain over the last couple months with unrevealing source.  Abdominal US today with "Interval decompression of the gallbladder noting residual sludge would be difficult to definitively exclude.  There is prominence of the common duct, increased since the previous examination without secondary findings to suggest acute cholecystitis.  Distal choledocholithiasis is not excluded.  Further evaluation with MRCP/ERCP as warranted. On arrival, VSS. Mentation delayed but but AAO x 3. Labs notable for elevated lipase although decreased from prior - pt has hx of pancreatic lesions. Plan to admit to observation for infectious work up and further imaging with MRCP. Plan of care discussed with Dr. Cobb.        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 No pertinent surgical history.    Review of patient's allergies indicates:   Allergen Reactions    Aspirin Tinitus    Ciprofloxacin Rash    Clindamycin Itching       Family History     None        Tobacco Use    Smoking status: Never Smoker    Smokeless tobacco: Never Used   Substance and " Sexual Activity    Alcohol use: Never     Frequency: Never    Drug use: Never    Sexual activity: Not on file       (Not in a hospital admission)      Review of Systems   Constitutional: Negative for activity change, appetite change, chills, diaphoresis and fever.   HENT: Negative for congestion, sinus pressure, sinus pain, sore throat and trouble swallowing.    Eyes: Negative for visual disturbance.   Respiratory: Negative for chest tightness, shortness of breath and stridor.    Cardiovascular: Negative for chest pain, palpitations and leg swelling.   Gastrointestinal: Positive for abdominal pain. Negative for abdominal distention, constipation, diarrhea, nausea and vomiting.   Genitourinary: Negative for decreased urine volume, difficulty urinating, dysuria and flank pain.   Musculoskeletal: Negative for neck pain and neck stiffness.   Skin: Negative for color change and rash.   Neurological: Negative for dizziness, tremors, syncope, light-headedness and headaches.   Psychiatric/Behavioral: Positive for decreased concentration. Negative for agitation, confusion and hallucinations. The patient is not nervous/anxious.      Objective:     Vital Signs (Most Recent):  Temp: 98.2 °F (36.8 °C) (10/19/20 1327)  Pulse: 71 (10/19/20 2113)  Resp: 16 (10/19/20 2113)  BP: (!) 94/51 (10/19/20 2113)  SpO2: 97 % (10/19/20 2113) Vital Signs (24h Range):  Temp:  [98.2 °F (36.8 °C)] 98.2 °F (36.8 °C)  Pulse:  [66-86] 71  Resp:  [16-18] 16  SpO2:  [97 %-99 %] 97 %  BP: ()/(51-64) 94/51     Weight: 65.8 kg (145 lb)  Body mass index is 24.89 kg/m².    No intake or output data in the 24 hours ending 10/19/20 2154    Physical Exam  Vitals signs and nursing note reviewed.   Constitutional:       General: She is not in acute distress.     Appearance: She is not diaphoretic.   HENT:      Head: Normocephalic and atraumatic.   Eyes:      General: No scleral icterus.        Right eye: No discharge.         Left eye: No discharge.    Neck:      Musculoskeletal: Normal range of motion and neck supple.   Cardiovascular:      Rate and Rhythm: Normal rate and regular rhythm.      Heart sounds: Normal heart sounds.   Pulmonary:      Effort: Pulmonary effort is normal.      Breath sounds: Normal breath sounds. No wheezing or rales.   Abdominal:      General: Bowel sounds are normal. There is no distension.      Palpations: Abdomen is soft.      Tenderness: There is abdominal tenderness (TTP in epigastric region). There is no guarding.   Skin:     General: Skin is warm and dry.      Capillary Refill: Capillary refill takes less than 2 seconds.   Neurological:      Mental Status: She is alert and oriented to person, place, and time.      Cranial Nerves: No cranial nerve deficit.      Comments: Mentation delayed but AOx3   Psychiatric:         Thought Content: Thought content normal.         Judgment: Judgment normal.         Laboratory:  CBC:   Recent Labs   Lab 10/19/20  1452   WBC 5.72   RBC 2.80*   HGB 9.7*   HCT 29.6*   PLT 88*   *   MCH 34.6*   MCHC 32.8     CMP:   Recent Labs   Lab 10/19/20  1452   *   CALCIUM 10.0   ALBUMIN 3.0*   PROT 6.6      K 4.5   CO2 25      BUN 14   CREATININE 1.0   ALKPHOS 114   ALT 22   AST 52*   BILITOT 3.8*     Labs within the past 24 hours have been reviewed.    Diagnostic Results:  Abdomen US:  Impression:  Interval decompression of the gallbladder noting residual sludge would be difficult to definitively exclude.  There is prominence of the common duct, increased since the previous examination without secondary findings to suggest acute cholecystitis.  Distal choledocholithiasis is not excluded.  Further evaluation with MRCP/ERCP as warranted.    Assessment/Plan:     * Abdominal pain  - Labs notable for minimally elevated AST/ALT & elevated bilirubin (although towards baseline)  - US reviewed  - Obtain MRCP  - Blood cultures sent      End stage liver disease  - listed for liver transplant  with MELD 20  - continue diuretics  - continue lactulose/rifaximin/zinc  - continue midodrine    MELD-Na score: 14 at 10/19/2020  2:52 PM  MELD score: 13 at 10/19/2020  2:52 PM  Calculated from:  Serum Creatinine: 1.0 mg/dL at 10/19/2020  2:52 PM  Serum Sodium: 136 mmol/L at 10/19/2020  2:52 PM  Total Bilirubin: 3.8 mg/dL at 10/19/2020  2:52 PM  INR(ratio): 1.1 at 10/19/2020  2:52 PM  Age: 55 years 1 month      Anemia of chronic disease  - 2/2 ESLD  - H&H stable on admit   - monitor with daily CBC      Protein-calorie malnutrition, moderate  + temporal and distal extremity muscle wasting  - RD consult      Thrombocytopenia due to hypersplenism  - 2/2 ESLD  - Monitor with daily CBC      Hypothyroidism  - continue synthroid      Acquired coagulation factor deficiency  - 2/2 ESLD  - monitor with daily PT/INR      Hepatic encephalopathy  - chronic; AOx3 on admit  - ammonia 51  - continue lactulose - titrate to 3-4 BM/day  - continue rifaximin and zinc            Sierra Farris PA-C  Liver Transplant  [facility]

## 2020-10-20 NOTE — ASSESSMENT & PLAN NOTE
- Labs notable for minimally elevated AST/ALT & elevated bilirubin (although towards baseline)  - US reviewed  - Obtain MRCP  - Blood cultures sent

## 2020-10-20 NOTE — PLAN OF CARE
Pt aaox4 with delayed response. Bed alarm on. Bed in low position and callbell within reach. Pt admitted to roow via wheelchair. Visa applied. Pt assessed. Skin intact with 4+ pitting edema BLE. U/s on days showed sludge in gallbladder. Pt admitted with abd pain. Infectious workup planned. Pt is listed for liver. Pt ambulates with standby assist.

## 2020-10-20 NOTE — PLAN OF CARE
Pt pulled out SL and removed Visi this am. Reinforced both were needed several times. Lactulose given as ordered. States had BM X1 today. Oriented X4, but converses in a confused manner. Reinforced to wear non-skid socks when ambulating to prevent falling. Verbalized understanding.  at BS today. Plan for MRI/MRCP today. Has been NPO since lunch. Wound to L shin healing and MADIHA. C/O abd pain. No relief obtained with Tramadol. Denies N/V. AST, ALT, Amylase, and Lipase WDL. Afebrile. Continue to monitor.

## 2020-10-20 NOTE — SUBJECTIVE & OBJECTIVE
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 No pertinent surgical history.    Review of patient's allergies indicates:   Allergen Reactions    Aspirin Tinitus    Ciprofloxacin Rash    Clindamycin Itching       Family History     None        Tobacco Use    Smoking status: Never Smoker    Smokeless tobacco: Never Used   Substance and Sexual Activity    Alcohol use: Never     Frequency: Never    Drug use: Never    Sexual activity: Not on file       PTA Medications   Medication Sig    acetaminophen (TYLENOL) 500 MG tablet Take 1 tablet (500 mg total) by mouth every 6 (six) hours as needed for Pain. Max 2000mg daily.    ergocalciferol (VITAMIN D2) 50,000 unit Cap Take 1 capsule (50,000 Units total) by mouth every 7 days.    escitalopram oxalate (LEXAPRO) 5 MG Tab Take 1 tablet (5 mg total) by mouth once daily.    furosemide (LASIX) 40 MG tablet Take 1 tablet (40 mg total) by mouth once daily.    hydrOXYzine HCL (ATARAX) 25 MG tablet Take 1 tablet (25 mg total) by mouth as needed for Itching.    lactulose (CEPHULAC) 10 gram packet Mix 3 packets (30 g total) with liquid and take by mouth 3 (three) times daily as needed.    levothyroxine (SYNTHROID) 88 MCG tablet Take 1 tablet (88 mcg total) by mouth before breakfast.    pantoprazole (PROTONIX) 40 MG tablet Take 40 mg by mouth before breakfast.    rifAXIMin (XIFAXAN) 550 mg Tab Take 550 mg by mouth 2 (two) times daily.     spironolactone (ALDACTONE) 50 MG tablet Take 0.5 tablets (25 mg total) by mouth once daily.    albuterol (VENTOLIN HFA) 90 mcg/actuation inhaler Inhale 2 puffs into the lungs every 6 (six) hours as needed for Wheezing. Rescue     diaper,brief,adult,disposable Misc 5 mg/kg/day by Misc.(Non-Drug; Combo Route) route 5 (five) times daily. Patient with incontinence. Please supply enough depends for 5xs/day    lidocaine (LIDODERM) 5 % Place 1 patch onto the skin every 24 hours. Remove & Discard patch within 12 hours or as directed by MD    magnesium hydroxide 400 mg/5 ml (MILK OF MAGNESIA) 400 mg/5 mL Susp Take 30 mLs (2,400 mg total) by mouth daily as needed (Take if constipated despite lactulose).    melatonin 10 mg Tab Take 10 mg by mouth nightly as needed (sleep).    midodrine (PROAMATINE) 10 MG tablet Take 1 tablet (10 mg total) by mouth 3 (three) times daily.    nystatin (MYCOSTATIN) powder Apply topically 2 (two) times daily. Apply to affected area twice a day    polyethylene glycol (GLYCOLAX) 17 gram PwPk Take 17 g by mouth once daily. Or miralax OTC. Take if constipated despite lactulose    simethicone (MYLICON) 80 MG chewable tablet Take 80 mg by mouth every 6 (six) hours as needed for Flatulence. Take 2 tablets by mouth every 6 hours as needed for gas    zinc sulfate (ZINCATE) 220 (50) mg capsule Take 220 mg by mouth before breakfast.       Review of Systems   Constitutional: Positive for activity change. Negative for appetite change, chills, diaphoresis and fever.   HENT: Negative for congestion, sinus pressure, sinus pain, sore throat and trouble swallowing.    Eyes: Negative for visual disturbance.   Respiratory: Negative for chest tightness, shortness of breath and stridor.    Cardiovascular: Negative for chest pain, palpitations and leg swelling.   Gastrointestinal: Positive for abdominal pain. Negative for abdominal distention, constipation, diarrhea, nausea and vomiting.   Genitourinary: Negative for decreased urine volume, difficulty urinating, dysuria and flank pain.   Musculoskeletal: Negative for neck pain and neck stiffness.   Skin: Negative for color change and rash.   Neurological: Negative for dizziness, tremors,  syncope, light-headedness and headaches.   Psychiatric/Behavioral: Positive for decreased concentration. Negative for agitation, confusion and hallucinations. The patient is not nervous/anxious.      Objective:     Vital Signs (Most Recent):  Temp: 98 °F (36.7 °C) (10/20/20 0750)  Pulse: 72 (10/20/20 0750)  Resp: (!) 21 (10/20/20 0750)  BP: 116/77 (10/20/20 0750)  SpO2: 99 % (10/20/20 0750) Vital Signs (24h Range):  Temp:  [97.5 °F (36.4 °C)-98.2 °F (36.8 °C)] 98 °F (36.7 °C)  Pulse:  [64-86] 72  Resp:  [12-21] 21  SpO2:  [97 %-100 %] 99 %  BP: ()/(46-77) 116/77     Weight: 66 kg (145 lb 8.1 oz)  Body mass index is 24.98 kg/m².      Intake/Output Summary (Last 24 hours) at 10/20/2020 1239  Last data filed at 10/20/2020 0800  Gross per 24 hour   Intake 480 ml   Output 400 ml   Net 80 ml       Physical Exam  Vitals signs and nursing note reviewed.   Constitutional:       General: She is not in acute distress.     Appearance: She is not diaphoretic.   HENT:      Head: Normocephalic and atraumatic.   Eyes:      General: No scleral icterus.        Right eye: No discharge.         Left eye: No discharge.   Neck:      Musculoskeletal: Normal range of motion and neck supple.   Cardiovascular:      Rate and Rhythm: Normal rate and regular rhythm.      Heart sounds: Normal heart sounds.   Pulmonary:      Effort: Pulmonary effort is normal.      Breath sounds: Normal breath sounds. No wheezing or rales.   Abdominal:      General: Bowel sounds are normal. There is no distension.      Palpations: Abdomen is soft.      Tenderness: There is abdominal tenderness (TTP in epigastric region). There is no guarding.   Skin:     General: Skin is warm and dry.      Capillary Refill: Capillary refill takes less than 2 seconds.   Neurological:      Mental Status: She is alert and oriented to person, place, and time.      Cranial Nerves: No cranial nerve deficit.      Comments: Mentation delayed but AOx3   Psychiatric:         Thought  Content: Thought content normal.         Judgment: Judgment normal.         Laboratory:  CBC:   Recent Labs   Lab 10/20/20  0600   WBC 5.27   RBC 2.47*   HGB 8.4*   HCT 25.9*   PLT 55*   *   MCH 34.0*   MCHC 32.4     CMP:   Recent Labs   Lab 10/20/20  0559   *   CALCIUM 9.3   ALBUMIN 2.5*   PROT 5.4*   *   K 3.5   CO2 27      BUN 15   CREATININE 1.0   ALKPHOS 108   ALT 18   AST 30   BILITOT 3.1*     Labs within the past 24 hours have been reviewed.    Diagnostic Results:  Abdomen US:  Impression:  Interval decompression of the gallbladder noting residual sludge would be difficult to definitively exclude.  There is prominence of the common duct, increased since the previous examination without secondary findings to suggest acute cholecystitis.  Distal choledocholithiasis is not excluded.  Further evaluation with MRCP/ERCP as warranted.

## 2020-10-20 NOTE — PROGRESS NOTES
Admit Note   Met with patient to assess needs. Patient is a 55 y.o.  female, admitted for abdominal pains; liver wait list.      Patient admitted from the emergency room on 10/19/2020 .  At this time, patient presents as alert and oriented x 4, pleasant, good eye contact, recall good, concentration/judgement good, average intelligence, calm, communicative and cooperative.  At this time, patients caregiver presents as not present.    Household/Family Systems     Patient resides with patient's , at [facility] [address]. Local Lodging:  [facility] ([address] [place] [address]).  Support system includes pt's  Yonis.  Patient does not have dependents that are need of being cared for.     Patients primary caregiver is Yonis Mahoney, patients , phone number [phone].  Confirmed patients contact information is [phone] (home);   Telephone Information:   Mobile [phone]     During admission, patient's caregiver plans to stay at home.  Confirmed patient and patients caregivers do have access to reliable transportation.    Cognitive Status/Learning     Patient reports reading ability as 12th grade and states patient does have difficulty with vision and wears readers.  Patient reports patient learns best by seeing, hearing and visual.   Needed: No.   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    Adherence     Patient reports a high level of adherence to patients health care regimen.  Adherence counseling and education provided. Patient verbalizes understanding.    Substance Use    Patient reports the following substance usage.    Tobacco: none, patient denies any use.  Alcohol: none, patient denies any use.  Illicit Drugs/Non-prescribed Medications: none, patient denies any  use.  Patient states clear understanding of the potential impact of substance use.  Substance abstinence/cessation counseling, education and resources provided and reviewed.     Services Utilizing/ADLS  Infusion Service: Prior to admission, patient utilizing? no; however, in the past pt utilized [facility] for services  Home Health: Prior to admission, patient utilizing? yes: current with [facility] for PT/SN  DME: Prior to admission, yes: walker, w/c, bathroom pull bars and shower chair.  Pt also utilizes a hospital bed at home    Pulmonary/Cardiac Rehab: Prior to admission, no  Dialysis:  Prior to admission, no  Transplant Specialty Pharmacy:  Prior to admission, yes; [facility].    Prior to admission, patient reports patient was independent with ADLS and was not driving.  Patient reports patient is able to care for self at this time..  Patient indicates a willingness to care for self once medically cleared to do so.    Insurance/Medications    Insured by   Payor/Plan Subscr  Sex Relation Sub. Ins. ID Effective Group Num   1. BLUE CROSS BL* FLORIAN MEDINA [birth year]/1964 Male  [id number]                                   [address] [address]      Primary Insurance (for UNOS reporting): Private Insurance-BCBS  Secondary Insurance (for UNOS reporting): None    Patient reports patient is able to obtain and afford medications at this time and at time of discharge.    Living Will/Healthcare Power of     Patient states patient does not have a LW and/or HCPA.   provided education regarding LW and HCPA and the completion of forms.    Coping/Mental Health  Patient is coping well with the aid of  family members.  Pt denied any issues or concerns.  Patient denies mental health difficulties.     Discharge Planning  At time of discharge, patient plans to return to patient's home under the care of self/pt's .  Patients  will transport patient.  Per rounds today, expected discharge date has not been  medically determined at this time. Patient and patients caregiver  verbalize understanding and are involved in treatment planning and discharge process.    Additional Concerns    Patient is being followed for needs, education, resources, information, emotional support, supportive counseling, and for supportive and skilled discharge plan of care.  providing ongoing psychosocial support, education, resources and d/c planning as needed.  SW remains available.  remains available. Patient denies additional needs and/or concerns at this time. Patient verbalizes understanding and agreement with information reviewed, social work availability, and how to access available resources as needed.

## 2020-10-20 NOTE — ASSESSMENT & PLAN NOTE
- listed for liver transplant with MELD 20  - continue diuretics  - continue lactulose/rifaximin/zinc  - continue midodrine    MELD-Na score: 14 at 10/19/2020  2:52 PM  MELD score: 13 at 10/19/2020  2:52 PM  Calculated from:  Serum Creatinine: 1.0 mg/dL at 10/19/2020  2:52 PM  Serum Sodium: 136 mmol/L at 10/19/2020  2:52 PM  Total Bilirubin: 3.8 mg/dL at 10/19/2020  2:52 PM  INR(ratio): 1.1 at 10/19/2020  2:52 PM  Age: 55 years 1 month

## 2020-10-20 NOTE — ASSESSMENT & PLAN NOTE
- acute on chronic  - ammonia 51  - continue lactulose - titrate to 3-4 BM/day  - continue rifaximin and zinc

## 2020-10-20 NOTE — HOSPITAL COURSE
Admitted secondary abdominal pain. Currently listed for liver transplant with MELD 20 until 11/8/20 due to CHAUDHARY. Abdominal u/s with possible distal choledocholithiasis. MRCP 10/20 with mild gallbladder wall thickening likely sequela of cirrhosis, mild intrahepatic ductal and CBD prominence (CBD 0.8cm), 4mm stone in cystic duct present since CT scan 9/22. Abdominal pain improving without intervention. Infectious work up on admission done, blood cultures 10/19 growing staphylococcus pseudintermedius, sensitivities pending, repeat blood cultures sent 10/21. Vanc started, ID consulted.     Interval History: no acute events overnight. Continues to c/o intermittent RUQ pain, appears to wax and wane spontaneously. Blood cultures 10/19 growing staph pseudintermedius, sensitivities pending, continue vanc, ID consulted. Per ID, recommend TTE and MRI lumbar spine (c/o intermittent lower back pain) to r/o other causes of bacteremia. Will plan for 2 week course of IV vanc (EOT 11/2/20). PICC to be placed tomorrow (48 hours after negative blood cultures). Remains afebrile. VSS. AAOx4, mild asterixis, continue lactulose/rifaximin.

## 2020-10-20 NOTE — SUBJECTIVE & OBJECTIVE
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 No pertinent surgical history.    Review of patient's allergies indicates:   Allergen Reactions    Aspirin Tinitus    Ciprofloxacin Rash    Clindamycin Itching       Family History     None        Tobacco Use    Smoking status: Never Smoker    Smokeless tobacco: Never Used   Substance and Sexual Activity    Alcohol use: Never     Frequency: Never    Drug use: Never    Sexual activity: Not on file       (Not in a hospital admission)      Review of Systems   Constitutional: Negative for activity change, appetite change, chills, diaphoresis and fever.   HENT: Negative for congestion, sinus pressure, sinus pain, sore throat and trouble swallowing.    Eyes: Negative for visual disturbance.   Respiratory: Negative for chest tightness, shortness of breath and stridor.    Cardiovascular: Negative for chest pain, palpitations and leg swelling.   Gastrointestinal: Positive for abdominal pain. Negative for abdominal distention, constipation, diarrhea, nausea and vomiting.   Genitourinary: Negative for decreased urine volume, difficulty urinating, dysuria and flank pain.   Musculoskeletal: Negative for neck pain and neck stiffness.   Skin: Negative for color change and rash.   Neurological: Negative for dizziness, tremors, syncope, light-headedness and headaches.   Psychiatric/Behavioral: Positive for decreased concentration. Negative for agitation, confusion and hallucinations. The patient is not nervous/anxious.      Objective:     Vital Signs (Most Recent):  Temp: 98.2 °F (36.8 °C) (10/19/20 1327)  Pulse: 71 (10/19/20 2113)  Resp: 16 (10/19/20 2113)  BP: (!) 94/51 (10/19/20 2113)  SpO2: 97 %  (10/19/20 2113) Vital Signs (24h Range):  Temp:  [98.2 °F (36.8 °C)] 98.2 °F (36.8 °C)  Pulse:  [66-86] 71  Resp:  [16-18] 16  SpO2:  [97 %-99 %] 97 %  BP: ()/(51-64) 94/51     Weight: 65.8 kg (145 lb)  Body mass index is 24.89 kg/m².    No intake or output data in the 24 hours ending 10/19/20 2154    Physical Exam  Vitals signs and nursing note reviewed.   Constitutional:       General: She is not in acute distress.     Appearance: She is not diaphoretic.   HENT:      Head: Normocephalic and atraumatic.   Eyes:      General: No scleral icterus.        Right eye: No discharge.         Left eye: No discharge.   Neck:      Musculoskeletal: Normal range of motion and neck supple.   Cardiovascular:      Rate and Rhythm: Normal rate and regular rhythm.      Heart sounds: Normal heart sounds.   Pulmonary:      Effort: Pulmonary effort is normal.      Breath sounds: Normal breath sounds. No wheezing or rales.   Abdominal:      General: Bowel sounds are normal. There is no distension.      Palpations: Abdomen is soft.      Tenderness: There is abdominal tenderness (TTP in epigastric region). There is no guarding.   Skin:     General: Skin is warm and dry.      Capillary Refill: Capillary refill takes less than 2 seconds.   Neurological:      Mental Status: She is alert and oriented to person, place, and time.      Cranial Nerves: No cranial nerve deficit.      Comments: Mentation delayed but AOx3   Psychiatric:         Thought Content: Thought content normal.         Judgment: Judgment normal.         Laboratory:  CBC:   Recent Labs   Lab 10/19/20  1452   WBC 5.72   RBC 2.80*   HGB 9.7*   HCT 29.6*   PLT 88*   *   MCH 34.6*   MCHC 32.8     CMP:   Recent Labs   Lab 10/19/20  1452   *   CALCIUM 10.0   ALBUMIN 3.0*   PROT 6.6      K 4.5   CO2 25      BUN 14   CREATININE 1.0   ALKPHOS 114   ALT 22   AST 52*   BILITOT 3.8*     Labs within the past 24 hours have been reviewed.    Diagnostic  Results:  Abdomen US:  Impression:  Interval decompression of the gallbladder noting residual sludge would be difficult to definitively exclude.  There is prominence of the common duct, increased since the previous examination without secondary findings to suggest acute cholecystitis.  Distal choledocholithiasis is not excluded.  Further evaluation with MRCP/ERCP as warranted.

## 2020-10-20 NOTE — HPI
"Ms. 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 States she has recurrent persistent epigastric pain that is an ongoing issue. She presents to the ED today because the pain has increased in severity. Denies N/V/D. Passing gas and +BM. Pt has had multiple presentations for abdominal pain over the last couple months with unrevealing source.  Abdominal US today with "Interval decompression of the gallbladder noting residual sludge would be difficult to definitively exclude.  There is prominence of the common duct, increased since the previous examination without secondary findings to suggest acute cholecystitis.  Distal choledocholithiasis is not excluded.  Further evaluation with MRCP/ERCP as warranted. On arrival, VSS. Mentation delayed but but AAO x 3. Labs notable for elevated lipase although decreased from prior - pt has hx of pancreatic lesions. Plan to admit to observation for infectious work up and further imaging with MRCP. Plan of care discussed with Dr. Cobb.      "

## 2020-10-20 NOTE — ASSESSMENT & PLAN NOTE
- chronic; AOx3 on admit  - ammonia 51  - continue lactulose - titrate to 3-4 BM/day  - continue rifaximin and zinc

## 2020-10-20 NOTE — ASSESSMENT & PLAN NOTE
- Labs notable for minimally elevated AST/ALT & elevated bilirubin (although towards baseline)  - US reviewed  - MRCP schedule 10/20  - Blood cultures sent, ngtd

## 2020-10-20 NOTE — ASSESSMENT & PLAN NOTE
- listed for liver transplant with MELD 20  - continue diuretics  - continue lactulose/rifaximin/zinc  - continue midodrine    MELD-Na score: 15 at 10/20/2020  6:00 AM  MELD score: 13 at 10/20/2020  6:00 AM  Calculated from:  Serum Creatinine: 1.0 mg/dL at 10/20/2020  5:59 AM  Serum Sodium: 135 mmol/L at 10/20/2020  5:59 AM  Total Bilirubin: 3.1 mg/dL at 10/20/2020  5:59 AM  INR(ratio): 1.2 at 10/20/2020  6:00 AM  Age: 55 years 1 month

## 2020-10-20 NOTE — PROGRESS NOTES
"[facility]  Liver Transplant  Progress Note    Patient Name: Zeina Mahoney  MRN: [id number]  Admission Date: 10/19/2020  Hospital Length of Stay: 0 days  Code Status: Full Code  Primary Care Provider: Primary Doctor No  Post-Operative Day:     ORGAN:   LIVER  Disease Etiology: Cirrhosis: Fatty Liver (Chaudhary)  Donor Type:   Donation after Brain Death  [facility] High Risk:   No  Donor CMV Status:   Donor CMV Status: Positive  Donor HBcAB:   Negative  Donor HCV Status:   Negative  Donor HBV HERACLIO: Negative  Donor HCV HERACLIO: Negative  Whole or Partial:   Biliary Anastomosis:   Arterial Anatomy:   Subjective:     History of Present Illness:  Ms. 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 States she has recurrent persistent epigastric pain that is an ongoing issue. She presents to the ED today because the pain has increased in severity. Denies N/V/D. Passing gas and +BM. Pt has had multiple presentations for abdominal pain over the last couple months with unrevealing source.  Abdominal US today with "Interval decompression of the gallbladder noting residual sludge would be difficult to definitively exclude.  There is prominence of the common duct, increased since the previous examination without secondary findings to suggest acute cholecystitis.  Distal choledocholithiasis is not excluded.  Further evaluation with MRCP/ERCP as warranted. On arrival, VSS. Mentation delayed but but AAO x 3. Labs notable for elevated lipase although decreased from prior - pt has hx of pancreatic lesions. Plan to admit to observation for infectious work up and further imaging with MRCP. Plan of care discussed with Dr. Cobb.        Hospital Course:  Admitted secondary abdominal pain.  Currently listed for liver transplant with MELD 20 until 11/8/20 due to CHAUDHARY.  Abdominal u/s with possible distal choledocholithiasis.  MRCP ordered - scheduled for this afternoon.  " Ammonia elevated on admit.  Encephalopathy being treated with rifaximin and lactulose.  Mental status still slowed from baseline.  Infectious work up unremarkable thus far. Continue to monitor.    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 No pertinent surgical history.    Review of patient's allergies indicates:   Allergen Reactions    Aspirin Tinitus    Ciprofloxacin Rash    Clindamycin Itching       Family History     None        Tobacco Use    Smoking status: Never Smoker    Smokeless tobacco: Never Used   Substance and Sexual Activity    Alcohol use: Never     Frequency: Never    Drug use: Never    Sexual activity: Not on file       PTA Medications   Medication Sig    acetaminophen (TYLENOL) 500 MG tablet Take 1 tablet (500 mg total) by mouth every 6 (six) hours as needed for Pain. Max 2000mg daily.    ergocalciferol (VITAMIN D2) 50,000 unit Cap Take 1 capsule (50,000 Units total) by mouth every 7 days.    escitalopram oxalate (LEXAPRO) 5 MG Tab Take 1 tablet (5 mg total) by mouth once daily.    furosemide (LASIX) 40 MG tablet Take 1 tablet (40 mg total) by mouth once daily.    hydrOXYzine HCL (ATARAX) 25 MG tablet Take 1 tablet (25 mg total) by mouth as needed for Itching.    lactulose (CEPHULAC) 10 gram packet Mix 3 packets (30 g total) with liquid and take by mouth 3 (three) times daily as needed.    levothyroxine (SYNTHROID) 88 MCG tablet Take 1 tablet (88 mcg total) by mouth before breakfast.    pantoprazole (PROTONIX) 40 MG tablet Take 40 mg by mouth before breakfast.    rifAXIMin (XIFAXAN) 550 mg Tab Take 550 mg by mouth 2 (two) times daily.     spironolactone (ALDACTONE) 50 MG tablet Take 0.5 tablets  (25 mg total) by mouth once daily.    albuterol (VENTOLIN HFA) 90 mcg/actuation inhaler Inhale 2 puffs into the lungs every 6 (six) hours as needed for Wheezing. Rescue    diaper,brief,adult,disposable Misc 5 mg/kg/day by Misc.(Non-Drug; Combo Route) route 5 (five) times daily. Patient with incontinence. Please supply enough depends for 5xs/day    lidocaine (LIDODERM) 5 % Place 1 patch onto the skin every 24 hours. Remove & Discard patch within 12 hours or as directed by MD    magnesium hydroxide 400 mg/5 ml (MILK OF MAGNESIA) 400 mg/5 mL Susp Take 30 mLs (2,400 mg total) by mouth daily as needed (Take if constipated despite lactulose).    melatonin 10 mg Tab Take 10 mg by mouth nightly as needed (sleep).    midodrine (PROAMATINE) 10 MG tablet Take 1 tablet (10 mg total) by mouth 3 (three) times daily.    nystatin (MYCOSTATIN) powder Apply topically 2 (two) times daily. Apply to affected area twice a day    polyethylene glycol (GLYCOLAX) 17 gram PwPk Take 17 g by mouth once daily. Or miralax OTC. Take if constipated despite lactulose    simethicone (MYLICON) 80 MG chewable tablet Take 80 mg by mouth every 6 (six) hours as needed for Flatulence. Take 2 tablets by mouth every 6 hours as needed for gas    zinc sulfate (ZINCATE) 220 (50) mg capsule Take 220 mg by mouth before breakfast.       Review of Systems   Constitutional: Positive for activity change. Negative for appetite change, chills, diaphoresis and fever.   HENT: Negative for congestion, sinus pressure, sinus pain, sore throat and trouble swallowing.    Eyes: Negative for visual disturbance.   Respiratory: Negative for chest tightness, shortness of breath and stridor.    Cardiovascular: Negative for chest pain, palpitations and leg swelling.   Gastrointestinal: Positive for abdominal pain. Negative for abdominal distention, constipation, diarrhea, nausea and vomiting.   Genitourinary: Negative for decreased urine volume, difficulty urinating,  dysuria and flank pain.   Musculoskeletal: Negative for neck pain and neck stiffness.   Skin: Negative for color change and rash.   Neurological: Negative for dizziness, tremors, syncope, light-headedness and headaches.   Psychiatric/Behavioral: Positive for decreased concentration. Negative for agitation, confusion and hallucinations. The patient is not nervous/anxious.      Objective:     Vital Signs (Most Recent):  Temp: 98 °F (36.7 °C) (10/20/20 0750)  Pulse: 72 (10/20/20 0750)  Resp: (!) 21 (10/20/20 0750)  BP: 116/77 (10/20/20 0750)  SpO2: 99 % (10/20/20 0750) Vital Signs (24h Range):  Temp:  [97.5 °F (36.4 °C)-98.2 °F (36.8 °C)] 98 °F (36.7 °C)  Pulse:  [64-86] 72  Resp:  [12-21] 21  SpO2:  [97 %-100 %] 99 %  BP: ()/(46-77) 116/77     Weight: 66 kg (145 lb 8.1 oz)  Body mass index is 24.98 kg/m².      Intake/Output Summary (Last 24 hours) at 10/20/2020 1239  Last data filed at 10/20/2020 0800  Gross per 24 hour   Intake 480 ml   Output 400 ml   Net 80 ml       Physical Exam  Vitals signs and nursing note reviewed.   Constitutional:       General: She is not in acute distress.     Appearance: She is not diaphoretic.   HENT:      Head: Normocephalic and atraumatic.   Eyes:      General: No scleral icterus.        Right eye: No discharge.         Left eye: No discharge.   Neck:      Musculoskeletal: Normal range of motion and neck supple.   Cardiovascular:      Rate and Rhythm: Normal rate and regular rhythm.      Heart sounds: Normal heart sounds.   Pulmonary:      Effort: Pulmonary effort is normal.      Breath sounds: Normal breath sounds. No wheezing or rales.   Abdominal:      General: Bowel sounds are normal. There is no distension.      Palpations: Abdomen is soft.      Tenderness: There is abdominal tenderness (TTP in epigastric region). There is no guarding.   Skin:     General: Skin is warm and dry.      Capillary Refill: Capillary refill takes less than 2 seconds.   Neurological:      Mental  Status: She is alert and oriented to person, place, and time.      Cranial Nerves: No cranial nerve deficit.      Comments: Mentation delayed but AOx3   Psychiatric:         Thought Content: Thought content normal.         Judgment: Judgment normal.         Laboratory:  CBC:   Recent Labs   Lab 10/20/20  0600   WBC 5.27   RBC 2.47*   HGB 8.4*   HCT 25.9*   PLT 55*   *   MCH 34.0*   MCHC 32.4     CMP:   Recent Labs   Lab 10/20/20  0559   *   CALCIUM 9.3   ALBUMIN 2.5*   PROT 5.4*   *   K 3.5   CO2 27      BUN 15   CREATININE 1.0   ALKPHOS 108   ALT 18   AST 30   BILITOT 3.1*     Labs within the past 24 hours have been reviewed.    Diagnostic Results:  Abdomen US:  Impression:  Interval decompression of the gallbladder noting residual sludge would be difficult to definitively exclude.  There is prominence of the common duct, increased since the previous examination without secondary findings to suggest acute cholecystitis.  Distal choledocholithiasis is not excluded.  Further evaluation with MRCP/ERCP as warranted.    Assessment/Plan:     * Abdominal pain  - Labs notable for minimally elevated AST/ALT & elevated bilirubin (although towards baseline)  - US reviewed  - MRCP schedule 10/20  - Blood cultures sent, ngtd          End stage liver disease  - listed for liver transplant with MELD 20  - continue diuretics  - continue lactulose/rifaximin/zinc  - continue midodrine    MELD-Na score: 15 at 10/20/2020  6:00 AM  MELD score: 13 at 10/20/2020  6:00 AM  Calculated from:  Serum Creatinine: 1.0 mg/dL at 10/20/2020  5:59 AM  Serum Sodium: 135 mmol/L at 10/20/2020  5:59 AM  Total Bilirubin: 3.1 mg/dL at 10/20/2020  5:59 AM  INR(ratio): 1.2 at 10/20/2020  6:00 AM  Age: 55 years 1 month      Anemia of chronic disease  - 2/2 ESLD  - H&H stable on admit   - monitor with daily CBC      Protein-calorie malnutrition, moderate  + temporal and distal extremity muscle wasting  - RD  consult      Thrombocytopenia due to hypersplenism  - 2/2 ESLD  - Monitor with daily CBC      Hypothyroidism  - continue synthroid      Acquired coagulation factor deficiency  - 2/2 ESLD  - monitor with daily PT/INR      Hepatic encephalopathy  - acute on chronic  - ammonia 51  - continue lactulose - titrate to 3-4 BM/day  - continue rifaximin and zinc          VTE Risk Mitigation (From admission, onward)         Ordered     heparin (porcine) injection 5,000 Units  Every 8 hours      10/19/20 2056     IP VTE HIGH RISK PATIENT  Once      10/19/20 2056     Place sequential compression device  Until discontinued      10/19/20 2056                The patients clinical status was discussed at multidisplinary rounds, involving transplant surgery, transplant medicine, pharmacy, nursing, nutrition, and social work    Discharge Planning: not candidate at this time  No Patient Care Coordination Note on file.      Yaritza Nazario NP  Liver Transplant  [facility]

## 2020-10-21 PROBLEM — R78.81 BACTEREMIA: Status: ACTIVE | Noted: 2020-10-21

## 2020-10-21 LAB
ALBUMIN SERPL BCP-MCNC: 2.7 G/DL (ref 3.5–5.2)
ALP SERPL-CCNC: 117 U/L (ref 55–135)
ALT SERPL W/O P-5'-P-CCNC: 20 U/L (ref 10–44)
ANION GAP SERPL CALC-SCNC: 6 MMOL/L (ref 8–16)
ANISOCYTOSIS BLD QL SMEAR: SLIGHT
AST SERPL-CCNC: 35 U/L (ref 10–40)
BASOPHILS # BLD AUTO: 0.06 K/UL (ref 0–0.2)
BASOPHILS NFR BLD: 1 % (ref 0–1.9)
BILIRUB SERPL-MCNC: 3.1 MG/DL (ref 0.1–1)
BUN SERPL-MCNC: 16 MG/DL (ref 6–20)
BURR CELLS BLD QL SMEAR: ABNORMAL
CALCIUM SERPL-MCNC: 10 MG/DL (ref 8.7–10.5)
CHLORIDE SERPL-SCNC: 103 MMOL/L (ref 95–110)
CO2 SERPL-SCNC: 29 MMOL/L (ref 23–29)
CREAT SERPL-MCNC: 1.1 MG/DL (ref 0.5–1.4)
DIFFERENTIAL METHOD: ABNORMAL
EOSINOPHIL # BLD AUTO: 0.2 K/UL (ref 0–0.5)
EOSINOPHIL NFR BLD: 2.5 % (ref 0–8)
ERYTHROCYTE [DISTWIDTH] IN BLOOD BY AUTOMATED COUNT: 13.5 % (ref 11.5–14.5)
EST. GFR  (AFRICAN AMERICAN): >60 ML/MIN/1.73 M^2
EST. GFR  (NON AFRICAN AMERICAN): 56.7 ML/MIN/1.73 M^2
GLUCOSE SERPL-MCNC: 87 MG/DL (ref 70–110)
HCT VFR BLD AUTO: 27.3 % (ref 37–48.5)
HGB BLD-MCNC: 8.9 G/DL (ref 12–16)
HYPOCHROMIA BLD QL SMEAR: ABNORMAL
IMM GRANULOCYTES # BLD AUTO: 0.02 K/UL (ref 0–0.04)
IMM GRANULOCYTES NFR BLD AUTO: 0.3 % (ref 0–0.5)
INR PPP: 1.2 (ref 0.8–1.2)
LYMPHOCYTES # BLD AUTO: 1.8 K/UL (ref 1–4.8)
LYMPHOCYTES NFR BLD: 27.9 % (ref 18–48)
MAGNESIUM SERPL-MCNC: 1.9 MG/DL (ref 1.6–2.6)
MCH RBC QN AUTO: 34.6 PG (ref 27–31)
MCHC RBC AUTO-ENTMCNC: 32.6 G/DL (ref 32–36)
MCV RBC AUTO: 106 FL (ref 82–98)
MONOCYTES # BLD AUTO: 0.4 K/UL (ref 0.3–1)
MONOCYTES NFR BLD: 6.8 % (ref 4–15)
NEUTROPHILS # BLD AUTO: 3.9 K/UL (ref 1.8–7.7)
NEUTROPHILS NFR BLD: 61.5 % (ref 38–73)
NRBC BLD-RTO: 0 /100 WBC
PLATELET # BLD AUTO: 66 K/UL (ref 150–350)
PLATELET BLD QL SMEAR: ABNORMAL
PMV BLD AUTO: 9.3 FL (ref 9.2–12.9)
POIKILOCYTOSIS BLD QL SMEAR: SLIGHT
POLYCHROMASIA BLD QL SMEAR: ABNORMAL
POTASSIUM SERPL-SCNC: 3.8 MMOL/L (ref 3.5–5.1)
PROT SERPL-MCNC: 5.8 G/DL (ref 6–8.4)
PROTHROMBIN TIME: 12.8 SEC (ref 9–12.5)
RBC # BLD AUTO: 2.57 M/UL (ref 4–5.4)
SCHISTOCYTES BLD QL SMEAR: PRESENT
SODIUM SERPL-SCNC: 138 MMOL/L (ref 136–145)
WBC # BLD AUTO: 6.3 K/UL (ref 3.9–12.7)

## 2020-10-21 PROCEDURE — 36415 COLL VENOUS BLD VENIPUNCTURE: CPT | Mod: NTX

## 2020-10-21 PROCEDURE — 96375 TX/PRO/DX INJ NEW DRUG ADDON: CPT

## 2020-10-21 PROCEDURE — 99233 PR SUBSEQUENT HOSPITAL CARE,LEVL III: ICD-10-PCS | Mod: NTX,,, | Performed by: NURSE PRACTITIONER

## 2020-10-21 PROCEDURE — 99233 SBSQ HOSP IP/OBS HIGH 50: CPT | Mod: NTX,,, | Performed by: NURSE PRACTITIONER

## 2020-10-21 PROCEDURE — 87040 BLOOD CULTURE FOR BACTERIA: CPT | Mod: NTX

## 2020-10-21 PROCEDURE — 96372 THER/PROPH/DIAG INJ SC/IM: CPT

## 2020-10-21 PROCEDURE — 20600001 HC STEP DOWN PRIVATE ROOM: Mod: NTX

## 2020-10-21 PROCEDURE — 63600175 PHARM REV CODE 636 W HCPCS: Mod: NTX | Performed by: INTERNAL MEDICINE

## 2020-10-21 PROCEDURE — 80053 COMPREHEN METABOLIC PANEL: CPT | Mod: NTX

## 2020-10-21 PROCEDURE — 25000003 PHARM REV CODE 250: Mod: NTX | Performed by: PHYSICIAN ASSISTANT

## 2020-10-21 PROCEDURE — 85025 COMPLETE CBC W/AUTO DIFF WBC: CPT | Mod: NTX

## 2020-10-21 PROCEDURE — 85610 PROTHROMBIN TIME: CPT | Mod: NTX

## 2020-10-21 PROCEDURE — 25000003 PHARM REV CODE 250: Mod: NTX | Performed by: INTERNAL MEDICINE

## 2020-10-21 PROCEDURE — 63600175 PHARM REV CODE 636 W HCPCS: Mod: NTX | Performed by: PHYSICIAN ASSISTANT

## 2020-10-21 PROCEDURE — 83735 ASSAY OF MAGNESIUM: CPT | Mod: NTX

## 2020-10-21 PROCEDURE — 25000003 PHARM REV CODE 250: Mod: NTX | Performed by: NURSE PRACTITIONER

## 2020-10-21 RX ORDER — TRAMADOL HYDROCHLORIDE 50 MG/1
100 TABLET ORAL EVERY 6 HOURS PRN
Status: DISCONTINUED | OUTPATIENT
Start: 2020-10-21 | End: 2020-10-23 | Stop reason: HOSPADM

## 2020-10-21 RX ADMIN — HEPARIN SODIUM 5000 UNITS: 5000 INJECTION INTRAVENOUS; SUBCUTANEOUS at 02:10

## 2020-10-21 RX ADMIN — ESCITALOPRAM OXALATE 5 MG: 5 TABLET, FILM COATED ORAL at 08:10

## 2020-10-21 RX ADMIN — HEPARIN SODIUM 5000 UNITS: 5000 INJECTION INTRAVENOUS; SUBCUTANEOUS at 08:10

## 2020-10-21 RX ADMIN — LEVOTHYROXINE SODIUM 88 MCG: 88 TABLET ORAL at 05:10

## 2020-10-21 RX ADMIN — SPIRONOLACTONE 25 MG: 25 TABLET, FILM COATED ORAL at 08:10

## 2020-10-21 RX ADMIN — LACTULOSE 30 G: 10 SOLUTION ORAL at 08:10

## 2020-10-21 RX ADMIN — RIFAXIMIN 550 MG: 550 TABLET ORAL at 08:10

## 2020-10-21 RX ADMIN — ACETAMINOPHEN 650 MG: 325 TABLET ORAL at 03:10

## 2020-10-21 RX ADMIN — MIDODRINE HYDROCHLORIDE 10 MG: 5 TABLET ORAL at 08:10

## 2020-10-21 RX ADMIN — HEPARIN SODIUM 5000 UNITS: 5000 INJECTION INTRAVENOUS; SUBCUTANEOUS at 05:10

## 2020-10-21 RX ADMIN — PANTOPRAZOLE SODIUM 40 MG: 40 TABLET, DELAYED RELEASE ORAL at 05:10

## 2020-10-21 RX ADMIN — ZINC SULFATE 220 MG (50 MG) CAPSULE 220 MG: CAPSULE at 06:10

## 2020-10-21 RX ADMIN — MIDODRINE HYDROCHLORIDE 10 MG: 5 TABLET ORAL at 02:10

## 2020-10-21 RX ADMIN — VANCOMYCIN HYDROCHLORIDE 1500 MG: 1.5 INJECTION, POWDER, LYOPHILIZED, FOR SOLUTION INTRAVENOUS at 10:10

## 2020-10-21 RX ADMIN — LACTULOSE 30 G: 10 SOLUTION ORAL at 03:10

## 2020-10-21 RX ADMIN — MIDODRINE HYDROCHLORIDE 10 MG: 5 TABLET ORAL at 05:10

## 2020-10-21 RX ADMIN — FUROSEMIDE 40 MG: 40 TABLET ORAL at 08:10

## 2020-10-21 NOTE — PROGRESS NOTES
Pharmacokinetic Initial Assessment: IV Vancomycin    Assessment/Plan:    Initiate intravenous vancomycin with loading dose of 1500 mg once followed by a maintenance dose of vancomycin 1250mg IV every 24 hours  Desired empiric serum trough concentration is 15 to 20 mcg/mL  Draw vancomycin trough level 60 min prior to third dose on 10/23 at approximately 0900  Pharmacy will continue to follow and monitor vancomycin.      Please contact pharmacy at extension [phone] with any questions regarding this assessment.     Thank you for the consult,   EloiseCassidy Pintoen       Patient brief summary:  Zeina Mahoney is a 55 y.o. female initiated on antimicrobial therapy with IV Vancomycin for treatment of suspected bacteremia    Drug Allergies:   Review of patient's allergies indicates:   Allergen Reactions    Aspirin Tinitus    Ciprofloxacin Rash    Clindamycin Itching       Actual Body Weight:   66.2 kg    Renal Function:   Estimated Creatinine Clearance: 54.1 mL/min (based on SCr of 1.1 mg/dL).,     Dialysis Method (if applicable):  N/A    CBC (last 72 hours):  Recent Labs   Lab Result Units 10/19/20  1452 10/20/20  0600 10/21/20  0633   WBC K/uL 5.72 5.27 6.30   Hemoglobin g/dL 9.7* 8.4* 8.9*   Hematocrit % 29.6* 25.9* 27.3*   Platelets K/uL 88* 55* 66*   Gran% % 81.7* 60.9 61.5   Lymph% % 10.1* 29.8 27.9   Mono% % 6.3 6.1 6.8   Eosinophil% % 0.7 1.9 2.5   Basophil% % 0.7 0.9 1.0   Differential Method  Automated Automated Automated       Metabolic Panel (last 72 hours):  Recent Labs   Lab Result Units 10/19/20  1452 10/20/20  0559 10/21/20  0633   Sodium mmol/L 136 135* 138   Potassium mmol/L 4.5 3.5 3.8   Chloride mmol/L 104 102 103   CO2 mmol/L 25 27 29   Glucose mg/dL 140* 114* 87   BUN, Bld mg/dL 14 15 16   Creatinine mg/dL 1.0 1.0 1.1   Albumin g/dL 3.0* 2.5* 2.7*   Total Bilirubin mg/dL 3.8* 3.1* 3.1*   Alkaline Phosphatase U/L 114 108 117   AST U/L 52* 30 35   ALT U/L 22 18 20   Magnesium mg/dL  --  1.6 1.9       Drug  levels (last 3 results):  No results for input(s): VANCOMYCINRA, VANCOMYCINPE, VANCOMYCINTR in the last 72 hours.    Microbiologic Results:  Microbiology Results (last 7 days)     Procedure Component Value Units Date/Time    Blood culture [[id number]]     Order Status: Sent Specimen: Blood     Blood culture [[id number]]     Order Status: Sent Specimen: Blood     Blood culture [[id number]]  (Abnormal) Collected: 10/19/20 2045    Order Status: Completed Specimen: Blood from Peripheral, Antecubital, Left Updated: 10/21/20 0836     Blood Culture, Routine Gram stain aer bottle: Gram positive cocci in clusters resembling Staph      Results called to and read back by:Santa Moreno RN 10/20/2020  16:24      STAPHYLOCOCCUS SPECIES  Identification and susceptibility pending      Blood culture [[id number]]  (Abnormal) Collected: 10/19/20 2046    Order Status: Completed Specimen: Blood from Peripheral, Hand, Left Updated: 10/21/20 0834     Blood Culture, Routine Gram stain aer bottle: Gram positive cocci in clusters resembling Staph      Results called to and read back by:Santa Moreno RN 10/20/2020  16:25      STAPHYLOCOCCUS SPECIES  Identification and susceptibility pending

## 2020-10-21 NOTE — ASSESSMENT & PLAN NOTE
- acute on chronic  - ammonia 51 on admit  - continue lactulose - titrate to 3-4 BM/day  - continue rifaximin and zinc

## 2020-10-21 NOTE — PLAN OF CARE
"Patient AAOx4, vitals WDL, afebrile.  L AC 22g saline flushed and locked, dressing reinforced.  Vancomycin began this shift.  Complaints of pain relieved with tylenol, refused tramadol due to stating she "previously got seizures when in her 20s and it just doesn't work". RAUDEL Pollard aware and spoke to patient.  Patient able to ambulate independently, remains free from injury for shift.      "

## 2020-10-21 NOTE — ASSESSMENT & PLAN NOTE
- Blood cultures x 2 on 10/19 growing staph pseudintermedius  - Vanc started 10/21  - Repeat cultures 10/21  - ID consulted

## 2020-10-21 NOTE — PROGRESS NOTES
"[facility]  Liver Transplant  Progress Note    Patient Name: Zeina Mahoney  MRN: [id number]  Admission Date: 10/19/2020  Hospital Length of Stay: 0 days  Code Status: Full Code  Primary Care Provider: Primary Doctor No  Post-Operative Day:     ORGAN:   LIVER  Disease Etiology: Cirrhosis: Fatty Liver (Chaudhary)  Donor Type:   Donation after Brain Death  [facility] High Risk:   No  Donor CMV Status:   Donor CMV Status: Positive  Donor HBcAB:   Negative  Donor HCV Status:   Negative  Donor HBV HERACLIO: Negative  Donor HCV HERACLIO: Negative  Whole or Partial:   Biliary Anastomosis:   Arterial Anatomy:   Subjective:     History of Present Illness:  Ms. 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 States she has recurrent persistent epigastric pain that is an ongoing issue. She presents to the ED today because the pain has increased in severity. Denies N/V/D. Passing gas and +BM. Pt has had multiple presentations for abdominal pain over the last couple months with unrevealing source.  Abdominal US today with "Interval decompression of the gallbladder noting residual sludge would be difficult to definitively exclude.  There is prominence of the common duct, increased since the previous examination without secondary findings to suggest acute cholecystitis.  Distal choledocholithiasis is not excluded.  Further evaluation with MRCP/ERCP as warranted. On arrival, VSS. Mentation delayed but but AAO x 3. Labs notable for elevated lipase although decreased from prior - pt has hx of pancreatic lesions. Plan to admit to observation for infectious work up and further imaging with MRCP. Plan of care discussed with Dr. Cobb.        Hospital Course:  Admitted secondary abdominal pain. Currently listed for liver transplant with MELD 20 until 11/8/20 due to CHAUDHARY. Abdominal u/s with possible distal choledocholithiasis. MRCP 10/20 with mild gallbladder wall thickening " likely sequela of cirrhosis, mild intrahepatic ductal and CBD prominence (CBD 0.8cm), 4mm stone in cystic duct present since CT scan 9/22. Abdominal pain improving without intervention. Infectious work up on admission done, blood cultures 10/19 growing staphylococcus pseudintermedius, sensitivities pending, repeat blood cultures sent 10/21. Vanc started, ID consulted.     Interval History: no acute events overnight. Reports abd improved but still occurs intermittently. MRCP with mild gallbladder thickening, mild prominence of CBD (0.8cm) and intrahepatic ductal dilatation, no filling defect or strictures identified, 4mm stone in cystic duct present since CT scan 9/22. Blood cultures 10/19 x 2 growing staph pseudintermedius, sensitivities pending, vanc started, ID consulted. No obvious wounds infections noted but  reports that patient picks at skin until bleeding at times. Remains afebrile, VSS. AAOx4, mild asterixis, continue lactulose/rifaximin.     Scheduled Meds:   escitalopram oxalate  5 mg Oral Daily    furosemide  40 mg Oral Daily    heparin (porcine)  5,000 Units Subcutaneous Q8H    lactulose  30 g Oral TID    levothyroxine  88 mcg Oral Before breakfast    midodrine  10 mg Oral Q8H    pantoprazole  40 mg Oral Before breakfast    rifAXIMin  550 mg Oral BID    spironolactone  25 mg Oral Daily    [START ON 10/22/2020] vancomycin (VANCOCIN) IVPB  1,250 mg Intravenous Q24H    zinc sulfate  220 mg Oral Before breakfast     Continuous Infusions:  PRN Meds:acetaminophen, hydrOXYzine HCL, melatonin, ondansetron, simethicone, sodium chloride 0.9%, traMADoL, Pharmacy to dose Vancomycin consult **AND** vancomycin - pharmacy to dose    Review of Systems   Constitutional: Positive for activity change. Negative for appetite change, chills, diaphoresis and fever.   HENT: Negative for congestion, sinus pressure, sinus pain, sore throat and trouble swallowing.    Eyes: Negative for visual disturbance.    Respiratory: Negative for chest tightness, shortness of breath and stridor.    Cardiovascular: Negative for chest pain, palpitations and leg swelling.   Gastrointestinal: Positive for abdominal pain. Negative for abdominal distention, constipation, diarrhea, nausea and vomiting.   Genitourinary: Negative for decreased urine volume, difficulty urinating, dysuria and flank pain.   Musculoskeletal: Negative for neck pain and neck stiffness.   Skin: Negative for color change and rash.   Neurological: Negative for dizziness, tremors, syncope, light-headedness and headaches.   Psychiatric/Behavioral: Positive for decreased concentration. Negative for agitation, confusion and hallucinations. The patient is not nervous/anxious.      Objective:     Vital Signs (Most Recent):  Temp: 98.3 °F (36.8 °C) (10/21/20 1214)  Pulse: 82 (10/21/20 0825)  Resp: 16 (10/21/20 0825)  BP: 106/67 (10/21/20 0825)  SpO2: 99 % (10/21/20 0825) Vital Signs (24h Range):  Temp:  [97.4 °F (36.3 °C)-98.3 °F (36.8 °C)] 98.3 °F (36.8 °C)  Pulse:  [67-82] 82  Resp:  [14-22] 16  SpO2:  [98 %-99 %] 99 %  BP: (106-128)/(67-82) 106/67     Weight: 66.2 kg (145 lb 15.1 oz)  Body mass index is 25.05 kg/m².    Intake/Output - Last 3 Shifts       10/19 0700 - 10/20 0659 10/20 0700 - 10/21 0659 10/21 0700 - 10/22 0659    P.O. 480 1200 300    Total Intake(mL/kg) 480 (7.3) 1200 (18.1) 300 (4.5)    Urine (mL/kg/hr)  3200 (2) 300 (0.7)    Stool  0     Total Output  3200 300    Net +480 -2000 0           Urine Occurrence 3 x      Stool Occurrence  2 x           Physical Exam  Vitals signs and nursing note reviewed.   Constitutional:       General: She is not in acute distress.     Appearance: She is not diaphoretic.   HENT:      Head: Normocephalic and atraumatic.   Eyes:      General: No scleral icterus.        Right eye: No discharge.         Left eye: No discharge.   Neck:      Musculoskeletal: Normal range of motion and neck supple.   Cardiovascular:      Rate  and Rhythm: Normal rate and regular rhythm.      Heart sounds: Normal heart sounds.   Pulmonary:      Effort: Pulmonary effort is normal.      Breath sounds: Normal breath sounds. No wheezing or rales.   Abdominal:      General: Bowel sounds are normal. There is no distension.      Palpations: Abdomen is soft.      Tenderness: There is abdominal tenderness (TTP in epigastric region). There is no guarding.   Skin:     General: Skin is warm and dry.      Capillary Refill: Capillary refill takes less than 2 seconds.   Neurological:      Mental Status: She is alert and oriented to person, place, and time.      Cranial Nerves: No cranial nerve deficit.      Comments: Mentation delayed but AOx3   Psychiatric:         Thought Content: Thought content normal.         Judgment: Judgment normal.         Laboratory:  Immunosuppressants     None        CBC:   Recent Labs   Lab 10/21/20  0633   WBC 6.30   RBC 2.57*   HGB 8.9*   HCT 27.3*   PLT 66*   *   MCH 34.6*   MCHC 32.6     CMP:   Recent Labs   Lab 10/21/20  0633   GLU 87   CALCIUM 10.0   ALBUMIN 2.7*   PROT 5.8*      K 3.8   CO2 29      BUN 16   CREATININE 1.1   ALKPHOS 117   ALT 20   AST 35   BILITOT 3.1*     Coagulation:   Recent Labs   Lab 10/21/20  0633   INR 1.2     Labs within the past 24 hours have been reviewed.    Diagnostic Results:  MRCP 10/20:  FINDINGS:  Markedly limited exam due to respiratory motion.     LIVER: Liver appears atrophic with a nodular contour suggestive of cirrhosis..  1.3 cm T2 hyperintensity lesion within the right hepatic dome measuring 1.3 cm consistent with simple cyst, unchanged from prior exams.  Upper abdominal venous collaterals are present.  Hepatic and portal veins demonstrate normal vascular flow voids suggesting patency.     SPLEEN: Enlarged measuring 12.5 x 7.4 cm     STOMACH/DUODENUM: Normal.     PANCREAS: Pancreas appears stable in size.  There is redemonstration of multiple T2 signal hyperintensity suggestive  of cysts scattered throughout the entire pancreas.  Largest cysts measure 1.7 cm in the body, and 1.5 cm in the head the pancreas.  Findings likely represent sequela chronic pancreatitis.     ADRENALS: Normal.     URINARY: No solid-appearing renal mass.  No hydronephrosis.  Bilateral scattered subcentimeter T2 hyperintense foci suggestive of simple cysts.     RETROPERITONEUM: Normal.     MISCELLANEOUS: Normal marrow signal.     MRCP:     Limited exam due to respiratory motion.     Mild gallbladder wall thickening likely due to sequela of cirrhosis.  Mild intrahepatic ductal prominence.  There is mild prominence of the common bile duct measuring 0.8 cm (series 11, image 31).  No filling defect identified.  Possible narrowing of the common duct at the liver hilum.  There is a focus of decreased signal measuring 4 mm in cystic duct, likely correlating with gallstone noted on the prior CT exam (series 11, image 29).  Pancreatic duct measures within normal limits.  No ductal strictures are identified.     Impression:     Probable small 4 mm gallstone in the cystic duct, correlating with the 09/22/2020 CT exam.  No filling defects in the common bile duct/ choledocholithiasis identified.     Gallbladder wall thickening, which can be seen with underlying hepatic dysfunction, similar to prior exams.     Sequela of cirrhosis and portal hypertension including liver atrophy, nodular contour of the liver, splenomegaly, and upper abdominal venous collaterals.  Limited assessment for HCC without IV contrast.     Numerous pancreatic cystic lesions, stable.     Debility/Functional status: Patient debilitated by evidence of Muscle wasting and atrophy, Weakness, Chronic fatigue, unspecified, Other malaise, Age-related physical debility, Limitation of activities due to disability and Other reduced mobility. Physical and occupational therapy ordered daily to evaluate and treat. Debility was: present on admission.    Assessment/Plan:      * Abdominal pain  - Labs notable for minimally elevated AST/ALT & elevated bilirubin (although towards baseline)  - US reviewed  - MRCP schedule 10/20 with mild gallbladder wall thickening likely sequela of cirrhosis, mild intrahepatic ductal and CBD prominence (CBD 0.8cm), 4mm stone in cystic duct present since CT scan 9/22.      Bacteremia  - Blood cultures x 2 on 10/19 growing staph pseudintermedius  - Vanc started 10/21  - Repeat cultures 10/21  - ID consulted    End stage liver disease  - listed for liver transplant with MELD 20  - continue diuretics  - continue lactulose/rifaximin/zinc  - continue midodrine    MELD-Na score: 14 at 10/21/2020  6:33 AM  MELD score: 14 at 10/21/2020  6:33 AM  Calculated from:  Serum Creatinine: 1.1 mg/dL at 10/21/2020  6:33 AM  Serum Sodium: 138 mmol/L (Rounded to 137 mmol/L) at 10/21/2020  6:33 AM  Total Bilirubin: 3.1 mg/dL at 10/21/2020  6:33 AM  INR(ratio): 1.2 at 10/21/2020  6:33 AM  Age: 55 years 1 month    Anemia of chronic disease  - 2/2 ESLD  - H&H stable on admit   - monitor with daily CBC    Protein-calorie malnutrition, moderate  + temporal and distal extremity muscle wasting  - RD consult    Thrombocytopenia due to hypersplenism  - 2/2 ESLD  - Monitor with daily CBC    Hypothyroidism  - continue synthroid    Acquired coagulation factor deficiency  - 2/2 ESLD  - monitor with daily PT/INR    Hepatic encephalopathy  - acute on chronic  - ammonia 51 on admit  - continue lactulose - titrate to 3-4 BM/day  - continue rifaximin and zinc        VTE Risk Mitigation (From admission, onward)         Ordered     heparin (porcine) injection 5,000 Units  Every 8 hours      10/19/20 2056     IP VTE HIGH RISK PATIENT  Once      10/19/20 2056     Place sequential compression device  Until discontinued      10/19/20 2056                The patients clinical status was discussed at multidisplinary rounds, involving transplant surgery, transplant medicine, pharmacy, nursing,  nutrition, and social work    Discharge Planning: not stable for discharge at this time.     Latrice Sorto DNP  Liver Transplant  [facility]

## 2020-10-21 NOTE — SUBJECTIVE & OBJECTIVE
Scheduled Meds:   escitalopram oxalate  5 mg Oral Daily    furosemide  40 mg Oral Daily    heparin (porcine)  5,000 Units Subcutaneous Q8H    lactulose  30 g Oral TID    levothyroxine  88 mcg Oral Before breakfast    midodrine  10 mg Oral Q8H    pantoprazole  40 mg Oral Before breakfast    rifAXIMin  550 mg Oral BID    spironolactone  25 mg Oral Daily    [START ON 10/22/2020] vancomycin (VANCOCIN) IVPB  1,250 mg Intravenous Q24H    zinc sulfate  220 mg Oral Before breakfast     Continuous Infusions:  PRN Meds:acetaminophen, hydrOXYzine HCL, melatonin, ondansetron, simethicone, sodium chloride 0.9%, traMADoL, Pharmacy to dose Vancomycin consult **AND** vancomycin - pharmacy to dose    Review of Systems   Constitutional: Positive for activity change. Negative for appetite change, chills, diaphoresis and fever.   HENT: Negative for congestion, sinus pressure, sinus pain, sore throat and trouble swallowing.    Eyes: Negative for visual disturbance.   Respiratory: Negative for chest tightness, shortness of breath and stridor.    Cardiovascular: Negative for chest pain, palpitations and leg swelling.   Gastrointestinal: Positive for abdominal pain. Negative for abdominal distention, constipation, diarrhea, nausea and vomiting.   Genitourinary: Negative for decreased urine volume, difficulty urinating, dysuria and flank pain.   Musculoskeletal: Negative for neck pain and neck stiffness.   Skin: Negative for color change and rash.   Neurological: Negative for dizziness, tremors, syncope, light-headedness and headaches.   Psychiatric/Behavioral: Positive for decreased concentration. Negative for agitation, confusion and hallucinations. The patient is not nervous/anxious.      Objective:     Vital Signs (Most Recent):  Temp: 98.3 °F (36.8 °C) (10/21/20 1214)  Pulse: 82 (10/21/20 0825)  Resp: 16 (10/21/20 0825)  BP: 106/67 (10/21/20 0825)  SpO2: 99 % (10/21/20 0825) Vital Signs (24h Range):  Temp:  [97.4 °F (36.3  °C)-98.3 °F (36.8 °C)] 98.3 °F (36.8 °C)  Pulse:  [67-82] 82  Resp:  [14-22] 16  SpO2:  [98 %-99 %] 99 %  BP: (106-128)/(67-82) 106/67     Weight: 66.2 kg (145 lb 15.1 oz)  Body mass index is 25.05 kg/m².    Intake/Output - Last 3 Shifts       10/19 0700 - 10/20 0659 10/20 0700 - 10/21 0659 10/21 0700 - 10/22 0659    P.O. 480 1200 300    Total Intake(mL/kg) 480 (7.3) 1200 (18.1) 300 (4.5)    Urine (mL/kg/hr)  3200 (2) 300 (0.7)    Stool  0     Total Output  3200 300    Net +480 -2000 0           Urine Occurrence 3 x      Stool Occurrence  2 x           Physical Exam  Vitals signs and nursing note reviewed.   Constitutional:       General: She is not in acute distress.     Appearance: She is not diaphoretic.   HENT:      Head: Normocephalic and atraumatic.   Eyes:      General: No scleral icterus.        Right eye: No discharge.         Left eye: No discharge.   Neck:      Musculoskeletal: Normal range of motion and neck supple.   Cardiovascular:      Rate and Rhythm: Normal rate and regular rhythm.      Heart sounds: Normal heart sounds.   Pulmonary:      Effort: Pulmonary effort is normal.      Breath sounds: Normal breath sounds. No wheezing or rales.   Abdominal:      General: Bowel sounds are normal. There is no distension.      Palpations: Abdomen is soft.      Tenderness: There is abdominal tenderness (TTP in epigastric region). There is no guarding.   Skin:     General: Skin is warm and dry.      Capillary Refill: Capillary refill takes less than 2 seconds.   Neurological:      Mental Status: She is alert and oriented to person, place, and time.      Cranial Nerves: No cranial nerve deficit.      Comments: Mentation delayed but AOx3   Psychiatric:         Thought Content: Thought content normal.         Judgment: Judgment normal.         Laboratory:  Immunosuppressants     None        CBC:   Recent Labs   Lab 10/21/20  0633   WBC 6.30   RBC 2.57*   HGB 8.9*   HCT 27.3*   PLT 66*   *   MCH 34.6*   MCHC  32.6     CMP:   Recent Labs   Lab 10/21/20  0633   GLU 87   CALCIUM 10.0   ALBUMIN 2.7*   PROT 5.8*      K 3.8   CO2 29      BUN 16   CREATININE 1.1   ALKPHOS 117   ALT 20   AST 35   BILITOT 3.1*     Coagulation:   Recent Labs   Lab 10/21/20  0633   INR 1.2     Labs within the past 24 hours have been reviewed.    Diagnostic Results:  MRCP 10/20:  FINDINGS:  Markedly limited exam due to respiratory motion.     LIVER: Liver appears atrophic with a nodular contour suggestive of cirrhosis..  1.3 cm T2 hyperintensity lesion within the right hepatic dome measuring 1.3 cm consistent with simple cyst, unchanged from prior exams.  Upper abdominal venous collaterals are present.  Hepatic and portal veins demonstrate normal vascular flow voids suggesting patency.     SPLEEN: Enlarged measuring 12.5 x 7.4 cm     STOMACH/DUODENUM: Normal.     PANCREAS: Pancreas appears stable in size.  There is redemonstration of multiple T2 signal hyperintensity suggestive of cysts scattered throughout the entire pancreas.  Largest cysts measure 1.7 cm in the body, and 1.5 cm in the head the pancreas.  Findings likely represent sequela chronic pancreatitis.     ADRENALS: Normal.     URINARY: No solid-appearing renal mass.  No hydronephrosis.  Bilateral scattered subcentimeter T2 hyperintense foci suggestive of simple cysts.     RETROPERITONEUM: Normal.     MISCELLANEOUS: Normal marrow signal.     MRCP:     Limited exam due to respiratory motion.     Mild gallbladder wall thickening likely due to sequela of cirrhosis.  Mild intrahepatic ductal prominence.  There is mild prominence of the common bile duct measuring 0.8 cm (series 11, image 31).  No filling defect identified.  Possible narrowing of the common duct at the liver hilum.  There is a focus of decreased signal measuring 4 mm in cystic duct, likely correlating with gallstone noted on the prior CT exam (series 11, image 29).  Pancreatic duct measures within normal limits.  No  ductal strictures are identified.     Impression:     Probable small 4 mm gallstone in the cystic duct, correlating with the 09/22/2020 CT exam.  No filling defects in the common bile duct/ choledocholithiasis identified.     Gallbladder wall thickening, which can be seen with underlying hepatic dysfunction, similar to prior exams.     Sequela of cirrhosis and portal hypertension including liver atrophy, nodular contour of the liver, splenomegaly, and upper abdominal venous collaterals.  Limited assessment for HCC without IV contrast.     Numerous pancreatic cystic lesions, stable.     Debility/Functional status: Patient debilitated by evidence of Muscle wasting and atrophy, Weakness, Chronic fatigue, unspecified, Other malaise, Age-related physical debility, Limitation of activities due to disability and Other reduced mobility. Physical and occupational therapy ordered daily to evaluate and treat. Debility was: present on admission.

## 2020-10-21 NOTE — ASSESSMENT & PLAN NOTE
- Labs notable for minimally elevated AST/ALT & elevated bilirubin (although towards baseline)  - US reviewed  - MRCP schedule 10/20 with mild gallbladder wall thickening likely sequela of cirrhosis, mild intrahepatic ductal and CBD prominence (CBD 0.8cm), 4mm stone in cystic duct present since CT scan 9/22.

## 2020-10-21 NOTE — NURSING
Pt pulled her iv out again this morning. Pulled her iv out on Tuesday morning also. She also refused her tramadol all night and the tylenol even though she complained of pain. She states she is allergic to tramadol however it is not on her list of allergies and she can not tell what reaction she has to the med. She was given the tramadol on Monday night without any problems or reaction.

## 2020-10-22 LAB
ALBUMIN SERPL BCP-MCNC: 2.6 G/DL (ref 3.5–5.2)
ALP SERPL-CCNC: 97 U/L (ref 55–135)
ALT SERPL W/O P-5'-P-CCNC: 20 U/L (ref 10–44)
ANION GAP SERPL CALC-SCNC: 6 MMOL/L (ref 8–16)
AST SERPL-CCNC: 38 U/L (ref 10–40)
BASOPHILS # BLD AUTO: 0.05 K/UL (ref 0–0.2)
BASOPHILS NFR BLD: 1.2 % (ref 0–1.9)
BILIRUB SERPL-MCNC: 3.4 MG/DL (ref 0.1–1)
BUN SERPL-MCNC: 16 MG/DL (ref 6–20)
CALCIUM SERPL-MCNC: 9.9 MG/DL (ref 8.7–10.5)
CHLORIDE SERPL-SCNC: 103 MMOL/L (ref 95–110)
CO2 SERPL-SCNC: 27 MMOL/L (ref 23–29)
CREAT SERPL-MCNC: 1 MG/DL (ref 0.5–1.4)
DIFFERENTIAL METHOD: ABNORMAL
EOSINOPHIL # BLD AUTO: 0.1 K/UL (ref 0–0.5)
EOSINOPHIL NFR BLD: 2.4 % (ref 0–8)
ERYTHROCYTE [DISTWIDTH] IN BLOOD BY AUTOMATED COUNT: 13.6 % (ref 11.5–14.5)
EST. GFR  (AFRICAN AMERICAN): >60 ML/MIN/1.73 M^2
EST. GFR  (NON AFRICAN AMERICAN): >60 ML/MIN/1.73 M^2
GLUCOSE SERPL-MCNC: 111 MG/DL (ref 70–110)
HCT VFR BLD AUTO: 24.9 % (ref 37–48.5)
HGB BLD-MCNC: 8.1 G/DL (ref 12–16)
IMM GRANULOCYTES # BLD AUTO: 0.02 K/UL (ref 0–0.04)
IMM GRANULOCYTES NFR BLD AUTO: 0.5 % (ref 0–0.5)
INR PPP: 1.2 (ref 0.8–1.2)
LYMPHOCYTES # BLD AUTO: 1.1 K/UL (ref 1–4.8)
LYMPHOCYTES NFR BLD: 24.8 % (ref 18–48)
MAGNESIUM SERPL-MCNC: 1.6 MG/DL (ref 1.6–2.6)
MCH RBC QN AUTO: 34.2 PG (ref 27–31)
MCHC RBC AUTO-ENTMCNC: 32.5 G/DL (ref 32–36)
MCV RBC AUTO: 105 FL (ref 82–98)
MONOCYTES # BLD AUTO: 0.3 K/UL (ref 0.3–1)
MONOCYTES NFR BLD: 6.1 % (ref 4–15)
NEUTROPHILS # BLD AUTO: 2.8 K/UL (ref 1.8–7.7)
NEUTROPHILS NFR BLD: 65 % (ref 38–73)
NRBC BLD-RTO: 0 /100 WBC
PLATELET # BLD AUTO: 51 K/UL (ref 150–350)
PMV BLD AUTO: 9.2 FL (ref 9.2–12.9)
POTASSIUM SERPL-SCNC: 3.4 MMOL/L (ref 3.5–5.1)
PROT SERPL-MCNC: 5.3 G/DL (ref 6–8.4)
PROTHROMBIN TIME: 13.2 SEC (ref 9–12.5)
RBC # BLD AUTO: 2.37 M/UL (ref 4–5.4)
SODIUM SERPL-SCNC: 136 MMOL/L (ref 136–145)
WBC # BLD AUTO: 4.24 K/UL (ref 3.9–12.7)

## 2020-10-22 PROCEDURE — 80053 COMPREHEN METABOLIC PANEL: CPT | Mod: NTX

## 2020-10-22 PROCEDURE — 25000003 PHARM REV CODE 250: Mod: NTX | Performed by: NURSE PRACTITIONER

## 2020-10-22 PROCEDURE — 25000003 PHARM REV CODE 250: Mod: NTX | Performed by: PHYSICIAN ASSISTANT

## 2020-10-22 PROCEDURE — 63600175 PHARM REV CODE 636 W HCPCS: Mod: NTX | Performed by: INTERNAL MEDICINE

## 2020-10-22 PROCEDURE — 25500020 PHARM REV CODE 255: Mod: NTX | Performed by: INTERNAL MEDICINE

## 2020-10-22 PROCEDURE — A9585 GADOBUTROL INJECTION: HCPCS | Mod: NTX | Performed by: INTERNAL MEDICINE

## 2020-10-22 PROCEDURE — 25000003 PHARM REV CODE 250: Mod: NTX | Performed by: INTERNAL MEDICINE

## 2020-10-22 PROCEDURE — 36415 COLL VENOUS BLD VENIPUNCTURE: CPT | Mod: NTX

## 2020-10-22 PROCEDURE — 85025 COMPLETE CBC W/AUTO DIFF WBC: CPT | Mod: NTX

## 2020-10-22 PROCEDURE — 83735 ASSAY OF MAGNESIUM: CPT | Mod: NTX

## 2020-10-22 PROCEDURE — 63600175 PHARM REV CODE 636 W HCPCS: Mod: NTX | Performed by: PHYSICIAN ASSISTANT

## 2020-10-22 PROCEDURE — 99255 PR INITIAL INPATIENT CONSULT,LEVL V: ICD-10-PCS | Mod: NTX,,, | Performed by: INTERNAL MEDICINE

## 2020-10-22 PROCEDURE — 85610 PROTHROMBIN TIME: CPT | Mod: NTX

## 2020-10-22 PROCEDURE — 20600001 HC STEP DOWN PRIVATE ROOM: Mod: NTX

## 2020-10-22 PROCEDURE — 99255 IP/OBS CONSLTJ NEW/EST HI 80: CPT | Mod: NTX,,, | Performed by: INTERNAL MEDICINE

## 2020-10-22 RX ORDER — GADOBUTROL 604.72 MG/ML
8 INJECTION INTRAVENOUS
Status: COMPLETED | OUTPATIENT
Start: 2020-10-22 | End: 2020-10-22

## 2020-10-22 RX ORDER — POTASSIUM CHLORIDE 750 MG/1
30 CAPSULE, EXTENDED RELEASE ORAL ONCE
Status: COMPLETED | OUTPATIENT
Start: 2020-10-22 | End: 2020-10-22

## 2020-10-22 RX ADMIN — RIFAXIMIN 550 MG: 550 TABLET ORAL at 09:10

## 2020-10-22 RX ADMIN — SPIRONOLACTONE 25 MG: 25 TABLET, FILM COATED ORAL at 08:10

## 2020-10-22 RX ADMIN — PANTOPRAZOLE SODIUM 40 MG: 40 TABLET, DELAYED RELEASE ORAL at 05:10

## 2020-10-22 RX ADMIN — FUROSEMIDE 40 MG: 40 TABLET ORAL at 08:10

## 2020-10-22 RX ADMIN — GADOBUTROL 8 ML: 604.72 INJECTION INTRAVENOUS at 05:10

## 2020-10-22 RX ADMIN — HEPARIN SODIUM 5000 UNITS: 5000 INJECTION INTRAVENOUS; SUBCUTANEOUS at 09:10

## 2020-10-22 RX ADMIN — ESCITALOPRAM OXALATE 5 MG: 5 TABLET, FILM COATED ORAL at 08:10

## 2020-10-22 RX ADMIN — HEPARIN SODIUM 5000 UNITS: 5000 INJECTION INTRAVENOUS; SUBCUTANEOUS at 05:10

## 2020-10-22 RX ADMIN — POTASSIUM CHLORIDE 30 MEQ: 750 CAPSULE, EXTENDED RELEASE ORAL at 10:10

## 2020-10-22 RX ADMIN — MIDODRINE HYDROCHLORIDE 10 MG: 5 TABLET ORAL at 09:10

## 2020-10-22 RX ADMIN — MIDODRINE HYDROCHLORIDE 10 MG: 5 TABLET ORAL at 05:10

## 2020-10-22 RX ADMIN — LEVOTHYROXINE SODIUM 88 MCG: 88 TABLET ORAL at 05:10

## 2020-10-22 RX ADMIN — VANCOMYCIN HYDROCHLORIDE 1250 MG: 1.25 INJECTION, POWDER, LYOPHILIZED, FOR SOLUTION INTRAVENOUS at 10:10

## 2020-10-22 RX ADMIN — LACTULOSE 30 G: 10 SOLUTION ORAL at 09:10

## 2020-10-22 RX ADMIN — ZINC SULFATE 220 MG (50 MG) CAPSULE 220 MG: CAPSULE at 05:10

## 2020-10-22 RX ADMIN — LACTULOSE 30 G: 10 SOLUTION ORAL at 02:10

## 2020-10-22 RX ADMIN — MIDODRINE HYDROCHLORIDE 10 MG: 5 TABLET ORAL at 02:10

## 2020-10-22 RX ADMIN — HEPARIN SODIUM 5000 UNITS: 5000 INJECTION INTRAVENOUS; SUBCUTANEOUS at 02:10

## 2020-10-22 RX ADMIN — RIFAXIMIN 550 MG: 550 TABLET ORAL at 08:10

## 2020-10-22 RX ADMIN — LACTULOSE 30 G: 10 SOLUTION ORAL at 08:10

## 2020-10-22 NOTE — SUBJECTIVE & OBJECTIVE
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 No pertinent surgical history.    Review of patient's allergies indicates:   Allergen Reactions    Aspirin Tinitus    Ciprofloxacin Rash    Clindamycin Itching       Medications:  Medications Prior to Admission   Medication Sig    acetaminophen (TYLENOL) 500 MG tablet Take 1 tablet (500 mg total) by mouth every 6 (six) hours as needed for Pain. Max 2000mg daily.    ergocalciferol (VITAMIN D2) 50,000 unit Cap Take 1 capsule (50,000 Units total) by mouth every 7 days.    escitalopram oxalate (LEXAPRO) 5 MG Tab Take 1 tablet (5 mg total) by mouth once daily.    furosemide (LASIX) 40 MG tablet Take 1 tablet (40 mg total) by mouth once daily.    hydrOXYzine HCL (ATARAX) 25 MG tablet Take 1 tablet (25 mg total) by mouth as needed for Itching.    lactulose (CEPHULAC) 10 gram packet Mix 3 packets (30 g total) with liquid and take by mouth 3 (three) times daily as needed.    levothyroxine (SYNTHROID) 88 MCG tablet Take 1 tablet (88 mcg total) by mouth before breakfast.    pantoprazole (PROTONIX) 40 MG tablet Take 40 mg by mouth before breakfast.    rifAXIMin (XIFAXAN) 550 mg Tab Take 550 mg by mouth 2 (two) times daily.     spironolactone (ALDACTONE) 50 MG tablet Take 0.5 tablets (25 mg total) by mouth once daily.    albuterol (VENTOLIN HFA) 90 mcg/actuation inhaler Inhale 2 puffs into the lungs every 6 (six) hours as needed for Wheezing. Rescue    diaper,brief,adult,disposable Misc 5 mg/kg/day by Misc.(Non-Drug; Combo Route) route 5 (five) times daily. Patient with incontinence. Please supply enough depends for 5xs/day    lidocaine (LIDODERM) 5 % Place 1 patch onto the skin every 24  hours. Remove & Discard patch within 12 hours or as directed by MD    magnesium hydroxide 400 mg/5 ml (MILK OF MAGNESIA) 400 mg/5 mL Susp Take 30 mLs (2,400 mg total) by mouth daily as needed (Take if constipated despite lactulose).    melatonin 10 mg Tab Take 10 mg by mouth nightly as needed (sleep).    midodrine (PROAMATINE) 10 MG tablet Take 1 tablet (10 mg total) by mouth 3 (three) times daily.    nystatin (MYCOSTATIN) powder Apply topically 2 (two) times daily. Apply to affected area twice a day    polyethylene glycol (GLYCOLAX) 17 gram PwPk Take 17 g by mouth once daily. Or miralax OTC. Take if constipated despite lactulose    simethicone (MYLICON) 80 MG chewable tablet Take 80 mg by mouth every 6 (six) hours as needed for Flatulence. Take 2 tablets by mouth every 6 hours as needed for gas    zinc sulfate (ZINCATE) 220 (50) mg capsule Take 220 mg by mouth before breakfast.     Antibiotics (From admission, onward)    Start     Stop Route Frequency Ordered    10/22/20 1000  vancomycin 1.25 g in dextrose 5% 250 mL IVPB (ready to mix)      -- IV Every 24 hours (non-standard times) 10/21/20 1104    10/21/20 1043  vancomycin - pharmacy to dose  (vancomycin IVPB)      -- IV pharmacy to manage frequency 10/21/20 0943    10/19/20 2200  rifAXIMin tablet 550 mg      -- Oral 2 times daily 10/19/20 2059        Antifungals (From admission, onward)    None        Antivirals (From admission, onward)    None           Immunization History   Administered Date(s) Administered    Tdap 07/12/2020       Family History     None        Social History     Socioeconomic History    Marital status:      Spouse name: Not on file    Number of children: Not on file    Years of education: Not on file    Highest education level: Not on file   Occupational History    Not on file   Social Needs    Financial resource strain: Not on file    Food insecurity     Worry: Not on file     Inability: Not on file    Transportation  needs     Medical: Not on file     Non-medical: Not on file   Tobacco Use    Smoking status: Never Smoker    Smokeless tobacco: Never Used   Substance and Sexual Activity    Alcohol use: Never     Frequency: Never    Drug use: Never    Sexual activity: Not on file   Lifestyle    Physical activity     Days per week: Not on file     Minutes per session: Not on file    Stress: Not on file   Relationships    Social connections     Talks on phone: Not on file     Gets together: Not on file     Attends Gnosticist service: Not on file     Active member of club or organization: Not on file     Attends meetings of clubs or organizations: Not on file     Relationship status: Not on file   Other Topics Concern    Not on file   Social History Narrative    Not on file     Review of Systems   Constitutional: Positive for activity change. Negative for appetite change, chills and fever.   HENT: Negative for congestion.    Eyes: Negative for pain and itching.   Respiratory: Negative for cough, chest tightness and shortness of breath.    Cardiovascular: Negative for palpitations and leg swelling.   Gastrointestinal: Positive for abdominal pain. Negative for nausea.   Endocrine: Negative for polyphagia and polyuria.   Genitourinary: Negative for dysuria and frequency.   Musculoskeletal: Positive for back pain.   Neurological: Negative for numbness and headaches.   Psychiatric/Behavioral: Negative for agitation and behavioral problems.     Objective:     Vital Signs (Most Recent):  Temp: 98 °F (36.7 °C) (10/22/20 0827)  Pulse: 81 (10/22/20 0827)  Resp: 17 (10/22/20 0827)  BP: 102/60 (10/22/20 0827)  SpO2: 98 % (10/22/20 0827) Vital Signs (24h Range):  Temp:  [98 °F (36.7 °C)-98.4 °F (36.9 °C)] 98 °F (36.7 °C)  Pulse:  [80-89] 81  Resp:  [7-18] 17  SpO2:  [96 %-99 %] 98 %  BP: ()/(57-73) 102/60     Weight: 67.9 kg (149 lb 11.1 oz)  Body mass index is 25.69 kg/m².    Estimated Creatinine Clearance: 60.2 mL/min (based on  SCr of 1 mg/dL).    Physical Exam  Constitutional:       Appearance: She is well-developed.   HENT:      Head: Normocephalic.   Cardiovascular:      Rate and Rhythm: Normal rate and regular rhythm.      Heart sounds: Normal heart sounds. No murmur.   Pulmonary:      Effort: Pulmonary effort is normal.      Breath sounds: Normal breath sounds.   Abdominal:      General: Bowel sounds are normal. There is distension.      Palpations: Abdomen is soft.      Tenderness: There is abdominal tenderness.   Musculoskeletal: Normal range of motion.   Neurological:      Mental Status: She is alert and oriented to person, place, and time.   Psychiatric:         Behavior: Behavior normal.         Significant Labs: All pertinent labs within the past 24 hours have been reviewed.    Significant Imaging: I have reviewed all pertinent imaging results/findings within the past 24 hours.

## 2020-10-22 NOTE — ASSESSMENT & PLAN NOTE
- H/O staph epi bacteremia 8/2020 treated with 14 days vanc  - Blood cultures x 2 on 10/19 growing staph pseudintermedius  - Vanc started 10/21, plan for 14 day course (EOT 11/2/20)  - Repeat cultures 10/21 NGTD  - ID consulted  - TTE and MRI lumbar spine (r/o osteo)  - PICC line to be placed tomorrow (48 hours after negative blood cultures)

## 2020-10-22 NOTE — CONSULTS
[facility]  Infectious Disease  Consult Note    Patient Name: Zeina Mahoney  MRN: [id number]  Admission Date: 10/19/2020  Hospital Length of Stay: 1 days  Attending Physician: Homa Cobb MD  Primary Care Provider: Primary Doctor No     Isolation Status: No active isolations    Patient information was obtained from patient, spouse/SO, past medical records and ER records.      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 MRCP 10/20 with mild gallbladder wall thickening likely sequela of cirrhosis, mild intrahepatic ductal and CBD prominenceInfectious work up on admission done, blood cultures 10/19 growing staphylococcus pseudintermedius 2/4  pt was started on vancomycin  10/21- repeat blood clx 10/21 NGT - No CL  Or devices, she c/o back pain.      Recommendations:    · Continue vancomycin.  · Consider further images Lumber spine.  · Will follow up repeat clx and Susceptibility.  · Obtain TTE to r/o IE.              Thank you for your consult. I will follow-up with patient. Please contact us if you have any additional questions.    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 MRCP 10/20 with mild gallbladder wall thickening likely sequela of cirrhosis,  mild intrahepatic ductal and CBD prominenceInfectious work up on admission done, blood cultures 10/19 growing staphylococcus pseudintermedius 2/4  pt was started on vancomycin  10/21- repeat blood clx 10/21 NGT - No CL  Or devices, she c/o back pain.    She is from [place] - moved to [facility] ~08/20, [facility]      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 No pertinent surgical history.    Review of patient's allergies indicates:   Allergen Reactions    Aspirin Tinitus    Ciprofloxacin Rash    Clindamycin Itching       Medications:  Medications Prior to Admission   Medication Sig    acetaminophen (TYLENOL) 500 MG tablet Take 1 tablet (500 mg total) by mouth every 6 (six) hours as needed for Pain. Max 2000mg daily.    ergocalciferol (VITAMIN D2) 50,000 unit Cap Take 1 capsule (50,000 Units total) by mouth every 7 days.    escitalopram oxalate (LEXAPRO) 5 MG Tab Take 1 tablet (5 mg total) by mouth once daily.    furosemide (LASIX) 40 MG tablet Take 1 tablet (40 mg total) by mouth once daily.    hydrOXYzine HCL (ATARAX) 25 MG tablet Take 1 tablet (25 mg total) by mouth as needed for Itching.    lactulose (CEPHULAC) 10 gram packet Mix 3 packets (30 g total) with liquid and take by mouth 3 (three) times daily as needed.    levothyroxine (SYNTHROID) 88 MCG tablet Take 1 tablet (88 mcg total) by mouth before breakfast.    pantoprazole (PROTONIX) 40 MG tablet Take 40 mg by mouth before breakfast.    rifAXIMin (XIFAXAN) 550 mg Tab Take 550 mg by mouth 2 (two) times daily.     spironolactone (ALDACTONE) 50 MG tablet Take 0.5 tablets (25 mg total) by mouth once daily.    albuterol (VENTOLIN HFA) 90 mcg/actuation inhaler  Inhale 2 puffs into the lungs every 6 (six) hours as needed for Wheezing. Rescue    diaper,brief,adult,disposable Misc 5 mg/kg/day by Misc.(Non-Drug; Combo Route) route 5 (five) times daily. Patient with incontinence. Please supply enough depends for 5xs/day    lidocaine (LIDODERM) 5 % Place 1 patch onto the skin every 24 hours. Remove & Discard patch within 12 hours or as directed by MD    magnesium hydroxide 400 mg/5 ml (MILK OF MAGNESIA) 400 mg/5 mL Susp Take 30 mLs (2,400 mg total) by mouth daily as needed (Take if constipated despite lactulose).    melatonin 10 mg Tab Take 10 mg by mouth nightly as needed (sleep).    midodrine (PROAMATINE) 10 MG tablet Take 1 tablet (10 mg total) by mouth 3 (three) times daily.    nystatin (MYCOSTATIN) powder Apply topically 2 (two) times daily. Apply to affected area twice a day    polyethylene glycol (GLYCOLAX) 17 gram PwPk Take 17 g by mouth once daily. Or miralax OTC. Take if constipated despite lactulose    simethicone (MYLICON) 80 MG chewable tablet Take 80 mg by mouth every 6 (six) hours as needed for Flatulence. Take 2 tablets by mouth every 6 hours as needed for gas    zinc sulfate (ZINCATE) 220 (50) mg capsule Take 220 mg by mouth before breakfast.     Antibiotics (From admission, onward)    Start     Stop Route Frequency Ordered    10/22/20 1000  vancomycin 1.25 g in dextrose 5% 250 mL IVPB (ready to mix)      -- IV Every 24 hours (non-standard times) 10/21/20 1104    10/21/20 1043  vancomycin - pharmacy to dose  (vancomycin IVPB)      -- IV pharmacy to manage frequency 10/21/20 0943    10/19/20 2200  rifAXIMin tablet 550 mg      -- Oral 2 times daily 10/19/20 2059        Antifungals (From admission, onward)    None        Antivirals (From admission, onward)    None           Immunization History   Administered Date(s) Administered    Tdap 07/12/2020       Family History     None        Social History     Socioeconomic History    Marital status:       Spouse name: Not on file    Number of children: Not on file    Years of education: Not on file    Highest education level: Not on file   Occupational History    Not on file   Social Needs    Financial resource strain: Not on file    Food insecurity     Worry: Not on file     Inability: Not on file    Transportation needs     Medical: Not on file     Non-medical: Not on file   Tobacco Use    Smoking status: Never Smoker    Smokeless tobacco: Never Used   Substance and Sexual Activity    Alcohol use: Never     Frequency: Never    Drug use: Never    Sexual activity: Not on file   Lifestyle    Physical activity     Days per week: Not on file     Minutes per session: Not on file    Stress: Not on file   Relationships    Social connections     Talks on phone: Not on file     Gets together: Not on file     Attends Holiness service: Not on file     Active member of club or organization: Not on file     Attends meetings of clubs or organizations: Not on file     Relationship status: Not on file   Other Topics Concern    Not on file   Social History Narrative    Not on file     Review of Systems   Constitutional: Positive for activity change. Negative for appetite change, chills and fever.   HENT: Negative for congestion.    Eyes: Negative for pain and itching.   Respiratory: Negative for cough, chest tightness and shortness of breath.    Cardiovascular: Negative for palpitations and leg swelling.   Gastrointestinal: Positive for abdominal pain. Negative for nausea.   Endocrine: Negative for polyphagia and polyuria.   Genitourinary: Negative for dysuria and frequency.   Musculoskeletal: Positive for back pain.   Neurological: Negative for numbness and headaches.   Psychiatric/Behavioral: Negative for agitation and behavioral problems.     Objective:     Vital Signs (Most Recent):  Temp: 98 °F (36.7 °C) (10/22/20 0827)  Pulse: 81 (10/22/20 0827)  Resp: 17 (10/22/20 0827)  BP: 102/60 (10/22/20 0827)  SpO2: 98  % (10/22/20 6148) Vital Signs (24h Range):  Temp:  [98 °F (36.7 °C)-98.4 °F (36.9 °C)] 98 °F (36.7 °C)  Pulse:  [80-89] 81  Resp:  [7-18] 17  SpO2:  [96 %-99 %] 98 %  BP: ()/(57-73) 102/60     Weight: 67.9 kg (149 lb 11.1 oz)  Body mass index is 25.69 kg/m².    Estimated Creatinine Clearance: 60.2 mL/min (based on SCr of 1 mg/dL).    Physical Exam  Constitutional:       Appearance: She is well-developed.   HENT:      Head: Normocephalic.   Cardiovascular:      Rate and Rhythm: Normal rate and regular rhythm.      Heart sounds: Normal heart sounds. No murmur.   Pulmonary:      Effort: Pulmonary effort is normal.      Breath sounds: Normal breath sounds.   Abdominal:      General: Bowel sounds are normal. There is distension.      Palpations: Abdomen is soft.      Tenderness: There is abdominal tenderness.   Musculoskeletal: Normal range of motion.   Neurological:      Mental Status: She is alert and oriented to person, place, and time.   Psychiatric:         Behavior: Behavior normal.         Significant Labs: All pertinent labs within the past 24 hours have been reviewed.    Significant Imaging: I have reviewed all pertinent imaging results/findings within the past 24 hours.

## 2020-10-22 NOTE — PROGRESS NOTES
"[facility]  Liver Transplant  Progress Note    Patient Name: Zeina Mahoney  MRN: [id number]  Admission Date: 10/19/2020  Hospital Length of Stay: 1 days  Code Status: Full Code  Primary Care Provider: Primary Doctor No  Post-Operative Day:     ORGAN:   LIVER  Disease Etiology: Cirrhosis: Fatty Liver (Chaudhary)  Donor Type:   Donation after Brain Death  CDC High Risk:   No  Donor CMV Status:   Donor CMV Status: Positive  Donor HBcAB:   Negative  Donor HCV Status:   Negative  Donor HBV HERACLIO: Negative  Donor HCV HERACLIO: Negative    Subjective:     History of Present Illness:  Ms. 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 States she has recurrent persistent epigastric pain that is an ongoing issue. She presents to the ED today because the pain has increased in severity. Denies N/V/D. Passing gas and +BM. Pt has had multiple presentations for abdominal pain over the last couple months with unrevealing source.  Abdominal US today with "Interval decompression of the gallbladder noting residual sludge would be difficult to definitively exclude.  There is prominence of the common duct, increased since the previous examination without secondary findings to suggest acute cholecystitis.  Distal choledocholithiasis is not excluded.  Further evaluation with MRCP/ERCP as warranted. On arrival, VSS. Mentation delayed but but AAO x 3. Labs notable for elevated lipase although decreased from prior - pt has hx of pancreatic lesions. Plan to admit to observation for infectious work up and further imaging with MRCP. Plan of care discussed with Dr. Cobb.        Hospital Course:  Admitted secondary abdominal pain. Currently listed for liver transplant with MELD 20 until 11/8/20 due to CHAUDHARY. Abdominal u/s with possible distal choledocholithiasis. MRCP 10/20 with mild gallbladder wall thickening likely sequela of cirrhosis, mild intrahepatic ductal and CBD " prominence (CBD 0.8cm), 4mm stone in cystic duct present since CT scan 9/22. Abdominal pain improving without intervention. Infectious work up on admission done, blood cultures 10/19 growing staphylococcus pseudintermedius, sensitivities pending, repeat blood cultures sent 10/21. Vanc started, ID consulted.     Interval History: no acute events overnight. Continues to c/o intermittent RUQ pain, appears to wax and wane spontaneously. Blood cultures 10/19 growing staph pseudintermedius, sensitivities pending, continue vanc, ID consulted. Per ID, recommend TTE and MRI lumbar spine (c/o intermittent lower back pain) to r/o other causes of bacteremia. Will plan for 2 week course of IV vanc (EOT 11/2/20). PICC to be placed tomorrow (48 hours after negative blood cultures). Remains afebrile. VSS. AAOx4, mild asterixis, continue lactulose/rifaximin.     Scheduled Meds:   escitalopram oxalate  5 mg Oral Daily    furosemide  40 mg Oral Daily    heparin (porcine)  5,000 Units Subcutaneous Q8H    lactulose  30 g Oral TID    levothyroxine  88 mcg Oral Before breakfast    midodrine  10 mg Oral Q8H    pantoprazole  40 mg Oral Before breakfast    rifAXIMin  550 mg Oral BID    spironolactone  25 mg Oral Daily    vancomycin (VANCOCIN) IVPB  1,250 mg Intravenous Q24H    zinc sulfate  220 mg Oral Before breakfast     Continuous Infusions:  PRN Meds:acetaminophen, hydrOXYzine HCL, melatonin, ondansetron, simethicone, sodium chloride 0.9%, traMADoL, Pharmacy to dose Vancomycin consult **AND** vancomycin - pharmacy to dose    Review of Systems   Constitutional: Positive for activity change. Negative for appetite change, chills, diaphoresis and fever.   HENT: Negative for congestion, sinus pressure, sinus pain, sore throat and trouble swallowing.    Eyes: Negative for visual disturbance.   Respiratory: Negative for chest tightness, shortness of breath and stridor.    Cardiovascular: Negative for chest pain, palpitations and leg  swelling.   Gastrointestinal: Positive for abdominal pain. Negative for abdominal distention, constipation, diarrhea, nausea and vomiting.   Genitourinary: Negative for decreased urine volume, difficulty urinating, dysuria and flank pain.   Musculoskeletal: Negative for neck pain and neck stiffness.   Skin: Negative for color change and rash.   Neurological: Negative for dizziness, tremors, syncope, light-headedness and headaches.   Psychiatric/Behavioral: Positive for decreased concentration. Negative for agitation, confusion and hallucinations. The patient is not nervous/anxious.      Objective:     Vital Signs (Most Recent):  Temp: 98.8 °F (37.1 °C) (10/22/20 1209)  Pulse: 71 (10/22/20 1223)  Resp: 15 (10/22/20 1223)  BP: 106/63 (10/22/20 1223)  SpO2: 99 % (10/22/20 1223) Vital Signs (24h Range):  Temp:  [98 °F (36.7 °C)-98.8 °F (37.1 °C)] 98.8 °F (37.1 °C)  Pulse:  [71-89] 71  Resp:  [14-18] 15  SpO2:  [96 %-99 %] 99 %  BP: ()/(57-73) 106/63     Weight: 67.9 kg (149 lb 11.1 oz)  Body mass index is 25.69 kg/m².    Intake/Output - Last 3 Shifts       10/20 0700 - 10/21 0659 10/21 0700 - 10/22 0659 10/22 0700 - 10/23 0659    P.O. 1200 1940 390    Total Intake(mL/kg) 1200 (18.1) 1940 (28.6) 390 (5.7)    Urine (mL/kg/hr) 3200 (2) 3000 (1.8) 300 (0.6)    Stool 0 0     Total Output 3200 3000 300    Net -2000 -1060 +90           Stool Occurrence 2 x 0 x           Physical Exam  Vitals signs and nursing note reviewed.   Constitutional:       General: She is not in acute distress.     Appearance: She is not diaphoretic.   HENT:      Head: Normocephalic and atraumatic.   Eyes:      General: No scleral icterus.        Right eye: No discharge.         Left eye: No discharge.   Neck:      Musculoskeletal: Normal range of motion and neck supple.   Cardiovascular:      Rate and Rhythm: Normal rate and regular rhythm.      Heart sounds: Normal heart sounds.   Pulmonary:      Effort: Pulmonary effort is normal.      Breath  sounds: Normal breath sounds. No wheezing or rales.   Abdominal:      General: Bowel sounds are normal. There is no distension.      Palpations: Abdomen is soft.      Tenderness: There is abdominal tenderness (TTP in epigastric region). There is no guarding.   Skin:     General: Skin is warm and dry.      Capillary Refill: Capillary refill takes less than 2 seconds.   Neurological:      Mental Status: She is alert and oriented to person, place, and time.      Cranial Nerves: No cranial nerve deficit.      Comments: Mentation delayed but AOx3   Psychiatric:         Thought Content: Thought content normal.         Judgment: Judgment normal.         Laboratory:  Immunosuppressants     None        CBC:   Recent Labs   Lab 10/22/20  0538   WBC 4.24   RBC 2.37*   HGB 8.1*   HCT 24.9*   PLT 51*   *   MCH 34.2*   MCHC 32.5     CMP:   Recent Labs   Lab 10/22/20  0538   *   CALCIUM 9.9   ALBUMIN 2.6*   PROT 5.3*      K 3.4*   CO2 27      BUN 16   CREATININE 1.0   ALKPHOS 97   ALT 20   AST 38   BILITOT 3.4*     Coagulation:   Recent Labs   Lab 10/22/20  0538   INR 1.2     Labs within the past 24 hours have been reviewed.    Diagnostic Results:  I have personally reviewed all pertinent imaging studies.    Debility/Functional status: Patient debilitated by evidence of Chronic fatigue, unspecified, Other malaise and Age-related physical debility. Physical and occupational therapy ordered daily to evaluate and treat. Debility was: present on admission.    Assessment/Plan:     * Bacteremia  - H/O staph epi bacteremia 8/2020 treated with 14 days vanc  - Blood cultures x 2 on 10/19 growing staph pseudintermedius  - Vanc started 10/21, plan for 14 day course (EOT 11/2/20)  - Repeat cultures 10/21 NGTD  - ID consulted  - TTE and MRI lumbar spine (r/o osteo)  - PICC line to be placed tomorrow (48 hours after negative blood cultures)    End stage liver disease  - listed for liver transplant with MELD 20  -  continue diuretics  - continue lactulose/rifaximin/zinc  - continue midodrine    MELD-Na score: 14 at 10/22/2020  5:38 AM  MELD score: 13 at 10/22/2020  5:38 AM  Calculated from:  Serum Creatinine: 1.0 mg/dL at 10/22/2020  5:38 AM  Serum Sodium: 136 mmol/L at 10/22/2020  5:38 AM  Total Bilirubin: 3.4 mg/dL at 10/22/2020  5:38 AM  INR(ratio): 1.2 at 10/22/2020  5:38 AM  Age: 55 years 1 month    Abdominal pain  - Labs notable for minimally elevated AST/ALT & elevated bilirubin (although towards baseline)  - US reviewed  - MRCP schedule 10/20 with mild gallbladder wall thickening likely sequela of cirrhosis, mild intrahepatic ductal and CBD prominence (CBD 0.8cm), 4mm stone in cystic duct present since CT scan 9/22.    Anemia of chronic disease  - 2/2 ESLD  - H&H stable on admit   - monitor with daily CBC    Protein-calorie malnutrition, moderate  + temporal and distal extremity muscle wasting  - RD consult    Thrombocytopenia due to hypersplenism  - 2/2 ESLD  - Monitor with daily CBC    Hypothyroidism  - continue synthroid    Acquired coagulation factor deficiency  - 2/2 ESLD  - monitor with daily PT/INR    Hepatic encephalopathy  - acute on chronic  - ammonia 51 on admit  - continue lactulose - titrate to 3-4 BM/day  - continue rifaximin and zinc        VTE Risk Mitigation (From admission, onward)         Ordered     heparin (porcine) injection 5,000 Units  Every 8 hours      10/19/20 2056     IP VTE HIGH RISK PATIENT  Once      10/19/20 2056     Place sequential compression device  Until discontinued      10/19/20 2056                The patients clinical status was discussed at multidisplinary rounds, involving transplant surgery, transplant medicine, pharmacy, nursing, nutrition, and social work    Discharge Planning: not stable for dc at this time. Probable dc tomorrow.      Latrice Sorto, RAUDEL  Liver Transplant  [facility]

## 2020-10-22 NOTE — PROGRESS NOTES
Patient tentatively scheduled to dc tomorrow with IV infusion needs.  contacted Sudha with [facility] to initiate IV infusion services. In addition,  contacted [facility] and informed Veronica ([id number]) of the referral. No further needs identified at this time. SW will continue to follow patient for resources, education, support and dc planning as appropriate.

## 2020-10-22 NOTE — PLAN OF CARE
-AAOx4  -VSS  -Rt forearm 22g PIV  -pt free of falls/injuries during shift  -pt ambulates independently  -bed in lowest, locked position  -pt OOB with nonslip socks  -call light is within reach  -no c/o abd pain overnight  -plan to continue IV ABx  -will continue to monitor

## 2020-10-22 NOTE — ASSESSMENT & PLAN NOTE
- listed for liver transplant with MELD 20  - continue diuretics  - continue lactulose/rifaximin/zinc  - continue midodrine    MELD-Na score: 14 at 10/22/2020  5:38 AM  MELD score: 13 at 10/22/2020  5:38 AM  Calculated from:  Serum Creatinine: 1.0 mg/dL at 10/22/2020  5:38 AM  Serum Sodium: 136 mmol/L at 10/22/2020  5:38 AM  Total Bilirubin: 3.4 mg/dL at 10/22/2020  5:38 AM  INR(ratio): 1.2 at 10/22/2020  5:38 AM  Age: 55 years 1 month

## 2020-10-22 NOTE — ASSESSMENT & PLAN NOTE
Zeina Mahoney is a 54 y/o female with hx of ESLD 2/2 CHAUDHARY, recently treated for staph epi bacteremia 08/2020 with possible source LLE wound presented to ED 10/19 with worsening chronic abdominal pain, her w/u with Abdominal u/s with possible distal choledocholithiasis. MRCP 10/20 with mild gallbladder wall thickening likely sequela of cirrhosis, mild intrahepatic ductal and CBD prominenceInfectious work up on admission done, blood cultures 10/19 growing staphylococcus pseudintermedius 2/4  pt was started on vancomycin  10/21- repeat blood clx 10/21 NGT - No CL  Or devices, she c/o back pain.      Recommendations:    · Continue vancomycin.  · Consider further images Lumber spine.  · Will follow up repeat clx and Susceptibility.  · Obtain TTE to r/o IE.

## 2020-10-22 NOTE — SUBJECTIVE & OBJECTIVE
Scheduled Meds:   escitalopram oxalate  5 mg Oral Daily    furosemide  40 mg Oral Daily    heparin (porcine)  5,000 Units Subcutaneous Q8H    lactulose  30 g Oral TID    levothyroxine  88 mcg Oral Before breakfast    midodrine  10 mg Oral Q8H    pantoprazole  40 mg Oral Before breakfast    rifAXIMin  550 mg Oral BID    spironolactone  25 mg Oral Daily    vancomycin (VANCOCIN) IVPB  1,250 mg Intravenous Q24H    zinc sulfate  220 mg Oral Before breakfast     Continuous Infusions:  PRN Meds:acetaminophen, hydrOXYzine HCL, melatonin, ondansetron, simethicone, sodium chloride 0.9%, traMADoL, Pharmacy to dose Vancomycin consult **AND** vancomycin - pharmacy to dose    Review of Systems   Constitutional: Positive for activity change. Negative for appetite change, chills, diaphoresis and fever.   HENT: Negative for congestion, sinus pressure, sinus pain, sore throat and trouble swallowing.    Eyes: Negative for visual disturbance.   Respiratory: Negative for chest tightness, shortness of breath and stridor.    Cardiovascular: Negative for chest pain, palpitations and leg swelling.   Gastrointestinal: Positive for abdominal pain. Negative for abdominal distention, constipation, diarrhea, nausea and vomiting.   Genitourinary: Negative for decreased urine volume, difficulty urinating, dysuria and flank pain.   Musculoskeletal: Negative for neck pain and neck stiffness.   Skin: Negative for color change and rash.   Neurological: Negative for dizziness, tremors, syncope, light-headedness and headaches.   Psychiatric/Behavioral: Positive for decreased concentration. Negative for agitation, confusion and hallucinations. The patient is not nervous/anxious.      Objective:     Vital Signs (Most Recent):  Temp: 98.8 °F (37.1 °C) (10/22/20 1209)  Pulse: 71 (10/22/20 1223)  Resp: 15 (10/22/20 1223)  BP: 106/63 (10/22/20 1223)  SpO2: 99 % (10/22/20 1223) Vital Signs (24h Range):  Temp:  [98 °F (36.7 °C)-98.8 °F (37.1 °C)]  98.8 °F (37.1 °C)  Pulse:  [71-89] 71  Resp:  [14-18] 15  SpO2:  [96 %-99 %] 99 %  BP: ()/(57-73) 106/63     Weight: 67.9 kg (149 lb 11.1 oz)  Body mass index is 25.69 kg/m².    Intake/Output - Last 3 Shifts       10/20 0700 - 10/21 0659 10/21 0700 - 10/22 0659 10/22 0700 - 10/23 0659    P.O. 1200 1940 390    Total Intake(mL/kg) 1200 (18.1) 1940 (28.6) 390 (5.7)    Urine (mL/kg/hr) 3200 (2) 3000 (1.8) 300 (0.6)    Stool 0 0     Total Output 3200 3000 300    Net -2000 -1060 +90           Stool Occurrence 2 x 0 x           Physical Exam  Vitals signs and nursing note reviewed.   Constitutional:       General: She is not in acute distress.     Appearance: She is not diaphoretic.   HENT:      Head: Normocephalic and atraumatic.   Eyes:      General: No scleral icterus.        Right eye: No discharge.         Left eye: No discharge.   Neck:      Musculoskeletal: Normal range of motion and neck supple.   Cardiovascular:      Rate and Rhythm: Normal rate and regular rhythm.      Heart sounds: Normal heart sounds.   Pulmonary:      Effort: Pulmonary effort is normal.      Breath sounds: Normal breath sounds. No wheezing or rales.   Abdominal:      General: Bowel sounds are normal. There is no distension.      Palpations: Abdomen is soft.      Tenderness: There is abdominal tenderness (TTP in epigastric region). There is no guarding.   Skin:     General: Skin is warm and dry.      Capillary Refill: Capillary refill takes less than 2 seconds.   Neurological:      Mental Status: She is alert and oriented to person, place, and time.      Cranial Nerves: No cranial nerve deficit.      Comments: Mentation delayed but AOx3   Psychiatric:         Thought Content: Thought content normal.         Judgment: Judgment normal.         Laboratory:  Immunosuppressants     None        CBC:   Recent Labs   Lab 10/22/20  0538   WBC 4.24   RBC 2.37*   HGB 8.1*   HCT 24.9*   PLT 51*   *   MCH 34.2*   MCHC 32.5     CMP:   Recent  Labs   Lab 10/22/20  0538   *   CALCIUM 9.9   ALBUMIN 2.6*   PROT 5.3*      K 3.4*   CO2 27      BUN 16   CREATININE 1.0   ALKPHOS 97   ALT 20   AST 38   BILITOT 3.4*     Coagulation:   Recent Labs   Lab 10/22/20  0538   INR 1.2     Labs within the past 24 hours have been reviewed.    Diagnostic Results:  I have personally reviewed all pertinent imaging studies.    Debility/Functional status: Patient debilitated by evidence of Chronic fatigue, unspecified, Other malaise and Age-related physical debility. Physical and occupational therapy ordered daily to evaluate and treat. Debility was: present on admission.

## 2020-10-22 NOTE — PLAN OF CARE
Patient AAOx3, vitals WDL, afebrile.   R forearm 22g saline flushed and locked.   Vanc continued.   [employer] RN came to educate patient, will return tomorrow when patient's  is present to learn.   Awaiting MRI.   Patient able to ambulate independently, remains free from injury for shift, no complaints for shift.

## 2020-10-22 NOTE — HPI
Zeina Mahoney is a 56 y/o female with hx of ESLD 2/2 CHAUDHARY, recently treated for staph epi bacteremia 08/2020 with possible source LLE wound presented to ED 10/19 with worsening chronic abdominal pain, her w/u with Abdominal u/s with possible distal choledocholithiasis. MRCP 10/20 with mild gallbladder wall thickening likely sequela of cirrhosis, mild intrahepatic ductal and CBD prominenceInfectious work up on admission done, blood cultures 10/19 growing staphylococcus pseudintermedius 2/4  pt was started on vancomycin  10/21- repeat blood clx 10/21 NGT - No CL  Or devices, she c/o back pain.    She is from [place] - moved to [facility] ~08/20, lives in [facility].

## 2020-10-23 VITALS
TEMPERATURE: 98 F | BODY MASS INDEX: 25.56 KG/M2 | SYSTOLIC BLOOD PRESSURE: 125 MMHG | HEART RATE: 77 BPM | WEIGHT: 149.69 LBS | OXYGEN SATURATION: 98 % | DIASTOLIC BLOOD PRESSURE: 71 MMHG | RESPIRATION RATE: 17 BRPM | HEIGHT: 64 IN

## 2020-10-23 LAB
ALBUMIN SERPL BCP-MCNC: 2.4 G/DL (ref 3.5–5.2)
ALP SERPL-CCNC: 92 U/L (ref 55–135)
ALT SERPL W/O P-5'-P-CCNC: 17 U/L (ref 10–44)
ANION GAP SERPL CALC-SCNC: 9 MMOL/L (ref 8–16)
ASCENDING AORTA: 2.87 CM
AST SERPL-CCNC: 35 U/L (ref 10–40)
AV INDEX (PROSTH): 0.78
AV MEAN GRADIENT: 8 MMHG
AV PEAK GRADIENT: 15 MMHG
AV VALVE AREA: 2.68 CM2
AV VELOCITY RATIO: 0.84
BACTERIA BLD CULT: ABNORMAL
BASOPHILS # BLD AUTO: 0.02 K/UL (ref 0–0.2)
BASOPHILS NFR BLD: 0.5 % (ref 0–1.9)
BILIRUB SERPL-MCNC: 3.3 MG/DL (ref 0.1–1)
BSA FOR ECHO PROCEDURE: 1.73 M2
BUN SERPL-MCNC: 15 MG/DL (ref 6–20)
CALCIUM SERPL-MCNC: 9.1 MG/DL (ref 8.7–10.5)
CHLORIDE SERPL-SCNC: 105 MMOL/L (ref 95–110)
CO2 SERPL-SCNC: 24 MMOL/L (ref 23–29)
CREAT SERPL-MCNC: 1.1 MG/DL (ref 0.5–1.4)
CV ECHO LV RWT: 0.36 CM
DIFFERENTIAL METHOD: ABNORMAL
DOP CALC AO PEAK VEL: 1.94 M/S
DOP CALC AO VTI: 35.59 CM
DOP CALC LVOT AREA: 3.4 CM2
DOP CALC LVOT DIAMETER: 2.09 CM
DOP CALC LVOT PEAK VEL: 1.63 M/S
DOP CALC LVOT STROKE VOLUME: 95.22 CM3
DOP CALCLVOT PEAK VEL VTI: 27.77 CM
E WAVE DECELERATION TIME: 182.44 MSEC
E/A RATIO: 0.94
E/E' RATIO: 9.75 M/S
ECHO LV POSTERIOR WALL: 0.88 CM (ref 0.6–1.1)
EOSINOPHIL # BLD AUTO: 0.1 K/UL (ref 0–0.5)
EOSINOPHIL NFR BLD: 2.7 % (ref 0–8)
ERYTHROCYTE [DISTWIDTH] IN BLOOD BY AUTOMATED COUNT: 13.5 % (ref 11.5–14.5)
EST. GFR  (AFRICAN AMERICAN): >60 ML/MIN/1.73 M^2
EST. GFR  (NON AFRICAN AMERICAN): 56.7 ML/MIN/1.73 M^2
FRACTIONAL SHORTENING: 37 % (ref 28–44)
GLUCOSE SERPL-MCNC: 215 MG/DL (ref 70–110)
HCT VFR BLD AUTO: 23.7 % (ref 37–48.5)
HGB BLD-MCNC: 7.8 G/DL (ref 12–16)
IMM GRANULOCYTES # BLD AUTO: 0.01 K/UL (ref 0–0.04)
IMM GRANULOCYTES NFR BLD AUTO: 0.2 % (ref 0–0.5)
INR PPP: 1.2 (ref 0.8–1.2)
INTERVENTRICULAR SEPTUM: 0.95 CM (ref 0.6–1.1)
LA MAJOR: 4.19 CM
LA MINOR: 4.11 CM
LA WIDTH: 3.25 CM
LEFT ATRIUM SIZE: 3.34 CM
LEFT ATRIUM VOLUME INDEX: 22.1 ML/M2
LEFT ATRIUM VOLUME: 38.29 CM3
LEFT INTERNAL DIMENSION IN SYSTOLE: 3.09 CM (ref 2.1–4)
LEFT VENTRICLE DIASTOLIC VOLUME INDEX: 66.23 ML/M2
LEFT VENTRICLE DIASTOLIC VOLUME: 114.53 ML
LEFT VENTRICLE MASS INDEX: 91 G/M2
LEFT VENTRICLE SYSTOLIC VOLUME INDEX: 21.7 ML/M2
LEFT VENTRICLE SYSTOLIC VOLUME: 37.58 ML
LEFT VENTRICULAR INTERNAL DIMENSION IN DIASTOLE: 4.93 CM (ref 3.5–6)
LEFT VENTRICULAR MASS: 157.93 G
LV LATERAL E/E' RATIO: 9.75 M/S
LV SEPTAL E/E' RATIO: 9.75 M/S
LYMPHOCYTES # BLD AUTO: 1.3 K/UL (ref 1–4.8)
LYMPHOCYTES NFR BLD: 30.8 % (ref 18–48)
MAGNESIUM SERPL-MCNC: 1.7 MG/DL (ref 1.6–2.6)
MCH RBC QN AUTO: 34.2 PG (ref 27–31)
MCHC RBC AUTO-ENTMCNC: 32.9 G/DL (ref 32–36)
MCV RBC AUTO: 104 FL (ref 82–98)
MONOCYTES # BLD AUTO: 0.3 K/UL (ref 0.3–1)
MONOCYTES NFR BLD: 7.2 % (ref 4–15)
MV PEAK A VEL: 0.83 M/S
MV PEAK E VEL: 0.78 M/S
MV STENOSIS PRESSURE HALF TIME: 52.91 MS
MV VALVE AREA P 1/2 METHOD: 4.16 CM2
NEUTROPHILS # BLD AUTO: 2.4 K/UL (ref 1.8–7.7)
NEUTROPHILS NFR BLD: 58.6 % (ref 38–73)
NRBC BLD-RTO: 0 /100 WBC
PISA TR MAX VEL: 2.04 M/S
PLATELET # BLD AUTO: 54 K/UL (ref 150–350)
PMV BLD AUTO: 10.2 FL (ref 9.2–12.9)
POTASSIUM SERPL-SCNC: 3.4 MMOL/L (ref 3.5–5.1)
PROT SERPL-MCNC: 5 G/DL (ref 6–8.4)
PROTHROMBIN TIME: 13 SEC (ref 9–12.5)
PULM VEIN S/D RATIO: 1.83
PV PEAK D VEL: 0.35 M/S
PV PEAK S VEL: 0.64 M/S
RA MAJOR: 3.83 CM
RA PRESSURE: 3 MMHG
RA WIDTH: 2.82 CM
RBC # BLD AUTO: 2.28 M/UL (ref 4–5.4)
RIGHT VENTRICULAR END-DIASTOLIC DIMENSION: 3.06 CM
RV TISSUE DOPPLER FREE WALL SYSTOLIC VELOCITY 1 (APICAL 4 CHAMBER VIEW): 18.89 CM/S
SINUS: 2.8 CM
SODIUM SERPL-SCNC: 138 MMOL/L (ref 136–145)
STJ: 2.81 CM
TDI LATERAL: 0.08 M/S
TDI SEPTAL: 0.08 M/S
TDI: 0.08 M/S
TR MAX PG: 17 MMHG
TRICUSPID ANNULAR PLANE SYSTOLIC EXCURSION: 1.9 CM
TV REST PULMONARY ARTERY PRESSURE: 20 MMHG
VANCOMYCIN TROUGH SERPL-MCNC: 12 UG/ML (ref 10–22)
WBC # BLD AUTO: 4.15 K/UL (ref 3.9–12.7)

## 2020-10-23 PROCEDURE — 85025 COMPLETE CBC W/AUTO DIFF WBC: CPT | Mod: NTX

## 2020-10-23 PROCEDURE — 99239 HOSP IP/OBS DSCHRG MGMT >30: CPT | Mod: NTX,,, | Performed by: NURSE PRACTITIONER

## 2020-10-23 PROCEDURE — 63600175 PHARM REV CODE 636 W HCPCS: Mod: NTX | Performed by: PHYSICIAN ASSISTANT

## 2020-10-23 PROCEDURE — 83735 ASSAY OF MAGNESIUM: CPT | Mod: NTX

## 2020-10-23 PROCEDURE — 99233 PR SUBSEQUENT HOSPITAL CARE,LEVL III: ICD-10-PCS | Mod: NTX,,, | Performed by: INTERNAL MEDICINE

## 2020-10-23 PROCEDURE — C1751 CATH, INF, PER/CENT/MIDLINE: HCPCS | Mod: NTX

## 2020-10-23 PROCEDURE — 85610 PROTHROMBIN TIME: CPT | Mod: NTX

## 2020-10-23 PROCEDURE — 80053 COMPREHEN METABOLIC PANEL: CPT | Mod: NTX

## 2020-10-23 PROCEDURE — 25000003 PHARM REV CODE 250: Mod: NTX | Performed by: PHYSICIAN ASSISTANT

## 2020-10-23 PROCEDURE — 25000003 PHARM REV CODE 250: Mod: NTX | Performed by: INTERNAL MEDICINE

## 2020-10-23 PROCEDURE — 63600175 PHARM REV CODE 636 W HCPCS: Mod: NTX | Performed by: INTERNAL MEDICINE

## 2020-10-23 PROCEDURE — 76937 US GUIDE VASCULAR ACCESS: CPT | Mod: NTX

## 2020-10-23 PROCEDURE — 36573 INSJ PICC RS&I 5 YR+: CPT | Mod: NTX

## 2020-10-23 PROCEDURE — 99233 SBSQ HOSP IP/OBS HIGH 50: CPT | Mod: NTX,,, | Performed by: INTERNAL MEDICINE

## 2020-10-23 PROCEDURE — 36415 COLL VENOUS BLD VENIPUNCTURE: CPT | Mod: NTX

## 2020-10-23 PROCEDURE — 25000003 PHARM REV CODE 250: Mod: NTX | Performed by: NURSE PRACTITIONER

## 2020-10-23 PROCEDURE — 99239 PR HOSPITAL DISCHARGE DAY,>30 MIN: ICD-10-PCS | Mod: NTX,,, | Performed by: NURSE PRACTITIONER

## 2020-10-23 PROCEDURE — 80202 ASSAY OF VANCOMYCIN: CPT | Mod: NTX

## 2020-10-23 RX ORDER — POTASSIUM CHLORIDE 750 MG/1
50 CAPSULE, EXTENDED RELEASE ORAL ONCE
Status: COMPLETED | OUTPATIENT
Start: 2020-10-23 | End: 2020-10-23

## 2020-10-23 RX ORDER — SODIUM CHLORIDE 0.9 % (FLUSH) 0.9 %
10 SYRINGE (ML) INJECTION EVERY 6 HOURS
Status: DISCONTINUED | OUTPATIENT
Start: 2020-10-23 | End: 2020-10-23 | Stop reason: HOSPADM

## 2020-10-23 RX ORDER — SODIUM CHLORIDE 0.9 % (FLUSH) 0.9 %
10 SYRINGE (ML) INJECTION
Status: DISCONTINUED | OUTPATIENT
Start: 2020-10-23 | End: 2020-10-23 | Stop reason: HOSPADM

## 2020-10-23 RX ADMIN — RIFAXIMIN 550 MG: 550 TABLET ORAL at 09:10

## 2020-10-23 RX ADMIN — ESCITALOPRAM OXALATE 5 MG: 5 TABLET, FILM COATED ORAL at 09:10

## 2020-10-23 RX ADMIN — HUMAN ALBUMIN MICROSPHERES AND PERFLUTREN 0.11 MG: 10; .22 INJECTION, SOLUTION INTRAVENOUS at 08:10

## 2020-10-23 RX ADMIN — PANTOPRAZOLE SODIUM 40 MG: 40 TABLET, DELAYED RELEASE ORAL at 05:10

## 2020-10-23 RX ADMIN — ZINC SULFATE 220 MG (50 MG) CAPSULE 220 MG: CAPSULE at 05:10

## 2020-10-23 RX ADMIN — LACTULOSE 30 G: 10 SOLUTION ORAL at 09:10

## 2020-10-23 RX ADMIN — FUROSEMIDE 40 MG: 40 TABLET ORAL at 09:10

## 2020-10-23 RX ADMIN — VANCOMYCIN HYDROCHLORIDE 1250 MG: 1.25 INJECTION, POWDER, LYOPHILIZED, FOR SOLUTION INTRAVENOUS at 09:10

## 2020-10-23 RX ADMIN — POTASSIUM CHLORIDE 50 MEQ: 750 CAPSULE, EXTENDED RELEASE ORAL at 11:10

## 2020-10-23 RX ADMIN — SPIRONOLACTONE 25 MG: 25 TABLET, FILM COATED ORAL at 10:10

## 2020-10-23 RX ADMIN — MIDODRINE HYDROCHLORIDE 10 MG: 5 TABLET ORAL at 05:10

## 2020-10-23 RX ADMIN — HEPARIN SODIUM 5000 UNITS: 5000 INJECTION INTRAVENOUS; SUBCUTANEOUS at 05:10

## 2020-10-23 RX ADMIN — LEVOTHYROXINE SODIUM 88 MCG: 88 TABLET ORAL at 05:10

## 2020-10-23 RX ADMIN — MIDODRINE HYDROCHLORIDE 10 MG: 5 TABLET ORAL at 02:10

## 2020-10-23 NOTE — PROCEDURES
"Zeina Mahoney is a 55 y.o. female patient.    Temp: 98.1 °F (36.7 °C) (10/23/20 0917)  Pulse: 77 (10/23/20 0917)  Resp: 17 (10/23/20 0917)  BP: 125/71 (10/23/20 0917)  SpO2: 98 % (10/23/20 0917)  Weight: 67.9 kg (149 lb 11.1 oz) (10/22/20 0500)  Height: 5' 4" (162.6 cm) (10/20/20 0000)    PICC  Date/Time: 10/23/2020 3:25 PM  Performed by: Rohit Sandoval RN  Assisting provider: Jonna Cerda LPN  Consent Done: Yes  Time out: Immediately prior to procedure a time out was called to verify the correct patient, procedure, equipment, support staff and site/side marked as required  Indications: med administration and vascular access  Anesthesia: local infiltration  Local anesthetic: lidocaine 1% without epinephrine  Anesthetic Total (mL): 2  Preparation: skin prepped with ChloraPrep  Skin prep agent dried: skin prep agent completely dried prior to procedure  Sterile barriers: all five maximum sterile barriers used - cap, mask, sterile gown, sterile gloves, and large sterile sheet  Hand hygiene: hand hygiene performed prior to central venous catheter insertion  Location details: right brachial  Catheter type: double lumen  Catheter size: 5 Fr  Catheter Length: 40cm    Ultrasound guidance: yes  Vessel Caliber: medium and patent, compressibility normal  Vascular Doppler: not done  Needle advanced into vessel with real time Ultrasound guidance.  Guidewire confirmed in vessel.  Image recorded and saved.  Sterile sheath used.  Number of attempts: 1  Post-procedure: blood return through all ports, chlorhexidine patch and sterile dressing applied  Technical procedures used: 3CG  Specimens: No  Implants: No  Assessment: placement verified by x-ray  Complications: none          Jonna Cerda  10/23/2020    "

## 2020-10-23 NOTE — DISCHARGE SUMMARY
"[facility]  Liver Transplant  Discharge Summary      Patient Name: Zeina Mahoney  MRN: [id number]  Admission Date: 10/19/2020  Hospital Length of Stay: 2 days  Discharge Date and Time:  10/23/2020 12:32 PM  Attending Physician: Homa Cobb MD   Discharging Provider: Latrice Sorto DNP  Primary Care Provider: Primary Doctor No  HPI:   Ms. 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 States she has recurrent persistent epigastric pain that is an ongoing issue. She presents to the ED today because the pain has increased in severity. Denies N/V/D. Passing gas and +BM. Pt has had multiple presentations for abdominal pain over the last couple months with unrevealing source.  Abdominal US today with "Interval decompression of the gallbladder noting residual sludge would be difficult to definitively exclude.  There is prominence of the common duct, increased since the previous examination without secondary findings to suggest acute cholecystitis.  Distal choledocholithiasis is not excluded.  Further evaluation with MRCP/ERCP as warranted. On arrival, VSS. Mentation delayed but but AAO x 3. Labs notable for elevated lipase although decreased from prior - pt has hx of pancreatic lesions. Plan to admit to observation for infectious work up and further imaging with MRCP. Plan of care discussed with Dr. Cobb.      Hospital Course:    Admitted secondary abdominal pain. Currently listed for liver transplant with MELD 20 until 11/8/20 due to CHAUDHARY. Abdominal u/s with possible distal choledocholithiasis. MRCP 10/20 with mild gallbladder wall thickening likely sequela of cirrhosis, mild intrahepatic ductal and CBD prominence (CBD 0.8cm), 4mm stone in cystic duct present since CT scan 9/22. Abdominal pain improving without intervention. Infectious work up on admission done, blood cultures 10/19 growing staphylococcus pseudintermedius, " susceptibilities pending, repeat blood cultures sent 10/21. Vanc started, ID consulted. Due to history of staph epi bacteremia 2 months prior, TTE and MRI lumbar spine obtained (patient c/o intermittent lower back pain for ~1 month). TTE normal without valvular vegetations. MRI lumbar spine with stable, remote multiple lumbar compression fractures. PICC line placed 10/23. Patient will discharge with 2 week course of vancomycin (EOT 11/3). She is stable and ready for discharge today. She will have follow up labs and clinic appt per PDOL protocol.     Of note, patient remains active on transplant list with MELD 20 (expires 11/18).       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] 07/18/2020 Yes    Hepatic encephalopathy [K72.90] 06/25/2020 Yes      Problems Resolved During this Admission:       Consults (From admission, onward)        Status Ordering Provider     Inpatient consult to Infectious Diseases  Once     Provider:  (Not yet assigned)    Completed KIMMEI DAWSON     Inpatient consult to PICC team ([facility])  Once     Provider:  (Not yet assigned)    Acknowledged KIMMIE DAWSON     Pharmacy to dose Vancomycin consult  Once     Provider:  (Not yet assigned)    Acknowledged KIMMIE DAWSON          Pending Diagnostic Studies:     None        Significant Diagnostic Studies: Labs:   CMP   Recent Labs   Lab 10/22/20  0538 10/23/20  0614    138   K 3.4* 3.4*    105   CO2 27 24   * 215*   BUN 16 15   CREATININE 1.0 1.1   CALCIUM 9.9 9.1   PROT 5.3* 5.0*   ALBUMIN 2.6* 2.4*    BILITOT 3.4* 3.3*   ALKPHOS 97 92   AST 38 35   ALT 20 17   ANIONGAP 6* 9   ESTGFRAFRICA >60.0 >60.0   EGFRNONAA >60.0 56.7*   , CBC   Recent Labs   Lab 10/22/20  0538 10/23/20  0614   WBC 4.24 4.15   HGB 8.1* 7.8*   HCT 24.9* 23.7*   PLT 51* 54*   , INR   Lab Results   Component Value Date    INR 1.2 10/23/2020    INR 1.2 10/22/2020    INR 1.2 10/21/2020    and All labs within the past 24 hours have been reviewed    The patients clinical status was discussed at multidisplinary rounds, involving transplant surgery, transplant medicine, pharmacy, nursing, nutrition, and social work    Discharged Condition: good    Disposition: Home or Self Care    Follow Up: see hospital course    Patient Instructions:      Ambulatory referral/consult to Home Health   Standing Status: Future   Referral Priority: Routine Referral Type: Home Health Care   Referral Reason: Specialty Services Required   Requested Specialty: Home Health Services   Number of Visits Requested: 1     Diet Adult Regular     Order Specific Question Answer Comments   Additional restrictions: Low Sodium,2gm      Notify your health care provider if you experience any of the following:  temperature >100.4     Notify your health care provider if you experience any of the following:  persistent nausea and vomiting or diarrhea     Notify your health care provider if you experience any of the following:  severe uncontrolled pain     Notify your health care provider if you experience any of the following:  redness, tenderness, or signs of infection (pain, swelling, redness, odor or green/yellow discharge around incision site)     Notify your health care provider if you experience any of the following:  difficulty breathing or increased cough     Notify your health care provider if you experience any of the following:  severe persistent headache     Notify your health care provider if you experience any of the following:  worsening rash     Notify your health care  provider if you experience any of the following:  persistent dizziness, light-headedness, or visual disturbances     Notify your health care provider if you experience any of the following:  increased confusion or weakness     No dressing needed     Activity as tolerated     Medications:  Reconciled Home Medications:      Medication List      START taking these medications    VANCOMYCIN 1.25 G/250 ML D5W (READY TO MIX)  Inject 250 mLs (1,250 mg total) into the vein every 24 hours as needed. Stop: 11/3/2020        CONTINUE taking these medications    acetaminophen 500 MG tablet  Commonly known as: TYLENOL  Take 1 tablet (500 mg total) by mouth every 6 (six) hours as needed for Pain. Max 2000mg daily.     diaper,brief,adult,disposable Misc  5 mg/kg/day by Misc.(Non-Drug; Combo Route) route 5 (five) times daily. Patient with incontinence. Please supply enough depends for 5xs/day     ergocalciferol 50,000 unit Cap  Commonly known as: VITAMIN D2  Take 1 capsule (50,000 Units total) by mouth every 7 days.     escitalopram oxalate 5 MG Tab  Commonly known as: LEXAPRO  Take 1 tablet (5 mg total) by mouth once daily.     furosemide 40 MG tablet  Commonly known as: LASIX  Take 1 tablet (40 mg total) by mouth once daily.     hydrOXYzine HCL 25 MG tablet  Commonly known as: ATARAX  Take 1 tablet (25 mg total) by mouth as needed for Itching.     KRISTALOSE 10 gram packet  Generic drug: lactulose  Mix 3 packets (30 g total) with liquid and take by mouth 3 (three) times daily as needed.     levothyroxine 88 MCG tablet  Commonly known as: SYNTHROID  Take 1 tablet (88 mcg total) by mouth before breakfast.     lidocaine 5 %  Commonly known as: LIDODERM  Place 1 patch onto the skin every 24 hours. Remove & Discard patch within 12 hours or as directed by MD     magnesium hydroxide 400 mg/5 ml 400 mg/5 mL Susp  Commonly known as: MILK OF MAGNESIA  Take 30 mLs (2,400 mg total) by mouth daily as needed (Take if constipated despite  lactulose).     melatonin 10 mg Tab  Take 10 mg by mouth nightly as needed (sleep).     midodrine 10 MG tablet  Commonly known as: PROAMATINE  Take 1 tablet (10 mg total) by mouth 3 (three) times daily.     nystatin powder  Commonly known as: MYCOSTATIN  Apply topically 2 (two) times daily. Apply to affected area twice a day     pantoprazole 40 MG tablet  Commonly known as: PROTONIX  Take 40 mg by mouth before breakfast.     polyethylene glycol 17 gram Pwpk  Commonly known as: GLYCOLAX  Take 17 g by mouth once daily. Or miralax OTC. Take if constipated despite lactulose     rifAXIMin 550 mg Tab  Commonly known as: XIFAXAN  Take 550 mg by mouth 2 (two) times daily.     simethicone 80 MG chewable tablet  Commonly known as: MYLICON  Take 80 mg by mouth every 6 (six) hours as needed for Flatulence. Take 2 tablets by mouth every 6 hours as needed for gas     spironolactone 50 MG tablet  Commonly known as: ALDACTONE  Take 0.5 tablets (25 mg total) by mouth once daily.     VENTOLIN HFA 90 mcg/actuation inhaler  Generic drug: albuterol  Inhale 2 puffs into the lungs every 6 (six) hours as needed for Wheezing. Rescue     zinc sulfate 220 (50) mg capsule  Commonly known as: ZINCATE  Take 220 mg by mouth before breakfast.          Time spent caring for patient (Greater than 1/2 spent in direct face-to-face contact): > 30 minutes    Latrice Sorto DNP  Liver Transplant  [facility]

## 2020-10-23 NOTE — PLAN OF CARE
Pt ambulated to restroom several times last night, pt removed 22 ga to left posterior forearm, pt seems to be mildly confused at times

## 2020-10-23 NOTE — PLAN OF CARE
Pt AAO x 4 throughout shift - with periods of confusion intermittently. Pt up independently throughout shift.  at bedside most of shift. PICC line placed by PICC team. Pt free from falls and injury throughout shift. Pt removed PIV placed overnight by RN. Pt to be discharged once PICC placement verified.

## 2020-10-23 NOTE — PROGRESS NOTES
Social Work Discharge:     Patient scheduled to discharge today, 10/23/20.  presented to pt's bedside to discuss discharge planning. Patient observed sitting upright, alert and oriented. Patient to discharge to home in [place], [place] with IV antibiotics ([facility] [phone]) and home health ([facility] [phone]). Patient and caregiver denied having any questions for  as it relates to discharge. Per Sudha, pt's medication to be delivered to her home on tomorrow. Pt to receive does before discharging to home. Patient reporting adequate coping and denied having any mental health concerns. No further needs identified. SW will continue to follow patient for resources, education, support and dc planning as appropriate. SW remains available.

## 2020-10-23 NOTE — PROGRESS NOTES
[facility]  Infectious Disease  Progress Note    Patient Name: Zeina Mahoney  MRN: [id number]  Admission Date: 10/19/2020  Length of Stay: 2 days  Attending Physician: Homa Cobb MD  Primary Care Provider: Primary Doctor No    Isolation Status: No active isolations  Assessment/Plan:      * Bacteremia  Zeina Mahoney is a 56 y/o female with hx of ESLD 2/2 CHAUDHARY, recently treated for staph epi bacteremia 08/2020 with possible source LLE wound presented to ED 10/19 with worsening chronic abdominal pain, her w/u with Abdominal u/s with possible distal choledocholithiasis. MRCP 10/20 with mild gallbladder wall thickening likely sequela of cirrhosis, mild intrahepatic ductal and CBD prominenceInfectious work up on admission done, blood cultures 10/19 growing staphylococcus pseudintermedius 2/4  pt was started on vancomycin  10/21- repeat blood clx 10/21 NGT - No CL  Or devices, she c/o back pain. MRI lumber wo evidence of infection.TTE neg for veg.    Source of her bacteremia most likely her skin wound.    Recommendations:    · Continue vancomycin.  · Will follow up Susceptibility.  · Would consider wound care for her LLE wound.                  Thank you for your consult. I will follow-up with patient. Please contact us if you have any additional questions.    Pacheco Oliver MD  Infectious Disease  [facility]    Subjective:     Principal Problem:Bacteremia    HPI: Zeina Mahoney is a 56 y/o female with hx of ESLD 2/2 CHAUDHARY, recently treated for staph epi bacteremia 08/2020 with possible source LLE wound presented to ED 10/19 with worsening chronic abdominal pain, her w/u with Abdominal u/s with possible distal choledocholithiasis. MRCP 10/20 with mild gallbladder wall thickening likely sequela of cirrhosis, mild intrahepatic ductal and CBD prominenceInfectious work up on admission done, blood cultures 10/19 growing staphylococcus pseudintermedius 2/4  pt was started on  vancomycin  10/21- repeat blood clx 10/21 NGT - No CL  Or devices, she c/o back pain.    She is [place] - moved [facility] ~08/20, lives in [facility].    Interval History: no events overnight.    Review of Systems   Constitutional: Negative for appetite change, chills and fever.   HENT: Negative for congestion.    Eyes: Negative for pain and itching.   Respiratory: Negative for cough, chest tightness and shortness of breath.    Cardiovascular: Negative for palpitations and leg swelling.   Gastrointestinal: Negative for abdominal pain and nausea.   Endocrine: Negative for polyphagia and polyuria.   Genitourinary: Negative for dysuria and frequency.   Musculoskeletal: Negative for back pain.   Neurological: Negative for numbness and headaches.   Psychiatric/Behavioral: Negative for agitation and behavioral problems.     Objective:     Vital Signs (Most Recent):  Temp: 98.1 °F (36.7 °C) (10/23/20 0917)  Pulse: 77 (10/23/20 0917)  Resp: 17 (10/23/20 0917)  BP: 125/71 (10/23/20 0917)  SpO2: 98 % (10/23/20 0917) Vital Signs (24h Range):  Temp:  [98.1 °F (36.7 °C)-98.4 °F (36.9 °C)] 98.1 °F (36.7 °C)  Pulse:  [64-77] 77  Resp:  [16-20] 17  SpO2:  [98 %] 98 %  BP: (106-125)/(60-71) 125/71     Weight: 67.9 kg (149 lb 11.1 oz)  Body mass index is 25.69 kg/m².    Estimated Creatinine Clearance: 54.7 mL/min (based on SCr of 1.1 mg/dL).    Physical Exam  Constitutional:       Appearance: She is well-developed.   HENT:      Head: Normocephalic.   Cardiovascular:      Rate and Rhythm: Normal rate and regular rhythm.      Heart sounds: Normal heart sounds. No murmur.   Pulmonary:      Effort: Pulmonary effort is normal.      Breath sounds: Normal breath sounds.   Abdominal:      General: Bowel sounds are normal. There is no distension.      Palpations: Abdomen is soft.      Tenderness: There is no abdominal tenderness.   Musculoskeletal: Normal range of motion.   Neurological:      Mental Status: She is alert and oriented to  person, place, and time.   Psychiatric:         Behavior: Behavior normal.         Significant Labs: All pertinent labs within the past 24 hours have been reviewed.    Significant Imaging: I have reviewed all pertinent imaging results/findings within the past 24 hours.

## 2020-10-23 NOTE — PROGRESS NOTES
Discharge Medication Note:    Hospital Course:  54 y/o F with ESLD secondary to CHAUDHARY admitted with abdominal pain.  Patient had infectious workup which showed + blood cultures with staphylococcus pseudintermedius 2/4 on 10/19.  Patient had repeat cultures on 10/21 which were NGTD.  Patient started on vancomycin and will be discharged with IV Vancomycin x 2 weeks.  Vancomycin 1250 mg IV q24.  Trough checked today was 12 -- therefore will continue this dose, would recheck a trough on 10/28 to ensure she does not accumulate.  Patient will be on therapy until 11/3/2020.     Met with Zeina Mahoney at discharge to review discharge medications and to update the blue medication card.       Zeina Mahoney   Home Medication Instructions LORETTA:[id number]    Printed on:10/23/20 1321   Medication Information                      acetaminophen (TYLENOL) 500 MG tablet  Take 1 tablet (500 mg total) by mouth every 6 (six) hours as needed for Pain. Max 2000mg daily.             albuterol (VENTOLIN HFA) 90 mcg/actuation inhaler  Inhale 2 puffs into the lungs every 6 (six) hours as needed for Wheezing. Rescue             diaper,brief,adult,disposable Misc  5 mg/kg/day by Misc.(Non-Drug; Combo Route) route 5 (five) times daily. Patient with incontinence. Please supply enough depends for 5xs/day             ergocalciferol (VITAMIN D2) 50,000 unit Cap  Take 1 capsule (50,000 Units total) by mouth every 7 days.             escitalopram oxalate (LEXAPRO) 5 MG Tab  Take 1 tablet (5 mg total) by mouth once daily.             furosemide (LASIX) 40 MG tablet  Take 1 tablet (40 mg total) by mouth once daily.             hydrOXYzine HCL (ATARAX) 25 MG tablet  Take 1 tablet (25 mg total) by mouth as needed for Itching.             lactulose (CEPHULAC) 10 gram packet  Mix 3 packets (30 g total) with liquid and take by mouth 3 (three) times daily as needed.             levothyroxine (SYNTHROID) 88 MCG tablet  Take 1 tablet (88 mcg total) by mouth  before breakfast.             lidocaine (LIDODERM) 5 %  Place 1 patch onto the skin every 24 hours. Remove & Discard patch within 12 hours or as directed by MD             magnesium hydroxide 400 mg/5 ml (MILK OF MAGNESIA) 400 mg/5 mL Susp  Take 30 mLs (2,400 mg total) by mouth daily as needed (Take if constipated despite lactulose).             melatonin 10 mg Tab  Take 10 mg by mouth nightly as needed (sleep).             midodrine (PROAMATINE) 10 MG tablet  Take 1 tablet (10 mg total) by mouth 3 (three) times daily.             nystatin (MYCOSTATIN) powder  Apply topically 2 (two) times daily. Apply to affected area twice a day             pantoprazole (PROTONIX) 40 MG tablet  Take 40 mg by mouth before breakfast.             polyethylene glycol (GLYCOLAX) 17 gram PwPk  Take 17 g by mouth once daily. Or miralax OTC. Take if constipated despite lactulose             rifAXIMin (XIFAXAN) 550 mg Tab  Take 550 mg by mouth 2 (two) times daily.              simethicone (MYLICON) 80 MG chewable tablet  Take 80 mg by mouth every 6 (six) hours as needed for Flatulence. Take 2 tablets by mouth every 6 hours as needed for gas             spironolactone (ALDACTONE) 50 MG tablet  Take 0.5 tablets (25 mg total) by mouth once daily.             vancomycin HCl (VANCOMYCIN 1.25 G/250 ML D5W, READY TO MIX,)  Inject 250 mLs (1,250 mg total) into the vein every 24 hours as needed. Stop: 11/3/2020             zinc sulfate (ZINCATE) 220 (50) mg capsule  Take 220 mg by mouth before breakfast.                 Pt was provided with prescriptions for the following meds:  E-rx: IV vancomycin  Printed rx: n/a  Phone-in or faxed rx: n/a to n/a pharmacy per pt request.    The following medications have been placed on HOLD and should be restarted in the outpatient setting (when appropriate): n/a    Zeina Mahoney verbalized understanding and had the opportunity to ask questions.

## 2020-10-23 NOTE — PROGRESS NOTES
Pharmacokinetic Assessment Follow Up: IV Vancomycin    Vancomycin serum concentration assessment and plan:    Patient's trough resulted at 12.0 mcg/mL, which was drawn appropriately.  Will continue dose of 1250 mg IV q24.     Drug levels (last 3 results):  Recent Labs   Lab Result Units 10/23/20  0935   Vancomycin-Trough ug/mL 12.0       Pharmacy will continue to follow and monitor vancomycin.    Please contact pharmacy at extension [phone] for questions regarding this assessment.    Thank you for the consult,   Radha Arce       Patient brief summary:  Zeina Mahoney is a 55 y.o. female initiated on antimicrobial therapy with IV Vancomycin for treatment of bacteremia    The patient's current regimen is 1250 mg IV q24h    Drug Allergies:   Review of patient's allergies indicates:   Allergen Reactions    Aspirin Tinitus    Ciprofloxacin Rash    Clindamycin Itching       Actual Body Weight:   68 kg    Renal Function:   Estimated Creatinine Clearance: 54.7 mL/min (based on SCr of 1.1 mg/dL).,     Dialysis Method (if applicable):  N/A    CBC (last 72 hours):  Recent Labs   Lab Result Units 10/21/20  0633 10/22/20  0538 10/23/20  0614   WBC K/uL 6.30 4.24 4.15   Hemoglobin g/dL 8.9* 8.1* 7.8*   Hematocrit % 27.3* 24.9* 23.7*   Platelets K/uL 66* 51* 54*   Gran% % 61.5 65.0 58.6   Lymph% % 27.9 24.8 30.8   Mono% % 6.8 6.1 7.2   Eosinophil% % 2.5 2.4 2.7   Basophil% % 1.0 1.2 0.5   Differential Method  Automated Automated Automated       Metabolic Panel (last 72 hours):  Recent Labs   Lab Result Units 10/21/20  0633 10/22/20  0538 10/23/20  0614   Sodium mmol/L 138 136 138   Potassium mmol/L 3.8 3.4* 3.4*   Chloride mmol/L 103 103 105   CO2 mmol/L 29 27 24   Glucose mg/dL 87 111* 215*   BUN, Bld mg/dL 16 16 15   Creatinine mg/dL 1.1 1.0 1.1   Albumin g/dL 2.7* 2.6* 2.4*   Total Bilirubin mg/dL 3.1* 3.4* 3.3*   Alkaline Phosphatase U/L 117 97 92   AST U/L 35 38 35   ALT U/L 20 20 17   Magnesium mg/dL 1.9 1.6 1.7        Vancomycin Administrations:  vancomycin given in the last 96 hours                   vancomycin 1.25 g in dextrose 5% 250 mL IVPB (ready to mix) (mg) 1,250 mg New Bag 10/23/20 0942     1,250 mg New Bag 10/22/20 1014    vancomycin 1.5 g in dextrose 5 % 250 mL IVPB (ready to mix) (mg) 1,500 mg New Bag 10/21/20 1043                Microbiologic Results:  Microbiology Results (last 7 days)     Procedure Component Value Units Date/Time    Blood culture [[id number]] Collected: 10/21/20 1125    Order Status: Completed Specimen: Blood from Antecubital, Right Arm Updated: 10/23/20 1412     Blood Culture, Routine No Growth to date      No Growth to date      No Growth to date    Blood culture [[id number]] Collected: 10/21/20 1123    Order Status: Completed Specimen: Blood from Peripheral, Right Hand Updated: 10/23/20 1412     Blood Culture, Routine No Growth to date      No Growth to date      No Growth to date    Blood culture [[id number]]  (Abnormal)  (Susceptibility) Collected: 10/19/20 2045    Order Status: Completed Specimen: Blood from Peripheral, Antecubital, Left Updated: 10/23/20 1332     Blood Culture, Routine Gram stain aer bottle: Gram positive cocci in clusters resembling Staph      Results called to and read back by:Santa Moreno RN 10/20/2020  16:24      STAPHYLOCOCCUS SPECIES  Further Identified as Staphylococcus pseudintermedius      Blood culture [[id number]]  (Abnormal)  (Susceptibility) Collected: 10/19/20 2046    Order Status: Completed Specimen: Blood from Peripheral, Hand, Left Updated: 10/23/20 1331     Blood Culture, Routine Gram stain aer bottle: Gram positive cocci in clusters resembling Staph      Results called to and read back by:Santa Moreno RN 10/20/2020  16:25      STAPHYLOCOCCUS SPECIES  Further Identified as Staphylococcus pseudintermedius

## 2020-10-23 NOTE — ASSESSMENT & PLAN NOTE
Zeina Mahoney is a 56 y/o female with hx of ESLD 2/2 CHAUDHARY, recently treated for staph epi bacteremia 08/2020 with possible source LLE wound presented to ED 10/19 with worsening chronic abdominal pain, her w/u with Abdominal u/s with possible distal choledocholithiasis. MRCP 10/20 with mild gallbladder wall thickening likely sequela of cirrhosis, mild intrahepatic ductal and CBD prominenceInfectious work up on admission done, blood cultures 10/19 growing staphylococcus pseudintermedius 2/4  pt was started on vancomycin  10/21- repeat blood clx 10/21 NGT - No CL  Or devices, she c/o back pain. MRI lumber wo evidence of infection.TTE neg for veg.    Source of her bacteremia most likely her skin wound.    Recommendations:    · Continue vancomycin.  · Will follow up Susceptibility.  · Would consider wound care for her LLE wound.

## 2020-10-23 NOTE — CONSULTS
D/L PICC placed in R BRACHIAL vein, 40cm in length with 0cm exposed. Arm circumference 28cm. Lot#[id number]

## 2020-10-23 NOTE — NURSING
RN spoke with PICC team at 1718 re: reading x ray of picc placement for verification. No orders given. 1747 RN spoke with KEAGAN Irvin for verification of PICC. 1754 - no orders placed yet.

## 2020-10-24 ENCOUNTER — TELEPHONE (OUTPATIENT)
Dept: TRANSPLANT | Facility: CLINIC | Age: 55
End: 2020-10-24

## 2020-10-24 NOTE — TELEPHONE ENCOUNTER
PDOL call documentation    Primary discharge diagnosis: abdominal pain and infection.    If other diagnosis:    1. How is patient feeling since discharge from the hospital? Pt denies issues or concerns  2. Has the patient had any new symptoms since discharge? No  a. If yes, notify hepatologist  3. Is the patient clear on the medication they should be taking? Yes  4. Does the patient know how to reach [facility]? Yes

## 2020-10-26 ENCOUNTER — PATIENT OUTREACH (OUTPATIENT)
Dept: ADMINISTRATIVE | Facility: CLINIC | Age: 55
End: 2020-10-26

## 2020-10-26 LAB
BACTERIA BLD CULT: NORMAL
BACTERIA BLD CULT: NORMAL

## 2020-10-26 NOTE — TELEPHONE ENCOUNTER
C3 nurse attempted to contact patient. No answer. The following message was left for the patient to return the call:  Good morning  I am a nurse calling on behalf of [employer] from the [facility].  This is a Transitional Care Call for Zeina Mahoney. When you have a moment please contact us at [phone] or [phone] Monday through Friday, between the hours of 8 am to 4 pm. We look forward to speaking with you. On behalf of [facility] have a nice day.    The patient has a scheduled HOSFU appointment with Nurse on 10/30/2020 @ [phone]. [phone]  Requested call back on phone with insurance company.

## 2020-10-27 ENCOUNTER — PATIENT MESSAGE (OUTPATIENT)
Dept: TRANSPLANT | Facility: CLINIC | Age: 55
End: 2020-10-27

## 2020-10-27 ENCOUNTER — TELEPHONE (OUTPATIENT)
Dept: TRANSPLANT | Facility: CLINIC | Age: 55
End: 2020-10-27

## 2020-10-27 ENCOUNTER — TELEPHONE (OUTPATIENT)
Dept: TRANSPLANT | Facility: HOSPITAL | Age: 55
End: 2020-10-27

## 2020-10-27 ENCOUNTER — LAB VISIT (OUTPATIENT)
Dept: LAB | Facility: HOSPITAL | Age: 55
End: 2020-10-27
Attending: INTERNAL MEDICINE
Payer: COMMERCIAL

## 2020-10-27 DIAGNOSIS — K75.81 LIVER CIRRHOSIS SECONDARY TO NASH: ICD-10-CM

## 2020-10-27 DIAGNOSIS — Z76.82 ORGAN TRANSPLANT CANDIDATE: ICD-10-CM

## 2020-10-27 DIAGNOSIS — K74.60 LIVER CIRRHOSIS SECONDARY TO NASH: ICD-10-CM

## 2020-10-27 LAB
ALBUMIN SERPL BCP-MCNC: 2.9 G/DL (ref 3.5–5.2)
ALP SERPL-CCNC: 104 U/L (ref 55–135)
ALT SERPL W/O P-5'-P-CCNC: 18 U/L (ref 10–44)
ANION GAP SERPL CALC-SCNC: 11 MMOL/L (ref 8–16)
AST SERPL-CCNC: 33 U/L (ref 10–40)
BASOPHILS # BLD AUTO: 0.03 K/UL (ref 0–0.2)
BASOPHILS NFR BLD: 0.6 % (ref 0–1.9)
BILIRUB SERPL-MCNC: 3.9 MG/DL (ref 0.1–1)
BUN SERPL-MCNC: 12 MG/DL (ref 6–20)
CALCIUM SERPL-MCNC: 9.3 MG/DL (ref 8.7–10.5)
CHLORIDE SERPL-SCNC: 101 MMOL/L (ref 95–110)
CO2 SERPL-SCNC: 25 MMOL/L (ref 23–29)
CREAT SERPL-MCNC: 1.4 MG/DL (ref 0.5–1.4)
DIFFERENTIAL METHOD: ABNORMAL
EOSINOPHIL # BLD AUTO: 0.1 K/UL (ref 0–0.5)
EOSINOPHIL NFR BLD: 2.7 % (ref 0–8)
ERYTHROCYTE [DISTWIDTH] IN BLOOD BY AUTOMATED COUNT: 14.4 % (ref 11.5–14.5)
EST. GFR  (AFRICAN AMERICAN): 48.8 ML/MIN/1.73 M^2
EST. GFR  (NON AFRICAN AMERICAN): 42.3 ML/MIN/1.73 M^2
GLUCOSE SERPL-MCNC: 218 MG/DL (ref 70–110)
HCT VFR BLD AUTO: 26 % (ref 37–48.5)
HGB BLD-MCNC: 8.5 G/DL (ref 12–16)
IMM GRANULOCYTES # BLD AUTO: 0.03 K/UL (ref 0–0.04)
IMM GRANULOCYTES NFR BLD AUTO: 0.6 % (ref 0–0.5)
INR PPP: 1.2 (ref 0.8–1.2)
LYMPHOCYTES # BLD AUTO: 1.1 K/UL (ref 1–4.8)
LYMPHOCYTES NFR BLD: 22.1 % (ref 18–48)
MCH RBC QN AUTO: 34.1 PG (ref 27–31)
MCHC RBC AUTO-ENTMCNC: 32.7 G/DL (ref 32–36)
MCV RBC AUTO: 104 FL (ref 82–98)
MONOCYTES # BLD AUTO: 0.3 K/UL (ref 0.3–1)
MONOCYTES NFR BLD: 6.5 % (ref 4–15)
NEUTROPHILS # BLD AUTO: 3.3 K/UL (ref 1.8–7.7)
NEUTROPHILS NFR BLD: 67.5 % (ref 38–73)
NRBC BLD-RTO: 0 /100 WBC
PLATELET # BLD AUTO: 60 K/UL (ref 150–350)
PMV BLD AUTO: 10.1 FL (ref 9.2–12.9)
POTASSIUM SERPL-SCNC: 3.3 MMOL/L (ref 3.5–5.1)
PROT SERPL-MCNC: 6.1 G/DL (ref 6–8.4)
PROTHROMBIN TIME: 13 SEC (ref 9–12.5)
RBC # BLD AUTO: 2.49 M/UL (ref 4–5.4)
SODIUM SERPL-SCNC: 137 MMOL/L (ref 136–145)
WBC # BLD AUTO: 4.89 K/UL (ref 3.9–12.7)

## 2020-10-27 PROCEDURE — 85610 PROTHROMBIN TIME: CPT | Mod: TXP

## 2020-10-27 PROCEDURE — 85025 COMPLETE CBC W/AUTO DIFF WBC: CPT | Mod: TXP

## 2020-10-27 PROCEDURE — 80053 COMPREHEN METABOLIC PANEL: CPT | Mod: TXP

## 2020-10-27 PROCEDURE — 36415 COLL VENOUS BLD VENIPUNCTURE: CPT | Mod: TXP

## 2020-10-27 NOTE — TELEPHONE ENCOUNTER
received the message below and contacted Sudha with [facility] (ph# [phone]). Sudha reported that he would be contacting pt's spouse to arrange for outpatient services at their infusion center.  contact pt's spouse, Yonis, and informed him of the above. Caregiver/spouse, expressed gratitude and denied having any questions or concerns at this time. Pt's coordinator informed as well. No further needs identified at this time.     ----- Message from Tay Kumar RN sent at 10/27/2020 11:59 AM CDT -----  Regarding: RE: problem with insurance and HH  Excellent! Thank you!     I was out of town last week, so I didn't know what was going on. Ann updated me this morning, but I think this is a better option. Thanks for working on it.  ----- Message -----  From: Silvia White LMSW  Sent: 10/27/2020  11:52 AM CDT  To: Tay Kumar RN  Subject: FW: problem with insurance and HH                Hejoe Cross! Disregard the previous msg. I was able to get [facility] to assist with dressing changes at their office moving forward. They will be reaching out to pt and caregiver today.     ----- Message -----  From: Tasia Calvin RN  Sent: 10/26/2020   3:28 PM CDT  To: Silvia White LMSW  Subject: problem with insurance and HH                    I spoke briefly to Mr. Mahoney, who stated that he was having a hard time with getting HH as his insurance denied it.  Spouse was on the phone to the insurance company so we did not talk long.  Please contact Mr. Mahoney directly regarding home health and picc line dressing change needs.  Respectfully,  Tasia Calvin RN  [facility]    [email or web address]       Please do not reply to this message, as this inbox is not routinely monitored.
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
## 2020-10-27 NOTE — PATIENT INSTRUCTIONS
Bacteremia, Suspected (Adult)  Your fever today is probably due to a viral illness. However, sometimes fever can be an early sign of a more serious bacterial infection. Bacteremia is a bacterial infection that has spread to the bloodstream. This is serious because it can spread to other organs, including the kidneys, brain, and lungs.  Your healthcare provider will perform tests (cultures) to check for bacteremia. Until the test results are known you should watch for the signs listed below.  Causes  Bacteremia usually starts with a typical infection, but it then spreads to the blood. Almost any type of infection can cause bacteremia, including a urinary tract infection, skin infection, gastrointestinal problem, surgical complication, or pneumonia.  Symptoms  At first symptoms may seem like any typical infection or illness, but then they worsen. Symptoms of bacteremia can include:  · Fever and chills  · Loss of appetite  · Nausea or vomiting  · Trouble breathing or fast breathing  · Fast heart rate  · Feeling lightheaded or faint  · Skin rashes or blotches  Home care  Follow these guidelines when caring for yourself at home.  · Rest at home for the first 2 to 3 days. When resuming activity, don't let yourself become overly tired.  · You can take acetaminophen or ibuprofen for pain, unless you were given a different pain medicine to use. (Note: If you have chronic liver or kidney disease or have ever had a stomach ulcer or gastrointestinal bleeding, talk with your healthcare provider before using these medicines. Also talk to your provider if you are taking medicine to prevent blood clots.) Aspirin should never be given to anyone younger than 18 years of age who is ill with a viral infection or fever. It may cause severe liver or brain damage.  · If you were given antibiotics, take them until they are used up, or your healthcare provider tells you to stop. It is important to finish the antibiotics even though you  feel better. This is to make sure the infection has cleared.  · Your appetite may be poor, so a light diet is fine. Avoid dehydration by drinking 6 to 8 glasses of fluid per day (such as water, soft drinks, sports drinks, juices, tea, or soup).  Follow-up care  Follow up with your healthcare provider, or as advised.  · If a culture was done, you will be notified if your treatment needs to be changed. You can call as directed for the results.  · If X-rays, a CT, or an ultrasound were done, a specialist will review them. You will be notified of any findings that may affect your care.  Call 911  Contact emergency services right away if any of these occur:  · Trouble breathing or swallowing, or wheezing  · Chest pain  · Confusion or sudden change in behavior  · Extreme drowsiness or trouble awakening  · Fainting or loss of consciousness  · Rapid heart rate  · Low blood pressure  · Vomiting blood, or large amounts of blood in stool  · Seizure  When to seek medical advice  Call your healthcare provider right away if any of these occur:  · Cough with lots of colored sputum (mucus), or blood in your sputum  · Severe headache  · Severe face, neck, throat, or ear pain  · Pain in the right lower abdomen  · Weakness, dizziness, repeated vomiting, or diarrhea  · Joint pain or a new rash  · Burning when urinating  · Fever of 100.4°F (38°C) or higher  Date Last Reviewed: 7/30/2015  © 8234-4634 [employer]. [address] [place] [address]. All rights reserved. This information is not intended as a substitute for professional medical care. Always follow your healthcare professional's instructions.        Understanding Sepsis  Sepsis is a severe response the body has to an infection. It is most often caused by bacteria. It is also known as septicemia, or systemic inflammatory response syndrome (SIRS). Sepsis is a medical emergency. It needs to be treated right away.  What is sepsis?  Sepsis is when the body  reacts to an infection with a severe inflammatory response. It can be caused by bacteria, fungus, or a virus. Sepsis can cause many kinds of problems around the body. It can lead severe low blood pressure (shock) and organ failure. This can lead to death if not treated.  Sepsis is most common in:  · Infants and older adults  · People with an infection such as pneumonia, meningitis, or a urinary tract infection  · People who have an illness such as cancer, AIDS, or diabetes  · People being treated with chemotherapy medications or radiation  · People who have had a transplant  Symptoms of sepsis  Symptoms of sepsis can include:  · Chills and shaking  · Rapid heartbeat  · Rapid breathing  · Shortness of breath  · Severe nausea or uncontrolled vomiting  · Confusion  · Dizziness  · Decreased urination  · Severe pain, including in the back or joints   Diagnosing sepsis  If your health care provider thinks you may have sepsis, you will be given tests. You may have blood and urine tests. These are done to look for bacteria, viruses, or fungus. You may also have X-rays or other imaging tests. These may be done to look at your organs to locate the source of infection.  Treating sepsis  If you have sepsis, your health care provider will give you antibiotics through a thin, flexible tube put into a vein in your arm (IV). You will also be given fluids through the IV. You may also be given nutrition or other medications through your IV. Your health care provider will talk with you about other treatments you may need. These may include using an oxygen mask or a ventilator to help with breathing. Treatment may last at least 7 to 10 days. Sepsis must be treated in the hospital.  Date Last Reviewed: 7/15/2015  © 9709-4545 [employer]. [address] [place] [address], [place] [address]. All rights reserved. This information is not intended as a substitute for professional medical care. Always follow your healthcare professional's  instructions.

## 2020-10-30 ENCOUNTER — PATIENT MESSAGE (OUTPATIENT)
Dept: TRANSPLANT | Facility: CLINIC | Age: 55
End: 2020-10-30

## 2020-10-30 ENCOUNTER — HOSPITAL ENCOUNTER (EMERGENCY)
Facility: HOSPITAL | Age: 55
Discharge: HOME | End: 2020-10-30
Attending: EMERGENCY MEDICINE
Payer: COMMERCIAL

## 2020-10-30 VITALS
RESPIRATION RATE: 16 BRPM | HEART RATE: 73 BPM | DIASTOLIC BLOOD PRESSURE: 57 MMHG | OXYGEN SATURATION: 100 % | TEMPERATURE: 98 F | SYSTOLIC BLOOD PRESSURE: 117 MMHG

## 2020-10-30 DIAGNOSIS — R05.9 COUGH: ICD-10-CM

## 2020-10-30 DIAGNOSIS — T82.524A DISPLACEMENT OF PERIPHERALLY INSERTED CENTRAL CATHETER (PICC): Primary | ICD-10-CM

## 2020-10-30 DIAGNOSIS — K74.60 HEPATIC CIRRHOSIS, UNSPECIFIED HEPATIC CIRRHOSIS TYPE, UNSPECIFIED WHETHER ASCITES PRESENT: ICD-10-CM

## 2020-10-30 LAB
ALBUMIN SERPL BCP-MCNC: 3.1 G/DL (ref 3.5–5.2)
ALP SERPL-CCNC: 106 U/L (ref 55–135)
ALT SERPL W/O P-5'-P-CCNC: 21 U/L (ref 10–44)
AMMONIA PLAS-SCNC: 61 UMOL/L (ref 10–50)
ANION GAP SERPL CALC-SCNC: 11 MMOL/L (ref 8–16)
AST SERPL-CCNC: 51 U/L (ref 10–40)
BASOPHILS # BLD AUTO: 0.07 K/UL (ref 0–0.2)
BASOPHILS NFR BLD: 1 % (ref 0–1.9)
BILIRUB SERPL-MCNC: 4.2 MG/DL (ref 0.1–1)
BUN SERPL-MCNC: 14 MG/DL (ref 6–20)
CALCIUM SERPL-MCNC: 9.4 MG/DL (ref 8.7–10.5)
CHLORIDE SERPL-SCNC: 105 MMOL/L (ref 95–110)
CO2 SERPL-SCNC: 21 MMOL/L (ref 23–29)
CREAT SERPL-MCNC: 1.2 MG/DL (ref 0.5–1.4)
CTP QC/QA: YES
DIFFERENTIAL METHOD: ABNORMAL
EOSINOPHIL # BLD AUTO: 0.2 K/UL (ref 0–0.5)
EOSINOPHIL NFR BLD: 2.2 % (ref 0–8)
ERYTHROCYTE [DISTWIDTH] IN BLOOD BY AUTOMATED COUNT: 14.6 % (ref 11.5–14.5)
EST. GFR  (AFRICAN AMERICAN): 58.8 ML/MIN/1.73 M^2
EST. GFR  (NON AFRICAN AMERICAN): 51 ML/MIN/1.73 M^2
GLUCOSE SERPL-MCNC: 159 MG/DL (ref 70–110)
HCT VFR BLD AUTO: 28.6 % (ref 37–48.5)
HGB BLD-MCNC: 9.5 G/DL (ref 12–16)
IMM GRANULOCYTES # BLD AUTO: 0.03 K/UL (ref 0–0.04)
IMM GRANULOCYTES NFR BLD AUTO: 0.4 % (ref 0–0.5)
INR PPP: 1.1 (ref 0.8–1.2)
LYMPHOCYTES # BLD AUTO: 1.5 K/UL (ref 1–4.8)
LYMPHOCYTES NFR BLD: 21.8 % (ref 18–48)
MAGNESIUM SERPL-MCNC: 1.7 MG/DL (ref 1.6–2.6)
MCH RBC QN AUTO: 34.4 PG (ref 27–31)
MCHC RBC AUTO-ENTMCNC: 33.2 G/DL (ref 32–36)
MCV RBC AUTO: 104 FL (ref 82–98)
MONOCYTES # BLD AUTO: 0.5 K/UL (ref 0.3–1)
MONOCYTES NFR BLD: 7.3 % (ref 4–15)
NEUTROPHILS # BLD AUTO: 4.7 K/UL (ref 1.8–7.7)
NEUTROPHILS NFR BLD: 67.3 % (ref 38–73)
NRBC BLD-RTO: 0 /100 WBC
PHOSPHATE SERPL-MCNC: 2.6 MG/DL (ref 2.7–4.5)
PLATELET # BLD AUTO: 81 K/UL (ref 150–350)
PMV BLD AUTO: 11.1 FL (ref 9.2–12.9)
POTASSIUM SERPL-SCNC: 3.5 MMOL/L (ref 3.5–5.1)
PROT SERPL-MCNC: 6.9 G/DL (ref 6–8.4)
PROTHROMBIN TIME: 12.3 SEC (ref 9–12.5)
RBC # BLD AUTO: 2.76 M/UL (ref 4–5.4)
SARS-COV-2 RDRP RESP QL NAA+PROBE: NEGATIVE
SODIUM SERPL-SCNC: 137 MMOL/L (ref 136–145)
VANCOMYCIN TROUGH SERPL-MCNC: 16.4 UG/ML (ref 10–22)
WBC # BLD AUTO: 6.97 K/UL (ref 3.9–12.7)

## 2020-10-30 PROCEDURE — 36000 PLACE NEEDLE IN VEIN: CPT | Mod: NTX

## 2020-10-30 PROCEDURE — C1751 CATH, INF, PER/CENT/MIDLINE: HCPCS | Mod: NTX

## 2020-10-30 PROCEDURE — 99285 EMERGENCY DEPT VISIT HI MDM: CPT | Mod: 25,NTX

## 2020-10-30 PROCEDURE — 80053 COMPREHEN METABOLIC PANEL: CPT | Mod: NTX

## 2020-10-30 PROCEDURE — 83735 ASSAY OF MAGNESIUM: CPT | Mod: NTX

## 2020-10-30 PROCEDURE — 99284 EMERGENCY DEPT VISIT MOD MDM: CPT | Mod: NTX,,, | Performed by: PHYSICIAN ASSISTANT

## 2020-10-30 PROCEDURE — 85610 PROTHROMBIN TIME: CPT | Mod: NTX

## 2020-10-30 PROCEDURE — 25000003 PHARM REV CODE 250: Mod: NTX | Performed by: PHYSICIAN ASSISTANT

## 2020-10-30 PROCEDURE — 76937 US GUIDE VASCULAR ACCESS: CPT | Mod: NTX

## 2020-10-30 PROCEDURE — 82140 ASSAY OF AMMONIA: CPT | Mod: NTX

## 2020-10-30 PROCEDURE — 84100 ASSAY OF PHOSPHORUS: CPT | Mod: NTX

## 2020-10-30 PROCEDURE — 99284 PR EMERGENCY DEPT VISIT,LEVEL IV: ICD-10-PCS | Mod: NTX,,, | Performed by: PHYSICIAN ASSISTANT

## 2020-10-30 PROCEDURE — U0002 COVID-19 LAB TEST NON-CDC: HCPCS | Mod: NTX | Performed by: PHYSICIAN ASSISTANT

## 2020-10-30 PROCEDURE — 80202 ASSAY OF VANCOMYCIN: CPT | Mod: NTX

## 2020-10-30 PROCEDURE — 36410 VNPNXR 3YR/> PHY/QHP DX/THER: CPT | Mod: NTX

## 2020-10-30 PROCEDURE — 85025 COMPLETE CBC W/AUTO DIFF WBC: CPT | Mod: NTX

## 2020-10-30 RX ORDER — MAG HYDROX/ALUMINUM HYD/SIMETH 200-200-20
5 SUSPENSION, ORAL (FINAL DOSE FORM) ORAL
Status: COMPLETED | OUTPATIENT
Start: 2020-10-30 | End: 2020-10-30

## 2020-10-30 RX ADMIN — ALUMINUM HYDROXIDE, MAGNESIUM HYDROXIDE, AND SIMETHICONE 5 ML: 200; 200; 20 SUSPENSION ORAL at 05:10

## 2020-10-30 NOTE — CONSULTS
Placed 18g, 10 cm Midline in left brachial vein using u/s guidance.  Max dwell date 11/28/2020.  Lot # [id number].    MIDLINE placed for 5 days of vancomycin ED 11/4/2020

## 2020-10-30 NOTE — ED NOTES
LOC: The patient is awake, alert and aware of environment  MUSCULOSKELETAL: Patient moving all extremities spontaneously, no obvious swelling or deformities noted.  RESPIRATORY: Airway is open and patent, respirations are spontaneous, patient has a normal effort and rate, no accessory muscle use noted   ABDOMEN: Soft and non tender to palpation, no distention noted  NEUROLOGIC:  facial expression is symmetrical, patient moving all extremities spontaneously, normal sensation in all extremities when touched with a finger.  Follows all commands appropriately.    Patient states her picc line got caught in her blanket and accidentally got pulled out, she states she also needs her vanc level drawn, patient states she also has been having a chronic cough for the last 3 weeks, she states she has been seen at her pcp doctor and got an inhaler but states it has not helped, no acute distress noted, will continue to monitor

## 2020-10-30 NOTE — DISCHARGE INSTRUCTIONS
Follow-up with your transplant team and infectious disease as directed.  Return to the emergency room for new, worsening, or concerning symptoms.

## 2020-10-30 NOTE — ED PROVIDER NOTES
Encounter Date: 10/30/2020       History     Chief Complaint   Patient presents with    PICC line came out     patient is waiting on a liver transplant and is getting IV antibiotics via PICC but the PICC came out.     The history is provided by the patient and the spouse.      Zeina Mahoney is a 55 y.o. 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 Patient's PICC line became displaced from her RUE last night. Believes the cover on her bed may have pulled it out. She had +BCx on 10/19 showing staphylococcus pseudintermedius during last admission for abdominal pain. She is on Vanc via PICC with end date 11/3. She receives her infusions everyday at noon, last received her dose yesterday. She was unable to get an appointment with IR and was referred to the ED for placement. She additionally reports chronic non-productive cough for 1 month that began after choking on a piece of chicken. She denies fever, chest pain, SOB, abdominal pain, nausea, vomiting, urinary or bowel movement changes. She denies confusion and she is in the ED with her spouse who states she is at her baseline mental status.     Review of patient's allergies indicates:   Allergen Reactions    Aspirin Tinitus    Ciprofloxacin Rash    Clindamycin Itching     Past Medical History:   Diagnosis Date    Chronic Hepatic encephalopathy 6/25/2020    Decompensated hepatic cirrhosis 6/25/2020    History of pancreatitis 6/25/2020    Liver cirrhosis secondary to CHAUDHARY 6/25/2020    Macrocytic anemia 6/25/2020    Other ascites 6/25/2020    Thrombocytopenia 6/25/2020     No past surgical history on file.  No family history on file.  Social History     Tobacco Use    Smoking status: Never Smoker    Smokeless tobacco: Never Used   Substance Use Topics    Alcohol use: Never     Frequency: Never    Drug use: Never     Review of Systems   Constitutional: Negative for fever.    HENT: Negative for sore throat.    Respiratory: Positive for cough. Negative for shortness of breath.    Cardiovascular: Negative for chest pain.   Gastrointestinal: Negative for nausea.   Genitourinary: Negative for dysuria.   Musculoskeletal: Negative for back pain.   Skin: Negative for rash.   Neurological: Negative for weakness.   Hematological: Does not bruise/bleed easily.     Physical Exam     Initial Vitals [10/30/20 1419]   BP Pulse Resp Temp SpO2   (!) 117/57 73 16 97.5 °F (36.4 °C) 100 %      MAP       --         Physical Exam    Constitutional: She appears well-developed and well-nourished. She is not diaphoretic. No distress.   HENT:   Head: Normocephalic and atraumatic.   Mouth/Throat: Oropharynx is clear and moist. No oropharyngeal exudate.   Eyes: EOM are normal. Pupils are equal, round, and reactive to light.   Neck: Normal range of motion. Neck supple.   Cardiovascular: Normal rate and regular rhythm.   Pulmonary/Chest: Breath sounds normal. No respiratory distress. She has no wheezes.   Abdominal: Soft. There is no abdominal tenderness.   Musculoskeletal: Normal range of motion. No tenderness or edema.   Neurological: She is alert and oriented to person, place, and time. She has normal strength. No cranial nerve deficit or sensory deficit.   Skin: Skin is warm and dry.   Scattered ecchymosis to extremities, chest, abdomen         ED Course   Procedures  Labs Reviewed   CBC W/ AUTO DIFFERENTIAL - Abnormal; Notable for the following components:       Result Value    RBC 2.76 (*)     Hemoglobin 9.5 (*)     Hematocrit 28.6 (*)      (*)     MCH 34.4 (*)     RDW 14.6 (*)     Platelets 81 (*)     All other components within normal limits   COMPREHENSIVE METABOLIC PANEL - Abnormal; Notable for the following components:    CO2 21 (*)     Glucose 159 (*)     Albumin 3.1 (*)     Total Bilirubin 4.2 (*)     AST 51 (*)     eGFR if  58.8 (*)     eGFR if non  51.0 (*)      All other components within normal limits   AMMONIA - Abnormal; Notable for the following components:    Ammonia 61 (*)     All other components within normal limits   PHOSPHORUS - Abnormal; Notable for the following components:    Phosphorus 2.6 (*)     All other components within normal limits   PROTIME-INR   MAGNESIUM   VANCOMYCIN, TROUGH   SARS-COV-2 RDRP GENE          Imaging Results          X-Ray Chest PA And Lateral (Final result)  Result time 10/30/20 16:53:21    Final result by Lacie Villareal MD (10/30/20 16:53:21)                 Impression:      No source for cough identified.      Electronically signed by: Lacie Villareal MD  Date:    10/30/2020  Time:    16:53             Narrative:    EXAMINATION:  XR CHEST PA AND LATERAL    CLINICAL HISTORY:  Cough    TECHNIQUE:  PA and lateral views of the chest were performed.    COMPARISON:  10/23/2020.  10/12/2020.    FINDINGS:  PICC line present on 10/23/2020 has been discontinued.    Mediastinal structures are midline. Cardiac silhouette and pulmonary vascular distribution are unremarkable, similar to 10/12/2020.    Lung volumes are normal and symmetric.    I detect no pulmonary disease, pleural fluid, lymph node enlargement, cardiac decompensation, pneumothorax, pneumomediastinum, pneumoperitoneum or significant osseous abnormality.                                 Medical Decision Making:   History:   Old Medical Records: I decided to obtain old medical records.  Old Records Summarized: records from clinic visits and records from previous admission(s).  Clinical Tests:   Lab Tests: Ordered and Reviewed  Radiological Study: Ordered and Reviewed  Other:   I have discussed this case with another health care provider.       <> Summary of the Discussion: PICC team, Transplant       APC / Resident Notes:   55 y.o. 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 Received Vanc daily for  bacteremia. Reports chronic cough, otherwise denies any medical complaints. Referred to ED as she was unable to get an appointment with IR. DDx includes but not limited to PICC displacement, liver failure, viral syndrome, dyspepsia, pneumonia, electrolyte abnormality.     PICC line consulted and placed midline in LUE as she only has 5 days left of her vancomycin therapy. Laboratory work appears at baseline. MELD 15. Discussed patient with transplant and they feel outpatient management appropriate. Advised outpatient follow-up with infectious disease and transplant as directed. Patient expresses understanding and agreeable to the plan. Return to ED precautions given for new, worsening, or concerning symptoms. I have discussed the care of this patient with my supervising physician.            Attending Attestation:     Physician Attestation Statement for NP/PA:   I discussed this assessment and plan of this patient with the NP/PA, but I did not personally examine the patient. The face to face encounter was performed by the NP/PA.                            Clinical Impression:     ICD-10-CM ICD-9-CM   1. Displacement of peripherally inserted central catheter (PICC)  T82.524A 996.1   2. Cough  R05 786.2   3. Hepatic cirrhosis, unspecified hepatic cirrhosis type, unspecified whether ascites present  K74.60 571.5                      Disposition:   Disposition: Discharged  Condition: Stable     ED Disposition Condition    Discharge Stable        ED Prescriptions     None        Follow-up Information     Follow up With Specialties Details Why Contact Info Additional Information    [facility] 1st Fl Transplant   [address]  [facility] [id number]  [phone] Multi-Organ Transplant [facility] - Main Building, 1st floor near Clinic elevator Please park in [facility] and walk through Clinic elevator hallway                                       Kee Parker PA-C  10/31/20  0006       Lucia Davey Jr., MD  10/31/20 2820

## 2020-11-01 ENCOUNTER — NURSE TRIAGE (OUTPATIENT)
Dept: ADMINISTRATIVE | Facility: CLINIC | Age: 55
End: 2020-11-01

## 2020-11-01 ENCOUNTER — HOSPITAL ENCOUNTER (OUTPATIENT)
Facility: HOSPITAL | Age: 55
Discharge: HOME | End: 2020-11-02
Attending: EMERGENCY MEDICINE | Admitting: STUDENT IN AN ORGANIZED HEALTH CARE EDUCATION/TRAINING PROGRAM
Payer: COMMERCIAL

## 2020-11-01 DIAGNOSIS — R78.81 BACTEREMIA: ICD-10-CM

## 2020-11-01 DIAGNOSIS — D69.59 THROMBOCYTOPENIA DUE TO HYPERSPLENISM: ICD-10-CM

## 2020-11-01 DIAGNOSIS — R18.8 OTHER ASCITES: ICD-10-CM

## 2020-11-01 DIAGNOSIS — E87.6 HYPOKALEMIA: ICD-10-CM

## 2020-11-01 DIAGNOSIS — E03.8 OTHER SPECIFIED HYPOTHYROIDISM: ICD-10-CM

## 2020-11-01 DIAGNOSIS — K76.82 HEPATIC ENCEPHALOPATHY: Primary | ICD-10-CM

## 2020-11-01 DIAGNOSIS — E72.20 HYPERAMMONEMIA: ICD-10-CM

## 2020-11-01 DIAGNOSIS — K59.09 OTHER CONSTIPATION: ICD-10-CM

## 2020-11-01 DIAGNOSIS — T82.524A DISPLACEMENT OF PERIPHERALLY INSERTED CENTRAL CATHETER (PICC): ICD-10-CM

## 2020-11-01 DIAGNOSIS — D68.4 ACQUIRED COAGULATION FACTOR DEFICIENCY: ICD-10-CM

## 2020-11-01 DIAGNOSIS — D63.8 ANEMIA OF CHRONIC DISEASE: ICD-10-CM

## 2020-11-01 DIAGNOSIS — D73.1 THROMBOCYTOPENIA DUE TO HYPERSPLENISM: ICD-10-CM

## 2020-11-01 DIAGNOSIS — E44.0 PROTEIN-CALORIE MALNUTRITION, MODERATE: ICD-10-CM

## 2020-11-01 DIAGNOSIS — K72.90 DECOMPENSATED HEPATIC CIRRHOSIS: ICD-10-CM

## 2020-11-01 DIAGNOSIS — K74.60 DECOMPENSATED HEPATIC CIRRHOSIS: ICD-10-CM

## 2020-11-01 DIAGNOSIS — F43.22 ADJUSTMENT DISORDER WITH ANXIOUS MOOD: ICD-10-CM

## 2020-11-01 DIAGNOSIS — K72.10 END STAGE LIVER DISEASE: ICD-10-CM

## 2020-11-01 PROBLEM — K76.7 HEPATORENAL SYNDROME: Status: RESOLVED | Noted: 2020-09-22 | Resolved: 2020-11-01

## 2020-11-01 PROBLEM — L03.119 CELLULITIS OF LOWER EXTREMITY: Status: RESOLVED | Noted: 2020-09-08 | Resolved: 2020-11-01

## 2020-11-01 PROBLEM — R10.9 ABDOMINAL PAIN: Status: RESOLVED | Noted: 2020-09-22 | Resolved: 2020-11-01

## 2020-11-01 PROBLEM — R65.10 SIRS (SYSTEMIC INFLAMMATORY RESPONSE SYNDROME): Status: RESOLVED | Noted: 2020-09-09 | Resolved: 2020-11-01

## 2020-11-01 PROBLEM — N17.9 AKI (ACUTE KIDNEY INJURY): Status: RESOLVED | Noted: 2020-09-22 | Resolved: 2020-11-01

## 2020-11-01 PROBLEM — E87.5 HYPERKALEMIA: Status: RESOLVED | Noted: 2020-09-22 | Resolved: 2020-11-01

## 2020-11-01 LAB
ALBUMIN SERPL BCP-MCNC: 2.9 G/DL (ref 3.5–5.2)
ALP SERPL-CCNC: 106 U/L (ref 55–135)
ALT SERPL W/O P-5'-P-CCNC: 17 U/L (ref 10–44)
AMMONIA PLAS-SCNC: 100 UMOL/L (ref 10–50)
AMMONIA PLAS-SCNC: 92 UMOL/L (ref 10–50)
ANION GAP SERPL CALC-SCNC: 11 MMOL/L (ref 8–16)
AST SERPL-CCNC: 30 U/L (ref 10–40)
BASOPHILS # BLD AUTO: 0.07 K/UL (ref 0–0.2)
BASOPHILS NFR BLD: 1 % (ref 0–1.9)
BILIRUB SERPL-MCNC: 4.3 MG/DL (ref 0.1–1)
BUN SERPL-MCNC: 10 MG/DL (ref 6–20)
CALCIUM SERPL-MCNC: 9.3 MG/DL (ref 8.7–10.5)
CHLORIDE SERPL-SCNC: 100 MMOL/L (ref 95–110)
CO2 SERPL-SCNC: 26 MMOL/L (ref 23–29)
CREAT SERPL-MCNC: 1.3 MG/DL (ref 0.5–1.4)
CTP QC/QA: YES
DIFFERENTIAL METHOD: ABNORMAL
EOSINOPHIL # BLD AUTO: 0.2 K/UL (ref 0–0.5)
EOSINOPHIL NFR BLD: 2 % (ref 0–8)
ERYTHROCYTE [DISTWIDTH] IN BLOOD BY AUTOMATED COUNT: 14.2 % (ref 11.5–14.5)
EST. GFR  (AFRICAN AMERICAN): 53.4 ML/MIN/1.73 M^2
EST. GFR  (NON AFRICAN AMERICAN): 46.3 ML/MIN/1.73 M^2
GLUCOSE SERPL-MCNC: 153 MG/DL (ref 70–110)
HCT VFR BLD AUTO: 27.5 % (ref 37–48.5)
HGB BLD-MCNC: 9.3 G/DL (ref 12–16)
IMM GRANULOCYTES # BLD AUTO: 0.02 K/UL (ref 0–0.04)
IMM GRANULOCYTES NFR BLD AUTO: 0.3 % (ref 0–0.5)
INR PPP: 1.1 (ref 0.8–1.2)
LYMPHOCYTES # BLD AUTO: 1.5 K/UL (ref 1–4.8)
LYMPHOCYTES NFR BLD: 21 % (ref 18–48)
MCH RBC QN AUTO: 33.9 PG (ref 27–31)
MCHC RBC AUTO-ENTMCNC: 33.8 G/DL (ref 32–36)
MCV RBC AUTO: 100 FL (ref 82–98)
MONOCYTES # BLD AUTO: 0.6 K/UL (ref 0.3–1)
MONOCYTES NFR BLD: 8.6 % (ref 4–15)
NEUTROPHILS # BLD AUTO: 4.9 K/UL (ref 1.8–7.7)
NEUTROPHILS NFR BLD: 67.1 % (ref 38–73)
NRBC BLD-RTO: 0 /100 WBC
PLATELET # BLD AUTO: 95 K/UL (ref 150–350)
PMV BLD AUTO: 11.2 FL (ref 9.2–12.9)
POTASSIUM SERPL-SCNC: 2.8 MMOL/L (ref 3.5–5.1)
PROT SERPL-MCNC: 6.2 G/DL (ref 6–8.4)
PROTHROMBIN TIME: 12.4 SEC (ref 9–12.5)
RBC # BLD AUTO: 2.74 M/UL (ref 4–5.4)
SARS-COV-2 RDRP RESP QL NAA+PROBE: NEGATIVE
SODIUM SERPL-SCNC: 137 MMOL/L (ref 136–145)
VANCOMYCIN TROUGH SERPL-MCNC: 39.4 UG/ML (ref 10–22)
WBC # BLD AUTO: 7.33 K/UL (ref 3.9–12.7)

## 2020-11-01 PROCEDURE — 96372 THER/PROPH/DIAG INJ SC/IM: CPT | Mod: 59

## 2020-11-01 PROCEDURE — 96366 THER/PROPH/DIAG IV INF ADDON: CPT | Mod: NTX

## 2020-11-01 PROCEDURE — 99285 EMERGENCY DEPT VISIT HI MDM: CPT | Mod: 25,NTX

## 2020-11-01 PROCEDURE — 25000003 PHARM REV CODE 250: Mod: NTX | Performed by: PHYSICIAN ASSISTANT

## 2020-11-01 PROCEDURE — 36415 COLL VENOUS BLD VENIPUNCTURE: CPT | Mod: NTX

## 2020-11-01 PROCEDURE — 82140 ASSAY OF AMMONIA: CPT | Mod: 91,NTX

## 2020-11-01 PROCEDURE — 85610 PROTHROMBIN TIME: CPT | Mod: NTX

## 2020-11-01 PROCEDURE — U0002 COVID-19 LAB TEST NON-CDC: HCPCS | Mod: NTX | Performed by: PHYSICIAN ASSISTANT

## 2020-11-01 PROCEDURE — 99219 PR INITIAL OBSERVATION CARE,LEVL II: ICD-10-PCS | Mod: NTX,,, | Performed by: PHYSICIAN ASSISTANT

## 2020-11-01 PROCEDURE — G0378 HOSPITAL OBSERVATION PER HR: HCPCS | Mod: NTX

## 2020-11-01 PROCEDURE — 93005 ELECTROCARDIOGRAM TRACING: CPT | Mod: NTX

## 2020-11-01 PROCEDURE — 96365 THER/PROPH/DIAG IV INF INIT: CPT | Mod: NTX

## 2020-11-01 PROCEDURE — 99284 EMERGENCY DEPT VISIT MOD MDM: CPT | Mod: ,,, | Performed by: PHYSICIAN ASSISTANT

## 2020-11-01 PROCEDURE — 82140 ASSAY OF AMMONIA: CPT | Mod: NTX

## 2020-11-01 PROCEDURE — 85025 COMPLETE CBC W/AUTO DIFF WBC: CPT | Mod: NTX

## 2020-11-01 PROCEDURE — 80202 ASSAY OF VANCOMYCIN: CPT | Mod: NTX

## 2020-11-01 PROCEDURE — 80053 COMPREHEN METABOLIC PANEL: CPT | Mod: NTX

## 2020-11-01 PROCEDURE — 63600175 PHARM REV CODE 636 W HCPCS: Mod: NTX | Performed by: PHYSICIAN ASSISTANT

## 2020-11-01 PROCEDURE — 99284 PR EMERGENCY DEPT VISIT,LEVEL IV: ICD-10-PCS | Mod: ,,, | Performed by: PHYSICIAN ASSISTANT

## 2020-11-01 PROCEDURE — 99219 PR INITIAL OBSERVATION CARE,LEVL II: CPT | Mod: NTX,,, | Performed by: PHYSICIAN ASSISTANT

## 2020-11-01 RX ORDER — PANTOPRAZOLE SODIUM 40 MG/1
40 TABLET, DELAYED RELEASE ORAL
Status: DISCONTINUED | OUTPATIENT
Start: 2020-11-02 | End: 2020-11-02 | Stop reason: HOSPADM

## 2020-11-01 RX ORDER — POTASSIUM CHLORIDE 20 MEQ/1
20 TABLET, EXTENDED RELEASE ORAL
Status: COMPLETED | OUTPATIENT
Start: 2020-11-01 | End: 2020-11-01

## 2020-11-01 RX ORDER — ZINC SULFATE 50(220)MG
220 CAPSULE ORAL
Status: DISCONTINUED | OUTPATIENT
Start: 2020-11-02 | End: 2020-11-02 | Stop reason: HOSPADM

## 2020-11-01 RX ORDER — TALC
6 POWDER (GRAM) TOPICAL NIGHTLY PRN
Status: DISCONTINUED | OUTPATIENT
Start: 2020-11-01 | End: 2020-11-02 | Stop reason: HOSPADM

## 2020-11-01 RX ORDER — SODIUM CHLORIDE 0.9 % (FLUSH) 0.9 %
10 SYRINGE (ML) INJECTION
Status: DISCONTINUED | OUTPATIENT
Start: 2020-11-01 | End: 2020-11-02 | Stop reason: HOSPADM

## 2020-11-01 RX ORDER — ACETAMINOPHEN 325 MG/1
650 TABLET ORAL EVERY 8 HOURS PRN
Status: DISCONTINUED | OUTPATIENT
Start: 2020-11-01 | End: 2020-11-02 | Stop reason: HOSPADM

## 2020-11-01 RX ORDER — BENZONATATE 100 MG/1
100 CAPSULE ORAL 3 TIMES DAILY PRN
Status: DISCONTINUED | OUTPATIENT
Start: 2020-11-02 | End: 2020-11-02 | Stop reason: HOSPADM

## 2020-11-01 RX ORDER — HEPARIN SODIUM 5000 [USP'U]/ML
5000 INJECTION, SOLUTION INTRAVENOUS; SUBCUTANEOUS EVERY 8 HOURS
Status: DISCONTINUED | OUTPATIENT
Start: 2020-11-01 | End: 2020-11-02 | Stop reason: HOSPADM

## 2020-11-01 RX ORDER — SPIRONOLACTONE 25 MG/1
25 TABLET ORAL DAILY
Status: DISCONTINUED | OUTPATIENT
Start: 2020-11-02 | End: 2020-11-02

## 2020-11-01 RX ORDER — HYDROXYZINE HYDROCHLORIDE 25 MG/1
25 TABLET, FILM COATED ORAL
Status: DISCONTINUED | OUTPATIENT
Start: 2020-11-01 | End: 2020-11-02 | Stop reason: HOSPADM

## 2020-11-01 RX ORDER — MIDODRINE HYDROCHLORIDE 5 MG/1
10 TABLET ORAL 3 TIMES DAILY
Status: DISCONTINUED | OUTPATIENT
Start: 2020-11-01 | End: 2020-11-02 | Stop reason: HOSPADM

## 2020-11-01 RX ORDER — POLYETHYLENE GLYCOL 3350 17 G/17G
17 POWDER, FOR SOLUTION ORAL DAILY
Status: DISCONTINUED | OUTPATIENT
Start: 2020-11-02 | End: 2020-11-02 | Stop reason: HOSPADM

## 2020-11-01 RX ORDER — ALBUTEROL SULFATE 90 UG/1
2 AEROSOL, METERED RESPIRATORY (INHALATION) EVERY 6 HOURS PRN
Status: DISCONTINUED | OUTPATIENT
Start: 2020-11-01 | End: 2020-11-02 | Stop reason: HOSPADM

## 2020-11-01 RX ORDER — FUROSEMIDE 40 MG/1
40 TABLET ORAL DAILY
Status: DISCONTINUED | OUTPATIENT
Start: 2020-11-02 | End: 2020-11-02 | Stop reason: HOSPADM

## 2020-11-01 RX ORDER — LACTULOSE 10 G/15ML
200 SOLUTION ORAL; RECTAL ONCE
Status: COMPLETED | OUTPATIENT
Start: 2020-11-01 | End: 2020-11-01

## 2020-11-01 RX ORDER — LEVOTHYROXINE SODIUM 88 UG/1
88 TABLET ORAL
Status: DISCONTINUED | OUTPATIENT
Start: 2020-11-02 | End: 2020-11-02 | Stop reason: HOSPADM

## 2020-11-01 RX ORDER — ESCITALOPRAM OXALATE 5 MG/1
5 TABLET ORAL DAILY
Status: DISCONTINUED | OUTPATIENT
Start: 2020-11-02 | End: 2020-11-02 | Stop reason: HOSPADM

## 2020-11-01 RX ORDER — LACTULOSE 10 G/15ML
30 SOLUTION ORAL 3 TIMES DAILY
Status: DISCONTINUED | OUTPATIENT
Start: 2020-11-01 | End: 2020-11-02 | Stop reason: HOSPADM

## 2020-11-01 RX ORDER — SIMETHICONE 80 MG
80 TABLET,CHEWABLE ORAL EVERY 6 HOURS PRN
Status: DISCONTINUED | OUTPATIENT
Start: 2020-11-01 | End: 2020-11-02 | Stop reason: HOSPADM

## 2020-11-01 RX ORDER — ONDANSETRON 8 MG/1
8 TABLET, ORALLY DISINTEGRATING ORAL EVERY 8 HOURS PRN
Status: DISCONTINUED | OUTPATIENT
Start: 2020-11-01 | End: 2020-11-02 | Stop reason: HOSPADM

## 2020-11-01 RX ADMIN — RIFAXIMIN 550 MG: 550 TABLET ORAL at 09:11

## 2020-11-01 RX ADMIN — POTASSIUM CHLORIDE 20 MEQ: 1500 TABLET, EXTENDED RELEASE ORAL at 09:11

## 2020-11-01 RX ADMIN — LACTULOSE 30 G: 20 SOLUTION ORAL at 09:11

## 2020-11-01 RX ADMIN — VANCOMYCIN HYDROCHLORIDE 1250 MG: 1.25 INJECTION, POWDER, LYOPHILIZED, FOR SOLUTION INTRAVENOUS at 04:11

## 2020-11-01 RX ADMIN — LACTULOSE 200 G: 10 SOLUTION ORAL at 09:11

## 2020-11-01 RX ADMIN — POTASSIUM CHLORIDE 20 MEQ: 1500 TABLET, EXTENDED RELEASE ORAL at 06:11

## 2020-11-01 RX ADMIN — BENZONATATE 100 MG: 100 CAPSULE ORAL at 11:11

## 2020-11-01 RX ADMIN — MIDODRINE HYDROCHLORIDE 10 MG: 5 TABLET ORAL at 09:11

## 2020-11-01 RX ADMIN — HEPARIN SODIUM 5000 UNITS: 5000 INJECTION INTRAVENOUS; SUBCUTANEOUS at 09:11

## 2020-11-01 NOTE — PROVIDER PROGRESS NOTES - EMERGENCY DEPT.
Encounter Date: 11/1/2020    ED Physician Progress Notes           Patient signed out to me by my colleague with instructions to follow-up pending work-up. Please see main ED note for previous ED stay documentation. Patient signed out to me with labs and disposition pending.    Lab work significant for elevated ammonia 100, highest in most recent labs. She does appear slightly confused on exam and suspect hepatic encephalopathy present. Potassium also decreased 2.8. MELD 16 today. Will discuss with liver transplant service.    Patient admitted to liver transplant for further management. Patient expresses understanding and agreeable to the plan.

## 2020-11-01 NOTE — TELEPHONE ENCOUNTER
Reason for Disposition   Nursing judgment    Additional Information   Negative: Nursing judgment   Negative: Information only call; adult is not ill or injured    Protocols used: NO GUIDELINE OR REFERENCE [id number]    Pt's Spouse stated her PICC line came out. Stated she needs it for her antibiotics and she has not had her dose today. Denies difficulty breathing, or bleeding. Advised to take to ED for re-insertion. Spouse verbalized understanding.

## 2020-11-01 NOTE — ED TRIAGE NOTES
Zeina Mahoney, a 55 y.o. female presents to the ED w/ complaint of picc line out    Triage note:  Chief Complaint   Patient presents with    Vascular Access Problem     picc line left upper arm out..needs for abx dose (did not have today )     Review of patient's allergies indicates:   Allergen Reactions    Aspirin Tinitus    Ciprofloxacin Rash    Clindamycin Itching     Past Medical History:   Diagnosis Date    Chronic Hepatic encephalopathy 6/25/2020    Decompensated hepatic cirrhosis 6/25/2020    History of pancreatitis 6/25/2020    Liver cirrhosis secondary to CHAUDHARY 6/25/2020    Macrocytic anemia 6/25/2020    Other ascites 6/25/2020    Thrombocytopenia 6/25/2020     LOC: Patient name and date of birth verified. The patient is awake, alert and aware of environment with an appropriate affect, the patient is oriented x 3 and speaking appropriately.   APPEARANCE: Patient resting comfortably, patient is clean and well groomed, patient's clothing is properly fastened.  SKIN: The skin is warm and dry, color consistent with ethnicity, patient has normal skin turgor and moist mucus membranes, skin intact, no breakdown or bruising noted.  MUSCULOSKELETAL: Patient moving all extremities well, no obvious swelling or deformities noted.   RESPIRATORY: Respirations are spontaneous, patient has a normal effort and rate, no accessory muscle use noted.  CARDIAC: Patient has a normal rate and rhythm, no periphreal edema noted, capillary refill < 3 seconds.  ABDOMEN: Soft and non tender to palpation, no distention noted. Bowel sounds present in all four quadrants.  NEUROLOGIC: Eyes open spontaneously, behavior appropriate to situation, follows commands, facial expression symmetrical, bilateral hand grasp equal and even, purposeful motor response noted, normal sensation in all extremities when touched with a finger.

## 2020-11-01 NOTE — ED PROVIDER NOTES
Encounter Date: 11/1/2020       History     Chief Complaint   Patient presents with    Vascular Access Problem     picc line left upper arm out..needs for abx dose (did not have today )     2:35 PM  Patient is a 55-year-old female with a history of ESLD, 10/19 growing staphylococcus pseudintermedius, on vanc IV daily presents to the ED with vascular access problem.  Patient had a PICC line and initially which came out and was replaced with a midline 2 days ago.  Patient reports feeling significantly improved, however she is unsure how her mid like a mild this morning.  She states that it was still secured to her arm, but the needle was out and there was blood all over herself.  Patient has no complaints or concerns at this time.    Future Appointments  11/3/2020  9:45 AM    LAB, APPOINTMENT [place] VNP   [facility]  11/4/2020  1:00 PM    Margoth Melgar MD    [facility] ID        [address]  11/6/2020  9:00 AM    Oskar Crawford MD        [facility] LIVERTX   [address]  12/16/2020 11:00 AM   Jesus Baez MD        [facility] PSYCH     [address]          Review of patient's allergies indicates:   Allergen Reactions    Aspirin Tinitus    Ciprofloxacin Rash    Clindamycin Itching     Past Medical History:   Diagnosis Date    Chronic Hepatic encephalopathy 6/25/2020    Decompensated hepatic cirrhosis 6/25/2020    History of pancreatitis 6/25/2020    Liver cirrhosis secondary to CHAUDHARY 6/25/2020    Macrocytic anemia 6/25/2020    Other ascites 6/25/2020    Thrombocytopenia 6/25/2020     No past surgical history on file.  No family history on file.  Social History     Tobacco Use    Smoking status: Never Smoker    Smokeless tobacco: Never Used   Substance Use Topics    Alcohol use: Never     Frequency: Never    Drug use: Never     Review of Systems   Constitutional: Negative for chills and fever.   HENT: Negative for sore throat.    Respiratory: Negative for shortness of breath.    Cardiovascular: Negative  for chest pain.   Gastrointestinal: Negative for nausea.   Genitourinary: Negative for dysuria.   Musculoskeletal: Negative for back pain.   Skin: Positive for wound. Negative for rash.   Neurological: Negative for weakness and headaches.   Hematological: Bruises/bleeds easily.   Psychiatric/Behavioral: Negative for confusion.       Physical Exam     Initial Vitals [11/01/20 1409]   BP Pulse Resp Temp SpO2   (!) 120/59 86 18 98.4 °F (36.9 °C) 100 %      MAP       --         Physical Exam    Vitals reviewed.  Constitutional: She appears well-developed and well-nourished. She is not diaphoretic. She is cooperative.  Non-toxic appearance. She has a sickly appearance. She does not appear ill. No distress. Face mask in place.   HENT:   Head: Normocephalic and atraumatic.   Nose: Nose normal.   Eyes: Conjunctivae and EOM are normal. Scleral icterus is present.   Neck: Normal range of motion.   Cardiovascular: Normal rate.   Pulses:       Radial pulses are 2+ on the left side.   No midline in left upper extremity which was placed a few days ago.  Surrounding ecchymoses to left arm.   Pulmonary/Chest: No respiratory distress.   Abdominal: She exhibits no distension.   Musculoskeletal: Normal range of motion.   Neurological: She is alert. She has normal strength.   Answering questions appropriately.   Skin: Skin is warm and dry. Ecchymosis noted. No erythema. No pallor.   Jaundice.         ED Course   Procedures  Labs Reviewed   CBC W/ AUTO DIFFERENTIAL - Abnormal; Notable for the following components:       Result Value    RBC 2.74 (*)     Hemoglobin 9.3 (*)     Hematocrit 27.5 (*)      (*)     MCH 33.9 (*)     Platelets 95 (*)     All other components within normal limits   AMMONIA - Abnormal; Notable for the following components:    Ammonia 92 (*)     All other components within normal limits   COMPREHENSIVE METABOLIC PANEL - Abnormal; Notable for the following components:    Potassium 2.8 (*)     Glucose 153 (*)      Albumin 2.9 (*)     Total Bilirubin 4.3 (*)     eGFR if  53.4 (*)     eGFR if non  46.3 (*)     All other components within normal limits   AMMONIA - Abnormal; Notable for the following components:    Ammonia 100 (*)     All other components within normal limits   PROTIME-INR   SARS-COV-2 RDRP GENE        ECG Results          EKG 12-lead (In process)  Result time 11/02/20 07:55:00    In process by Interface, Lab In [facility] (11/02/20 07:55:00)                 Narrative:    Test Reason : E87.6,    Vent. Rate : 082 BPM     Atrial Rate : 082 BPM     P-R Int : 158 ms          QRS Dur : 102 ms      QT Int : 412 ms       P-R-T Axes : 053 -10 042 degrees     QTc Int : 481 ms    Normal sinus rhythm  Prolonged QT  Abnormal ECG  When compared with ECG of 29-SEP-2020 05:12,  QT has lengthened    Referred By: [email or web address]   SELF           Confirmed By:                             Imaging Results    None          Medical Decision Making:   History:   Old Medical Records: I decided to obtain old medical records.  Initial Assessment:   Patient is a 55-year-old female with a history of ESLD, 10/19 growing staphylococcus pseudintermedius, on vanc IV daily presents to the ED with vascular access problem.   Differential Diagnosis:   Includes but is not limited to vascular after this problem, encephalopathy, liver failure.  Clinical Tests:   Lab Tests: Reviewed and Ordered  ED Management:  PICC team unavailable today given weekend.  Will insert peripheral IV, give vancomycin dose, and order labs.  Patient seen wandering in the hallway by nurse.  Although  and patient states that they feel fine, I am concerned about encephalopathy.    4:00 PM  Patient will be signed out to in coming ED team.  Refer to their progress note.    UPDATE:  Ammonia level 100.  Patient placed in observation with Hospital Medicine.    I have reviewed patient's chart and discussed this case with my supervising MD.      Jeanine Remy PA-C  Emergent Department  [facility]  Spectralink #[id number] or #[id number]                               Clinical Impression:     ICD-10-CM ICD-9-CM   1. Hepatic encephalopathy  K72.90 572.2   2. Displacement of peripherally inserted central catheter (PICC)  T82.524A 996.1   3. End stage liver disease  K72.90 572.8   4. Hypokalemia  E87.6 276.8   5. Decompensated hepatic cirrhosis  K72.90 571.5    K74.60 572.8   6. Acquired coagulation factor deficiency  D68.4 286.7   7. Adjustment disorder with anxious mood  F43.22 309.24   8. Anemia of chronic disease  D63.8 285.29   9. Bacteremia  R78.81 790.7   10. Hyperammonemia  E72.20 270.6   11. Other specified hypothyroidism  E03.8 244.8   12. Other ascites  R18.8 789.59   13. Other constipation  K59.09 564.09   14. Protein-calorie malnutrition, moderate  E44.0 263.0   15. Thrombocytopenia due to hypersplenism  D69.59 287.49                      Disposition:   Disposition: Placed in Observation  Condition: Stable     ED Disposition Condition    Observation                             Jeanine Remy PA-C  11/02/20 0854

## 2020-11-02 VITALS
HEART RATE: 85 BPM | DIASTOLIC BLOOD PRESSURE: 70 MMHG | BODY MASS INDEX: 24.69 KG/M2 | RESPIRATION RATE: 18 BRPM | TEMPERATURE: 99 F | HEIGHT: 64 IN | WEIGHT: 144.63 LBS | SYSTOLIC BLOOD PRESSURE: 108 MMHG | OXYGEN SATURATION: 99 %

## 2020-11-02 LAB
ABO + RH BLD: NORMAL
ALBUMIN SERPL BCP-MCNC: 2.6 G/DL (ref 3.5–5.2)
ALP SERPL-CCNC: 98 U/L (ref 55–135)
ALT SERPL W/O P-5'-P-CCNC: 16 U/L (ref 10–44)
ANION GAP SERPL CALC-SCNC: 10 MMOL/L (ref 8–16)
AST SERPL-CCNC: 26 U/L (ref 10–40)
BASOPHILS # BLD AUTO: 0.04 K/UL (ref 0–0.2)
BASOPHILS NFR BLD: 0.6 % (ref 0–1.9)
BILIRUB SERPL-MCNC: 3.5 MG/DL (ref 0.1–1)
BLD GP AB SCN CELLS X3 SERPL QL: NORMAL
BLOOD GROUP ANTIBODIES SERPL: NORMAL
BUN SERPL-MCNC: 11 MG/DL (ref 6–20)
CALCIUM SERPL-MCNC: 8.9 MG/DL (ref 8.7–10.5)
CHLORIDE SERPL-SCNC: 100 MMOL/L (ref 95–110)
CO2 SERPL-SCNC: 26 MMOL/L (ref 23–29)
CREAT SERPL-MCNC: 1.3 MG/DL (ref 0.5–1.4)
DIFFERENTIAL METHOD: ABNORMAL
EOSINOPHIL # BLD AUTO: 0.2 K/UL (ref 0–0.5)
EOSINOPHIL NFR BLD: 2.9 % (ref 0–8)
ERYTHROCYTE [DISTWIDTH] IN BLOOD BY AUTOMATED COUNT: 14.1 % (ref 11.5–14.5)
EST. GFR  (AFRICAN AMERICAN): 53.4 ML/MIN/1.73 M^2
EST. GFR  (NON AFRICAN AMERICAN): 46.3 ML/MIN/1.73 M^2
GLUCOSE SERPL-MCNC: 144 MG/DL (ref 70–110)
HCT VFR BLD AUTO: 23.3 % (ref 37–48.5)
HGB BLD-MCNC: 7.8 G/DL (ref 12–16)
IMM GRANULOCYTES # BLD AUTO: 0.01 K/UL (ref 0–0.04)
IMM GRANULOCYTES NFR BLD AUTO: 0.2 % (ref 0–0.5)
INR PPP: 1.2 (ref 0.8–1.2)
LYMPHOCYTES # BLD AUTO: 1.5 K/UL (ref 1–4.8)
LYMPHOCYTES NFR BLD: 23.6 % (ref 18–48)
MAGNESIUM SERPL-MCNC: 1.6 MG/DL (ref 1.6–2.6)
MCH RBC QN AUTO: 33.3 PG (ref 27–31)
MCHC RBC AUTO-ENTMCNC: 33.5 G/DL (ref 32–36)
MCV RBC AUTO: 100 FL (ref 82–98)
MONOCYTES # BLD AUTO: 0.6 K/UL (ref 0.3–1)
MONOCYTES NFR BLD: 8.8 % (ref 4–15)
NEUTROPHILS # BLD AUTO: 4 K/UL (ref 1.8–7.7)
NEUTROPHILS NFR BLD: 63.9 % (ref 38–73)
NRBC BLD-RTO: 0 /100 WBC
PHOSPHATE SERPL-MCNC: 2.5 MG/DL (ref 2.7–4.5)
PLATELET # BLD AUTO: 69 K/UL (ref 150–350)
PMV BLD AUTO: 10.6 FL (ref 9.2–12.9)
POTASSIUM SERPL-SCNC: 2.8 MMOL/L (ref 3.5–5.1)
PROT SERPL-MCNC: 5.3 G/DL (ref 6–8.4)
PROTHROMBIN TIME: 13.1 SEC (ref 9–12.5)
RBC # BLD AUTO: 2.34 M/UL (ref 4–5.4)
SODIUM SERPL-SCNC: 136 MMOL/L (ref 136–145)
VANCOMYCIN SERPL-MCNC: 25.7 UG/ML
WBC # BLD AUTO: 6.22 K/UL (ref 3.9–12.7)

## 2020-11-02 PROCEDURE — 63600175 PHARM REV CODE 636 W HCPCS: Mod: NTX | Performed by: PHYSICIAN ASSISTANT

## 2020-11-02 PROCEDURE — 84100 ASSAY OF PHOSPHORUS: CPT | Mod: NTX

## 2020-11-02 PROCEDURE — 80053 COMPREHEN METABOLIC PANEL: CPT | Mod: NTX

## 2020-11-02 PROCEDURE — 25000003 PHARM REV CODE 250: Mod: NTX | Performed by: NURSE PRACTITIONER

## 2020-11-02 PROCEDURE — 86850 RBC ANTIBODY SCREEN: CPT | Mod: NTX

## 2020-11-02 PROCEDURE — 25000003 PHARM REV CODE 250: Mod: NTX | Performed by: PHYSICIAN ASSISTANT

## 2020-11-02 PROCEDURE — G0378 HOSPITAL OBSERVATION PER HR: HCPCS | Mod: NTX

## 2020-11-02 PROCEDURE — 85610 PROTHROMBIN TIME: CPT | Mod: NTX

## 2020-11-02 PROCEDURE — 83735 ASSAY OF MAGNESIUM: CPT | Mod: NTX

## 2020-11-02 PROCEDURE — 86870 RBC ANTIBODY IDENTIFICATION: CPT | Mod: NTX

## 2020-11-02 PROCEDURE — 96372 THER/PROPH/DIAG INJ SC/IM: CPT | Mod: NTX

## 2020-11-02 PROCEDURE — 99217 PR OBSERVATION CARE DISCHARGE: ICD-10-PCS | Mod: NTX,,, | Performed by: NURSE PRACTITIONER

## 2020-11-02 PROCEDURE — 85025 COMPLETE CBC W/AUTO DIFF WBC: CPT | Mod: NTX

## 2020-11-02 PROCEDURE — 99217 PR OBSERVATION CARE DISCHARGE: CPT | Mod: NTX,,, | Performed by: NURSE PRACTITIONER

## 2020-11-02 PROCEDURE — 80202 ASSAY OF VANCOMYCIN: CPT | Mod: NTX

## 2020-11-02 RX ORDER — SPIRONOLACTONE 50 MG/1
100 TABLET, FILM COATED ORAL DAILY
Status: DISCONTINUED | OUTPATIENT
Start: 2020-11-03 | End: 2020-11-02 | Stop reason: HOSPADM

## 2020-11-02 RX ORDER — SPIRONOLACTONE 50 MG/1
100 TABLET, FILM COATED ORAL DAILY
Qty: 60 TABLET | Refills: 11 | Status: SHIPPED | OUTPATIENT
Start: 2020-11-03

## 2020-11-02 RX ORDER — SPIRONOLACTONE 50 MG/1
100 TABLET, FILM COATED ORAL DAILY
Qty: 60 TABLET | Refills: 11 | Status: SHIPPED | OUTPATIENT
Start: 2020-11-03 | End: 2020-11-02

## 2020-11-02 RX ORDER — POTASSIUM CHLORIDE 20 MEQ/1
40 TABLET, EXTENDED RELEASE ORAL EVERY 4 HOURS
Status: DISCONTINUED | OUTPATIENT
Start: 2020-11-02 | End: 2020-11-02 | Stop reason: HOSPADM

## 2020-11-02 RX ADMIN — POLYETHYLENE GLYCOL 3350 17 G: 17 POWDER, FOR SOLUTION ORAL at 09:11

## 2020-11-02 RX ADMIN — MIDODRINE HYDROCHLORIDE 10 MG: 5 TABLET ORAL at 09:11

## 2020-11-02 RX ADMIN — RIFAXIMIN 550 MG: 550 TABLET ORAL at 09:11

## 2020-11-02 RX ADMIN — LEVOTHYROXINE SODIUM 88 MCG: 88 TABLET ORAL at 05:11

## 2020-11-02 RX ADMIN — HEPARIN SODIUM 5000 UNITS: 5000 INJECTION INTRAVENOUS; SUBCUTANEOUS at 05:11

## 2020-11-02 RX ADMIN — PANTOPRAZOLE SODIUM 40 MG: 40 TABLET, DELAYED RELEASE ORAL at 05:11

## 2020-11-02 RX ADMIN — ESCITALOPRAM 5 MG: 5 TABLET, FILM COATED ORAL at 09:11

## 2020-11-02 RX ADMIN — POTASSIUM CHLORIDE 40 MEQ: 1500 TABLET, EXTENDED RELEASE ORAL at 09:11

## 2020-11-02 RX ADMIN — LACTULOSE 30 G: 20 SOLUTION ORAL at 09:11

## 2020-11-02 RX ADMIN — ZINC SULFATE 220 MG (50 MG) CAPSULE 220 MG: CAPSULE at 05:11

## 2020-11-02 RX ADMIN — FUROSEMIDE 40 MG: 40 TABLET ORAL at 09:11

## 2020-11-02 NOTE — PLAN OF CARE
Patient AAOx4, VSS on RA. Afebrile. Pt c/o cough, controlled by PRN medications. Lactulose enema given w/ moderate BM noted. Up independently. Bed locked and lowered, call bell in reach, nonskid socks on when out of bed. Reminded pt to call for assistance, pt verbalized understanding. Hand hygiene performed before and after care. Pt insisted on leaving AMA,  notified and at bedside o/n. OK to stay. NAD noted, TORIN.

## 2020-11-02 NOTE — SUBJECTIVE & OBJECTIVE
Past Medical History:   Diagnosis Date    Chronic Hepatic encephalopathy 6/25/2020    Decompensated hepatic cirrhosis 6/25/2020    History of pancreatitis 6/25/2020    Liver cirrhosis secondary to CHAUDHARY 6/25/2020    Macrocytic anemia 6/25/2020    Other ascites 6/25/2020    Thrombocytopenia 6/25/2020       No past surgical history on file.    Review of patient's allergies indicates:   Allergen Reactions    Aspirin Tinitus    Ciprofloxacin Rash    Clindamycin Itching       Family History     None        Tobacco Use    Smoking status: Never Smoker    Smokeless tobacco: Never Used   Substance and Sexual Activity    Alcohol use: Never     Frequency: Never    Drug use: Never    Sexual activity: Not on file       (Not in a hospital admission)      Review of Systems   Constitutional: Negative for activity change, appetite change, chills and fever.   HENT: Negative for facial swelling and trouble swallowing.    Eyes: Negative for photophobia and redness.   Respiratory: Negative for cough, shortness of breath, wheezing and stridor.    Cardiovascular: Positive for leg swelling. Negative for chest pain and palpitations.   Gastrointestinal: Positive for constipation. Negative for abdominal distention, abdominal pain, nausea and vomiting.   Genitourinary: Negative for decreased urine volume, difficulty urinating and dysuria.   Neurological: Negative for dizziness, syncope and light-headedness.   Hematological: Bruises/bleeds easily.   Psychiatric/Behavioral: Positive for decreased concentration. Negative for agitation, behavioral problems and confusion.     Objective:     Vital Signs (Most Recent):  Temp: 99.2 °F (37.3 °C) (11/01/20 1847)  Pulse: 88 (11/01/20 1847)  Resp: 18 (11/01/20 1847)  BP: 129/60 (11/01/20 1847)  SpO2: 100 % (11/01/20 1847) Vital Signs (24h Range):  Temp:  [98.4 °F (36.9 °C)-99.2 °F (37.3 °C)] 99.2 °F (37.3 °C)  Pulse:  [86-88] 88  Resp:  [18] 18  SpO2:  [100 %] 100 %  BP: (120-129)/(59-60)  129/60     Weight: 63.5 kg (140 lb)  Body mass index is 24.03 kg/m².    No intake or output data in the 24 hours ending 11/01/20 2027    Physical Exam  Vitals signs and nursing note reviewed.   Constitutional:       General: She is not in acute distress.  HENT:      Head: Normocephalic and atraumatic.   Eyes:      General: Scleral icterus present.         Right eye: No discharge.         Left eye: No discharge.   Cardiovascular:      Rate and Rhythm: Normal rate and regular rhythm.      Heart sounds: No murmur.   Pulmonary:      Effort: Pulmonary effort is normal. No respiratory distress.      Breath sounds: No wheezing or rales.   Abdominal:      General: Bowel sounds are normal. There is no distension.      Tenderness: There is no abdominal tenderness. There is no guarding.   Musculoskeletal:      Right lower leg: Edema present.      Left lower leg: Edema present.   Skin:     General: Skin is warm and dry.      Capillary Refill: Capillary refill takes 2 to 3 seconds.      Coloration: Skin is jaundiced.   Neurological:      General: No focal deficit present.      Mental Status: She is alert and oriented to person, place, and time. Mental status is at baseline.      Comments: No asterixis   Psychiatric:         Mood and Affect: Mood normal.         Behavior: Behavior normal.         Thought Content: Thought content normal.         Laboratory:  CBC:   Recent Labs   Lab 11/01/20  1455   WBC 7.33   RBC 2.74*   HGB 9.3*   HCT 27.5*   PLT 95*   *   MCH 33.9*   MCHC 33.8     CMP:   Recent Labs   Lab 11/01/20  1652   *   CALCIUM 9.3   ALBUMIN 2.9*   PROT 6.2      K 2.8*   CO2 26      BUN 10   CREATININE 1.3   ALKPHOS 106   ALT 17   AST 30   BILITOT 4.3*     Coagulation:   Recent Labs   Lab 11/01/20  1455   INR 1.1     Labs within the past 24 hours have been reviewed.    Diagnostic Results:  EKG reviewed.

## 2020-11-02 NOTE — DISCHARGE SUMMARY
[facility]  Liver Transplant  Discharge Summary      Patient Name: Zeina Mahoney  MRN: [id number]  Admission Date: 11/1/2020  Hospital Length of Stay: 0 days  Discharge Date and Time:  11/02/2020 10:57 AM  Attending Physician: Oskar Crawford MD   Discharging Provider: Latrice Sorto DNP  Primary Care Provider: Primary Doctor No  HPI:   Ms. Mahoney is a 54 y/o F with ELSD 2/2 CHAUDHARY currently listed for liver transplant with MELD 20 (aatbarl57/8/20) recently admitted 10/19-10/23 with abdominal pain - found to have staphylococcus pseudintermedius bacteremia. She discharged with a PICC line for home administration of 2 week course of Vancomycin (EOT 11/3). She presented to the ED 10/30 after PICC line inadvertently removed. Line was replaced and she was discharged home. She presents to the ED 11/1 after PICC again inadvertently removed this morning. Pt's caregiver also reported patient constipated with LBM 2 days ago despite PO lactulose. Labs notable for hypokalemia and ammonia of 100. Vancomycin given and K replaced in the ED. On exam pt AAOx3 without asterixis, but confirms constipation and requests lactulose enema. PICC team unavailable to replace line until tomorrow. Plan to admit patient to observation for electrolyte replacement/monitoring, lactulose enema, and line replacement for continued treatment of bacteremia with Vancomycin.    Hospital Course:    Patient admitted for observation after accidentally removing PICC line. Vanc trough 25.7. Discussed with transplant ID and pharmD, decision made to hold further doses of vanc. EOT previously 11/3. Patient to keep pre-scheduled labs and clinic appointments. Patient is stable and ready for discharge today. She will not be discharged with IV antibiotics. Patient will have labs 11/3. ID clinic appt 11/4 and hepatology appt 11/6.     Of note, patient hypokalemic on admission. Spirolactone dose decreased previous admission. Will resume previous dose  100mg QD. K repleted before discharge.     Final Active Diagnoses:    Diagnosis Date Noted POA    PRINCIPAL PROBLEM:  Decompensated hepatic cirrhosis [K72.90, K74.60] 06/25/2020 Yes    Hypokalemia [E87.6] 11/01/2020 Yes    Other constipation [K59.09] 11/01/2020 Yes    Bacteremia [R78.81] 10/21/2020 Yes    Anemia of chronic disease [D63.8] 08/10/2020 Yes    Hyperammonemia [E72.20]  Yes    Hypothyroidism [E03.9] 07/28/2020 Yes    Protein-calorie malnutrition, moderate [E44.0] 07/28/2020 Yes    Thrombocytopenia due to hypersplenism [D69.59] 07/28/2020 Yes    Acquired coagulation factor deficiency [D68.4] 07/18/2020 Yes    Adjustment disorder with anxious mood [F43.22] 07/09/2020 Yes    Hepatic encephalopathy [K72.90] 06/25/2020 Yes    Other ascites [R18.8] 06/25/2020 Yes      Problems Resolved During this Admission:    Diagnosis Date Noted Date Resolved POA    Displacement of peripherally inserted central catheter (PICC) [T82.524A]  11/02/2020 Yes         Pending Diagnostic Studies:     None        Significant Diagnostic Studies: Labs:   CMP   Recent Labs   Lab 11/01/20  1652 11/02/20  0530    136   K 2.8* 2.8*    100   CO2 26 26   * 144*   BUN 10 11   CREATININE 1.3 1.3   CALCIUM 9.3 8.9   PROT 6.2 5.3*   ALBUMIN 2.9* 2.6*   BILITOT 4.3* 3.5*   ALKPHOS 106 98   AST 30 26   ALT 17 16   ANIONGAP 11 10   ESTGFRAFRICA 53.4* 53.4*   EGFRNONAA 46.3* 46.3*   , CBC   Recent Labs   Lab 11/01/20  1455 11/02/20  0530   WBC 7.33 6.22   HGB 9.3* 7.8*   HCT 27.5* 23.3*   PLT 95* 69*   , INR   Lab Results   Component Value Date    INR 1.2 11/02/2020    INR 1.1 11/01/2020    INR 1.1 10/30/2020    and All labs within the past 24 hours have been reviewed    The patients clinical status was discussed at multidisplinary rounds, involving transplant surgery, transplant medicine, pharmacy, nursing, nutrition, and social work    Discharged Condition: good    Disposition: Home or Self Care    Follow Up: see  hospital course    Patient Instructions:      Diet Adult Regular     Order Specific Question Answer Comments   Na restriction, if any: 2gNa      Notify your health care provider if you experience any of the following:  temperature >100.4     Notify your health care provider if you experience any of the following:  persistent nausea and vomiting or diarrhea     Notify your health care provider if you experience any of the following:  severe uncontrolled pain     Notify your health care provider if you experience any of the following:  redness, tenderness, or signs of infection (pain, swelling, redness, odor or green/yellow discharge around incision site)     Notify your health care provider if you experience any of the following:  difficulty breathing or increased cough     Notify your health care provider if you experience any of the following:  severe persistent headache     Notify your health care provider if you experience any of the following:  worsening rash     Notify your health care provider if you experience any of the following:  persistent dizziness, light-headedness, or visual disturbances     Notify your health care provider if you experience any of the following:  increased confusion or weakness     No dressing needed     Activity as tolerated     Medications:  Reconciled Home Medications:      Medication List      CHANGE how you take these medications    spironolactone 50 MG tablet  Commonly known as: ALDACTONE  Take 2 tablets (100 mg total) by mouth once daily.  Start taking on: November 3, 2020  What changed: how much to take        CONTINUE taking these medications    acetaminophen 500 MG tablet  Commonly known as: TYLENOL  Take 1 tablet (500 mg total) by mouth every 6 (six) hours as needed for Pain. Max 2000mg daily.     diaper,brief,adult,disposable Misc  5 mg/kg/day by Misc.(Non-Drug; Combo Route) route 5 (five) times daily. Patient with incontinence. Please supply enough depends for 5xs/day      ergocalciferol 50,000 unit Cap  Commonly known as: VITAMIN D2  Take 1 capsule (50,000 Units total) by mouth every 7 days.     escitalopram oxalate 5 MG Tab  Commonly known as: LEXAPRO  Take 1 tablet (5 mg total) by mouth once daily.     furosemide 40 MG tablet  Commonly known as: LASIX  Take 1 tablet (40 mg total) by mouth once daily.     hydrOXYzine HCL 25 MG tablet  Commonly known as: ATARAX  Take 1 tablet (25 mg total) by mouth as needed for Itching.     KRISTALOSE 10 gram packet  Generic drug: lactulose  Mix 3 packets (30 g total) with liquid and take by mouth 3 (three) times daily as needed.     levothyroxine 88 MCG tablet  Commonly known as: SYNTHROID  Take 1 tablet (88 mcg total) by mouth before breakfast.     lidocaine 5 %  Commonly known as: LIDODERM  Place 1 patch onto the skin every 24 hours. Remove & Discard patch within 12 hours or as directed by MD     magnesium hydroxide 400 mg/5 ml 400 mg/5 mL Susp  Commonly known as: MILK OF MAGNESIA  Take 30 mLs (2,400 mg total) by mouth daily as needed (Take if constipated despite lactulose).     melatonin 10 mg Tab  Take 10 mg by mouth nightly as needed (sleep).     midodrine 10 MG tablet  Commonly known as: PROAMATINE  Take 1 tablet (10 mg total) by mouth 3 (three) times daily.     nystatin powder  Commonly known as: MYCOSTATIN  Apply topically 2 (two) times daily. Apply to affected area twice a day     pantoprazole 40 MG tablet  Commonly known as: PROTONIX  Take 40 mg by mouth before breakfast.     polyethylene glycol 17 gram Pwpk  Commonly known as: GLYCOLAX  Take 17 g by mouth once daily. Or miralax OTC. Take if constipated despite lactulose     rifAXIMin 550 mg Tab  Commonly known as: XIFAXAN  Take 1 tablet (550 mg total) by mouth 2 (two) times daily.     simethicone 80 MG chewable tablet  Commonly known as: MYLICON  Take 80 mg by mouth every 6 (six) hours as needed for Flatulence. Take 2 tablets by mouth every 6 hours as needed for gas     VENTOLIN HFA 90  mcg/actuation inhaler  Generic drug: albuterol  Inhale 2 puffs into the lungs every 6 (six) hours as needed for Wheezing. Rescue     zinc sulfate 220 (50) mg capsule  Commonly known as: ZINCATE  Take 220 mg by mouth before breakfast.        STOP taking these medications    VANCOMYCIN 1.25 G/250 ML D5W (READY TO MIX)          Time spent caring for patient (Greater than 1/2 spent in direct face-to-face contact): > 30 minutes    Latrice Sorto DNP  Liver Transplant  [facility]

## 2020-11-02 NOTE — HPI
Ms. Mahoney is a 54 y/o F with ELSD 2/2 CHAUDHARY currently listed for liver transplant with MELD 20 ([id number]/8/20) recently admitted 10/19-10/23 with abdominal pain - found to have staphylococcus pseudintermedius bacteremia. She discharged with a PICC line for home administration of 2 week course of Vancomycin (EOT 11/3). She presented to the ED 10/30 after PICC line inadvertently removed. Line was replaced and she was discharged home. She presents to the ED 11/1 after PICC again inadvertently removed this morning. Pt's caregiver also reported patient constipated with LBM 2 days ago despite PO lactulose. Labs notable for hypokalemia and ammonia of 100. Vancomycin given and K replaced in the ED. On exam pt AAOx3 without asterixis, but confirms constipation and requests lactulose enema. PICC team unavailable to replace line until tomorrow. Plan to admit patient to observation for electrolyte replacement/monitoring, lactulose enema, and line replacement for continued treatment of bacteremia with Vancomycin.

## 2020-11-02 NOTE — NURSING
Patient arrived to unit via stretcher w/ escort. Tele in place, VSS. Skin intact. Pt belongings at bedside. Oriented to room, call bell in reach, non skid socks on when out of bed. Reviewed fall precautions; plan of care w/ pt- pt verbalized understanding.

## 2020-11-02 NOTE — PROGRESS NOTES
Patient dcd per md order. No PIV to remove. Vitals stable. Patient given avs, notified of change to aldactone and where to  medication. Given follow up appointments on AVS. Patients  at bedside throughout DC. Patient and patients  gave full verbal understanding on all written and verbal DC instructions.

## 2020-11-02 NOTE — ASSESSMENT & PLAN NOTE
- ELSD 2/2 CHAUDHARY listed for liver transplant with MELD of 20 (expires 11/8)  - Admitted to obs for electrolyte replacement, lactulose enema due to constipation, and line replacement by PICC team tomorrow  - Monitor.

## 2020-11-02 NOTE — H&P
[facility]  Liver Transplant  History & Physical    Patient Name: Zeina Mahoney  MRN: [id number]  Admission Date: 11/1/2020  Code Status: Full Code  Primary Care Provider: Primary Doctor No    Subjective:     History of Present Illness:  Ms. 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 She discharged with a PICC line for home administration of 2 week course of Vancomycin (EOT 11/3). She presented to the ED 10/30 after PICC line inadvertently removed. Line was replaced and she was discharged home. She presents to the ED 11/1 after PICC again inadvertently removed this morning. Pt's caregiver also reported patient constipated with LBM 2 days ago despite PO lactulose. Labs notable for hypokalemia and ammonia of 100. Vancomycin given and K replaced in the ED. On exam pt AAOx3 without asterixis, but confirms constipation and requests lactulose enema. PICC team unavailable to replace line until tomorrow. Plan to admit patient to observation for electrolyte replacement/monitoring, lactulose enema, and line replacement for continued treatment of bacteremia with Vancomycin.    Past Medical History:   Diagnosis Date    Chronic Hepatic encephalopathy 6/25/2020    Decompensated hepatic cirrhosis 6/25/2020    History of pancreatitis 6/25/2020    Liver cirrhosis secondary to CHAUDHARY 6/25/2020    Macrocytic anemia 6/25/2020    Other ascites 6/25/2020    Thrombocytopenia 6/25/2020       No past surgical history on file.    Review of patient's allergies indicates:   Allergen Reactions    Aspirin Tinitus    Ciprofloxacin Rash    Clindamycin Itching       Family History     None        Tobacco Use    Smoking status: Never Smoker    Smokeless tobacco: Never Used   Substance and Sexual Activity    Alcohol use: Never     Frequency: Never     Drug use: Never    Sexual activity: Not on file       (Not in a hospital admission)      Review of Systems   Constitutional: Negative for activity change, appetite change, chills and fever.   HENT: Negative for facial swelling and trouble swallowing.    Eyes: Negative for photophobia and redness.   Respiratory: Negative for cough, shortness of breath, wheezing and stridor.    Cardiovascular: Positive for leg swelling. Negative for chest pain and palpitations.   Gastrointestinal: Positive for constipation. Negative for abdominal distention, abdominal pain, nausea and vomiting.   Genitourinary: Negative for decreased urine volume, difficulty urinating and dysuria.   Neurological: Negative for dizziness, syncope and light-headedness.   Hematological: Bruises/bleeds easily.   Psychiatric/Behavioral: Positive for decreased concentration. Negative for agitation, behavioral problems and confusion.     Objective:     Vital Signs (Most Recent):  Temp: 99.2 °F (37.3 °C) (11/01/20 1847)  Pulse: 88 (11/01/20 1847)  Resp: 18 (11/01/20 1847)  BP: 129/60 (11/01/20 1847)  SpO2: 100 % (11/01/20 1847) Vital Signs (24h Range):  Temp:  [98.4 °F (36.9 °C)-99.2 °F (37.3 °C)] 99.2 °F (37.3 °C)  Pulse:  [86-88] 88  Resp:  [18] 18  SpO2:  [100 %] 100 %  BP: (120-129)/(59-60) 129/60     Weight: 63.5 kg (140 lb)  Body mass index is 24.03 kg/m².    No intake or output data in the 24 hours ending 11/01/20 2027    Physical Exam  Vitals signs and nursing note reviewed.   Constitutional:       General: She is not in acute distress.  HENT:      Head: Normocephalic and atraumatic.   Eyes:      General: Scleral icterus present.         Right eye: No discharge.         Left eye: No discharge.   Cardiovascular:      Rate and Rhythm: Normal rate and regular rhythm.      Heart sounds: No murmur.   Pulmonary:      Effort: Pulmonary effort is normal. No respiratory distress.      Breath sounds: No wheezing or rales.   Abdominal:      General: Bowel sounds  are normal. There is no distension.      Tenderness: There is no abdominal tenderness. There is no guarding.   Musculoskeletal:      Right lower leg: Edema present.      Left lower leg: Edema present.   Skin:     General: Skin is warm and dry.      Capillary Refill: Capillary refill takes 2 to 3 seconds.      Coloration: Skin is jaundiced.   Neurological:      General: No focal deficit present.      Mental Status: She is alert and oriented to person, place, and time. Mental status is at baseline.      Comments: No asterixis   Psychiatric:         Mood and Affect: Mood normal.         Behavior: Behavior normal.         Thought Content: Thought content normal.         Laboratory:  CBC:   Recent Labs   Lab 11/01/20  1455   WBC 7.33   RBC 2.74*   HGB 9.3*   HCT 27.5*   PLT 95*   *   MCH 33.9*   MCHC 33.8     CMP:   Recent Labs   Lab 11/01/20  1652   *   CALCIUM 9.3   ALBUMIN 2.9*   PROT 6.2      K 2.8*   CO2 26      BUN 10   CREATININE 1.3   ALKPHOS 106   ALT 17   AST 30   BILITOT 4.3*     Coagulation:   Recent Labs   Lab 11/01/20  1455   INR 1.1     Labs within the past 24 hours have been reviewed.    Diagnostic Results:  EKG reviewed.    Assessment/Plan:     * Decompensated hepatic cirrhosis  - ELSD 2/2 CHAUDHARY listed for liver transplant with MELD of 20 (expires 11/8)  - Admitted to obs for electrolyte replacement, lactulose enema due to constipation, and line replacement by PICC team tomorrow  - Monitor.    Hepatic encephalopathy  - Ammonia 100 on admission, but pt with no more than grade I HE   - Continue PO lactulose, Miralax, Rifaximin  - Enema tonight per patient and  request (pt at risk for acute HE when constipated)  - Monitor.      Other constipation  - Continue PO lactulose and miralax.  - Lactulose enema tonight        Hypokalemia  - K 2.8 on admission.   - EKG reviewed: no acute changes  - Replaced with total of 40 meq.   - Tele  - Monitor with daily labs.      Bacteremia  -  "Continue treatment of staphylococcus pseudintermedius bacteremia with 2 week course of Vancomycin (EOT 11/3).  - F/u with ID outpatient post dc    Anemia of chronic disease  - Due to liver disease  - Monitor      Hyperammonemia  - See "hepatic encephalopathy."      Protein-calorie malnutrition, moderate  - Encourage PO intake.         Thrombocytopenia due to hypersplenism  - Due to liver disease  - Monitor      Hypothyroidism  - Continue levothyroxine.      Acquired coagulation factor deficiency  - Due to liver disease  - Monitor.      Adjustment disorder with anxious mood  - Continue Lexapro.        Other ascites  - Continue home diuretics.  - Monitor.            MELD-Na score: 16 at 11/1/2020  4:52 PM  MELD score: 16 at 11/1/2020  4:52 PM  Calculated from:  Serum Creatinine: 1.3 mg/dL at 11/1/2020  4:52 PM  Serum Sodium: 137 mmol/L at 11/1/2020  4:52 PM  Total Bilirubin: 4.3 mg/dL at 11/1/2020  4:52 PM  INR(ratio): 1.1 at 11/1/2020  2:55 PM  Age: 55 years 1 month        Polly Irene PA-C  Liver Transplant  [facility]      "

## 2020-11-02 NOTE — NURSING
"RN found IV catheter on bedside table upon morning rounds. Pt reports "it fell out." PICC team to place new line today. No IV medications ordered. Vanc dose held this AM for trough level of 39.4.  "

## 2020-11-02 NOTE — ASSESSMENT & PLAN NOTE
- Continue treatment of staphylococcus pseudintermedius bacteremia with 2 week course of Vancomycin (EOT 11/3).  - F/u with ID outpatient post dc

## 2020-11-02 NOTE — ASSESSMENT & PLAN NOTE
- Ammonia 100 on admission, but pt with no more than grade I HE   - Continue PO lactulose, Miralax, Rifaximin  - Enema tonight per patient and  request (pt at risk for acute HE when constipated)  - Monitor.

## 2020-11-02 NOTE — ASSESSMENT & PLAN NOTE
- K 2.8 on admission.   - EKG reviewed: no acute changes  - Replaced with total of 40 meq.   - Tele  - Monitor with daily labs.

## 2020-11-03 ENCOUNTER — LAB VISIT (OUTPATIENT)
Dept: LAB | Facility: HOSPITAL | Age: 55
End: 2020-11-03
Attending: STUDENT IN AN ORGANIZED HEALTH CARE EDUCATION/TRAINING PROGRAM
Payer: COMMERCIAL

## 2020-11-03 DIAGNOSIS — Z76.82 ORGAN TRANSPLANT CANDIDATE: ICD-10-CM

## 2020-11-03 DIAGNOSIS — K75.81 LIVER CIRRHOSIS SECONDARY TO NASH: ICD-10-CM

## 2020-11-03 DIAGNOSIS — K74.60 LIVER CIRRHOSIS SECONDARY TO NASH: ICD-10-CM

## 2020-11-03 LAB
ALBUMIN SERPL BCP-MCNC: 3 G/DL (ref 3.5–5.2)
ALP SERPL-CCNC: 100 U/L (ref 55–135)
ALT SERPL W/O P-5'-P-CCNC: 17 U/L (ref 10–44)
ANION GAP SERPL CALC-SCNC: 9 MMOL/L (ref 8–16)
AST SERPL-CCNC: 29 U/L (ref 10–40)
BASOPHILS # BLD AUTO: 0.04 K/UL (ref 0–0.2)
BASOPHILS NFR BLD: 0.8 % (ref 0–1.9)
BILIRUB SERPL-MCNC: 4.3 MG/DL (ref 0.1–1)
BUN SERPL-MCNC: 11 MG/DL (ref 6–20)
CALCIUM SERPL-MCNC: 9.4 MG/DL (ref 8.7–10.5)
CHLORIDE SERPL-SCNC: 100 MMOL/L (ref 95–110)
CO2 SERPL-SCNC: 28 MMOL/L (ref 23–29)
CREAT SERPL-MCNC: 1.3 MG/DL (ref 0.5–1.4)
DIFFERENTIAL METHOD: ABNORMAL
EOSINOPHIL # BLD AUTO: 0.1 K/UL (ref 0–0.5)
EOSINOPHIL NFR BLD: 2.1 % (ref 0–8)
ERYTHROCYTE [DISTWIDTH] IN BLOOD BY AUTOMATED COUNT: 14.5 % (ref 11.5–14.5)
EST. GFR  (AFRICAN AMERICAN): 53.4 ML/MIN/1.73 M^2
EST. GFR  (NON AFRICAN AMERICAN): 46.3 ML/MIN/1.73 M^2
GLUCOSE SERPL-MCNC: 175 MG/DL (ref 70–110)
HCT VFR BLD AUTO: 25.7 % (ref 37–48.5)
HGB BLD-MCNC: 8.5 G/DL (ref 12–16)
IMM GRANULOCYTES # BLD AUTO: 0.01 K/UL (ref 0–0.04)
IMM GRANULOCYTES NFR BLD AUTO: 0.2 % (ref 0–0.5)
INR PPP: 1.1 (ref 0.8–1.2)
LYMPHOCYTES # BLD AUTO: 1.1 K/UL (ref 1–4.8)
LYMPHOCYTES NFR BLD: 21.2 % (ref 18–48)
MCH RBC QN AUTO: 34 PG (ref 27–31)
MCHC RBC AUTO-ENTMCNC: 33.1 G/DL (ref 32–36)
MCV RBC AUTO: 103 FL (ref 82–98)
MONOCYTES # BLD AUTO: 0.4 K/UL (ref 0.3–1)
MONOCYTES NFR BLD: 6.6 % (ref 4–15)
NEUTROPHILS # BLD AUTO: 3.7 K/UL (ref 1.8–7.7)
NEUTROPHILS NFR BLD: 69.1 % (ref 38–73)
NRBC BLD-RTO: 0 /100 WBC
PLATELET # BLD AUTO: 79 K/UL (ref 150–350)
PMV BLD AUTO: 10.2 FL (ref 9.2–12.9)
POTASSIUM SERPL-SCNC: 3.3 MMOL/L (ref 3.5–5.1)
PROT SERPL-MCNC: 6.1 G/DL (ref 6–8.4)
PROTHROMBIN TIME: 12.6 SEC (ref 9–12.5)
RBC # BLD AUTO: 2.5 M/UL (ref 4–5.4)
SODIUM SERPL-SCNC: 137 MMOL/L (ref 136–145)
WBC # BLD AUTO: 5.28 K/UL (ref 3.9–12.7)

## 2020-11-03 PROCEDURE — 36415 COLL VENOUS BLD VENIPUNCTURE: CPT | Mod: TXP

## 2020-11-03 PROCEDURE — 80053 COMPREHEN METABOLIC PANEL: CPT | Mod: TXP

## 2020-11-03 PROCEDURE — 80321 ALCOHOLS BIOMARKERS 1OR 2: CPT | Mod: TXP

## 2020-11-03 PROCEDURE — 85025 COMPLETE CBC W/AUTO DIFF WBC: CPT | Mod: TXP

## 2020-11-03 PROCEDURE — 85610 PROTHROMBIN TIME: CPT | Mod: TXP

## 2020-11-03 NOTE — PROGRESS NOTES
"I have personally taken the history and examined this patient and agree with the resident's note as stated above with the following thoughts:  BP (!) 98/58   Pulse 79   Ht 5' 4" (1.626 m)   Wt 64.4 kg (142 lb)   LMP 07/10/2020 (LMP Unknown) Comment: manupause  SpO2 99%   BMI 24.37 kg/m²       we are going to check a chest x-ray to make sure that pleural effusions on this cause of this cough.  We will also try some Ventolin to use as needed.  She has been recently tested for COVID and she does not have any other symptoms COVID so I do not think this is the reason for her cough.  She is on the transplant list and they are following her up   For prepping for liver transplant.  She is not on ACE-inhibitor.          "

## 2020-11-04 ENCOUNTER — TELEPHONE (OUTPATIENT)
Dept: TRANSPLANT | Facility: CLINIC | Age: 55
End: 2020-11-04

## 2020-11-04 ENCOUNTER — OFFICE VISIT (OUTPATIENT)
Dept: INFECTIOUS DISEASES | Facility: CLINIC | Age: 55
End: 2020-11-04
Payer: COMMERCIAL

## 2020-11-04 VITALS
BODY MASS INDEX: 24.65 KG/M2 | SYSTOLIC BLOOD PRESSURE: 109 MMHG | DIASTOLIC BLOOD PRESSURE: 68 MMHG | HEIGHT: 64 IN | HEART RATE: 92 BPM | TEMPERATURE: 98 F | WEIGHT: 144.38 LBS

## 2020-11-04 DIAGNOSIS — B95.7 COAG NEGATIVE STAPHYLOCOCCUS BACTEREMIA: Primary | ICD-10-CM

## 2020-11-04 DIAGNOSIS — Z23 NEED FOR INFLUENZA VACCINATION: ICD-10-CM

## 2020-11-04 DIAGNOSIS — R78.81 COAG NEGATIVE STAPHYLOCOCCUS BACTEREMIA: Primary | ICD-10-CM

## 2020-11-04 PROCEDURE — 3008F PR BODY MASS INDEX (BMI) DOCUMENTED: ICD-10-PCS | Mod: CPTII,NTX,S$GLB, | Performed by: INTERNAL MEDICINE

## 2020-11-04 PROCEDURE — 99213 OFFICE O/P EST LOW 20 MIN: CPT | Mod: NTX,S$GLB,, | Performed by: INTERNAL MEDICINE

## 2020-11-04 PROCEDURE — 99999 PR PBB SHADOW E&M-EST. PATIENT-LVL IV: CPT | Mod: PBBFAC,TXP,, | Performed by: INTERNAL MEDICINE

## 2020-11-04 PROCEDURE — 99999 PR PBB SHADOW E&M-EST. PATIENT-LVL IV: ICD-10-PCS | Mod: PBBFAC,TXP,, | Performed by: INTERNAL MEDICINE

## 2020-11-04 PROCEDURE — 3008F BODY MASS INDEX DOCD: CPT | Mod: CPTII,NTX,S$GLB, | Performed by: INTERNAL MEDICINE

## 2020-11-04 PROCEDURE — 99213 PR OFFICE/OUTPT VISIT, EST, LEVL III, 20-29 MIN: ICD-10-PCS | Mod: NTX,S$GLB,, | Performed by: INTERNAL MEDICINE

## 2020-11-04 NOTE — TELEPHONE ENCOUNTER
PDOL call documentation    Primary discharge diagnosis: HE/PICC line accidentally removed    If encephalopathy:     1.      Is the patient feeling confused? No    a.      If yes, notify hepatologist   2.      Is the patient feeling more forgetful than usual?No    a.      If yes, notify hepatologist   3.      If patient is taking Lactulose, are they having 3-5 bowel movements          per day? Yes    a.      If no, notify hepatologist   4.      Is the patient's speech slurring? No    a.      If yes, notify hepatologist   5.      Has the patient had any new symptoms since discharge? No    a.      If yes, notify hepatologist  6.      Is the patient clear on the medication they should be taking? Yes  7.      Does the patient know how to reach [facility]? Yes    If other diagnosis (dislodged PICC line):    1. How is patient feeling since discharge from the hospital? Well  2. Has the patient had any new symptoms since discharge? No  a. If yes, notify hepatologist  3. Is the patient clear on the medication they should be taking? Yes  4. Does the patient know how to reach [facility]? Yes

## 2020-11-04 NOTE — PROGRESS NOTES
Subjective:      Patient ID: Zeina Mahoney is a 55 y.o. female.    Chief Complaint:Follow-up      History of Present Illness    A 55-year-old woman with ESLD due to CHAUDHARY, S epidermidis bacteremia in August 2020, and recent S pseudointermedius bacteremia who is seen as a hospital follow up. Mrs. Mahoney was admitted after developing worsening abdominal pain. As part of her work up to rule out infectious etiologies she underwent blood culture testing which yielded the aforementioned organisms. She was discharged to complete a 14 day course of vancomycin from the first negative blood culture with a scheduled end date of 11/4. Unfortunately, her PICC line migrated out prompting a visit to the ED for replacement. At the time vancomycin levels were above 20 and decision was made not to replace PICC line. She shs been experiencing a dry cough.     Review of Systems   Constitution: Negative for chills, decreased appetite, fever, malaise/fatigue, night sweats, weight gain and weight loss.   HENT: Negative for congestion, ear pain, hearing loss, hoarse voice, sore throat and tinnitus.    Eyes: Negative for blurred vision, redness and visual disturbance.   Cardiovascular: Negative for chest pain, leg swelling and palpitations.   Respiratory: Positive for cough and wheezing. Negative for hemoptysis, shortness of breath and sputum production.    Hematologic/Lymphatic: Negative for adenopathy. Does not bruise/bleed easily.   Skin: Negative for dry skin, itching, rash and suspicious lesions.   Musculoskeletal: Negative for back pain, joint pain, myalgias and neck pain.   Gastrointestinal: Negative for abdominal pain, constipation, diarrhea, heartburn, nausea and vomiting.   Genitourinary: Negative for dysuria, flank pain, frequency, hematuria, hesitancy and urgency.   Neurological: Negative for dizziness, headaches, numbness, paresthesias and weakness.   Psychiatric/Behavioral: Negative for depression and memory loss. The patient  does not have insomnia and is not nervous/anxious.      Objective:   Physical Exam  Vitals signs and nursing note reviewed.   Constitutional:       Appearance: She is well-developed.      Comments: Ashen colored skin   HENT:      Head: Normocephalic and atraumatic.      Right Ear: External ear normal.      Left Ear: External ear normal.   Eyes:      General: No scleral icterus.        Right eye: No discharge.         Left eye: No discharge.      Conjunctiva/sclera: Conjunctivae normal.   Cardiovascular:      Rate and Rhythm: Normal rate and regular rhythm.      Heart sounds: Normal heart sounds. No murmur. No friction rub. No gallop.    Pulmonary:      Effort: Pulmonary effort is normal. No respiratory distress.      Breath sounds: Normal breath sounds. No stridor. No wheezing or rales.   Abdominal:      General: Bowel sounds are normal.      Tenderness: There is no abdominal tenderness.   Musculoskeletal: Normal range of motion.         General: No tenderness.   Skin:     General: Skin is warm and dry.   Neurological:      Mental Status: She is alert and oriented to person, place, and time.       Assessment:       1. Coag negative Staphylococcus bacteremia    2. Need for influenza vaccination          Plan:   Patient with S pseudointermedius bacteremia with negative repeat blood cultures while admitted. Completed a 14 day course of vancomycin as an outpatient. PICC has already been removed. She does not have signs or symptoms of ongoing infection. Patient continues to scratch her open wound.   · Patient advised to stop scratching the wound as it could lead to further recurrent infections.   · Advised to consider wound care evaluation.   · No indication for ongoing antibiotics at this time.   · Should bacteremia recur then would consider longer treatment duration and further invasive work up with [facility] if indicated.   · Patient requested seasonal influenza vaccine. Orders placed.   · RTC as needed.

## 2020-11-06 ENCOUNTER — LAB VISIT (OUTPATIENT)
Dept: LAB | Facility: HOSPITAL | Age: 55
End: 2020-11-06
Payer: COMMERCIAL

## 2020-11-06 ENCOUNTER — CLINICAL SUPPORT (OUTPATIENT)
Dept: INFECTIOUS DISEASES | Facility: CLINIC | Age: 55
End: 2020-11-06
Payer: COMMERCIAL

## 2020-11-06 ENCOUNTER — OFFICE VISIT (OUTPATIENT)
Dept: TRANSPLANT | Facility: CLINIC | Age: 55
End: 2020-11-06
Payer: COMMERCIAL

## 2020-11-06 VITALS
OXYGEN SATURATION: 100 % | HEIGHT: 64 IN | RESPIRATION RATE: 18 BRPM | TEMPERATURE: 97 F | BODY MASS INDEX: 24.65 KG/M2 | DIASTOLIC BLOOD PRESSURE: 60 MMHG | WEIGHT: 144.38 LBS | HEART RATE: 96 BPM | SYSTOLIC BLOOD PRESSURE: 129 MMHG

## 2020-11-06 DIAGNOSIS — Z23 NEED FOR INFLUENZA VACCINATION: ICD-10-CM

## 2020-11-06 DIAGNOSIS — K76.82 HEPATIC ENCEPHALOPATHY: ICD-10-CM

## 2020-11-06 DIAGNOSIS — K75.81 LIVER CIRRHOSIS SECONDARY TO NASH: Primary | ICD-10-CM

## 2020-11-06 DIAGNOSIS — K74.60 LIVER CIRRHOSIS SECONDARY TO NASH: ICD-10-CM

## 2020-11-06 DIAGNOSIS — K74.60 LIVER CIRRHOSIS SECONDARY TO NASH: Primary | ICD-10-CM

## 2020-11-06 DIAGNOSIS — R18.8 OTHER ASCITES: ICD-10-CM

## 2020-11-06 DIAGNOSIS — Z76.82 ORGAN TRANSPLANT CANDIDATE: ICD-10-CM

## 2020-11-06 DIAGNOSIS — K75.81 LIVER CIRRHOSIS SECONDARY TO NASH: ICD-10-CM

## 2020-11-06 LAB
ALBUMIN SERPL BCP-MCNC: 3.2 G/DL (ref 3.5–5.2)
ALP SERPL-CCNC: 107 U/L (ref 55–135)
ALT SERPL W/O P-5'-P-CCNC: 16 U/L (ref 10–44)
ANION GAP SERPL CALC-SCNC: 10 MMOL/L (ref 8–16)
AST SERPL-CCNC: 31 U/L (ref 10–40)
BASOPHILS # BLD AUTO: 0.06 K/UL (ref 0–0.2)
BASOPHILS NFR BLD: 1 % (ref 0–1.9)
BILIRUB SERPL-MCNC: 5 MG/DL (ref 0.1–1)
BUN SERPL-MCNC: 13 MG/DL (ref 6–20)
CALCIUM SERPL-MCNC: 10.3 MG/DL (ref 8.7–10.5)
CHLORIDE SERPL-SCNC: 100 MMOL/L (ref 95–110)
CO2 SERPL-SCNC: 27 MMOL/L (ref 23–29)
CREAT SERPL-MCNC: 1.3 MG/DL (ref 0.5–1.4)
DIFFERENTIAL METHOD: ABNORMAL
EOSINOPHIL # BLD AUTO: 0.1 K/UL (ref 0–0.5)
EOSINOPHIL NFR BLD: 2 % (ref 0–8)
ERYTHROCYTE [DISTWIDTH] IN BLOOD BY AUTOMATED COUNT: 14.5 % (ref 11.5–14.5)
EST. GFR  (AFRICAN AMERICAN): 53.4 ML/MIN/1.73 M^2
EST. GFR  (NON AFRICAN AMERICAN): 46.3 ML/MIN/1.73 M^2
GLUCOSE SERPL-MCNC: 148 MG/DL (ref 70–110)
HCT VFR BLD AUTO: 27.6 % (ref 37–48.5)
HGB BLD-MCNC: 9.2 G/DL (ref 12–16)
IMM GRANULOCYTES # BLD AUTO: 0.01 K/UL (ref 0–0.04)
IMM GRANULOCYTES NFR BLD AUTO: 0.2 % (ref 0–0.5)
INR PPP: 1.2 (ref 0.8–1.2)
LYMPHOCYTES # BLD AUTO: 1.5 K/UL (ref 1–4.8)
LYMPHOCYTES NFR BLD: 24.3 % (ref 18–48)
MCH RBC QN AUTO: 33.7 PG (ref 27–31)
MCHC RBC AUTO-ENTMCNC: 33.3 G/DL (ref 32–36)
MCV RBC AUTO: 101 FL (ref 82–98)
MONOCYTES # BLD AUTO: 0.4 K/UL (ref 0.3–1)
MONOCYTES NFR BLD: 6.2 % (ref 4–15)
NEUTROPHILS # BLD AUTO: 4 K/UL (ref 1.8–7.7)
NEUTROPHILS NFR BLD: 66.3 % (ref 38–73)
NRBC BLD-RTO: 0 /100 WBC
PLATELET # BLD AUTO: 87 K/UL (ref 150–350)
PMV BLD AUTO: 9.7 FL (ref 9.2–12.9)
POTASSIUM SERPL-SCNC: 3 MMOL/L (ref 3.5–5.1)
PROT SERPL-MCNC: 6.6 G/DL (ref 6–8.4)
PROTHROMBIN TIME: 13 SEC (ref 9–12.5)
RBC # BLD AUTO: 2.73 M/UL (ref 4–5.4)
SODIUM SERPL-SCNC: 137 MMOL/L (ref 136–145)
WBC # BLD AUTO: 6.09 K/UL (ref 3.9–12.7)

## 2020-11-06 PROCEDURE — 3008F BODY MASS INDEX DOCD: CPT | Mod: CPTII,S$GLB,TXP, | Performed by: STUDENT IN AN ORGANIZED HEALTH CARE EDUCATION/TRAINING PROGRAM

## 2020-11-06 PROCEDURE — 85610 PROTHROMBIN TIME: CPT | Mod: TXP

## 2020-11-06 PROCEDURE — 85025 COMPLETE CBC W/AUTO DIFF WBC: CPT | Mod: TXP

## 2020-11-06 PROCEDURE — 80053 COMPREHEN METABOLIC PANEL: CPT | Mod: TXP

## 2020-11-06 PROCEDURE — 90694 VACC AIIV4 NO PRSRV 0.5ML IM: CPT | Mod: S$GLB,TXP,, | Performed by: INTERNAL MEDICINE

## 2020-11-06 PROCEDURE — 99999 PR PBB SHADOW E&M-EST. PATIENT-LVL V: CPT | Mod: PBBFAC,TXP,, | Performed by: STUDENT IN AN ORGANIZED HEALTH CARE EDUCATION/TRAINING PROGRAM

## 2020-11-06 PROCEDURE — 99215 OFFICE O/P EST HI 40 MIN: CPT | Mod: S$GLB,TXP,, | Performed by: STUDENT IN AN ORGANIZED HEALTH CARE EDUCATION/TRAINING PROGRAM

## 2020-11-06 PROCEDURE — 90471 IMMUNIZATION ADMIN: CPT | Mod: S$GLB,TXP,, | Performed by: INTERNAL MEDICINE

## 2020-11-06 PROCEDURE — 3008F PR BODY MASS INDEX (BMI) DOCUMENTED: ICD-10-PCS | Mod: CPTII,S$GLB,TXP, | Performed by: STUDENT IN AN ORGANIZED HEALTH CARE EDUCATION/TRAINING PROGRAM

## 2020-11-06 PROCEDURE — 90694 FLU VACCINE - QUADRIVALENT - ADJUVANTED: ICD-10-PCS | Mod: S$GLB,TXP,, | Performed by: INTERNAL MEDICINE

## 2020-11-06 PROCEDURE — 99215 PR OFFICE/OUTPT VISIT, EST, LEVL V, 40-54 MIN: ICD-10-PCS | Mod: S$GLB,TXP,, | Performed by: STUDENT IN AN ORGANIZED HEALTH CARE EDUCATION/TRAINING PROGRAM

## 2020-11-06 PROCEDURE — 99999 PR PBB SHADOW E&M-EST. PATIENT-LVL III: ICD-10-PCS | Mod: PBBFAC,TXP,,

## 2020-11-06 PROCEDURE — 99999 PR PBB SHADOW E&M-EST. PATIENT-LVL V: ICD-10-PCS | Mod: PBBFAC,TXP,, | Performed by: STUDENT IN AN ORGANIZED HEALTH CARE EDUCATION/TRAINING PROGRAM

## 2020-11-06 PROCEDURE — 99999 PR PBB SHADOW E&M-EST. PATIENT-LVL III: CPT | Mod: PBBFAC,TXP,,

## 2020-11-06 PROCEDURE — 90471 FLU VACCINE - QUADRIVALENT - ADJUVANTED: ICD-10-PCS | Mod: S$GLB,TXP,, | Performed by: INTERNAL MEDICINE

## 2020-11-06 PROCEDURE — 36415 COLL VENOUS BLD VENIPUNCTURE: CPT | Mod: TXP

## 2020-11-06 NOTE — PROGRESS NOTES
Transplant Hepatology   Transplant Evaluation Consult Note    Referring provider: No ref. provider found  PCP: Primary Doctor No    Chief complaint: follow up of CHAUDHARY related cirrhosis    HPI: Zeina Mahoney is a 55 y.o. female who presents to Transplant Hepatology Clinic for follow up of CHAUDHARY related cirrhosis. She is accompanied by her .    Since her last visit, she has been hospitalized twice.  Once in October with her current abdominal pain.  At that time she was found to have Staphylococcus pseudintermedius bacteremia treated with 2 weeks of IV vancomycin.  She was discharged 4 days ago after a brief hospitalization due to her PICC falling out.  She reports overall doing well since discharge.  She reports compliance with Lasix and spironolactone without recent edema or abdominal distention.  She also reports compliance with lactulose and rifaximin without recent encephalopathy. She denies recent hematemesis, coffee ground emesis, melena, hematochezia.    Past Medical History:   Diagnosis Date    Chronic Hepatic encephalopathy 6/25/2020    Decompensated hepatic cirrhosis 6/25/2020    History of pancreatitis 6/25/2020    Liver cirrhosis secondary to CHAUDHARY 6/25/2020    Macrocytic anemia 6/25/2020    Other ascites 6/25/2020    Thrombocytopenia 6/25/2020       No past surgical history on file.    No family history on file.    Social History     Tobacco Use    Smoking status: Never Smoker    Smokeless tobacco: Never Used   Substance Use Topics    Alcohol use: Never     Frequency: Never    Drug use: Never       Current Outpatient Medications   Medication Sig Dispense Refill    acetaminophen (TYLENOL) 500 MG tablet Take 1 tablet (500 mg total) by mouth every 6 (six) hours as needed for Pain. Max 2000mg daily.  0    albuterol (VENTOLIN HFA) 90 mcg/actuation inhaler Inhale 2 puffs into the lungs every 6 (six) hours as needed for Wheezing. Rescue 18 g 0    diaper,brief,adult,disposable Misc 5 mg/kg/day  by Misc.(Non-Drug; Combo Route) route 5 (five) times daily. Patient with incontinence. Please supply enough depends for 5xs/day      ergocalciferol (VITAMIN D2) 50,000 unit Cap Take 1 capsule (50,000 Units total) by mouth every 7 days. 12 capsule 3    escitalopram oxalate (LEXAPRO) 5 MG Tab Take 1 tablet (5 mg total) by mouth once daily. 30 tablet 11    furosemide (LASIX) 40 MG tablet Take 1 tablet (40 mg total) by mouth once daily. 30 tablet 11    hydrOXYzine HCL (ATARAX) 25 MG tablet Take 1 tablet (25 mg total) by mouth as needed for Itching. 90 tablet 3    lactulose (CEPHULAC) 10 gram packet Mix 3 packets (30 g total) with liquid and take by mouth 3 (three) times daily as needed. 90 packet 11    levothyroxine (SYNTHROID) 88 MCG tablet Take 1 tablet (88 mcg total) by mouth before breakfast. 30 tablet 3    lidocaine (LIDODERM) 5 % Place 1 patch onto the skin every 24 hours. Remove & Discard patch within 12 hours or as directed by MD      magnesium hydroxide 400 mg/5 ml (MILK OF MAGNESIA) 400 mg/5 mL Susp Take 30 mLs (2,400 mg total) by mouth daily as needed (Take if constipated despite lactulose).      melatonin 10 mg Tab Take 10 mg by mouth nightly as needed (sleep).      midodrine (PROAMATINE) 10 MG tablet Take 1 tablet (10 mg total) by mouth 3 (three) times daily. 90 tablet 3    nystatin (MYCOSTATIN) powder Apply topically 2 (two) times daily. Apply to affected area twice a day      pantoprazole (PROTONIX) 40 MG tablet Take 40 mg by mouth before breakfast.      polyethylene glycol (GLYCOLAX) 17 gram PwPk Take 17 g by mouth once daily. Or miralax OTC. Take if constipated despite lactulose  0    rifAXIMin (XIFAXAN) 550 mg Tab Take 1 tablet (550 mg total) by mouth 2 (two) times daily. 60 tablet 5    simethicone (MYLICON) 80 MG chewable tablet Take 80 mg by mouth every 6 (six) hours as needed for Flatulence. Take 2 tablets by mouth every 6 hours as needed for gas      spironolactone (ALDACTONE) 50 MG  "tablet Take 2 tablets (100 mg total) by mouth once daily. 60 tablet 11    zinc sulfate (ZINCATE) 220 (50) mg capsule Take 220 mg by mouth before breakfast.       No current facility-administered medications for this visit.        Review of patient's allergies indicates:   Allergen Reactions    Aspirin Tinitus    Ciprofloxacin Rash    Clindamycin Itching       Review of Systems   Constitutional: Negative for fever and weight loss.   HENT: Negative for congestion and sore throat.    Eyes: Negative for blurred vision and double vision.   Respiratory: Negative for cough and shortness of breath.    Cardiovascular: Negative for chest pain and leg swelling.   Gastrointestinal: Negative for abdominal pain, blood in stool, constipation, diarrhea, melena, nausea and vomiting.   Genitourinary: Negative for dysuria and hematuria.   Musculoskeletal: Negative for joint pain and myalgias.   Skin: Negative for itching and rash.   Neurological: Negative for tremors, seizures and headaches.   Endo/Heme/Allergies: Does not bruise/bleed easily.   Psychiatric/Behavioral: Negative for depression. The patient is not nervous/anxious.        Vitals:    11/06/20 0831   BP: 129/60   Pulse: 96   Resp: 18   Temp: 96.9 °F (36.1 °C)   SpO2: 100%   Weight: 65.5 kg (144 lb 6.4 oz)   Height: 5' 4" (1.626 m)       Physical Exam  Vitals signs reviewed.   Constitutional:       General: She is not in acute distress.     Appearance: She is well-developed.   HENT:      Head: Normocephalic and atraumatic.   Eyes:      General: Scleral icterus present.      Extraocular Movements: Extraocular movements intact.      Conjunctiva/sclera: Conjunctivae normal.   Cardiovascular:      Rate and Rhythm: Normal rate and regular rhythm.   Pulmonary:      Effort: Pulmonary effort is normal. No respiratory distress.      Breath sounds: Normal breath sounds. No wheezing, rhonchi or rales.   Abdominal:      General: Bowel sounds are normal. There is no distension.      " Palpations: Abdomen is soft.      Tenderness: There is no abdominal tenderness.   Musculoskeletal:         General: No swelling or deformity.      Right lower leg: No edema.      Left lower leg: No edema.   Skin:     General: Skin is warm and dry.      Coloration: Skin is jaundiced.   Neurological:      General: No focal deficit present.      Mental Status: She is alert and oriented to person, place, and time.   Psychiatric:         Mood and Affect: Mood and affect normal.         Behavior: Behavior is cooperative.         LABS: I personally reviewed pertinent laboratory findings.    Lab Results   Component Value Date    ALT 16 11/06/2020    AST 31 11/06/2020    GGT 32 07/06/2020    GGT 32 07/06/2020    ALKPHOS 107 11/06/2020    BILITOT 5.0 (H) 11/06/2020       Lab Results   Component Value Date    WBC 6.09 11/06/2020    HGB 9.2 (L) 11/06/2020    HCT 27.6 (L) 11/06/2020     (H) 11/06/2020    PLT 87 (L) 11/06/2020       Lab Results   Component Value Date     11/06/2020    K 3.0 (L) 11/06/2020     11/06/2020    CO2 27 11/06/2020    BUN 13 11/06/2020    CREATININE 1.3 11/06/2020    CALCIUM 10.3 11/06/2020    ANIONGAP 10 11/06/2020    ESTGFRAFRICA 53.4 (A) 11/06/2020    EGFRNONAA 46.3 (A) 11/06/2020       Lab Results   Component Value Date    INR 1.2 11/06/2020    INR 1.1 11/03/2020    INR 1.2 11/02/2020     Imaging:   I personally reviewed recent imaging studies available on the chart and from outside medical records.      Assessment:  55 y.o. female presenting with CHAUDHARY related cirrhosis. She is decompensated with ascites, HE, jaundice.    1. Liver cirrhosis secondary to CHAUDHARY    2. Hepatic encephalopathy    3. Other ascites        MELD-Na score: 17 at 11/6/2020  8:00 AM  MELD score: 17 at 11/6/2020  8:00 AM  Calculated from:  Serum Creatinine: 1.3 mg/dL at 11/6/2020  8:00 AM  Serum Sodium: 137 mmol/L at 11/6/2020  8:00 AM  Total Bilirubin: 5.0 mg/dL at 11/6/2020  8:00 AM  INR(ratio): 1.2 at  11/6/2020  8:00 AM  Age: 55 years 1 month    UNOS Patient Status  Functional Status: 50% - Requires considerable assistance and frequent medical care  Physical Capacity: No Limitations    Transplant Candidacy: She is listed for transplant.    Recommendations:  - Ascites/Edema: 2 gm Na diet. Continue Lasix 40 mg daily and Aldactone 100 mg daily    - Encephalopathy: Continue lactulose. Titrate to maintain 3-4 bowel movements per day. Continue rifaximin 550mg BID    - Variceal screening: EGD in 11/2019 showed no varices. Repeat EGD in 11/2020. Ordered today.    - HCC screening: CT in 09/2020 without HCC. Repeat abdominal US and AFP every 6 months.    Labs in 2 weeks. Return to clinic in 1 month with labs.    A total of 40 minutes were spent face-to-face with the patient during this encounter, and over half of that time was spent on counseling and coordination of care. We discussed in depth the nature of her liver disease and the management plan and liver transplant evaluation in details. I also educated her about lifestyle modifications which may stop or slow progression of liver disease. I have provided her with an opportunity to ask questions and have all questions answered to her satisfaction.    I have sent communication to the referring physician and/or primary care provider.    Oskar Crawford MD  Staff Physician  Hepatology and Liver Transplant  [facility]  [facility] [place]

## 2020-11-06 NOTE — LETTER
November 6, 2020        Reuben Vee  [address]  [place] [facility] [address]  Phone: [phone]  Fax: [phone]             [facility] [address]  [address]  [facility] [address]  Phone: [phone]   Patient: Zeina Mahoney   MR Number: [id number]   YOB: 1965   Date of Visit: 11/6/2020       Dear Dr. Reuben Vee    Thank you for referring Zeina Mahoney to me for evaluation. Attached you will find relevant portions of my assessment and plan of care.    If you have questions, please do not hesitate to call me. I look forward to following Zeina Mahoney along with you.    Sincerely,    Oskar Crawford MD    Enclosure    If you would like to receive this communication electronically, please contact [email or web address] or [phone] to request [employer] access.    [employer] is a tool which provides read-only access to select patient information with whom you have a relationship. Its easy to use and provides real time access to review your patients record including encounter summaries, notes, results, and demographic information.    If you feel you have received this communication in error or would no longer like to receive these types of communications, please e-mail [email or web address]

## 2020-11-09 ENCOUNTER — TELEPHONE (OUTPATIENT)
Dept: TRANSPLANT | Facility: CLINIC | Age: 55
End: 2020-11-09

## 2020-11-09 ENCOUNTER — PATIENT MESSAGE (OUTPATIENT)
Dept: TRANSPLANT | Facility: CLINIC | Age: 55
End: 2020-11-09

## 2020-11-09 LAB — PHOSPHATIDYLETHANOL (PETH): NEGATIVE NG/ML

## 2020-11-09 NOTE — TELEPHONE ENCOUNTER
PATIENT NAME: Zeina Mahoney  [facility] #: [id number]    Lab Results   Component Value Date    CREATININE 1.3 11/06/2020     11/06/2020    BILITOT 5.0 (H) 11/06/2020    ALBUMIN 3.2 (L) 11/06/2020    INR 1.2 11/06/2020     MELD 17  Encephalopathy: 1 - 2  Ascites: moderate  Dialysis: no     Recertification requestor: Lindsey Peterson

## 2020-11-10 ENCOUNTER — TELEPHONE (OUTPATIENT)
Dept: TRANSPLANT | Facility: CLINIC | Age: 55
End: 2020-11-10

## 2020-11-10 ENCOUNTER — CONFERENCE (OUTPATIENT)
Dept: TRANSPLANT | Facility: CLINIC | Age: 55
End: 2020-11-10

## 2020-11-10 DIAGNOSIS — K75.81 LIVER CIRRHOSIS SECONDARY TO NASH: Primary | ICD-10-CM

## 2020-11-10 DIAGNOSIS — K74.60 LIVER CIRRHOSIS SECONDARY TO NASH: Primary | ICD-10-CM

## 2020-11-10 DIAGNOSIS — Z76.82 ORGAN TRANSPLANT CANDIDATE: ICD-10-CM

## 2020-11-10 RX ORDER — BENZONATATE 100 MG/1
100 CAPSULE ORAL 3 TIMES DAILY PRN
Qty: 90 CAPSULE | Refills: 2 | Status: SHIPPED | OUTPATIENT
Start: 2020-11-10 | End: 2021-02-08

## 2020-11-10 NOTE — TELEPHONE ENCOUNTER
Per portal request, called Yonis re: pt relocating to [place] for a month. Yonis requested assistance with lab orders. Entered MELD lab orders for every 2 weeks x12 to be drawn @ a local [facility] facility. Also, spoke with pt's son re: living donor. He will complete health history info & email to living donor coordinator (me). Will follow up for test scheduling if health history info is acceptable for the team. Understanding expressed.     Yonis has booked flights home for himself and pt & plans to return by 12/28 if not sooner. He will contact team ahead of return to schedule f/u appts with hepatologist and labs. Understanding expressed. No other questions at this time.

## 2020-11-10 NOTE — TELEPHONE ENCOUNTER
Discussed pt in Tuesday conference to update the team that she will be relocating to [facility] from 11/13 through 12/28. Her  will update us if they come back sooner. Relocation appts will be scheduled as appropriate: labs, hepatologist & SW. Discussed with pt's  & son. Understanding expressed.

## 2020-11-19 ENCOUNTER — PATIENT MESSAGE (OUTPATIENT)
Dept: TRANSPLANT | Facility: CLINIC | Age: 55
End: 2020-11-19

## 2020-11-19 DIAGNOSIS — K75.81 LIVER CIRRHOSIS SECONDARY TO NASH: ICD-10-CM

## 2020-11-19 DIAGNOSIS — Z76.82 ORGAN TRANSPLANT CANDIDATE: Primary | ICD-10-CM

## 2020-11-19 DIAGNOSIS — K74.60 LIVER CIRRHOSIS SECONDARY TO NASH: ICD-10-CM

## 2020-11-19 NOTE — PROGRESS NOTES
messaged for return to clinic with MELD labs & PEth after 12/1 (pt returning to [facility] on that date).

## 2020-12-02 ENCOUNTER — PATIENT MESSAGE (OUTPATIENT)
Dept: TRANSPLANT | Facility: CLINIC | Age: 55
End: 2020-12-02

## 2020-12-02 ENCOUNTER — SOCIAL WORK (OUTPATIENT)
Dept: TRANSPLANT | Facility: CLINIC | Age: 55
End: 2020-12-02
Payer: COMMERCIAL

## 2020-12-02 ENCOUNTER — OFFICE VISIT (OUTPATIENT)
Dept: TRANSPLANT | Facility: CLINIC | Age: 55
End: 2020-12-02
Payer: COMMERCIAL

## 2020-12-02 ENCOUNTER — LAB VISIT (OUTPATIENT)
Dept: LAB | Facility: HOSPITAL | Age: 55
End: 2020-12-02
Payer: COMMERCIAL

## 2020-12-02 VITALS
HEART RATE: 94 BPM | HEIGHT: 64 IN | BODY MASS INDEX: 24.28 KG/M2 | OXYGEN SATURATION: 100 % | DIASTOLIC BLOOD PRESSURE: 62 MMHG | SYSTOLIC BLOOD PRESSURE: 142 MMHG | RESPIRATION RATE: 18 BRPM | WEIGHT: 142.19 LBS | TEMPERATURE: 98 F

## 2020-12-02 DIAGNOSIS — K74.60 LIVER CIRRHOSIS SECONDARY TO NASH: ICD-10-CM

## 2020-12-02 DIAGNOSIS — Z76.82 ORGAN TRANSPLANT CANDIDATE: ICD-10-CM

## 2020-12-02 DIAGNOSIS — K76.82 HEPATIC ENCEPHALOPATHY: ICD-10-CM

## 2020-12-02 DIAGNOSIS — Z01.812 PRE-PROCEDURE LAB EXAM: ICD-10-CM

## 2020-12-02 DIAGNOSIS — K75.81 LIVER CIRRHOSIS SECONDARY TO NASH: ICD-10-CM

## 2020-12-02 DIAGNOSIS — K74.60 LIVER CIRRHOSIS SECONDARY TO NASH: Primary | ICD-10-CM

## 2020-12-02 DIAGNOSIS — K74.60 HEPATIC CIRRHOSIS, UNSPECIFIED HEPATIC CIRRHOSIS TYPE, UNSPECIFIED WHETHER ASCITES PRESENT: Primary | ICD-10-CM

## 2020-12-02 DIAGNOSIS — K75.81 LIVER CIRRHOSIS SECONDARY TO NASH: Primary | ICD-10-CM

## 2020-12-02 DIAGNOSIS — R18.8 OTHER ASCITES: ICD-10-CM

## 2020-12-02 LAB
ALBUMIN SERPL BCP-MCNC: 2.8 G/DL (ref 3.5–5.2)
ALP SERPL-CCNC: 121 U/L (ref 55–135)
ALT SERPL W/O P-5'-P-CCNC: 20 U/L (ref 10–44)
ANION GAP SERPL CALC-SCNC: 8 MMOL/L (ref 8–16)
AST SERPL-CCNC: 33 U/L (ref 10–40)
BASOPHILS # BLD AUTO: 0.03 K/UL (ref 0–0.2)
BASOPHILS NFR BLD: 0.6 % (ref 0–1.9)
BILIRUB SERPL-MCNC: 3.3 MG/DL (ref 0.1–1)
BUN SERPL-MCNC: 12 MG/DL (ref 6–20)
CALCIUM SERPL-MCNC: 9.7 MG/DL (ref 8.7–10.5)
CHLORIDE SERPL-SCNC: 104 MMOL/L (ref 95–110)
CO2 SERPL-SCNC: 25 MMOL/L (ref 23–29)
CREAT SERPL-MCNC: 1.2 MG/DL (ref 0.5–1.4)
DIFFERENTIAL METHOD: ABNORMAL
EOSINOPHIL # BLD AUTO: 0.1 K/UL (ref 0–0.5)
EOSINOPHIL NFR BLD: 2.2 % (ref 0–8)
ERYTHROCYTE [DISTWIDTH] IN BLOOD BY AUTOMATED COUNT: 14.3 % (ref 11.5–14.5)
EST. GFR  (AFRICAN AMERICAN): 58.8 ML/MIN/1.73 M^2
EST. GFR  (NON AFRICAN AMERICAN): 51 ML/MIN/1.73 M^2
GLUCOSE SERPL-MCNC: 208 MG/DL (ref 70–110)
HCT VFR BLD AUTO: 29.5 % (ref 37–48.5)
HGB BLD-MCNC: 9.3 G/DL (ref 12–16)
IMM GRANULOCYTES # BLD AUTO: 0.01 K/UL (ref 0–0.04)
IMM GRANULOCYTES NFR BLD AUTO: 0.2 % (ref 0–0.5)
INR PPP: 1.1 (ref 0.8–1.2)
LYMPHOCYTES # BLD AUTO: 1.4 K/UL (ref 1–4.8)
LYMPHOCYTES NFR BLD: 26.6 % (ref 18–48)
MCH RBC QN AUTO: 33.1 PG (ref 27–31)
MCHC RBC AUTO-ENTMCNC: 31.5 G/DL (ref 32–36)
MCV RBC AUTO: 105 FL (ref 82–98)
MONOCYTES # BLD AUTO: 0.3 K/UL (ref 0.3–1)
MONOCYTES NFR BLD: 6.4 % (ref 4–15)
NEUTROPHILS # BLD AUTO: 3.4 K/UL (ref 1.8–7.7)
NEUTROPHILS NFR BLD: 64 % (ref 38–73)
NRBC BLD-RTO: 0 /100 WBC
PLATELET # BLD AUTO: 77 K/UL (ref 150–350)
PMV BLD AUTO: 10.8 FL (ref 9.2–12.9)
POTASSIUM SERPL-SCNC: 3.6 MMOL/L (ref 3.5–5.1)
PROT SERPL-MCNC: 6.5 G/DL (ref 6–8.4)
PROTHROMBIN TIME: 12.1 SEC (ref 9–12.5)
RBC # BLD AUTO: 2.81 M/UL (ref 4–5.4)
SODIUM SERPL-SCNC: 137 MMOL/L (ref 136–145)
WBC # BLD AUTO: 5.34 K/UL (ref 3.9–12.7)

## 2020-12-02 PROCEDURE — 80321 ALCOHOLS BIOMARKERS 1OR 2: CPT | Mod: TXP

## 2020-12-02 PROCEDURE — 1126F AMNT PAIN NOTED NONE PRSNT: CPT | Mod: S$GLB,TXP,, | Performed by: STUDENT IN AN ORGANIZED HEALTH CARE EDUCATION/TRAINING PROGRAM

## 2020-12-02 PROCEDURE — 36415 COLL VENOUS BLD VENIPUNCTURE: CPT | Mod: TXP

## 2020-12-02 PROCEDURE — 99215 OFFICE O/P EST HI 40 MIN: CPT | Mod: S$GLB,TXP,, | Performed by: STUDENT IN AN ORGANIZED HEALTH CARE EDUCATION/TRAINING PROGRAM

## 2020-12-02 PROCEDURE — 80053 COMPREHEN METABOLIC PANEL: CPT | Mod: TXP

## 2020-12-02 PROCEDURE — 85025 COMPLETE CBC W/AUTO DIFF WBC: CPT | Mod: TXP

## 2020-12-02 PROCEDURE — 1126F PR PAIN SEVERITY QUANTIFIED, NO PAIN PRESENT: ICD-10-PCS | Mod: S$GLB,TXP,, | Performed by: STUDENT IN AN ORGANIZED HEALTH CARE EDUCATION/TRAINING PROGRAM

## 2020-12-02 PROCEDURE — 99999 PR PBB SHADOW E&M-EST. PATIENT-LVL III: ICD-10-PCS | Mod: PBBFAC,TXP,, | Performed by: STUDENT IN AN ORGANIZED HEALTH CARE EDUCATION/TRAINING PROGRAM

## 2020-12-02 PROCEDURE — 3008F BODY MASS INDEX DOCD: CPT | Mod: CPTII,S$GLB,TXP, | Performed by: STUDENT IN AN ORGANIZED HEALTH CARE EDUCATION/TRAINING PROGRAM

## 2020-12-02 PROCEDURE — 99215 PR OFFICE/OUTPT VISIT, EST, LEVL V, 40-54 MIN: ICD-10-PCS | Mod: S$GLB,TXP,, | Performed by: STUDENT IN AN ORGANIZED HEALTH CARE EDUCATION/TRAINING PROGRAM

## 2020-12-02 PROCEDURE — 85610 PROTHROMBIN TIME: CPT | Mod: TXP

## 2020-12-02 PROCEDURE — 3008F PR BODY MASS INDEX (BMI) DOCUMENTED: ICD-10-PCS | Mod: CPTII,S$GLB,TXP, | Performed by: STUDENT IN AN ORGANIZED HEALTH CARE EDUCATION/TRAINING PROGRAM

## 2020-12-02 PROCEDURE — 99999 PR PBB SHADOW E&M-EST. PATIENT-LVL III: CPT | Mod: PBBFAC,TXP,, | Performed by: STUDENT IN AN ORGANIZED HEALTH CARE EDUCATION/TRAINING PROGRAM

## 2020-12-02 NOTE — PROGRESS NOTES
Transplant Hepatology   Transplant Evaluation Consult Note    Referring provider: No ref. provider found  PCP: Primary Doctor No    Chief complaint: follow up of CHAUDHARY related cirrhosis    HPI: Zeina Mahoney is a 55 y.o. female who presents to Transplant Hep[facility]ology Clinic for follow up of CHAUDHARY related cirrhosis. She is accompanied by her .    She is without complaints today.  She has not had recent hospitalizations or ED visits.  She recently returned from a trip to [place] to visit family.  She reports compliance with Lasix and spironolactone without recent abdominal distention or lower extremity edema.  She takes lactulose and rifaximin with adequate control of encephalopathy.  Since her last visit, she has been hospitalized twice.  Once in October with her current abdominal pain.  At that time she was found to have Staphylococcus pseudintermedius bacteremia treated with 2 weeks of IV vancomycin.  She was discharged 4 days ago after a brief hospitalization due to her PICC falling out.  She reports overall doing well since discharge.  She reports compliance with Lasix and spironolactone without recent edema or abdominal distention.  She also reports compliance with lactulose and rifaximin without recent encephalopathy. She denies recent hematemesis, coffee ground emesis, melena, hematochezia.    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 No pertinent surgical history.    History reviewed. No pertinent family history.    Social History     Tobacco Use    Smoking status: Never Smoker    Smokeless tobacco: Never Used   Substance Use Topics    Alcohol use: Never     Frequency: Never    Drug use: Never       Current  Outpatient Medications   Medication Sig Dispense Refill    acetaminophen (TYLENOL) 500 MG tablet Take 1 tablet (500 mg total) by mouth every 6 (six) hours as needed for Pain. Max 2000mg daily.  0    albuterol (VENTOLIN HFA) 90 mcg/actuation inhaler Inhale 2 puffs into the lungs every 6 (six) hours as needed for Wheezing. Rescue 18 g 0    benzonatate (TESSALON) 100 MG capsule Take 1 capsule (100 mg total) by mouth 3 (three) times daily as needed for Cough. 90 capsule 2    diaper,brief,adult,disposable Misc 5 mg/kg/day by Misc.(Non-Drug; Combo Route) route 5 (five) times daily. Patient with incontinence. Please supply enough depends for 5xs/day      ergocalciferol (VITAMIN D2) 50,000 unit Cap Take 1 capsule (50,000 Units total) by mouth every 7 days. 12 capsule 3    escitalopram oxalate (LEXAPRO) 5 MG Tab Take 1 tablet (5 mg total) by mouth once daily. 30 tablet 11    furosemide (LASIX) 40 MG tablet Take 1 tablet (40 mg total) by mouth once daily. 30 tablet 11    hydrOXYzine HCL (ATARAX) 25 MG tablet Take 1 tablet (25 mg total) by mouth as needed for Itching. 90 tablet 3    lactulose (CEPHULAC) 10 gram packet Mix 3 packets (30 g total) with liquid and take by mouth 3 (three) times daily as needed. 90 packet 11    levothyroxine (SYNTHROID) 88 MCG tablet Take 1 tablet (88 mcg total) by mouth before breakfast. 30 tablet 3    lidocaine (LIDODERM) 5 % Place 1 patch onto the skin every 24 hours. Remove & Discard patch within 12 hours or as directed by MD      magnesium hydroxide 400 mg/5 ml (MILK OF MAGNESIA) 400 mg/5 mL Susp Take 30 mLs (2,400 mg total) by mouth daily as needed (Take if constipated despite lactulose).      melatonin 10 mg Tab Take 10 mg by mouth nightly as needed (sleep).      midodrine (PROAMATINE) 10 MG tablet Take 1 tablet (10 mg total) by mouth 3 (three) times daily. 90 tablet 3    nystatin (MYCOSTATIN) powder Apply topically 2 (two) times daily. Apply to affected area twice a day       "pantoprazole (PROTONIX) 40 MG tablet Take 40 mg by mouth before breakfast.      polyethylene glycol (GLYCOLAX) 17 gram PwPk Take 17 g by mouth once daily. Or miralax OTC. Take if constipated despite lactulose  0    rifAXIMin (XIFAXAN) 550 mg Tab Take 1 tablet (550 mg total) by mouth 2 (two) times daily. 60 tablet 5    simethicone (MYLICON) 80 MG chewable tablet Take 80 mg by mouth every 6 (six) hours as needed for Flatulence. Take 2 tablets by mouth every 6 hours as needed for gas      spironolactone (ALDACTONE) 50 MG tablet Take 2 tablets (100 mg total) by mouth once daily. 60 tablet 11    zinc sulfate (ZINCATE) 220 (50) mg capsule Take 220 mg by mouth before breakfast.       No current facility-administered medications for this visit.        Review of patient's allergies indicates:   Allergen Reactions    Aspirin Tinitus    Ciprofloxacin Rash    Clindamycin Itching       Review of Systems   Constitutional: Negative for fever and weight loss.   HENT: Negative for congestion and sore throat.    Eyes: Negative for blurred vision and double vision.   Respiratory: Negative for cough and shortness of breath.    Cardiovascular: Negative for chest pain and leg swelling.   Gastrointestinal: Negative for abdominal pain, blood in stool, constipation, diarrhea, melena, nausea and vomiting.   Genitourinary: Negative for dysuria and hematuria.   Musculoskeletal: Negative for joint pain and myalgias.   Skin: Negative for itching and rash.   Neurological: Negative for tremors, seizures and headaches.   Endo/Heme/Allergies: Does not bruise/bleed easily.   Psychiatric/Behavioral: Negative for depression. The patient is not nervous/anxious.        Vitals:    12/02/20 0910   BP: (!) 142/62   Pulse: 94   Resp: 18   Temp: 97.7 °F (36.5 °C)   TempSrc: Oral   SpO2: 100%   Weight: 64.5 kg (142 lb 3.2 oz)   Height: 5' 4" (1.626 m)       Physical Exam  Vitals signs reviewed.   Constitutional:       General: She is not in acute " distress.     Appearance: She is well-developed.   HENT:      Head: Normocephalic and atraumatic.   Eyes:      General: Scleral icterus present.      Extraocular Movements: Extraocular movements intact.      Conjunctiva/sclera: Conjunctivae normal.   Cardiovascular:      Rate and Rhythm: Normal rate and regular rhythm.   Pulmonary:      Effort: Pulmonary effort is normal. No respiratory distress.      Breath sounds: Normal breath sounds. No wheezing, rhonchi or rales.   Abdominal:      General: Bowel sounds are normal. There is no distension.      Palpations: Abdomen is soft.      Tenderness: There is no abdominal tenderness.   Musculoskeletal:         General: No swelling or deformity.      Right lower leg: No edema.      Left lower leg: No edema.   Skin:     General: Skin is warm and dry.      Coloration: Skin is jaundiced.   Neurological:      General: No focal deficit present.      Mental Status: She is alert and oriented to person, place, and time.   Psychiatric:         Mood and Affect: Mood and affect normal.         Behavior: Behavior is cooperative.         LABS: I personally reviewed pertinent laboratory findings.    Lab Results   Component Value Date    ALT 20 12/02/2020    AST 33 12/02/2020    GGT 32 07/06/2020    GGT 32 07/06/2020    ALKPHOS 121 12/02/2020    BILITOT 3.3 (H) 12/02/2020       Lab Results   Component Value Date    WBC 5.34 12/02/2020    HGB 9.3 (L) 12/02/2020    HCT 29.5 (L) 12/02/2020     (H) 12/02/2020    PLT 77 (L) 12/02/2020       Lab Results   Component Value Date     12/02/2020    K 3.6 12/02/2020     12/02/2020    CO2 25 12/02/2020    BUN 12 12/02/2020    CREATININE 1.2 12/02/2020    CALCIUM 9.7 12/02/2020    ANIONGAP 8 12/02/2020    ESTGFRAFRICA 58.8 (A) 12/02/2020    EGFRNONAA 51.0 (A) 12/02/2020       Lab Results   Component Value Date    INR 1.1 12/02/2020    INR 1.2 11/06/2020    INR 1.1 11/03/2020     Imaging:   I personally reviewed recent imaging studies  available on the chart and from outside medical records.      Assessment:  55 y.o. female presenting with CHAUDHARY related cirrhosis. She is decompensated with ascites, HE, jaundice.    1. Liver cirrhosis secondary to CHAUDHARY    2. Hepatic encephalopathy    3. Other ascites    4. Organ transplant candidate        MELD-Na score: 14 at 12/2/2020  9:00 AM  MELD score: 14 at 12/2/2020  9:00 AM  Calculated from:  Serum Creatinine: 1.2 mg/dL at 12/2/2020  9:00 AM  Serum Sodium: 137 mmol/L at 12/2/2020  9:00 AM  Total Bilirubin: 3.3 mg/dL at 12/2/2020  9:00 AM  INR(ratio): 1.1 at 12/2/2020  9:00 AM  Age: 55 years 2 months    UNOS Patient Status  Functional Status: 50% - Requires considerable assistance and frequent medical care  Physical Capacity: No Limitations    Transplant Candidacy: She is listed for transplant.    Recommendations:  - Ascites/Edema: 2 gm Na diet. Continue Lasix 40 mg daily and Aldactone 100 mg daily    - Encephalopathy: Continue lactulose. Titrate to maintain 3-4 bowel movements per day. Continue rifaximin 550mg BID    - Variceal screening: EGD in 11/2019 showed no varices. Overdue for repeat EGD which was previously ordered. Patient and her  provided number to schedule.    - HCC screening: CT in 09/2020 without HCC. Repeat abdominal US and AFP every 6 months.    Return to clinic in 3 months with labs and US.    A total of 40 minutes were spent face-to-face with the patient during this encounter, and over half of that time was spent on counseling and coordination of care. We discussed in depth the nature of her liver disease and the management plan and liver transplant evaluation in details. I also educated her about lifestyle modifications which may stop or slow progression of liver disease. I have provided her with an opportunity to ask questions and have all questions answered to her satisfaction.    Oskar Crawford MD  Staff Physician  Hepatology and Liver Transplant  [facility]  [facility] [place]

## 2020-12-02 NOTE — PROGRESS NOTES
Transplant Recipient Adult Psychosocial Assessment - Update-Relocation       Zeina Mahoney  [address]  [facility] [address]  Telephone Information:   Mobile [phone]   Home  [phone] (home)  Work  There is no work phone number on file.  E-mail  [email or web address]    Sex: female  YOB: 1965  Age: 55 y.o.    Encounter Date: 2020  U[place] Citizen: yes  Primary Language: English   Needed: no    Emergency Contact:  Name: Yonis Mahoney   Relationship:   Address: [address]  [address], [place], [place] [address]  Phone Numbers:  [phone] (mobile)    Local Address:  [address] [place] [address]    Family/Social Support:   Number of dependents/: Pt has no dependents that she provides cares for. Pt has 2 outside dogs and 1 inside cat in the home. Pt's daughter will take care of the pets in the event that pt is relocated for transplant.   Marital history: Pt has been  to her  for 30 years and this is her only marriage.   Other family dynamics: Pt has 3 adult children; 2 sons - Yonis Mahoney  (29), Ramiro Mahoney (26), and 1 daughter - Riley Mahoney (21). Pt's parents are both . Pt has 2 older sisters that live near her and 2 older brothers that are both .     Household Composition:  Name: Zeina Mahoney   Age: 54  Relationship: patient  Does person drive? no     Name: Yonis Mahoney  Age: u/k  Relationship:   Does person drive? yes    Riley Mahoney (daughter) lives on the same property as the pt but in a different home.     Do you and your caregivers have access to reliable transportation? yes  PRIMARY CAREGIVER: Yonis Mahoney, pt's , will be the primary caregiver. Phone: [phone]     provided in-depth information to patient and caregiver regarding pre- and post-transplant caregiver role.   strongly encourages patient and caregiver to have concrete plan regarding post-transplant care giving,  including back-up caregiver(s) to ensure care giving needs are met as needed.    Patient and Caregiver states understanding all aspects of caregiver role/commitment and is able/willing/committed to being caregiver to the fullest extent necessary.    Patient and Caregiver verbalizes understanding of the education provided today and caregiver responsibilities.         remains available. Patient and Caregiver agree to contact  in a timely manner if concerns arise.      Able to take time off work without financial concerns: yes. Pt's  is retired.     Additional Significant Others who will Assist with Transplant:  Name: Ramiro Mahoney; : [phone] (here locally with patient and patient )  Age: 26  City: [facility]: [place]  Relationship: son  Does person drive? yes    Name: Riley Mahoney; ; [phone]  Age: 21  City: [facility]: [place]  Relationship: daughter  Does person drive? yes    Living Will: no  Healthcare Power of : no  Advance Directives on file: <<no information> per medical record.  Verbally reviewed LW/HCPA information.   provided patient with copy of LW/HCPA documents and provided education on completion of forms.    Living Donors: Patient son, Ramiro interested in living donation to patient.  Education was provided.     Highest Education Level: High School (9-12) or GED  Reading Ability: 12th grade  Reports difficulty with: reading; pt wears readers; patient has had episodes of confusion due to hepatic encephalopathy in the past.   Learns Best By:  Pt reports that she learns best by seeing, hearing, and viewing demonstration.      Status: no  VA Benefits: no     Working for Income: No  If no, reason not working: Patient choice. Pt stopped working when her kids were born to raise her children and stayed home.   Patient is retired from working in retail. 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    Spouse/Significant Other Employment:  Pt's  is retired, worked in [employer] in [place].     Disabled: no    Monthly Income:  senior living: $9,000 - Pt's 's detention.   Able to afford all costs now and if transplanted, including medications: yes  Patient and Caregiver verbalizes understanding of personal responsibilities related to transplant costs and the importance of having a financial plan to ensure that patients transplant costs are fully covered.      provided fundraising information/education.  Patient and Caregiver verbalizes understanding.   remains available.    Insurance:   Payor/Plan Subscr  Sex Relation Sub. Ins. ID Effective Group Num   1. BLUE CROSS BL* FLORIAN MEDINA 9/10/1964 Male  [id number]                                   [address] [address]     Primary Insurance (for UNOS reporting): Private Insurance  Secondary Insurance (for UNOS reporting): None  Patient and Caregiver verbalizes clear understanding that patient may experience difficulty obtaining and/or be denied insurance coverage post-surgery. This includes and is not limited to disability insurance, life insurance, health insurance, burial insurance, long term care insurance, and other insurances.    Patient and Caregiver also reports understanding that future health concerns related to or unrelated to transplantation may not be covered by patient's insurance.  Resources and information provided and reviewed.      Patient and Caregiver provides verbal permission to release any necessary information to outside resources for patient care and discharge planning.  Resources and information provided are reviewed.      Dialysis Adherence:  Patient and Caregiver reports no history of dialysis treatment.      Infusion Service: patient utilizing? no  Home Health: patient utilizing? no Patient is not current with home health services.  Patient has used [facility] locally for SN/PT.  Patient has utilized HH services in [place] in the past.    DME: yes - Pt's  reports that the pt utilizes several DME devices including a hospital bed, walker, wheelchair, bathroom pull-bars, and shower chair.   Locally in [place], patient has a wheelchair, walker, bathroom pull-bars.  The SC they have locally does not fit in the current shower.   Pulmonary/Cardiac Rehab: no   ADLS:  Pt is more independent with ADL's lately.  She does need assistance into the shower, but no assistance with bath.   Patient is not driving.      Adherence:   Pt and pt's  reports no issues or concerns with adherence with medical appointments and taking prescribed medications.  Adherence education and counseling provided.     Per History Section:  Past Medical History:   Diagnosis Date    Chronic Hepatic encephalopathy 6/25/2020    Decompensated hepatic cirrhosis 6/25/2020    History of pancreatitis 6/25/2020    Liver cirrhosis secondary to CHAUDHARY 6/25/2020    Macrocytic anemia 6/25/2020    Other ascites 6/25/2020    Thrombocytopenia 6/25/2020     Social History     Tobacco Use    Smoking status: Never Smoker    Smokeless tobacco: Never Used   Substance Use Topics    Alcohol use: Never     Frequency: Never     Social History     Substance and Sexual Activity   Drug Use Never     Social History     Substance and Sexual Activity   Sexual Activity Not on file       Per Today's Psychosocial:  Tobacco: none, patient denies any use.  Alcohol: Pt and pt's  reports that the pt used to be a social drinker and would have a few glasses of wine on occasion. Pt reports that she has not had any ETOH use in over a year when she recieved her ESLD diagnosis and did not have any problems with quitting.   Illicit Drugs/Non-prescribed Medications: none, patient denies any use.    Patient and Caregiver states clear understanding of the potential impact of substance use as it relates to transplant candidacy and is aware of possible random substance screening.  Substance  abstinence/cessation counseling, education and resources provided and reviewed.     Arrests/DWI/Treatment/Rehab: patient denies    Psychiatric History:    Mental Health: Patient with recent symptoms of anxiety that were exacerbated in October.  Patient denies current anxiety symptoms since returning home to [place] for a short time.  As well as with medication management.  Psychiatrist/Counselor: During inpatient hospitalization at [facility].  Patient does have an outpatient follow-up appointment on December 16, 2020 at [facility]  Medications:  Lexapro 10mg daily  Suicide/Homicide Issues: Pt denies any SI/HI (past/present).    Safety at home: Pt and pt's  reports not concerns safety in the home.     Knowledge: Patient and Caregiver states having clear understanding and realistic expectations regarding the potential risks and potential benefits of organ transplantation and organ donation, agrees to discuss with health care team members and support system members and to utilize available resources and express questions and/or concerns in order to further facilitate the pt informed decision-making.  Resources and information provided and reviewed.     Patient and Caregiver is aware of [facility]'s affiliation and/or partnership with agencies in home health care, LTAC, SNF, [facility], and other hospitals and clinics.    Understanding: Patient and Caregiver reports having a clear understanding of the many lifetime commitments involved with being a transplant recipient, including costs, compliance, medications, lab work, procedures, appointments, concrete and financial planning, preparedness, timely and appropriate communication of concerns, abstinence (ETOH, tobacco, illicit non-prescribed drugs), adherence to all health care team recommendations, support system and caregiver involvement, appropriate and timely resource utilization and follow-through, mental health counseling as needed/recommended, and patient  and caregiver responsibilities.  Social Service Handbook, resources and detailed educational information provided and reviewed.  Educational information provided.    Patient and Caregiver also reports current and expected compliance with health care regime and states having a clear understanding of the importance of compliance.      Patient and Caregiver reports a clear understanding that risks and benefits may be involved with organ transplantation and with organ donation.      Patient and Caregiver also reports clear understanding that psychosocial risk factors may affect patient, and include but are not limited to feelings of depression, generalized anxiety, anxiety regarding dependence on others, post traumatic stress disorder, feelings of guilt and other emotional and/or mental concerns, and/or exacerbation of existing mental health concerns.  Detailed resources provided and discussed.     Patient and Caregiver agrees to access appropriate resources in a timely manner as needed and/or as recommended, and to communicate concerns appropriately.  Patient and Caregiver also reports a clear understanding of treatment options available.      reviewed education, provided additional information, and answered questions.    Feelings or Concerns: Pt reports that she is nervous and scared about the transplant process and nervous that she will not receive a transplant.     Coping: Identify Patient & Caregiver Strategies to [place]:   1. Currently & Pre-transplant - Pt marlene with surrounding herself with family and spending time with her  and children.    2. At the time of surgery - Pt reported that she will continue to cope by surrounding herself with family and spending time with her  and children.    3. During post-Transplant & Recovery Period - Pt reported that she will continue to cope by surrounding herself with family and spending time with her  and children.     Goals: Pt reports she  looks forward to fishing and going to the beach after she is recovered from transplant.  Patient referred to Vocational Rehabilitation.    Interview Behavior: Patient presents as alert and oriented x 4, good eye contact, well groomed, recall good, concentration/judgement good and average intelligence.  Pt's  presents as alert and oriented x 4, good eye contact, and asking/answering questions appropriately.       Transplant Social Work - Candidacy  Assessment/Plan:     Psychosocial Suitability: Patient presents as an acceptable candidate for liver transplant at this time. Based on psychosocial risk factors, patient presents as low risk. Patient acknowledges more recent anxiety symptoms and seeking treatment.  Pt's protective factors includes a supportive family and caregiver plan, and a lack of substance abuse and mental health concerns.     Recommendations/Additional Comments:  -continued mental health follow-up  -fundraising  -local lodging     Blanca Gonzalez

## 2020-12-02 NOTE — LETTER
December 2, 2020        Reuben Vee  [address]  [facility] [address]  Phone: [phone]  Fax: [phone]             [facility]  [address]  [facility] [address]  Phone: [phone]   Patient: Zeina Mahoney   MR Number: [id number]   YOB: 1965   Date of Visit: 12/2/2020       Dear Dr. Reuben Vee    Thank you for referring Zeina Mahoney to me for evaluation. Attached you will find relevant portions of my assessment and plan of care.    If you have questions, please do not hesitate to call me. I look forward to following Zeina Mahoney along with you.    Sincerely,    Oskar Crawford MD    Enclosure    If you would like to receive this communication electronically, please contact [email or web address] or [phone] to request [employer] access.    [employer] is a tool which provides read-only access to select patient information with whom you have a relationship. Its easy to use and provides real time access to review your patients record including encounter summaries, notes, results, and demographic information.    If you feel you have received this communication in error or would no longer like to receive these types of communications, please e-mail [email or web address]

## 2020-12-03 ENCOUNTER — PATIENT MESSAGE (OUTPATIENT)
Dept: TRANSPLANT | Facility: CLINIC | Age: 55
End: 2020-12-03

## 2020-12-03 DIAGNOSIS — K74.60 LIVER CIRRHOSIS SECONDARY TO NASH: Primary | ICD-10-CM

## 2020-12-03 DIAGNOSIS — K75.81 LIVER CIRRHOSIS SECONDARY TO NASH: Primary | ICD-10-CM

## 2020-12-03 DIAGNOSIS — Z76.82 ORGAN TRANSPLANT CANDIDATE: ICD-10-CM

## 2020-12-04 DIAGNOSIS — Z76.82 ORGAN TRANSPLANT CANDIDATE: ICD-10-CM

## 2020-12-04 DIAGNOSIS — K74.60 LIVER CIRRHOSIS SECONDARY TO NASH: Primary | ICD-10-CM

## 2020-12-04 DIAGNOSIS — K75.81 LIVER CIRRHOSIS SECONDARY TO NASH: Primary | ICD-10-CM

## 2020-12-07 LAB — PHOSPHATIDYLETHANOL (PETH): NEGATIVE NG/ML

## 2020-12-08 ENCOUNTER — HOSPITAL ENCOUNTER (OUTPATIENT)
Facility: HOSPITAL | Age: 55
LOS: 1 days | Discharge: HOME | End: 2020-12-08
Attending: EMERGENCY MEDICINE | Admitting: INTERNAL MEDICINE
Payer: COMMERCIAL

## 2020-12-08 VITALS
SYSTOLIC BLOOD PRESSURE: 108 MMHG | OXYGEN SATURATION: 100 % | TEMPERATURE: 98 F | HEART RATE: 68 BPM | BODY MASS INDEX: 24.41 KG/M2 | DIASTOLIC BLOOD PRESSURE: 58 MMHG | HEIGHT: 64 IN | RESPIRATION RATE: 19 BRPM

## 2020-12-08 DIAGNOSIS — K75.81 LIVER CIRRHOSIS SECONDARY TO NASH: ICD-10-CM

## 2020-12-08 DIAGNOSIS — R10.9 ABDOMINAL PAIN, UNSPECIFIED ABDOMINAL LOCATION: ICD-10-CM

## 2020-12-08 DIAGNOSIS — K76.82 HEPATIC ENCEPHALOPATHY: Primary | ICD-10-CM

## 2020-12-08 DIAGNOSIS — M79.602 LEFT ARM PAIN: ICD-10-CM

## 2020-12-08 DIAGNOSIS — R41.82 AMS (ALTERED MENTAL STATUS): ICD-10-CM

## 2020-12-08 DIAGNOSIS — K74.60 HEPATIC CIRRHOSIS, UNSPECIFIED HEPATIC CIRRHOSIS TYPE, UNSPECIFIED WHETHER ASCITES PRESENT: ICD-10-CM

## 2020-12-08 DIAGNOSIS — R10.9 ABDOMINAL PAIN: ICD-10-CM

## 2020-12-08 DIAGNOSIS — R41.82 ALTERED MENTAL STATUS, UNSPECIFIED ALTERED MENTAL STATUS TYPE: ICD-10-CM

## 2020-12-08 DIAGNOSIS — K74.60 LIVER CIRRHOSIS SECONDARY TO NASH: ICD-10-CM

## 2020-12-08 LAB
ALBUMIN SERPL BCP-MCNC: 2.7 G/DL (ref 3.5–5.2)
ALP SERPL-CCNC: 132 U/L (ref 55–135)
ALT SERPL W/O P-5'-P-CCNC: 22 U/L (ref 10–44)
AMMONIA PLAS-SCNC: 70 UMOL/L (ref 10–50)
ANION GAP SERPL CALC-SCNC: 7 MMOL/L (ref 8–16)
AST SERPL-CCNC: 43 U/L (ref 10–40)
BACTERIA #/AREA URNS AUTO: ABNORMAL /HPF
BASOPHILS # BLD AUTO: 0.05 K/UL (ref 0–0.2)
BASOPHILS NFR BLD: 0.9 % (ref 0–1.9)
BILIRUB SERPL-MCNC: 3.5 MG/DL (ref 0.1–1)
BILIRUB UR QL STRIP: NEGATIVE
BUN SERPL-MCNC: 12 MG/DL (ref 6–20)
BUN SERPL-MCNC: 13 MG/DL (ref 6–30)
CALCIUM SERPL-MCNC: 9.6 MG/DL (ref 8.7–10.5)
CHLORIDE SERPL-SCNC: 100 MMOL/L (ref 95–110)
CHLORIDE SERPL-SCNC: 103 MMOL/L (ref 95–110)
CLARITY UR REFRACT.AUTO: ABNORMAL
CO2 SERPL-SCNC: 22 MMOL/L (ref 23–29)
COLOR UR AUTO: YELLOW
CREAT SERPL-MCNC: 1.1 MG/DL (ref 0.5–1.4)
CREAT SERPL-MCNC: 1.2 MG/DL (ref 0.5–1.4)
CTP QC/QA: YES
DIFFERENTIAL METHOD: ABNORMAL
EOSINOPHIL # BLD AUTO: 0.1 K/UL (ref 0–0.5)
EOSINOPHIL NFR BLD: 2.3 % (ref 0–8)
ERYTHROCYTE [DISTWIDTH] IN BLOOD BY AUTOMATED COUNT: 14.4 % (ref 11.5–14.5)
EST. GFR  (AFRICAN AMERICAN): 58.8 ML/MIN/1.73 M^2
EST. GFR  (NON AFRICAN AMERICAN): 51 ML/MIN/1.73 M^2
GLUCOSE SERPL-MCNC: 245 MG/DL (ref 70–110)
GLUCOSE SERPL-MCNC: 256 MG/DL (ref 70–110)
GLUCOSE UR QL STRIP: NEGATIVE
HCT VFR BLD AUTO: 30.2 % (ref 37–48.5)
HCT VFR BLD CALC: 29 %PCV (ref 36–54)
HGB BLD-MCNC: 9.5 G/DL (ref 12–16)
HGB UR QL STRIP: NEGATIVE
IMM GRANULOCYTES # BLD AUTO: 0.02 K/UL (ref 0–0.04)
IMM GRANULOCYTES NFR BLD AUTO: 0.4 % (ref 0–0.5)
KETONES UR QL STRIP: NEGATIVE
LACTATE SERPL-SCNC: 2 MMOL/L (ref 0.5–2.2)
LEUKOCYTE ESTERASE UR QL STRIP: ABNORMAL
LIPASE SERPL-CCNC: 285 U/L (ref 4–60)
LYMPHOCYTES # BLD AUTO: 1.4 K/UL (ref 1–4.8)
LYMPHOCYTES NFR BLD: 25.9 % (ref 18–48)
MAGNESIUM SERPL-MCNC: 1.4 MG/DL (ref 1.6–2.6)
MCH RBC QN AUTO: 32.6 PG (ref 27–31)
MCHC RBC AUTO-ENTMCNC: 31.5 G/DL (ref 32–36)
MCV RBC AUTO: 104 FL (ref 82–98)
MICROSCOPIC COMMENT: ABNORMAL
MONOCYTES # BLD AUTO: 0.3 K/UL (ref 0.3–1)
MONOCYTES NFR BLD: 5.6 % (ref 4–15)
NEUTROPHILS # BLD AUTO: 3.6 K/UL (ref 1.8–7.7)
NEUTROPHILS NFR BLD: 64.9 % (ref 38–73)
NITRITE UR QL STRIP: NEGATIVE
NRBC BLD-RTO: 0 /100 WBC
PH UR STRIP: 6 [PH] (ref 5–8)
PLATELET # BLD AUTO: 90 K/UL (ref 150–350)
PMV BLD AUTO: 10.9 FL (ref 9.2–12.9)
POC IONIZED CALCIUM: 1.3 MMOL/L (ref 1.06–1.42)
POC TCO2 (MEASURED): 25 MMOL/L (ref 23–29)
POTASSIUM BLD-SCNC: 3.8 MMOL/L (ref 3.5–5.1)
POTASSIUM SERPL-SCNC: 4 MMOL/L (ref 3.5–5.1)
PROT SERPL-MCNC: 6.4 G/DL (ref 6–8.4)
PROT UR QL STRIP: NEGATIVE
RBC # BLD AUTO: 2.91 M/UL (ref 4–5.4)
RBC #/AREA URNS AUTO: 1 /HPF (ref 0–4)
SAMPLE: ABNORMAL
SARS-COV-2 RDRP RESP QL NAA+PROBE: NEGATIVE
SODIUM BLD-SCNC: 134 MMOL/L (ref 136–145)
SODIUM SERPL-SCNC: 132 MMOL/L (ref 136–145)
SP GR UR STRIP: 1.01 (ref 1–1.03)
SQUAMOUS #/AREA URNS AUTO: 2 /HPF
URN SPEC COLLECT METH UR: ABNORMAL
WBC # BLD AUTO: 5.56 K/UL (ref 3.9–12.7)
WBC #/AREA URNS AUTO: 27 /HPF (ref 0–5)
WBC CLUMPS UR QL AUTO: ABNORMAL
YEAST UR QL AUTO: ABNORMAL

## 2020-12-08 PROCEDURE — 94761 N-INVAS EAR/PLS OXIMETRY MLT: CPT | Mod: NTX

## 2020-12-08 PROCEDURE — 99285 EMERGENCY DEPT VISIT HI MDM: CPT | Mod: 25,NTX

## 2020-12-08 PROCEDURE — 80047 BASIC METABLC PNL IONIZED CA: CPT | Mod: NTX

## 2020-12-08 PROCEDURE — 87186 SC STD MICRODIL/AGAR DIL: CPT | Mod: NTX

## 2020-12-08 PROCEDURE — 87088 URINE BACTERIA CULTURE: CPT | Mod: NTX

## 2020-12-08 PROCEDURE — 80053 COMPREHEN METABOLIC PANEL: CPT | Mod: NTX

## 2020-12-08 PROCEDURE — 93010 ELECTROCARDIOGRAM REPORT: CPT | Mod: NTX,,, | Performed by: INTERNAL MEDICINE

## 2020-12-08 PROCEDURE — G0378 HOSPITAL OBSERVATION PER HR: HCPCS | Mod: NTX

## 2020-12-08 PROCEDURE — 93010 EKG 12-LEAD: ICD-10-PCS | Mod: NTX,,, | Performed by: INTERNAL MEDICINE

## 2020-12-08 PROCEDURE — 81001 URINALYSIS AUTO W/SCOPE: CPT | Mod: NTX

## 2020-12-08 PROCEDURE — 87077 CULTURE AEROBIC IDENTIFY: CPT | Mod: NTX

## 2020-12-08 PROCEDURE — 87086 URINE CULTURE/COLONY COUNT: CPT | Mod: NTX

## 2020-12-08 PROCEDURE — 63600175 PHARM REV CODE 636 W HCPCS: Mod: NTX | Performed by: EMERGENCY MEDICINE

## 2020-12-08 PROCEDURE — 25000003 PHARM REV CODE 250: Mod: NTX | Performed by: NURSE PRACTITIONER

## 2020-12-08 PROCEDURE — 96365 THER/PROPH/DIAG IV INF INIT: CPT

## 2020-12-08 PROCEDURE — 83735 ASSAY OF MAGNESIUM: CPT | Mod: NTX

## 2020-12-08 PROCEDURE — 93005 ELECTROCARDIOGRAM TRACING: CPT | Mod: NTX

## 2020-12-08 PROCEDURE — 99223 1ST HOSP IP/OBS HIGH 75: CPT | Mod: NTX,,, | Performed by: NURSE PRACTITIONER

## 2020-12-08 PROCEDURE — U0002 COVID-19 LAB TEST NON-CDC: HCPCS | Mod: NTX | Performed by: EMERGENCY MEDICINE

## 2020-12-08 PROCEDURE — 99223 PR INITIAL HOSPITAL CARE,LEVL III: ICD-10-PCS | Mod: NTX,,, | Performed by: NURSE PRACTITIONER

## 2020-12-08 PROCEDURE — 83605 ASSAY OF LACTIC ACID: CPT | Mod: NTX

## 2020-12-08 PROCEDURE — 82140 ASSAY OF AMMONIA: CPT | Mod: NTX

## 2020-12-08 PROCEDURE — 99285 PR EMERGENCY DEPT VISIT,LEVEL V: ICD-10-PCS | Mod: NTX,,, | Performed by: EMERGENCY MEDICINE

## 2020-12-08 PROCEDURE — 83690 ASSAY OF LIPASE: CPT | Mod: NTX

## 2020-12-08 PROCEDURE — 87040 BLOOD CULTURE FOR BACTERIA: CPT | Mod: NTX

## 2020-12-08 PROCEDURE — 99285 EMERGENCY DEPT VISIT HI MDM: CPT | Mod: NTX,,, | Performed by: EMERGENCY MEDICINE

## 2020-12-08 PROCEDURE — 25000003 PHARM REV CODE 250: Mod: NTX | Performed by: EMERGENCY MEDICINE

## 2020-12-08 PROCEDURE — 85025 COMPLETE CBC W/AUTO DIFF WBC: CPT | Mod: NTX

## 2020-12-08 RX ORDER — VANCOMYCIN HCL IN 5 % DEXTROSE 1G/250ML
1000 PLASTIC BAG, INJECTION (ML) INTRAVENOUS
Status: COMPLETED | OUTPATIENT
Start: 2020-12-08 | End: 2020-12-08

## 2020-12-08 RX ORDER — ZINC SULFATE 50(220)MG
220 CAPSULE ORAL DAILY
Status: DISCONTINUED | OUTPATIENT
Start: 2020-12-09 | End: 2020-12-08 | Stop reason: HOSPADM

## 2020-12-08 RX ORDER — LEVOTHYROXINE SODIUM 88 UG/1
88 TABLET ORAL
Status: DISCONTINUED | OUTPATIENT
Start: 2020-12-09 | End: 2020-12-08 | Stop reason: HOSPADM

## 2020-12-08 RX ORDER — ACETAMINOPHEN 325 MG/1
650 TABLET ORAL EVERY 8 HOURS PRN
Status: DISCONTINUED | OUTPATIENT
Start: 2020-12-08 | End: 2020-12-08 | Stop reason: HOSPADM

## 2020-12-08 RX ORDER — MIDODRINE HYDROCHLORIDE 5 MG/1
10 TABLET ORAL 3 TIMES DAILY
Status: DISCONTINUED | OUTPATIENT
Start: 2020-12-08 | End: 2020-12-08 | Stop reason: HOSPADM

## 2020-12-08 RX ORDER — LACTULOSE 10 G/15ML
20 SOLUTION ORAL ONCE
Status: COMPLETED | OUTPATIENT
Start: 2020-12-08 | End: 2020-12-08

## 2020-12-08 RX ORDER — HYDROXYZINE HYDROCHLORIDE 25 MG/1
25 TABLET, FILM COATED ORAL
Status: DISCONTINUED | OUTPATIENT
Start: 2020-12-08 | End: 2020-12-08 | Stop reason: HOSPADM

## 2020-12-08 RX ORDER — LACTULOSE 10 G/15ML
30 SOLUTION ORAL 3 TIMES DAILY
Status: DISCONTINUED | OUTPATIENT
Start: 2020-12-08 | End: 2020-12-08 | Stop reason: HOSPADM

## 2020-12-08 RX ORDER — LACTULOSE 10 G/15ML
10 SOLUTION ORAL
Status: COMPLETED | OUTPATIENT
Start: 2020-12-08 | End: 2020-12-08

## 2020-12-08 RX ORDER — MAGNESIUM SULFATE HEPTAHYDRATE 40 MG/ML
2 INJECTION, SOLUTION INTRAVENOUS ONCE
Status: DISCONTINUED | OUTPATIENT
Start: 2020-12-08 | End: 2020-12-08

## 2020-12-08 RX ORDER — SODIUM CHLORIDE 0.9 % (FLUSH) 0.9 %
10 SYRINGE (ML) INJECTION
Status: DISCONTINUED | OUTPATIENT
Start: 2020-12-08 | End: 2020-12-08 | Stop reason: HOSPADM

## 2020-12-08 RX ORDER — ESCITALOPRAM OXALATE 5 MG/1
5 TABLET ORAL DAILY
Status: DISCONTINUED | OUTPATIENT
Start: 2020-12-09 | End: 2020-12-08 | Stop reason: HOSPADM

## 2020-12-08 RX ORDER — PANTOPRAZOLE SODIUM 40 MG/1
40 TABLET, DELAYED RELEASE ORAL DAILY
Status: DISCONTINUED | OUTPATIENT
Start: 2020-12-09 | End: 2020-12-08 | Stop reason: HOSPADM

## 2020-12-08 RX ORDER — ONDANSETRON 2 MG/ML
4 INJECTION INTRAMUSCULAR; INTRAVENOUS EVERY 6 HOURS PRN
Status: DISCONTINUED | OUTPATIENT
Start: 2020-12-08 | End: 2020-12-08 | Stop reason: HOSPADM

## 2020-12-08 RX ORDER — HEPARIN SODIUM 5000 [USP'U]/ML
5000 INJECTION, SOLUTION INTRAVENOUS; SUBCUTANEOUS EVERY 8 HOURS
Status: DISCONTINUED | OUTPATIENT
Start: 2020-12-08 | End: 2020-12-08 | Stop reason: HOSPADM

## 2020-12-08 RX ADMIN — LACTULOSE 20 G: 20 SOLUTION ORAL at 12:12

## 2020-12-08 RX ADMIN — VANCOMYCIN HYDROCHLORIDE 1000 MG: 1 INJECTION, POWDER, LYOPHILIZED, FOR SOLUTION INTRAVENOUS at 12:12

## 2020-12-08 RX ADMIN — LACTULOSE 10 G: 20 SOLUTION ORAL at 12:12

## 2020-12-08 NOTE — ED NOTES
Patient identifiers verified and correct for Ms Coy  C/C: Abd pain in liver patient SEE NN  APPEARANCE: awake and alert in NAD. Speech slurred.   SKIN: warm, dry and intact. No breakdown or bruising.  MUSCULOSKELETAL: Patient moving all extremities spontaneously, no obvious swelling or deformities noted. Ambulates independently.  RESPIRATORY: Denies shortness of breath.Respirations unlabored.   CARDIAC: Denies CP, 2+ distal pulses; no peripheral edema  ABDOMEN: Abdomen round, soft, denies nausea or vomiting No BM since yesterday  : voids spontaneously, denies difficulty  Neurologic: AAO x 4; follows commands equal strength in all extremities; denies numbness/tingling. Denies dizziness Reports weakness left shoulder pain with mvmt since Thanksgiving

## 2020-12-08 NOTE — H&P
[facility]  Liver Transplant  History & Physical    Patient Name: Zeina Mahoney  MRN: [id number]  Admission Date: 12/8/2020  Code Status: Full Code  Primary Care Provider: Primary Doctor No    Subjective:     History of Present Illness:  Zeina Mahoney is a 55 year old female with PMH of CHAUDHARY listed for liver transplant with MELD 20. Pt presents to the ED with c/o LUQ abdominal pain and left arm pain. Pt denies any falls or trauma. She recently returned home to [place] and reported pulling up on car safety handle often when getting in and out of car which could be contributing to increased soreness to left arm. Xrays in ED negative for fracture. Also there was concern for hepatic encephalopathy. Ammonia 70. Pt AAO x 3. Reported taking lactulose as prescribed and having a large BM in the ED. Abdominal pain resolved. Pt denies fever/chills, nausea/vomiting, chest pain, palpitations, sob, or changes in bladder/bowel function. COVID 19 negative. Will admit to LTS for observation and infectious workup.         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 No pertinent surgical history.    Review of patient's allergies indicates:   Allergen Reactions    Aspirin Tinitus    Ciprofloxacin Rash    Clindamycin Itching       Family History     None        Tobacco Use    Smoking status: Never Smoker    Smokeless tobacco: Never Used   Substance and Sexual Activity    Alcohol use: Never     Frequency: Never    Drug use: Never    Sexual activity: Not on file       (Not in a hospital admission)      Review of Systems   Constitutional: Negative for activity change, appetite change, chills, fatigue and fever.   HENT: Negative for  congestion and facial swelling.    Eyes: Negative for pain, discharge and visual disturbance.   Respiratory: Negative for cough, chest tightness, shortness of breath and wheezing.    Cardiovascular: Negative for chest pain, palpitations and leg swelling.   Gastrointestinal: Negative for abdominal distention, abdominal pain, constipation, diarrhea, nausea and vomiting.   Endocrine: Negative.    Genitourinary: Negative for decreased urine volume, difficulty urinating and hematuria.   Musculoskeletal: Negative for back pain.   Skin: Negative for pallor, rash and wound.   Allergic/Immunologic: Negative for immunocompromised state.   Neurological: Negative for dizziness, tremors, weakness and light-headedness.   Hematological: Negative.    Psychiatric/Behavioral: Negative for agitation, confusion and dysphoric mood. The patient is not nervous/anxious.      Objective:     Vital Signs (Most Recent):  Temp: 97.6 °F (36.4 °C) (12/08/20 0722)  Pulse: 85 (12/08/20 1153)  Resp: 20 (12/08/20 1138)  BP: (!) 106/52 (12/08/20 1146)  SpO2: 99 % (12/08/20 1146) Vital Signs (24h Range):  Temp:  [97.6 °F (36.4 °C)] 97.6 °F (36.4 °C)  Pulse:  [68-88] 85  Resp:  [15-21] 20  SpO2:  [96 %-100 %] 99 %  BP: ()/(50-69) 106/52        Body mass index is 24.41 kg/m².    No intake or output data in the 24 hours ending 12/08/20 1302    Physical Exam  Vitals signs and nursing note reviewed.   Constitutional:       Appearance: She is well-developed.   HENT:      Head: Normocephalic and atraumatic.   Eyes:      General: No scleral icterus.     Pupils: Pupils are equal, round, and reactive to light.   Neck:      Musculoskeletal: Normal range of motion and neck supple.      Vascular: No JVD.   Cardiovascular:      Rate and Rhythm: Normal rate and regular rhythm.      Heart sounds: Normal heart sounds. No murmur.   Pulmonary:      Effort: Pulmonary effort is normal. No respiratory distress.      Breath sounds: Normal breath sounds. No wheezing.    Abdominal:      General: Bowel sounds are normal. There is no distension.      Palpations: Abdomen is soft.   Musculoskeletal: Normal range of motion.   Skin:     General: Skin is warm and dry.   Neurological:      Mental Status: She is alert and oriented to person, place, and time.   Psychiatric:         Behavior: Behavior normal.         Laboratory:  CBC:   Recent Labs   Lab 12/08/20  0807 12/08/20  0810   WBC 5.56  --    RBC 2.91*  --    HGB 9.5*  --    HCT 30.2* 29*   PLT 90*  --    *  --    MCH 32.6*  --    MCHC 31.5*  --      CMP:   Recent Labs   Lab 12/08/20  0807   *   CALCIUM 9.6   ALBUMIN 2.7*   PROT 6.4   *   K 4.0   CO2 22*      BUN 12   CREATININE 1.2   ALKPHOS 132   ALT 22   AST 43*   BILITOT 3.5*     Labs within the past 24 hours have been reviewed.    Diagnostic Results:  I have personally reviewed all pertinent imaging studies.    Assessment/Plan:     Left arm pain  - Xrays in ED negative for fracture.      AMS (altered mental status)  - initially admitted for left arm pain.  - Ammonia 70 with concern for HE.  - continue lactulose.  - infectious workup pending.      Liver cirrhosis secondary to CHAUDHARY  MELD-Na score: 14 at 12/2/2020  9:00 AM  MELD score: 14 at 12/2/2020  9:00 AM  Calculated from:  Serum Creatinine: 1.2 mg/dL at 12/2/2020  9:00 AM  Serum Sodium: 137 mmol/L at 12/2/2020  9:00 AM  Total Bilirubin: 3.3 mg/dL at 12/2/2020  9:00 AM  INR(ratio): 1.1 at 12/2/2020  9:00 AM  Age: 55 years 2 months            Discharge Planning: not a candidate for dc at this time.       Milagro Shea NP  Liver Transplant  [facility]

## 2020-12-08 NOTE — SUBJECTIVE & OBJECTIVE
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 No pertinent surgical history.    Review of patient's allergies indicates:   Allergen Reactions    Aspirin Tinitus    Ciprofloxacin Rash    Clindamycin Itching       Family History     None        Tobacco Use    Smoking status: Never Smoker    Smokeless tobacco: Never Used   Substance and Sexual Activity    Alcohol use: Never     Frequency: Never    Drug use: Never    Sexual activity: Not on file       (Not in a hospital admission)      Review of Systems   Constitutional: Negative for activity change, appetite change, chills, fatigue and fever.   HENT: Negative for congestion and facial swelling.    Eyes: Negative for pain, discharge and visual disturbance.   Respiratory: Negative for cough, chest tightness, shortness of breath and wheezing.    Cardiovascular: Negative for chest pain, palpitations and leg swelling.   Gastrointestinal: Negative for abdominal distention, abdominal pain, constipation, diarrhea, nausea and vomiting.   Endocrine: Negative.    Genitourinary: Negative for decreased urine volume, difficulty urinating and hematuria.   Musculoskeletal: Negative for back pain.   Skin: Negative for pallor, rash and wound.   Allergic/Immunologic: Negative for immunocompromised state.   Neurological: Negative for dizziness, tremors, weakness and light-headedness.   Hematological: Negative.    Psychiatric/Behavioral: Negative for agitation, confusion and dysphoric mood. The patient is not nervous/anxious.      Objective:     Vital Signs (Most Recent):  Temp: 97.6 °F (36.4 °C) (12/08/20 0722)  Pulse: 85 (12/08/20 1153)  Resp: 20 (12/08/20 1138)  BP: (!) 106/52 (12/08/20 1146)  SpO2:  99 % (12/08/20 1146) Vital Signs (24h Range):  Temp:  [97.6 °F (36.4 °C)] 97.6 °F (36.4 °C)  Pulse:  [68-88] 85  Resp:  [15-21] 20  SpO2:  [96 %-100 %] 99 %  BP: ()/(50-69) 106/52        Body mass index is 24.41 kg/m².    No intake or output data in the 24 hours ending 12/08/20 1302    Physical Exam  Vitals signs and nursing note reviewed.   Constitutional:       Appearance: She is well-developed.   HENT:      Head: Normocephalic and atraumatic.   Eyes:      General: No scleral icterus.     Pupils: Pupils are equal, round, and reactive to light.   Neck:      Musculoskeletal: Normal range of motion and neck supple.      Vascular: No JVD.   Cardiovascular:      Rate and Rhythm: Normal rate and regular rhythm.      Heart sounds: Normal heart sounds. No murmur.   Pulmonary:      Effort: Pulmonary effort is normal. No respiratory distress.      Breath sounds: Normal breath sounds. No wheezing.   Abdominal:      General: Bowel sounds are normal. There is no distension.      Palpations: Abdomen is soft.   Musculoskeletal: Normal range of motion.   Skin:     General: Skin is warm and dry.   Neurological:      Mental Status: She is alert and oriented to person, place, and time.   Psychiatric:         Behavior: Behavior normal.         Laboratory:  CBC:   Recent Labs   Lab 12/08/20  0807 12/08/20  0810   WBC 5.56  --    RBC 2.91*  --    HGB 9.5*  --    HCT 30.2* 29*   PLT 90*  --    *  --    MCH 32.6*  --    MCHC 31.5*  --      CMP:   Recent Labs   Lab 12/08/20  0807   *   CALCIUM 9.6   ALBUMIN 2.7*   PROT 6.4   *   K 4.0   CO2 22*      BUN 12   CREATININE 1.2   ALKPHOS 132   ALT 22   AST 43*   BILITOT 3.5*     Labs within the past 24 hours have been reviewed.    Diagnostic Results:  I have personally reviewed all pertinent imaging studies.

## 2020-12-08 NOTE — DISCHARGE SUMMARY
[facility]  Liver Transplant  Discharge Summary      Patient Name: Zeina Mahoney  MRN: [id number]  Admission Date: 12/8/2020  Hospital Length of Stay: 0 days  Discharge Date and Time:  12/08/2020 1:12 PM  Attending Physician: Markie Hale DO   Discharging Provider: Milagro Shea NP  Primary Care Provider: Primary Doctor No       HPI:   Zeina Mahoney is a 55 year old female with PMH of CHAUDHARY listed for liver transplant. Pt presents to the ED with c/o LUQ abdominal pain and left arm pain. Pt denies any falls or trauma. She recently returned home to [place] and reported pulling up on car safety handle often when getting in and out of car which could be contributing to increased soreness to left arm. Xrays in ED negative for fracture. Also there was concern for hepatic encephalopathy. Ammonia 70. Pt AAO x 3. Reported taking lactulose as prescribed and having a large BM in the ED. Abdominal pain resolved. Pt denies fever/chills, nausea/vomiting, chest pain, palpitations, sob, or changes in bladder/bowel function. COVID 19 negative. Will admit to LTS for observation and infectious workup.       Hospital Course:  Pt AAO x 3 and feeling well. Pt reported a BM without intervention and abdominal pain resolved. Resume home lactulose. Shoulder/arm xrays negative for fracture.     Pt stable to discharge home. She has no complaints. Listed with MELD 17. Will f/u blood and urine cultures. Vanc x 1 dose given in ED prior to discharge. She will follow the PDOL protocol. Labs scheduled 12/15. Pt/caregiver verbalized understanding of discharge instructions and importance of follow up.    Final Active Diagnoses:    Diagnosis Date Noted POA    AMS (altered mental status) [R41.82] 12/08/2020 Yes    Left arm pain [M79.602] 12/08/2020 Unknown    Abdominal pain [R10.9] 09/22/2020 Unknown    Liver cirrhosis secondary to CHAUDHARY [K75.81, K74.60] 06/25/2020 Yes      Problems Resolved During this Admission:            Pending Diagnostic Studies:     None        Significant Diagnostic Studies: Labs:   CMP   Recent Labs   Lab 12/08/20  0807   *   K 4.0      CO2 22*   *   BUN 12   CREATININE 1.2   CALCIUM 9.6   PROT 6.4   ALBUMIN 2.7*   BILITOT 3.5*   ALKPHOS 132   AST 43*   ALT 22   ANIONGAP 7*   ESTGFRAFRICA 58.8*   EGFRNONAA 51.0*    and CBC   Recent Labs   Lab 12/08/20  0807 12/08/20  0810   WBC 5.56  --    HGB 9.5*  --    HCT 30.2* 29*   PLT 90*  --        The patients clinical status was discussed at multidisplinary rounds, involving transplant surgery, transplant medicine, pharmacy, nursing, nutrition, and social work    Discharged Condition: good    Disposition:     Follow Up: lab Tues 12/15    Patient Instructions:   No discharge procedures on file.  Medications:  Reconciled Home Medications:      Medication List      ASK your doctor about these medications    acetaminophen 500 MG tablet  Commonly known as: TYLENOL  Take 1 tablet (500 mg total) by mouth every 6 (six) hours as needed for Pain. Max 2000mg daily.     benzonatate 100 MG capsule  Commonly known as: TESSALON  Take 1 capsule (100 mg total) by mouth 3 (three) times daily as needed for Cough.     diaper,brief,adult,disposable Misc  5 mg/kg/day by Misc.(Non-Drug; Combo Route) route 5 (five) times daily. Patient with incontinence. Please supply enough depends for 5xs/day     ergocalciferol 50,000 unit Cap  Commonly known as: VITAMIN D2  Take 1 capsule (50,000 Units total) by mouth every 7 days.     escitalopram oxalate 5 MG Tab  Commonly known as: LEXAPRO  Take 1 tablet (5 mg total) by mouth once daily.     furosemide 40 MG tablet  Commonly known as: LASIX  Take 1 tablet (40 mg total) by mouth once daily.     hydrOXYzine HCL 25 MG tablet  Commonly known as: ATARAX  Take 1 tablet (25 mg total) by mouth as needed for Itching.     lactulose 10 gram packet  Commonly known as: CEPHULAC  Mix 3 packets (30 g total) with liquid and take by mouth  3 (three) times daily as needed.     levothyroxine 88 MCG tablet  Commonly known as: SYNTHROID  Take 1 tablet (88 mcg total) by mouth before breakfast.     lidocaine 5 %  Commonly known as: LIDODERM  Place 1 patch onto the skin every 24 hours. Remove & Discard patch within 12 hours or as directed by MD     magnesium hydroxide 400 mg/5 ml 400 mg/5 mL Susp  Commonly known as: MILK OF MAGNESIA  Take 30 mLs (2,400 mg total) by mouth daily as needed (Take if constipated despite lactulose).     melatonin 10 mg Tab  Take 10 mg by mouth nightly as needed (sleep).     midodrine 10 MG tablet  Commonly known as: PROAMATINE  Take 1 tablet (10 mg total) by mouth 3 (three) times daily.     nystatin powder  Commonly known as: MYCOSTATIN  Apply topically 2 (two) times daily. Apply to affected area twice a day     pantoprazole 40 MG tablet  Commonly known as: PROTONIX  Take 40 mg by mouth before breakfast.     polyethylene glycol 17 gram Pwpk  Commonly known as: GLYCOLAX  Take 17 g by mouth once daily. Or miralax OTC. Take if constipated despite lactulose     rifAXIMin 550 mg Tab  Commonly known as: XIFAXAN  Take 1 tablet (550 mg total) by mouth 2 (two) times daily.     simethicone 80 MG chewable tablet  Commonly known as: MYLICON  Take 80 mg by mouth every 6 (six) hours as needed for Flatulence. Take 2 tablets by mouth every 6 hours as needed for gas     spironolactone 50 MG tablet  Commonly known as: ALDACTONE  Take 2 tablets (100 mg total) by mouth once daily.     VENTOLIN HFA 90 mcg/actuation inhaler  Generic drug: albuterol  Inhale 2 puffs into the lungs every 6 (six) hours as needed for Wheezing. Rescue     zinc sulfate 220 (50) mg capsule  Commonly known as: ZINCATE  Take 220 mg by mouth before breakfast.          Time spent caring for patient (Greater than 1/2 spent in direct face-to-face contact): > 30 minutes    Milagro Shea NP  Liver Transplant  [facility]

## 2020-12-08 NOTE — ASSESSMENT & PLAN NOTE
- initially admitted for left arm pain.  - Ammonia 70 with concern for HE.  - continue lactulose.  - infectious workup pending.

## 2020-12-08 NOTE — ED TRIAGE NOTES
Patient states LUQ abd pain onset 0200, denies nausea or vomiting, Denies fevers,no BM< since yesterday, taking Lactulose. Has been in [place] since 12/2

## 2020-12-08 NOTE — ED NOTES
I-STAT Chem-8+ Results:   Value Reference Range   Sodium 134 136-145 mmol/L   Potassium  3.8 3.5-5.1 mmol/L   Chloride 100  mmol/L   Ionized Calcium 1.3 1.06-1.42 mmol/L   CO2 (measured) 25 23-29 mmol/L   Glucose 245  mg/dL   BUN 13 6-30 mg/dL   Creatinine 1.1 0.5-1.4 mg/dL   Hematocrit 29 36-54%

## 2020-12-08 NOTE — ED PROVIDER NOTES
Encounter Date: 12/8/2020       History     Chief Complaint   Patient presents with    Liver Failure     on list having more pain    Arm Pain     L arm pain since thanksgiving     HPI   Zeina Mahoney is a 55-year-old female with a history of end-stage liver disease secondary to CHAUDHARY with previous meld of 20, recently admitted 10/19 to 1023 with abdominal pain found to have Staph a coccus pseudo intermedius bacteria, history of pancreatitis, history of ascites, history of chronic hepatic encephalopathy and decompensated hepatic cirrhosis presenting with abdominal pain and left arm pain.  Her abdominal pain is located just below the left rib cage in the epigastrium.  No abdominal peritoneal findings.  She denies any fevers, chills, nausea, vomiting, diarrhea at this time.  No COVID-19 symptoms.  Onset has been gradual with no other associated factors.  She also has been complaining of left arm pain for 1 month with pain around the humerus.  Denies any falls or trauma.  Current arm pain rated 7/10.  Patient has not take any Tylenol or medication for this.    Review of patient's allergies indicates:   Allergen Reactions    Aspirin Tinitus    Ciprofloxacin Rash    Clindamycin Itching     Past Medical History:   Diagnosis Date    Chronic Hepatic encephalopathy 6/25/2020    Decompensated hepatic cirrhosis 6/25/2020    History of pancreatitis 6/25/2020    Liver cirrhosis secondary to CHAUDHARY 6/25/2020    Macrocytic anemia 6/25/2020    Other ascites 6/25/2020    Thrombocytopenia 6/25/2020     History reviewed. No pertinent surgical history.  History reviewed. No pertinent family history.  Social History     Tobacco Use    Smoking status: Never Smoker    Smokeless tobacco: Never Used   Substance Use Topics    Alcohol use: Never     Frequency: Never    Drug use: Never     Review of Systems   Constitutional: Positive for appetite change and fatigue. Negative for chills, diaphoresis and fever.   HENT: Negative for  sore throat.    Respiratory: Negative for chest tightness and shortness of breath.    Cardiovascular: Negative for chest pain, palpitations and leg swelling.   Gastrointestinal: Positive for abdominal pain, nausea and vomiting. Negative for constipation and diarrhea.   Endocrine: Negative for polyphagia and polyuria.   Genitourinary: Negative for dysuria, flank pain and hematuria.   Musculoskeletal: Negative for joint swelling, myalgias, neck pain and neck stiffness.   Skin: Positive for color change. Negative for rash and wound.   Allergic/Immunologic: Negative for immunocompromised state.   Neurological: Positive for weakness. Negative for syncope, facial asymmetry, speech difficulty, light-headedness and headaches.   Psychiatric/Behavioral: Positive for confusion. Negative for agitation and hallucinations. The patient is not nervous/anxious and is not hyperactive.        Physical Exam     Initial Vitals [12/08/20 0722]   BP Pulse Resp Temp SpO2   (!) 110/58 88 18 97.6 °F (36.4 °C) 100 %      MAP       --         Physical Exam    Nursing note and vitals reviewed.    Gen/Constitutional: Interactive.  Appears chronically ill, slightly confused, jaundice skin  Head: Normocephalic, Atraumatic  Neck: supple, no masses or LAD  Eyes: PERRLA, conjunctiva with scleral icterus  Ears, Nose and Throat: No rhinorrhea or stridor.  Cardiac:  Regular rate, Reg Rhythm, No murmur  Chest wall:  Tenderness on the left lower ribcage  Pulmonary: CTA Bilat, no wheezes, rhonchi, rales.  GI: Abdomen soft, non-tender, non-distended; no rebound or guarding; slight epigastric tenderness on the left side.  : No CVA tenderness.  Musculoskeletal: Extremities warm, well perfused, no erythema, no edema  Skin: No rashes, jaundice skin, no cyanosis  Neuro: Alert and Oriented x 2; confused; No focal motor or sensory deficits.  Slight asterixis.  Psych:  Pleasant and confused      ED Course   Procedures  Labs Reviewed   CBC W/ AUTO DIFFERENTIAL -  Abnormal; Notable for the following components:       Result Value    RBC 2.91 (*)     Hemoglobin 9.5 (*)     Hematocrit 30.2 (*)      (*)     MCH 32.6 (*)     MCHC 31.5 (*)     Platelets 90 (*)     All other components within normal limits   COMPREHENSIVE METABOLIC PANEL - Abnormal; Notable for the following components:    Sodium 132 (*)     CO2 22 (*)     Glucose 256 (*)     Albumin 2.7 (*)     Total Bilirubin 3.5 (*)     AST 43 (*)     Anion Gap 7 (*)     eGFR if  58.8 (*)     eGFR if non  51.0 (*)     All other components within normal limits   LIPASE - Abnormal; Notable for the following components:    Lipase 285 (*)     All other components within normal limits   URINALYSIS, REFLEX TO URINE CULTURE - Abnormal; Notable for the following components:    Appearance, UA Hazy (*)     Leukocytes, UA 2+ (*)     All other components within normal limits    Narrative:     Specimen Source->Urine   AMMONIA - Abnormal; Notable for the following components:    Ammonia 70 (*)     All other components within normal limits   MAGNESIUM - Abnormal; Notable for the following components:    Magnesium 1.4 (*)     All other components within normal limits   URINALYSIS MICROSCOPIC - Abnormal; Notable for the following components:    WBC, UA 27 (*)     Bacteria Many (*)     All other components within normal limits    Narrative:     Specimen Source->Urine   ISTAT PROCEDURE - Abnormal; Notable for the following components:    POC Glucose 245 (*)     POC Sodium 134 (*)     POC Hematocrit 29 (*)     All other components within normal limits   CULTURE, URINE   CULTURE, BLOOD   CULTURE, BLOOD   LACTIC ACID, PLASMA   SARS-COV-2 RDRP GENE   ISTAT CHEM8     EKG Readings: (Independently Interpreted)   Initial Reading: No STEMI. Previous EKG: Compared with most recent EKG Rhythm: Normal Sinus Rhythm. Heart Rate: 78. Ectopy: No Ectopy. ST Segments: Normal ST Segments.     ECG Results          EKG 12-lead  (Final result)  Result time 12/08/20 12:10:05    Final result by Rita, Lab In [facility] (12/08/20 12:10:05)                 Narrative:    Test Reason : R10.9,    Vent. Rate : 078 BPM     Atrial Rate : 078 BPM     P-R Int : 158 ms          QRS Dur : 090 ms      QT Int : 402 ms       P-R-T Axes : 055 009 055 degrees     QTc Int : 458 ms    Normal sinus rhythm  Normal ECG  When compared with ECG of 01-NOV-2020 18:54,  No significant change was found  Confirmed by James SCHUMACHER, Holland BERMEO ([id number]) on 12/8/2020 12:09:57 PM    Referred By: [email or web address]   SELF           Confirmed By:Holland Ramirez MD                            Imaging Results          X-Ray Humerus 2 View Left (Final result)  Result time 12/08/20 09:46:27    Final result by Chris Vidales III, MD (12/08/20 09:46:27)                 Narrative:    EXAMINATION:  XR HUMERUS 2 VIEW LEFT    CLINICAL HISTORY:  Pain in left arm    FINDINGS:  Humerus two views left: There is DJD of the shoulder.  No definite fracture dislocation bone destruction seen.      Electronically signed by: Chris Vidales MD  Date:    12/08/2020  Time:    09:46                             X-Ray Shoulder Trauma Left (Final result)  Result time 12/08/20 09:39:28    Final result by Souleymane Granado MD (12/08/20 09:39:28)                 Impression:      No fracture, arthritic findings as noted.      Electronically signed by: Souleymane Granado  Date:    12/08/2020  Time:    09:39             Narrative:    EXAMINATION:  XR SHOULDER TRAUMA 3 VIEW LEFT    CLINICAL HISTORY:  Pain in left arm    TECHNIQUE:  Three views of the left shoulder were performed.    COMPARISON  None    FINDINGS:  No acute fracture.  No malalignment.  Some spurring seen to the glenoid side of the preserved glenohumeral articulation.  Unremarkable acromioclavicular articulation.  Mild degenerative changes about the greater tuberosity area.  No findings calcific tendonitis.                               X-Ray Chest AP Portable (Final  result)  Result time 12/08/20 09:37:00    Final result by Lacie Villareal MD (12/08/20 09:37:00)                 Impression:      No pneumonia or other source for cough identified.      Electronically signed by: Lacie Villareal MD  Date:    12/08/2020  Time:    09:37             Narrative:    EXAMINATION:  XR CHEST AP PORTABLE    CLINICAL HISTORY:  cough;    TECHNIQUE:  Single frontal view of the chest was performed.    COMPARISON:  10/30/2020.  10/23/2020.    FINDINGS:  Study was obtained at the bedside with the patient rotated into right posterior oblique position and mildly underinflated.    Allowing for technical limitations I detect no acute pulmonary disease and no pleural fluid, lymph node enlargement, benny cardiac decompensation, pneumothorax, pneumomediastinum, pneumoperitoneum or significant osseous abnormality.                              X-Rays:   Independently Interpreted Readings:   Chest X-Ray: Normal heart size.  No infiltrates.  No acute abnormalities. No pneumothorax or free air     Medical Decision Making:   History:   Old Medical Records: I decided to obtain old medical records.  Old Records Summarized: records from clinic visits and records from previous admission(s).  Initial Assessment:   Zeina Mahoney is a 55-year-old female with a history of end-stage liver disease secondary to CHAUDHARY with previous meld of 20, recently admitted 10/19 to 1023 with abdominal pain found to have Staph a coccus pseudo intermedius bacteria, history of pancreatitis, history of ascites, history of chronic hepatic encephalopathy and decompensated hepatic cirrhosis presenting with abdominal pain and left arm pain.   Differential Diagnosis:   Decompensated cirrhosis, hepatic encephalopathy, pancreatitis, SBP, bacteremia, UTI, fracture, dislocation, contusion, metabolic acidosis, metabolic encephalopathy, pneumonia, ACS  Independently Interpreted Test(s):   I have ordered and independently interpreted X-rays - see  prior notes.  I have ordered and independently interpreted EKG Reading(s) - see prior notes  Clinical Tests:   Lab Tests: Ordered and Reviewed  Radiological Study: Ordered and Reviewed  Medical Tests: Ordered and Reviewed  Other:   I have discussed this case with another health care provider.       <> Summary of the Discussion: Liver transplant surgery    Emergent evaluation of patient presenting with abdominal epigastric pain, left arm pain and confusion.  She appears lethargic initially and confused with jaundice and scleral icterus.  Her vital signs are stable, she is afebrile without hypotension or hypoxemia.  Physical exam findings remarkable for scleral icterus, jaundice, slight abdominal tenderness along the left epigastrium and left lower ribcage.  No peritoneal signs on my exam, no focal neurologic deficits, slight asterixis positive, with confusion.  Cardiac and respiratory exam unremarkable.  Broad workup conducted including ECG with no signs of ischemia or STEMI on my read.  Doubt ACS based on exam, history and findings.  Chest x-ray obtained with no evidence of pneumonia, pneumothorax or pleural effusion, no free air seen on my read.  Placed on cardiac telemetry monitoring.  Labs obtained, with slight elevation in the ammonia levels.  Liver enzymes stable.  Left arm x-rays and shoulder x-rays negative for acute fracture or dislocation.  Exam improved, do not suspect occult fracture.  Discussed with liver transplant team, given hepatic encephalopathy, confusion, and abdominal pain with recent bacteremia will treat with 1 dose IV vancomycin and admit to observation.  Patient agreeable to observation plan.    Complexity: High - level 5                             Clinical Impression:     ICD-10-CM ICD-9-CM   1. Hepatic encephalopathy  K72.90 572.2   2. Left arm pain  M79.602 729.5   3. Abdominal pain  R10.9 789.00   4. Hepatic cirrhosis, unspecified hepatic cirrhosis type, unspecified whether ascites  present  K74.60 571.5   5. AMS (altered mental status)  R41.82 780.97   6. Abdominal pain, unspecified abdominal location  R10.9 789.00   7. Altered mental status, unspecified altered mental status type  R41.82 780.97   8. Liver cirrhosis secondary to CHAUDHARY  K75.81 571.8    K74.60 571.5                      Disposition:   Disposition: Placed in Observation  Condition: Fair     ED Disposition Condition    Observation                Markie Hale DO, [facility]  Emergency Staff Physician   Dept of Emergency Medicine   [facility]  Spectralink: [id number]               Markie Hale DO  12/08/20 1433

## 2020-12-08 NOTE — HPI
Zeina Mahoney is a 55 year old female with PMH of CHAUDHARY listed for liver transplant with MELD 20. Pt presents to the ED with c/o LUQ abdominal pain and left arm pain. Pt denies any falls or trauma. She recently returned home to [place] and reported pulling up on car safety handle often when getting in and out of car which could be contributing to increased soreness to left arm. Xrays in ED negative for fracture. Also there was concern for hepatic encephalopathy. Ammonia 70. Pt AAO x 3. Reported taking lactulose as prescribed and having a large BM in the ED. Abdominal pain resolved. Pt denies fever/chills, nausea/vomiting, chest pain, palpitations, sob, or changes in bladder/bowel function. COVID 19 negative. Will admit to LTS for observation and infectious workup.

## 2020-12-08 NOTE — ASSESSMENT & PLAN NOTE
MELD-Na score: 14 at 12/2/2020  9:00 AM  MELD score: 14 at 12/2/2020  9:00 AM  Calculated from:  Serum Creatinine: 1.2 mg/dL at 12/2/2020  9:00 AM  Serum Sodium: 137 mmol/L at 12/2/2020  9:00 AM  Total Bilirubin: 3.3 mg/dL at 12/2/2020  9:00 AM  INR(ratio): 1.1 at 12/2/2020  9:00 AM  Age: 55 years 2 months

## 2020-12-09 DIAGNOSIS — R78.81 BACTEREMIA: Primary | ICD-10-CM

## 2020-12-09 RX ORDER — DOXYCYCLINE 100 MG/1
100 CAPSULE ORAL 2 TIMES DAILY
Qty: 20 CAPSULE | Refills: 0 | Status: SHIPPED | OUTPATIENT
Start: 2020-12-09

## 2020-12-10 LAB — BACTERIA UR CULT: ABNORMAL

## 2020-12-11 LAB
BACTERIA BLD CULT: ABNORMAL

## 2020-12-11 RX ORDER — LEVOTHYROXINE SODIUM 88 UG/1
88 TABLET ORAL
Qty: 30 TABLET | Refills: 3 | Status: SHIPPED | OUTPATIENT
Start: 2020-12-11 | End: 2021-04-09

## 2020-12-12 ENCOUNTER — LAB VISIT (OUTPATIENT)
Dept: INTERNAL MEDICINE | Facility: CLINIC | Age: 55
End: 2020-12-12
Payer: COMMERCIAL

## 2020-12-12 DIAGNOSIS — Z01.812 PRE-PROCEDURE LAB EXAM: ICD-10-CM

## 2020-12-12 PROCEDURE — U0003 INFECTIOUS AGENT DETECTION BY NUCLEIC ACID (DNA OR RNA); SEVERE ACUTE RESPIRATORY SYNDROME CORONAVIRUS 2 (SARS-COV-2) (CORONAVIRUS DISEASE [COVID-19]), AMPLIFIED PROBE TECHNIQUE, MAKING USE OF HIGH THROUGHPUT TECHNOLOGIES AS DESCRIBED BY CMS-2020-01-R: HCPCS

## 2020-12-13 LAB
BACTERIA BLD CULT: NORMAL
SARS-COV-2 RNA RESP QL NAA+PROBE: NOT DETECTED

## 2020-12-14 ENCOUNTER — PATIENT MESSAGE (OUTPATIENT)
Dept: TRANSPLANT | Facility: CLINIC | Age: 55
End: 2020-12-14

## 2020-12-15 ENCOUNTER — LAB VISIT (OUTPATIENT)
Dept: LAB | Facility: HOSPITAL | Age: 55
End: 2020-12-15
Attending: STUDENT IN AN ORGANIZED HEALTH CARE EDUCATION/TRAINING PROGRAM
Payer: COMMERCIAL

## 2020-12-15 ENCOUNTER — PATIENT MESSAGE (OUTPATIENT)
Dept: TRANSPLANT | Facility: CLINIC | Age: 55
End: 2020-12-15

## 2020-12-15 ENCOUNTER — TELEPHONE (OUTPATIENT)
Dept: TRANSPLANT | Facility: CLINIC | Age: 55
End: 2020-12-15

## 2020-12-15 DIAGNOSIS — K74.60 HEPATIC CIRRHOSIS, UNSPECIFIED HEPATIC CIRRHOSIS TYPE, UNSPECIFIED WHETHER ASCITES PRESENT: ICD-10-CM

## 2020-12-15 DIAGNOSIS — K75.81 LIVER CIRRHOSIS SECONDARY TO NASH: ICD-10-CM

## 2020-12-15 DIAGNOSIS — K74.60 LIVER CIRRHOSIS SECONDARY TO NASH: ICD-10-CM

## 2020-12-15 DIAGNOSIS — Z76.82 ORGAN TRANSPLANT CANDIDATE: ICD-10-CM

## 2020-12-15 LAB
ALBUMIN SERPL BCP-MCNC: 2.7 G/DL (ref 3.5–5.2)
ALP SERPL-CCNC: 152 U/L (ref 55–135)
ALT SERPL W/O P-5'-P-CCNC: 17 U/L (ref 10–44)
ANION GAP SERPL CALC-SCNC: 4 MMOL/L (ref 8–16)
AST SERPL-CCNC: 30 U/L (ref 10–40)
BASOPHILS # BLD AUTO: 0.06 K/UL (ref 0–0.2)
BASOPHILS # BLD AUTO: 0.06 K/UL (ref 0–0.2)
BASOPHILS NFR BLD: 1.2 % (ref 0–1.9)
BASOPHILS NFR BLD: 1.2 % (ref 0–1.9)
BILIRUB SERPL-MCNC: 2.3 MG/DL (ref 0.1–1)
BUN SERPL-MCNC: 22 MG/DL (ref 6–20)
CALCIUM SERPL-MCNC: 10.1 MG/DL (ref 8.7–10.5)
CHLORIDE SERPL-SCNC: 103 MMOL/L (ref 95–110)
CO2 SERPL-SCNC: 27 MMOL/L (ref 23–29)
CREAT SERPL-MCNC: 1 MG/DL (ref 0.5–1.4)
DIFFERENTIAL METHOD: ABNORMAL
DIFFERENTIAL METHOD: ABNORMAL
EOSINOPHIL # BLD AUTO: 0.1 K/UL (ref 0–0.5)
EOSINOPHIL # BLD AUTO: 0.1 K/UL (ref 0–0.5)
EOSINOPHIL NFR BLD: 2.7 % (ref 0–8)
EOSINOPHIL NFR BLD: 2.7 % (ref 0–8)
ERYTHROCYTE [DISTWIDTH] IN BLOOD BY AUTOMATED COUNT: 14.6 % (ref 11.5–14.5)
ERYTHROCYTE [DISTWIDTH] IN BLOOD BY AUTOMATED COUNT: 14.6 % (ref 11.5–14.5)
EST. GFR  (AFRICAN AMERICAN): >60 ML/MIN/1.73 M^2
EST. GFR  (NON AFRICAN AMERICAN): >60 ML/MIN/1.73 M^2
GLUCOSE SERPL-MCNC: 124 MG/DL (ref 70–110)
HCT VFR BLD AUTO: 29.1 % (ref 37–48.5)
HCT VFR BLD AUTO: 29.1 % (ref 37–48.5)
HGB BLD-MCNC: 9.2 G/DL (ref 12–16)
HGB BLD-MCNC: 9.2 G/DL (ref 12–16)
IMM GRANULOCYTES # BLD AUTO: 0.01 K/UL (ref 0–0.04)
IMM GRANULOCYTES # BLD AUTO: 0.01 K/UL (ref 0–0.04)
IMM GRANULOCYTES NFR BLD AUTO: 0.2 % (ref 0–0.5)
IMM GRANULOCYTES NFR BLD AUTO: 0.2 % (ref 0–0.5)
INR PPP: 1.1 (ref 0.8–1.2)
INR PPP: 1.1 (ref 0.8–1.2)
LYMPHOCYTES # BLD AUTO: 1.2 K/UL (ref 1–4.8)
LYMPHOCYTES # BLD AUTO: 1.2 K/UL (ref 1–4.8)
LYMPHOCYTES NFR BLD: 23.5 % (ref 18–48)
LYMPHOCYTES NFR BLD: 23.5 % (ref 18–48)
MCH RBC QN AUTO: 33 PG (ref 27–31)
MCH RBC QN AUTO: 33 PG (ref 27–31)
MCHC RBC AUTO-ENTMCNC: 31.6 G/DL (ref 32–36)
MCHC RBC AUTO-ENTMCNC: 31.6 G/DL (ref 32–36)
MCV RBC AUTO: 104 FL (ref 82–98)
MCV RBC AUTO: 104 FL (ref 82–98)
MONOCYTES # BLD AUTO: 0.3 K/UL (ref 0.3–1)
MONOCYTES # BLD AUTO: 0.3 K/UL (ref 0.3–1)
MONOCYTES NFR BLD: 5.4 % (ref 4–15)
MONOCYTES NFR BLD: 5.4 % (ref 4–15)
NEUTROPHILS # BLD AUTO: 3.5 K/UL (ref 1.8–7.7)
NEUTROPHILS # BLD AUTO: 3.5 K/UL (ref 1.8–7.7)
NEUTROPHILS NFR BLD: 67 % (ref 38–73)
NEUTROPHILS NFR BLD: 67 % (ref 38–73)
NRBC BLD-RTO: 0 /100 WBC
NRBC BLD-RTO: 0 /100 WBC
PLATELET # BLD AUTO: 77 K/UL (ref 150–350)
PLATELET # BLD AUTO: 77 K/UL (ref 150–350)
PMV BLD AUTO: 10.1 FL (ref 9.2–12.9)
PMV BLD AUTO: 10.1 FL (ref 9.2–12.9)
POTASSIUM SERPL-SCNC: 4.4 MMOL/L (ref 3.5–5.1)
PROT SERPL-MCNC: 6.2 G/DL (ref 6–8.4)
PROTHROMBIN TIME: 12.1 SEC (ref 9–12.5)
PROTHROMBIN TIME: 12.1 SEC (ref 9–12.5)
RBC # BLD AUTO: 2.79 M/UL (ref 4–5.4)
RBC # BLD AUTO: 2.79 M/UL (ref 4–5.4)
SODIUM SERPL-SCNC: 134 MMOL/L (ref 136–145)
WBC # BLD AUTO: 5.15 K/UL (ref 3.9–12.7)
WBC # BLD AUTO: 5.15 K/UL (ref 3.9–12.7)

## 2020-12-15 PROCEDURE — 80053 COMPREHEN METABOLIC PANEL: CPT | Mod: TXP

## 2020-12-15 PROCEDURE — 85025 COMPLETE CBC W/AUTO DIFF WBC: CPT | Mod: TXP

## 2020-12-15 PROCEDURE — 85610 PROTHROMBIN TIME: CPT | Mod: TXP

## 2020-12-15 PROCEDURE — 36415 COLL VENOUS BLD VENIPUNCTURE: CPT | Mod: TXP

## 2020-12-15 NOTE — TELEPHONE ENCOUNTER
Called pt's insurance company in reference to her EGD which still hasn't been approved. The agent stated that today is the last day to review this case & that I should call back in a few hours. Reminded agent that the EGD is scheduled in 3 hours, but she stated that I should still call back later.     Will update pt via portal.

## 2020-12-16 ENCOUNTER — OFFICE VISIT (OUTPATIENT)
Dept: PSYCHIATRY | Facility: CLINIC | Age: 55
End: 2020-12-16
Payer: COMMERCIAL

## 2020-12-16 VITALS
DIASTOLIC BLOOD PRESSURE: 53 MMHG | SYSTOLIC BLOOD PRESSURE: 103 MMHG | WEIGHT: 142 LBS | HEIGHT: 64 IN | BODY MASS INDEX: 24.24 KG/M2 | HEART RATE: 72 BPM

## 2020-12-16 DIAGNOSIS — F41.9 ANXIETY: Primary | ICD-10-CM

## 2020-12-16 PROCEDURE — 3008F BODY MASS INDEX DOCD: CPT | Mod: CPTII,NTX,S$GLB, | Performed by: PSYCHIATRY & NEUROLOGY

## 2020-12-16 PROCEDURE — 99214 PR OFFICE/OUTPT VISIT, EST, LEVL IV, 30-39 MIN: ICD-10-PCS | Mod: NTX,S$GLB,, | Performed by: PSYCHIATRY & NEUROLOGY

## 2020-12-16 PROCEDURE — 3008F PR BODY MASS INDEX (BMI) DOCUMENTED: ICD-10-PCS | Mod: CPTII,NTX,S$GLB, | Performed by: PSYCHIATRY & NEUROLOGY

## 2020-12-16 PROCEDURE — 99214 OFFICE O/P EST MOD 30 MIN: CPT | Mod: NTX,S$GLB,, | Performed by: PSYCHIATRY & NEUROLOGY

## 2020-12-16 PROCEDURE — 99999 PR PBB SHADOW E&M-EST. PATIENT-LVL II: ICD-10-PCS | Mod: PBBFAC,TXP,, | Performed by: PSYCHIATRY & NEUROLOGY

## 2020-12-16 PROCEDURE — 99999 PR PBB SHADOW E&M-EST. PATIENT-LVL II: CPT | Mod: PBBFAC,TXP,, | Performed by: PSYCHIATRY & NEUROLOGY

## 2020-12-16 RX ORDER — ESCITALOPRAM OXALATE 10 MG/1
10 TABLET ORAL DAILY
Qty: 30 TABLET | Refills: 3 | Status: SHIPPED | OUTPATIENT
Start: 2020-12-16 | End: 2021-12-16

## 2020-12-16 NOTE — PROGRESS NOTES
ESTABLISHED OUTPATIENT VISIT   E/M LEVEL 4: [id number]    ENCOUNTER DATE: 12/16/2020  SITE: [facility], [address]    HISTORY    CHIEF COMPLAINT   Zeina Mahoney is a 55 y.o. female who presents for follow up of anxiety    HPI     Reports some anxiety. States, that she is waiting for a liver transplant.  Sleep is good. Appetite is good.    Knows correct date, states that today is Tuesday.    Psychiatric Review Of Systems - Is patient experiencing or having changes in:  sleep: good  appetite: good  weight: no  energy/anergy: no  interest/pleasure/anhedonia: no  somatic symptoms: no  libido: no  anxiety/panic: no  guilty/hopelessness: no  concentration: no  S.I.B.s/risky behavior: no  Irritability: no  Racing thoughts: no  Impulsive behaviors: no  Paranoia:no  AVH:no    Recent alcohol: no  Recent drug: no    Medical ROS    General ROS: negative  ENT ROS: negative  Cardiovascular ROS: negative  Respiratory ROS: negative  Gastrointestinal ROS: negative  Neurological ROS: negative  Dermatological ROS: negative  Endocrine ROS: negative    PAST MEDICAL, FAMILY AND SOCIAL HISTORY: The patient's past medical, family and social history have been reviewed and updated as appropriate within the electronic medical record - see encounter notes.    PSYCHOTROPIC MEDICATIONS   Lexapro 5 mg daily    Scheduled and PRN Medications     Current Outpatient Medications:     acetaminophen (TYLENOL) 500 MG tablet, Take 1 tablet (500 mg total) by mouth every 6 (six) hours as needed for Pain. Max 2000mg daily., Disp: , Rfl: 0    albuterol (VENTOLIN HFA) 90 mcg/actuation inhaler, Inhale 2 puffs into the lungs every 6 (six) hours as needed for Wheezing. Rescue, Disp: 18 g, Rfl: 0    benzonatate (TESSALON) 100 MG capsule, Take 1 capsule (100 mg total) by mouth 3 (three) times daily as needed for Cough., Disp: 90 capsule, Rfl: 2    diaper,brief,adult,disposable Misc, 5 mg/kg/day by Misc.(Non-Drug; Combo Route) route 5 (five) times  daily. Patient with incontinence. Please supply enough depends for 5xs/day, Disp: , Rfl:     doxycycline (VIBRAMYCIN) 100 MG Cap, Take 1 capsule (100 mg total) by mouth 2 (two) times daily., Disp: 20 capsule, Rfl: 0    ergocalciferol (VITAMIN D2) 50,000 unit Cap, Take 1 capsule (50,000 Units total) by mouth every 7 days., Disp: 12 capsule, Rfl: 3    escitalopram oxalate (LEXAPRO) 5 MG Tab, Take 1 tablet (5 mg total) by mouth once daily., Disp: 30 tablet, Rfl: 11    furosemide (LASIX) 40 MG tablet, Take 1 tablet (40 mg total) by mouth once daily., Disp: 30 tablet, Rfl: 11    hydrOXYzine HCL (ATARAX) 25 MG tablet, Take 1 tablet (25 mg total) by mouth as needed for Itching., Disp: 90 tablet, Rfl: 3    lactulose (CEPHULAC) 10 gram packet, Mix 3 packets (30 g total) with liquid and take by mouth 3 (three) times daily as needed., Disp: 90 packet, Rfl: 11    levothyroxine (SYNTHROID) 88 MCG tablet, Take 1 tablet (88 mcg total) by mouth before breakfast., Disp: 30 tablet, Rfl: 3    lidocaine (LIDODERM) 5 %, Place 1 patch onto the skin every 24 hours. Remove & Discard patch within 12 hours or as directed by MD, Disp: , Rfl:     magnesium hydroxide 400 mg/5 ml (MILK OF MAGNESIA) 400 mg/5 mL Susp, Take 30 mLs (2,400 mg total) by mouth daily as needed (Take if constipated despite lactulose)., Disp:  , Rfl:     melatonin 10 mg Tab, Take 10 mg by mouth nightly as needed (sleep)., Disp: , Rfl:     midodrine (PROAMATINE) 10 MG tablet, Take 1 tablet (10 mg total) by mouth 3 (three) times daily., Disp: 90 tablet, Rfl: 3    nystatin (MYCOSTATIN) powder, Apply topically 2 (two) times daily. Apply to affected area twice a day, Disp: , Rfl:     pantoprazole (PROTONIX) 40 MG tablet, Take 40 mg by mouth before breakfast., Disp: , Rfl:     polyethylene glycol (GLYCOLAX) 17 gram PwPk, Take 17 g by mouth once daily. Or miralax OTC. Take if constipated despite lactulose, Disp: , Rfl: 0    rifAXIMin (XIFAXAN) 550 mg Tab, Take 1  tablet (550 mg total) by mouth 2 (two) times daily., Disp: 60 tablet, Rfl: 5    simethicone (MYLICON) 80 MG chewable tablet, Take 80 mg by mouth every 6 (six) hours as needed for Flatulence. Take 2 tablets by mouth every 6 hours as needed for gas, Disp: , Rfl:     spironolactone (ALDACTONE) 50 MG tablet, Take 2 tablets (100 mg total) by mouth once daily., Disp: 60 tablet, Rfl: 11    zinc sulfate (ZINCATE) 220 (50) mg capsule, TAKE 1 CAPSULE BY MOUTH DAILY 1 HOUR BEFORE BREAKFAST, Disp: 30 capsule, Rfl: 1    EXAMINATION  Wt Readings from Last 3 Encounters:   12/16/20 64.4 kg (142 lb)   12/02/20 64.5 kg (142 lb 3.2 oz)   11/06/20 65.5 kg (144 lb 6.4 oz)     Temp Readings from Last 3 Encounters:   12/08/20 97.6 °F (36.4 °C) (Oral)   12/02/20 97.7 °F (36.5 °C) (Oral)   11/06/20 96.9 °F (36.1 °C)     BP Readings from Last 3 Encounters:   12/16/20 (!) 103/53   12/08/20 (!) 108/58   12/02/20 (!) 142/62     Pulse Readings from Last 3 Encounters:   12/16/20 72   12/08/20 68   12/02/20 94       CONSTITUTIONAL  General Appearance: well nourished    MUSCULOSKELETAL  Muscle Strength and Tone: normal strength and tone  Abnormal Involuntary Movements: no abnormal movement noted  Gait and Station: normal gait    PSYCHIATRIC   Level of Consciousness: alert  Orientation: oriented to person, place and time  Grooming: well groomed  Psychomotor Behavior: no restlessness/agitation  Speech: normal in rate, rhythm and volume  Language: normal vocabulary  Mood: some anxiety  Affect: full range and appropriate  Thought Process: logical and goal directed  Associations: intact associations  Thought Content: no SI/HI  Memory: grossly intact  Attention: intact to content of interview  Fund of Knowledge: appears adequate  Insight: good  Judgement: good    MEDICAL DECISION MAKING    DIAGNOSES  Anxiety, unspecified    PROBLEM LIST AND MANAGEMENT PLANS    - increase Lexapro to 10 mg daily, monitor serum sodium  - rtc 1 month     Time with  patient: 15 min  More than 50% of the time was spent counseling/coordinating care.  LABORATORY DATA    Lab Visit on 12/15/2020   Component Date Value Ref Range Status    WBC 12/15/2020 5.15  3.90 - 12.70 K/uL Final    RBC 12/15/2020 2.79* 4.00 - 5.40 M/uL Final    Hemoglobin 12/15/2020 9.2* 12.0 - 16.0 g/dL Final    Hematocrit 12/15/2020 29.1* 37.0 - 48.5 % Final    MCV 12/15/2020 104* 82 - 98 fL Final    MCH 12/15/2020 33.0* 27.0 - 31.0 pg Final    MCHC 12/15/2020 31.6* 32.0 - 36.0 g/dL Final    RDW 12/15/2020 14.6* 11.5 - 14.5 % Final    Platelets 12/15/2020 77* 150 - 350 K/uL Final    MPV 12/15/2020 10.1  9.2 - 12.9 fL Final    Immature Granulocytes 12/15/2020 0.2  0.0 - 0.5 % Final    Gran # (ANC) 12/15/2020 3.5  1.8 - 7.7 K/uL Final    Immature Grans (Abs) 12/15/2020 0.01  0.00 - 0.04 K/uL Final    Comment: Mild elevation in immature granulocytes is non specific and   can be seen in a variety of conditions including stress response,   acute inflammation, trauma and pregnancy. Correlation with other   laboratory and clinical findings is essential.      Lymph # 12/15/2020 1.2  1.0 - 4.8 K/uL Final    Mono # 12/15/2020 0.3  0.3 - 1.0 K/uL Final    Eos # 12/15/2020 0.1  0.0 - 0.5 K/uL Final    Baso # 12/15/2020 0.06  0.00 - 0.20 K/uL Final    nRBC 12/15/2020 0  0 /100 WBC Final    Gran % 12/15/2020 67.0  38.0 - 73.0 % Final    Lymph % 12/15/2020 23.5  18.0 - 48.0 % Final    Mono % 12/15/2020 5.4  4.0 - 15.0 % Final    Eosinophil % 12/15/2020 2.7  0.0 - 8.0 % Final    Basophil % 12/15/2020 1.2  0.0 - 1.9 % Final    Differential Method 12/15/2020 Automated   Final    Prothrombin Time 12/15/2020 12.1  9.0 - 12.5 sec Final    INR 12/15/2020 1.1  0.8 - 1.2 Final    Comment: Coumadin Therapy:  2.0 - 3.0 for INR for all indicators except mechanical heart valves  and antiphospholipid syndromes which should use 2.5 - 3.5.      Sodium 12/15/2020 134* 136 - 145 mmol/L Final    Potassium 12/15/2020  4.4  3.5 - 5.1 mmol/L Final    Chloride 12/15/2020 103  95 - 110 mmol/L Final    CO2 12/15/2020 27  23 - 29 mmol/L Final    Glucose 12/15/2020 124* 70 - 110 mg/dL Final    BUN 12/15/2020 22* 6 - 20 mg/dL Final    Creatinine 12/15/2020 1.0  0.5 - 1.4 mg/dL Final    Calcium 12/15/2020 10.1  8.7 - 10.5 mg/dL Final    Total Protein 12/15/2020 6.2  6.0 - 8.4 g/dL Final    Albumin 12/15/2020 2.7* 3.5 - 5.2 g/dL Final    Total Bilirubin 12/15/2020 2.3* 0.1 - 1.0 mg/dL Final    Comment: For infants and newborns, interpretation of results should be based  on gestational age, weight and in agreement with clinical  observations.  Premature Infant recommended reference ranges:  Up to 24 hours.............<8.0 mg/dL  Up to 48 hours............<12.0 mg/dL  3-5 days..................<15.0 mg/dL  6-29 days.................<15.0 mg/dL      Alkaline Phosphatase 12/15/2020 152* 55 - 135 U/L Final    AST 12/15/2020 30  10 - 40 U/L Final    ALT 12/15/2020 17  10 - 44 U/L Final    Anion Gap 12/15/2020 4* 8 - 16 mmol/L Final    eGFR if African American 12/15/2020 >60.0  >60 mL/min/1.73 m^2 Final    eGFR if non African American 12/15/2020 >60.0  >60 mL/min/1.73 m^2 Final    Comment: Calculation used to obtain the estimated glomerular filtration  rate (eGFR) is the CKD-EPI equation.       WBC 12/15/2020 5.15  3.90 - 12.70 K/uL Final    RBC 12/15/2020 2.79* 4.00 - 5.40 M/uL Final    Hemoglobin 12/15/2020 9.2* 12.0 - 16.0 g/dL Final    Hematocrit 12/15/2020 29.1* 37.0 - 48.5 % Final    MCV 12/15/2020 104* 82 - 98 fL Final    MCH 12/15/2020 33.0* 27.0 - 31.0 pg Final    MCHC 12/15/2020 31.6* 32.0 - 36.0 g/dL Final    RDW 12/15/2020 14.6* 11.5 - 14.5 % Final    Platelets 12/15/2020 77* 150 - 350 K/uL Final    MPV 12/15/2020 10.1  9.2 - 12.9 fL Final    Immature Granulocytes 12/15/2020 0.2  0.0 - 0.5 % Final    Gran # (ANC) 12/15/2020 3.5  1.8 - 7.7 K/uL Final    Immature Grans (Abs) 12/15/2020 0.01  0.00 - 0.04 K/uL  Final    Comment: Mild elevation in immature granulocytes is non specific and   can be seen in a variety of conditions including stress response,   acute inflammation, trauma and pregnancy. Correlation with other   laboratory and clinical findings is essential.      Lymph # 12/15/2020 1.2  1.0 - 4.8 K/uL Final    Mono # 12/15/2020 0.3  0.3 - 1.0 K/uL Final    Eos # 12/15/2020 0.1  0.0 - 0.5 K/uL Final    Baso # 12/15/2020 0.06  0.00 - 0.20 K/uL Final    nRBC 12/15/2020 0  0 /100 WBC Final    Gran % 12/15/2020 67.0  38.0 - 73.0 % Final    Lymph % 12/15/2020 23.5  18.0 - 48.0 % Final    Mono % 12/15/2020 5.4  4.0 - 15.0 % Final    Eosinophil % 12/15/2020 2.7  0.0 - 8.0 % Final    Basophil % 12/15/2020 1.2  0.0 - 1.9 % Final    Differential Method 12/15/2020 Automated   Final    Prothrombin Time 12/15/2020 12.1  9.0 - 12.5 sec Final    INR 12/15/2020 1.1  0.8 - 1.2 Final    Comment: Coumadin Therapy:  2.0 - 3.0 for INR for all indicators except mechanical heart valves  and antiphospholipid syndromes which should use 2.5 - 3.5.     Lab Visit on 12/12/2020   Component Date Value Ref Range Status    SARS-CoV2 (COVID-19) Qualitative P* 12/12/2020 Not Detected  Not Detected Final    Comment: This test utilizes a real-time reverse transcription  polymerase chain reaction procedure to amplify and   detect the SARS-CoV-2 RdRp and N genes.    The analytical sensitivity (limit of detection) of   this assay is 100 copies/mL.   A Detected result is considered positive for COVID-19.  This patient is considered infected with the   SARS-CoV-2 virus and is presumed to be contagious.    A Not Detected result means that SARS-CoV-2 RNA is not  present above the limit of detection. It does not rule  out the possibility of COVID-19 and should not be the  sole basis for treatment decisions.  If COVID-19 is   strongly suspected based on clinical and exposure   history,re-testing should be considered.    This test is only for  use under Food and Drug   Administration s Emergency Use Authorization (EUA).   Commercial reagents are provided by [employer]  Performance characteristics of the EUA have been   independently verified by [facility]   Department of Pathology and L                           aboratory Medicine.       Admission on 12/08/2020, Discharged on 12/08/2020   Component Date Value Ref Range Status    WBC 12/08/2020 5.56  3.90 - 12.70 K/uL Final    RBC 12/08/2020 2.91* 4.00 - 5.40 M/uL Final    Hemoglobin 12/08/2020 9.5* 12.0 - 16.0 g/dL Final    Hematocrit 12/08/2020 30.2* 37.0 - 48.5 % Final    MCV 12/08/2020 104* 82 - 98 fL Final    MCH 12/08/2020 32.6* 27.0 - 31.0 pg Final    MCHC 12/08/2020 31.5* 32.0 - 36.0 g/dL Final    RDW 12/08/2020 14.4  11.5 - 14.5 % Final    Platelets 12/08/2020 90* 150 - 350 K/uL Final    MPV 12/08/2020 10.9  9.2 - 12.9 fL Final    Immature Granulocytes 12/08/2020 0.4  0.0 - 0.5 % Final    Gran # (ANC) 12/08/2020 3.6  1.8 - 7.7 K/uL Final    Immature Grans (Abs) 12/08/2020 0.02  0.00 - 0.04 K/uL Final    Comment: Mild elevation in immature granulocytes is non specific and   can be seen in a variety of conditions including stress response,   acute inflammation, trauma and pregnancy. Correlation with other   laboratory and clinical findings is essential.      Lymph # 12/08/2020 1.4  1.0 - 4.8 K/uL Final    Mono # 12/08/2020 0.3  0.3 - 1.0 K/uL Final    Eos # 12/08/2020 0.1  0.0 - 0.5 K/uL Final    Baso # 12/08/2020 0.05  0.00 - 0.20 K/uL Final    nRBC 12/08/2020 0  0 /100 WBC Final    Gran % 12/08/2020 64.9  38.0 - 73.0 % Final    Lymph % 12/08/2020 25.9  18.0 - 48.0 % Final    Mono % 12/08/2020 5.6  4.0 - 15.0 % Final    Eosinophil % 12/08/2020 2.3  0.0 - 8.0 % Final    Basophil % 12/08/2020 0.9  0.0 - 1.9 % Final    Differential Method 12/08/2020 Automated   Final    Sodium 12/08/2020 132* 136 - 145 mmol/L Final    Potassium 12/08/2020 4.0  3.5 - 5.1  mmol/L Final    Chloride 12/08/2020 103  95 - 110 mmol/L Final    CO2 12/08/2020 22* 23 - 29 mmol/L Final    Glucose 12/08/2020 256* 70 - 110 mg/dL Final    BUN 12/08/2020 12  6 - 20 mg/dL Final    Creatinine 12/08/2020 1.2  0.5 - 1.4 mg/dL Final    Calcium 12/08/2020 9.6  8.7 - 10.5 mg/dL Final    Total Protein 12/08/2020 6.4  6.0 - 8.4 g/dL Final    Albumin 12/08/2020 2.7* 3.5 - 5.2 g/dL Final    Total Bilirubin 12/08/2020 3.5* 0.1 - 1.0 mg/dL Final    Comment: For infants and newborns, interpretation of results should be based  on gestational age, weight and in agreement with clinical  observations.  Premature Infant recommended reference ranges:  Up to 24 hours.............<8.0 mg/dL  Up to 48 hours............<12.0 mg/dL  3-5 days..................<15.0 mg/dL  6-29 days.................<15.0 mg/dL      Alkaline Phosphatase 12/08/2020 132  55 - 135 U/L Final    AST 12/08/2020 43* 10 - 40 U/L Final    *Result may be interfered by visible hemolysis    ALT 12/08/2020 22  10 - 44 U/L Final    Anion Gap 12/08/2020 7* 8 - 16 mmol/L Final    eGFR if  12/08/2020 58.8* >60 mL/min/1.73 m^2 Final    eGFR if non African American 12/08/2020 51.0* >60 mL/min/1.73 m^2 Final    Comment: Calculation used to obtain the estimated glomerular filtration  rate (eGFR) is the CKD-EPI equation.       Lipase 12/08/2020 285* 4 - 60 U/L Final    Specimen UA 12/08/2020 Urine, Clean Catch   Final    Color, UA 12/08/2020 Yellow  Yellow, Straw, Fang Final    Appearance, UA 12/08/2020 Hazy* Clear Final    pH, UA 12/08/2020 6.0  5.0 - 8.0 Final    Specific [place], UA 12/08/2020 1.010  1.005 - 1.030 Final    Protein, UA 12/08/2020 Negative  Negative Final    Comment: Recommend a 24 hour urine protein or a urine   protein/creatinine ratio if globulin induced proteinuria is  clinically suspected.      Glucose, UA 12/08/2020 Negative  Negative Final    Ketones, UA 12/08/2020 Negative  Negative Final     Bilirubin (UA) 12/08/2020 Negative  Negative Final    Occult Blood UA 12/08/2020 Negative  Negative Corrected    Corrected result; previously reported as 3+ on 12/08/2020 at 09:20.    Nitrite, UA 12/08/2020 Negative  Negative Final    Leukocytes, UA 12/08/2020 2+* Negative Final    Lactate (Lactic Acid) 12/08/2020 2.0  0.5 - 2.2 mmol/L Final    Comment: Falsely low lactic acid results can be found in samples   containing >=13.0 mg/dL total bilirubin and/or >=3.5 mg/dL   direct bilirubin.      Ammonia 12/08/2020 70* 10 - 50 umol/L Final    *Result may be interfered by visible hemolysis    Magnesium 12/08/2020 1.4* 1.6 - 2.6 mg/dL Final    POC Rapid COVID 12/08/2020 Negative  Negative Final     Acceptable 12/08/2020 Yes   Final    POC Glucose 12/08/2020 245* 70 - 110 mg/dL Final    POC BUN 12/08/2020 13  6 - 30 mg/dL Final    POC Creatinine 12/08/2020 1.1  0.5 - 1.4 mg/dL Final    POC Sodium 12/08/2020 134* 136 - 145 mmol/L Final    POC Potassium 12/08/2020 3.8  3.5 - 5.1 mmol/L Final    POC Chloride 12/08/2020 100  95 - 110 mmol/L Final    POC TCO2 (MEASURED) 12/08/2020 25  23 - 29 mmol/L Final    POC Ionized Calcium 12/08/2020 1.30  1.06 - 1.42 mmol/L Final    POC Hematocrit 12/08/2020 29* 36 - 54 %PCV Final    Sample 12/08/2020 DANIEL   Final    RBC, UA 12/08/2020 1  0 - 4 /hpf Corrected    Corrected result; previously reported as 20 on 12/08/2020 at 09:20.    WBC, UA 12/08/2020 27* 0 - 5 /hpf Corrected    Comment: results called to brent  Corrected result; previously reported as >100 on 12/08/2020 at 09:20.      WBC Clumps, UA 12/08/2020 None  None-Rare Corrected    Comment: Corrected result; previously reported as Occasional on 12/08/2020 at   09:20.      Bacteria 12/08/2020 Many* None-Occ /hpf Final    Yeast, UA 12/08/2020 None  None Final    Squam Epithel, UA 12/08/2020 2  /hpf Corrected    Corrected result; previously reported as 1 on 12/08/2020 at 09:20.    Microscopic  Comment 12/08/2020 SEE COMMENT   Final    Comment: Other formed elements not mentioned in the report are not   present in the microscopic examination.       Urine Culture, Routine 12/08/2020 *  Final                    Value:ESCHERICHIA COLI  >100,000 cfu/ml      Blood Culture, Routine 12/08/2020 No growth after 5 days.   Final    Blood Culture, Routine 12/08/2020 Gram stain aer bottle: Gram positive cocci in clusters resembling Staph    Final    Blood Culture, Routine 12/08/2020 Gram stain stan bottle: Gram positive cocci in clusters resembling Staph    Final    Blood Culture, Routine 12/08/2020 Results called to and read back by: Emiliano BOOGIE 12/09/2020     Final    Blood Culture, Routine 12/08/2020 10:06   Final    Blood Culture, Routine 12/08/2020 *  Final                    Value:COAGULASE-NEGATIVE STAPHYLOCOCCUS SPECIES  Organism is a probable contaminant     Lab Visit on 12/02/2020   Component Date Value Ref Range Status    WBC 12/02/2020 5.34  3.90 - 12.70 K/uL Final    RBC 12/02/2020 2.81* 4.00 - 5.40 M/uL Final    Hemoglobin 12/02/2020 9.3* 12.0 - 16.0 g/dL Final    Hematocrit 12/02/2020 29.5* 37.0 - 48.5 % Final    MCV 12/02/2020 105* 82 - 98 fL Final    MCH 12/02/2020 33.1* 27.0 - 31.0 pg Final    MCHC 12/02/2020 31.5* 32.0 - 36.0 g/dL Final    RDW 12/02/2020 14.3  11.5 - 14.5 % Final    Platelets 12/02/2020 77* 150 - 350 K/uL Final    MPV 12/02/2020 10.8  9.2 - 12.9 fL Final    Immature Granulocytes 12/02/2020 0.2  0.0 - 0.5 % Final    Gran # (ANC) 12/02/2020 3.4  1.8 - 7.7 K/uL Final    Immature Grans (Abs) 12/02/2020 0.01  0.00 - 0.04 K/uL Final    Comment: Mild elevation in immature granulocytes is non specific and   can be seen in a variety of conditions including stress response,   acute inflammation, trauma and pregnancy. Correlation with other   laboratory and clinical findings is essential.      Lymph # 12/02/2020 1.4  1.0 - 4.8 K/uL Final    Mono # 12/02/2020 0.3   0.3 - 1.0 K/uL Final    Eos # 12/02/2020 0.1  0.0 - 0.5 K/uL Final    Baso # 12/02/2020 0.03  0.00 - 0.20 K/uL Final    nRBC 12/02/2020 0  0 /100 WBC Final    Gran % 12/02/2020 64.0  38.0 - 73.0 % Final    Lymph % 12/02/2020 26.6  18.0 - 48.0 % Final    Mono % 12/02/2020 6.4  4.0 - 15.0 % Final    Eosinophil % 12/02/2020 2.2  0.0 - 8.0 % Final    Basophil % 12/02/2020 0.6  0.0 - 1.9 % Final    Differential Method 12/02/2020 Automated   Final    Sodium 12/02/2020 137  136 - 145 mmol/L Final    Potassium 12/02/2020 3.6  3.5 - 5.1 mmol/L Final    Chloride 12/02/2020 104  95 - 110 mmol/L Final    CO2 12/02/2020 25  23 - 29 mmol/L Final    Glucose 12/02/2020 208* 70 - 110 mg/dL Final    BUN 12/02/2020 12  6 - 20 mg/dL Final    Creatinine 12/02/2020 1.2  0.5 - 1.4 mg/dL Final    Calcium 12/02/2020 9.7  8.7 - 10.5 mg/dL Final    Total Protein 12/02/2020 6.5  6.0 - 8.4 g/dL Final    Albumin 12/02/2020 2.8* 3.5 - 5.2 g/dL Final    Total Bilirubin 12/02/2020 3.3* 0.1 - 1.0 mg/dL Final    Comment: For infants and newborns, interpretation of results should be based  on gestational age, weight and in agreement with clinical  observations.  Premature Infant recommended reference ranges:  Up to 24 hours.............<8.0 mg/dL  Up to 48 hours............<12.0 mg/dL  3-5 days..................<15.0 mg/dL  6-29 days.................<15.0 mg/dL      Alkaline Phosphatase 12/02/2020 121  55 - 135 U/L Final    AST 12/02/2020 33  10 - 40 U/L Final    ALT 12/02/2020 20  10 - 44 U/L Final    Anion Gap 12/02/2020 8  8 - 16 mmol/L Final    eGFR if African American 12/02/2020 58.8* >60 mL/min/1.73 m^2 Final    eGFR if non African American 12/02/2020 51.0* >60 mL/min/1.73 m^2 Final    Comment: Calculation used to obtain the estimated glomerular filtration  rate (eGFR) is the CKD-EPI equation.       Prothrombin Time 12/02/2020 12.1  9.0 - 12.5 sec Final    INR 12/02/2020 1.1  0.8 - 1.2 Final    Comment: Coumadin  Therapy:  2.0 - 3.0 for INR for all indicators except mechanical heart valves  and antiphospholipid syndromes which should use 2.5 - 3.5.      Phosphatidylethanol 12/02/2020 Negative  Negative ng/mL Final    Comment: Analyzed compound: PEth 16:0/18:1  1-palmitoyl-2-myiatz-mr-bboscsn-3-phosphoethanol.  Analysis performed by Liquid Chromatography with Tandem Mass  Spectrometry (LC/MS/MS).  Detection limit: 20 ng/ml  PEth levels in excess of 20 ng/ml are considered evidence of   moderate to heavy ethanol consumption.  However,the Center for   Substance Abuse Treatment (CSAT) advises caution in interpretation  and use of biomarkers alone to assess alcohol use. Results should be   interpreted in the context of all available clinical and behavioral   information.   Reference: Substance Abuse and Mental Health Services Administration  (2012). &quot;The Role of Biomarkers in the Treatment of Alcohol   Use        Disorders&quot;, 2012 Revision. Advisory, Volume 11,Issue 2.  This test was developed and its performance characteristics determined  by [employer].  It has not been cleared or approved by the Food and Drug  Administration.  *Performing location:  [facility].  [address] [place] [address]     Lab Visit on 11/06/2020   Component Date Value Ref Range Status    WBC 11/06/2020 6.09  3.90 - 12.70 K/uL Final    RBC 11/06/2020 2.73* 4.00 - 5.40 M/uL Final    Hemoglobin 11/06/2020 9.2* 12.0 - 16.0 g/dL Final    Hematocrit 11/06/2020 27.6* 37.0 - 48.5 % Final    MCV 11/06/2020 101* 82 - 98 fL Final    MCH 11/06/2020 33.7* 27.0 - 31.0 pg Final    MCHC 11/06/2020 33.3  32.0 - 36.0 g/dL Final    RDW 11/06/2020 14.5  11.5 - 14.5 % Final    Platelets 11/06/2020 87* 150 - 350 K/uL Final    MPV 11/06/2020 9.7  9.2 - 12.9 fL Final    Immature Granulocytes 11/06/2020 0.2  0.0 - 0.5 % Final    Gran # (ANC) 11/06/2020 4.0  1.8 - 7.7 K/uL Final    Immature Grans (Abs)  11/06/2020 0.01  0.00 - 0.04 K/uL Final    Comment: Mild elevation in immature granulocytes is non specific and   can be seen in a variety of conditions including stress response,   acute inflammation, trauma and pregnancy. Correlation with other   laboratory and clinical findings is essential.      Lymph # 11/06/2020 1.5  1.0 - 4.8 K/uL Final    Mono # 11/06/2020 0.4  0.3 - 1.0 K/uL Final    Eos # 11/06/2020 0.1  0.0 - 0.5 K/uL Final    Baso # 11/06/2020 0.06  0.00 - 0.20 K/uL Final    nRBC 11/06/2020 0  0 /100 WBC Final    Gran % 11/06/2020 66.3  38.0 - 73.0 % Final    Lymph % 11/06/2020 24.3  18.0 - 48.0 % Final    Mono % 11/06/2020 6.2  4.0 - 15.0 % Final    Eosinophil % 11/06/2020 2.0  0.0 - 8.0 % Final    Basophil % 11/06/2020 1.0  0.0 - 1.9 % Final    Differential Method 11/06/2020 Automated   Final    Sodium 11/06/2020 137  136 - 145 mmol/L Final    Potassium 11/06/2020 3.0* 3.5 - 5.1 mmol/L Final    Chloride 11/06/2020 100  95 - 110 mmol/L Final    CO2 11/06/2020 27  23 - 29 mmol/L Final    Glucose 11/06/2020 148* 70 - 110 mg/dL Final    BUN 11/06/2020 13  6 - 20 mg/dL Final    Creatinine 11/06/2020 1.3  0.5 - 1.4 mg/dL Final    Calcium 11/06/2020 10.3  8.7 - 10.5 mg/dL Final    Total Protein 11/06/2020 6.6  6.0 - 8.4 g/dL Final    Albumin 11/06/2020 3.2* 3.5 - 5.2 g/dL Final    Total Bilirubin 11/06/2020 5.0* 0.1 - 1.0 mg/dL Final    Comment: For infants and newborns, interpretation of results should be based  on gestational age, weight and in agreement with clinical  observations.  Premature Infant recommended reference ranges:  Up to 24 hours.............<8.0 mg/dL  Up to 48 hours............<12.0 mg/dL  3-5 days..................<15.0 mg/dL  6-29 days.................<15.0 mg/dL      Alkaline Phosphatase 11/06/2020 107  55 - 135 U/L Final    AST 11/06/2020 31  10 - 40 U/L Final    ALT 11/06/2020 16  10 - 44 U/L Final    Anion Gap 11/06/2020 10  8 - 16 mmol/L Final    eGFR if   11/06/2020 53.4* >60 mL/min/1.73 m^2 Final    eGFR if non African American 11/06/2020 46.3* >60 mL/min/1.73 m^2 Final    Comment: Calculation used to obtain the estimated glomerular filtration  rate (eGFR) is the CKD-EPI equation.       Prothrombin Time 11/06/2020 13.0* 9.0 - 12.5 sec Final    INR 11/06/2020 1.2  0.8 - 1.2 Final    Comment: Coumadin Therapy:  2.0 - 3.0 for INR for all indicators except mechanical heart valves  and antiphospholipid syndromes which should use 2.5 - 3.5.     Lab Visit on 11/03/2020   Component Date Value Ref Range Status    WBC 11/03/2020 5.28  3.90 - 12.70 K/uL Final    RBC 11/03/2020 2.50* 4.00 - 5.40 M/uL Final    Hemoglobin 11/03/2020 8.5* 12.0 - 16.0 g/dL Final    Hematocrit 11/03/2020 25.7* 37.0 - 48.5 % Final    MCV 11/03/2020 103* 82 - 98 fL Final    MCH 11/03/2020 34.0* 27.0 - 31.0 pg Final    MCHC 11/03/2020 33.1  32.0 - 36.0 g/dL Final    RDW 11/03/2020 14.5  11.5 - 14.5 % Final    Platelets 11/03/2020 79* 150 - 350 K/uL Final    MPV 11/03/2020 10.2  9.2 - 12.9 fL Final    Immature Granulocytes 11/03/2020 0.2  0.0 - 0.5 % Final    Gran # (ANC) 11/03/2020 3.7  1.8 - 7.7 K/uL Final    Immature Grans (Abs) 11/03/2020 0.01  0.00 - 0.04 K/uL Final    Comment: Mild elevation in immature granulocytes is non specific and   can be seen in a variety of conditions including stress response,   acute inflammation, trauma and pregnancy. Correlation with other   laboratory and clinical findings is essential.      Lymph # 11/03/2020 1.1  1.0 - 4.8 K/uL Final    Mono # 11/03/2020 0.4  0.3 - 1.0 K/uL Final    Eos # 11/03/2020 0.1  0.0 - 0.5 K/uL Final    Baso # 11/03/2020 0.04  0.00 - 0.20 K/uL Final    nRBC 11/03/2020 0  0 /100 WBC Final    Gran % 11/03/2020 69.1  38.0 - 73.0 % Final    Lymph % 11/03/2020 21.2  18.0 - 48.0 % Final    Mono % 11/03/2020 6.6  4.0 - 15.0 % Final    Eosinophil % 11/03/2020 2.1  0.0 - 8.0 % Final    Basophil % 11/03/2020  0.8  0.0 - 1.9 % Final    Differential Method 11/03/2020 Automated   Final    Sodium 11/03/2020 137  136 - 145 mmol/L Final    Potassium 11/03/2020 3.3* 3.5 - 5.1 mmol/L Final    Chloride 11/03/2020 100  95 - 110 mmol/L Final    CO2 11/03/2020 28  23 - 29 mmol/L Final    Glucose 11/03/2020 175* 70 - 110 mg/dL Final    BUN 11/03/2020 11  6 - 20 mg/dL Final    Creatinine 11/03/2020 1.3  0.5 - 1.4 mg/dL Final    Calcium 11/03/2020 9.4  8.7 - 10.5 mg/dL Final    Total Protein 11/03/2020 6.1  6.0 - 8.4 g/dL Final    Albumin 11/03/2020 3.0* 3.5 - 5.2 g/dL Final    Total Bilirubin 11/03/2020 4.3* 0.1 - 1.0 mg/dL Final    Comment: For infants and newborns, interpretation of results should be based  on gestational age, weight and in agreement with clinical  observations.  Premature Infant recommended reference ranges:  Up to 24 hours.............<8.0 mg/dL  Up to 48 hours............<12.0 mg/dL  3-5 days..................<15.0 mg/dL  6-29 days.................<15.0 mg/dL      Alkaline Phosphatase 11/03/2020 100  55 - 135 U/L Final    AST 11/03/2020 29  10 - 40 U/L Final    ALT 11/03/2020 17  10 - 44 U/L Final    Anion Gap 11/03/2020 9  8 - 16 mmol/L Final    eGFR if African American 11/03/2020 53.4* >60 mL/min/1.73 m^2 Final    eGFR if non African American 11/03/2020 46.3* >60 mL/min/1.73 m^2 Final    Comment: Calculation used to obtain the estimated glomerular filtration  rate (eGFR) is the CKD-EPI equation.       Prothrombin Time 11/03/2020 12.6* 9.0 - 12.5 sec Final    INR 11/03/2020 1.1  0.8 - 1.2 Final    Comment: Coumadin Therapy:  2.0 - 3.0 for INR for all indicators except mechanical heart valves  and antiphospholipid syndromes which should use 2.5 - 3.5.      Phosphatidylethanol 11/03/2020 Negative  Negative ng/mL Final    Comment: Analyzed compound: PEth 16:0/18:1  1-palmitoyl-2-xlgdze-wf-zhtxgbc-3-phosphoethanol.  Analysis performed by Liquid Chromatography with Tandem Mass  Spectrometry  (LC/MS/MS).  Detection limit: 20 ng/ml  PEth levels in excess of 20 ng/ml are considered evidence of   moderate to heavy ethanol consumption.  However,the Center for   Substance Abuse Treatment (CSAT) advises caution in interpretation  and use of biomarkers alone to assess alcohol use. Results should be   interpreted in the context of all available clinical and behavioral   information.   Reference: Substance Abuse and Mental Health Services Administration  (2012). &quot;The Role of Biomarkers in the Treatment of Alcohol   Use        Disorders&quot;, 2012 Revision. Advisory, Volume 11,Issue 2.  This test was developed and its performance characteristics determined  by [employer].  It has not been cleared or approved by the Food and Drug  Administration.  *Performing location:  [email or web address], [email or web address].  [address] [place] [address]     Admission on 11/01/2020, Discharged on 11/02/2020   Component Date Value Ref Range Status    WBC 11/01/2020 7.33  3.90 - 12.70 K/uL Final    RBC 11/01/2020 2.74* 4.00 - 5.40 M/uL Final    Hemoglobin 11/01/2020 9.3* 12.0 - 16.0 g/dL Final    Hematocrit 11/01/2020 27.5* 37.0 - 48.5 % Final    MCV 11/01/2020 100* 82 - 98 fL Final    MCH 11/01/2020 33.9* 27.0 - 31.0 pg Final    MCHC 11/01/2020 33.8  32.0 - 36.0 g/dL Final    RDW 11/01/2020 14.2  11.5 - 14.5 % Final    Platelets 11/01/2020 95* 150 - 350 K/uL Final    MPV 11/01/2020 11.2  9.2 - 12.9 fL Final    Immature Granulocytes 11/01/2020 0.3  0.0 - 0.5 % Final    Gran # (ANC) 11/01/2020 4.9  1.8 - 7.7 K/uL Final    Immature Grans (Abs) 11/01/2020 0.02  0.00 - 0.04 K/uL Final    Comment: Mild elevation in immature granulocytes is non specific and   can be seen in a variety of conditions including stress response,   acute inflammation, trauma and pregnancy. Correlation with other   laboratory and clinical findings is essential.      Lymph # 11/01/2020 1.5  1.0 - 4.8 K/uL Final    Mono #  11/01/2020 0.6  0.3 - 1.0 K/uL Final    Eos # 11/01/2020 0.2  0.0 - 0.5 K/uL Final    Baso # 11/01/2020 0.07  0.00 - 0.20 K/uL Final    nRBC 11/01/2020 0  0 /100 WBC Final    Gran % 11/01/2020 67.1  38.0 - 73.0 % Final    Lymph % 11/01/2020 21.0  18.0 - 48.0 % Final    Mono % 11/01/2020 8.6  4.0 - 15.0 % Final    Eosinophil % 11/01/2020 2.0  0.0 - 8.0 % Final    Basophil % 11/01/2020 1.0  0.0 - 1.9 % Final    Differential Method 11/01/2020 Automated   Final    Ammonia 11/01/2020 92* 10 - 50 umol/L Final    *Result may be interfered by visible hemolysis    Prothrombin Time 11/01/2020 12.4  9.0 - 12.5 sec Final    INR 11/01/2020 1.1  0.8 - 1.2 Final    Comment: Coumadin Therapy:  2.0 - 3.0 for INR for all indicators except mechanical heart valves  and antiphospholipid syndromes which should use 2.5 - 3.5.      Sodium 11/01/2020 137  136 - 145 mmol/L Final    Potassium 11/01/2020 2.8* 3.5 - 5.1 mmol/L Final    Chloride 11/01/2020 100  95 - 110 mmol/L Final    CO2 11/01/2020 26  23 - 29 mmol/L Final    Glucose 11/01/2020 153* 70 - 110 mg/dL Final    BUN 11/01/2020 10  6 - 20 mg/dL Final    Creatinine 11/01/2020 1.3  0.5 - 1.4 mg/dL Final    Calcium 11/01/2020 9.3  8.7 - 10.5 mg/dL Final    Total Protein 11/01/2020 6.2  6.0 - 8.4 g/dL Final    Albumin 11/01/2020 2.9* 3.5 - 5.2 g/dL Final    Total Bilirubin 11/01/2020 4.3* 0.1 - 1.0 mg/dL Final    Comment: For infants and newborns, interpretation of results should be based  on gestational age, weight and in agreement with clinical  observations.  Premature Infant recommended reference ranges:  Up to 24 hours.............<8.0 mg/dL  Up to 48 hours............<12.0 mg/dL  3-5 days..................<15.0 mg/dL  6-29 days.................<15.0 mg/dL      Alkaline Phosphatase 11/01/2020 106  55 - 135 U/L Final    AST 11/01/2020 30  10 - 40 U/L Final    ALT 11/01/2020 17  10 - 44 U/L Final    Anion Gap 11/01/2020 11  8 - 16 mmol/L Final    eGFR if   11/01/2020 53.4* >60 mL/min/1.73 m^2 Final    eGFR if non African American 11/01/2020 46.3* >60 mL/min/1.73 m^2 Final    Comment: Calculation used to obtain the estimated glomerular filtration  rate (eGFR) is the CKD-EPI equation.       Ammonia 11/01/2020 100* 10 - 50 umol/L Final    POC Rapid COVID 11/01/2020 Negative  Negative Final     Acceptable 11/01/2020 Yes   Final    Vancomycin-Trough 11/01/2020 39.4* 10.0 - 22.0 ug/mL Final    Comment: *Critical value -   Results called to and read back by:Roxana Keyes RN       Sodium 11/02/2020 136  136 - 145 mmol/L Final    Potassium 11/02/2020 2.8* 3.5 - 5.1 mmol/L Final    Chloride 11/02/2020 100  95 - 110 mmol/L Final    CO2 11/02/2020 26  23 - 29 mmol/L Final    Glucose 11/02/2020 144* 70 - 110 mg/dL Final    BUN 11/02/2020 11  6 - 20 mg/dL Final    Creatinine 11/02/2020 1.3  0.5 - 1.4 mg/dL Final    Calcium 11/02/2020 8.9  8.7 - 10.5 mg/dL Final    Total Protein 11/02/2020 5.3* 6.0 - 8.4 g/dL Final    Albumin 11/02/2020 2.6* 3.5 - 5.2 g/dL Final    Total Bilirubin 11/02/2020 3.5* 0.1 - 1.0 mg/dL Final    Comment: For infants and newborns, interpretation of results should be based  on gestational age, weight and in agreement with clinical  observations.  Premature Infant recommended reference ranges:  Up to 24 hours.............<8.0 mg/dL  Up to 48 hours............<12.0 mg/dL  3-5 days..................<15.0 mg/dL  6-29 days.................<15.0 mg/dL      Alkaline Phosphatase 11/02/2020 98  55 - 135 U/L Final    AST 11/02/2020 26  10 - 40 U/L Final    ALT 11/02/2020 16  10 - 44 U/L Final    Anion Gap 11/02/2020 10  8 - 16 mmol/L Final    eGFR if African American 11/02/2020 53.4* >60 mL/min/1.73 m^2 Final    eGFR if non African American 11/02/2020 46.3* >60 mL/min/1.73 m^2 Final    Comment: Calculation used to obtain the estimated glomerular filtration  rate (eGFR) is the CKD-EPI equation.       Magnesium  11/02/2020 1.6  1.6 - 2.6 mg/dL Final    Phosphorus 11/02/2020 2.5* 2.7 - 4.5 mg/dL Final    WBC 11/02/2020 6.22  3.90 - 12.70 K/uL Final    RBC 11/02/2020 2.34* 4.00 - 5.40 M/uL Final    Hemoglobin 11/02/2020 7.8* 12.0 - 16.0 g/dL Final    Hematocrit 11/02/2020 23.3* 37.0 - 48.5 % Final    MCV 11/02/2020 100* 82 - 98 fL Final    MCH 11/02/2020 33.3* 27.0 - 31.0 pg Final    MCHC 11/02/2020 33.5  32.0 - 36.0 g/dL Final    RDW 11/02/2020 14.1  11.5 - 14.5 % Final    Platelets 11/02/2020 69* 150 - 350 K/uL Final    MPV 11/02/2020 10.6  9.2 - 12.9 fL Final    Immature Granulocytes 11/02/2020 0.2  0.0 - 0.5 % Final    Gran # (ANC) 11/02/2020 4.0  1.8 - 7.7 K/uL Final    Immature Grans (Abs) 11/02/2020 0.01  0.00 - 0.04 K/uL Final    Comment: Mild elevation in immature granulocytes is non specific and   can be seen in a variety of conditions including stress response,   acute inflammation, trauma and pregnancy. Correlation with other   laboratory and clinical findings is essential.      Lymph # 11/02/2020 1.5  1.0 - 4.8 K/uL Final    Mono # 11/02/2020 0.6  0.3 - 1.0 K/uL Final    Eos # 11/02/2020 0.2  0.0 - 0.5 K/uL Final    Baso # 11/02/2020 0.04  0.00 - 0.20 K/uL Final    nRBC 11/02/2020 0  0 /100 WBC Final    Gran % 11/02/2020 63.9  38.0 - 73.0 % Final    Lymph % 11/02/2020 23.6  18.0 - 48.0 % Final    Mono % 11/02/2020 8.8  4.0 - 15.0 % Final    Eosinophil % 11/02/2020 2.9  0.0 - 8.0 % Final    Basophil % 11/02/2020 0.6  0.0 - 1.9 % Final    Differential Method 11/02/2020 Automated   Final    Prothrombin Time 11/02/2020 13.1* 9.0 - 12.5 sec Final    INR 11/02/2020 1.2  0.8 - 1.2 Final    Comment: Coumadin Therapy:  2.0 - 3.0 for INR for all indicators except mechanical heart valves  and antiphospholipid syndromes which should use 2.5 - 3.5.      Group & Rh 11/02/2020 O POS   Final    Indirect Miguelina 11/02/2020 POS   Final    Vancomycin, Random 11/02/2020 25.7  Not established ug/mL Final     Antibody Identification 11/02/2020 POS   Final    Undetermined Specificity   Admission on 10/30/2020, Discharged on 10/30/2020   Component Date Value Ref Range Status    WBC 10/30/2020 6.97  3.90 - 12.70 K/uL Final    RBC 10/30/2020 2.76* 4.00 - 5.40 M/uL Final    Hemoglobin 10/30/2020 9.5* 12.0 - 16.0 g/dL Final    Hematocrit 10/30/2020 28.6* 37.0 - 48.5 % Final    MCV 10/30/2020 104* 82 - 98 fL Final    MCH 10/30/2020 34.4* 27.0 - 31.0 pg Final    MCHC 10/30/2020 33.2  32.0 - 36.0 g/dL Final    RDW 10/30/2020 14.6* 11.5 - 14.5 % Final    Platelets 10/30/2020 81* 150 - 350 K/uL Final    MPV 10/30/2020 11.1  9.2 - 12.9 fL Final    Immature Granulocytes 10/30/2020 0.4  0.0 - 0.5 % Final    Gran # (ANC) 10/30/2020 4.7  1.8 - 7.7 K/uL Final    Immature Grans (Abs) 10/30/2020 0.03  0.00 - 0.04 K/uL Final    Comment: Mild elevation in immature granulocytes is non specific and   can be seen in a variety of conditions including stress response,   acute inflammation, trauma and pregnancy. Correlation with other   laboratory and clinical findings is essential.      Lymph # 10/30/2020 1.5  1.0 - 4.8 K/uL Final    Mono # 10/30/2020 0.5  0.3 - 1.0 K/uL Final    Eos # 10/30/2020 0.2  0.0 - 0.5 K/uL Final    Baso # 10/30/2020 0.07  0.00 - 0.20 K/uL Final    nRBC 10/30/2020 0  0 /100 WBC Final    Gran % 10/30/2020 67.3  38.0 - 73.0 % Final    Lymph % 10/30/2020 21.8  18.0 - 48.0 % Final    Mono % 10/30/2020 7.3  4.0 - 15.0 % Final    Eosinophil % 10/30/2020 2.2  0.0 - 8.0 % Final    Basophil % 10/30/2020 1.0  0.0 - 1.9 % Final    Differential Method 10/30/2020 Automated   Final    Sodium 10/30/2020 137  136 - 145 mmol/L Final    Potassium 10/30/2020 3.5  3.5 - 5.1 mmol/L Final    Chloride 10/30/2020 105  95 - 110 mmol/L Final    CO2 10/30/2020 21* 23 - 29 mmol/L Final    Glucose 10/30/2020 159* 70 - 110 mg/dL Final    BUN 10/30/2020 14  6 - 20 mg/dL Final    Creatinine 10/30/2020 1.2  0.5 - 1.4  mg/dL Final    Calcium 10/30/2020 9.4  8.7 - 10.5 mg/dL Final    Total Protein 10/30/2020 6.9  6.0 - 8.4 g/dL Final    Albumin 10/30/2020 3.1* 3.5 - 5.2 g/dL Final    Total Bilirubin 10/30/2020 4.2* 0.1 - 1.0 mg/dL Final    Comment: For infants and newborns, interpretation of results should be based  on gestational age, weight and in agreement with clinical  observations.  Premature Infant recommended reference ranges:  Up to 24 hours.............<8.0 mg/dL  Up to 48 hours............<12.0 mg/dL  3-5 days..................<15.0 mg/dL  6-29 days.................<15.0 mg/dL      Alkaline Phosphatase 10/30/2020 106  55 - 135 U/L Final    AST 10/30/2020 51* 10 - 40 U/L Final    *Result may be interfered by visible hemolysis    ALT 10/30/2020 21  10 - 44 U/L Final    Anion Gap 10/30/2020 11  8 - 16 mmol/L Final    eGFR if  10/30/2020 58.8* >60 mL/min/1.73 m^2 Final    eGFR if non African American 10/30/2020 51.0* >60 mL/min/1.73 m^2 Final    Comment: Calculation used to obtain the estimated glomerular filtration  rate (eGFR) is the CKD-EPI equation.       Prothrombin Time 10/30/2020 12.3  9.0 - 12.5 sec Final    INR 10/30/2020 1.1  0.8 - 1.2 Final    Comment: Coumadin Therapy:  2.0 - 3.0 for INR for all indicators except mechanical heart valves  and antiphospholipid syndromes which should use 2.5 - 3.5.      Ammonia 10/30/2020 61* 10 - 50 umol/L Final    POC Rapid COVID 10/30/2020 Negative  Negative Final     Acceptable 10/30/2020 Yes   Final    Magnesium 10/30/2020 1.7  1.6 - 2.6 mg/dL Final    Phosphorus 10/30/2020 2.6* 2.7 - 4.5 mg/dL Final    Vancomycin-Trough 10/30/2020 16.4  10.0 - 22.0 ug/mL Final   Lab Visit on 10/27/2020   Component Date Value Ref Range Status    WBC 10/27/2020 4.89  3.90 - 12.70 K/uL Final    RBC 10/27/2020 2.49* 4.00 - 5.40 M/uL Final    Hemoglobin 10/27/2020 8.5* 12.0 - 16.0 g/dL Final    Hematocrit 10/27/2020 26.0* 37.0 - 48.5 % Final     MCV 10/27/2020 104* 82 - 98 fL Final    MCH 10/27/2020 34.1* 27.0 - 31.0 pg Final    MCHC 10/27/2020 32.7  32.0 - 36.0 g/dL Final    RDW 10/27/2020 14.4  11.5 - 14.5 % Final    Platelets 10/27/2020 60* 150 - 350 K/uL Final    MPV 10/27/2020 10.1  9.2 - 12.9 fL Final    Immature Granulocytes 10/27/2020 0.6* 0.0 - 0.5 % Final    Gran # (ANC) 10/27/2020 3.3  1.8 - 7.7 K/uL Final    Immature Grans (Abs) 10/27/2020 0.03  0.00 - 0.04 K/uL Final    Comment: Mild elevation in immature granulocytes is non specific and   can be seen in a variety of conditions including stress response,   acute inflammation, trauma and pregnancy. Correlation with other   laboratory and clinical findings is essential.      Lymph # 10/27/2020 1.1  1.0 - 4.8 K/uL Final    Mono # 10/27/2020 0.3  0.3 - 1.0 K/uL Final    Eos # 10/27/2020 0.1  0.0 - 0.5 K/uL Final    Baso # 10/27/2020 0.03  0.00 - 0.20 K/uL Final    nRBC 10/27/2020 0  0 /100 WBC Final    Gran % 10/27/2020 67.5  38.0 - 73.0 % Final    Lymph % 10/27/2020 22.1  18.0 - 48.0 % Final    Mono % 10/27/2020 6.5  4.0 - 15.0 % Final    Eosinophil % 10/27/2020 2.7  0.0 - 8.0 % Final    Basophil % 10/27/2020 0.6  0.0 - 1.9 % Final    Differential Method 10/27/2020 Automated   Final    Sodium 10/27/2020 137  136 - 145 mmol/L Final    Potassium 10/27/2020 3.3* 3.5 - 5.1 mmol/L Final    Chloride 10/27/2020 101  95 - 110 mmol/L Final    CO2 10/27/2020 25  23 - 29 mmol/L Final    Glucose 10/27/2020 218* 70 - 110 mg/dL Final    BUN 10/27/2020 12  6 - 20 mg/dL Final    Creatinine 10/27/2020 1.4  0.5 - 1.4 mg/dL Final    Calcium 10/27/2020 9.3  8.7 - 10.5 mg/dL Final    Total Protein 10/27/2020 6.1  6.0 - 8.4 g/dL Final    Albumin 10/27/2020 2.9* 3.5 - 5.2 g/dL Final    Total Bilirubin 10/27/2020 3.9* 0.1 - 1.0 mg/dL Final    Comment: For infants and newborns, interpretation of results should be based  on gestational age, weight and in agreement with  clinical  observations.  Premature Infant recommended reference ranges:  Up to 24 hours.............<8.0 mg/dL  Up to 48 hours............<12.0 mg/dL  3-5 days..................<15.0 mg/dL  6-29 days.................<15.0 mg/dL      Alkaline Phosphatase 10/27/2020 104  55 - 135 U/L Final    AST 10/27/2020 33  10 - 40 U/L Final    ALT 10/27/2020 18  10 - 44 U/L Final    Anion Gap 10/27/2020 11  8 - 16 mmol/L Final    eGFR if  10/27/2020 48.8* >60 mL/min/1.73 m^2 Final    eGFR if non African American 10/27/2020 42.3* >60 mL/min/1.73 m^2 Final    Comment: Calculation used to obtain the estimated glomerular filtration  rate (eGFR) is the CKD-EPI equation.       Prothrombin Time 10/27/2020 13.0* 9.0 - 12.5 sec Final    INR 10/27/2020 1.2  0.8 - 1.2 Final    Comment: Coumadin Therapy:  2.0 - 3.0 for INR for all indicators except mechanical heart valves  and antiphospholipid syndromes which should use 2.5 - 3.5.     No results displayed because visit has over 200 results.      There may be more visits with results that are not included.           Jesus Baez

## 2020-12-18 ENCOUNTER — TELEPHONE (OUTPATIENT)
Dept: TRANSPLANT | Facility: CLINIC | Age: 55
End: 2020-12-18

## 2020-12-18 NOTE — TELEPHONE ENCOUNTER
----- Message from Maria Croft sent at 12/18/2020 12:04 PM CST -----  Regarding: Authorization  Contact: Pt @ [phone]  Pt  Yonis stating that we sent a procedure to be authorized for the pt was suppose to be marked urgent. Avni with insurance stated it's for a procedure code [id number]. Stated he spoke with [facility] in regards to this.     Call back: [phone]

## 2020-12-18 NOTE — TELEPHONE ENCOUNTER
Returned call to Yonis. He stated that the ins co had 14 days to work on approval & wanted to know about an update. Told Yonis I had no updates. Only that I had called earlier this week & was told by his insurance co that they were still working on the approval. Understanding expressed. No other questions at this time.

## 2020-12-22 ENCOUNTER — PATIENT MESSAGE (OUTPATIENT)
Dept: TRANSPLANT | Facility: CLINIC | Age: 55
End: 2020-12-22

## 2020-12-23 ENCOUNTER — CONFERENCE (OUTPATIENT)
Dept: TRANSPLANT | Facility: CLINIC | Age: 55
End: 2020-12-23

## 2020-12-23 ENCOUNTER — TELEPHONE (OUTPATIENT)
Dept: TRANSPLANT | Facility: CLINIC | Age: 55
End: 2020-12-23

## 2020-12-23 NOTE — TELEPHONE ENCOUNTER
Called pt to discuss the decision/conversation re: living donor in pt's case. Spoke with , Yonis.     On pt's CT, she has a partial, non-occlusive PV thrombus. This makes her a higher surgical complexity and less ideal for our center to perform a living donor/split liver transplant for her. The surgeons on the team suggest that the patient and her son research centers in [place] that do a higher number of living donor & would therefore potentially have more experience with complex recipients in living liver donor surgery/transplantation.    Yonis stated that pt's son has looked into [facility] as it is closer to home geographically. He asked if our team had any preferences, which they do not - they suggest whichever is closest & therefore more convenient to the pt.     I will work on getting some contacts for pt &  at both [facility] & [facility] to start the process of referral & send via portal. Understanding expressed. No other questions at this time.

## 2020-12-23 NOTE — TELEPHONE ENCOUNTER
Pt's case discussed in liver selection committee  meeting.to discuss potential acceptance of living donor. Per committee committee Pt may not be a candidate for accepting living donor due to higher surgery complexity. Pt's coordinator will ask patient and family about talking to [place] program with MELD improving.

## 2020-12-28 ENCOUNTER — PATIENT MESSAGE (OUTPATIENT)
Dept: TRANSPLANT | Facility: CLINIC | Age: 55
End: 2020-12-28

## 2020-12-29 ENCOUNTER — PATIENT MESSAGE (OUTPATIENT)
Dept: TRANSPLANT | Facility: CLINIC | Age: 55
End: 2020-12-29

## 2020-12-29 ENCOUNTER — TELEPHONE (OUTPATIENT)
Dept: TRANSPLANT | Facility: CLINIC | Age: 55
End: 2020-12-29

## 2020-12-29 NOTE — TELEPHONE ENCOUNTER
Per pt request via portal, reached out to Vin Andre RN, at [facility] for assistance in getting pt referred to their center. Pt wants to pursue a living donor option (her son Oskar is willing to donate), but the team here believes that she would be high risk for our program due to her partial, nonocclusive portal vein thrombus. Patient requested a referral from [facility] to Vin, but Vin stated that she should have a referral from the doctor who sent her here (Dr. Reuben Vee - pt's local hepatologist at [facility]). He stated that [facility] often sends potential living donor recipients to [facility] as they do not have their own living donor program, and pt's insurance should accept this referral process. Explained to Vin that we are not delisting her here at [facility], only attempting to help pt get transplanted as soon as possible in a way that she would prefer. Pt's current MELD is 11, and she is not getting organ offers currently in [place].    Will relay message re: referral from Mariusz to pt via portal.

## 2021-01-08 ENCOUNTER — PATIENT MESSAGE (OUTPATIENT)
Dept: TRANSPLANT | Facility: CLINIC | Age: 56
End: 2021-01-08

## 2021-01-15 ENCOUNTER — TELEPHONE (OUTPATIENT)
Dept: TRANSPLANT | Facility: CLINIC | Age: 56
End: 2021-01-15

## 2021-01-27 ENCOUNTER — TELEPHONE (OUTPATIENT)
Dept: TRANSPLANT | Facility: CLINIC | Age: 56
End: 2021-01-27

## 2021-01-28 ENCOUNTER — PATIENT MESSAGE (OUTPATIENT)
Dept: TRANSPLANT | Facility: CLINIC | Age: 56
End: 2021-01-28

## 2021-02-01 ENCOUNTER — TELEPHONE (OUTPATIENT)
Dept: TRANSPLANT | Facility: CLINIC | Age: 56
End: 2021-02-01

## 2021-02-02 ENCOUNTER — TELEPHONE (OUTPATIENT)
Dept: ENDOSCOPY | Facility: HOSPITAL | Age: 56
End: 2021-02-02

## 2021-02-05 ENCOUNTER — TELEPHONE (OUTPATIENT)
Dept: TRANSPLANT | Facility: CLINIC | Age: 56
End: 2021-02-05

## 2021-02-05 LAB
EXT ALBUMIN: 3.2
EXT ALKALINE PHOSPHATASE: 85
EXT ALT: 12
EXT AST: 23
EXT BILIRUBIN TOTAL: 2.2
EXT BUN: 13
EXT CALCIUM: 9.9
EXT CHLORIDE: 108
EXT CO2: 28
EXT CREATININE: 0.9 MG/DL
EXT GLUCOSE: 163
EXT HEMATOCRIT: 23.5
EXT HEMOGLOBIN: 8.2
EXT INR: 1.2
EXT PLATELETS: 91
EXT POTASSIUM: 3.9
EXT PROTEIN TOTAL: 5.6
EXT PT: 12
EXT SODIUM: 140 MMOL/L
EXT WBC: 4.6

## 2021-02-22 ENCOUNTER — TELEPHONE (OUTPATIENT)
Dept: TRANSPLANT | Facility: CLINIC | Age: 56
End: 2021-02-22

## 2021-02-22 ENCOUNTER — PATIENT MESSAGE (OUTPATIENT)
Dept: ENDOSCOPY | Facility: HOSPITAL | Age: 56
End: 2021-02-22

## 2021-02-22 ENCOUNTER — PATIENT MESSAGE (OUTPATIENT)
Dept: TRANSPLANT | Facility: CLINIC | Age: 56
End: 2021-02-22

## 2021-02-22 ENCOUNTER — DOCUMENTATION ONLY (OUTPATIENT)
Dept: TRANSPLANT | Facility: CLINIC | Age: 56
End: 2021-02-22

## 2021-02-22 LAB
EXT ALBUMIN: 3.8
EXT ALKALINE PHOSPHATASE: 102
EXT ALT: 15
EXT AST: 31
EXT BASOPHIL%: 1
EXT BASOPHILS (ABSOLUTE): 49
EXT BILIRUBIN TOTAL: 3.2
EXT BUN: 11
EXT CALCIUM: 10.2
EXT CHLORIDE: 99
EXT CO2: 27
EXT CREATININE: 1 MG/DL
EXT EOSINOPHIL%: 1.6
EXT EOSINOPHILS (ABSOLUTE): 78
EXT GFR MDRD AF AMER: 73
EXT GFR MDRD NON AF AMER: 63
EXT GLUCOSE: 178
EXT HEMATOCRIT: 29.5
EXT HEMOGLOBIN: 10.3
EXT INR: 1.2
EXT LYMPH%: 24.1
EXT LYMPHS (ABSOLUTE): 1181
EXT MCH: 33
EXT MCHC: 34.9
EXT MCV: 94.6
EXT MONOCYTES%: 5.3
EXT MPV: 11.4
EXT NEUT%: 68
EXT NEUTROPHILS (ABSOLUTE): 3332
EXT PLATELETS: 90
EXT POTASSIUM: 3.2
EXT PROTEIN TOTAL: 6.6
EXT PT: 12.4
EXT RBC: 3.12
EXT RDW: 12.7
EXT SODIUM: 135 MMOL/L
EXT WBC: 4.9

## 2021-02-23 NOTE — ED NOTES
Notified Jerry Perdue NP of elevated heart rate. Transport put in.   Patient was scheduled for a nurse visit.

## 2021-03-30 ENCOUNTER — DOCUMENTATION ONLY (OUTPATIENT)
Dept: TRANSPLANT | Facility: CLINIC | Age: 56
End: 2021-03-30

## 2021-03-30 LAB
EXT ALBUMIN: 3.4
EXT ALKALINE PHOSPHATASE: 85
EXT ALT: 18
EXT AST: 33
EXT BASOPHIL%: 0.8
EXT BASOPHILS (ABSOLUTE): 40
EXT BILIRUBIN TOTAL: 2
EXT BUN: 9
EXT CALCIUM: 10.2
EXT CHLORIDE: 108
EXT CO2: 26
EXT CREATININE: 1 MG/DL
EXT EOSINOPHIL%: 2.2
EXT EOSINOPHILS (ABSOLUTE): 110
EXT GFR MDRD AF AMER: 73
EXT GFR MDRD NON AF AMER: 63
EXT GLUCOSE: 124
EXT HEMATOCRIT: 29.8
EXT HEMOGLOBIN: 9.9
EXT INR: 1.1
EXT LYMPH%: 23.7
EXT LYMPHS (ABSOLUTE): 1185
EXT MCH: 32.7
EXT MCHC: 33.2
EXT MCV: 98.3
EXT MONOCYTES (ABSOLUTE): 330
EXT MONOCYTES%: 6.6
EXT MPV: 10.3
EXT NEUT%: 66.7
EXT NEUTROPHILS (ABSOLUTE): 3335
EXT PLATELETS: 84
EXT POTASSIUM: 3.6
EXT PROTEIN TOTAL: 6.2
EXT PT: 11.1
EXT RBC: 3.03
EXT RDW: 13.2
EXT SODIUM: 138 MMOL/L
EXT WBC: 5

## 2021-04-01 ENCOUNTER — DOCUMENTATION ONLY (OUTPATIENT)
Dept: TRANSPLANT | Facility: CLINIC | Age: 56
End: 2021-04-01

## 2021-04-06 RX ORDER — LACTULOSE 10 G/10G
30 SOLUTION ORAL 3 TIMES DAILY PRN
Qty: 90 PACKET | Refills: 11 | Status: SHIPPED | OUTPATIENT
Start: 2021-04-06

## 2021-04-16 ENCOUNTER — DOCUMENTATION ONLY (OUTPATIENT)
Dept: TRANSPLANT | Facility: CLINIC | Age: 56
End: 2021-04-16

## 2021-04-16 LAB
EXT ALBUMIN: 3.2
EXT ALKALINE PHOSPHATASE: 91
EXT ALT: 16
EXT AST: 29
EXT BASOPHIL%: 0.8
EXT BASOPHILS (ABSOLUTE): 39
EXT BILIRUBIN TOTAL: 1.6
EXT BUN: 8
EXT CALCIUM: 9.8
EXT CHLORIDE: 106
EXT CO2: 26
EXT CREATININE: 0.94 MG/DL
EXT EOSINOPHIL%: 2
EXT EOSINOPHILS (ABSOLUTE): 98
EXT GFR MDRD AF AMER: 79
EXT GFR MDRD NON AF AMER: 68
EXT GLUCOSE: 147
EXT HEMATOCRIT: 26.8
EXT HEMOGLOBIN: 9.3
EXT INR: 1.2
EXT LYMPH%: 23.4
EXT LYMPHS (ABSOLUTE): 1147
EXT MCH: 31.7
EXT MCHC: 34.7
EXT MCV: 91.5
EXT MONOCYTES (ABSOLUTE): 299
EXT MONOCYTES%: 6.1
EXT MPV: 10.8
EXT NEUT%: 67.7
EXT NEUTROPHILS (ABSOLUTE): 3317
EXT PLATELETS: 69
EXT POTASSIUM: 3.7
EXT PROTEIN TOTAL: 5.8
EXT PT: 11.9
EXT RBC: 2.93
EXT RDW: 12.8
EXT SODIUM: 137 MMOL/L
EXT WBC: 4.9

## 2021-05-17 ENCOUNTER — TELEPHONE (OUTPATIENT)
Dept: TRANSPLANT | Facility: CLINIC | Age: 56
End: 2021-05-17

## 2021-05-17 LAB
EXT ALBUMIN: 3.3 (ref 3.6–5.1)
EXT ALKALINE PHOSPHATASE: 96 (ref 37–153)
EXT ALT: 13 (ref 6–29)
EXT AST: 26 (ref 10–35)
EXT BILIRUBIN TOTAL: 1.9 (ref 0.2–1.2)
EXT BUN: 12 (ref 7–25)
EXT CALCIUM: 10.3 (ref 8.6–10.4)
EXT CHLORIDE: 106 (ref 98–110)
EXT CO2: 28 (ref 20–32)
EXT CREATININE: 1.02 MG/DL (ref 0.5–1.05)
EXT GFR MDRD AF AMER: 72
EXT GFR MDRD NON AF AMER: 62
EXT GLUCOSE: 151 (ref 65–99)
EXT INR: 1.1
EXT POTASSIUM: 4.3 (ref 3.5–5.3)
EXT PROTEIN TOTAL: 5.8 (ref 6.1–8.1)
EXT PT: 11.7
EXT SODIUM: 139 MMOL/L (ref 135–146)

## 2021-07-12 ENCOUNTER — PATIENT MESSAGE (OUTPATIENT)
Dept: TRANSPLANT | Facility: CLINIC | Age: 56
End: 2021-07-12

## 2021-07-19 ENCOUNTER — TELEPHONE (OUTPATIENT)
Dept: TRANSPLANT | Facility: CLINIC | Age: 56
End: 2021-07-19

## 2021-07-23 ENCOUNTER — PATIENT MESSAGE (OUTPATIENT)
Dept: TRANSPLANT | Facility: CLINIC | Age: 56
End: 2021-07-23

## 2021-08-06 ENCOUNTER — DOCUMENTATION ONLY (OUTPATIENT)
Dept: TRANSPLANT | Facility: CLINIC | Age: 56
End: 2021-08-06

## 2021-08-06 ENCOUNTER — TELEPHONE (OUTPATIENT)
Dept: TRANSPLANT | Facility: CLINIC | Age: 56
End: 2021-08-06

## 2021-08-06 LAB
EXT ALBUMIN: 3.4
EXT ALKALINE PHOSPHATASE: 79
EXT ALT: 14
EXT AST: 27
EXT BASOPHIL%: 0.9
EXT BASOPHILS (ABSOLUTE): 41
EXT BILIRUBIN TOTAL: 1.8
EXT BUN: 12
EXT CALCIUM: 9.4
EXT CHLORIDE: 99
EXT CO2: 29
EXT CREATININE: 1.07 MG/DL
EXT EOSINOPHIL%: 2
EXT EOSINOPHILS (ABSOLUTE): 92
EXT GFR MDRD AF AMER: 68
EXT GFR MDRD NON AF AMER: 58
EXT GLUCOSE: 134
EXT HEMATOCRIT: 30.2
EXT HEMOGLOBIN: 10.4
EXT INR: 1.1
EXT LYMPH%: 17.3
EXT LYMPHS (ABSOLUTE): 796
EXT MCH: 31.6
EXT MCHC: 34.4
EXT MCV: 91.8
EXT MONOCYTES (ABSOLUTE): 363
EXT MONOCYTES%: 7.9
EXT MPV: 10.7
EXT NEUT%: 71.9
EXT NEUTROPHILS (ABSOLUTE): 3307
EXT PLATELETS: 74
EXT POTASSIUM: 4.1
EXT PROTEIN TOTAL: 6.1
EXT PT: 11.4
EXT RBC: 3.29
EXT RDW: 13.7
EXT SODIUM: 133 MMOL/L
EXT WBC: 4.6

## 2021-09-30 ENCOUNTER — PATIENT MESSAGE (OUTPATIENT)
Dept: TRANSPLANT | Facility: CLINIC | Age: 56
End: 2021-09-30

## 2021-10-22 ENCOUNTER — DOCUMENTATION ONLY (OUTPATIENT)
Dept: TRANSPLANT | Facility: CLINIC | Age: 56
End: 2021-10-22

## 2021-10-22 LAB
EXT ALBUMIN: 2.9
EXT ALKALINE PHOSPHATASE: 78
EXT ALT: 18
EXT AST: 30
EXT BASOPHILS (ABSOLUTE): 0
EXT BILIRUBIN TOTAL: 1.7
EXT BUN: 13
EXT CALCIUM: 10
EXT CHLORIDE: 107
EXT CO2: 25
EXT CREATININE: 1.23 MG/DL
EXT EOSINOPHILS (ABSOLUTE): 0.2
EXT GFR MDRD AF AMER: 57
EXT GFR MDRD NON AF AMER: 49
EXT GLUCOSE: 166
EXT HEMATOCRIT: 30.2
EXT HEMOGLOBIN: 10.6
EXT INR: 1.2
EXT LYMPHS (ABSOLUTE): 1.2
EXT MCH: 33.4
EXT MCHC: 35.1
EXT MCV: 95
EXT MONOCYTES (ABSOLUTE): 0.4
EXT NEUTROPHILS (ABSOLUTE): 4.3
EXT PLATELETS: 69
EXT POTASSIUM: 3.6
EXT PROTEIN TOTAL: 6.5
EXT PT: 15
EXT RBC: 3.17
EXT RDW: 13.5
EXT SODIUM: 136 MMOL/L
EXT WBC: 6.2

## 2021-11-01 ENCOUNTER — TELEPHONE (OUTPATIENT)
Dept: TRANSPLANT | Facility: CLINIC | Age: 56
End: 2021-11-01
Payer: COMMERCIAL

## 2021-11-29 ENCOUNTER — PATIENT MESSAGE (OUTPATIENT)
Dept: TRANSPLANT | Facility: CLINIC | Age: 56
End: 2021-11-29
Payer: COMMERCIAL

## 2023-01-01 NOTE — PT/OT/SLP PROGRESS
Add 93443 Cpt? (Important Note: In 2017 The Use Of 07395 Is Being Tracked By Cms To Determine Future Global Period Reimbursement For Global Periods): no
Occupational Therapy      Patient Name:  Zeina Mahoney   MRN:  [id number]    Patient not seen today secondary to spousal refusal. Will follow-up 6/27/2020.    Kee Wang OT  6/26/2020  
Physical Therapy      Patient Name:  Zeina Mahoney   MRN:  [id number]    Patient not seen today secondary to (pt.'s spouse refused PT due to pt.'s confusion). Will follow-up over weekend.    Gerard Rivera, PT   6/26/2020      
Detail Level: Simple
Normal shape and contour; nares, nostrils and choana patent; no nasal flaring; mucosa pink and moist.

## 2023-11-21 NOTE — ED TRIAGE NOTES
Zeina Maru, a 54 y.o. female presents to the ED w/ complaint of abdominal pain    Triage note: pt presents with abdominal pain for the past full day. Pt reports diffuse abdominal pain 10/10. Pt denies vomiting and diarrhea. Pt on liver transplant list   Chief Complaint   Patient presents with    Abdominal Pain     Pt reports generalized abd pain x 1 day. pt denies emesis and diarrhea. Pt on Liver tx list.     Review of patient's allergies indicates:   Allergen Reactions    Aspirin Tinitus    Ciprofloxacin Rash     Past Medical History:   Diagnosis Date    Chronic Hepatic encephalopathy 6/25/2020    Decompensated hepatic cirrhosis 6/25/2020    History of pancreatitis 6/25/2020    Liver cirrhosis secondary to CHAUDHARY 6/25/2020    Macrocytic anemia 6/25/2020    Other ascites 6/25/2020    Thrombocytopenia 6/25/2020      pain/impaired postural control/decreased strength
